# Patient Record
Sex: FEMALE | Race: WHITE | Employment: OTHER | URBAN - METROPOLITAN AREA
[De-identification: names, ages, dates, MRNs, and addresses within clinical notes are randomized per-mention and may not be internally consistent; named-entity substitution may affect disease eponyms.]

---

## 2017-02-13 ENCOUNTER — HOSPITAL ENCOUNTER (OUTPATIENT)
Dept: SURGERY | Age: 62
Discharge: HOME | End: 2017-02-13
Attending: INTERNAL MEDICINE | Admitting: INTERNAL MEDICINE

## 2017-02-13 VITALS
DIASTOLIC BLOOD PRESSURE: 76 MMHG | HEART RATE: 102 BPM | WEIGHT: 130 LBS | SYSTOLIC BLOOD PRESSURE: 108 MMHG | OXYGEN SATURATION: 97 % | BODY MASS INDEX: 25.52 KG/M2 | RESPIRATION RATE: 16 BRPM | HEIGHT: 60 IN

## 2017-02-13 RX ORDER — LIDOCAINE HYDROCHLORIDE 10 MG/ML
0.1 INJECTION, SOLUTION INFILTRATION; PERINEURAL ONCE
Status: DISCONTINUED | OUTPATIENT
Start: 2017-02-13 | End: 2017-02-14 | Stop reason: HOSPADM

## 2017-02-13 RX ORDER — SODIUM CHLORIDE, SODIUM LACTATE, POTASSIUM CHLORIDE, CALCIUM CHLORIDE 600; 310; 30; 20 MG/100ML; MG/100ML; MG/100ML; MG/100ML
INJECTION, SOLUTION INTRAVENOUS CONTINUOUS
Status: DISCONTINUED | OUTPATIENT
Start: 2017-02-13 | End: 2017-02-14 | Stop reason: HOSPADM

## 2017-02-13 RX ADMIN — SODIUM CHLORIDE, SODIUM LACTATE, POTASSIUM CHLORIDE, CALCIUM CHLORIDE: 600; 310; 30; 20 INJECTION, SOLUTION INTRAVENOUS at 10:54

## 2017-02-13 ASSESSMENT — PAIN - FUNCTIONAL ASSESSMENT: PAIN_FUNCTIONAL_ASSESSMENT: 0-10

## 2017-02-13 ASSESSMENT — PAIN SCALES - GENERAL: PAINLEVEL_OUTOF10: 0

## 2018-08-31 ENCOUNTER — HOSPITAL ENCOUNTER (OUTPATIENT)
Dept: PULMONOLOGY | Age: 63
Discharge: HOME | End: 2018-08-31
Payer: MEDICAID

## 2018-08-31 PROCEDURE — 94760 N-INVAS EAR/PLS OXIMETRY 1: CPT

## 2018-08-31 PROCEDURE — 6370000000 HC RX 637 (ALT 250 FOR IP): Performed by: NURSE PRACTITIONER

## 2018-08-31 PROCEDURE — 94726 PLETHYSMOGRAPHY LUNG VOLUMES: CPT

## 2018-08-31 PROCEDURE — 94664 DEMO&/EVAL PT USE INHALER: CPT

## 2018-08-31 PROCEDURE — 94729 DIFFUSING CAPACITY: CPT

## 2018-08-31 PROCEDURE — 94060 EVALUATION OF WHEEZING: CPT

## 2018-08-31 RX ORDER — ALBUTEROL SULFATE 90 UG/1
4 AEROSOL, METERED RESPIRATORY (INHALATION) ONCE
Status: COMPLETED | OUTPATIENT
Start: 2018-08-31 | End: 2018-08-31

## 2018-08-31 RX ADMIN — Medication 4 PUFF: at 13:51

## 2018-09-04 NOTE — PROCEDURES
[facility]                   [facility]                                PULMONARY FUNCTION    PATIENT NAME: Prashant Cope                      :        1955  MED REC NO:   [id number]                          ROOM:  ACCOUNT NO:   [de-identified]                           ADMIT DATE: 2018  PROVIDER:     Kamaljit Yi MD    DATE OF PROCEDURE:  2018    PFT    Spirometry showed FVC 1.02 L, 36% predicted; FEV1 0.88 L, 41% predicted,  with the FEV1/FVC ratio of 86%. There was no significant bronchodilator  response. Lung volume showed TLC of 2.26 L, 50% predicted; ERV 14%  predicted. Diffusion capacity was 57% predicted. IMPRESSION:  PFT suggests a severe restrictive defect based on total lung  capacity and spirometry. There is an insignificant bronchodilator  response. Diffusion capacity is also significantly diminished. Overall,  there is a high suggestion of an interstitial process. With the provided  history of wheezing, a coexisting obstructive process is not ruled out. Clinical correlation is recommended.         Edin Montes MD    D: 2018 9:02:55       T: 2018 16:50:21     [email or web address]  Job#: [id number]     Doc#: [id number]    CC:  ANGELINE Pierce MD

## 2018-11-07 ENCOUNTER — APPOINTMENT (OUTPATIENT)
Dept: CT IMAGING | Age: 63
DRG: 139 | End: 2018-11-07
Payer: MEDICAID

## 2018-11-07 ENCOUNTER — APPOINTMENT (OUTPATIENT)
Dept: GENERAL RADIOLOGY | Age: 63
DRG: 139 | End: 2018-11-07
Payer: MEDICAID

## 2018-11-07 ENCOUNTER — HOSPITAL ENCOUNTER (INPATIENT)
Age: 63
LOS: 2 days | Discharge: HOME | DRG: 139 | End: 2018-11-09
Attending: EMERGENCY MEDICINE | Admitting: INTERNAL MEDICINE
Payer: MEDICAID

## 2018-11-07 DIAGNOSIS — A41.9 SEPTICEMIA (HCC): Primary | ICD-10-CM

## 2018-11-07 DIAGNOSIS — J18.9 PNEUMONIA DUE TO ORGANISM: ICD-10-CM

## 2018-11-07 LAB
A/G RATIO: 1.3 (ref 1.1–2.2)
ALBUMIN SERPL-MCNC: 3.9 G/DL (ref 3.4–5)
ALP BLD-CCNC: 121 U/L (ref 40–129)
ALT SERPL-CCNC: 80 U/L (ref 10–40)
ANION GAP SERPL CALCULATED.3IONS-SCNC: 13 MMOL/L (ref 3–16)
AST SERPL-CCNC: 113 U/L (ref 15–37)
BANDED NEUTROPHILS RELATIVE PERCENT: 9 % (ref 0–7)
BASOPHILS ABSOLUTE: 0 K/UL (ref 0–0.2)
BASOPHILS RELATIVE PERCENT: 0 %
BILIRUB SERPL-MCNC: 0.4 MG/DL (ref 0–1)
BUN BLDV-MCNC: 15 MG/DL (ref 7–20)
CALCIUM SERPL-MCNC: 9.6 MG/DL (ref 8.3–10.6)
CHLORIDE BLD-SCNC: 98 MMOL/L (ref 99–110)
CO2: 22 MMOL/L (ref 21–32)
CREAT SERPL-MCNC: 0.8 MG/DL (ref 0.6–1.2)
EOSINOPHILS ABSOLUTE: 0 K/UL (ref 0–0.6)
EOSINOPHILS RELATIVE PERCENT: 0 %
GFR AFRICAN AMERICAN: >60
GFR NON-AFRICAN AMERICAN: >60
GLOBULIN: 3 G/DL
GLUCOSE BLD-MCNC: 158 MG/DL (ref 70–99)
HCT VFR BLD CALC: 43.3 % (ref 36–48)
HEMOGLOBIN: 14.3 G/DL (ref 12–16)
LACTIC ACID: 2.4 MMOL/L (ref 0.4–2)
LACTIC ACID: 2.9 MMOL/L (ref 0.4–2)
LYMPHOCYTES ABSOLUTE: 1.3 K/UL (ref 1–5.1)
LYMPHOCYTES RELATIVE PERCENT: 10 %
MCH RBC QN AUTO: 30.6 PG (ref 26–34)
MCHC RBC AUTO-ENTMCNC: 32.9 G/DL (ref 31–36)
MCV RBC AUTO: 92.8 FL (ref 80–100)
MONOCYTES ABSOLUTE: 0.9 K/UL (ref 0–1.3)
MONOCYTES RELATIVE PERCENT: 7 %
MYELOCYTE PERCENT: 1 %
NEUTROPHILS ABSOLUTE: 10.8 K/UL (ref 1.7–7.7)
NEUTROPHILS RELATIVE PERCENT: 73 %
PDW BLD-RTO: 15 % (ref 12.4–15.4)
PLATELET # BLD: 342 K/UL (ref 135–450)
PLATELET SLIDE REVIEW: ADEQUATE
PMV BLD AUTO: 7.3 FL (ref 5–10.5)
POTASSIUM REFLEX MAGNESIUM: 4 MMOL/L (ref 3.5–5.1)
PRO-BNP: 67 PG/ML (ref 0–124)
RBC # BLD: 4.67 M/UL (ref 4–5.2)
SLIDE REVIEW: ABNORMAL
SODIUM BLD-SCNC: 133 MMOL/L (ref 136–145)
TOTAL PROTEIN: 6.9 G/DL (ref 6.4–8.2)
TROPONIN: <0.01 NG/ML
VACUOLATED NEUTROPHILS: PRESENT
WBC # BLD: 13 K/UL (ref 4–11)

## 2018-11-07 PROCEDURE — G0378 HOSPITAL OBSERVATION PER HR: HCPCS

## 2018-11-07 PROCEDURE — 83880 ASSAY OF NATRIURETIC PEPTIDE: CPT

## 2018-11-07 PROCEDURE — 71275 CT ANGIOGRAPHY CHEST: CPT

## 2018-11-07 PROCEDURE — 6360000002 HC RX W HCPCS: Performed by: PHYSICIAN ASSISTANT

## 2018-11-07 PROCEDURE — 84484 ASSAY OF TROPONIN QUANT: CPT

## 2018-11-07 PROCEDURE — 99285 EMERGENCY DEPT VISIT HI MDM: CPT

## 2018-11-07 PROCEDURE — 93005 ELECTROCARDIOGRAM TRACING: CPT | Performed by: PHYSICIAN ASSISTANT

## 2018-11-07 PROCEDURE — 85025 COMPLETE CBC W/AUTO DIFF WBC: CPT

## 2018-11-07 PROCEDURE — 80053 COMPREHEN METABOLIC PANEL: CPT

## 2018-11-07 PROCEDURE — 6370000000 HC RX 637 (ALT 250 FOR IP): Performed by: INTERNAL MEDICINE

## 2018-11-07 PROCEDURE — 96361 HYDRATE IV INFUSION ADD-ON: CPT

## 2018-11-07 PROCEDURE — 2580000003 HC RX 258: Performed by: INTERNAL MEDICINE

## 2018-11-07 PROCEDURE — 83605 ASSAY OF LACTIC ACID: CPT

## 2018-11-07 PROCEDURE — 87040 BLOOD CULTURE FOR BACTERIA: CPT

## 2018-11-07 PROCEDURE — 96367 TX/PROPH/DG ADDL SEQ IV INF: CPT

## 2018-11-07 PROCEDURE — 96365 THER/PROPH/DIAG IV INF INIT: CPT

## 2018-11-07 PROCEDURE — 96375 TX/PRO/DX INJ NEW DRUG ADDON: CPT

## 2018-11-07 PROCEDURE — 2580000003 HC RX 258: Performed by: PHYSICIAN ASSISTANT

## 2018-11-07 PROCEDURE — 1200000000 HC SEMI PRIVATE

## 2018-11-07 PROCEDURE — 6360000004 HC RX CONTRAST MEDICATION: Performed by: PHYSICIAN ASSISTANT

## 2018-11-07 PROCEDURE — 71046 X-RAY EXAM CHEST 2 VIEWS: CPT

## 2018-11-07 PROCEDURE — 6370000000 HC RX 637 (ALT 250 FOR IP): Performed by: PHYSICIAN ASSISTANT

## 2018-11-07 RX ORDER — SODIUM CHLORIDE, SODIUM LACTATE, POTASSIUM CHLORIDE, CALCIUM CHLORIDE 600; 310; 30; 20 MG/100ML; MG/100ML; MG/100ML; MG/100ML
INJECTION, SOLUTION INTRAVENOUS CONTINUOUS
Status: DISCONTINUED | OUTPATIENT
Start: 2018-11-07 | End: 2018-11-09

## 2018-11-07 RX ORDER — IPRATROPIUM BROMIDE AND ALBUTEROL SULFATE 2.5; .5 MG/3ML; MG/3ML
1 SOLUTION RESPIRATORY (INHALATION) ONCE
Status: COMPLETED | OUTPATIENT
Start: 2018-11-07 | End: 2018-11-07

## 2018-11-07 RX ORDER — ONDANSETRON 2 MG/ML
4 INJECTION INTRAMUSCULAR; INTRAVENOUS EVERY 6 HOURS PRN
Status: DISCONTINUED | OUTPATIENT
Start: 2018-11-07 | End: 2018-11-09 | Stop reason: HOSPADM

## 2018-11-07 RX ORDER — AZITHROMYCIN 250 MG/1
250 TABLET, FILM COATED ORAL DAILY
Status: DISCONTINUED | OUTPATIENT
Start: 2018-11-08 | End: 2018-11-09 | Stop reason: HOSPADM

## 2018-11-07 RX ORDER — METOPROLOL TARTRATE 100 MG/1
100 TABLET ORAL DAILY
COMMUNITY
End: 2019-01-08 | Stop reason: ALTCHOICE

## 2018-11-07 RX ORDER — METHYLPREDNISOLONE SODIUM SUCCINATE 125 MG/2ML
125 INJECTION, POWDER, LYOPHILIZED, FOR SOLUTION INTRAMUSCULAR; INTRAVENOUS ONCE
Status: COMPLETED | OUTPATIENT
Start: 2018-11-07 | End: 2018-11-07

## 2018-11-07 RX ORDER — SODIUM CHLORIDE 0.9 % (FLUSH) 0.9 %
10 SYRINGE (ML) INJECTION EVERY 12 HOURS SCHEDULED
Status: DISCONTINUED | OUTPATIENT
Start: 2018-11-07 | End: 2018-11-09 | Stop reason: HOSPADM

## 2018-11-07 RX ORDER — METOPROLOL TARTRATE 50 MG/1
100 TABLET, FILM COATED ORAL DAILY
Status: DISCONTINUED | OUTPATIENT
Start: 2018-11-08 | End: 2018-11-09 | Stop reason: HOSPADM

## 2018-11-07 RX ORDER — KETOROLAC TROMETHAMINE 30 MG/ML
15 INJECTION, SOLUTION INTRAMUSCULAR; INTRAVENOUS ONCE
Status: COMPLETED | OUTPATIENT
Start: 2018-11-07 | End: 2018-11-07

## 2018-11-07 RX ORDER — 0.9 % SODIUM CHLORIDE 0.9 %
30 INTRAVENOUS SOLUTION INTRAVENOUS ONCE
Status: COMPLETED | OUTPATIENT
Start: 2018-11-07 | End: 2018-11-07

## 2018-11-07 RX ORDER — SODIUM CHLORIDE 0.9 % (FLUSH) 0.9 %
10 SYRINGE (ML) INJECTION PRN
Status: DISCONTINUED | OUTPATIENT
Start: 2018-11-07 | End: 2018-11-09 | Stop reason: HOSPADM

## 2018-11-07 RX ORDER — PREDNISONE 1 MG/1
5 TABLET ORAL 2 TIMES DAILY
Status: DISCONTINUED | OUTPATIENT
Start: 2018-11-07 | End: 2018-11-09 | Stop reason: HOSPADM

## 2018-11-07 RX ORDER — PREDNISONE 1 MG/1
5 TABLET ORAL DAILY
COMMUNITY
Start: 2018-07-24 | End: 2020-07-09

## 2018-11-07 RX ADMIN — METHYLPREDNISOLONE SODIUM SUCCINATE 125 MG: 125 INJECTION, POWDER, FOR SOLUTION INTRAMUSCULAR; INTRAVENOUS at 19:00

## 2018-11-07 RX ADMIN — PREDNISONE 5 MG: 5 TABLET ORAL at 22:23

## 2018-11-07 RX ADMIN — KETOROLAC TROMETHAMINE 15 MG: 30 INJECTION, SOLUTION INTRAMUSCULAR at 19:00

## 2018-11-07 RX ADMIN — IPRATROPIUM BROMIDE AND ALBUTEROL SULFATE 1 AMPULE: .5; 3 SOLUTION RESPIRATORY (INHALATION) at 19:07

## 2018-11-07 RX ADMIN — SODIUM CHLORIDE, POTASSIUM CHLORIDE, SODIUM LACTATE AND CALCIUM CHLORIDE: 600; 310; 30; 20 INJECTION, SOLUTION INTRAVENOUS at 22:24

## 2018-11-07 RX ADMIN — DEXTROSE MONOHYDRATE 500 MG: 50 INJECTION, SOLUTION INTRAVENOUS at 19:38

## 2018-11-07 RX ADMIN — CEFTRIAXONE SODIUM 1 G: 1 INJECTION, POWDER, FOR SOLUTION INTRAMUSCULAR; INTRAVENOUS at 19:06

## 2018-11-07 RX ADMIN — SODIUM CHLORIDE 1905 ML: 9 INJECTION, SOLUTION INTRAVENOUS at 17:55

## 2018-11-07 RX ADMIN — Medication 10 ML: at 22:24

## 2018-11-07 RX ADMIN — IOPAMIDOL 85 ML: 755 INJECTION, SOLUTION INTRAVENOUS at 19:49

## 2018-11-07 RX ADMIN — IPRATROPIUM BROMIDE AND ALBUTEROL SULFATE 1 AMPULE: .5; 3 SOLUTION RESPIRATORY (INHALATION) at 19:00

## 2018-11-07 ASSESSMENT — PAIN SCALES - GENERAL: PAINLEVEL_OUTOF10: 0

## 2018-11-08 PROBLEM — M35.3 POLYMYALGIA RHEUMATICA (HCC): Status: ACTIVE | Noted: 2018-11-08

## 2018-11-08 PROBLEM — R65.10 SIRS (SYSTEMIC INFLAMMATORY RESPONSE SYNDROME) (HCC): Status: ACTIVE | Noted: 2018-11-07

## 2018-11-08 PROBLEM — I10 ESSENTIAL HYPERTENSION: Status: ACTIVE | Noted: 2018-11-08

## 2018-11-08 LAB
EKG ATRIAL RATE: 125 BPM
EKG DIAGNOSIS: NORMAL
EKG P AXIS: 54 DEGREES
EKG P-R INTERVAL: 140 MS
EKG Q-T INTERVAL: 308 MS
EKG QRS DURATION: 82 MS
EKG QTC CALCULATION (BAZETT): 444 MS
EKG R AXIS: 23 DEGREES
EKG T AXIS: 34 DEGREES
EKG VENTRICULAR RATE: 125 BPM
PROCALCITONIN: 0.3 NG/ML (ref 0–0.15)

## 2018-11-08 PROCEDURE — 94150 VITAL CAPACITY TEST: CPT

## 2018-11-08 PROCEDURE — 6360000002 HC RX W HCPCS: Performed by: INTERNAL MEDICINE

## 2018-11-08 PROCEDURE — 1200000000 HC SEMI PRIVATE

## 2018-11-08 PROCEDURE — 6370000000 HC RX 637 (ALT 250 FOR IP): Performed by: INTERNAL MEDICINE

## 2018-11-08 PROCEDURE — 93010 ELECTROCARDIOGRAM REPORT: CPT | Performed by: INTERNAL MEDICINE

## 2018-11-08 PROCEDURE — G0378 HOSPITAL OBSERVATION PER HR: HCPCS

## 2018-11-08 PROCEDURE — 99233 SBSQ HOSP IP/OBS HIGH 50: CPT | Performed by: INTERNAL MEDICINE

## 2018-11-08 PROCEDURE — 84145 PROCALCITONIN (PCT): CPT

## 2018-11-08 PROCEDURE — 36415 COLL VENOUS BLD VENIPUNCTURE: CPT

## 2018-11-08 PROCEDURE — 94640 AIRWAY INHALATION TREATMENT: CPT

## 2018-11-08 PROCEDURE — 96375 TX/PRO/DX INJ NEW DRUG ADDON: CPT

## 2018-11-08 PROCEDURE — 2580000003 HC RX 258: Performed by: INTERNAL MEDICINE

## 2018-11-08 PROCEDURE — 96366 THER/PROPH/DIAG IV INF ADDON: CPT

## 2018-11-08 PROCEDURE — 94761 N-INVAS EAR/PLS OXIMETRY MLT: CPT

## 2018-11-08 PROCEDURE — 6360000002 HC RX W HCPCS: Performed by: PHYSICIAN ASSISTANT

## 2018-11-08 RX ORDER — ALBUTEROL SULFATE 2.5 MG/3ML
2.5 SOLUTION RESPIRATORY (INHALATION) EVERY 4 HOURS PRN
Status: DISCONTINUED | OUTPATIENT
Start: 2018-11-08 | End: 2018-11-09 | Stop reason: HOSPADM

## 2018-11-08 RX ADMIN — METOPROLOL TARTRATE 100 MG: 50 TABLET ORAL at 08:26

## 2018-11-08 RX ADMIN — PREDNISONE 5 MG: 5 TABLET ORAL at 20:40

## 2018-11-08 RX ADMIN — CEFTRIAXONE SODIUM 1 G: 1 INJECTION, POWDER, FOR SOLUTION INTRAMUSCULAR; INTRAVENOUS at 20:40

## 2018-11-08 RX ADMIN — AZITHROMYCIN 250 MG: 250 TABLET, FILM COATED ORAL at 08:25

## 2018-11-08 RX ADMIN — ALBUTEROL SULFATE 2.5 MG: 2.5 SOLUTION RESPIRATORY (INHALATION) at 13:31

## 2018-11-08 RX ADMIN — SODIUM CHLORIDE, POTASSIUM CHLORIDE, SODIUM LACTATE AND CALCIUM CHLORIDE: 600; 310; 30; 20 INJECTION, SOLUTION INTRAVENOUS at 05:50

## 2018-11-08 RX ADMIN — Medication 10 ML: at 20:40

## 2018-11-08 RX ADMIN — ONDANSETRON 4 MG: 2 INJECTION INTRAMUSCULAR; INTRAVENOUS at 23:33

## 2018-11-08 RX ADMIN — SODIUM CHLORIDE, POTASSIUM CHLORIDE, SODIUM LACTATE AND CALCIUM CHLORIDE: 600; 310; 30; 20 INJECTION, SOLUTION INTRAVENOUS at 13:44

## 2018-11-08 RX ADMIN — PREDNISONE 5 MG: 5 TABLET ORAL at 08:26

## 2018-11-08 RX ADMIN — ALBUTEROL SULFATE 2.5 MG: 2.5 SOLUTION RESPIRATORY (INHALATION) at 18:48

## 2018-11-08 RX ADMIN — SODIUM CHLORIDE, POTASSIUM CHLORIDE, SODIUM LACTATE AND CALCIUM CHLORIDE: 600; 310; 30; 20 INJECTION, SOLUTION INTRAVENOUS at 20:39

## 2018-11-08 ASSESSMENT — PAIN DESCRIPTION - ONSET: ONSET: GRADUAL

## 2018-11-08 ASSESSMENT — PAIN DESCRIPTION - PAIN TYPE: TYPE: ACUTE PAIN

## 2018-11-08 ASSESSMENT — PAIN SCALES - GENERAL: PAINLEVEL_OUTOF10: 2

## 2018-11-08 NOTE — ED PROVIDER NOTES
department physician unless otherwise noted. Please see attending Ed providers note for interpretation      ED Course / Medical Decision Making     Medications   metoprolol tartrate (LOPRESSOR) tablet 100 mg (not administered)   predniSONE (DELTASONE) tablet 5 mg (5 mg Oral Given 11/7/18 2223)   sodium chloride flush 0.9 % injection 10 mL (10 mLs Intravenous Given 11/7/18 2224)   sodium chloride flush 0.9 % injection 10 mL (not administered)   magnesium hydroxide (MILK OF MAGNESIA) 400 MG/5ML suspension 30 mL (not administered)   ondansetron (ZOFRAN) injection 4 mg (not administered)   enoxaparin (LOVENOX) injection 40 mg (not administered)   lactated ringers infusion ( Intravenous New Bag 11/7/18 2224)   cefTRIAXone (ROCEPHIN) 1 g IVPB in 50 mL D5W minibag (not administered)   azithromycin (ZITHROMAX) tablet 250 mg (not administered)   0.9 % sodium chloride bolus (0 mL/kg × 63.5 kg Intravenous Stopped 11/7/18 1939)   azithromycin (ZITHROMAX) 500 mg in D5W 250ml addavial (0 mg Intravenous Stopped 11/7/18 2109)   cefTRIAXone (ROCEPHIN) 1 g IVPB in 50 mL D5W minibag (0 g Intravenous Stopped 11/7/18 1939)   ipratropium-albuterol (DUONEB) nebulizer solution 1 ampule (1 ampule Inhalation Given 11/7/18 1907)   ipratropium-albuterol (DUONEB) nebulizer solution 1 ampule (1 ampule Inhalation Given 11/7/18 1900)   ketorolac (TORADOL) injection 15 mg (15 mg Intravenous Given 11/7/18 1900)   methylPREDNISolone sodium (SOLU-MEDROL) injection 125 mg (125 mg Intravenous Given 11/7/18 1900)   iopamidol (ISOVUE-370) 76 % injection 85 mL (85 mLs Intravenous Given 11/7/18 1949)     Re-Evaluations:  11/7/18      Time: 2115    Patients symptoms show no change. Consultations:             IP CONSULT TO HOSPITALIST  Agrees to admit the patient in stable condition    MDM:  Afebrile, stable, patient presents to the ED for evaluation. Seen in conjunction with attending ED provider who agrees with assessment and plan.   Patients sent in

## 2018-11-08 NOTE — H&P
[address]                 [address] [address]                              HISTORY AND PHYSICAL    PATIENT NAME: Omayra Garcia                      :        1955  MED REC NO:   [id number]                          ROOM:       2750  ACCOUNT NO:   [de-identified]                           ADMIT DATE: 2018  PROVIDER:     Delma Ellis MD    I obtained a history and performed physical exam on the patient on the  medical floor in the presence of her assigned nurse in the room on  2018. CHIEF COMPLAINT:  Feeling very fatigued and weak. HISTORY OF PRESENT ILLNESS:  The patient is a 81-year-old   female presenting to the hospital with chief complaint of 3-4 days of  initially flu-like symptoms with body aches, associated with gradual  onset of shortness of breath over three days along with cough without  expectoration and persistent increase in fevers and chills especially  today. The patient's symptoms were more when she tried to exert and  were partially relieved by rest.  At the time of my exam, she states  that she is feeling a little better. PAST MEDICAL/PAST SURGICAL HISTORY:  Polymyalgia rheumatica, on chronic  steroids at home. ALLERGIC HISTORY:  No known allergies. FAMILY HISTORY:  Reviewed by me and is currently noncontributory. SOCIAL HISTORY:  Nonsmoker, no illicit substance use. MEDICATIONS:  Home medication list has been reviewed. REVIEW OF SYSTEMS:  Significant for the fatigue and the shortness of  breath and per the history of present illness. All other systems have  been reviewed and are negative except for the history of present  illness. PHYSICAL EXAMINATION:  The patient was examined on the medical floor. VITAL SIGNS:  Temperature 98, respiratory rate 17, pulse 127, blood  pressure 97/80, saturating 94%. CNS:  Alert, awake and oriented to time, place and person.   PSYCH:

## 2018-11-08 NOTE — PROGRESS NOTES
RESPIRATORY THERAPY ASSESSMENT    Name:  Randy Burks Record Number:  [id number]  Age: 66 y.o. Gender: female  : 1955  Today's Date:  2018  Room:  02080208-01    Assessment     Is the patient being admitted for a COPD or Asthma exacerbation? No   (If yes the patient will be seen every 4 hours for the first 24 hours and then reassessed)    Patient Admission Diagnosis      Allergies  Allergies   Allergen Reactions    Penicillins Rash       Minimum Predicted Vital Capacity:     680          Actual Vital Capacity:      700            Pulmonary History:No history  Home Oxygen Therapy:  room air  Home Respiratory Therapy:Albuterol just started mdi  Current Respiratory Therapy:  Albuterol q 4 prn  Treatment Type: HHN  Medications: Albuterol    Respiratory Severity Index(RSI)   Patients with orders for inhalation medications, oxygen, or any therapeutic treatment modality will be placed on Respiratory Protocol. They will be assessed with the first treatment and at least every 72 hours thereafter. The following severity scale will be used to determine frequency of treatment intervention.     Smoking History: No Smoking History = 0    Social History  Social History   Substance Use Topics    Smoking status: Never Smoker    Smokeless tobacco: Never Used    Alcohol use No       Recent Surgical History: None = 0  Past Surgical History  Past Surgical History:   Procedure Laterality Date    INNER EAR SURGERY Right        Level of Consciousness: Alert, Oriented, and Cooperative = 0    Level of Activity: Walking unassisted = 0    Respiratory Pattern: Regular Pattern; RR 8-20 = 0    Breath Sounds: Diminshed bilaterally and/or crackles = 2    Sputum   ,  , Sputum How Obtained: Spontaneous cough  Cough: Strong, spontaneous, non-productive = 0    Vital Signs   /78   Pulse 103   Temp 97.6 °F (36.4 °C) (Oral)   Resp 20   Ht 5' (1.524 m)   Wt 144 lb 1 oz (65.3 kg)   SpO2 96%   BMI 28.14 kg/m²

## 2018-11-08 NOTE — PROGRESS NOTES
Admission questions complete. Shift assessment completed. Pt is alert and oriented. Vital signs are WNL. Respirations are even & easy at rest. Dyspnea with exertion. Lungs diminished t/o. Pt has dry cough occasionally. No complaints voiced. Four eyes completed no skin issues noted. Pt denies needs at this time. SR up x 2 and bed in low position. Call light is within reach. Will monitor.

## 2018-11-09 VITALS
SYSTOLIC BLOOD PRESSURE: 152 MMHG | RESPIRATION RATE: 18 BRPM | DIASTOLIC BLOOD PRESSURE: 80 MMHG | HEIGHT: 60 IN | HEART RATE: 97 BPM | OXYGEN SATURATION: 97 % | WEIGHT: 144.06 LBS | TEMPERATURE: 97.4 F | BODY MASS INDEX: 28.28 KG/M2

## 2018-11-09 LAB — PROCALCITONIN: 0.18 NG/ML (ref 0–0.15)

## 2018-11-09 PROCEDURE — 99238 HOSP IP/OBS DSCHRG MGMT 30/<: CPT | Performed by: INTERNAL MEDICINE

## 2018-11-09 PROCEDURE — G0378 HOSPITAL OBSERVATION PER HR: HCPCS

## 2018-11-09 PROCEDURE — 2580000003 HC RX 258: Performed by: INTERNAL MEDICINE

## 2018-11-09 PROCEDURE — 6370000000 HC RX 637 (ALT 250 FOR IP): Performed by: INTERNAL MEDICINE

## 2018-11-09 PROCEDURE — 84145 PROCALCITONIN (PCT): CPT

## 2018-11-09 PROCEDURE — 36415 COLL VENOUS BLD VENIPUNCTURE: CPT

## 2018-11-09 RX ORDER — LEVOFLOXACIN 500 MG/1
500 TABLET, FILM COATED ORAL DAILY
Qty: 6 TABLET | Refills: 0 | Status: SHIPPED | OUTPATIENT
Start: 2018-11-09 | End: 2018-11-15

## 2018-11-09 RX ORDER — ALBUTEROL SULFATE 90 UG/1
2 AEROSOL, METERED RESPIRATORY (INHALATION) EVERY 6 HOURS PRN
Qty: 1 INHALER | Refills: 0 | Status: ON HOLD | OUTPATIENT
Start: 2018-11-09 | End: 2021-05-11 | Stop reason: SDUPTHER

## 2018-11-09 RX ADMIN — METOPROLOL TARTRATE 100 MG: 50 TABLET ORAL at 08:55

## 2018-11-09 RX ADMIN — AZITHROMYCIN 250 MG: 250 TABLET, FILM COATED ORAL at 08:55

## 2018-11-09 RX ADMIN — PREDNISONE 5 MG: 5 TABLET ORAL at 08:55

## 2018-11-09 RX ADMIN — Medication 10 ML: at 08:55

## 2018-11-09 RX ADMIN — SODIUM CHLORIDE, POTASSIUM CHLORIDE, SODIUM LACTATE AND CALCIUM CHLORIDE: 600; 310; 30; 20 INJECTION, SOLUTION INTRAVENOUS at 05:24

## 2018-11-09 NOTE — PROGRESS NOTES
Awake most of the night. Pt stated that she has slept for 2 days and she just couldn't sleep very well tonight. O2 sat 95-96% on RA. Pt denies c/o pain or needing anything at this time.

## 2018-11-09 NOTE — DISCHARGE SUMMARY
shakiness,   headache, fast heartbeat.     levofloxacin 500 MG tablet  Commonly known as:  LEVAQUIN  Take 1 tablet by mouth daily for 6 days  Notes to patient:  Levaquin  Use: Antibiotic  Side effects: Nausea/Vomiting, Headache,   Dizziness, Tendoniitis, Tendon rupture        CONTINUE taking these medications    metoprolol 100 MG tablet  Commonly known as:  LOPRESSOR     predniSONE 5 MG tablet  Commonly known as:  Chloe Pelaez        STOP taking these medications    NONFORMULARY           Where to Get Your Medications      These medications were sent to [address] [place] [address],[address], [address] [place] [address], [address] [address]    Phone:  [phone]   · albuterol sulfate  (90 Base) MCG/ACT inhaler  · levofloxacin 500 MG tablet           Discharged in stable condition to home    Follow Up:   Follow up with PCP in 1 week    Homa Blake 11/10/2018 11:58 AM

## 2018-11-09 NOTE — PROGRESS NOTES
Shift assessment completed, see flowsheets. AM meds given per orders. Pt refused lovenox injection, pt educated on the importance and need for injection. Pt denies pain or further needs at this time. Call light and personal belongings within reach.

## 2018-11-12 LAB
BLOOD CULTURE, ROUTINE: NORMAL
CULTURE, BLOOD 2: NORMAL

## 2019-01-07 PROBLEM — R00.0 TACHYCARDIA: Status: ACTIVE | Noted: 2019-01-07

## 2019-01-08 ENCOUNTER — OFFICE VISIT (OUTPATIENT)
Dept: CARDIOLOGY CLINIC | Age: 64
End: 2019-01-08
Payer: MEDICAID

## 2019-01-08 VITALS
SYSTOLIC BLOOD PRESSURE: 116 MMHG | DIASTOLIC BLOOD PRESSURE: 76 MMHG | OXYGEN SATURATION: 95 % | HEIGHT: 60 IN | BODY MASS INDEX: 27.78 KG/M2 | WEIGHT: 141.5 LBS | HEART RATE: 90 BPM

## 2019-01-08 DIAGNOSIS — I10 ESSENTIAL HYPERTENSION: Primary | ICD-10-CM

## 2019-01-08 DIAGNOSIS — R00.0 TACHYCARDIA: ICD-10-CM

## 2019-01-08 PROCEDURE — G8484 FLU IMMUNIZE NO ADMIN: HCPCS | Performed by: INTERNAL MEDICINE

## 2019-01-08 PROCEDURE — G8419 CALC BMI OUT NRM PARAM NOF/U: HCPCS | Performed by: INTERNAL MEDICINE

## 2019-01-08 PROCEDURE — G8427 DOCREV CUR MEDS BY ELIG CLIN: HCPCS | Performed by: INTERNAL MEDICINE

## 2019-01-08 PROCEDURE — 1036F TOBACCO NON-USER: CPT | Performed by: INTERNAL MEDICINE

## 2019-01-08 PROCEDURE — 3017F COLORECTAL CA SCREEN DOC REV: CPT | Performed by: INTERNAL MEDICINE

## 2019-01-08 PROCEDURE — 99203 OFFICE O/P NEW LOW 30 MIN: CPT | Performed by: INTERNAL MEDICINE

## 2019-01-08 RX ORDER — METOPROLOL SUCCINATE 100 MG/1
100 TABLET, EXTENDED RELEASE ORAL DAILY
Qty: 30 TABLET | Refills: 11 | Status: SHIPPED | OUTPATIENT
Start: 2019-01-08 | End: 2020-01-02 | Stop reason: SDUPTHER

## 2019-01-08 RX ORDER — ERGOCALCIFEROL 1.25 MG/1
50000 CAPSULE ORAL WEEKLY
Qty: 12 CAPSULE | Refills: 1
Start: 2019-01-08 | End: 2020-07-09

## 2019-03-01 ENCOUNTER — HOSPITAL ENCOUNTER (OUTPATIENT)
Dept: VASCULAR LAB | Age: 64
Discharge: HOME | End: 2019-03-01
Payer: COMMERCIAL

## 2019-03-01 PROCEDURE — 93922 UPR/L XTREMITY ART 2 LEVELS: CPT

## 2019-08-30 ENCOUNTER — OFFICE VISIT (OUTPATIENT)
Dept: ENT CLINIC | Age: 64
End: 2019-08-30
Payer: COMMERCIAL

## 2019-08-30 VITALS
BODY MASS INDEX: 25.32 KG/M2 | DIASTOLIC BLOOD PRESSURE: 79 MMHG | WEIGHT: 129 LBS | HEART RATE: 100 BPM | SYSTOLIC BLOOD PRESSURE: 146 MMHG | HEIGHT: 60 IN | TEMPERATURE: 97.5 F

## 2019-08-30 DIAGNOSIS — J30.1 SEASONAL ALLERGIC RHINITIS DUE TO POLLEN: ICD-10-CM

## 2019-08-30 DIAGNOSIS — Z90.89 HISTORY OF RIGHT MASTOIDECTOMY: Primary | ICD-10-CM

## 2019-08-30 PROCEDURE — G8427 DOCREV CUR MEDS BY ELIG CLIN: HCPCS | Performed by: OTOLARYNGOLOGY

## 2019-08-30 PROCEDURE — 99203 OFFICE O/P NEW LOW 30 MIN: CPT | Performed by: OTOLARYNGOLOGY

## 2019-08-30 PROCEDURE — 1036F TOBACCO NON-USER: CPT | Performed by: OTOLARYNGOLOGY

## 2019-08-30 PROCEDURE — G8419 CALC BMI OUT NRM PARAM NOF/U: HCPCS | Performed by: OTOLARYNGOLOGY

## 2019-08-30 PROCEDURE — 69220 CLEAN OUT MASTOID CAVITY: CPT | Performed by: OTOLARYNGOLOGY

## 2019-08-30 PROCEDURE — 3017F COLORECTAL CA SCREEN DOC REV: CPT | Performed by: OTOLARYNGOLOGY

## 2019-08-30 ASSESSMENT — ENCOUNTER SYMPTOMS
COLOR CHANGE: 0
WHEEZING: 0
TROUBLE SWALLOWING: 0
STRIDOR: 0
SINUS PAIN: 0
DIARRHEA: 0
EYE ITCHING: 0
SORE THROAT: 0
CONSTIPATION: 0
COUGH: 0
PHOTOPHOBIA: 0
BLOOD IN STOOL: 0
VOMITING: 0
SINUS PRESSURE: 0
EYE DISCHARGE: 0
FACIAL SWELLING: 0
VOICE CHANGE: 0
CHOKING: 0
BACK PAIN: 0
SHORTNESS OF BREATH: 0
RHINORRHEA: 0
NAUSEA: 0

## 2019-08-30 NOTE — PROGRESS NOTES
[facility]  [address] [facility] [address], [address] [facility] [address]  P: [phone]  F: [phone]       Patient     Nancy Landeros  1955    ChiefComplaint     Chief Complaint   Patient presents with    Ear Problem     Surgery right ear and Dr. Amarilis Puentes rebuilt her ear but he retired and she need to have her ear looked at every 6 months       History of Present Illness     Celine Salcido is a pleasant 72-year-old female who presents as a new patient today for right ear problem. She has a history of a right canal wall down mastoidectomy for cholesteatoma. She is here for mastoid bowl cleaning. She has not had the ear cleaned for quite some time. Surgery was 2 years ago. Her previous ENT retired. She denies any significant change in hearing. She denies otalgia or otorrhea. She does have a history of PE tubes on the right and left side. No problems with hearing in the left ear. Additionally the patient complains of some allergic rhinitis and nasal obstruction symptoms. She has used Flonase in the past but experiences some nosebleeds with that. She has been taking Claritin as needed. She continues to have some nasal obstruction symptoms.     Past Medical History     Past Medical History:   Diagnosis Date    Arthritis     Hypertension        Past Surgical History     Past Surgical History:   Procedure Laterality Date    INNER EAR SURGERY Right        Family History     Family History   Problem Relation Age of Onset    Diabetes Mother     Cancer Father         pancreas       Social History     Social History     Socioeconomic History    Marital status:      Spouse name: Luz Elena Edwards    Number of children: 0    Years of education: 15    Highest education level: Not on file   Occupational History    Not on file   Social Needs    Financial resource strain: Not on file    Food insecurity:     Worry: Not on file     Inability: Not on file    Transportation needs:     Medical: Not not retracted and not bulging. Tympanic membrane mobility is normal.  No middle ear effusion. No decreased hearing is noted. Ears:    Nose: No mucosal edema, rhinorrhea, nose lacerations, sinus tenderness, nasal deformity, septal deviation or nasal septal hematoma. No epistaxis. No foreign bodies. Right sinus exhibits no maxillary sinus tenderness and no frontal sinus tenderness. Left sinus exhibits no maxillary sinus tenderness and no frontal sinus tenderness. Mouth/Throat: Uvula is midline and oropharynx is clear and moist. Mucous membranes are not pale, not dry and not cyanotic. No oral lesions. No trismus in the jaw. Normal dentition. No dental abscesses, uvula swelling, lacerations or dental caries. No oropharyngeal exudate, posterior oropharyngeal edema, posterior oropharyngeal erythema or tonsillar abscesses. Eyes: Lids are normal. Right eye exhibits no chemosis, no discharge and no exudate. Left eye exhibits no chemosis, no discharge and no exudate. Right eye exhibits normal extraocular motion and no nystagmus. Left eye exhibits normal extraocular motion and no nystagmus. Neck: Neck supple. Normal carotid pulses present. No tracheal tenderness present. No tracheal deviation present. No thyroid mass and no thyromegaly present. Cardiovascular: Normal rate and regular rhythm. Pulmonary/Chest: No stridor. No apnea, no tachypnea and no bradypnea. No respiratory distress. Musculoskeletal:        Right shoulder: She exhibits normal range of motion. Lymphadenopathy:        Head (right side): No submental, no submandibular, no tonsillar, no preauricular, no posterior auricular and no occipital adenopathy present. Head (left side): No submental, no submandibular, no tonsillar, no preauricular, no posterior auricular and no occipital adenopathy present. She has no cervical adenopathy.         Right cervical: No superficial cervical, no deep cervical and no posterior cervical adenopathy

## 2019-09-10 ENCOUNTER — OFFICE VISIT (OUTPATIENT)
Dept: ENT CLINIC | Age: 64
End: 2019-09-10
Payer: COMMERCIAL

## 2019-09-10 VITALS
DIASTOLIC BLOOD PRESSURE: 76 MMHG | HEART RATE: 85 BPM | SYSTOLIC BLOOD PRESSURE: 149 MMHG | TEMPERATURE: 97.4 F | BODY MASS INDEX: 25.19 KG/M2 | HEIGHT: 60 IN

## 2019-09-10 DIAGNOSIS — Z90.89 HISTORY OF RIGHT MASTOIDECTOMY: Primary | ICD-10-CM

## 2019-09-10 PROCEDURE — G8427 DOCREV CUR MEDS BY ELIG CLIN: HCPCS | Performed by: OTOLARYNGOLOGY

## 2019-09-10 PROCEDURE — 1036F TOBACCO NON-USER: CPT | Performed by: OTOLARYNGOLOGY

## 2019-09-10 PROCEDURE — G8419 CALC BMI OUT NRM PARAM NOF/U: HCPCS | Performed by: OTOLARYNGOLOGY

## 2019-09-10 PROCEDURE — 3017F COLORECTAL CA SCREEN DOC REV: CPT | Performed by: OTOLARYNGOLOGY

## 2019-09-10 PROCEDURE — 99213 OFFICE O/P EST LOW 20 MIN: CPT | Performed by: OTOLARYNGOLOGY

## 2019-09-10 PROCEDURE — 69220 CLEAN OUT MASTOID CAVITY: CPT | Performed by: OTOLARYNGOLOGY

## 2019-09-10 ASSESSMENT — ENCOUNTER SYMPTOMS
SINUS PRESSURE: 0
PHOTOPHOBIA: 0
CHOKING: 0
STRIDOR: 0
DIARRHEA: 0
TROUBLE SWALLOWING: 0
SHORTNESS OF BREATH: 0
EYE PAIN: 0
FACIAL SWELLING: 0
EYE REDNESS: 0
COUGH: 0
COLOR CHANGE: 0
SORE THROAT: 0
RHINORRHEA: 0
SINUS PAIN: 0
EYE ITCHING: 0
NAUSEA: 0
VOICE CHANGE: 0

## 2019-09-10 NOTE — PROGRESS NOTES
[facility]  [address], [address] [facility], [address] [place] [address]  P: [phone]  F: [phone]       Patient     Jean-Paul Kilgore  1955    ChiefComplaint     No chief complaint on file. History of Present Illness     Rufino Crawley is here for follow-up for right mastoid bowl cleaning. She has been using Debrox over the past week.     Past Medical History     Past Medical History:   Diagnosis Date    Arthritis     Hypertension        Past Surgical History     Past Surgical History:   Procedure Laterality Date    INNER EAR SURGERY Right        Family History     Family History   Problem Relation Age of Onset    Diabetes Mother     Cancer Father         pancreas       Social History     Social History     Socioeconomic History    Marital status:      Spouse name: Aubrie Youssef    Number of children: 0    Years of education: 15    Highest education level: Not on file   Occupational History    Not on file   Social Needs    Financial resource strain: Not on file    Food insecurity:     Worry: Not on file     Inability: Not on file    Transportation needs:     Medical: Not on file     Non-medical: Not on file   Tobacco Use    Smoking status: Never Smoker    Smokeless tobacco: Never Used   Substance and Sexual Activity    Alcohol use: No    Drug use: No    Sexual activity: Not on file   Lifestyle    Physical activity:     Days per week: Not on file     Minutes per session: Not on file    Stress: Not on file   Relationships    Social connections:     Talks on phone: Not on file     Gets together: Not on file     Attends Jehovah's witness service: Not on file     Active member of club or organization: Not on file     Attends meetings of clubs or organizations: Not on file     Relationship status: Not on file    Intimate partner violence:     Fear of current or ex partner: Not on file     Emotionally abused: Not on file     Physically abused: Not on file     Forced sexual activity: Not

## 2019-09-27 ENCOUNTER — PROCEDURE VISIT (OUTPATIENT)
Dept: AUDIOLOGY | Age: 64
End: 2019-09-27
Payer: COMMERCIAL

## 2019-09-27 DIAGNOSIS — H93.11 TINNITUS, RIGHT EAR: ICD-10-CM

## 2019-09-27 DIAGNOSIS — H90.A22 SENSORINEURAL HEARING LOSS (SNHL) OF LEFT EAR WITH RESTRICTED HEARING OF RIGHT EAR: ICD-10-CM

## 2019-09-27 DIAGNOSIS — H90.A31 MIXED CONDUCTIVE AND SENSORINEURAL HEARING LOSS OF RIGHT EAR WITH RESTRICTED HEARING OF LEFT EAR: Primary | ICD-10-CM

## 2019-09-27 PROCEDURE — 92557 COMPREHENSIVE HEARING TEST: CPT | Performed by: AUDIOLOGIST

## 2019-09-27 PROCEDURE — 92567 TYMPANOMETRY: CPT | Performed by: AUDIOLOGIST

## 2019-09-27 NOTE — PATIENT INSTRUCTIONS
directly into a persons ear      Some additional items that may be helpful:   - Remain patient - this is important for both parties   - Write down items that still cannot be heard/understood. You may write with pen/paper or consider typing/texting on a cell phone or smart device. - If background noise is unavoidable, try to keep yourself in a good position in the room. By sitting at a bonner on the side of the restaurant (preferably a corner), it will be easier to communicate than if you were sitting at a table in the middle with background noise surrounding you. Keep yourself positioned away from music speakers or heavy foot traffic. Tinnitus: Overview and Management Strategies          Many people have some ringing sounds in their ears once in a while. You may hear a roar, a hiss, a tinkle, or a buzz. The sound usually lasts only a few minutes. If it goes on all the time, you may have tinnitus. Tinnitus is usually caused by long-term exposure to loud noise. This damages the nerves in the inner ear. It can occur with all types of hearing loss. It may be a symptom of almost any ear problem. Tinnitus may be caused by a buildup of earwax. Or, it may be caused by ear infections or certain medicines (especially antibiotics or large amounts of aspirin). You can also hear noises in your ears because of an injury to the ears, drinking too much alcohol or caffeine, or a medical condition. Other conditions may also contribute to tinnitus, including: head and neck trauma, temporomandibular joint disorder (TMJ), sinus pressure and barometric trauma, traumatic brain injury, metabolic disorders, autoimmune disorders, stress, and high blood pressure. You may need tests to evaluate your hearing and to find causes of long-lasting tinnitus. Your doctor may suggest one or more treatments to help you cope with the tinnitus. You can also do things at home to help reduce symptoms.     Follow-up care is a key part of your treatment and safety. Be sure to make and go to all appointments, and call your doctor if you are having problems. It's also a good idea to know your test results and keep a list of the medicines you take. How can you care for yourself at home? · Limit or cut out alcohol, caffeine, and sodium. They can make your symptoms worse. · Do not smoke or use other tobacco products. Nicotine reduces blood flow to the ear and makes tinnitus worse. If you need help quitting, talk to your doctor about stop-smoking programs and medicines. These can increase your chances of quitting for good. · Talk to your doctor about whether to stop taking aspirin and similar products such as ibuprofen or naproxen. · Get exercise often. It can help improve blood flow to the ear. Ways to manage/cope with tinnitus  Some tinnitus may last a long time. To manage your tinnitus, try to:  · Avoid noises that you think caused your tinnitus. If you can't avoid loud noises, wear earplugs or earmuffs. · Ignore the sound by paying attention to other things. Keeping your brain busy with other tasks or background noise can help your brain not focus on the tinnitus. · Try to not give the tinnitus an emotional reaction. Do your best to ignore the sound and not let it bother you. Relax using biofeedback, meditation, or yoga. Feeling stressed and being tired can make tinnitus worse. · Play music or white noise to help you sleep. Background noise may cover up the noise that you hear in your ears. You can buy a tabletop machine or a device that sits under your pillow to play soothing sounds, like ocean waves. · Smart phones have free apps, such as Whist, Relax Melodies, ReSound Relief, and White Noise Lite. These apps have different types of sounds/noise, some of which you can blend together to find sounds that are most soothing to you.   · Hearing aid technology, especially when there is some hearing loss, may help reduce tinnitus symptoms by giving your brain better access to the sounds it is missing. There are some hearing aids with built-in noise generator programs, which may help when amplification alone is not enough. Additional resources may be found through the [employer] at www.rodrick.org    When should you call for help? Call 911 anytime you think you may need emergency care. For example, call if:    · You have symptoms of a stroke. These may include:  ? Sudden numbness, tingling, weakness, or loss of movement in your face, arm, or leg, especially on only one side of your body. ? Sudden vision changes. ? Sudden trouble speaking. ? Sudden confusion or trouble understanding simple statements. ? Sudden problems with walking or balance. ? A sudden, severe headache that is different from past headaches. Call your doctor now or seek immediate medical care if:    · You develop other symptoms. These may include hearing loss (or worse hearing loss), balance problems, dizziness, nausea, or vomiting. Watch closely for changes in your health, and be sure to contact your doctor if:    · Your tinnitus moves from both ears to one ear. · Your hearing loss gets worse within 1 day after an ear injury. · Your tinnitus or hearing loss does not get better within 1 week after an ear injury. · Your tinnitus bothers you enough that you want to take medicines to help you cope with it. If you notice changes in your tinnitus and/or your hearing, it is recommended that you have your hearing tested by your audiologist and to follow-up with your physician that manages your hearing loss (such as your ENT or Primary Care doctor). Hearing Loss: Care Instructions  Your Care Instructions      Hearing loss is a sudden or slow decrease in how well you hear. It can range from mild to profound. Permanent hearing loss can occur with aging, and it can happen when you are exposed long-term to loud noise.  Examples include listening

## 2019-10-08 ENCOUNTER — TELEPHONE (OUTPATIENT)
Dept: CARDIOLOGY CLINIC | Age: 64
End: 2019-10-08

## 2020-01-02 RX ORDER — METOPROLOL SUCCINATE 100 MG/1
100 TABLET, EXTENDED RELEASE ORAL DAILY
Qty: 30 TABLET | Refills: 1 | Status: SHIPPED | OUTPATIENT
Start: 2020-01-02 | End: 2020-02-27

## 2020-02-27 RX ORDER — METOPROLOL SUCCINATE 100 MG/1
TABLET, EXTENDED RELEASE ORAL
Qty: 30 TABLET | Refills: 6 | Status: SHIPPED | OUTPATIENT
Start: 2020-02-27 | End: 2020-09-23

## 2020-07-09 ENCOUNTER — OFFICE VISIT (OUTPATIENT)
Dept: ENT CLINIC | Age: 65
End: 2020-07-09
Payer: COMMERCIAL

## 2020-07-09 VITALS
TEMPERATURE: 96.6 F | HEART RATE: 98 BPM | WEIGHT: 131.2 LBS | BODY MASS INDEX: 26.45 KG/M2 | DIASTOLIC BLOOD PRESSURE: 73 MMHG | SYSTOLIC BLOOD PRESSURE: 118 MMHG | HEIGHT: 59 IN

## 2020-07-09 PROCEDURE — G8427 DOCREV CUR MEDS BY ELIG CLIN: HCPCS | Performed by: OTOLARYNGOLOGY

## 2020-07-09 PROCEDURE — 99213 OFFICE O/P EST LOW 20 MIN: CPT | Performed by: OTOLARYNGOLOGY

## 2020-07-09 PROCEDURE — 3017F COLORECTAL CA SCREEN DOC REV: CPT | Performed by: OTOLARYNGOLOGY

## 2020-07-09 PROCEDURE — 69220 CLEAN OUT MASTOID CAVITY: CPT | Performed by: OTOLARYNGOLOGY

## 2020-07-09 PROCEDURE — 1036F TOBACCO NON-USER: CPT | Performed by: OTOLARYNGOLOGY

## 2020-07-09 PROCEDURE — G8419 CALC BMI OUT NRM PARAM NOF/U: HCPCS | Performed by: OTOLARYNGOLOGY

## 2020-07-09 ASSESSMENT — ENCOUNTER SYMPTOMS
COLOR CHANGE: 0
NAUSEA: 0
EYE PAIN: 0
VOICE CHANGE: 0
SINUS PRESSURE: 0
EYE REDNESS: 0
RHINORRHEA: 0
FACIAL SWELLING: 0
TROUBLE SWALLOWING: 0
EYE ITCHING: 0
PHOTOPHOBIA: 0
CHOKING: 0
SORE THROAT: 0
COUGH: 0
SHORTNESS OF BREATH: 0
STRIDOR: 0
DIARRHEA: 0
SINUS PAIN: 0

## 2020-07-09 NOTE — PROGRESS NOTES
[facility]  [facility] STEPHANIE Kaba, [address] [facility], [address] [place] [address]  P: [phone]  F: [phone]       Patient     Hiram Lama  1955    ChiefComplaint     Chief Complaint   Patient presents with    Cerumen Impaction     Here to have her ears cleaned. No other conserns       History of Present Illness     Hiram Lama is a pleasant 63 y.o. female history of right canal wall down mastoidectomy. She is here today for mastoid bowl cleaning. Denies any complaints of hearing loss, tinnitus, otorrhea, otalgia. Overall doing well. Not experiencing significant issues with her allergic rhinitis.     Past Medical History     Past Medical History:   Diagnosis Date    Arthritis     Hypertension        Past Surgical History     Past Surgical History:   Procedure Laterality Date    INNER EAR SURGERY Right        Family History     Family History   Problem Relation Age of Onset    Diabetes Mother     Cancer Father         pancreas       Social History     Social History     Socioeconomic History    Marital status:      Spouse name: Jose Alcantara    Number of children: 0    Years of education: 15    Highest education level: Not on file   Occupational History    Not on file   Social Needs    Financial resource strain: Not on file    Food insecurity     Worry: Not on file     Inability: Not on file   [employer] needs     Medical: Not on file     Non-medical: Not on file   Tobacco Use    Smoking status: Never Smoker    Smokeless tobacco: Never Used   Substance and Sexual Activity    Alcohol use: Yes     Comment: occas    Drug use: No    Sexual activity: Not on file   Lifestyle    Physical activity     Days per week: Not on file     Minutes per session: Not on file    Stress: Not on file   Relationships    Social connections     Talks on phone: Not on file     Gets together: Not on file     Attends [employer] service: Not on file     Active member of club or organization: Normocephalic and atraumatic. Jaw: No trismus. Right Ear: Tympanic membrane, ear canal and external ear normal. No drainage. No middle ear effusion. Tympanic membrane is not perforated. Left Ear: Tympanic membrane, ear canal and external ear normal. No drainage. No middle ear effusion. Tympanic membrane is not perforated. Nose: No septal deviation, mucosal edema or rhinorrhea. Mouth/Throat:      Dentition: Normal dentition. Pharynx: Uvula midline. No oropharyngeal exudate. Eyes:      General: No scleral icterus. Right eye: No discharge. Left eye: No discharge. Pupils: Pupils are equal, round, and reactive to light. Neck:      Musculoskeletal: Neck supple. Thyroid: No thyromegaly. Trachea: Phonation normal. No tracheal deviation. Pulmonary:      Effort: Pulmonary effort is normal. No respiratory distress. Breath sounds: No stridor. Lymphadenopathy:      Cervical: No cervical adenopathy. Skin:     General: Skin is warm and dry. Neurological:      Mental Status: She is alert and oriented to person, place, and time. Cranial Nerves: No cranial nerve deficit. Psychiatric:         Behavior: Behavior normal.           Procedure     Otomicroscopy with mastoid debridement    An operating microscope was utilized to visualize the external auditory canals using a 5mm speculum. Right mastoid bowl, well aerated, epithelial and ceruminous debris removed. Tympanic membrane intact. Assessment and Plan     1. History of right mastoidectomy  Mastoid bowl cleaned today in the office. Appears healthy and well aerated. Follow-up 6 months for repeat cleaning    2. Seasonal allergic rhinitis due to pollen  No significant change or worsening of allergy symptoms. Overall doing well. Return in about 6 months (around 1/9/2021). Portions of this note were dictated using Dragon.  There may be linguistic errors secondary to the use of this program.

## 2020-07-17 ENCOUNTER — OFFICE VISIT (OUTPATIENT)
Dept: ORTHOPEDIC SURGERY | Age: 65
End: 2020-07-17
Payer: MEDICAID

## 2020-07-17 VITALS — HEIGHT: 59 IN | RESPIRATION RATE: 16 BRPM | WEIGHT: 131 LBS | BODY MASS INDEX: 26.41 KG/M2

## 2020-07-17 PROBLEM — G56.21 ULNAR NEUROPATHY AT ELBOW, RIGHT: Status: ACTIVE | Noted: 2020-07-17

## 2020-07-17 PROBLEM — M19.049 HAND ARTHRITIS: Status: ACTIVE | Noted: 2020-07-17

## 2020-07-17 PROBLEM — G56.01 RIGHT CARPAL TUNNEL SYNDROME: Status: ACTIVE | Noted: 2020-07-17

## 2020-07-17 PROCEDURE — G8419 CALC BMI OUT NRM PARAM NOF/U: HCPCS | Performed by: ORTHOPAEDIC SURGERY

## 2020-07-17 PROCEDURE — G8427 DOCREV CUR MEDS BY ELIG CLIN: HCPCS | Performed by: ORTHOPAEDIC SURGERY

## 2020-07-17 PROCEDURE — 99243 OFF/OP CNSLTJ NEW/EST LOW 30: CPT | Performed by: ORTHOPAEDIC SURGERY

## 2020-07-17 RX ORDER — HYDROXYCHLOROQUINE SULFATE 200 MG/1
200 TABLET, FILM COATED ORAL DAILY
COMMUNITY
Start: 2020-07-04

## 2020-07-17 RX ORDER — CYCLOBENZAPRINE HCL 5 MG
5 TABLET ORAL 2 TIMES DAILY
COMMUNITY
Start: 2020-04-28 | End: 2021-01-07 | Stop reason: ALTCHOICE

## 2020-07-17 NOTE — PROGRESS NOTES
HISTORY OF PRESENT ILLNESS: The patient is a 62-year-old right-hand-dominant female who presents today for a new hand surgery specialty consultation at the request of Trina Harmon CNP, regarding her right upper extremity. She is the spouse of 1 of my existing patients and she reports to me that she has had ongoing numbness and tingling into the right hand for several years. Several years ago she saw different physician and had electrodiagnostic studies performed. At that time she reports that it documented both ulnar neuropathy at the elbow and right carpal tunnel syndrome. In the past she has had some bracing and she has had one cortisone injection into the carpal tunnel that gave her some temporary relief. More recently she has been having a fairly constant cold feeling into the thumb index and long fingers and a feeling of weakness after prolonged typing. She has been starting to have some nighttime discomfort as well. PAST MEDICAL HISTORY: Patient's medications, allergies, past medical, surgical, social and family histories were reviewed and updated as appropriate. ROS: Pertinent items are noted in HPI. Review of systems reviewed from patient history form dated on 7/17/20 and available in the patient's chart under the media tab. Denies fever, chills, confusion, bowel/bladder active change. PHYSICAL EXAMINATION: Examination reveals a pleasant individual in no acute distress. There appears to be satisfactory pain-free range of motion, strength, and stability of the cervical spine, shoulders, and elbows. Skin is intact without lymphadenopathy, discoloration, or abnormal temperature. There is intact, symmetric circulation in both upper extremities. Wrist, hand, and digital range of motion is satisfactory bilaterally. At the right elbow there is tenderness when I palpate directly over the ulnar nerve in the cubital tunnel and fairly immediate numbness and tingling down to the small finger. There is no wasting into the intrinsics and she still has good firing of the intrinsics distally. Provocative testing for right carpal tunnel syndrome suggests reproduction of symptoms with direct compression, Phalen's, and Tinel's. There is no sign of circulatory dysfunction or atrophy. DIAGNOSTIC TESTING: I do not have the actual EMG test for review today. 3 views are taken today and x-ray of both hands. X-rays demonstrate widespread small joint osteoarthritic change of multiple IP joints in the base of each thumb. 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    At this juncture I discussed with her the options as we move forward. She would like to move forward with definitive care and we will plan for an outpatient right ulnar nerve decompression at the elbow and right carpal tunnel release, with some limited early postop therapy. In addition, she does have some baseline bilateral hand arthritis and we talked about simple conservative care options. I do not think any dramatic intervention is indicated at this juncture for her hand arthritis. We discussed the risks, benefits, limitations, alternatives, and postop recovery following the proposed procedure. We will begin the process of scheduling surgery if there are no other preoperative medical contraindications. Please note that this transcription was created using voice recognition software. Any errors are unintentional and may be due to voice recognition transcription.

## 2020-08-04 NOTE — PROGRESS NOTES
Lonmarlonl Ogren    Age 63 y.o.    female    1955    N [id number]    8/12/2020  Arrival Time_____________  OR Time____________[address]     Procedure(s):  RIGHT CARPAL TUNNEL RELEASE  RIGHT ULNAR NERVE DECOMPRESSION AT THE ELBOW                      General    Surgeon(s):  Mookie Red, MD       Phone [phone] ([place]) [phone] (work)    [address] [place] [address]  Cell Work  _________________________________________________________________  _________________________________________________________________  _________________________________________________________________  _________________________________________________________________  _________________________________________________________________      PCP _____________________________ Phone_________________       H&P__________________Bringing    Chart            Epic  DOS     Called_______  EKG__________________Bringing    Chart            Epic  DOS     Called_______  LAB__________________ Bringing    Chart            Epic  DOS     Called_______  Cardiac Clearance_______Bringing    Chart            Epic      DOS       Called_______    Cardiologist________________________ Phone___________________________      ? Mandaeism concerns / Waiver on Chart            PAT Communications_____________  ? Pre-op Instructions Given [place]          ______________________________  ? Directions to Surgery Center                          ______________________________  ? Transportation Home_______________      _______________________________  ?  Crutches/Walker__________________        _______________________________      ________Pre-op Orders   _______Transcribed    _______Wt.  ________Pharmacy          _______SCD  ______VTE     ______[facility] [employer]  ________Consent             ________[facility] [place] [facility]

## 2020-08-06 ENCOUNTER — NURSE ONLY (OUTPATIENT)
Dept: CARDIOLOGY CLINIC | Age: 65
End: 2020-08-06
Payer: MEDICAID

## 2020-08-06 ENCOUNTER — OFFICE VISIT (OUTPATIENT)
Dept: PRIMARY CARE CLINIC | Age: 65
End: 2020-08-06
Payer: MEDICAID

## 2020-08-06 DIAGNOSIS — Z01.818 PREOP EXAMINATION: Primary | ICD-10-CM

## 2020-08-06 PROCEDURE — 93000 ELECTROCARDIOGRAM COMPLETE: CPT | Performed by: INTERNAL MEDICINE

## 2020-08-06 PROCEDURE — 99211 OFF/OP EST MAY X REQ PHY/QHP: CPT | Performed by: NURSE PRACTITIONER

## 2020-08-06 PROCEDURE — G8428 CUR MEDS NOT DOCUMENT: HCPCS | Performed by: NURSE PRACTITIONER

## 2020-08-06 PROCEDURE — G8419 CALC BMI OUT NRM PARAM NOF/U: HCPCS | Performed by: NURSE PRACTITIONER

## 2020-08-06 NOTE — PROGRESS NOTES
Cindy Valorie received a viral test for COVID-19. They were educated on isolation and quarantine as appropriate. For any symptoms, they were directed to seek care from their PCP, given contact information to establish with a doctor, directed to an urgent care or the emergency room.

## 2020-08-06 NOTE — PATIENT INSTRUCTIONS

## 2020-08-07 NOTE — PROGRESS NOTES
Preoperative Screening for Elective Surgery/Invasive Procedures While COVID-19 present in the community     Have you tested positive or have been told to self-isolate for COVID-19 like symptoms within the past 28 days? No   Do you currently have any of the following symptoms? No  o Fever >100.0 F or 99.9 F in immunocompromised patients? No  o New onset cough, shortness of breath or difficulty breathing? No  o New onset sore throat, myalgia (muscle aches and pains), headache, loss of taste/smell or diarrhea? No   Have you had a potential exposure to COVID-19 within the past 14 days by:  o Close contact with a confirmed case? No  o Close contact with a healthcare worker,  or essential infrastructure worker (grocery store, [employer], gas station, public utilities or transportation)? Patient is an essential worker  o Do you reside in a congregate setting such as; skilled nursing facility, adult home, correctional facility, homeless shelter or other institutional setting? No  o Have you had recent travel to a known COVID-19 hotspot? No    Indicate if the patient has a positive screen by answering yes to one or more of the above questions. Patients who test positive or screen positive prior to surgery or on the day of surgery should be evaluated in conjunction with the surgeon/proceduralist/anesthesiologist to determine the urgency of the procedure.

## 2020-08-07 NOTE — PROGRESS NOTES
Obstructive Sleep Apnea (AMAN) Screening     Patient:  Simone Bence    YOB: 1955      Medical Record #:  [id number]                     Date:  8/7/2020     1. Are you a loud and/or regular snorer? []  Yes       [x] No    2. Have you been observed to gasp or stop breathing during sleep? []  Yes       [x] No    3. Do you feel tired or groggy upon awakening or do you awaken with a headache?           []  Yes       [] No    4. Are you often tired or fatigued during the wake time hours? []  Yes       [] No    5. Do you fall asleep sitting, reading, watching TV or driving? []  Yes       [] No    6. Do you often have problems with memory or concentration? []  Yes       [] No    **If patient's score is ? 3 they are considered high risk for AMAN. An Anesthesia provider will evaluate the patient and develop a plan of care the day of surgery. Note:  If the patient's BMI is more than 35 kg m¯² , has neck circumference > 40 cm, and/or high blood pressure the risk is greater (© American Sleep Apnea Association, 2006).

## 2020-08-08 LAB — SARS-COV-2, NAA: NOT DETECTED

## 2020-08-10 ENCOUNTER — TELEPHONE (OUTPATIENT)
Dept: ORTHOPEDIC SURGERY | Age: 65
End: 2020-08-10

## 2020-08-10 NOTE — TELEPHONE ENCOUNTER
Auth: # NPR    Date: 08/12/2020  Type of SX:  OP  Location: [facility]  CPT: 48064  95020   DX Code: G56.01   G56.21  SX area: Right CTR & right Ulnar nerve decompression at elbow  Insurance: Aneesh Hadley

## 2020-08-11 ENCOUNTER — ANESTHESIA EVENT (OUTPATIENT)
Dept: OPERATING ROOM | Age: 65
End: 2020-08-11
Payer: MEDICAID

## 2020-08-11 NOTE — H&P
I have reviewed the H&P and examined the patient and find no relevant changes. I have reviewed with the patient and/or family the risks, benefits, and alternatives to the procedure(s). Past Medical History:   Diagnosis Date    Arthritis     Hypertension     PONV (postoperative nausea and vomiting)      Past Surgical History:   Procedure Laterality Date    BACK SURGERY      Cason rods for scoliosis    EYE SURGERY Bilateral     cataracts    INNER EAR SURGERY Right     replacement of eardrum    TONSILLECTOMY       No current facility-administered medications on file prior to encounter. Current Outpatient Medications on File Prior to Encounter   Medication Sig Dispense Refill    VITAMIN D PO Take by mouth once a week      cyclobenzaprine (FLEXERIL) 5 MG tablet Take 5 mg by mouth 2 times daily       hydroxychloroquine (PLAQUENIL) 200 MG tablet Take 200 mg by mouth daily       Alendronate Sodium (FOSAMAX PO) Take by mouth once a week       metoprolol succinate (TOPROL XL) 100 MG extended release tablet TAKE ONE TABLET BY MOUTH DAILY 30 tablet 6    albuterol sulfate HFA (PROAIR HFA) 108 (90 Base) MCG/ACT inhaler Inhale 2 puffs into the lungs every 6 hours as needed for Wheezing 1 Inhaler 0       The patient was counseled at length about the risks of brad Covid-19 during their perioperative period and any recovery window from their procedure. The patient was made aware that brad Covid-19  may worsen their prognosis for recovering from their procedure  and lend to a higher morbidity and/or mortality risk. All material risks, benefits, and reasonable alternatives including postponing the procedure were discussed. The patient does wish to proceed with the procedure at this time.           Jonny Riojas 134

## 2020-08-12 ENCOUNTER — HOSPITAL ENCOUNTER (OUTPATIENT)
Age: 65
Setting detail: OUTPATIENT SURGERY
Discharge: HOME | End: 2020-08-12
Attending: ORTHOPAEDIC SURGERY | Admitting: ORTHOPAEDIC SURGERY
Payer: MEDICAID

## 2020-08-12 ENCOUNTER — ANESTHESIA (OUTPATIENT)
Dept: OPERATING ROOM | Age: 65
End: 2020-08-12
Payer: MEDICAID

## 2020-08-12 VITALS
WEIGHT: 131 LBS | SYSTOLIC BLOOD PRESSURE: 142 MMHG | RESPIRATION RATE: 16 BRPM | HEIGHT: 59 IN | TEMPERATURE: 97.6 F | DIASTOLIC BLOOD PRESSURE: 75 MMHG | HEART RATE: 84 BPM | OXYGEN SATURATION: 98 % | BODY MASS INDEX: 26.41 KG/M2

## 2020-08-12 VITALS
DIASTOLIC BLOOD PRESSURE: 43 MMHG | SYSTOLIC BLOOD PRESSURE: 89 MMHG | RESPIRATION RATE: 2 BRPM | OXYGEN SATURATION: 99 %

## 2020-08-12 PROCEDURE — 7100000001 HC PACU RECOVERY - ADDTL 15 MIN: Performed by: ORTHOPAEDIC SURGERY

## 2020-08-12 PROCEDURE — 6360000002 HC RX W HCPCS: Performed by: NURSE ANESTHETIST, CERTIFIED REGISTERED

## 2020-08-12 PROCEDURE — 3700000001 HC ADD 15 MINUTES (ANESTHESIA): Performed by: ORTHOPAEDIC SURGERY

## 2020-08-12 PROCEDURE — 2500000003 HC RX 250 WO HCPCS: Performed by: NURSE ANESTHETIST, CERTIFIED REGISTERED

## 2020-08-12 PROCEDURE — 3700000000 HC ANESTHESIA ATTENDED CARE: Performed by: ORTHOPAEDIC SURGERY

## 2020-08-12 PROCEDURE — 2580000003 HC RX 258: Performed by: ANESTHESIOLOGY

## 2020-08-12 PROCEDURE — 2709999900 HC NON-CHARGEABLE SUPPLY: Performed by: ORTHOPAEDIC SURGERY

## 2020-08-12 PROCEDURE — 7100000000 HC PACU RECOVERY - FIRST 15 MIN: Performed by: ORTHOPAEDIC SURGERY

## 2020-08-12 PROCEDURE — 7100000010 HC PHASE II RECOVERY - FIRST 15 MIN: Performed by: ORTHOPAEDIC SURGERY

## 2020-08-12 PROCEDURE — 3600000003 HC SURGERY LEVEL 3 BASE: Performed by: ORTHOPAEDIC SURGERY

## 2020-08-12 PROCEDURE — 2580000003 HC RX 258: Performed by: ORTHOPAEDIC SURGERY

## 2020-08-12 PROCEDURE — 7100000011 HC PHASE II RECOVERY - ADDTL 15 MIN: Performed by: ORTHOPAEDIC SURGERY

## 2020-08-12 PROCEDURE — 3600000013 HC SURGERY LEVEL 3 ADDTL 15MIN: Performed by: ORTHOPAEDIC SURGERY

## 2020-08-12 PROCEDURE — 2500000003 HC RX 250 WO HCPCS: Performed by: ORTHOPAEDIC SURGERY

## 2020-08-12 RX ORDER — PROPOFOL 10 MG/ML
INJECTION, EMULSION INTRAVENOUS PRN
Status: DISCONTINUED | OUTPATIENT
Start: 2020-08-12 | End: 2020-08-12 | Stop reason: SDUPTHER

## 2020-08-12 RX ORDER — DEXAMETHASONE SODIUM PHOSPHATE 10 MG/ML
INJECTION INTRAMUSCULAR; INTRAVENOUS PRN
Status: DISCONTINUED | OUTPATIENT
Start: 2020-08-12 | End: 2020-08-12 | Stop reason: SDUPTHER

## 2020-08-12 RX ORDER — HYDRALAZINE HYDROCHLORIDE 20 MG/ML
5 INJECTION INTRAMUSCULAR; INTRAVENOUS EVERY 10 MIN PRN
Status: DISCONTINUED | OUTPATIENT
Start: 2020-08-12 | End: 2020-08-12 | Stop reason: HOSPADM

## 2020-08-12 RX ORDER — BUPIVACAINE HYDROCHLORIDE 5 MG/ML
INJECTION, SOLUTION EPIDURAL; INTRACAUDAL PRN
Status: DISCONTINUED | OUTPATIENT
Start: 2020-08-12 | End: 2020-08-12 | Stop reason: ALTCHOICE

## 2020-08-12 RX ORDER — PHENYLEPHRINE HCL IN 0.9% NACL 1 MG/10 ML
SYRINGE (ML) INTRAVENOUS PRN
Status: DISCONTINUED | OUTPATIENT
Start: 2020-08-12 | End: 2020-08-12 | Stop reason: SDUPTHER

## 2020-08-12 RX ORDER — ONDANSETRON 2 MG/ML
4 INJECTION INTRAMUSCULAR; INTRAVENOUS PRN
Status: DISCONTINUED | OUTPATIENT
Start: 2020-08-12 | End: 2020-08-12 | Stop reason: HOSPADM

## 2020-08-12 RX ORDER — PROMETHAZINE HYDROCHLORIDE 25 MG/ML
6.25 INJECTION, SOLUTION INTRAMUSCULAR; INTRAVENOUS
Status: DISCONTINUED | OUTPATIENT
Start: 2020-08-12 | End: 2020-08-12 | Stop reason: HOSPADM

## 2020-08-12 RX ORDER — MAGNESIUM HYDROXIDE 1200 MG/15ML
LIQUID ORAL CONTINUOUS PRN
Status: COMPLETED | OUTPATIENT
Start: 2020-08-12 | End: 2020-08-12

## 2020-08-12 RX ORDER — IBUPROFEN 600 MG/1
600 TABLET ORAL EVERY 6 HOURS PRN
Qty: 60 TABLET | Refills: 1 | Status: SHIPPED | OUTPATIENT
Start: 2020-08-12 | End: 2021-01-07

## 2020-08-12 RX ORDER — OXYCODONE HYDROCHLORIDE 5 MG/1
5 TABLET ORAL EVERY 6 HOURS PRN
Qty: 20 TABLET | Refills: 0 | Status: SHIPPED | OUTPATIENT
Start: 2020-08-12 | End: 2020-08-19

## 2020-08-12 RX ORDER — LABETALOL HYDROCHLORIDE 5 MG/ML
5 INJECTION, SOLUTION INTRAVENOUS EVERY 10 MIN PRN
Status: DISCONTINUED | OUTPATIENT
Start: 2020-08-12 | End: 2020-08-12 | Stop reason: HOSPADM

## 2020-08-12 RX ORDER — EPHEDRINE SULFATE 50 MG/ML
INJECTION INTRAVENOUS PRN
Status: DISCONTINUED | OUTPATIENT
Start: 2020-08-12 | End: 2020-08-12 | Stop reason: SDUPTHER

## 2020-08-12 RX ORDER — MEPERIDINE HYDROCHLORIDE 50 MG/ML
12.5 INJECTION INTRAMUSCULAR; INTRAVENOUS; SUBCUTANEOUS EVERY 5 MIN PRN
Status: DISCONTINUED | OUTPATIENT
Start: 2020-08-12 | End: 2020-08-12 | Stop reason: HOSPADM

## 2020-08-12 RX ORDER — SODIUM CHLORIDE, SODIUM LACTATE, POTASSIUM CHLORIDE, CALCIUM CHLORIDE 600; 310; 30; 20 MG/100ML; MG/100ML; MG/100ML; MG/100ML
INJECTION, SOLUTION INTRAVENOUS CONTINUOUS
Status: DISCONTINUED | OUTPATIENT
Start: 2020-08-12 | End: 2020-08-12 | Stop reason: HOSPADM

## 2020-08-12 RX ORDER — MIDAZOLAM HYDROCHLORIDE 1 MG/ML
INJECTION INTRAMUSCULAR; INTRAVENOUS PRN
Status: DISCONTINUED | OUTPATIENT
Start: 2020-08-12 | End: 2020-08-12 | Stop reason: SDUPTHER

## 2020-08-12 RX ORDER — ONDANSETRON 2 MG/ML
INJECTION INTRAMUSCULAR; INTRAVENOUS PRN
Status: DISCONTINUED | OUTPATIENT
Start: 2020-08-12 | End: 2020-08-12 | Stop reason: SDUPTHER

## 2020-08-12 RX ORDER — OXYCODONE HYDROCHLORIDE AND ACETAMINOPHEN 5; 325 MG/1; MG/1
1 TABLET ORAL PRN
Status: DISCONTINUED | OUTPATIENT
Start: 2020-08-12 | End: 2020-08-12 | Stop reason: HOSPADM

## 2020-08-12 RX ORDER — MORPHINE SULFATE 2 MG/ML
1 INJECTION, SOLUTION INTRAMUSCULAR; INTRAVENOUS EVERY 5 MIN PRN
Status: DISCONTINUED | OUTPATIENT
Start: 2020-08-12 | End: 2020-08-12 | Stop reason: HOSPADM

## 2020-08-12 RX ORDER — ONDANSETRON 4 MG/1
4 TABLET, FILM COATED ORAL EVERY 8 HOURS PRN
Qty: 10 TABLET | Refills: 1 | Status: SHIPPED | OUTPATIENT
Start: 2020-08-12 | End: 2021-01-07 | Stop reason: ALTCHOICE

## 2020-08-12 RX ORDER — FENTANYL CITRATE 50 UG/ML
INJECTION, SOLUTION INTRAMUSCULAR; INTRAVENOUS PRN
Status: DISCONTINUED | OUTPATIENT
Start: 2020-08-12 | End: 2020-08-12 | Stop reason: SDUPTHER

## 2020-08-12 RX ORDER — MORPHINE SULFATE 2 MG/ML
2 INJECTION, SOLUTION INTRAMUSCULAR; INTRAVENOUS EVERY 5 MIN PRN
Status: DISCONTINUED | OUTPATIENT
Start: 2020-08-12 | End: 2020-08-12 | Stop reason: HOSPADM

## 2020-08-12 RX ORDER — OXYCODONE HYDROCHLORIDE AND ACETAMINOPHEN 5; 325 MG/1; MG/1
2 TABLET ORAL PRN
Status: DISCONTINUED | OUTPATIENT
Start: 2020-08-12 | End: 2020-08-12 | Stop reason: HOSPADM

## 2020-08-12 RX ORDER — DIPHENHYDRAMINE HYDROCHLORIDE 50 MG/ML
12.5 INJECTION INTRAMUSCULAR; INTRAVENOUS
Status: DISCONTINUED | OUTPATIENT
Start: 2020-08-12 | End: 2020-08-12 | Stop reason: HOSPADM

## 2020-08-12 RX ADMIN — FENTANYL CITRATE 50 MCG: 50 INJECTION INTRAMUSCULAR; INTRAVENOUS at 08:04

## 2020-08-12 RX ADMIN — SODIUM CHLORIDE, POTASSIUM CHLORIDE, SODIUM LACTATE AND CALCIUM CHLORIDE: 600; 310; 30; 20 INJECTION, SOLUTION INTRAVENOUS at 08:02

## 2020-08-12 RX ADMIN — ONDANSETRON 4 MG: 2 INJECTION INTRAMUSCULAR; INTRAVENOUS at 08:28

## 2020-08-12 RX ADMIN — Medication 200 MCG: at 08:17

## 2020-08-12 RX ADMIN — MIDAZOLAM HYDROCHLORIDE 1 MG: 2 INJECTION, SOLUTION INTRAMUSCULAR; INTRAVENOUS at 08:04

## 2020-08-12 RX ADMIN — SODIUM CHLORIDE, POTASSIUM CHLORIDE, SODIUM LACTATE AND CALCIUM CHLORIDE: 600; 310; 30; 20 INJECTION, SOLUTION INTRAVENOUS at 06:50

## 2020-08-12 RX ADMIN — DEXAMETHASONE SODIUM PHOSPHATE 10 MG: 10 INJECTION INTRAMUSCULAR; INTRAVENOUS at 08:28

## 2020-08-12 RX ADMIN — PROPOFOL 150 MG: 10 INJECTION, EMULSION INTRAVENOUS at 08:04

## 2020-08-12 RX ADMIN — EPHEDRINE SULFATE 20 MG: 50 INJECTION INTRAVENOUS at 08:12

## 2020-08-12 ASSESSMENT — PULMONARY FUNCTION TESTS
PIF_VALUE: 14
PIF_VALUE: 10
PIF_VALUE: 8
PIF_VALUE: 10
PIF_VALUE: 1
PIF_VALUE: 8
PIF_VALUE: 16
PIF_VALUE: 3
PIF_VALUE: 10
PIF_VALUE: 11
PIF_VALUE: 8
PIF_VALUE: 12
PIF_VALUE: 3
PIF_VALUE: 8
PIF_VALUE: 8
PIF_VALUE: 12
PIF_VALUE: 10
PIF_VALUE: 9
PIF_VALUE: 1
PIF_VALUE: 1
PIF_VALUE: 21
PIF_VALUE: 13
PIF_VALUE: 8
PIF_VALUE: 1
PIF_VALUE: 1
PIF_VALUE: 11
PIF_VALUE: 8
PIF_VALUE: 11
PIF_VALUE: 8
PIF_VALUE: 13
PIF_VALUE: 11
PIF_VALUE: 11
PIF_VALUE: 10
PIF_VALUE: 8
PIF_VALUE: 10
PIF_VALUE: 8
PIF_VALUE: 8

## 2020-08-12 ASSESSMENT — PAIN - FUNCTIONAL ASSESSMENT: PAIN_FUNCTIONAL_ASSESSMENT: 0-10

## 2020-08-12 NOTE — PROGRESS NOTES
Preoperative Screening for Elective Surgery/Invasive Procedures While COVID-19 present in the community     Have you had any of the following symptoms? o Fever, chills  o Cough  o Shortness of breath  o Muscle aches/pain  o Diarrhea  o Abdominal pain, nausea, vomiting  o Loss or decrease in taste and / or smell   Risk of Exposure  o Have you recently been hospitalized for COVID-19 or flu-like illness, if so when?  o Recently diagnosed with COVID-19, if so when?  o Recently tested for COVID-19, if so when?  o Have you been in close contact with a person or family member who currently has or recently had COVID-19? If yes, when and in what context?  o Do you live with anybody who in the last 14 days has had fever, chills, shortness of breath, muscle aches, flu-like illness?  o Do you have any close contacts or family members who are currently in the hospital for COVID-19 or flu-like illness? If yes, assess recent close contact with this person. Indicate if the patient has a positive screen by answering yes to one or more of the above questions. Patients who test positive or screen positive prior to surgery or on the day of surgery should be evaluated in conjunction with the surgeon/proceduralist/anesthesiologist to determine the urgency of the procedure.

## 2020-08-12 NOTE — ANESTHESIA POSTPROCEDURE EVALUATION
Department of Anesthesiology  Postprocedure Note    Patient: Walt Posada  MRN: [id number]  YOB: 1955  Date of evaluation: 8/12/2020  Time:  9:44 AM     Procedure Summary     Date:  08/12/20 Room / Location:  [address]    Anesthesia Start:  0802 Anesthesia Stop:  5175    Procedures:       RIGHT CARPAL TUNNEL RELEASE (Right Hand)      RIGHT ULNAR NERVE DECOMPRESSION AT THE ELBOW (Right Arm Lower) Diagnosis:       Right carpal tunnel syndrome      Cubital canal compression syndrome, right      (RIGHT CARPAL AND CUBITAL TUNNEL SYNDROMES)    Surgeon:  Julianna Neri MD Responsible Provider:  Benjy Leos MD    Anesthesia Type:  general ASA Status:  2          Anesthesia Type: general    Thad Phase I: Thad Score: 6    Thad Phase II: Thad Score: 10    Last vitals: Reviewed and per EMR flowsheets.        Anesthesia Post Evaluation    Comments: Postoperative Anesthesia Note    Name:    Walt Posada  MRN:      [id number]    Patient Vitals in the past 12 hrs:  08/12/20 0909, BP:(!) 142/75, Pulse:84, Resp:16, SpO2:98 %  08/12/20 0900, BP:137/73, Temp:97.6 °F (36.4 °C), Pulse:85, Resp:16, SpO2:99 %  08/12/20 0857, BP:128/63, Pulse:93, Resp:16, SpO2:96 %  08/12/20 0853, BP:(!) 115/53, Pulse:79, Resp:12, SpO2:96 %  08/12/20 0848, BP:(!) 113/54, Pulse:79, Resp:12, SpO2:97 %  08/12/20 0843, BP:(!) 120/53, Pulse:82, Resp:12, SpO2:96 %  08/12/20 0619, BP:(!) 147/66, Temp:97.3 °F (36.3 °C), Temp src:Temporal, Pulse:93, Resp:16, SpO2:99 %, Height:4' 11\" (1.499 m), Weight:131 lb (59.4 kg)     LABS:    CBC  Lab Results       Component                Value               Date/Time                  WBC                      13.0 (H)            11/07/2018 05:30 PM        HGB                      14.3                11/07/2018 05:30 PM        HCT                      43.3                11/07/2018 05:30 PM        PLT                      342                 11/07/2018 05:30 PM RENAL  Lab Results       Component                Value               Date/Time                  NA                       133 (L)             11/07/2018 05:30 PM        K                        4.0                 11/07/2018 05:30 PM        CL                       98 (L)              11/07/2018 05:30 PM        CO2                      22                  11/07/2018 05:30 PM        BUN                      15                  11/07/2018 05:30 PM        CREATININE               0.8                 11/07/2018 05:30 PM        GLUCOSE                  158 (H)             11/07/2018 05:30 PM   COAGS  No results found for: PROTIME, INR, APTT    Intake & Output:  @54AKAX@    Nausea & Vomiting:  No    Level of Consciousness:  Awake    Pain Assessment:  Adequate analgesia    Anesthesia Complications:  No apparent anesthetic complications    SUMMARY      Vital signs stable  OK to discharge from Stage I post anesthesia care.   Care transferred from Anesthesiology department on discharge from perioperative area

## 2020-08-12 NOTE — ANESTHESIA PRE PROCEDURE
Department of Anesthesiology  Preprocedure Note       Name:  Rafa Cornell   Age:  63 y.o.  :  1955                                          MRN:  [id number]         Date:  2020      Surgeon: Angelo Penny):  Giorgi Peñaloza MD    Procedure: Procedure(s):  RIGHT CARPAL TUNNEL RELEASE  RIGHT ULNAR NERVE DECOMPRESSION AT THE ELBOW    Medications prior to admission:   Prior to Admission medications    Medication Sig Start Date End Date Taking? Authorizing Provider   VITAMIN D PO Take by mouth once a week   Yes Historical Provider, MD   hydroxychloroquine (PLAQUENIL) 200 MG tablet Take 200 mg by mouth daily  20  Yes Historical Provider, MD   metoprolol succinate (TOPROL XL) 100 MG extended release tablet TAKE ONE TABLET BY MOUTH DAILY 20  Yes Agapito Amaya MD   cyclobenzaprine (FLEXERIL) 5 MG tablet Take 5 mg by mouth 2 times daily  20   Historical Provider, MD   Alendronate Sodium (FOSAMAX PO) Take by mouth once a week     Historical Provider, MD   albuterol sulfate HFA (PROAIR HFA) 108 (90 Base) MCG/ACT inhaler Inhale 2 puffs into the lungs every 6 hours as needed for Wheezing 18   Nancy Serrano MD       Current medications:    Current Facility-Administered Medications   Medication Dose Route Frequency Provider Last Rate Last Dose    clindamycin (CLEOCIN) 900 mg in dextrose 5% 50 mL IVPB  900 mg Intravenous On Call to [address] [place] [address], MD        lactated ringers infusion   Intravenous Continuous Flxe Salgado MD 50 mL/hr at 20 0650      lidocaine 1 % (PF) injection 0.1 mL  0.1 mL Intradermal Once PRN Flex Salgado MD           Allergies:     Allergies   Allergen Reactions    Penicillins Rash       Problem List:    Patient Active Problem List   Diagnosis Code    SIRS (systemic inflammatory response syndrome) ([facility]) R65.10    Essential hypertension I10    Polymyalgia rheumatica ([facility]) M35.3    Arthritis M19.90    Pneumonia due to organism J18.9    Tachycardia R00.0    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    Hand arthritis M19.049       Past Medical History:        Diagnosis Date    Arthritis     Hypertension     Osteoporosis     Pneumonia 2017    PONV (postoperative nausea and vomiting)        Past Surgical History:        Procedure Laterality Date    BACK SURGERY      Cason rods for scoliosis    EYE SURGERY Bilateral     cataracts    INNER EAR SURGERY Right     replacement of eardrum    TONSILLECTOMY         Social History:    Social History     Tobacco Use    Smoking status: Never Smoker    Smokeless tobacco: Never Used   Substance Use Topics    Alcohol use: Yes     Comment: 0-1 drinks per month                                Counseling given: Not Answered      Vital Signs (Current):   Vitals:    08/07/20 0928 08/12/20 0619   BP:  (!) 147/66   Pulse:  93   Resp:  16   Temp:  97.3 °F (36.3 °C)   TempSrc:  Temporal   SpO2:  99%   Weight: 131 lb (59.4 kg) 131 lb (59.4 kg)   Height: 4' 11\" (1.499 m) 4' 11\" (1.499 m)                                              BP Readings from Last 3 Encounters:   08/12/20 (!) 147/66   07/09/20 118/73   09/10/19 (!) 149/76       NPO Status: Time of last liquid consumption: 2200                        Time of last solid consumption: 2000                        Date of last liquid consumption: 08/11/20                        Date of last solid food consumption: 08/11/20    BMI:   Wt Readings from Last 3 Encounters:   08/12/20 131 lb (59.4 kg)   07/17/20 131 lb (59.4 kg)   07/09/20 131 lb 3.2 oz (59.5 kg)     Body mass index is 26.46 kg/m².     CBC:   Lab Results   Component Value Date    WBC 13.0 11/07/2018    RBC 4.67 11/07/2018    HGB 14.3 11/07/2018    HCT 43.3 11/07/2018    MCV 92.8 11/07/2018    RDW 15.0 11/07/2018     11/07/2018       CMP:   Lab Results   Component Value Date     11/07/2018    K 4.0 11/07/2018    CL 98 11/07/2018    CO2 22 11/07/2018    BUN 15 11/07/2018    CREATININE 0.8 11/07/2018    GFRAA >60 11/07/2018    AGRATIO 1.3 11/07/2018    LABGLOM >60 11/07/2018    GLUCOSE 158 11/07/2018    PROT 6.9 11/07/2018    CALCIUM 9.6 11/07/2018    BILITOT 0.4 11/07/2018    ALKPHOS 121 11/07/2018     11/07/2018    ALT 80 11/07/2018       POC Tests: No results for input(s): POCGLU, POCNA, POCK, POCCL, POCBUN, POCHEMO, POCHCT in the last 72 hours. Coags: No results found for: PROTIME, INR, APTT    HCG (If Applicable): No results found for: PREGTESTUR, PREGSERUM, HCG, HCGQUANT     ABGs: No results found for: PHART, PO2ART, ADG9VTA, ZGP7QRJ, BEART, D1XUHOPG     Type & Screen (If Applicable):  No results found for: LABABO, LABRH    Drug/Infectious Status (If Applicable):  No results found for: HIV, HEPCAB    COVID-19 Screening (If Applicable):   Lab Results   Component Value Date    COVID19 NOT DETECTED 08/06/2020         Anesthesia Evaluation  Patient summary reviewed and Nursing notes reviewed   history of anesthetic complications: PONV.   Airway: Mallampati: II     Neck ROM: full   Dental:          Pulmonary:Negative Pulmonary ROS and normal exam    (+) pneumonia:                             Cardiovascular:Negative CV ROS    (+) hypertension:,                   Neuro/Psych:   Negative Neuro/Psych ROS  (+) neuromuscular disease (neuropathy):,             GI/Hepatic/Renal: Neg GI/Hepatic/Renal ROS       (-) hiatal hernia and GERD       Endo/Other: Negative Endo/Other ROS   (+) : arthritis:., .                 Abdominal:           Vascular:                                        Anesthesia Plan      general     ASA 2     (I discussed with the patient the risks and benefits of PIV, general anesthesia, IV Narcotics, PACU. All questions were answered the patient agrees with the plan and wishes to proceed.  )  Induction: intravenous. Pre-Operative Diagnosis: Right carpal tunnel syndrome [G56.01];Cubital canal compression syndrome, right [G56.21]    63 y.o.   BMI:  Body mass index is 26.46 kg/m².      Vitals:    08/07/20 0928 08/12/20 0619   BP:  (!) 147/66   Pulse:  93   Resp:  16   Temp:  97.3 °F (36.3 °C)   TempSrc:  Temporal   SpO2:  99%   Weight: 131 lb (59.4 kg) 131 lb (59.4 kg)   Height: 4' 11\" (1.499 m) 4' 11\" (1.499 m)       Allergies   Allergen Reactions    Penicillins Rash       Social History     Tobacco Use    Smoking status: Never Smoker    Smokeless tobacco: Never Used   Substance Use Topics    Alcohol use: Yes     Comment: 0-1 drinks per month       LABS:    CBC  Lab Results   Component Value Date/Time    WBC 13.0 (H) 11/07/2018 05:30 PM    HGB 14.3 11/07/2018 05:30 PM    HCT 43.3 11/07/2018 05:30 PM     11/07/2018 05:30 PM     RENAL  Lab Results   Component Value Date/Time     (L) 11/07/2018 05:30 PM    K 4.0 11/07/2018 05:30 PM    CL 98 (L) 11/07/2018 05:30 PM    CO2 22 11/07/2018 05:30 PM    BUN 15 11/07/2018 05:30 PM    CREATININE 0.8 11/07/2018 05:30 PM    GLUCOSE 158 (H) 11/07/2018 05:30 PM     COAGS  No results found for: PROTIME, INR, APTT      Randall Haskins MD   8/12/2020

## 2020-08-24 ENCOUNTER — OFFICE VISIT (OUTPATIENT)
Dept: ORTHOPEDIC SURGERY | Age: 65
End: 2020-08-24
Payer: MEDICAID

## 2020-08-24 VITALS — BODY MASS INDEX: 26.41 KG/M2 | HEIGHT: 59 IN | WEIGHT: 131 LBS

## 2020-08-24 PROCEDURE — E0191 PROTECTOR HEEL OR ELBOW: HCPCS | Performed by: ORTHOPAEDIC SURGERY

## 2020-08-24 PROCEDURE — 99024 POSTOP FOLLOW-UP VISIT: CPT | Performed by: ORTHOPAEDIC SURGERY

## 2020-08-24 PROCEDURE — L3908 WHO COCK-UP NONMOLDE PRE OTS: HCPCS | Performed by: ORTHOPAEDIC SURGERY

## 2020-08-24 NOTE — PROGRESS NOTES
Chief Complaint:  Post-Op Check (SX 8/12/20 RIGHT CTR, R ULNAR NERVE DECOMP)      History of Present of Illness: The patient returns for the initial appointment after right ulnar nerve decompression at the elbow and right carpal tunnel release. She admits that she has not yet gone to therapy and has been very stiff. Her  is with her and we know him as a patient and he reports that she really has not been moving her fingers. Review of Systems  Pertinent items are noted in HPI  Denies fever, chills, confusion, bowel/bladder active change. Examination:  On exam today both wounds are healing nicely but she does have considerable stiffness and swelling into the fingers. She is not yet making a full fist.  There is no sign of active infection. Radiology:     X-rays obtained and reviewed in office:  Views    Location    Impression      Orders Placed This Encounter   Procedures    Veda Rodrigez Gel Wrist Brace     Patient was prescribed a Veda Rodrigez Gel Wrist Brace. The right wrist will require stabilization / immobilization from this semi-rigid / rigid orthosis to improve their function. The orthosis will assist in protecting the affected area, provide functional support and facilitate healing. The patient was educated and fit by a healthcare professional with expert knowledge and specialization in brace application while under the direct supervision of the treating physician. Verbal and written instructions for the use of and application of this item were provided. They were instructed to contact the office immediately should the brace result in increased pain, decreased sensation, increased swelling or worsening of the condition. Cierra Cons and Popeye Heel and Elbow Protector     Patient was prescribed a Bird and Popeye Elbow Protector. The right elbow will require protection from this device to improve their function.   The orthosis will assist in protecting the affected area, provide functional support and facilitate healing. The patient was educated and fit by a healthcare professional with expert knowledge and specialization in brace application while under the direct supervision of the treating physician. Verbal and written instructions for the use of and application of this item were provided. They were instructed to contact the office immediately should the brace result in increased pain, decreased sensation, increased swelling or worsening of the condition. Impression:  Encounter Diagnoses   Name Primary?  Right carpal tunnel syndrome Yes    Ulnar neuropathy at elbow, right          Treatment Plan:  I emphasized to the patient the importance of some early progressive range of motion and not allowing her hand to become stiff this early. Today we will remove sutures and apply Steri-Strips and a carpal gel sleeve and a padded elbow sleeve. She does need to work very diligently on therapy and range of motion and swelling and I will see her back in 4 weeks to monitor the progress. I did underscore to her the expectation that with significant nerve compressions it may take many months and even up to a year for maximum improvement. Please note that this transcription was created using voice recognition software. Any errors are unintentional and may be due to voice recognition transcription.

## 2020-08-25 ENCOUNTER — HOSPITAL ENCOUNTER (OUTPATIENT)
Dept: OCCUPATIONAL THERAPY | Age: 65
Setting detail: THERAPIES SERIES
Discharge: HOME | End: 2020-08-25
Payer: MEDICAID

## 2020-08-25 PROCEDURE — 97110 THERAPEUTIC EXERCISES: CPT | Performed by: OCCUPATIONAL THERAPIST

## 2020-08-25 PROCEDURE — 97530 THERAPEUTIC ACTIVITIES: CPT | Performed by: OCCUPATIONAL THERAPIST

## 2020-08-25 PROCEDURE — 97165 OT EVAL LOW COMPLEX 30 MIN: CPT | Performed by: OCCUPATIONAL THERAPIST

## 2020-08-25 NOTE — FLOWSHEET NOTE
[address] [facility], [address] [place] [address] Juan Torres  Phone: [phone]  Fax [phone]    Occupational Therapy Treatment Note/ Progress Report:     Is this a Progress Report:     []  Yes  [x]  No      If Yes:  Date Range for reporting period:  Beginning 20  Ending 2020  Progress report will be due (10 Rx or 30 days whichever is less):     Recertification will be due (POC Duration  / 90 days whichever is less): 10/20/20  Date:  2020  Patient Name:  Malika Mendoza    :  1955  MRN: [id number]  Medical/Treatment Diagnosis Information:  · Diagnosis: G56.01 (ICD-10-CM) - Right carpal tunnel syndrome G56.21 (ICD-10-CM) - Ulnar neuropathy at elbow, right   · Treatment Diagnosis: M25.521  pain right elbow     Insurance/Certification information:     Physician Information:  Referring Practitioner: Dr. Mulu Carranza  Has the plan of care been signed (Y/N):        []  Yes  [x]  No     Comorbidities Affecting Functional Performance:             []?Anxiety (F41.9)/Depression (F32.9)             []?Diabetes Type 1(E10.65) or 2 (E11.65)       []? Rheumatoid Arthritis (M05.9)  []? Fibromyalgia (M79.7)  []? Neuropathy(G60.9)  [x]? Osteoarthritis(M19.91)  []? None              [x]? Other: osteoporosis, states she has a bad right rotator cuff also, back pain    Visit # Insurance Allowable Auth Required   1  []  Yes []  No    From 20  to 2020    Date of Injury: several month history of numbness in right hand  Date of Surgery: 20   1.  Right Ulnar Nerve Decompression at Elbow                          2.  Right Carpal Tunnel Release     Date of Patient follow up with Physician: 20     RESTRICTIONS/PRECAUTIONS: none     Latex Allergy:  [x]? No      []? Yes                    Pacemaker:  [x]? No       []? Yes      Preferred Language for Healthcare:   [x]? English       []? other:      Functional Scale: 82 % (Quick DASH) Date assessed:  2020     SUBJECTIVE:  Feels like the heelbo sleeve is too tight.      Patient reported deficits/history of current problem: Did not come to therapy as directed post-operatively. Saw MD on 20 and was very stiff. She was issued carpal tunnel gel splint and heelbo for right elbow and referred to therapy. Patient states that her hand was going numb and cold all the time.     Pain Scale: 7/10          [x]? Constant                []?Intermittent              []?other:  Pain Location:  Whole right arm  Easing factors: rest  Provocative factors: trying to use arm   OBJECTIVE:      Date:  Hand Dominance:     [x]? Right    []? Left 2020      Objective Measures:     PAIN 7/10   Quick DASH 82 %   Digits tip to DPFC in cm Index: 4  Lon  Ring: 3  Small: 2   Thumb ROM MP - 20  IP - 40   Thumb opposition  R: to middle  L:   Thumb Radial/Palmar abd ROM R:  L:   Wrist ROM Ext/Flex R: 25/30  L:   Rad/Uln dev ROM R:  L:   Forearm ROM  Sup/pron R: 45/80  L:   Elbow ROM Ext/flex R: 15/75  L:   Edema in cm circumf. MCPJs R: 17.5  L: 15.5   Edema in cm circumf. Wrist R: 15.5  L: 13.0    strength in lbs R:   L:    Pinch Strengthin lbs: lat  R:  L:   Pinch Strength in lbs:  3 point R:  L:      MMT:      Observations:  (including splints, bandages, incisions, scars):  Well healed incision to palm , steristrips over medial right elbow, edematous hand         MODALITIES: 20      Fluidotherapy (38508)      Estim (35253/47306)      Paraffin (62472)      US (63113)      Iontophoresis (52937)      Hot Pack      Cold Pack            INTERVENTIONS:      Therapeutic Exercise (56612) AAROM right elbow, wrist and hand                             Therapeutic Activity (60926) Scooping and pouring with cup simulating eating                 Manual Therapy (24066) Edema massage to RUE     (IASTM, Dry Needling, manual mobilization)            Neuromuscular Reeducation (27143) ADL Training (66936)                  HEP Training/Review See sheet(s)                 Splinting      Lcode:      Orthotic Mgmt, Subsequent Enc (15484)      Orthotic Mgmt & Training (20643)            Other: Issued edema glove and tubigrip D, discharge gel splint and heelbo pad       Therapeutic Exercise & NMR:  [x] (41793) Provided verbal/tactile cueing for activities related to strengthening, flexibility, endurance, ROM  for improvements in scapular, scapulothoracic and UE control with self care, reaching, carrying, lifting, house/yardwork, driving/computer work.    [] (05825) Provided verbal/tactile cueing for activities related to improving balance, coordination, kinesthetic sense, posture, motor skill, proprioception  to assist with  scapular, scapulothoracic and UE control with self care, reaching, carrying, lifting, house/yardwork, driving/computer work.     Therapeutic Activities & NMR:    [x] (48005 or 31025) Provided verbal/tactile cueing for activities related to improving balance, coordination, kinesthetic sense, posture, motor skill, proprioception and motor activation to allow for proper function of scapular, scapulothoracic and UE control with self care, carrying, lifting, driving/computer work    Home Exercise Program:    [] (69132) Reviewed/Progressed HEP activities related to strengthening, flexibility, endurance, ROM of scapular, scapulothoracic and UE control with self care, reaching, carrying, lifting, house/yardwork, driving/computer work  [] (00686) Reviewed/Progressed HEP activities related to improving balance, coordination, kinesthetic sense, posture, motor skill, proprioception of scapular, scapulothoracic and UE control with self care, reaching, carrying, lifting, house/yardwork, driving/computer work      Manual Treatments:  PROM / STM / Oscillations-Mobs:  G-I, II, III, IV (PA's, Inf., Post.)  [] (77006) Provided manual therapy to mobilize soft tissue/joints of cervical/CT, scapular GHJ and UE for the purpose of modulating pain, promoting relaxation,  increasing ROM, reducing/eliminating soft tissue swelling/inflammation/restriction, improving soft tissue extensibility and allowing for proper ROM for normal function with self care, reaching, carrying, lifting, house/yardwork, driving/computer work    ADL Training:  [] (20849) Provided self-care/home management training related to activities of daily living and compensatory training, and/or use of adaptive equipment      Charges:  Timed Code Treatment Minutes: 30   Total Treatment Minutes: 45   Worker's Comp: Time In/Time Out     [x] EVAL (LOW) 11773 (typically 20 minutes face-to-face)    [] EVAL (MOD) 34386 (typically 30 minutes face-to-face)  [] EVAL (HIGH) 51568 (typically 45 minutes face-to-face)  [] OT Re-eval (60523)       [x] Chichi ([id number]) x 1     [] VDBXV(57490)  [] NMR (64869) x      [] Estim (attended) (71201)   [] Manual ([phone]) x      [] US (25222)  [x] TA (84446) x 1     [] Paraffin (65314)  [] ADL  (05823) x     [] Splint/L code:    [] Estim (unattended) [phone]  [] Fluidotherapy (09623)  [] DN 1-2 (28119)   [] DN 3+ (51717)  [] Orthotic Mgmt, Subsequent Enc (29799)  [] Orthotic Mgmt & Training (63847)  [] Other:    ASSESSMENT:      GOALS: Patient stated goal: write, flex fingers and arm   []? Progressing: []? Met: []? Not Met: []? Adjusted     Therapist goals for Patient:   Short Term Goals: To be achieved in: 2 weeks  1. Independent in HEP and progression per patient tolerance, in order to prevent re-injury. []? Progressing: []? Met: []? Not Met: []? Adjusted   2. Patient will have a decrease in pain to facilitate improvement in movement, function, and ADLs as indicated by Functional Deficits. []? Progressing: []? Met: []? Not Met: []?  Adjusted     Long Term Goals to be achieved in 8 weeks (through 10/20/20), including patient directed goals to address patient identified performance deficits:  1) Pt to be independent in graded HEP progression with a good level of effort and compliance. []? Progressing: []? Met: []? Not Met: []? Adjusted   2) Pt to report a score of </= 30 % on the Quick DASH disability questionnaire for increased performance with carrying, moving, and handling objects. []? Progressing: []? Met: []? Not Met: []? Adjusted   3) Pt will demonstrate increased ROM to elbow flexion to 120, wrist to 60/60 and full finger motion for improved independence with eating with right hand, writing with right hand and grooming. []? Progressing: []? Met: []? Not Met: []? Adjusted   4) Pt will demonstrate increased strength to right  780% of left for improved independence with completing household tasks. []? Progressing: []? Met: []? Not Met: []? Adjusted   5) Pt will have a decrease in pain to 2/10 to facilitate normal ADL. []? Progressing: []? Met: []? Not Met: []? Adjusted       Overall Progression Towards Functional Goals/Treatment Progress Update:  [] Patient is progressing as expected towards functional goals listed. [] Progression is slowed due to complexities/impairments listed. [] Progression has been slowed due to co-morbidities.   [x] Plan just implemented, too soon to assess goals progression <30 days  [] Goals require adjustment due to lack of progress  [] Patient is not progressing as expected and requires additional follow up with physician  [] All goals are met  [] Other:     Prognosis for POC: [x] Good [] Fair  [] Poor    Patient requires continued skilled intervention: [x] Yes  [] No    Treatment/Activity Tolerance:  [] Patient able to complete treatment  [] Patient limited by fatigue  [x] Patient limited by pain    [] Patient limited by other medical complications  [x] Other: very anxious to move                 PLAN: See eval  [] Continue per plan of care [] Alter current plan (see comments above)  [x] Plan of care initiated [] Hold pending MD visit [] Discharge    Electronically signed by:  Peggy Castanon Jewel Perez, [facility]      Note: If patient does not return for scheduled/ recommended follow up visits, this note will serve as a discharge from care along with most recent update on progress.

## 2020-08-25 NOTE — PLAN OF CARE
[facility], [address]  Phone: [phone]  Fax [phone]    Occupational [place] Certification  Dear Referring Practitioner: Dr. Francis Duenas    We had the pleasure of evaluating the following patient for occupational therapy services at [address] [place] [address]. A summary of our findings can be found in the initial assessment below. This includes our plan of care. If you have any questions or concerns regarding these findings, please do not hesitate to contact me at the office phone number checked above. Thank you for the referral.     Physician Signature:_______________________________Date:__________________  By signing above (or electronic signature), therapists plan is approved by physician      Patient: Brad Brown   : 1955   MRN: [id number]  Referring Physician: Referring Practitioner: Dr. Francis Duenas      Evaluation Date: 2020      Medical Diagnosis Information:  Diagnosis: G56.01 (ICD-10-CM) - Right carpal tunnel syndrome G56.21 (ICD-10-CM) - Ulnar neuropathy at elbow, right   Treatment Diagnosis: M25.521  pain right elbow                                         Insurance information:      Date of Injury: several month history of numbness in right hand  Date of Surgery: 20   1. Right Ulnar Nerve Decompression at Elbow                          2.  Right Carpal Tunnel Release    Date of Patient follow up with Physician: 20    RESTRICTIONS/PRECAUTIONS: none    Latex Allergy:  [x]No      []Yes  Pacemaker:  [x] No       [] Yes     Preferred Language for Healthcare:   [x]English       []other:     Functional Scale: 82 % (Quick DASH)   Date assessed:  2020    SUBJECTIVE:  Feels like the heelbo sleeve is too tight. Patient reported deficits/history of current problem: Did not come to therapy as directed post-operatively. Saw MD on 20 and was very stiff. She was issued carpal tunnel gel splint and heelbo for right elbow and referred to therapy. Patient states that her hand was going numb and cold all the time. Pain Scale: 7/10  [x]Constant      []Intermittent    []other:  Pain Location:  Whole right arm  Easing factors: rest  Provocative factors: trying to use arm     [x] Patient reported history, allergies, and medications reviewed - see intake form. 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 [employer]    Recreational Activities/Meaningful Interests: nothing specific    Prior Level of Function: [x] Independent with ADLs/IADLs     [] Assistance needed (describe):    Patient-Identified Primary Performance Deficits (to be addressed in POC):   [] bathing    [x] household tasks (cooking/cleaning)   [] dressing    [] self feeding   [x] grooming    [x] work/education - writing and typing   [] functional mobility   [] sleeping/rest   [] toileting/hygiene   [] recreational activities   [] driving    [] community/social participation   [] other:     Comorbidities Affecting Functional Performance:     []Anxiety (F41.9)/Depression (F32.9)   []Diabetes Type 1(E10.65) or 2 (E11.65)   []Rheumatoid Arthritis (M05.9)  []Fibromyalgia (M79.7)  []Neuropathy(G60.9)  [x]Osteoarthritis(M19.91)  []None   [x]Other: osteoporosis, states she has a bad right rotator cuff also, back pain    OBJECTIVE:   Date:  Hand Dominance:     [x]  Right    [] Left 2020     Objective Measures:    PAIN 7/10   Quick DASH 82 %   Digits tip to DPFC in cm Index: 4  Lon  Ring: 3  Small: 2   Thumb ROM MP - 20  IP - 40   Thumb opposition  R: to middle  L:   Thumb Radial/Palmar abd ROM R:  L:   Wrist ROM Ext/Flex R: 25/30  L:   Rad/Uln dev ROM R:  L:   Forearm ROM  Sup/pron R: 45/80  L:   Elbow ROM Ext/flex R: 15/75  L:   Edema in cm circumf.   MCPJs R: 17.5  L: 15.5   Edema in cm circumf. Wrist R: 15.5  L: 13.0    strength in lbs R:   L:    Pinch Strengthin lbs: lat  R:  L:   Pinch Strength in lbs:  3 point R:  L:     MMT:     Observations:  (including splints, bandages, incisions, scars): Well healed incision to palm , steristrips over medial right elbow, edematous hand. Holding arm very guarded. Sensation:  [x] No reported deficits  [] Intact to light touch    [] Denbo Rebel test completed, findings as noted:  [] Other:    Palpation: whole arm tender to touch    Functional Mobility/Transfers/Gait:  [] Independent - no significant gait deviations  [] Assistance needed   [] Assistive device used: Falls Risk Assessment (30 days):   [] Falls Risk assessed and no intervention required. [] Falls Risk assessed and Patient requires intervention due to being higher risk   TUG score (>12s at risk):     [] Falls education provided, including      Review Of Systems (ROS): [x]Performed Review of systems (Integumentary, CardioPulmonary, Neurological) by intake and observation. Intake form has been scanned into medical record. Patient has been instructed to contact their primary care physician regarding ROS issues if not already being addressed at this time. ASSESSMENT:   This patient presents with signs and symptoms consistent with the medical diagnosis provided by the referring physician.      Impairments (physical, cognitive and/or psychosocial):  [x] Decreased mobility   [x] Weakness    [] Hypersensitivity   [x] Pain/tenderness   [x] Edema/swelling   [x] Decreased coordination (fine/gross motor)   [] Impaired body mechanics  [] Sensory loss  [] Loss of balance   [] Other:      Performance Deficits (to be addressed in plan of care):   [] Bathing    [x] Household Tasks (cooking/cleaning)   [] Dressing    [x] Self Feeding   [x] Grooming    [x] Work/Education   [] Functional Mobility   [] Sleeping/Rest   [] Toileting/Hygiene   [] Recreational Activities   [] Driving    [] Community/Social Participation   [] Other:     Rehab Potential:   [] Excellent [x] Good [] Fair  [] Poor     Barriers affecting rehab potential:  []Age    []Lack of Motivation   []Co-Morbidities  []Cognitive Function  []Environmental/home/work barriers  []Other: Tolerance of evaluation/treatment:    [] Excellent [] Good [] Fair  [] Poor    PLAN OF CARE:  Interventions:   [x] Therapeutic Exercise [x] Therapeutic Activity    [x] Activities of Daily Living [x] Neuromuscular Re-education      [x] Patient Education  [x] Manual Therapy      [x] Modalities as needed, and not otherwise contraindicated, including: ultrasound,paraffin,moist heat/cold pack, electrical stimulation, contrast bath, iontophoresis  [] Splinting    Frequency/Duration:  2 days per week for 8 weeks      GOALS:  Patient stated goal: wrist and flex fingers and arm   [] Progressing: [] Met: [] Not Met: [] Adjusted    Therapist goals for Patient:   Short Term Goals: To be achieved in: 2 weeks  1. Independent in HEP and progression per patient tolerance, in order to prevent re-injury. [] Progressing: [] Met: [] Not Met: [] Adjusted   2. Patient will have a decrease in pain to facilitate improvement in movement, function, and ADLs as indicated by Functional Deficits. [] Progressing: [] Met: [] Not Met: [] Adjusted    Long Term Goals to be achieved in 8 weeks (through 10/20/20), including patient directed goals to address patient identified performance deficits:  1) Pt to be independent in graded HEP progression with a good level of effort and compliance. [] Progressing: [] Met: [] Not Met: [] Adjusted   2) Pt to report a score of </= 30 % on the Quick DASH disability questionnaire for increased performance with carrying, moving, and handling objects.   [] Progressing: [] Met: [] Not Met: [] Adjusted   3) Pt will demonstrate increased ROM to elbow flexion to 120, wrist to 60/60 and full finger motion for improved independence with eating with right hand, writing with right hand and grooming. [] Progressing: [] Met: [] Not Met: [] Adjusted   4) Pt will demonstrate increased strength to right  780% of left for improved independence with completing household tasks. [] Progressing: [] Met: [] Not Met: [] Adjusted   5) Pt will have a decrease in pain to 2/10 to facilitate normal ADL. [] Progressing: [] Met: [] Not Met: [] Adjusted        OCCUPATIONAL THERAPY EVALUATION COMPLEXITY JUSTIFICATION:    [] An occupational profile and medical/therapy history, which includes:   [x] a brief history including medical and/or therapy records relating to the     presenting problem   [] an expanded review of medical and/or therapy records and additional review     of physical, cognitive or psychosocial history related to current functional    performance   [] an extensive additional review of review of medical and/or therapy records   and physical, cognitive, or psychosocial history related to current    functional performance    [] An assessment that identifies performance deficits (relating to physical, cognitive, or psychosocial skills) that result in activity limitations and/or participation restrictions:   [x] 1-3 performance deficits   [] 3-5 performance deficits   [] 5 or more performance deficits    [] Clinical decision making of:   [x] low complexity, including analysis of occupational profile, data analysis from problem focused assessment, and consideration of a limited number of treatment options. No comorbidities affect occupational performance. No task modifications or assistance needed to complete evaluation. [] moderate complexity, including analysis of occupational profile, data analysis from detailed assessment and consideration of several treatment options. Comorbidities that affect occupational performance may be present. Minimal to moderate task modifications or assistance needed to complete assessment.    [] high complexity, including analysis of occupational profile, analysis of data from comprehensive assessment and consideration of multiple treatment options. Multiple comorbidities present that affect occupational performance. Significant task modifications or assistance needed to complete assessment.     Evaluation Code:  [x] Low Complexity EVAL 90445 (typically 30 minutes face to face)  [] Mod Complexity EVAL 61602 (typically 45 minutes face to face)  [] High Complexity EVAL 07653 (typically 60 minutes face to face)    Electronically signed by:  Tonya Major, [address] [place] [address]

## 2020-09-01 ENCOUNTER — HOSPITAL ENCOUNTER (OUTPATIENT)
Dept: OCCUPATIONAL THERAPY | Age: 65
Setting detail: THERAPIES SERIES
Discharge: HOME | End: 2020-09-01
Payer: MEDICAID

## 2020-09-01 PROCEDURE — 97140 MANUAL THERAPY 1/> REGIONS: CPT | Performed by: OCCUPATIONAL THERAPIST

## 2020-09-01 PROCEDURE — 97112 NEUROMUSCULAR REEDUCATION: CPT | Performed by: OCCUPATIONAL THERAPIST

## 2020-09-01 PROCEDURE — 97530 THERAPEUTIC ACTIVITIES: CPT | Performed by: OCCUPATIONAL THERAPIST

## 2020-09-01 PROCEDURE — 97110 THERAPEUTIC EXERCISES: CPT | Performed by: OCCUPATIONAL THERAPIST

## 2020-09-01 NOTE — FLOWSHEET NOTE
[address] [facility], [address] [place] [address]  Phone: [phone]  Fax [phone]    Occupational Therapy Treatment Note/ Progress Report:     Is this a Progress Report:     []  Yes  [x]  No      If Yes:  Date Range for reporting period:  Beginning 20  Ending 2020  Progress report will be due (10 Rx or 30 days whichever is less):     Recertification will be due (POC Duration  / 90 days whichever is less): 10/20/20  Date:  2020  Patient Name:  Sean Schneider    :  1955  MRN: [id number]  Medical/Treatment Diagnosis Information:  · Diagnosis: G56.01 (ICD-10-CM) - Right carpal tunnel syndrome G56.21 (ICD-10-CM) - Ulnar neuropathy at elbow, right   · Treatment Diagnosis: M25.521  pain right elbow     Insurance/Certification information:     Physician Information:  Referring Practitioner: Dr. Rodriguez Kellen  Has the plan of care been signed (Y/N):        []  Yes  [x]  No     Comorbidities Affecting Functional Performance:             []?Anxiety (F41.9)/Depression (F32.9)             []?Diabetes Type 1(E10.65) or 2 (E11.65)       []? Rheumatoid Arthritis (M05.9)  []? Fibromyalgia (M79.7)  []? Neuropathy(G60.9)  [x]? Osteoarthritis(M19.91)  []? None              [x]? Other: osteoporosis, states she has a bad right rotator cuff also, back pain    Visit # Insurance Allowable Auth Required   3  []  Yes []  No    From 20  to 2020    Date of Injury: several month history of numbness in right hand  Date of Surgery: 20   1.  Right Ulnar Nerve Decompression at Elbow                          2.  Right Carpal Tunnel Release     Date of Patient follow up with Physician: 20     RESTRICTIONS/PRECAUTIONS: none     Latex Allergy:  [x]? No      []? Yes                    Pacemaker:  [x]? No       []? Yes      Preferred Language for Healthcare:   [x]? English       []? other:      Functional Scale: 82 % (Quick DASH) Date assessed:  2020     SUBJECTIVE:  Able to tie shoes loosely today, able to write a little bit. Have to have a root canal, have to go to [facility] for [facility].     Patient reported deficits/history of current problem: Did not come to therapy as directed post-operatively. Saw MD on 20 and was very stiff. She was issued carpal tunnel gel splint and heelbo for right elbow and referred to therapy. Patient states that her hand was going numb and cold all the time.     Pain Scale: 5/10          [x]? Constant                []?Intermittent              []?other:  Pain Location: right elbow  Easing factors: rest  Provocative factors: trying to bend elbow  OBJECTIVE:      Date:  Hand Dominance:     [x]? Right    []? Left 2020   Objective Measures:      PAIN 7/10    Quick DASH 82 %    Digits tip to DPFC in cm Index: 4  Lon  Ring: 3  Small: 2 3  3  2  1   Thumb ROM MP - 20  IP - 40    Thumb opposition  R: to middle  L: To small   Thumb Radial/Palmar abd ROM R:  L:    Wrist ROM Ext/Flex R: 25/30  L: 35/35   Rad/Uln dev ROM R:  L:    Forearm ROM  Sup/pron R: 45/80  L:    Elbow ROM Ext/flex R: 15/75  L: 15/105   Edema in cm circumf. MCPJs R: 17.5  L: 15.5    Edema in cm circumf. Wrist R: 15.5  L: 13.0     strength in lbs R:   L:     Pinch Strengthin lbs: lat  R:  L:    Pinch Strength in lbs:  3 point R:  L:       MMT:       Observations:  (including splints, bandages, incisions, scars):  Well healed incision to palm , steristrips over medial right elbow, edematous hand Healed incisions, removed steristrips         MODALITIES: 20   Fluidotherapy (29002)      Estim (12730/23242)      Paraffin (87435)      US (11882)      Iontophoresis (03199)      Hot Pack   10 to elbow wrist and hand on right   Cold Pack            INTERVENTIONS:      Therapeutic Exercise (52175) AAROM right elbow, wrist and hand same Gentle passive elbow, writ and finger ROM as patient coordination, kinesthetic sense, posture, motor skill, proprioception  to assist with  scapular, scapulothoracic and UE control with self care, reaching, carrying, lifting, house/yardwork, driving/computer work.     Therapeutic Activities & NMR:    [x] (12141 or 23472) Provided verbal/tactile cueing for activities related to improving balance, coordination, kinesthetic sense, posture, motor skill, proprioception and motor activation to allow for proper function of scapular, scapulothoracic and UE control with self care, carrying, lifting, driving/computer work    Home Exercise Program:    [] (60210) Reviewed/Progressed HEP activities related to strengthening, flexibility, endurance, ROM of scapular, scapulothoracic and UE control with self care, reaching, carrying, lifting, house/yardwork, driving/computer work  [] (73703) Reviewed/Progressed HEP activities related to improving balance, coordination, kinesthetic sense, posture, motor skill, proprioception of scapular, scapulothoracic and UE control with self care, reaching, carrying, lifting, house/yardwork, driving/computer work      Manual Treatments:  PROM / STM / Oscillations-Mobs:  G-I, II, III, IV (PA's, Inf., Post.)  [x] (99465) Provided manual therapy to mobilize soft tissue/joints of cervical/CT, scapular GHJ and UE for the purpose of modulating pain, promoting relaxation,  increasing ROM, reducing/eliminating soft tissue swelling/inflammation/restriction, improving soft tissue extensibility and allowing for proper ROM for normal function with self care, reaching, carrying, lifting, house/yardwork, driving/computer work    ADL Training:  [] (55092) Provided self-care/home management training related to activities of daily living and compensatory training, and/or use of adaptive equipment      Charges:  Timed Code Treatment Minutes: 53   Total Treatment Minutes: 63   Worker's Comp: Time In/Time Out     [] TRISTAN (LOW) 65766 (typically 20 minutes face-to-face)    [] EVAL (MOD) 60929 (typically 30 minutes face-to-face)  [] EVAL (HIGH) 70179 (typically 45 minutes face-to-face)  [] OT Re-eval (44065)       [x] Chichi [phone]) x 1     [] KPBQF(93105)  [x] NMR (07786) x  1    [] Estim (attended) (31677)   [x] Manual ([phone]) x 1     [] US (84305)  [x] TA (39495) x 1     [] Paraffin (63948)  [] ADL  (83218) x     [] Splint/L code:    [] Estim (unattended) (64619)  [] Fluidotherapy (09196)  [] DN 1-2 (72149)   [] DN 3+ (02347)  [] Orthotic Mgmt, Subsequent Enc (13590)  [] Orthotic Mgmt & Training (20243)  [] Other:    ASSESSMENT:      GOALS: Patient stated goal: write, flex fingers and arm   []? Progressing: []? Met: []? Not Met: []? Adjusted     Therapist goals for Patient:   Short Term Goals: To be achieved in: 2 weeks  1. Independent in HEP and progression per patient tolerance, in order to prevent re-injury. []? Progressing: []? Met: []? Not Met: []? Adjusted   2. Patient will have a decrease in pain to facilitate improvement in movement, function, and ADLs as indicated by Functional Deficits. []? Progressing: []? Met: []? Not Met: []? Adjusted     Long Term Goals to be achieved in 8 weeks (through 10/20/20), including patient directed goals to address patient identified performance deficits:  1) Pt to be independent in graded HEP progression with a good level of effort and compliance. []? Progressing: []? Met: []? Not Met: []? Adjusted   2) Pt to report a score of </= 30 % on the Quick DASH disability questionnaire for increased performance with carrying, moving, and handling objects. []? Progressing: []? Met: []? Not Met: []? Adjusted   3) Pt will demonstrate increased ROM to elbow flexion to 120, wrist to 60/60 and full finger motion for improved independence with eating with right hand, writing with right hand and grooming. []? Progressing: []? Met: []? Not Met: []?  Adjusted   4) Pt will demonstrate increased strength to right  780% of left for improved independence with completing household tasks. []? Progressing: []? Met: []? Not Met: []? Adjusted   5) Pt will have a decrease in pain to 2/10 to facilitate normal ADL. []? Progressing: []? Met: []? Not Met: []? Adjusted       Overall Progression Towards Functional Goals/Treatment Progress Update:  [x] Patient is progressing as expected towards functional goals listed. [] Progression is slowed due to complexities/impairments listed. [] Progression has been slowed due to co-morbidities. [] Plan just implemented, too soon to assess goals progression <30 days  [] Goals require adjustment due to lack of progress  [] Patient is not progressing as expected and requires additional follow up with physician  [] All goals are met  [x] Other:  Good gains in ROM     Prognosis for POC: [x] Good [] Fair  [] Poor    Patient requires continued skilled intervention: [x] Yes  [] No    Treatment/Activity Tolerance:  [] Patient able to complete treatment  [] Patient limited by fatigue  [x] Patient limited by pain    [] Patient limited by other medical complications  [x] Other: having significant pain with attempts to passively flex elbow, did make gains in all joint ROM. PLAN: See eval  [x] Continue per plan of care [] Alter current plan (see comments above)  [] Plan of care initiated [] Hold pending MD visit [] Discharge    Electronically signed by:  Carmen Mchugh , KOSTAR\L, T - [id number]      Note: If patient does not return for scheduled/ recommended follow up visits, this note will serve as a discharge from care along with most recent update on progress.

## 2020-09-03 ENCOUNTER — HOSPITAL ENCOUNTER (OUTPATIENT)
Dept: OCCUPATIONAL THERAPY | Age: 65
Setting detail: THERAPIES SERIES
Discharge: HOME | End: 2020-09-03
Payer: MEDICAID

## 2020-09-03 PROCEDURE — 97530 THERAPEUTIC ACTIVITIES: CPT | Performed by: OCCUPATIONAL THERAPIST

## 2020-09-03 PROCEDURE — 97110 THERAPEUTIC EXERCISES: CPT | Performed by: OCCUPATIONAL THERAPIST

## 2020-09-03 PROCEDURE — 97140 MANUAL THERAPY 1/> REGIONS: CPT | Performed by: OCCUPATIONAL THERAPIST

## 2020-09-03 NOTE — FLOWSHEET NOTE
[address] [facility], [address] [place] [address] Juan Torres  Phone: [phone]  Fax [phone]    Occupational Therapy Treatment Note/ Progress Report:     Is this a Progress Report:     []  Yes  [x]  No      If Yes:  Date Range for reporting period:  Beginning 20  Ending 9/3/2020  Progress report will be due (10 Rx or 30 days whichever is less):     Recertification will be due (POC Duration  / 90 days whichever is less): 10/20/20  Date:  9/3/2020  Patient Name:  Brad Brown    :  1955  MRN: [id number]  Medical/Treatment Diagnosis Information:  · Diagnosis: G56.01 (ICD-10-CM) - Right carpal tunnel syndrome G56.21 (ICD-10-CM) - Ulnar neuropathy at elbow, right   · Treatment Diagnosis: M25.521  pain right elbow     Insurance/Certification information:     Physician Information:  Referring Practitioner: Dr. Francis Duenas  Has the plan of care been signed (Y/N):        []  Yes  [x]  No     Comorbidities Affecting Functional Performance:             []?Anxiety (F41.9)/Depression (F32.9)             []?Diabetes Type 1(E10.65) or 2 (E11.65)       []? Rheumatoid Arthritis (M05.9)  []? Fibromyalgia (M79.7)  []? Neuropathy(G60.9)  [x]? Osteoarthritis(M19.91)  []? None              [x]? Other: osteoporosis, states she has a bad right rotator cuff also, back pain    Visit # Insurance Allowable Auth Required   3  []  Yes []  No    From 20  to 9/3/2020    Date of Injury: several month history of numbness in right hand  Date of Surgery: 20   1.  Right Ulnar Nerve Decompression at Elbow                          2.  Right Carpal Tunnel Release     Date of Patient follow up with Physician: 20     RESTRICTIONS/PRECAUTIONS: none     Latex Allergy:  [x]? No      []? Yes                    Pacemaker:  [x]? No       []? Yes      Preferred Language for Healthcare:   [x]? English       []? other:      Functional Scale: 82 % (Quick DASH) elbow, writ and finger ROM as patient tolerates. Warm water soak squeezing yellow sponge  And pressing flat for passive wrist and finger extension. AROM elbow flexion to ear x 10, writ flex/ext x 10, full fisting x 10. AROM elbow flexion to ear x 10, writ flex/ext x 10, full fisting x 10. Therapeutic Activity (65577) Scooping and pouring with cup simulating eating Scooping and pouring with cup simulating eating. Touching top of head with right hand and slidiing down to nose x 5 Reaching out elbow extended grasp small velcro block to stick on shoulder and chest with elbow and wrist flexion x 15. Scrubbing table in standing with outstretched hand. Rolling green therabar on table in standing for wrist and digital extension and some elbow. extension. Reaching out elbow extended grasp small velcro block to stick on shoulder and chest with elbow and wrist flexion x 15. Scrubbing table in standing with outstretched hand. Warm water soak  Manipulating paper towels in hand. Wrist wrap up and down x 4. Wrist wrap up and down x 4. Squeezing soft pink sponge and rotating between thumb and fingertips. Pilates ring bilateral pushing forward and bringing to chest  X 10. Manual Therapy (36679) Edema massage to RUE same STM to bicep and volar forearm muscles. Scar mobilization elbow and wrist Focus on scar mobilization to right elbow. (IASTM, Dry Needling, manual mobilization)              Neuromuscular Reeducation (18819)  Cueing to maintain correct wrist and forearm positions to maximize elbow motion. Cuing for correct performance of wrist wrap activities to maximize wrist motion.                   ADL Training (68549)                     HEP Training/Review See sheet(s)                    Splinting       Lcode:       Orthotic Mgmt, Subsequent Enc (34673)       Orthotic Mgmt & Training (02901)              Other: Issued edema glove and tubigrip D, discharge gel splint and heelbo pad Continue glove and tubigrip Swelling much decreased, discontinue compression      Therapeutic Exercise & NMR:  [x] (76249) Provided verbal/tactile cueing for activities related to strengthening, flexibility, endurance, ROM  for improvements in scapular, scapulothoracic and UE control with self care, reaching, carrying, lifting, house/yardwork, driving/computer work. [x] (97002) Provided verbal/tactile cueing for activities related to improving balance, coordination, kinesthetic sense, posture, motor skill, proprioception  to assist with  scapular, scapulothoracic and UE control with self care, reaching, carrying, lifting, house/yardwork, driving/computer work.     Therapeutic Activities & NMR:    [x] (84214 or 19908) Provided verbal/tactile cueing for activities related to improving balance, coordination, kinesthetic sense, posture, motor skill, proprioception and motor activation to allow for proper function of scapular, scapulothoracic and UE control with self care, carrying, lifting, driving/computer work    Home Exercise Program:    [] (62714) Reviewed/Progressed HEP activities related to strengthening, flexibility, endurance, ROM of scapular, scapulothoracic and UE control with self care, reaching, carrying, lifting, house/yardwork, driving/computer work  [] (97210) Reviewed/Progressed HEP activities related to improving balance, coordination, kinesthetic sense, posture, motor skill, proprioception of scapular, scapulothoracic and UE control with self care, reaching, carrying, lifting, house/yardwork, driving/computer work      Manual Treatments:  PROM / STM / Oscillations-Mobs:  G-I, II, III, IV (PA's, Inf., Post.)  [x] (17437) Provided manual therapy to mobilize soft tissue/joints of cervical/CT, scapular GHJ and UE for the purpose of modulating pain, promoting relaxation,  increasing ROM, reducing/eliminating soft tissue swelling/inflammation/restriction, improving soft tissue extensibility and allowing for proper ROM for normal function with self care, reaching, carrying, lifting, house/yardwork, driving/computer work    ADL Training:  [] (61698) Provided self-care/home management training related to activities of daily living and compensatory training, and/or use of adaptive equipment      Charges:  Timed Code Treatment Minutes: 40   Total Treatment Minutes: 50   Worker's Comp: Time In/Time Out     [] EVAL (LOW) 47162 (typically 20 minutes face-to-face)    [] EVAL (MOD) 65514 (typically 30 minutes face-to-face)  [] EVAL (HIGH) 70898 (typically 45 minutes face-to-face)  [] OT Re-eval (75786)       [x] Chichi [phone]) x 1     [] KPGMM(38381)  [] NMR (18720) x  1    [] Estim (attended) (77327)   [x] Manual ([phone]) x 1     [] US (14533)  [x] TA (20558) x 1     [] Paraffin (03385)  [] ADL  ([phone]) x     [] Splint/L code:    [] Estim (unattended) [phone])  [] Fluidotherapy (28137)  [] DN 1-2 (22901)   [] DN 3+ (22912)  [] Orthotic Mgmt, Subsequent Enc (68907)  [] Orthotic Mgmt & Training (24415)  [] Other:    ASSESSMENT:      GOALS: Patient stated goal: write, flex fingers and arm   []? Progressing: []? Met: []? Not Met: []? Adjusted     Therapist goals for Patient:   Short Term Goals: To be achieved in: 2 weeks  1. Independent in HEP and progression per patient tolerance, in order to prevent re-injury. []? Progressing: []? Met: []? Not Met: []? Adjusted   2. Patient will have a decrease in pain to facilitate improvement in movement, function, and ADLs as indicated by Functional Deficits. []? Progressing: []? Met: []? Not Met: []? Adjusted     Long Term Goals to be achieved in 8 weeks (through 10/20/20), including patient directed goals to address patient identified performance deficits:  1) Pt to be independent in graded HEP progression with a good level of effort and compliance. []? Progressing: []? Met: []? Not Met: []?  Adjusted   2) Pt to report a score of </= 30 % on the Quick DASH disability scheduled/ recommended follow up visits, this note will serve as a discharge from care along with most recent update on progress.

## 2020-09-08 ENCOUNTER — APPOINTMENT (OUTPATIENT)
Dept: OCCUPATIONAL THERAPY | Age: 65
End: 2020-09-08
Payer: MEDICAID

## 2020-09-10 ENCOUNTER — HOSPITAL ENCOUNTER (OUTPATIENT)
Dept: OCCUPATIONAL THERAPY | Age: 65
Setting detail: THERAPIES SERIES
Discharge: HOME | End: 2020-09-10
Payer: MEDICAID

## 2020-09-10 ENCOUNTER — APPOINTMENT (OUTPATIENT)
Dept: OCCUPATIONAL THERAPY | Age: 65
End: 2020-09-10
Payer: MEDICAID

## 2020-09-10 PROCEDURE — 97530 THERAPEUTIC ACTIVITIES: CPT | Performed by: OCCUPATIONAL THERAPIST

## 2020-09-10 PROCEDURE — 97140 MANUAL THERAPY 1/> REGIONS: CPT | Performed by: OCCUPATIONAL THERAPIST

## 2020-09-10 PROCEDURE — 97110 THERAPEUTIC EXERCISES: CPT | Performed by: OCCUPATIONAL THERAPIST

## 2020-09-10 NOTE — FLOWSHEET NOTE
[address] [facility], [address] [place] [address] Juan Torres  Phone: [phone]  Fax [phone]    Occupational Therapy Treatment Note/ Progress Report:     Is this a Progress Report:     []  Yes  [x]  No      If Yes:  Date Range for reporting period:  Beginning 20  Ending 9/10/2020  Progress report will be due (10 Rx or 30 days whichever is less):     Recertification will be due (POC Duration  / 90 days whichever is less): 10/20/20  Date:  9/10/2020  Patient Name:  Justin Castillo    :  1955  MRN: [id number]  Medical/Treatment Diagnosis Information:  · Diagnosis: G56.01 (ICD-10-CM) - Right carpal tunnel syndrome G56.21 (ICD-10-CM) - Ulnar neuropathy at elbow, right   · Treatment Diagnosis: M25.521  pain right elbow     Insurance/Certification information:     Physician Information:  Referring Practitioner: Dr. Charles Tejeda  Has the plan of care been signed (Y/N):        []  Yes  [x]  No     Comorbidities Affecting Functional Performance:             []?Anxiety (F41.9)/Depression (F32.9)             []?Diabetes Type 1(E10.65) or 2 (E11.65)       []? Rheumatoid Arthritis (M05.9)  []? Fibromyalgia (M79.7)  []? Neuropathy(G60.9)  [x]? Osteoarthritis(M19.91)  []? None              [x]? Other: osteoporosis, states she has a bad right rotator cuff also, back pain    Visit # Insurance Allowable Auth Required   5  []  Yes []  No    From 20  to 9/10/2020    Date of Injury: several month history of numbness in right hand  Date of Surgery: 20   1.  Right Ulnar Nerve Decompression at Elbow                          2.  Right Carpal Tunnel Release     Date of Patient follow up with Physician: 20     RESTRICTIONS/PRECAUTIONS: none     Latex Allergy:  [x]? No      []? Yes                    Pacemaker:  [x]? No       []? Yes      Preferred Language for Healthcare:   [x]? English       []? other:      Functional Scale: 82 % (Quick DASH) Therapeutic Exercise (76141) AAROM right elbow, wrist and hand same Gentle passive elbow, writ and finger ROM as patient tolerates. Warm water soak squeezing yellow sponge  And pressing flat for passive wrist and finger extension. Rolling and squeezing soft yellow putty      AROM elbow flexion to ear x 10, writ flex/ext x 10, full fisting x 10. AROM elbow flexion to ear x 10, writ flex/ext x 10, full fisting x 10. AROM elbow flexion to ear x 10, writ flex/ext x 10, full fisting x 10. Therapeutic Activity (95317) Scooping and pouring with cup simulating eating Scooping and pouring with cup simulating eating. Touching top of head with right hand and slidiing down to nose x 5 Reaching out elbow extended grasp small velcro block to stick on shoulder and chest with elbow and wrist flexion x 15. Scrubbing table in standing with outstretched hand. Scrubbing table in standing with outstretched hand    Rolling green therabar on table in standing for wrist and digital extension and some elbow. extension. Reaching out elbow extended grasp small velcro block to stick on shoulder and chest with elbow and wrist flexion x 15. Scrubbing table in standing with outstretched hand. Scooping and pouring with full elbow e  Flexion and extension and forearm rotation. Wrist wrap up and down x 3. Scrubbing table in standing with outstretched hand         Warm water soak  Manipulating paper towels in hand. Wrist wrap up and down x 4. Wrist wrap up and down x 4. Squeezing soft pink sponge and rotating between thumb and fingertips. Pilates ring bilateral pushing forward and bringing to chest  X 10. Manual Therapy (88199) Edema massage to RUE same STM to bicep and volar forearm muscles. Scar mobilization elbow and wrist Focus on scar mobilization to right elbow.  Scar mobilization right elbow and palm    (IASTM, Dry Needling, manual mobilization)                Neuromuscular Reeducation (27739)  Cueing to maintain correct wrist and forearm positions to maximize elbow motion. Cuing for correct performance of wrist wrap activities to maximize wrist motion. ADL Training (04713)                        HEP Training/Review See sheet(s)                       Splinting        Lcode:        Orthotic Mgmt, Subsequent Enc (04466)        Orthotic Mgmt & Training (38881)                Other: Issued edema glove and tubigrip D, discharge gel splint and heelbo pad Continue glove and tubigrip Swelling much decreased, discontinue compression       Therapeutic Exercise & NMR:  [x] (03686) Provided verbal/tactile cueing for activities related to strengthening, flexibility, endurance, ROM  for improvements in scapular, scapulothoracic and UE control with self care, reaching, carrying, lifting, house/yardwork, driving/computer work. [x] (24089) Provided verbal/tactile cueing for activities related to improving balance, coordination, kinesthetic sense, posture, motor skill, proprioception  to assist with  scapular, scapulothoracic and UE control with self care, reaching, carrying, lifting, house/yardwork, driving/computer work.     Therapeutic Activities & NMR:    [x] (41213 or 29139) Provided verbal/tactile cueing for activities related to improving balance, coordination, kinesthetic sense, posture, motor skill, proprioception and motor activation to allow for proper function of scapular, scapulothoracic and UE control with self care, carrying, lifting, driving/computer work    Home Exercise Program:    [] (49453) Reviewed/Progressed HEP activities related to strengthening, flexibility, endurance, ROM of scapular, scapulothoracic and UE control with self care, reaching, carrying, lifting, house/yardwork, driving/computer work  [] (60632) Reviewed/Progressed HEP activities related to improving balance, coordination, kinesthetic sense, posture, motor skill, proprioception of scapular, movement, function, and ADLs as indicated by Functional Deficits. []? Progressing: []? Met: []? Not Met: []? Adjusted     Long Term Goals to be achieved in 8 weeks (through 10/20/20), including patient directed goals to address patient identified performance deficits:  1) Pt to be independent in graded HEP progression with a good level of effort and compliance. []? Progressing: []? Met: []? Not Met: []? Adjusted   2) Pt to report a score of </= 30 % on the Quick DASH disability questionnaire for increased performance with carrying, moving, and handling objects. []? Progressing: []? Met: []? Not Met: []? Adjusted   3) Pt will demonstrate increased ROM to elbow flexion to 120, wrist to 60/60 and full finger motion for improved independence with eating with right hand, writing with right hand and grooming. []? Progressing: []? Met: []? Not Met: []? Adjusted   4) Pt will demonstrate increased strength to right  780% of left for improved independence with completing household tasks. []? Progressing: []? Met: []? Not Met: []? Adjusted   5) Pt will have a decrease in pain to 2/10 to facilitate normal ADL. []? Progressing: []? Met: []? Not Met: []? Adjusted       Overall Progression Towards Functional Goals/Treatment Progress Update:  [x] Patient is progressing as expected towards functional goals listed. [] Progression is slowed due to complexities/impairments listed. [] Progression has been slowed due to co-morbidities. [] Plan just implemented, too soon to assess goals progression <30 days  [] Goals require adjustment due to lack of progress  [] Patient is not progressing as expected and requires additional follow up with physician  [] All goals are met  [x] Oher:  Much improved ROM, decreased pan and increased functional use.     Prognosis for POC: [x] Good [] Fair  [] Poor    Patient requires continued skilled intervention: [x] Yes  [] No    Treatment/Activity Tolerance:  [] Patient able to complete

## 2020-09-17 ENCOUNTER — HOSPITAL ENCOUNTER (OUTPATIENT)
Dept: OCCUPATIONAL THERAPY | Age: 65
Setting detail: THERAPIES SERIES
Discharge: HOME | End: 2020-09-17
Payer: MEDICAID

## 2020-09-17 PROCEDURE — 97110 THERAPEUTIC EXERCISES: CPT | Performed by: OCCUPATIONAL THERAPIST

## 2020-09-17 PROCEDURE — 97140 MANUAL THERAPY 1/> REGIONS: CPT | Performed by: OCCUPATIONAL THERAPIST

## 2020-09-17 PROCEDURE — 97530 THERAPEUTIC ACTIVITIES: CPT | Performed by: OCCUPATIONAL THERAPIST

## 2020-09-17 NOTE — DISCHARGE SUMMARY
[address] [facility], [address] [place] [address] Juan Torres  Phone: [phone]  Fax [phone]    Occupational Therapy Treatment Note/ Progress Report:/Discharge    Is this a Progress Report:     []  Yes  [x]  No      If Yes:  Date Range for reporting period:  Beginning 20  Ending 2020  Progress report will be due (10 Rx or 30 days whichever is less):     Recertification will be due (POC Duration  / 90 days whichever is less): 10/20/20  Date:  2020  Patient Name:  Tona Allen    :  1955  MRN: [id number]  Medical/Treatment Diagnosis Information:  · Diagnosis: G56.01 (ICD-10-CM) - Right carpal tunnel syndrome G56.21 (ICD-10-CM) - Ulnar neuropathy at elbow, right   · Treatment Diagnosis: M25.521  pain right elbow     Insurance/Certification information:     Physician Information:  Referring Practitioner: Dr. Althea Galaviz  Has the plan of care been signed (Y/N):        []  Yes  [x]  No     Comorbidities Affecting Functional Performance:             []?Anxiety (F41.9)/Depression (F32.9)             []?Diabetes Type 1(E10.65) or 2 (E11.65)       []? Rheumatoid Arthritis (M05.9)  []? Fibromyalgia (M79.7)  []? Neuropathy(G60.9)  [x]? Osteoarthritis(M19.91)  []? None              [x]? Other: osteoporosis, states she has a bad right rotator cuff also, back pain    Visit # Insurance Allowable Auth Required   6  []  Yes []  No    From 20  to 2020    Date of Injury: several month history of numbness in right hand  Date of Surgery: 20   1.  Right Ulnar Nerve Decompression at Elbow                          2.  Right Carpal Tunnel Release     Date of Patient follow up with Physician: 20     RESTRICTIONS/PRECAUTIONS: none     Latex Allergy:  [x]? No      []? Yes                    Pacemaker:  [x]? No       []? Yes      Preferred Language for Healthcare:   [x]? English       []? other:      Functional Scale: 82 % (Quick DASH)                                Date assessed:  2020                                  34%                                                                                       20     SUBJECTIVE:  Doing everything she needs to with right arm. Washing hair, writing, typing, very little pain except for in her shoulder    Patient reported deficits/history of current problem: Did not come to therapy as directed post-operatively. Saw MD on 20 and was very stiff. She was issued carpal tunnel gel splint and heelbo for right elbow and referred to therapy. Patient states that her hand was going numb and cold all the time.     Pain Scale: 3/10          []? Constant                [x]? Intermittent              []?other:  Pain Location: right elbow  Easing factors: rest  Provocative factors: trying to bend elbow  OBJECTIVE:      Date:  Hand Dominance:     [x]? Right    []? Left 2020    8/27/20 9/10/20 9/17/20   Objective Measures:        PAIN 7/10   3, occasionally   Quick DASH 82 %   34 %   Digits tip to DPFC in cm Index: 4  Lon  Ring: 3  Small: 2 3  3  2  1 0  0  0  0 0  0  0  0   Thumb ROM MP - 20  IP - 40      Thumb opposition  R: to middle  L: To small     Thumb Radial/Palmar abd ROM R:  L:      Wrist ROM Ext/Flex R: 25/30  L: 35/35 45/65 50/70   Rad/Uln dev ROM R:  L:      Forearm ROM  Sup/pron R: 45/80  L:  75/85 75/85   Elbow ROM Ext/flex R: 15/75  L: 15/105 15/135 15/135   Edema in cm circumf. MCPJs R: 17.5  L: 15.5  16.5    Edema in cm circumf. Wrist R: 15.5  L: 13.0  14.0     strength in lbs R:   L:   10  23 10   Pinch Strengthin lbs: lat  R:  L:  6.5  8.0 6.6   Pinch Strength in lbs:  3 point R:  L:     4.9  8.1 4.9   MMT:         Observations:  (including splints, bandages, incisions, scars):  Well healed incision to palm , steristrips over medial right elbow, edematous hand Healed incisions, removed steristrips Incision healed and all skin peeled around incisions MODALITIES: 8/25/20  8/27/20 9/1/20 9/3/20 9/10/20 9/17/20   Fluidotherapy (64385)         Estim (28128/74177)         Paraffin (28232)         US (86871)         Iontophoresis (47692)         Hot Pack   10 to elbow wrist and hand on right same same same   Cold Pack                  INTERVENTIONS:         Therapeutic Exercise (44487) AAROM right elbow, wrist and hand same Gentle passive elbow, writ and finger ROM as patient tolerates. Warm water soak squeezing yellow sponge  And pressing flat for passive wrist and finger extension. Rolling and squeezing soft yellow putty Passive elbow and wrist extension 15 second holds x 5 each. AROM elbow flexion to ear x 10, writ flex/ext x 10, full fisting x 10. AROM elbow flexion to ear x 10, writ flex/ext x 10, full fisting x 10. AROM elbow flexion to ear x 10, writ flex/ext x 10, full fisting x 10. Combined functional movements of hand to head, shoulder, opposite arm and armpit x 10 of each. AROM wrist flex/ext, and finger flex and ext,            Therapeutic Activity (44185) Scooping and pouring with cup simulating eating Scooping and pouring with cup simulating eating. Touching top of head with right hand and slidiing down to nose x 5 Reaching out elbow extended grasp small velcro block to stick on shoulder and chest with elbow and wrist flexion x 15. Scrubbing table in standing with outstretched hand. Scrubbing table in standing with outstretched hand    Rolling green therabar on table in standing for wrist and digital extension and some elbow. extension. Reaching out elbow extended grasp small velcro block to stick on shoulder and chest with elbow and wrist flexion x 15. Scrubbing table in standing with outstretched hand. Scooping and pouring with full elbow e  Flexion and extension and forearm rotation. Wrist wrap up and down x 3.   Scrubbing table in standing with outstretched hand     Theraputty  strengthening and pinch strengthening. Warm water soak  Manipulating paper towels in hand. Wrist wrap up and down x 4. Wrist wrap up and down x 4. Squeezing soft pink sponge and rotating between thumb and fingertips. Pilates ring bilateral pushing forward and bringing to chest  X 10. Manual Therapy (14819) Edema massage to RUE same STM to bicep and volar forearm muscles. Scar mobilization elbow and wrist Focus on scar mobilization to right elbow. Scar mobilization right elbow and palm     (IASTM, Dry Needling, manual mobilization)                  Neuromuscular Reeducation (62596)  Cueing to maintain correct wrist and forearm positions to maximize elbow motion. Cuing for correct performance of wrist wrap activities to maximize wrist motion. ADL Training (69861)                           HEP Training/Review See sheet(s)                          Splinting         Lcode:         Orthotic Mgmt, Subsequent Enc (61560)         Orthotic Mgmt & Training (05250)                  Other: Issued edema glove and tubigrip D, discharge gel splint and heelbo pad Continue glove and tubigrip Swelling much decreased, discontinue compression        Therapeutic Exercise & NMR:  [x] (39787) Provided verbal/tactile cueing for activities related to strengthening, flexibility, endurance, ROM  for improvements in scapular, scapulothoracic and UE control with self care, reaching, carrying, lifting, house/yardwork, driving/computer work.    [] (23877) Provided verbal/tactile cueing for activities related to improving balance, coordination, kinesthetic sense, posture, motor skill, proprioception  to assist with  scapular, scapulothoracic and UE control with self care, reaching, carrying, lifting, house/yardwork, driving/computer work.     Therapeutic Activities & NMR:    [x] (59470 or 23152) Provided verbal/tactile cueing for activities related to improving balance, coordination, kinesthetic sense, posture, motor skill, due to complexities/impairments listed. [] Progression has been slowed due to co-morbidities. [] Plan just implemented, too soon to assess goals progression <30 days  [] Goals require adjustment due to lack of progress  [] Patient is not progressing as expected and requires additional follow up with physician  [] All goals are met  [x] Oher:  Much improved ROM, decreased pan and increased functional use. Prognosis for POC: [x] Good [] Fair  [] Poor    Patient requires continued skilled intervention: [x] Yes  [] No    Treatment/Activity Tolerance:  [x] Patient able to complete treatment  [] Patient limited by fatigue  [] Patient limited by pain    [] Patient limited by other medical compl                 PLAN: Patient feels like he can use her arm for all she needs to. She has exercises to continue workin on wrist extension and  strengthening. [] Continue per plan of care [] Alter current plan (see comments above)  [] Plan of care initiated [] Hold pending MD visit [x] Discharge    Electronically signed by:  Sanket Seals , OTR\L, T - [id number]      Note: If patient does not return for scheduled/ recommended follow up visits, this note will serve as a discharge from care along with most recent update on progress.

## 2020-09-21 ENCOUNTER — HOSPITAL ENCOUNTER (OUTPATIENT)
Dept: PHYSICAL THERAPY | Age: 65
Setting detail: THERAPIES SERIES
Discharge: HOME | End: 2020-09-21
Payer: MEDICAID

## 2020-09-21 ENCOUNTER — APPOINTMENT (OUTPATIENT)
Dept: OCCUPATIONAL THERAPY | Age: 65
End: 2020-09-21
Payer: MEDICAID

## 2020-09-21 ENCOUNTER — OFFICE VISIT (OUTPATIENT)
Dept: ORTHOPEDIC SURGERY | Age: 65
End: 2020-09-21

## 2020-09-21 VITALS — BODY MASS INDEX: 26.4 KG/M2 | HEIGHT: 59 IN | WEIGHT: 130.95 LBS

## 2020-09-21 PROCEDURE — 97161 PT EVAL LOW COMPLEX 20 MIN: CPT | Performed by: PHYSICAL THERAPIST

## 2020-09-21 PROCEDURE — 97112 NEUROMUSCULAR REEDUCATION: CPT | Performed by: PHYSICAL THERAPIST

## 2020-09-21 PROCEDURE — 97110 THERAPEUTIC EXERCISES: CPT | Performed by: PHYSICAL THERAPIST

## 2020-09-21 PROCEDURE — 99024 POSTOP FOLLOW-UP VISIT: CPT | Performed by: ORTHOPAEDIC SURGERY

## 2020-09-21 RX ORDER — ERGOCALCIFEROL 1.25 MG/1
CAPSULE ORAL
COMMUNITY
Start: 2020-08-28

## 2020-09-21 RX ORDER — DEXAMETHASONE 4 MG/1
TABLET ORAL
COMMUNITY
Start: 2020-08-28 | End: 2021-01-07 | Stop reason: ALTCHOICE

## 2020-09-21 RX ORDER — CYCLOBENZAPRINE HCL 10 MG
TABLET ORAL
COMMUNITY
Start: 2020-07-20 | End: 2021-01-07

## 2020-09-21 NOTE — PROGRESS NOTES
Chief Complaint:  Post-Op Check (PO CK RT CTR, RT ULNAR NERVE DECOMP. 8/12)      History of Present of Illness: The patient reports that she continues to make some improvement in overall daily activity level and comfort, but continues to have scar tenderness both at the wrist and at the medial elbow. She does not seem to have a clear understanding of how to use the carpal gel sleeve and the padded elbow sleeve. Review of Systems  Pertinent items are noted in HPI  Denies fever, chills, confusion, bowel/bladder active change. Review of systems reviewed from Patient History Form dated on 7/17/2020 and available in the patient's chart under the Media tab. Examination:  On exam today the incisions are indeed healing nicely but she does have a bit of prominent scar at each site. She has good firing of the intrinsics and thenar musculature. There is good perfusion to all fingers. Radiology:     X-rays obtained and reviewed in office:  Views    Location    Impression      No orders of the defined types were placed in this encounter. Impression:  Encounter Diagnoses   Name Primary?  Ulnar neuropathy at elbow, right Yes    Right carpal tunnel syndrome          Treatment Plan:  I have reassured the patient that I believe she is coming along nicely and that many of these areas of scar tenderness will be expected to improve steadily over time. We will make certain that she has a good understanding of the different protective gel sleeve and elbow pad system as she continues to heal.    I will be happy to see her back on an as-needed basis moving forward as the therapy team has given her a good set of advancement of her program.  Please note that this transcription was created using voice recognition software. Any errors are unintentional and may be due to voice recognition transcription.

## 2020-09-21 NOTE — FLOWSHEET NOTE
pain, promoting relaxation,  increasing ROM, reducing/eliminating soft tissue swelling/inflammation/restriction, improving soft tissue extensibility and allowing for proper ROM for normal function with self care, reaching, carrying, lifting, house/yardwork, driving/computer work    Modalities:  Ice x 10 mins     Charges:  Timed Code Treatment Minutes: 25   Total Treatment Minutes: 45     BWC time in/time out:     [x] EVAL (LOW) 82214   [] EVAL (MOD) 68929   [] EVAL (HIGH) 50680   [] RE-EVAL     [x] XO(44918) x  1   [] IONTO  [x] NMR (65501) x 1     [] VASO  [] Manual (63242) x      [] Other:  [] TA x      [] Mech Traction (40102)  [] ES(attended) (50320)      [] ES (un) (05805):     HEP instruction: (see scanned forms)  Access Code: [email or web address]   URL: [facility]/   Date: 09/21/2020   Prepared by: Paula Craig     Exercises   Seated Scapular Retraction - 10 reps - 3 hold - 2x daily - 7x weekly   No Money - 10 reps - 3 hold - 2x daily - 7x weekly   Seated Shoulder Flexion Towel Slide at Table Top - 10 reps - 10 hold - 2x daily - 7x weekly   Seated Shoulder External Rotation AAROM with Dowel - 5 reps - 10 hold - 2x daily - 7x weekly   Patient Education   Office Posture   Forward Head Posture      GOALS:  Patient stated goal: decrease pain  [] Progressing: [] Met: [] Not Met: [] Adjusted    Therapist goals for Patient:   Short Term Goals: To be achieved in: 2 weeks  1. Independent in HEP and progression per patient tolerance, in order to prevent re-injury. [] Progressing: [] Met: [] Not Met: [] Adjusted  2. Patient will have a decrease in pain to facilitate improvement in movement, function, and ADLs as indicated by Functional Deficits. [] Progressing: [] Met: [] Not Met: [] Adjusted    Long Term Goals: To be achieved in: 8 weeks  1. Disability index score of 10% or less for the [facility] to assist with reaching prior level of function. [] Progressing: [] Met: [] Not Met: [] Adjusted  2.  Patient will demonstrate increased AROM to [facility] to allow for proper joint functioning as indicated by patients Functional Deficits. [] Progressing: [] Met: [] Not Met: [] Adjusted  3. Patient will demonstrate an increase in Strength to [facility] to allow for proper functional mobility as indicated by patients Functional Deficits. [] Progressing: [] Met: [] Not Met: [] Adjusted  4. Patient will return to 75% functional activities without increased symptoms or restriction. [] Progressing: [] Met: [] Not Met: [] Adjusted  5. Pt will report <3/10 pain with overhead use. (patient specific functional goal)    [] Progressing: [] Met: [] Not Met: [] Adjusted       Progression Towards Functional goals:  [] Patient is progressing as expected towards functional goals listed. [] Progression is slowed due to complexities listed. [] Progression has been slowed due to co-morbidities. [x] Plan just implemented, too soon to assess goals progression  [] Other:     ASSESSMENT:  See eval    Overall Progression Towards Functional goals/ Treatment Progress Update:  [] Patient is progressing as expected towards functional goals listed. [] Progression is slowed due to complexities/Impairments listed. [] Progression has been slowed due to co-morbidities.   [x] Plan just implemented, too soon to assess goals progression <30days   [] Goals require adjustment due to lack of progress  [] Patient is not progressing as expected and requires additional follow up with physician  [] Other    Prognosis for POC: [x] Good [] Fair  [] Poor      Patient requires continued skilled intervention: [x] Yes  [] No    Treatment/Activity Tolerance:  [x] Patient able to complete treatment  [] Patient limited by fatigue  [] Patient limited by pain    [] Patient limited by other medical complications  [] Other:         Return to Play: (if applicable)   []  Stage 1: Intro to Strength   []  Stage 2: Return to Run and Strength   []  Stage 3: Return to Jump and Strength   []  Stage 4: Dynamic Strength and Agility   []  Stage 5: Sport Specific Training     []  Ready to Return to Play, Meets All Above Stages   []  Not Ready for Return to Sports   Comments:                               PLAN: See eval  [] Continue per plan of care [] Alter current plan (see comments above)  [x] Plan of care initiated [] Hold pending MD visit [] Discharge      Electronically signed by:  Augustin Chandler, [place] [id number]    Note: If patient does not return for scheduled/ recommended follow up visits, this note will serve as a discharge from care along with most recent update on progress.

## 2020-09-21 NOTE — PLAN OF CARE
[facility], [facility]  [address] [place] [address], [address] [place] [address]  Phone: [phone]  Fax [phone]     Physical Therapy Certification    Dear Referring Practitioner: Andrei Tyler,    We had the pleasure of evaluating the following patient for physical therapy services at [address] [place] [address]. A summary of our findings can be found in the initial assessment below. This includes our plan of care. If you have any questions or concerns regarding these findings, please do not hesitate to contact me at the office phone number checked above. Thank you for the referral.       Physician Signature:_______________________________Date:__________________  By signing above (or electronic signature), therapists plan is approved by physician    Patient: Justin Castillo   : 1955   MRN: [id number]  Referring Physician: Referring Practitioner: Andrei Tyler      Evaluation Date: 2020      Medical Diagnosis Information:  Diagnosis: M25.50 (ICD-10-CM) - Pain in unspecified joint   Treatment Diagnosis: Pain in Right Shoulder                                         Insurance information: PT Insurance Information: 2020 NELSON  - $0CP - $0DED - 30PT - 100% - AUTH AFTER 30V    Precautions/ Contra-indications: h/o ernestina placement d/t scoliosis ()  C-SSRS Triggered by Intake questionnaire (Past 2 wk assessment):   [x] No, Questionnaire did not trigger screening.   [] Yes, Patient intake triggered further evaluation      [] C-SSRS Screening completed  [] PCP notified via Plan of Care  [] Emergency services notified     Latex Allergy:  [x]NO      []YES  Preferred Language for Healthcare:   [x]English       []other:    SUBJECTIVE: Patient stated complaint: Pt reports at least 1 year h/o shoulder pain, she was tried to break a fall on the stairs. She had tried injections for a year but without significant efficacy the last time.  She had an x-ray and so the MD referred her for PT. She has trouble lifting arm, reaching behind head and behind her back. She is unable to lift a gallon of milk out of the refrigerator. She does not drive but has pain when she is trying to sleep. Relevant Medical History: RA, OA, osteoperosis, HTN   Functional Disability Index: 25% Q DASH    Pain Scale:  6/10  Easing factors: injections, ice  Provocative factors: use of arm     Type: [x]Constant   []Intermittent  []Radiating [x]Localized []other:     Numbness/Tingling: none    Occupation/School: self- employed (desk/ computer work)     Living Status/Prior Level of Function: Independent with ADLs and IADLs, none    OBJECTIVE:     ROM Left Right   Shoulder Flex 148 118 w/ pain   Shoulder Abd     Shoulder ER T2 C5  PROM: 33   Shoulder IR T12 Iliac crest  PROM: 40   Elbow ext  Lacking 0             Strength  Left Right   Shoulder Flex  3-   Shoulder Scap  3-   Shoulder ER  3-   Shoulder IR  4-               Reflexes/Sensation:    [x]Dermatomes/Myotomes intact    [x]Reflexes equal and normal bilaterally   []Other:    Joint mobility:    []Normal    [x]Hypo   []Hyper    Palpation: TTP biceps, supraspinatus, AC joing    Functional Mobility/Transfers: ind. Posture: forward head/ rounded shoulders    Bandages/Dressings/Incisions: NA    Gait: (include devices/WB status): WNL    Orthopedic Special Tests:  (+) HK/ (-) abduction                        [x] Patient history, allergies, meds reviewed. Medical chart reviewed. See intake form. Review Of Systems (ROS):  [x]Performed Review of systems (Integumentary, CardioPulmonary, Neurological) by intake and observation. Intake form has been scanned into medical record. Patient has been instructed to contact their primary care physician regarding ROS issues if not already being addressed at this time.       Co-morbidities/Complexities (which will affect course of rehabilitation):   []None           Arthritic conditions   [x]Rheumatoid arthritis (M05.9)  [x]Osteoarthritis (M19.91)   Cardiovascular conditions   [x]Hypertension (I10)  []Hyperlipidemia (E78.5)  []Angina pectoris (I20)  []Atherosclerosis (I70)   Musculoskeletal conditions   []Disc pathology   []Congenital spine pathologies   []Prior surgical intervention  []Osteoporosis (M81.8)  []Osteopenia (M85.8)   Endocrine conditions   []Hypothyroid (E03.9)  []Hyperthyroid Gastrointestinal conditions   []Constipation (T33.59)   Metabolic conditions   []Morbid obesity (E66.01)  []Diabetes type 1(E10.65) or 2 (E11.65)   []Neuropathy (G60.9)     Pulmonary conditions   []Asthma (J45)  []Coughing   []COPD (J44.9)   Psychological Disorders  []Anxiety (F41.9)  []Depression (F32.9)   []Other:   []Other:          Barriers to/and or personal factors that will affect rehab potential:              []Age  []Sex              [x]Motivation/Lack of Motivation                        [x]Co-Morbidities              []Cognitive Function, education/learning barriers              [x]Environmental, home barriers              []profession/work barriers  [x]past PT/medical experience  []other:  Justification: pt has mutliple comorbidities that may limit progress. Falls Risk Assessment (30 days):   [x] Falls Risk assessed and no intervention required.   [] Falls Risk assessed and Patient requires intervention due to being higher risk   TUG score (>12s at risk):     [] Falls education provided, including         ASSESSMENT:   Functional Impairments   [x]Noted spinal or UE joint hypomobility   []Noted spinal or UE joint hypermobility   [x]Decreased UE functional ROM   [x]Decreased UE functional strength   []Abnormal reflexes/sensation/myotomal/dermatomal deficits   [x]Decreased RC/scapular/core strength and neuromuscular control   []other:      Functional Activity Limitations (from functional questionnaire and intake)   [x]Reduced ability to tolerate prolonged functional positions   [x]Reduced ability or difficulty with changes of positions or transfers between positions   [x]Reduced ability to maintain good posture and demonstrate good body mechanics with sitting, bending, and lifting   [] Reduced ability or tolerance with driving and/or computer work   [x]Reduced ability to sleep   [x]Reduced ability to perform lifting, reaching, carrying tasks   [x]Reduced ability to tolerate impact through UE   [x]Reduced ability to reach behind back   [x]Reduced ability to  or hold objects   [x]Reduced ability to throw or toss an object   []other:    Participation Restrictions   [x]Reduced participation in self care activities   [x]Reduced participation in home management activities   [x]Reduced participation in work activities   []Reduced participation in social activities. []Reduced participation in sport/recreation activities. Classification:   []Signs/symptoms consistent with post-surgical status including decreased ROM, strength and function.   []Signs/symptoms consistent with joint sprain/strain   [x]Signs/symptoms consistent with shoulder impingement   []Signs/symptoms consistent with shoulder/elbow/wrist tendinopathy   [x]Signs/symptoms consistent with Rotator cuff tear   []Signs/symptoms consistent with labral tear   [x]Signs/symptoms consistent with postural dysfunction    [x]Signs/symptoms consistent with Glenohumeral IR Deficit - <45 degrees   []Signs/symptoms consistent with facet dysfunction of cervical/thoracic spine    []Signs/symptoms consistent with pathology which may benefit from Dry needling     []other:     Prognosis/Rehab Potential:      []Excellent   []Good    [x]Fair   []Poor    Tolerance of evaluation/treatment:    []Excellent   []Good    [x]Fair   []Poor  Physical Therapy Evaluation Complexity Justification  [x] A history of present problem with:  [] no personal factors and/or comorbidities that impact the plan of care;  []1-2 personal factors and/or comorbidities that impact the plan of care  [x]3 personal factors and/or comorbidities that impact the plan of care  [x] An examination of body systems using standardized tests and measures addressing any of the following: body structures and functions (impairments), activity limitations, and/or participation restrictions;:  [] a total of 1-2 or more elements   [] a total of 3 or more elements   [x] a total of 4 or more elements   [x] A clinical presentation with:  [x] stable and/or uncomplicated characteristics   [] evolving clinical presentation with changing characteristics  [] unstable and unpredictable characteristics;   [x] Clinical decision making of [x] low, [] moderate, [] high complexity using standardized patient assessment instrument and/or measurable assessment of functional outcome. [x] EVAL (LOW) 68956 (typically 20 minutes face-to-face)  [] EVAL (MOD) 23518 (typically 30 minutes face-to-face)  [] EVAL (HIGH) 73074 (typically 45 minutes face-to-face)  [] RE-EVAL       PLAN:  Frequency/Duration:  1-2 days per week for 6-8 Weeks:  INTERVENTIONS:  [x] Therapeutic exercise including: strength training, ROM, for Upper extremity and core   [x]  NMR activation and proprioception for UE, scap and Core   [x] Manual therapy as indicated for shoulder, scapula and spine to include: Dry Needling/IASTM, STM, PROM, Gr I-IV mobilizations, manipulation. [x] Modalities as needed that may include: thermal agents, E-stim, Biofeedback, US, iontophoresis as indicated  [x] Patient education on joint protection, postural re-education, activity modification, progression of HEP. HEP instruction: (see scanned forms)  Access Code: [email or web address]   URL: [email or web address]   Date: 09/21/2020   Prepared by: Kirstin Montalvo     Exercises   Seated Scapular Retraction - 10 reps - 3 hold - 2x daily - 7x weekly   No Money - 10 reps - 3 hold - 2x daily - 7x weekly   Seated Shoulder Flexion Towel Slide at Table Top - 10 reps - 10 hold - 2x daily - 7x weekly   Seated Shoulder External Rotation GENOOM with Dowel - 5 reps - 10 hold - 2x daily - 7x weekly   Patient Education   Office Posture   Forward Head Posture      GOALS:  Patient stated goal: decrease pain  [] Progressing: [] Met: [] Not Met: [] Adjusted    Therapist goals for Patient:   Short Term Goals: To be achieved in: 2 weeks  1. Independent in HEP and progression per patient tolerance, in order to prevent re-injury. [] Progressing: [] Met: [] Not Met: [] Adjusted  2. Patient will have a decrease in pain to facilitate improvement in movement, function, and ADLs as indicated by Functional Deficits. [] Progressing: [] Met: [] Not Met: [] Adjusted    Long Term Goals: To be achieved in: 8 weeks  1. Disability index score of 10% or less for the [facility] to assist with reaching prior level of function. [] Progressing: [] Met: [] Not Met: [] Adjusted  2. Patient will demonstrate increased AROM to [employer] [facility] [place] to allow for proper joint functioning as indicated by patients Functional Deficits. [] Progressing: [] Met: [] Not Met: [] Adjusted  3. Patient will demonstrate an increase in Strength to [employer] [facility] [place] to allow for proper functional mobility as indicated by patients Functional Deficits. [] Progressing: [] Met: [] Not Met: [] Adjusted  4. Patient will return to 75% functional activities without increased symptoms or restriction. [] Progressing: [] Met: [] Not Met: [] Adjusted  5.  Pt will report <3/10 pain with overhead use. (patient specific functional goal)    [] Progressing: [] Met: [] Not Met: [] Adjusted       Electronically signed by:  Ksenia Yang, PT

## 2020-09-23 ENCOUNTER — HOSPITAL ENCOUNTER (OUTPATIENT)
Dept: PHYSICAL THERAPY | Age: 65
Setting detail: THERAPIES SERIES
Discharge: HOME | End: 2020-09-23
Payer: MEDICAID

## 2020-09-23 PROCEDURE — 97140 MANUAL THERAPY 1/> REGIONS: CPT | Performed by: PHYSICAL THERAPIST

## 2020-09-23 PROCEDURE — 97112 NEUROMUSCULAR REEDUCATION: CPT | Performed by: PHYSICAL THERAPIST

## 2020-09-23 PROCEDURE — 97110 THERAPEUTIC EXERCISES: CPT | Performed by: PHYSICAL THERAPIST

## 2020-09-23 RX ORDER — METOPROLOL SUCCINATE 100 MG/1
TABLET, EXTENDED RELEASE ORAL
Qty: 30 TABLET | Refills: 5 | Status: SHIPPED | OUTPATIENT
Start: 2020-09-23 | End: 2021-03-22

## 2020-09-23 NOTE — TELEPHONE ENCOUNTER
Pt last OV 01/08/19 [facility], No future appts scheduled, called pt and LVM to schedule her over due yrly:  Assessment:      1. Essential hypertension:  Well controlled and will continue current medical regimen.      2. Tachycardia:  Hx of atrial tachycardia controlled with toprol XL. Most recent holter monitor 9/19/17 ([facility]) NSR with sinus tachycardia frequently present. No ventricular ectopy. Moderately frequent PAC's. No symptoms noted. Most recent ECHO ([facility]) 11/22/17 EF 55-60%, Regional wall motion abnormalities cannot be excluded. Normal diastolic function. Note EKG 11/7/18 shows ST, 125 bpm.              Plan:  1. Continue current medications. Her HR today 's bpm and no concerning symptoms.   2. Follow up with Dr. Anastasiya Alvarenga in 1 year

## 2020-09-23 NOTE — FLOWSHEET NOTE
[facility],  [address] [place]  Phone: [phone]  Fax [phone]        Date:  2020    Patient Name:  Fariha Aguilar    :  1955  MRN: [id number]  Restrictions/Precautions:    Medical/Treatment Diagnosis Information:  · Diagnosis: M25.50 (ICD-10-CM) - Pain in unspecified joint  · Treatment Diagnosis: Pain in Right Shoulder  Insurance/Certification information:  PT Insurance Information: 2020 NELSON  - $0CP - $0DED - 30PT - 100% - AUTH AFTER 30V  Physician Information:  Referring Practitioner: Gali Brown  Has the plan of care been signed (Y/N):        []  Yes  [x]  No     Date of Patient follow up with Physician: PRN      Is this a Progress Report:     []  Yes  [x]  No        If Yes:  Date Range for reporting period:  Beginning 2020  Ending 10-    Progress report will be due (10 Rx or 30 days whichever is less): see above       Recertification will be due (POC Duration  / 90 days whichever is less): 8 weeks from IE       Visit # Insurance Allowable Auth Required   2 30 []  Yes []  No        Functional Scale: Q DASH 25%    Date assessed:  2020      Therapy Diagnosis/Practice Pattern:R shoulder pain/ stiffness/ C      Number of Comorbidities:  []0     []1-2    [x]3+    Latex Allergy:  [x]NO      []YES  Preferred Language for Healthcare:   [x]English       []other:      Pain level:  610     SUBJECTIVE:  Reports pain is about 10% less overall in UE since starting exercises. OBJECTIVE:    Observation:    Test measurements:  Forgot about no money exercises. Told to add that at home.     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; recent carpal tunnel sx    Exercises/Interventions:     Therapeutic Ex (89145) Sets/sec Reps Notes/CUES   Table slide 10\" 10 hep   Cane ER 10\" 10 hep   Scap retract 3\" 10 hep   No money (BW) 3\" 10 hep               Cane flexion H5 5    Cane ER at 45 abd H10 5 reps           punches in supine with L UE assist H5 5 reps + N.V. Cane BP  H5 5 reps + N.V.         Manual Intervention (24629)      PROM, joint mob 10 mins                                   NMR re-education (52224)   CUES NEEDED   Pt education: px, dx, poc, role of PT, posture re-ed, work station modifications, HEP 6 mins                                                     Therapeutic Activity (87510)                                                Therapeutic Exercise and NMR EXR  [x] (18425) Provided verbal/tactile cueing for activities related to strengthening, flexibility, endurance, ROM  for improvements in scapular, scapulothoracic and UE control with self care, reaching, carrying, lifting, house/yardwork, driving/computer work.    [] (55355) Provided verbal/tactile cueing for activities related to improving balance, coordination, kinesthetic sense, posture, motor skill, proprioception  to assist with  scapular, scapulothoracic and UE control with self care, reaching, carrying, lifting, house/yardwork, driving/computer work. Therapeutic Activities:    [] (72153 or 99250) Provided verbal/tactile cueing for activities related to improving balance, coordination, kinesthetic sense, posture, motor skill, proprioception and motor activation to allow for proper function of scapular, scapulothoracic and UE control with self care, carrying, lifting, driving/computer work.      Home Exercise Program:    [x] (75032) Reviewed/Progressed HEP activities related to strengthening, flexibility, endurance, ROM of scapular, scapulothoracic and UE control with self care, reaching, carrying, lifting, house/yardwork, driving/computer work  [] (22549) Reviewed/Progressed HEP activities related to improving balance, coordination, kinesthetic sense, posture, motor skill, proprioception of scapular, scapulothoracic and UE control with self care, reaching, carrying, lifting, house/yardwork, driving/computer work      Manual Treatments:  PROM / STM / Oscillations-Mobs:  G-I, II, III, IV (PA's, Inf., Post.)  [x] (58959) Provided manual therapy to mobilize soft tissue/joints of cervical/CT, scapular GHJ and UE for the purpose of modulating pain, promoting relaxation,  increasing ROM, reducing/eliminating soft tissue swelling/inflammation/restriction, improving soft tissue extensibility and allowing for proper ROM for normal function with self care, reaching, carrying, lifting, house/yardwork, driving/computer work    Modalities:  Ice x 10 mins     Charges:  Timed Code Treatment Minutes: 39'   Total Treatment Minutes: 39'     [facility] time in/time out:     [] EVAL (LOW) 40749   [] EVAL (MOD) 04126   [] EVAL (HIGH) 87396   [] RE-EVAL     [x] QL(01081) x  1   [] IONTO  [x] NMR (18335) x 1     [] VASO  [x] Manual (41949) x 1     [] Other:  [] TA x      [] Mech Traction (50298)  [] ES(attended) (49610)      [] ES (un) (92571):     HEP instruction: (see scanned forms)  Access Code: [email or web address]   URL: [email or web address]   Date: 09/21/2020   Prepared by: Milo Colon     Exercises   Seated Scapular Retraction - 10 reps - 3 hold - 2x daily - 7x weekly   No Money - 10 reps - 3 hold - 2x daily - 7x weekly   Seated Shoulder Flexion Towel Slide at Table Top - 10 reps - 10 hold - 2x daily - 7x weekly   Seated Shoulder External Rotation AAROM with Dowel - 5 reps - 10 hold - 2x daily - 7x weekly   Patient Education   Office Posture   Forward Head Posture      GOALS:  Patient stated goal: decrease pain  [] Progressing: [] Met: [] Not Met: [] Adjusted    Therapist goals for Patient:   Short Term Goals: To be achieved in: 2 weeks  1. Independent in HEP and progression per patient tolerance, in order to prevent re-injury. [] Progressing: [] Met: [] Not Met: [] Adjusted  2. Patient will have a decrease in pain to facilitate improvement in movement, function, and ADLs as indicated by Functional Deficits.   [] Progressing: [] Met: [] Not Met: [] Adjusted    Long Term Goals: To be achieved in: 8 weeks  1. Disability index score of 10% or less for the [facility] to assist with reaching prior level of function. [] Progressing: [] Met: [] Not Met: [] Adjusted  2. Patient will demonstrate increased AROM to [facility] [place] to allow for proper joint functioning as indicated by patients Functional Deficits. [] Progressing: [] Met: [] Not Met: [] Adjusted  3. Patient will demonstrate an increase in Strength to [facility] [place] to allow for proper functional mobility as indicated by patients Functional Deficits. [] Progressing: [] Met: [] Not Met: [] Adjusted  4. Patient will return to 75% functional activities without increased symptoms or restriction. [] Progressing: [] Met: [] Not Met: [] Adjusted  5. Pt will report <3/10 pain with overhead use. (patient specific functional goal)    [] Progressing: [] Met: [] Not Met: [] Adjusted       Progression Towards Functional goals:  [] Patient is progressing as expected towards functional goals listed. [] Progression is slowed due to complexities listed. [] Progression has been slowed due to co-morbidities. [x] Plan just implemented, too soon to assess goals progression  [] Other:     ASSESSMENT:  See eval    Overall Progression Towards Functional goals/ Treatment Progress Update:  [] Patient is progressing as expected towards functional goals listed. [] Progression is slowed due to complexities/Impairments listed. [] Progression has been slowed due to co-morbidities.   [x] Plan just implemented, too soon to assess goals progression <30days   [] Goals require adjustment due to lack of progress  [] Patient is not progressing as expected and requires additional follow up with physician  [] Other    Prognosis for POC: [x] Good [] Fair  [] Poor      Patient requires continued skilled intervention: [x] Yes  [] No    Treatment/Activity Tolerance:  [x] Patient able to complete treatment  [] Patient limited by fatigue  [] Patient

## 2020-09-28 ENCOUNTER — HOSPITAL ENCOUNTER (OUTPATIENT)
Dept: PHYSICAL THERAPY | Age: 65
Setting detail: THERAPIES SERIES
Discharge: HOME | End: 2020-09-28
Payer: MEDICAID

## 2020-09-28 PROCEDURE — 97112 NEUROMUSCULAR REEDUCATION: CPT | Performed by: PHYSICAL THERAPIST

## 2020-09-28 PROCEDURE — 97110 THERAPEUTIC EXERCISES: CPT | Performed by: PHYSICAL THERAPIST

## 2020-09-28 PROCEDURE — 97140 MANUAL THERAPY 1/> REGIONS: CPT | Performed by: PHYSICAL THERAPIST

## 2020-09-28 NOTE — FLOWSHEET NOTE
[facility], [address] [place]  Phone: [phone]  Fax [phone]        Date:  2020    Patient Name:  Brad Brown    :  1955  MRN: [id number]  Restrictions/Precautions:    Medical/Treatment Diagnosis Information:  · Diagnosis: M25.50 (ICD-10-CM) - Pain in unspecified joint  · Treatment Diagnosis: Pain in Right Shoulder  Insurance/Certification information:  PT Insurance Information: 2020 [employer]SON  - $0CP - $0DED - 30PT - 100% - AUTH AFTER 30V  Physician Information:  Referring Practitioner: Christelle Kruse  Has the plan of care been signed (Y/N):        []  Yes  [x]  No     Date of Patient follow up with Physician: PRN      Is this a Progress Report:     []  Yes  [x]  No        If Yes:  Date Range for reporting period:  Beginning 2020  Ending 10-    Progress report will be due (10 Rx or 30 days whichever is less): see above       Recertification will be due (POC Duration  / 90 days whichever is less): 8 weeks from IE       Visit # Insurance Allowable Auth Required   3 30 []  Yes []  No        Functional Scale: Q DASH 25%    Date assessed:  2020      Therapy Diagnosis/Practice Pattern:R shoulder pain/ stiffness/ C      Number of Comorbidities:  []0     []1-2    [x]3+     Latex Allergy:  [x]NO      []YES  Preferred Language for Healthcare:   [x]English       []other:      Pain level:  6/10     SUBJECTIVE:  Reports pain is about 10% less overall in UE since starting exercises. Can sit at the computer without Shld pain noted. OBJECTIVE:    Observation:    Test measurements: shld flex: 135 this visit PROM.   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; recent carpal tunnel sx    Exercises/Interventions:     Therapeutic Ex (85515) Sets/sec Reps Notes/CUES   Table slide  10\" 10 hep   Cane ER 10\" 10 hep   Scap retract 3\" 10 hep   No money (BW) 3\" 10 hep         SB table slides  10 reps Increased pain   Cane flexion H5 5    Cane ER at 45 abd H10 5 reps           punches in supine with L UE assist H5 10 reps + to HEP   Cane BP  H5 10 reps + HEP   shld ext glenny  H5 5 reps    Pulleys  10 reps Increased pain   Manual Intervention (91516)      PROM, joint mob 10 mins                                   NMR re-education (75717)   CUES NEEDED   Pt education: px, dx, poc, role of PT, posture re-ed, work station modifications, HEP 6 mins                                                     Therapeutic Activity (00599)                                                Therapeutic Exercise and NMR EXR  [x] (35189) Provided verbal/tactile cueing for activities related to strengthening, flexibility, endurance, ROM  for improvements in scapular, scapulothoracic and UE control with self care, reaching, carrying, lifting, house/yardwork, driving/computer work.    [] (29480) Provided verbal/tactile cueing for activities related to improving balance, coordination, kinesthetic sense, posture, motor skill, proprioception  to assist with  scapular, scapulothoracic and UE control with self care, reaching, carrying, lifting, house/yardwork, driving/computer work. Therapeutic Activities:    [] (57313 or 31290) Provided verbal/tactile cueing for activities related to improving balance, coordination, kinesthetic sense, posture, motor skill, proprioception and motor activation to allow for proper function of scapular, scapulothoracic and UE control with self care, carrying, lifting, driving/computer work.      Home Exercise Program:    [x] (92176) Reviewed/Progressed HEP activities related to strengthening, flexibility, endurance, ROM of scapular, scapulothoracic and UE implemented, too soon to assess goals progression <30days   [] Goals require adjustment due to lack of progress  [] Patient is not progressing as expected and requires additional follow up with physician  [] Other    Prognosis for POC: [x] Good [] Fair  [] Poor      Patient requires continued skilled intervention: [x] Yes  [] No    Treatment/Activity Tolerance:  [x] Patient able to complete treatment  [] Patient limited by fatigue  [] Patient limited by pain    [] Patient limited by other medical complications  [] Other:         Return to Play: (if applicable)   []  Stage 1: Intro to Strength   []  Stage 2: Return to Run and Strength   []  Stage 3: Return to Jump and Strength   []  Stage 4: Dynamic Strength and Agility   []  Stage 5: Sport Specific Training     []  Ready to Return to Play, Meets All Above Stages   []  Not Ready for Return to Sports   Comments:                               PLAN: Progress scap stabilization as tolerated. [x] Continue per plan of care [] Alter current plan (see comments above)  [] Plan of care initiated [] Hold pending MD visit [] Discharge      Electronically signed by:  Daysi Parr, [address] [place] [address]    Note: If patient does not return for scheduled/ recommended follow up visits, this note will serve as a discharge from care along with most recent update on progress.

## 2020-09-30 ENCOUNTER — HOSPITAL ENCOUNTER (OUTPATIENT)
Dept: PHYSICAL THERAPY | Age: 65
Setting detail: THERAPIES SERIES
Discharge: HOME | End: 2020-09-30
Payer: MEDICAID

## 2020-09-30 PROCEDURE — 97112 NEUROMUSCULAR REEDUCATION: CPT | Performed by: PHYSICAL THERAPIST

## 2020-09-30 PROCEDURE — 97110 THERAPEUTIC EXERCISES: CPT | Performed by: PHYSICAL THERAPIST

## 2020-09-30 PROCEDURE — 97140 MANUAL THERAPY 1/> REGIONS: CPT | Performed by: PHYSICAL THERAPIST

## 2020-09-30 NOTE — FLOWSHEET NOTE
[facility], [address] [place]  Phone: [phone]  Fax [phone]        Date:  2020    Patient Name:  Janki Crespo    :  1955  MRN: [id number]  Restrictions/Precautions:    Medical/Treatment Diagnosis Information:  · Diagnosis: M25.50 (ICD-10-CM) - Pain in unspecified joint  · Treatment Diagnosis: Pain in Right Shoulder  Insurance/Certification information:  PT Insurance Information: 2020 NELSON  - $0CP - $0DED - 30PT - 100% - AUTH AFTER 30V  Physician Information:  Referring Practitioner: Audra Koyanagi  Has the plan of care been signed (Y/N):        []  Yes  [x]  No     Date of Patient follow up with Physician: PRN      Is this a Progress Report:     []  Yes  [x]  No        If Yes:  Date Range for reporting period:  Beginning 2020  Ending 10-    Progress report will be due (10 Rx or 30 days whichever is less): see above       Recertification will be due (POC Duration  / 90 days whichever is less): 8 weeks from IE       Visit # Insurance Allowable Auth Required   4 30 []  Yes []  No        Functional Scale: Q DASH 25%    Date assessed:  2020      Therapy Diagnosis/Practice Pattern:R shoulder pain/ stiffness/ C      Number of Comorbidities:  []0     []1-2    [x]3+     Latex Allergy:  [x]NO      []YES  Preferred Language for Healthcare:   [x]English       []other:      Pain level:  4/10     SUBJECTIVE:  Reports pain is not as bad today but cant raise arm overhead as far today. Back is sore today probably due to the weather. OBJECTIVE:    Observation:    Test measurements: shld flex: 135 this visit PROM.   OBJECTIVE  Test used Initial score Current Score   Pain Summary  6/10    Functional questionnaire Quick dash 25%    Functional Testing            ROM PROM Flex 118     IR Iliac crest/40     ER 33    Strength shld flex/scap 3-/5             RESTRICTIONS/PRECAUTIONS: rods in T spine d/t and UE control with self care, reaching, carrying, lifting, house/yardwork, driving/computer work  [] (21575) Reviewed/Progressed HEP activities related to improving balance, coordination, kinesthetic sense, posture, motor skill, proprioception of scapular, scapulothoracic and UE control with self care, reaching, carrying, lifting, house/yardwork, driving/computer work      Manual Treatments:  PROM / STM / Oscillations-Mobs:  G-I, II, III, IV (PA's, Inf., Post.)  [x] (68534) Provided manual therapy to mobilize soft tissue/joints of cervical/CT, scapular GHJ and UE for the purpose of modulating pain, promoting relaxation,  increasing ROM, reducing/eliminating soft tissue swelling/inflammation/restriction, improving soft tissue extensibility and allowing for proper ROM for normal function with self care, reaching, carrying, lifting, house/yardwork, driving/computer work    Modalities:  Ice x 10 mins     Charges:  Timed Code Treatment Minutes: 40'   Total Treatment Minutes: 40'     [facility] time in/time out:     [] EVAL (LOW) 09281   [] EVAL (MOD) 36377   [] EVAL (HIGH) 04164   [] RE-EVAL     [x] MD(77149) x  1   [] IONTO  [x] NMR (53970) x 1     [] VASO  [x] Manual (60659) x 1     [] Other:  [] TA x      [] Mech Traction (43557)  [] ES(attended) (46033)      [] ES (un) (01942):     HEP instruction: (see scanned forms)  Access Code: [email or web address]   URL: [email or web address]   Date: 09/21/2020   Prepared by: Carmella Ayers     Exercises   Seated Scapular Retraction - 10 reps - 3 hold - 2x daily - 7x weekly   No Money - 10 reps - 3 hold - 2x daily - 7x weekly   Seated Shoulder Flexion Towel Slide at Table Top - 10 reps - 10 hold - 2x daily - 7x weekly   Seated Shoulder External Rotation AAROM with Dowel - 5 reps - 10 hold - 2x daily - 7x weekly   Patient Education   Office Posture   Forward Head Posture    Provided handout on new exercises. Not in [address].   GOALS:  Patient stated goal: decrease pain  [] Progressing: [] Met: [] Not Met: [] Adjusted    Therapist goals for Patient:   Short Term Goals: To be achieved in: 2 weeks  1. Independent in HEP and progression per patient tolerance, in order to prevent re-injury. [] Progressing: [] Met: [] Not Met: [] Adjusted  2. Patient will have a decrease in pain to facilitate improvement in movement, function, and ADLs as indicated by Functional Deficits. [] Progressing: [] Met: [] Not Met: [] Adjusted    Long Term Goals: To be achieved in: 8 weeks  1. Disability index score of 10% or less for the [facility] to assist with reaching prior level of function. [] Progressing: [] Met: [] Not Met: [] Adjusted  2. Patient will demonstrate increased AROM to [employer] [place] to allow for proper joint functioning as indicated by patients Functional Deficits. [] Progressing: [] Met: [] Not Met: [] Adjusted  3. Patient will demonstrate an increase in Strength to [employer] [place] to allow for proper functional mobility as indicated by patients Functional Deficits. [] Progressing: [] Met: [] Not Met: [] Adjusted  4. Patient will return to 75% functional activities without increased symptoms or restriction. [] Progressing: [] Met: [] Not Met: [] Adjusted  5. Pt will report <3/10 pain with overhead use. (patient specific functional goal)    [] Progressing: [] Met: [] Not Met: [] Adjusted       Progression Towards Functional goals:  [] Patient is progressing as expected towards functional goals listed. [] Progression is slowed due to complexities listed. [] Progression has been slowed due to co-morbidities. [x] Plan just implemented, too soon to assess goals progression  [] Other:     ASSESSMENT:  See eval. Increased PROM noted this visit. Overall Progression Towards Functional goals/ Treatment Progress Update:  [] Patient is progressing as expected towards functional goals listed. [] Progression is slowed due to complexities/Impairments listed. [] Progression has been slowed due to co-morbidities.   [x] Plan just implemented, too soon to assess goals progression <30days   [] Goals require adjustment due to lack of progress  [] Patient is not progressing as expected and requires additional follow up with physician  [] Other    Prognosis for POC: [x] Good [] Fair  [] Poor      Patient requires continued skilled intervention: [x] Yes  [] No    Treatment/Activity Tolerance:  [x] Patient able to complete treatment  [] Patient limited by fatigue  [] Patient limited by pain    [] Patient limited by other medical complications  [] Other:         Return to Play: (if applicable)   []  Stage 1: Intro to Strength   []  Stage 2: Return to Run and Strength   []  Stage 3: Return to Jump and Strength   []  Stage 4: Dynamic Strength and Agility   []  Stage 5: Sport Specific Training     []  Ready to Return to Play, Meets All Above Stages   []  Not Ready for Return to Sports   Comments:                               PLAN: Progress scap stabilization as tolerated. [x] Continue per plan of care [] Alter current plan (see comments above)  [] Plan of care initiated [] Hold pending MD visit [] Discharge      Electronically signed by:  Tremaine Davenport, [address]    Note: If patient does not return for scheduled/ recommended follow up visits, this note will serve as a discharge from care along with most recent update on progress.

## 2020-10-05 ENCOUNTER — APPOINTMENT (OUTPATIENT)
Dept: PHYSICAL THERAPY | Age: 65
End: 2020-10-05
Payer: MEDICAID

## 2020-10-07 ENCOUNTER — HOSPITAL ENCOUNTER (OUTPATIENT)
Dept: PHYSICAL THERAPY | Age: 65
Setting detail: THERAPIES SERIES
Discharge: HOME | End: 2020-10-07
Payer: MEDICAID

## 2020-10-07 PROCEDURE — 97110 THERAPEUTIC EXERCISES: CPT | Performed by: PHYSICAL THERAPIST

## 2020-10-07 PROCEDURE — 97112 NEUROMUSCULAR REEDUCATION: CPT | Performed by: PHYSICAL THERAPIST

## 2020-10-07 PROCEDURE — 97140 MANUAL THERAPY 1/> REGIONS: CPT | Performed by: PHYSICAL THERAPIST

## 2020-10-07 NOTE — FLOWSHEET NOTE
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; recent carpal tunnel sx    Exercises/Interventions:     Therapeutic Ex (74576) Sets/sec Reps Notes/CUES   Table slide  hep   Cane ER 10\" 10 hep   Scap retract 3\" 10 hep   No money (BW)/ YTB 3\" 2x10 hep         SB table slides  Cane flexion 1# H5 10    Cane ER at 45 abd H10 5 reps    Sitting ER stretches in chair H30 3 reps     punches in supine with and w/o L UE assist H5 10 reps( 5 with and 5 w/o) + to HEP   Cane BP 1# H5 1x10 reps + HEP   BP without cane  x5 reps    shld ext/abd glenny  H5 10 reps    Pulleys  Manual Intervention (33615)      PROM, joint mob 10 mins                                   NMR re-education (13737)   CUES NEEDED   Pt education: px, dx, poc, role of PT, posture re-ed, work station modifications, HEP 6 mins                 TB Rows/Ext YTB H3 1x10 reps Soreness noted                                 Therapeutic Activity (86550)                                                Therapeutic Exercise and NMR EXR  [x] (44080) Provided verbal/tactile cueing for activities related to strengthening, flexibility, endurance, ROM  for improvements in scapular, scapulothoracic and UE control with self care, reaching, carrying, lifting, house/yardwork, driving/computer work. [x] (99800) Provided verbal/tactile cueing for activities related to improving balance, coordination, kinesthetic sense, posture, motor skill, proprioception  to assist with  scapular, scapulothoracic and UE control with self care, reaching, carrying, lifting, house/yardwork, driving/computer work. Therapeutic Activities:    [] (60070 or 52331) Provided verbal/tactile cueing for activities related to improving balance, coordination, kinesthetic sense, posture, motor skill, proprioception and motor activation to allow for proper function of scapular, scapulothoracic and UE control with self care, carrying, lifting, driving/computer work.      Home Exercise Program:    [x] (59602) Reviewed/Progressed HEP activities related to strengthening, flexibility, endurance, ROM of scapular, scapulothoracic and UE control with self care, reaching, carrying, lifting, house/yardwork, driving/computer work  [] (03005) Reviewed/Progressed HEP activities related to improving balance, coordination, kinesthetic sense, posture, motor skill, proprioception of scapular, scapulothoracic and UE control with self care, reaching, carrying, lifting, house/yardwork, driving/computer work      Manual Treatments:  PROM / STM / Oscillations-Mobs:  G-I, II, III, IV (PA's, Inf., Post.)  [x] (56072) Provided manual therapy to mobilize soft tissue/joints of cervical/CT, scapular GHJ and UE for the purpose of modulating pain, promoting relaxation,  increasing ROM, reducing/eliminating soft tissue swelling/inflammation/restriction, improving soft tissue extensibility and allowing for proper ROM for normal function with self care, reaching, carrying, lifting, house/yardwork, driving/computer work    Modalities:  Ice x 10 mins to R shld. Charges:  Timed Code Treatment Minutes: 39'   Total Treatment Minutes: 54'     [facility] time in/time out:     [] EVAL (LOW) 08469   [] EVAL (MOD) 35741   [] EVAL (HIGH) 08304   [] RE-EVAL     [x] TM(29781) x  1   [] IONTO  [x] NMR (82419) x 1     [] VASO  [x] Manual (03044) x 1     [] Other:  [] TA x      [] Mech Traction (83569)  [] ES(attended) (61335)      [] ES (un) (69367):     HEP instruction: (see scanned forms)  Access Code: [email or web address]   URL: [email or web address]   Date: 09/21/2020   Prepared by: Rufina Farrell     Exercises   Seated Scapular Retraction - 10 reps - 3 hold - 2x daily - 7x weekly   No Money - 10 reps - 3 hold - 2x daily - 7x weekly   Seated Shoulder Flexion Towel Slide at Table Top - 10 reps - 10 hold - 2x daily - 7x weekly   Seated Shoulder External Rotation AAROM with Dowel - 5 reps - 10 hold - 2x daily - 7x weekly   Patient Education   Office Posture   Forward Head Posture    Provided handout on new exercises. Not in [address]. GOALS:  Patient stated goal: decrease pain  [] Progressing: [] Met: [] Not Met: [] Adjusted    Therapist goals for Patient:   Short Term Goals: To be achieved in: 2 weeks  1. Independent in HEP and progression per patient tolerance, in order to prevent re-injury. [] Progressing: [] Met: [] Not Met: [] Adjusted  2. Patient will have a decrease in pain to facilitate improvement in movement, function, and ADLs as indicated by Functional Deficits. [] Progressing: [] Met: [] Not Met: [] Adjusted    Long Term Goals: To be achieved in: 8 weeks  1. Disability index score of 10% or less for the [facility] to assist with reaching prior level of function. [] Progressing: [] Met: [] Not Met: [] Adjusted  2. Patient will demonstrate increased AROM to [facility] [place] to allow for proper joint functioning as indicated by patients Functional Deficits. [] Progressing: [] Met: [] Not Met: [] Adjusted  3. Patient will demonstrate an increase in Strength to [place] [place] to allow for proper functional mobility as indicated by patients Functional Deficits. [] Progressing: [] Met: [] Not Met: [] Adjusted  4. Patient will return to 75% functional activities without increased symptoms or restriction. [] Progressing: [] Met: [] Not Met: [] Adjusted  5. Pt will report <3/10 pain with overhead use. (patient specific functional goal)    [] Progressing: [] Met: [] Not Met: [] Adjusted       Progression Towards Functional goals:  [] Patient is progressing as expected towards functional goals listed. [] Progression is slowed due to complexities listed. [] Progression has been slowed due to co-morbidities. [x] Plan just implemented, too soon to assess goals progression  [] Other:     ASSESSMENT:  See eval. Increased PROM noted this visit.     Overall Progression Towards Functional goals/ Treatment Progress Update:  [] Patient is progressing as expected towards functional goals listed. [] Progression is slowed due to complexities/Impairments listed. [] Progression has been slowed due to co-morbidities. [x] Plan just implemented, too soon to assess goals progression <30days   [] Goals require adjustment due to lack of progress  [] Patient is not progressing as expected and requires additional follow up with physician  [] Other    Prognosis for POC: [x] Good [] Fair  [] Poor      Patient requires continued skilled intervention: [x] Yes  [] No    Treatment/Activity Tolerance:  [x] Patient able to complete treatment  [] Patient limited by fatigue  [] Patient limited by pain    [] Patient limited by other medical complications  [] Other:         Return to Play: (if applicable)   []  Stage 1: Intro to Strength   []  Stage 2: Return to Run and Strength   []  Stage 3: Return to Jump and Strength   []  Stage 4: Dynamic Strength and Agility   []  Stage 5: Sport Specific Training     []  Ready to Return to Play, Meets All Above Stages   []  Not Ready for Return to Sports   Comments:                               PLAN: Progress scap stabilization as tolerated. [x] Continue per plan of care [] Alter current plan (see comments above)  [] Plan of care initiated [] Hold pending MD visit [] Discharge      Electronically signed by:  Tc Parker, [address]    Note: If patient does not return for scheduled/ recommended follow up visits, this note will serve as a discharge from care along with most recent update on progress.

## 2020-10-09 ENCOUNTER — HOSPITAL ENCOUNTER (OUTPATIENT)
Dept: PHYSICAL THERAPY | Age: 65
Setting detail: THERAPIES SERIES
Discharge: HOME | End: 2020-10-09
Payer: MEDICAID

## 2020-10-09 PROCEDURE — 97110 THERAPEUTIC EXERCISES: CPT | Performed by: PHYSICAL THERAPIST

## 2020-10-09 PROCEDURE — 97140 MANUAL THERAPY 1/> REGIONS: CPT | Performed by: PHYSICAL THERAPIST

## 2020-10-09 PROCEDURE — 97112 NEUROMUSCULAR REEDUCATION: CPT | Performed by: PHYSICAL THERAPIST

## 2020-10-09 NOTE — FLOWSHEET NOTE
[facility],  [address]  Phone: [phone]  Fax [phone]        Date:  10/9/2020    Patient Name:  Kathlean Brunner    :  1955  MRN: [id number]  Restrictions/Precautions:    Medical/Treatment Diagnosis Information:  · Diagnosis: M25.50 (ICD-10-CM) - Pain in unspecified joint  · Treatment Diagnosis: Pain in Right Shoulder  Insurance/Certification information:  PT Insurance Information: 2020 [employer]  - $0CP - $0DED - 30PT - 100% - AUTH AFTER 30V  Physician Information:  Referring Practitioner: David Dong  Has the plan of care been signed (Y/N):        []  Yes  [x]  No     Date of Patient follow up with Physician: PRN      Is this a Progress Report:     []  Yes  [x]  No        If Yes:  Date Range for reporting period:  Beginning 2020  Ending 10-    Progress report will be due (10 Rx or 30 days whichever is less): see above       Recertification will be due (POC Duration  / 90 days whichever is less): 8 weeks from IE (11-16)      Visit # Insurance Allowable Auth Required   6 30 []  Yes []  No        Functional Scale: Q DASH 25%    Date assessed:  2020      Therapy Diagnosis/Practice Pattern:R shoulder pain/ stiffness/ C      Number of Comorbidities:  []0     []1-2    [x]3+     Latex Allergy:  [x]NO      []YES  Preferred Language for Healthcare:   [x]English       []other:      Pain level:  10     SUBJECTIVE:  Reports pain is not as bad as before and ROM is improved. Some soreness on Weds.  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; recent carpal tunnel sx    Exercises/Interventions:     Therapeutic Ex (52172) Sets/sec Reps Notes/CUES   Table slide  hep   Beach chair  20\" 5    Scap retract 3\" 10 hep   Corner (s) low arms 20\" 5    No money RTB 5\" 15 hep   Cane T spine rotation (s) 5\" 10     SB table slides  Cane flexion 1# H5 10    Cane ER at 45 abd H10 5 reps    Sitting ER stretches in chair H30 3 reps     punches in supine with and w/o L UE assist H5 10 reps (5 with and 5 w/o) + to HEP   Cane BP 1# H5 1x10 reps + HEP   BP without cane  x5 reps    shld ext/abd glenny  H5 10 reps    Pulleys H3 15 repsManual Intervention (82775)      PROM, joint mob 10 mins     T spine MET T12 FRS- pain free 3 reps                              NMR re-education (69366)   CUES NEEDED   Pt education: px, dx, poc, role of PT, posture re-ed, work station modifications, HEP 6 mins                 TB Rows/Ext YTB H3 15 reps ea Soreness noted                                 Therapeutic Activity (42151)                                                Therapeutic Exercise and NMR EXR  [x] (32206) Provided verbal/tactile cueing for activities related to strengthening, flexibility, endurance, ROM  for improvements in scapular, scapulothoracic and UE control with self care, reaching, carrying, lifting, house/yardwork, driving/computer work. [x] (29360) Provided verbal/tactile cueing for activities related to improving balance, coordination, kinesthetic sense, posture, motor skill, proprioception  to assist with  scapular, scapulothoracic and UE control with self care, reaching, carrying, lifting, house/yardwork, driving/computer work. Therapeutic Activities:    [] (39568 or 54869) Provided verbal/tactile cueing for activities related to improving balance, coordination, kinesthetic sense, posture, motor skill, proprioception and motor activation to allow for proper function of scapular, scapulothoracic and UE control with self care, carrying, lifting, driving/computer work.      Home Exercise Program:    [x] (16583) Reviewed/Progressed HEP activities related to strengthening, flexibility, endurance, ROM of scapular, scapulothoracic and UE control with self care, reaching, carrying, lifting, house/yardwork, driving/computer work  [] (54461) Reviewed/Progressed HEP activities related to improving balance, coordination, kinesthetic sense, posture, motor skill, proprioception of scapular, scapulothoracic and UE control with self care, reaching, carrying, lifting, house/yardwork, driving/computer work      Manual Treatments:  PROM / STM / Oscillations-Mobs:  G-I, II, III, IV (PA's, Inf., Post.)  [x] (57198) Provided manual therapy to mobilize soft tissue/joints of cervical/CT, scapular GHJ and UE for the purpose of modulating pain, promoting relaxation,  increasing ROM, reducing/eliminating soft tissue swelling/inflammation/restriction, improving soft tissue extensibility and allowing for proper ROM for normal function with self care, reaching, carrying, lifting, house/yardwork, driving/computer work    Modalities:  Ice x 10 mins to R shld. Charges:  Timed Code Treatment Minutes: 39'   Total Treatment Minutes: 54'     [facility] time in/time out:     [] EVAL (LOW) 92269   [] EVAL (MOD) 67160   [] EVAL (HIGH) 84935   [] RE-EVAL     [x] BEBO(39645) x  1   [] IONTO  [x] NMR (99281) x 1     [] VASO  [x] Manual (94461) x 1     [] Other:  [] TA x      [] Mech Traction (59762)  [] ES(attended) (64400)      [] ES (un) (14259):     HEP instruction: (see scanned forms)  Access Code: [email or web address]   URL: [email or web address]   Date: 09/21/2020   Prepared by: Su [place]     Exercises   Seated Scapular Retraction - 10 reps - 3 hold - 2x daily - 7x weekly   No Money - 10 reps - 3 hold - 2x daily - 7x weekly   Seated Shoulder Flexion Towel Slide at Table Top - 10 reps - 10 hold - 2x daily - 7x weekly   Seated Shoulder External Rotation AAROM with Dowel - 5 reps - 10 hold - 2x daily - 7x weekly   Patient Education awareness. She will continue her HEP and improving her posture. Overall Progression Towards Functional goals/ Treatment Progress Update:  [x] Patient is progressing as expected towards functional goals listed. [] Progression is slowed due to complexities/Impairments listed. [] Progression has been slowed due to co-morbidities. [] Plan just implemented, too soon to assess goals progression <30days   [] Goals require adjustment due to lack of progress  [] Patient is not progressing as expected and requires additional follow up with physician  [] Other    Prognosis for POC: [x] Good [] Fair  [] Poor      Patient requires continued skilled intervention: [x] Yes  [] No    Treatment/Activity Tolerance:  [x] Patient able to complete treatment  [] Patient limited by fatigue  [] Patient limited by pain    [] Patient limited by other medical complications  [] Other:         PLAN: Progress scap stabilization as tolerated. [x] Continue per plan of care [] Alter current plan (see comments above)  [] Plan of care initiated [] Hold pending MD visit [] Discharge      Electronically signed by:  Jamel Reardon, [place] [id number]    Note: If patient does not return for scheduled/ recommended follow up visits, this note will serve as a discharge from care along with most recent update on progress.

## 2020-10-12 ENCOUNTER — HOSPITAL ENCOUNTER (OUTPATIENT)
Dept: PHYSICAL THERAPY | Age: 65
Setting detail: THERAPIES SERIES
Discharge: HOME | End: 2020-10-12
Payer: MEDICAID

## 2020-10-12 PROCEDURE — 97140 MANUAL THERAPY 1/> REGIONS: CPT | Performed by: PHYSICAL THERAPIST

## 2020-10-12 PROCEDURE — 97110 THERAPEUTIC EXERCISES: CPT | Performed by: PHYSICAL THERAPIST

## 2020-10-12 PROCEDURE — 97112 NEUROMUSCULAR REEDUCATION: CPT | Performed by: PHYSICAL THERAPIST

## 2020-10-12 NOTE — FLOWSHEET NOTE
[facility], [address] [facility]  Phone: [phone]  Fax [phone]        Date:  10/12/2020    Patient Name:  Bettie Starr    :  1955  MRN: [id number]  Restrictions/Precautions:    Medical/Treatment Diagnosis Information:  · Diagnosis: M25.50 (ICD-10-CM) - Pain in unspecified joint  · Treatment Diagnosis: Pain in Right Shoulder  Insurance/Certification information:  PT Insurance Information: 2020 NELSON  - $0CP - $0DED - 30PT - 100% - AUTH AFTER 30V  Physician Information:  Referring Practitioner: Alcides Kohler  Has the plan of care been signed (Y/N):        []  Yes  [x]  No     Date of Patient follow up with Physician: PRN      Is this a Progress Report:     []  Yes  [x]  No        If Yes:  Date Range for reporting period:  Beginning 2020  Ending 10-    Progress report will be due (10 Rx or 30 days whichever is less): see above       Recertification will be due (POC Duration  / 90 days whichever is less): 8 weeks from IE (-16)      Visit # Insurance Allowable Auth Required   7 30 []  Yes []  No        Functional Scale: Q DASH 25%    Date assessed:  2020      Therapy Diagnosis/Practice Pattern:R shoulder pain/ stiffness/ C      Number of Comorbidities:  []0     []1-2    [x]3+     Latex Allergy:  [x]NO      []YES  Preferred Language for Healthcare:   [x]English       []other:      Pain level:  10     SUBJECTIVE:  Reports being sore after last visit.     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; recent carpal tunnel sx    Exercises/Interventions: Therapeutic Ex (40201) Sets/sec Reps Notes/CUES   Table slide  hep   Beach chair  20\" 5    Scap retract 3\" 10 hep   Corner (s) low arms 20\" 5    No money RTB 5\" 15 + TB hep   Cane T spine rotation (s)    Supine flex on wedge H5 x10 repsCane flexion 1.5 # H5 15    Cane ER at 45 abd H10 5 reps    Sitting ER stretches in chair     punches in supine  w/o L UE assist H5 10 reps  + to HEP   Cane BP 1.5# H5 1x10 reps + HEP   BP without cane 1#  2x5 reps    glenny /ER/IR H5 10 reps    Wall slides flex/ thread the needle 10 reps each Pulleys Manual Intervention (90324)      PROM, joint mob 10 mins     T spine MET T12 FRS- pain free                              NMR re-education (07385)   CUES NEEDED   Pt education: px, dx, poc, role of PT, posture re-ed, work station modifications, HEP 6 mins                 TB Rows/Ext YTB H3 2x10 reps reps ea Soreness noted                                 Therapeutic Activity (52508)                                                Therapeutic Exercise and NMR EXR  [x] (23127) Provided verbal/tactile cueing for activities related to strengthening, flexibility, endurance, ROM  for improvements in scapular, scapulothoracic and UE control with self care, reaching, carrying, lifting, house/yardwork, driving/computer work. [x] (84760) Provided verbal/tactile cueing for activities related to improving balance, coordination, kinesthetic sense, posture, motor skill, proprioception  to assist with  scapular, scapulothoracic and UE control with self care, reaching, carrying, lifting, house/yardwork, driving/computer work. Therapeutic Activities:    [] (72804 or 12327) Provided verbal/tactile cueing for activities related to improving balance, coordination, kinesthetic sense, posture, motor skill, proprioception and motor activation to allow for proper function of scapular, scapulothoracic and UE control with self care, carrying, lifting, driving/computer work.      Home Exercise Program: Forward Head Posture    Provided handout on new exercises. Not in [address]. GOALS:  Patient stated goal: decrease pain  [] Progressing: [] Met: [] Not Met: [] Adjusted    Therapist goals for Patient:   Short Term Goals: To be achieved in: 2 weeks  1. Independent in HEP and progression per patient tolerance, in order to prevent re-injury. [] Progressing: [] Met: [] Not Met: [] Adjusted  2. Patient will have a decrease in pain to facilitate improvement in movement, function, and ADLs as indicated by Functional Deficits. [] Progressing: [] Met: [] Not Met: [] Adjusted    Long Term Goals: To be achieved in: 8 weeks  1. Disability index score of 10% or less for the [facility] to assist with reaching prior level of function. [] Progressing: [] Met: [] Not Met: [] Adjusted  2. Patient will demonstrate increased AROM to [place] [place] to allow for proper joint functioning as indicated by patients Functional Deficits. [] Progressing: [] Met: [] Not Met: [] Adjusted  3. Patient will demonstrate an increase in Strength to [place] [place] to allow for proper functional mobility as indicated by patients Functional Deficits. [] Progressing: [] Met: [] Not Met: [] Adjusted  4. Patient will return to 75% functional activities without increased symptoms or restriction. [] Progressing: [] Met: [] Not Met: [] Adjusted  5. Pt will report <3/10 pain with overhead use. (patient specific functional goal)    [] Progressing: [] Met: [] Not Met: [] Adjusted       Progression Towards Functional goals:  [] Patient is progressing as expected towards functional goals listed. [x] Progression is slowed due to complexities listed. [x] Progression has been slowed due to co-morbidities. [] Plan just implemented, too soon to assess goals progression  [] Other:     ASSESSMENT:  Pt still demonstrating postural deficits and guarding with ROM. She requires skilled intervention to restore functional ROM, strength endurance and postural awareness.  She will

## 2020-10-13 ENCOUNTER — HOSPITAL ENCOUNTER (OUTPATIENT)
Dept: OCCUPATIONAL THERAPY | Age: 65
Setting detail: THERAPIES SERIES
Discharge: HOME | End: 2020-10-13
Payer: MEDICAID

## 2020-10-13 PROCEDURE — 97140 MANUAL THERAPY 1/> REGIONS: CPT | Performed by: OCCUPATIONAL THERAPIST

## 2020-10-13 PROCEDURE — 97035 APP MDLTY 1+ULTRASOUND EA 15: CPT | Performed by: OCCUPATIONAL THERAPIST

## 2020-10-13 PROCEDURE — 97110 THERAPEUTIC EXERCISES: CPT | Performed by: OCCUPATIONAL THERAPIST

## 2020-10-13 NOTE — PLAN OF CARE
[address] [facility], [address] [place] [address] Juan Torres  Phone: [phone]  Fax [phone]    Occupational Therapy Treatment Note/ Progress Report/Reassessment    Is this a Progress Report:     []  Yes  [x]  No      If Yes:  Date Range for reporting period:  Beginning 20  Ending 10/13/2020  Progress report will be due (10 Rx or 30 days whichever is less):     Recertification will be due (POC Duration  / 90 days whichever is less): 10/20/20  Date:  10/13/2020  Patient Name:  Parvin Salcido    :  1955  MRN: [id number]  Medical/Treatment Diagnosis Information:  · Diagnosis: G56.01 (ICD-10-CM) - Right carpal tunnel syndrome G56.21 (ICD-10-CM) - Ulnar neuropathy at elbow, right   · Treatment Diagnosis: M25.521  pain right elbow     Insurance/Certification information:     Physician Information:  Referring Practitioner: Dr. Bethany Martínez  Has the plan of care been signed (Y/N):        []  Yes  [x]  No     Comorbidities Affecting Functional Performance:             []?Anxiety (F41.9)/Depression (F32.9)             []?Diabetes Type 1(E10.65) or 2 (E11.65)       []? Rheumatoid Arthritis (M05.9)  []? Fibromyalgia (M79.7)  []? Neuropathy(G60.9)  [x]? Osteoarthritis(M19.91)  []? None              [x]? Other: osteoporosis, states she has a bad right rotator cuff also, back pain    Visit # Insurance Allowable Auth Required   6  []  Yes []  No    From 20  to 10/13/2020    Date of Injury: several month history of numbness in right hand  Date of Surgery: 20   1.  Right Ulnar Nerve Decompression at Elbow                          2.  Right Carpal Tunnel Release     Date of Patient follow up with Physician: 20     RESTRICTIONS/PRECAUTIONS: none     Latex Allergy:  [x]? No      []? Yes                    Pacemaker:  [x]? No       []? Yes      Preferred Language for Healthcare:   [x]? English       []? other:      Functional Scale: 82 % (Quick DASH)                                Date assessed:  2020                                  34%                                                                                       20     SUBJECTIVE:  States that her elbow feels like it is getting poked with a needle certain ways she touches it. Scar in ronaldo also still has knots in it. Patient reported deficits/history of current problem: Did not come to therapy as directed post-operatively. Saw MD on 20 and was very stiff. She was issued carpal tunnel gel splint and heelbo for right elbow and referred to therapy. Patient states that her hand was going numb and cold all the time. She attended therapy for 6 visits and obtained fairly good ROM to RUE and was increasing strength. She also had a shoulder issue and was starting PT for her shoulder so didn't think she could handle coming to two therapies at the same time.     Pain Scale: 3/10          []? Constant                [x]? Intermittent              []?other:  Pain Location: right elbow  Easing factors: rest  Provocative factors: trying to bend elbow  OBJECTIVE:      Date:  Hand Dominance:     [x]? Right    []? Left 2020    8/27/20 9/10/20 9/17/20 10/13/20   Objective Measures:         PAIN 7/10   3, occasionally 4 when she feels the needle poking her elbow. Quick DASH 82 %   34 %    Digits tip to DPFC in cm Index: 4  Lon  Ring: 3  Small: 2 3  3  2  1 0  0  0  0 0  0  0  0    Thumb ROM MP - 20  IP - 40       Thumb opposition  R: to middle  L: To small      Thumb Radial/Palmar abd ROM R:  L:       Wrist ROM Ext/Flex R: 25/30  L: 35/35 45/65 50/70 55/75   Rad/Uln dev ROM R:  L:       Forearm ROM  Sup/pron R: 45/80  L:  75/85 75/85    Elbow ROM Ext/flex R: 15/75  L: 15/105 15/135 15/135 10/140   Edema in cm circumf. MCPJs R: 17.5  L: 15.5  16.5     Edema in cm circumf.   Wrist R: 15.5  L: 13.0  14.0      strength in lbs R:   L:   10  23 10 12   Pinch Strengthin lbs: lat R:  L:  6.5  8.0 6.6 6.4   Pinch Strength in lbs:  3 point R:  L:     4.9  8.1 4.9 5.5   MMT:          Observations:  (including splints, bandages, incisions, scars): Well healed incision to palm , steristrips over medial right elbow, edematous hand Healed incisions, removed steristrips Incision healed and all skin peeled around incisions           MODALITIES: 8/25/20  8/27/20 9/1/20 9/3/20 9/10/20 9/17/20 10/13/20   Fluidotherapy (06102)          Estim (14450/10445)          Paraffin (27514)          US (58839)       1.5wcm2, 3mg, 8\" CW to carpal tunnel scar   Iontophoresis (95667)          Hot Pack   10 to elbow wrist and hand on right same same same same   Cold Pack                    INTERVENTIONS:          Therapeutic Exercise (27160) AAROM right elbow, wrist and hand same Gentle passive elbow, writ and finger ROM as patient tolerates. Warm water soak squeezing yellow sponge  And pressing flat for passive wrist and finger extension. Rolling and squeezing soft yellow putty Passive elbow and wrist extension 15 second holds x 5 each. AROM elbow flexion to ear x 10, writ flex/ext x 10, full fisting x 10. AROM elbow flexion to ear x 10, writ flex/ext x 10, full fisting x 10. AROM elbow flexion to ear x 10, writ flex/ext x 10, full fisting x 10. Combined functional movements of hand to head, shoulder, opposite arm and armpit x 10 of each. AROM wrist flex/ext, and finger flex and ext,              Therapeutic Activity (11883) Scooping and pouring with cup simulating eating Scooping and pouring with cup simulating eating. Touching top of head with right hand and slidiing down to nose x 5 Reaching out elbow extended grasp small velcro block to stick on shoulder and chest with elbow and wrist flexion x 15. Scrubbing table in standing with outstretched hand. Scrubbing table in standing with outstretched hand    Rolling green therabar on table in standing for wrist and digital extension and some elbow. extension. Reaching out elbow extended grasp small velcro block to stick on shoulder and chest with elbow and wrist flexion x 15. Scrubbing table in standing with outstretched hand. Scooping and pouring with full elbow e  Flexion and extension and forearm rotation. Wrist wrap up and down x 3. Scrubbing table in standing with outstretched hand     Theraputty  strengthening and pinch strengthening. Putty rolling and gripping. Warm water soak  Manipulating paper towels in hand. Wrist wrap up and down x 4. Wrist wrap up and down x 4. Squeezing soft pink sponge and rotating between thumb and fingertips. Pilates ring bilateral pushing forward and bringing to chest  X 10. Manual Therapy (27466) Edema massage to RUE same STM to bicep and volar forearm muscles. Scar mobilization elbow and wrist Focus on scar mobilization to right elbow. Scar mobilization right elbow and palm   Scar massage to elbow and wrist, DTM to tricep and tricep insertion as that is where patient feels the needle pain. (IASTM, Dry Needling, manual mobilization)                    Neuromuscular Reeducation (95734)  Cueing to maintain correct wrist and forearm positions to maximize elbow motion. Cuing for correct performance of wrist wrap activities to maximize wrist motion.                            ADL Training (24514)                              HEP Training/Review See sheet(s)                             Splinting          Lcode:          Orthotic Mgmt, Subsequent Enc (37051)          Orthotic Mgmt & Training (44046)                    Other: Issued edema glove and tubigrip D, discharge gel splint and heelbo pad Continue glove and tubigrip Swelling much decreased, discontinue compression         Therapeutic Exercise & NMR:  [x] (26147) Provided verbal/tactile cueing for activities related to strengthening, flexibility, endurance, ROM  for improvements in scapular, scapulothoracic and UE control with self care, reaching, carrying, lifting, house/yardwork, driving/computer work.    [] (86889) Provided verbal/tactile cueing for activities related to improving balance, coordination, kinesthetic sense, posture, motor skill, proprioception  to assist with  scapular, scapulothoracic and UE control with self care, reaching, carrying, lifting, house/yardwork, driving/computer work.     Therapeutic Activities & NMR:    [x] (02250 or 07640) Provided verbal/tactile cueing for activities related to improving balance, coordination, kinesthetic sense, posture, motor skill, proprioception and motor activation to allow for proper function of scapular, scapulothoracic and UE control with self care, carrying, lifting, driving/computer work    Home Exercise Program:    [] (21816) Reviewed/Progressed HEP activities related to strengthening, flexibility, endurance, ROM of scapular, scapulothoracic and UE control with self care, reaching, carrying, lifting, house/yardwork, driving/computer work  [] (61019) Reviewed/Progressed HEP activities related to improving balance, coordination, kinesthetic sense, posture, motor skill, proprioception of scapular, scapulothoracic and UE control with self care, reaching, carrying, lifting, house/yardwork, driving/computer work      Manual Treatments:  PROM / STM / Oscillations-Mobs:  G-I, II, III, IV (PA's, Inf., Post.)  [x] (78914) Provided manual therapy to mobilize soft tissue/joints of cervical/CT, scapular GHJ and UE for the purpose of modulating pain, promoting relaxation,  increasing ROM, reducing/eliminating soft tissue swelling/inflammation/restriction, improving soft tissue extensibility and allowing for proper ROM for normal function with self care, reaching, carrying, lifting, house/yardwork, driving/computer work    ADL Training:  [] (11969) Provided self-care/home management training related to activities of daily living and compensatory training, and/or use of adaptive equipment Charges:  Timed Code Treatment Minutes: 40   Total Treatment Minutes: 40   Worker's Comp: Time In/Time Out     [] EVAL (LOW) 20437 (typically 20 minutes face-to-face)    [] EVAL (MOD) 22177 (typically 30 minutes face-to-face)  [] EVAL (HIGH) 90990 (typically 45 minutes face-to-face)  [] OT Re-eval (35792)       [x] Chichi (77023) x 1     [] YVPSF(56337)  [] NMR (70771) x  1    [] Estim (attended) (84858)   [x] Manual ([id number]) x 1     [x] US (31725)  [] TA (21154) x 1     [] Paraffin (86806)  [] ADL  (08629) x     [] Splint/L code:    [] Estim (unattended) (62829)  [] Fluidotherapy (65003)  [] DN 1-2 (86381)   [] DN 3+ (12585)  [] Orthotic Mgmt, Subsequent Enc (67302)  [] Orthotic Mgmt & Training (82588)  [] Other:    ASSESSMENT:      GOALS: Patient stated goal: write, flex fingers and arm   []? Progressing: []? Met: []? Not Met: []? Adjusted     Therapist goals for Patient:   Short Term Goals: To be achieved in: 2 weeks  1. Independent in HEP and progression per patient tolerance, in order to prevent re-injury. []? Progressing: [x]? Met: []? Not Met: []? Adjusted   2. Patient will have a decrease in pain to facilitate improvement in movement, function, and ADLs as indicated by Functional Deficits. []? Progressing: [x]? Met: []? Not Met: []? Adjusted     Long Term Goals to be achieved in 8 weeks (through 10/20/20), including patient directed goals to address patient identified performance deficits:  1) Pt to be independent in graded HEP progression with a good level of effort and compliance. []? Progressing: [x]? Met: []? Not Met: []? Adjusted   2) Pt to report a score of </= 30 % on the Quick DASH disability questionnaire for increased performance with carrying, moving, and handling objects. []? Progressing: [x]? Met: []? Not Met: []?  Adjusted   3) Pt will demonstrate increased ROM to elbow flexion to 120, wrist to 60/60 and full finger motion for improved independence with eating with right hand, writing with right hand and grooming. [x]? Progressing: []? Met: []? Not Met: []? Adjusted   4) Pt will demonstrate increased strength to right  80% of left for improved independence with completing household tasks. [x]? Progressing: []? Met: []? Not Met: []? Adjusted   5) Pt will have a decrease in pain to 2/10 to facilitate normal ADL. [x]? Progressing: []? Met: []? Not Met: []? Adjusted       Overall Progression Towards Functional Goals/Treatment Progress Update:  [x] Patient is progressing as expected towards functional goals listed. [] Progression is slowed due to complexities/impairments listed. [] Progression has been slowed due to co-morbidities. [] Plan just implemented, too soon to assess goals progression <30 days  [] Goals require adjustment due to lack of progress  [] Patient is not progressing as expected and requires additional follow up with physician  [] All goals are met  [x] Oher:  Patient has improved elbow and wrist motion since the last time she was seen.  is slightly better also. Unsure why she is having a poking pain in posterior elbow that is not consistent. Reccommended she continue massaging it a lot and wearing the Heelbo sleeve as needed. Continue work on  strengthening as well    Prognosis for POC: [x] Good [] Fair  [] Poor    Patient requires continued skilled intervention: [x] Yes  [] No    Treatment/Activity Tolerance:  [x] Patient able to complete treatment  [] Patient limited by fatigue  [] Patient limited by pain    [] Patient limited by other medical compl                 PLAN: No further follow-up needed.   [] Continue per plan of care [] Alter current plan (see comments above)  [] Plan of care initiated [] Hold pending MD visit [x] Discharge    Electronically signed by:  Doc Prakash , OTR\L, CHT - [id number]      Note: If patient does not return for scheduled/ recommended follow up visits, this note will serve as a discharge from care along with most recent update on progress.

## 2020-10-14 ENCOUNTER — HOSPITAL ENCOUNTER (OUTPATIENT)
Dept: PHYSICAL THERAPY | Age: 65
Setting detail: THERAPIES SERIES
Discharge: HOME | End: 2020-10-14
Payer: MEDICAID

## 2020-10-14 PROCEDURE — 97140 MANUAL THERAPY 1/> REGIONS: CPT | Performed by: PHYSICAL THERAPIST

## 2020-10-14 PROCEDURE — 97110 THERAPEUTIC EXERCISES: CPT | Performed by: PHYSICAL THERAPIST

## 2020-10-14 PROCEDURE — 97112 NEUROMUSCULAR REEDUCATION: CPT | Performed by: PHYSICAL THERAPIST

## 2020-10-14 NOTE — FLOWSHEET NOTE
carpal tunnel sx    Exercises/Interventions:     Therapeutic Ex (65206) Sets/sec Reps Notes/CUES   Table slide  hep   Beach chair  20\" 5    Scap retract 3\" 10 hep   Corner (s) low arms 20\" 5    No money RTB 5\" 15 + TB hep   Cane T spine rotation (s)    Supine flex on wedge H5 x10 repsCane flexion 1.5 # H5 15    SL shld abd partial range  10 reps    Cane ER at 45 abd H10 5 reps    Sitting ER stretches in chair     punches in supine  w/o L UE assist H5 10 reps  + to HEP   Cane BP 1.5# H5 1x10 reps + HEP   BP without cane 1#  10  reps    glenny /ER/IR    Wall slides flex/ thread the needle 10 reps each Pulleys Manual Intervention (97305)      PROM, joint mob 10 mins     T spine MET T12 FRS- pain free            RST 20\"  x1 rep                  NMR re-education (19825)   CUES NEEDED   Pt education: px, dx, poc, role of PT, posture re-ed, work station modifications, HEP 6 mins                 TB Rows/Ext YTB H3 2x10 reps reps ea Soreness noted   TB walkouts ER H5 5 reps                            Therapeutic Activity (06640)                                                Therapeutic Exercise and NMR EXR  [x] (81251) Provided verbal/tactile cueing for activities related to strengthening, flexibility, endurance, ROM  for improvements in scapular, scapulothoracic and UE control with self care, reaching, carrying, lifting, house/yardwork, driving/computer work. [x] (13498) Provided verbal/tactile cueing for activities related to improving balance, coordination, kinesthetic sense, posture, motor skill, proprioception  to assist with  scapular, scapulothoracic and UE control with self care, reaching, carrying, lifting, house/yardwork, driving/computer work.     Therapeutic Activities:    [] (74477 or 59892) Provided verbal/tactile cueing for activities related to improving balance, coordination, kinesthetic sense, posture, motor skill, proprioception and motor activation to allow for proper function of scapular, scapulothoracic and UE control with self care, carrying, lifting, driving/computer work. Home Exercise Program:    [x] (06239) Reviewed/Progressed HEP activities related to strengthening, flexibility, endurance, ROM of scapular, scapulothoracic and UE control with self care, reaching, carrying, lifting, house/yardwork, driving/computer work  [] (18339) Reviewed/Progressed HEP activities related to improving balance, coordination, kinesthetic sense, posture, motor skill, proprioception of scapular, scapulothoracic and UE control with self care, reaching, carrying, lifting, house/yardwork, driving/computer work      Manual Treatments:  PROM / STM / Oscillations-Mobs:  G-I, II, III, IV (PA's, Inf., Post.)  [x] (76255) Provided manual therapy to mobilize soft tissue/joints of cervical/CT, scapular GHJ and UE for the purpose of modulating pain, promoting relaxation,  increasing ROM, reducing/eliminating soft tissue swelling/inflammation/restriction, improving soft tissue extensibility and allowing for proper ROM for normal function with self care, reaching, carrying, lifting, house/yardwork, driving/computer work    Modalities:  Ice x 10 mins to R shld. Charges:  Timed Code Treatment Minutes: 39'   Total Treatment Minutes: 54'     [facility] time in/time out:     [] EVAL (LOW) 53505   [] EVAL (MOD) 66568   [] EVAL (HIGH) 50567   [] RE-EVAL     [x] ZU(96621) x  1   [] IONTO  [x] NMR (45558) x 1     [] VASO  [x] Manual (60937) x 1     [] Other:  [] TA x      [] Mech Traction (12088)  [] ES(attended) (72795)      [] ES (un) (76186):     HEP instruction: (see scanned forms)  Access Code: [email or web address]   URL: [email or web address]   Date: 09/21/2020   Prepared by: Laci Harper     Exercises   Seated Scapular Retraction - 10 reps - 3 hold - 2x daily - 7x weekly   No Money - 10 reps - 3 hold - 2x daily - 7x weekly   Seated Shoulder Flexion Towel Slide at Table Top - 10 reps - 10 hold - 2x daily - 7x weekly   Seated Shoulder requires skilled intervention to restore functional ROM, strength endurance and postural awareness. She will continue her HEP and improving her posture. Tolerating exercises better than at beginning of therapy. Overall Progression Towards Functional goals/ Treatment Progress Update:  [x] Patient is progressing as expected towards functional goals listed. [] Progression is slowed due to complexities/Impairments listed. [] Progression has been slowed due to co-morbidities. [] Plan just implemented, too soon to assess goals progression <30days   [] Goals require adjustment due to lack of progress  [] Patient is not progressing as expected and requires additional follow up with physician  [] Other    Prognosis for POC: [x] Good [] Fair  [] Poor      Patient requires continued skilled intervention: [x] Yes  [] No    Treatment/Activity Tolerance:  [x] Patient able to complete treatment  [] Patient limited by fatigue  [] Patient limited by pain    [] Patient limited by other medical complications  [] Other:         PLAN: Progress scap stabilization as tolerated. Assess response to new exercises. [x] Continue per plan of care [] Alter current plan (see comments above)  [] Plan of care initiated [] Hold pending MD visit [] Discharge      Electronically signed by:  Fariha Smith, [address]    Note: If patient does not return for scheduled/ recommended follow up visits, this note will serve as a discharge from care along with most recent update on progress.

## 2020-10-19 ENCOUNTER — HOSPITAL ENCOUNTER (OUTPATIENT)
Dept: PHYSICAL THERAPY | Age: 65
Setting detail: THERAPIES SERIES
Discharge: HOME | End: 2020-10-19
Payer: MEDICAID

## 2020-10-19 PROCEDURE — 97110 THERAPEUTIC EXERCISES: CPT | Performed by: PHYSICAL THERAPIST

## 2020-10-19 PROCEDURE — 97140 MANUAL THERAPY 1/> REGIONS: CPT | Performed by: PHYSICAL THERAPIST

## 2020-10-19 PROCEDURE — 97112 NEUROMUSCULAR REEDUCATION: CPT | Performed by: PHYSICAL THERAPIST

## 2020-10-19 NOTE — FLOWSHEET NOTE
[facility], [address] [place]  Phone: [phone]  Fax [phone]        Date:  10/19/2020    Patient Name:  Meredith Alfaro    :  1955  MRN: [id number]  Restrictions/Precautions:    Medical/Treatment Diagnosis Information:  · Diagnosis: M25.50 (ICD-10-CM) - Pain in unspecified joint  · Treatment Diagnosis: Pain in Right Shoulder  Insurance/Certification information:  PT Insurance Information: 2020 [employer]  - $0CP - $0DED - 30PT - 100% - AUTH AFTER 30V  Physician Information:  Referring Practitioner: Agusto Haskins  Has the plan of care been signed (Y/N):        []  Yes  [x]  No     Date of Patient follow up with Physician: PRN      Is this a Progress Report:     [x]  Yes  []  No        If Yes:  Date Range for reporting period:  Beginning 2020  Ending 10-    Progress report will be due (10 Rx or 30 days whichever is less):       Recertification will be due (POC Duration  / 90 days whichever is less): 8 weeks from IE (11-16)      Visit # Insurance Allowable Auth Required   9 30 []  Yes []  No        Functional Scale: Q DASH :18%    Date assessed:  10/19/20      Therapy Diagnosis/Practice Pattern:R shoulder pain/ stiffness/ C      Number of Comorbidities:  []0     []1-2    [x]3+     Latex Allergy:  [x]NO      []YES  Preferred Language for Healthcare:   [x]English       []other:      Pain level: 2- 410     SUBJECTIVE:  Reports shld is better overall. Not as sore last time after therapy in shld. Still has to take tylenol at night to help with sleeping. Reports overall improvement since IE is about 30%.     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; recent carpal tunnel sx    Exercises/Interventions:     Therapeutic Ex (20594) Sets/sec Reps Notes/CUES   Table slide  hep   Beach chair  20\" 5    Scap retract 3\" 10 hep   Corner (s) low arms 20\" 5    No money RTB 5\" 15  TB with hep   Cane T spine rotation (s)    Supine flex on wedge H5 x10 repsCane flexion 1.5 # H5 15    SL shld abd partial range  10 reps    Cane ER at 45 abd H10 5 reps    Sitting ER stretches in chair     punches in supine  w/o L UE assist H5 10 reps   HEP   Cane BP 1.5# HEP   BP without cane 1#  10  reps    glenny /ER/IR    Wall slides flex/ thread the needle 10 reps each Pulleys Manual Intervention (18684)      PROM, joint mob 10 mins     T spine MET T12 FRS- pain free            RST 20\"  x1 rep                  NMR re-education (09060)   CUES NEEDED   Pt education: px, dx, poc, role of PT, posture re-ed, work station modifications, HEP 6 mins                 TB Rows/Ext YTB H3 2x10 reps reps ea + to HEP   TB walkouts ER/IR H5 5 reps + to HEP                           Therapeutic Activity (97771)                                                Therapeutic Exercise and NMR EXR  [x] (61157) Provided verbal/tactile cueing for activities related to strengthening, flexibility, endurance, ROM  for improvements in scapular, scapulothoracic and UE control with self care, reaching, carrying, lifting, house/yardwork, driving/computer work. [x] (36178) Provided verbal/tactile cueing for activities related to improving balance, coordination, kinesthetic sense, posture, motor skill, proprioception  to assist with  scapular, scapulothoracic and UE control with self care, reaching, carrying, lifting, house/yardwork, driving/computer work.     Therapeutic Activities:    [] (71987 or ) Provided verbal/tactile cueing for activities related to improving balance, coordination, kinesthetic sense, posture, motor skill, proprioception and motor activation to allow for proper function of scapular, scapulothoracic and UE control with self care, carrying, lifting, driving/computer work. Home Exercise Program:    [x] (07610) Reviewed/Progressed HEP activities related to strengthening, flexibility, endurance, ROM of scapular, scapulothoracic and UE control with self care, reaching, carrying, lifting, house/yardwork, driving/computer work  [] (75764) Reviewed/Progressed HEP activities related to improving balance, coordination, kinesthetic sense, posture, motor skill, proprioception of scapular, scapulothoracic and UE control with self care, reaching, carrying, lifting, house/yardwork, driving/computer work      Manual Treatments:  PROM / STM / Oscillations-Mobs:  G-I, II, III, IV (PA's, Inf., Post.)  [x] (74385) Provided manual therapy to mobilize soft tissue/joints of cervical/CT, scapular GHJ and UE for the purpose of modulating pain, promoting relaxation,  increasing ROM, reducing/eliminating soft tissue swelling/inflammation/restriction, improving soft tissue extensibility and allowing for proper ROM for normal function with self care, reaching, carrying, lifting, house/yardwork, driving/computer work    Modalities:  Ice x 10 mins to R shld. Charges:  Timed Code Treatment Minutes: 39'   Total Treatment Minutes: 54'     [facility] time in/time out:     [] EVAL (LOW) 93267   [] EVAL (MOD) 40539   [] EVAL (HIGH) 02313   [] RE-EVAL     [x] OG(19980) x  1   [] IONTO  [x] NMR (48295) x 1     [] VASO  [x] Manual (05698) x 1     [] Other:  [] TA x      [] Mech Traction (23684)  [] ES(attended) (61550)      [] ES (un) (61832):     HEP instruction: (see scanned forms)  Access Code: [email or web address]   URL: [email or web address]   Date: 09/21/2020   Prepared by: Gregg Brandt     Exercises   Seated Scapular Retraction - 10 reps - 3 hold - 2x daily - 7x weekly No Money - 10 reps - 3 hold - 2x daily - 7x weekly   Seated Shoulder Flexion Towel Slide at Table Top - 10 reps - 10 hold - 2x daily - 7x weekly   Seated Shoulder External Rotation AAROM with Dowel - 5 reps - 10 hold - 2x daily - 7x weekly   Patient Education   Office Posture   Forward Head Posture  Access Code: [email or web address]   URL: [email or web address] com/   Date: 10/19/2020   Prepared by: Evin Sandy     Exercises   Standing Shoulder Row with Anchored Resistance - 10 reps - 2-3 sets - 5 hold - 2x daily - 7x weekly   Shoulder extension with resistance - Neutral - 10 reps - 2-3 sets - 5 hold - 2x daily - 7x weekly   Shoulder External Rotation Reactive Isometrics - 10 reps - 2-3 sets - 5 hold - 2x daily - 7x weekly   Shoulder Internal Rotation Reactive Isometrics - 10 reps - 2-3 sets - 5 hold - 2x daily - 7x weekly     Provided handout on new exercises not in [address] [place] [address]. GOALS:  Patient stated goal: decrease pain  [] Progressing: [] Met: [] Not Met: [] Adjusted    Therapist goals for Patient:   Short Term Goals: To be achieved in: 2 weeks  1. Independent in HEP and progression per patient tolerance, in order to prevent re-injury. [] Progressing: [x] Met: [] Not Met: [] Adjusted  2. Patient will have a decrease in pain to facilitate improvement in movement, function, and ADLs as indicated by Functional Deficits. [x] Progressing: [] Met: [] Not Met: [] Adjusted    Long Term Goals: To be achieved in: 8 weeks  1. Disability index score of 10% or less for the [facility] to assist with reaching prior level of function. [x] Progressing: [] Met: [] Not Met: [] Adjusted  2. Patient will demonstrate increased AROM to [facility] to allow for proper joint functioning as indicated by patients Functional Deficits. [x] Progressing: [] Met: [] Not Met: [] Adjusted  3. Patient will demonstrate an increase in Strength to [facility] to allow for proper functional mobility as indicated by patients Functional Deficits.    [x] Progressing: [] Met: [] Not Met: [] Adjusted  4. Patient will return to 75% functional activities without increased symptoms or restriction. [x] Progressing: [] Met: [] Not Met: [] Adjusted  5. Pt will report <3/10 pain with overhead use. (patient specific functional goal)    [x] Progressing: [] Met: [] Not Met: [] Adjusted       Progression Towards Functional goals:  [] Patient is progressing as expected towards functional goals listed. [x] Progression is slowed due to complexities listed. [x] Progression has been slowed due to co-morbidities. [] Plan just implemented, too soon to assess goals progression  [] Other:     ASSESSMENT:  Pt still demonstrating postural deficits and guarding with ROM. She requires skilled intervention to restore functional ROM, strength endurance and postural awareness. She will continue her HEP and improving her posture. Tolerating exercises better than at beginning of therapy. Overall progressing with ROM and strength but continues to have pain and functional limitations. Overall Progression Towards Functional goals/ Treatment Progress Update:  [x] Patient is progressing as expected towards functional goals listed. [x] Progression is slowed due to complexities/Impairments listed. [] Progression has been slowed due to co-morbidities. [] Plan just implemented, too soon to assess goals progression <30days   [] Goals require adjustment due to lack of progress  [] Patient is not progressing as expected and requires additional follow up with physician  [] Other    Prognosis for POC: [x] Good [] Fair  [] Poor      Patient requires continued skilled intervention: [x] Yes  [] No    Treatment/Activity Tolerance:   [x] Patient able to complete treatment  [] Patient limited by fatigue  [] Patient limited by pain    [] Patient limited by other medical complications  [] Other:         PLAN: Progress scap stabilization as tolerated.     [x] Continue per plan of care [] Alter current plan (see comments above)  [] Plan of care initiated [] Hold pending MD visit [] Discharge      Electronically signed by:  Ambrosio Francisco, [address]    Note: If patient does not return for scheduled/ recommended follow up visits, this note will serve as a discharge from care along with most recent update on progress.

## 2020-10-22 ENCOUNTER — HOSPITAL ENCOUNTER (OUTPATIENT)
Dept: PHYSICAL THERAPY | Age: 65
Setting detail: THERAPIES SERIES
Discharge: HOME | End: 2020-10-22
Payer: MEDICAID

## 2020-10-22 PROCEDURE — 97112 NEUROMUSCULAR REEDUCATION: CPT | Performed by: PHYSICAL THERAPIST

## 2020-10-22 PROCEDURE — 97110 THERAPEUTIC EXERCISES: CPT | Performed by: PHYSICAL THERAPIST

## 2020-10-22 NOTE — FLOWSHEET NOTE
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; recent carpal tunnel sx    Exercises/Interventions:     Therapeutic Ex (59469) Sets/sec Reps Notes/CUES   Table slide  hep   Beach chair  20\" 5    Scap retract 3\" 10 hep   Corner (s) low arms 20\" 5    No money RTB 5\"/ 2 10  TB with hep   Cane T spine rotation (s)    Supine flex on wedge H5 x10 repsCane flexion 1.5 # H5 15    SL shld abd partial range  15 reps    Cane ER at 45 abd H10 5 reps    Sitting ER stretches in chair     punches in supine  w/o L UE assist 1# H5 15 reps   HEP   Cane BP 1.5# HEP   BP without cane 1#  10  reps    glenny /ER/IR    Wall slides flex   Thread the needle 15 reps  10 reps ea Pulleys H3 15 reps Increased pain   Manual Intervention (18149)                                  NMR re-education (55020)   CUES NEEDED   Pt education: px, dx, poc, role of PT, posture re-ed, work station modifications, HEP 6 mins                 TB Rows/Ext YTB H3 2x12 reps reps ea + to HEP   TB walkouts ER/IR H5 5 reps + to HEP   TB Hug a Tree  10 reps    Wall ball on table  10 ea 4 direction: a/p, med/lat, CW/CCW               Therapeutic Activity (64553)                                                Therapeutic Exercise and NMR EXR  [x] (00717) Provided verbal/tactile cueing for activities related to strengthening, flexibility, endurance, ROM  for improvements in scapular, scapulothoracic and UE control with self care, reaching, carrying, lifting, house/yardwork, driving/computer work. [x] (03505) Provided verbal/tactile cueing for activities related to improving balance, coordination, kinesthetic sense, posture, motor skill, proprioception  to assist with  scapular, scapulothoracic and UE control with self care, reaching, carrying, lifting, house/yardwork, driving/computer work.     Therapeutic Activities:    [] (44619 or 09458) Provided verbal/tactile cueing for activities related to improving balance, coordination, kinesthetic sense, posture, motor skill, proprioception and motor activation to allow for proper function of scapular, scapulothoracic and UE control with self care, carrying, lifting, driving/computer work. Home Exercise Program:    [x] (37991) Reviewed/Progressed HEP activities related to strengthening, flexibility, endurance, ROM of scapular, scapulothoracic and UE control with self care, reaching, carrying, lifting, house/yardwork, driving/computer work  [] (44060) Reviewed/Progressed HEP activities related to improving balance, coordination, kinesthetic sense, posture, motor skill, proprioception of scapular, scapulothoracic and UE control with self care, reaching, carrying, lifting, house/yardwork, driving/computer work      Manual Treatments:  PROM / STM / Oscillations-Mobs:  G-I, II, III, IV (PA's, Inf., Post.)  [x] (72455) Provided manual therapy to mobilize soft tissue/joints of cervical/CT, scapular GHJ and UE for the purpose of modulating pain, promoting relaxation,  increasing ROM, reducing/eliminating soft tissue swelling/inflammation/restriction, improving soft tissue extensibility and allowing for proper ROM for normal function with self care, reaching, carrying, lifting, house/yardwork, driving/computer work    Modalities:  Ice x 10 mins to R shld. Charges:  Timed Code Treatment Minutes: 40'   Total Treatment Minutes: 48'     [facility] time in/time out:     [] EVAL (LOW) 49212   [] EVAL (MOD) 78188   [] EVAL (HIGH) 64499   [] RE-EVAL     [x] XY(83574) x  2  [] IONTO  [x] NMR (41531) x 1     [] VASO  [x] Manual (36014) x      [] Other:  [] TA x      [] Mech Traction (00704)  [] ES(attended) (73865)      [] ES (un) (12579):     HEP instruction: (see scanned forms)  Access Code: [email or web address]   URL: [email or web address]   Date: 09/21/2020   Prepared by: Su Beatty     Exercises   Seated Scapular Other: PLAN: Progress scap stabilization as tolerated. [x] Continue per plan of care [] Alter current plan (see comments above)  [] Plan of care initiated [] Hold pending MD visit [] Discharge      Electronically signed by:  Janell Betts, [place] [id number]    Note: If patient does not return for scheduled/ recommended follow up visits, this note will serve as a discharge from care along with most recent update on progress.

## 2020-10-26 ENCOUNTER — HOSPITAL ENCOUNTER (OUTPATIENT)
Dept: PHYSICAL THERAPY | Age: 65
Setting detail: THERAPIES SERIES
Discharge: HOME | End: 2020-10-26
Payer: MEDICAID

## 2020-10-26 PROCEDURE — 97530 THERAPEUTIC ACTIVITIES: CPT | Performed by: PHYSICAL THERAPIST

## 2020-10-26 PROCEDURE — 97110 THERAPEUTIC EXERCISES: CPT | Performed by: PHYSICAL THERAPIST

## 2020-10-26 NOTE — FLOWSHEET NOTE
[facility],  [address] [place]  Phone: [phone]  Fax [phone]        Date:  10/26/2020    Patient Name:  Saloni Monk    :  1955  MRN: [id number]  Restrictions/Precautions:    Medical/Treatment Diagnosis Information:  · Diagnosis: M25.50 (ICD-10-CM) - Pain in unspecified joint  · Treatment Diagnosis: Pain in Right Shoulder  Insurance/Certification information:  PT Insurance Information: 2020 [employer]  - $0CP - $0DED - 30PT - 100% - AUTH AFTER 30V  Physician Information:  Referring Practitioner: Cee Barrios  Has the plan of care been signed (Y/N):        []  Yes  [x]  No     Date of Patient follow up with Physician: PRN      Is this a Progress Report:     []  Yes  [x]  No        If Yes:  Date Range for reporting period:  Beginning 2020  Ending 10-    Progress report will be due (10 Rx or 30 days whichever is less): 80//12      Recertification will be due (POC Duration  / 90 days whichever is less): 8 weeks from IE (11-16)      Visit # Insurance Allowable Auth Required   10 30 []  Yes []  No        Functional Scale: Q DASH :18%    Date assessed:  10/19/20      Therapy Diagnosis/Practice Pattern:R shoulder pain/ stiffness/ C      Number of Comorbidities:  []0     []1-2    [x]3+     Latex Allergy:  [x]NO      []YES  Preferred Language for Healthcare:   [x]English       []other:      Pain level: 10     SUBJECTIVE:  Shoulder is hurting more today than usual. Has been sitting all day and thinks she is stiff and painful.        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; recent carpal tunnel sx    Exercises/Interventions:     Therapeutic Ex (75697) Sets/sec Reps Notes/CUES   Table slide with SB: Aaron Villa, DKG, B UE 10\" 33245 Palingen chair  20\" 5    Scap retract 3\" 10 hep   Corner (s) low arms 20\" 5    No money RTB 5\"/ 2 10  TB with hep   Cane T spine rotation (s)    Post capsule (s) 30\" 3Cane flexion  H10 15    SL shld abd partial range  15 reps    Cane ER at 45 abd H10 10 reps    Sitting ER stretches in chair     punches in supine  w/o L UE assist 1# H5 15 reps   HEP   Cane BP 1.5# HEP   BP without cane 1#  10  reps    glenny /ER/IR    Pulleys H3 15 reps Increased pain   Manual Intervention (97779)                                  NMR re-education (09931)   CUES NEEDED   Pt education: px, dx, poc, role of PT, posture re-ed, work station modifications, HEP 6 mins                 TB Rows/Ext YTB H3 2x12 reps reps ea + to HEP   TB walkouts ER/IR H5 5 reps + to HEP   TB Hug a Tree  10 reps    Wall ball on table  10 ea 4 direction: a/p, med/lat, CW/CCW               Therapeutic Activity (55209)      Finisher- ladders, B flex, CW/CCW, threading B 2# B 10 reps ea                                        Therapeutic Exercise and NMR EXR  [x] (89430) Provided verbal/tactile cueing for activities related to strengthening, flexibility, endurance, ROM  for improvements in scapular, scapulothoracic and UE control with self care, reaching, carrying, lifting, house/yardwork, driving/computer work. [x] (61891) Provided verbal/tactile cueing for activities related to improving balance, coordination, kinesthetic sense, posture, motor skill, proprioception  to assist with  scapular, scapulothoracic and UE control with self care, reaching, carrying, lifting, house/yardwork, driving/computer work.     Therapeutic Activities:    [] (75179 or ) Provided verbal/tactile cueing for activities related to improving balance, coordination, kinesthetic sense, posture, motor skill, proprioception and motor activation to allow for proper function of scapular, scapulothoracic and UE control with self care, carrying, lifting, driving/computer work. Home Exercise Program:    [x] (08124) Reviewed/Progressed HEP activities related to strengthening, flexibility, endurance, ROM of scapular, scapulothoracic and UE control with self care, reaching, carrying, lifting, house/yardwork, driving/computer work  [] (92197) Reviewed/Progressed HEP activities related to improving balance, coordination, kinesthetic sense, posture, motor skill, proprioception of scapular, scapulothoracic and UE control with self care, reaching, carrying, lifting, house/yardwork, driving/computer work      Manual Treatments:  PROM / STM / Oscillations-Mobs:  G-I, II, III, IV (PA's, Inf., Post.)  [x] (57554) Provided manual therapy to mobilize soft tissue/joints of cervical/CT, scapular GHJ and UE for the purpose of modulating pain, promoting relaxation,  increasing ROM, reducing/eliminating soft tissue swelling/inflammation/restriction, improving soft tissue extensibility and allowing for proper ROM for normal function with self care, reaching, carrying, lifting, house/yardwork, driving/computer work    Modalities:  Ice x 10 mins to R shld. Charges:  Timed Code Treatment Minutes: 40'   Total Treatment Minutes: 48'     [facility] time in/time out:     [] EVAL (LOW) 52262   [] EVAL (MOD) 86389   [] EVAL (HIGH) 69386   [] RE-EVAL     [x] MA(20908) x  2  [] IONTO  [x] NMR (79911) x      [] VASO  [x] Manual (91208) x      [] Other:  [x] TA x  1    [] Mech Traction (68571)  [] ES(attended) (02539)      [] ES (un) (02982):     HEP instruction: (see scanned forms)  Access Code: [email or web address]   URL: [email or web address]   Date: 09/21/2020   Prepared by: Mark Anthony Caceresr     Exercises   Seated Scapular Retraction - 10 reps - 3 hold - 2x daily - 7x weekly   No Money - 10 reps - 3 hold - 2x daily - 7x weekly   Seated Shoulder Flexion Towel Slide at Table Top - 10 reps - 10 hold - 2x daily - 7x weekly   Seated Shoulder External Rotation AAROM with Dowel - 5 reps - 10 hold - 2x daily - 7x weekly   Patient Education   Office Posture   Forward Head Posture  Access Code: [email or web address]   URL: [email or web address]   Date: 10/19/2020   Prepared by: Radha Prince     Exercises   Standing Shoulder Row with Anchored Resistance - 10 reps - 2-3 sets - 5 hold - 2x daily - 7x weekly   Shoulder extension with resistance - Neutral - 10 reps - 2-3 sets - 5 hold - 2x daily - 7x weekly   Shoulder External Rotation Reactive Isometrics - 10 reps - 2-3 sets - 5 hold - 2x daily - 7x weekly   Shoulder Internal Rotation Reactive Isometrics - 10 reps - 2-3 sets - 5 hold - 2x daily - 7x weekly     Provided handout on new exercises not in [address] [place] [address]. GOALS:  Patient stated goal: decrease pain  [] Progressing: [] Met: [] Not Met: [] Adjusted    Therapist goals for Patient:   Short Term Goals: To be achieved in: 2 weeks  1. Independent in HEP and progression per patient tolerance, in order to prevent re-injury. [] Progressing: [x] Met: [] Not Met: [] Adjusted  2. Patient will have a decrease in pain to facilitate improvement in movement, function, and ADLs as indicated by Functional Deficits. [x] Progressing: [] Met: [] Not Met: [] Adjusted    Long Term Goals: To be achieved in: 8 weeks  1. Disability index score of 10% or less for the [facility] to assist with reaching prior level of function. [x] Progressing: [] Met: [] Not Met: [] Adjusted  2. Patient will demonstrate increased [facility] to allow for proper joint functioning as indicated by patients Functional Deficits. [x] Progressing: [] Met: [] Not Met: [] Adjusted  3. Patient will demonstrate an increase in Strength to [facility] to allow for proper functional mobility as indicated by patients Functional Deficits.    [x] Progressing: [] Met: [] Not Met: [] Adjusted  4. Patient will return to 75% functional activities without increased symptoms or restriction. [x] Progressing: [] Met: [] Not Met: [] Adjusted  5. Pt will report <3/10 pain with overhead use. (patient specific functional goal)    [x] Progressing: [] Met: [] Not Met: [] Adjusted       Progression Towards Functional goals:  [] Patient is progressing as expected towards functional goals listed. [x] Progression is slowed due to complexities listed. [x] Progression has been slowed due to co-morbidities. [] Plan just implemented, too soon to assess goals progression  [] Other:     ASSESSMENT:  Pt still demonstrating postural deficits and guarding with ROM, so stretching and proprioception of posture was the focus of the session today. She requires skilled intervention to restore functional ROM, strength endurance and postural awareness. She will continue her HEP and improving her posture. Tolerating exercises better than at beginning of therapy. Overall progressing with ROM and strength but continues to have pain and functional limitations. Overall Progression Towards Functional goals/ Treatment Progress Update:  [] Patient is progressing as expected towards functional goals listed. [x] Progression is slowed due to complexities/Impairments listed. [] Progression has been slowed due to co-morbidities. [] Plan just implemented, too soon to assess goals progression <30days   [] Goals require adjustment due to lack of progress  [] Patient is not progressing as expected and requires additional follow up with physician  [] Other    Prognosis for POC: [x] Good [] Fair  [] Poor      Patient requires continued skilled intervention: [x] Yes  [] No    Treatment/Activity Tolerance:   [x] Patient able to complete treatment  [] Patient limited by fatigue  [] Patient limited by pain    [] Patient limited by other medical complications  [] Other:         PLAN: Progress scap stabilization as tolerated.     [x] Continue per plan of care [] Alter current plan (see comments above)  [] Plan of care initiated [] Hold pending MD visit [] Discharge      Electronically signed by:  Janell Betts, [place] [id number]    Note: If patient does not return for scheduled/ recommended follow up visits, this note will serve as a discharge from care along with most recent update on progress.

## 2020-10-28 ENCOUNTER — HOSPITAL ENCOUNTER (OUTPATIENT)
Dept: PHYSICAL THERAPY | Age: 65
Setting detail: THERAPIES SERIES
Discharge: HOME | End: 2020-10-28
Payer: MEDICAID

## 2020-10-28 PROCEDURE — 97112 NEUROMUSCULAR REEDUCATION: CPT | Performed by: PHYSICAL THERAPIST

## 2020-10-28 PROCEDURE — 97140 MANUAL THERAPY 1/> REGIONS: CPT | Performed by: PHYSICAL THERAPIST

## 2020-10-28 PROCEDURE — 97110 THERAPEUTIC EXERCISES: CPT | Performed by: PHYSICAL THERAPIST

## 2020-10-28 NOTE — FLOWSHEET NOTE
[facility],  [address] [place]  Phone: [phone]  Fax [phone]        Date:  10/28/2020    Patient Name:  Adal Marie    :  1955  MRN: [id number]  Restrictions/Precautions:    Medical/Treatment Diagnosis Information:  · Diagnosis: M25.50 (ICD-10-CM) - Pain in unspecified joint  · Treatment Diagnosis: Pain in Right Shoulder  Insurance/Certification information:  PT Insurance Information: 2020 [employer]ELSON  - $0CP - $0DED - 30PT - 100% - AUTH AFTER 30V  Physician Information:  Referring Practitioner: Santa Patton  Has the plan of care been signed (Y/N):        []  Yes  [x]  No     Date of Patient follow up with Physician: PRN      Is this a Progress Report:     []  Yes  [x]  No        If Yes:  Date Range for reporting period:  Beginning 10/19/20  Ending:    Progress report will be due (10 Rx or 30 days whichever is less):       Recertification will be due (POC Duration  / 90 days whichever is less): 8 weeks from IE (-16)      Visit # Insurance Allowable Auth Required   11 30 []  Yes []  No        Functional Scale: Q DASH :18%    Date assessed:  10/19/20      Therapy Diagnosis/Practice Pattern:R shoulder pain/ stiffness/ C      Number of Comorbidities:  []0     []1-2    [x]3+     Latex Allergy:  [x]NO      []YES  Preferred Language for Healthcare:   [x]English       []other:      Pain level: 10     SUBJECTIVE:  Shoulder is not as painful today. Much better than Monday. OBJECTIVE: AROM flex: 125 /abd 105 this visit.   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; recent carpal tunnel sx    Exercises/Interventions:     Therapeutic Ex (76259) Sets/sec Reps Notes/CUES   Table slide with SB: Aaron Pedraza, XVD, B UE 10\" [address] chair  20\" 5    Scap retract 3\" 10 hep   Corner (s) low arms 20\" 5    No money RTB 5\"/ 2 10  TB with hep   Cane T spine rotation (s)    Post capsule (s) 30\" 3Cane flexion 1.5 # H10 15    SL shld abd partial range  15 reps    Cane ER at 45 abd H10 10 reps    Sitting ER stretches in chair     punches in supine  w/o L UE assist 1# H5 15 reps   HEP   Cane BP 1.5# HEP   BP without cane 1#  10  reps    glenny /ER/IR    Pulleys H3 15 reps Increased pain   Manual Intervention (32901)      PROM, joint mob 10 mins                             NMR re-education (76859)   CUES NEEDED   Pt education: px, dx, poc, role of PT, posture re-ed, work station modifications, HEP 6 mins                 TB Rows/Ext YTB H3 2x12 reps reps ea + to HEP   TB walkouts ER/IR H5 5 reps + to HEP   TB Hug a Tree     Wall ball on table              Therapeutic Activity (02737)      Finisher- ladders, B flex, CW/CCW, threading B, diagonals,  2# B 15 reps ea                                        Therapeutic Exercise and NMR EXR  [x] (92270) Provided verbal/tactile cueing for activities related to strengthening, flexibility, endurance, ROM  for improvements in scapular, scapulothoracic and UE control with self care, reaching, carrying, lifting, house/yardwork, driving/computer work. [x] (61843) Provided verbal/tactile cueing for activities related to improving balance, coordination, kinesthetic sense, posture, motor skill, proprioception  to assist with  scapular, scapulothoracic and UE control with self care, reaching, carrying, lifting, house/yardwork, driving/computer work.     Therapeutic Activities:    [] (48081 or ) Provided verbal/tactile cueing for activities related to improving balance, coordination, kinesthetic sense, posture, motor skill, proprioception and motor activation to allow for proper function of scapular, scapulothoracic and UE control with self care, carrying, lifting, driving/computer work. Home Exercise Program:    [x] (95075) Reviewed/Progressed HEP activities related to strengthening, flexibility, endurance, ROM of scapular, scapulothoracic and UE control with self care, reaching, carrying, lifting, house/yardwork, driving/computer work  [] (98596) Reviewed/Progressed HEP activities related to improving balance, coordination, kinesthetic sense, posture, motor skill, proprioception of scapular, scapulothoracic and UE control with self care, reaching, carrying, lifting, house/yardwork, driving/computer work      Manual Treatments:  PROM / STM / Oscillations-Mobs:  G-I, II, III, IV (PA's, Inf., Post.)  [x] (84223) Provided manual therapy to mobilize soft tissue/joints of cervical/CT, scapular GHJ and UE for the purpose of modulating pain, promoting relaxation,  increasing ROM, reducing/eliminating soft tissue swelling/inflammation/restriction, improving soft tissue extensibility and allowing for proper ROM for normal function with self care, reaching, carrying, lifting, house/yardwork, driving/computer work    Modalities:  Ice x 10 mins to R shld. Charges:  Timed Code Treatment Minutes: 48'   Total Treatment Minutes: 61'     [facility] time in/time out:     [] EVAL (LOW) 61384   [] EVAL (MOD) 04054   [] EVAL (HIGH) 12195   [] RE-EVAL     [x] DB(19031) x  1  [] IONTO  [x] NMR (59692) x 1     [] VASO  [x] Manual (27889) x  1    [] Other:  [] TA x    [] Mech Traction (93031)  [] ES(attended) (76543)      [] ES (un) (56379):     HEP instruction: (see scanned forms)  Access Code: [email or web address]   URL: [employer]/   Date: 09/21/2020   Prepared by: Mayelin Lima     Exercises   Seated Scapular Retraction - 10 reps - 3 hold - 2x daily - 7x weekly   No Money - 10 reps - 3 hold - 2x daily - 7x return to 75% functional activities without increased symptoms or restriction. [x] Progressing: [] Met: [] Not Met: [] Adjusted  5. Pt will report <3/10 pain with overhead use. (patient specific functional goal)    [x] Progressing: [] Met: [] Not Met: [] Adjusted       Progression Towards Functional goals:  [] Patient is progressing as expected towards functional goals listed. [x] Progression is slowed due to complexities listed. [x] Progression has been slowed due to co-morbidities. [] Plan just implemented, too soon to assess goals progression  [] Other:     ASSESSMENT:  Pt still demonstrating postural deficits and guarding with ROM, so stretching and proprioception of posture was the focus of the session today. She requires skilled intervention to restore functional ROM, strength endurance and postural awareness. She will continue her HEP and improving her posture. Tolerating exercises better than at beginning of therapy. Overall progressing with ROM and strength but continues to have pain and functional limitations. Overall Progression Towards Functional goals/ Treatment Progress Update:  [] Patient is progressing as expected towards functional goals listed. [x] Progression is slowed due to complexities/Impairments listed. [] Progression has been slowed due to co-morbidities. [] Plan just implemented, too soon to assess goals progression <30days   [] Goals require adjustment due to lack of progress  [] Patient is not progressing as expected and requires additional follow up with physician  [] Other    Prognosis for POC: [x] Good [] Fair  [] Poor      Patient requires continued skilled intervention: [x] Yes  [] No    Treatment/Activity Tolerance:   [x] Patient able to complete treatment  [] Patient limited by fatigue  [] Patient limited by pain    [] Patient limited by other medical complications  [] Other:         PLAN: Progress scap stabilization as tolerated.     [x] Continue per plan of care []

## 2020-11-02 ENCOUNTER — HOSPITAL ENCOUNTER (OUTPATIENT)
Dept: PHYSICAL THERAPY | Age: 65
Setting detail: THERAPIES SERIES
Discharge: HOME | End: 2020-11-02
Payer: MEDICAID

## 2020-11-02 PROCEDURE — 97110 THERAPEUTIC EXERCISES: CPT | Performed by: PHYSICAL THERAPIST

## 2020-11-02 PROCEDURE — 97112 NEUROMUSCULAR REEDUCATION: CPT | Performed by: PHYSICAL THERAPIST

## 2020-11-02 PROCEDURE — 97140 MANUAL THERAPY 1/> REGIONS: CPT | Performed by: PHYSICAL THERAPIST

## 2020-11-02 NOTE — FLOWSHEET NOTE
d/t scolosis as a child; recent carpal tunnel sx    Exercises/Interventions:     Therapeutic Ex (60981) Sets/sec Reps Notes/CUES   Table slide with SB: Omid Pal, DSV, B UE 10\" [address] chair  30\" 5    Scap retract 3\" 10 hep   Corner (s) low arms 20\" 5    No money RTB 5\"/ 2 10  TB with hep   Cane T spine rotation (s)    Post capsule (s) 30\" 3Cane flexion 1.5 # H10 15    SL shld abd partial range  15 reps    Cane ER at 45 abd H10 10 reps    Sitting ER stretches in chair     punches in supine  w/o L UE assist 1# H5 15 reps   HEP   Cane BP 1.5# HEP   BP without cane 1#  10  reps    glenny /ER/IR    Pulleys H3 15 reps Increased pain   Manual Intervention (35725)      PROM, joint mob 10 mins                             NMR re-education (37491)   CUES NEEDED   Pt education: px, dx, poc, role of PT, posture re-ed, work station modifications, HEP 6 mins                 TB Rows/Ext YTB H3 2x12 reps reps ea + to HEP   TB walkouts ER/IR H5 5 reps + to HEP   TB Hug a Tree  2x10 reps   Wall ball on table              Therapeutic Activity (38291)      Finisher- ladders, B flex, CW/CCW, threading B, diagonals,  3# B 15 reps ea                                        Therapeutic Exercise and NMR EXR  [x] (81191) Provided verbal/tactile cueing for activities related to strengthening, flexibility, endurance, ROM  for improvements in scapular, scapulothoracic and UE control with self care, reaching, carrying, lifting, house/yardwork, driving/computer work. [x] (41605) Provided verbal/tactile cueing for activities related to improving balance, coordination, kinesthetic sense, posture, motor skill, proprioception  to assist with  scapular, scapulothoracic and UE control with self care, reaching, carrying, lifting, house/yardwork, driving/computer work.     Therapeutic Activities:    [] (50933 or 35992) Provided verbal/tactile cueing for activities related to improving balance, coordination, kinesthetic sense, posture, motor skill, proprioception and motor activation to allow for proper function of scapular, scapulothoracic and UE control with self care, carrying, lifting, driving/computer work. Home Exercise Program:    [x] (99177) Reviewed/Progressed HEP activities related to strengthening, flexibility, endurance, ROM of scapular, scapulothoracic and UE control with self care, reaching, carrying, lifting, house/yardwork, driving/computer work  [] (46078) Reviewed/Progressed HEP activities related to improving balance, coordination, kinesthetic sense, posture, motor skill, proprioception of scapular, scapulothoracic and UE control with self care, reaching, carrying, lifting, house/yardwork, driving/computer work      Manual Treatments:  PROM / STM / Oscillations-Mobs:  G-I, II, III, IV (PA's, Inf., Post.)  [x] (66764) Provided manual therapy to mobilize soft tissue/joints of cervical/CT, scapular GHJ and UE for the purpose of modulating pain, promoting relaxation,  increasing ROM, reducing/eliminating soft tissue swelling/inflammation/restriction, improving soft tissue extensibility and allowing for proper ROM for normal function with self care, reaching, carrying, lifting, house/yardwork, driving/computer work    Modalities:  Ice x 10 mins to R shld. Charges:  Timed Code Treatment Minutes: 48'   Total Treatment Minutes: 61'     [facility] time in/time out:     [] EVAL (LOW) 05940   [] EVAL (MOD) 43521   [] EVAL (HIGH) 23367   [] RE-EVAL     [x] NL(02397) x  1  [] IONTO  [x] NMR (15641) x 1     [] VASO  [x] Manual (86115) x  1    [] Other:  [] TA x    [] Mech Traction (19601)  [] ES(attended) (08019)      [] ES (un) (80665):     HEP instruction: (see scanned forms)  Access Code: [email or web address]   URL: [email or web address]/   Date: 09/21/2020   Prepared by: Marylu Ball     Exercises   Seated Scapular Retraction - 10 reps - 3 hold - 2x daily - 7x weekly   No Money - 10 reps - 3 hold - 2x daily - 7x weekly   Seated Shoulder Flexion Towel Slide at Table Top - 10 reps - 10 hold - 2x daily - 7x weekly   Seated Shoulder External Rotation AAROM with Dowel - 5 reps - 10 hold - 2x daily - 7x weekly   Patient Education   Office Posture   Forward Head Posture  Access Code: [email or web address]   URL: [email or web address]/   Date: 10/19/2020   Prepared by: Hai Cash     Exercises   Standing Shoulder Row with Anchored Resistance - 10 reps - 2-3 sets - 5 hold - 2x daily - 7x weekly   Shoulder extension with resistance - Neutral - 10 reps - 2-3 sets - 5 hold - 2x daily - 7x weekly   Shoulder External Rotation Reactive Isometrics - 10 reps - 2-3 sets - 5 hold - 2x daily - 7x weekly   Shoulder Internal Rotation Reactive Isometrics - 10 reps - 2-3 sets - 5 hold - 2x daily - 7x weekly     Provided handout on new exercises not in [address] [place] [address]. GOALS:  Patient stated goal: decrease pain  [] Progressing: [] Met: [] Not Met: [] Adjusted    Therapist goals for Patient:   Short Term Goals: To be achieved in: 2 weeks  1. Independent in HEP and progression per patient tolerance, in order to prevent re-injury. [] Progressing: [x] Met: [] Not Met: [] Adjusted  2. Patient will have a decrease in pain to facilitate improvement in movement, function, and ADLs as indicated by Functional Deficits. [x] Progressing: [] Met: [] Not Met: [] Adjusted    Long Term Goals: To be achieved in: 8 weeks  1. Disability index score of 10% or less for the [facility] to assist with reaching prior level of function. [x] Progressing: [] Met: [] Not Met: [] Adjusted  2. Patient will demonstrate increased [facility]OM to [facility] to allow for proper joint functioning as indicated by patients Functional Deficits. [x] Progressing: [] Met: [] Not Met: [] Adjusted  3. Patient will demonstrate an increase in Strength to [facility] [place] to allow for proper functional mobility as indicated by patients Functional Deficits. [x] Progressing: [] Met: [] Not Met: [] Adjusted  4.  Patient will return to 75% functional activities above)  [] Plan of care initiated [] Hold pending MD visit [] Discharge      Electronically signed by:  Eveline Lindsey, [address]    Note: If patient does not return for scheduled/ recommended follow up visits, this note will serve as a discharge from care along with most recent update on progress.

## 2020-11-04 ENCOUNTER — HOSPITAL ENCOUNTER (OUTPATIENT)
Dept: PHYSICAL THERAPY | Age: 65
Setting detail: THERAPIES SERIES
Discharge: HOME | End: 2020-11-04
Payer: MEDICAID

## 2020-11-04 PROCEDURE — 97140 MANUAL THERAPY 1/> REGIONS: CPT | Performed by: PHYSICAL THERAPIST

## 2020-11-04 PROCEDURE — 97112 NEUROMUSCULAR REEDUCATION: CPT | Performed by: PHYSICAL THERAPIST

## 2020-11-04 PROCEDURE — 97110 THERAPEUTIC EXERCISES: CPT | Performed by: PHYSICAL THERAPIST

## 2020-11-04 NOTE — FLOWSHEET NOTE
[facility], [address] [place]  Phone: [phone]  Fax [phone]        Date:  2020    Patient Name:  Meredith Alfaro    :  1955  MRN: [id number]  Restrictions/Precautions:    Medical/Treatment Diagnosis Information:  · Diagnosis: M25.50 (ICD-10-CM) - Pain in unspecified joint  · Treatment Diagnosis: Pain in Right Shoulder  Insurance/Certification information:  PT Insurance Information: 2020 NELSON  - $0CP - $0DED - 30PT - 100% - AUTH AFTER 30V  Physician Information:  Referring Practitioner: Agusto Haskins  Has the plan of care been signed (Y/N):        []  Yes  [x]  No     Date of Patient follow up with Physician: PRN      Is this a Progress Report:     []  Yes  [x]  No        If Yes:  Date Range for reporting period:  Beginning 10/19/20  Ending:    Progress report will be due (10 Rx or 30 days whichever is less):       Recertification will be due (POC Duration  / 90 days whichever is less): 8 weeks from IE (-16)      Visit # Insurance Allowable Auth Required   13 30 []  Yes []  No        Functional Scale: Q DASH :18%    Date assessed:  10/19/20      Therapy Diagnosis/Practice Pattern:R shoulder pain/ stiffness/ C      Number of Comorbidities:  []0     []1-2    [x]3+     Latex Allergy:  [x]NO      []YES  Preferred Language for Healthcare:   [x]English       []other:      Pain level: 10     SUBJECTIVE:  Shoulder is not as painful today. Was sore that night after last therapy session.     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; recent carpal tunnel sx    Exercises/Interventions:     Therapeutic Ex (54634) Sets/sec Reps Notes/CUES   Table slide with SB: Aaron Carry, AVS, B UE 10\" [address] chair  30\" 5    Scap retract 3\" 10 hep   Corner (s) low arms 20\" 5    No money RTB 5\"/ 2 10  TB with hep   Cane T spine rotation (s)    Post capsule (s) 30\" 3Cane flexion 1.5 # H10 15    SL shld abd   15 reps    Cane ER at 45 abd H10 10 reps    Sitting ER stretches in chair     punches in supine  w/o L UE assist 1# H5 15 reps   HEP   Cane BP 1.5# HEP   BP without cane 1#  10  reps    glenny /ER/IR    Wall slides flex   Thread the needle  15 reps  10 reps ea Pulleys H3 15 reps Increased pain   Manual Intervention (56282)      PROM, joint mob 10 mins                             NMR re-education (83390)   CUES NEEDED   Pt education: px, dx, poc, role of PT, posture re-ed, work station modifications, HEP 6 mins                 TB Rows/Ext YTB TB walkouts ER/IR H5 10 reps + to HEP   TB Hug a Tree     Wall ball on table  10 ea 4 direction: a/p, med/lat, CW/CCW               Therapeutic Activity (16848)      Finisher- ladders, B flex, CW/CCW, threading B, diagonals,  3# B 15 reps ea                                        Therapeutic Exercise and NMR EXR  [x] (66166) Provided verbal/tactile cueing for activities related to strengthening, flexibility, endurance, ROM  for improvements in scapular, scapulothoracic and UE control with self care, reaching, carrying, lifting, house/yardwork, driving/computer work. [x] (91875) Provided verbal/tactile cueing for activities related to improving balance, coordination, kinesthetic sense, posture, motor skill, proprioception  to assist with  scapular, scapulothoracic and UE control with self care, reaching, carrying, lifting, house/yardwork, driving/computer work.     Therapeutic Activities:    [] (65017 or 53210) Provided verbal/tactile cueing for activities related to improving balance, coordination, kinesthetic sense, posture, motor skill, proprioception and motor activation to allow for proper function of scapular, scapulothoracic and UE control with self care, carrying, lifting, driving/computer work. Home Exercise Program:    [x] (08154) Reviewed/Progressed HEP activities related to strengthening, flexibility, endurance, ROM of scapular, scapulothoracic and UE control with self care, reaching, carrying, lifting, house/yardwork, driving/computer work  [] (18681) Reviewed/Progressed HEP activities related to improving balance, coordination, kinesthetic sense, posture, motor skill, proprioception of scapular, scapulothoracic and UE control with self care, reaching, carrying, lifting, house/yardwork, driving/computer work      Manual Treatments:  PROM / STM / Oscillations-Mobs:  G-I, II, III, IV (PA's, Inf., Post.)  [x] (06861) Provided manual therapy to mobilize soft tissue/joints of cervical/CT, scapular GHJ and UE for the purpose of modulating pain, promoting relaxation,  increasing ROM, reducing/eliminating soft tissue swelling/inflammation/restriction, improving soft tissue extensibility and allowing for proper ROM for normal function with self care, reaching, carrying, lifting, house/yardwork, driving/computer work    Modalities:  Ice x 10 mins to R shld. Charges:  Timed Code Treatment Minutes: 48'   Total Treatment Minutes: 61'     [facility] time in/time out:     [] EVAL (LOW) 50195   [] EVAL (MOD) 75228   [] EVAL (HIGH) 13612   [] RE-EVAL     [x] NR(67531) x  1  [] IONTO  [x] NMR (64544) x 1     [] VASO  [x] Manual (82951) x  1    [] Other:  [] TA x    [] Mech Traction (39393)  [] ES(attended) (76570)      [] ES (un) (26240):     HEP instruction: (see scanned forms)  Access Code: [email or web address]   URL: [email or web address]/   Date: 09/21/2020   Prepared by: Mark Anthony Angel     Exercises   Seated Scapular Retraction - 10 reps - 3 hold - 2x daily - 7x weekly   No Money - 10 reps - 3 hold - 2x daily - 7x weekly   Seated Shoulder Flexion Towel Slide at Table Top - 10 reps - 10 hold - 2x daily - 7x weekly   Seated Shoulder External Rotation AAROM with Dowel - 5 reps - 10 hold - 2x daily - 7x weekly   Patient Education   Office Posture   Forward Head Posture  Access Code: [email or web address]   URL: [email or web address] com/   Date: 10/19/2020   Prepared by: Marinoaris Runner     Exercises   Standing Shoulder Row with Anchored Resistance - 10 reps - 2-3 sets - 5 hold - 2x daily - 7x weekly   Shoulder extension with resistance - Neutral - 10 reps - 2-3 sets - 5 hold - 2x daily - 7x weekly   Shoulder External Rotation Reactive Isometrics - 10 reps - 2-3 sets - 5 hold - 2x daily - 7x weekly   Shoulder Internal Rotation Reactive Isometrics - 10 reps - 2-3 sets - 5 hold - 2x daily - 7x weekly     Provided handout on new exercises not in [address] [place] [address]. GOALS:  Patient stated goal: decrease pain  [] Progressing: [] Met: [] Not Met: [] Adjusted    Therapist goals for Patient:   Short Term Goals: To be achieved in: 2 weeks  1. Independent in HEP and progression per patient tolerance, in order to prevent re-injury. [] Progressing: [x] Met: [] Not Met: [] Adjusted  2. Patient will have a decrease in pain to facilitate improvement in movement, function, and ADLs as indicated by Functional Deficits. [x] Progressing: [] Met: [] Not Met: [] Adjusted    Long Term Goals: To be achieved in: 8 weeks  1. Disability index score of 10% or less for the [facility] to assist with reaching prior level of function. [x] Progressing: [] Met: [] Not Met: [] Adjusted  2. Patient will demonstrate increased AROM to [facility] to allow for proper joint functioning as indicated by patients Functional Deficits. [x] Progressing: [] Met: [] Not Met: [] Adjusted  3. Patient will demonstrate an increase in Strength to [facility] [place] to allow for proper functional mobility as indicated by patients Functional Deficits.    [x] Progressing: [] Met: [] Not Met: [] Adjusted  4. Patient will return to 75% functional activities without increased symptoms or restriction. [x] Progressing: [] Met: [] Not Met: [] Adjusted  5. Pt will report <3/10 pain with overhead use. (patient specific functional goal)    [x] Progressing: [] Met: [] Not Met: [] Adjusted       Progression Towards Functional goals:  [] Patient is progressing as expected towards functional goals listed. [x] Progression is slowed due to complexities listed. [x] Progression has been slowed due to co-morbidities. [] Plan just implemented, too soon to assess goals progression  [] Other:     ASSESSMENT:  Pt still demonstrating postural deficits and guarding with ROM, so stretching and proprioception of posture was the focus of the session today. She requires skilled intervention to restore functional ROM, strength endurance and postural awareness. She will continue her HEP and improving her posture. Tolerating exercises better than at beginning of therapy. Overall progressing with ROM and strength but continues to have pain and functional limitations. Overall Progression Towards Functional goals/ Treatment Progress Update:  [] Patient is progressing as expected towards functional goals listed. [x] Progression is slowed due to complexities/Impairments listed. [] Progression has been slowed due to co-morbidities.   [] Plan just implemented, too soon to assess goals progression <30days   [] Goals require adjustment due to lack of progress  [] Patient is not progressing as expected and requires additional follow up with physician  [] Other    Prognosis for POC: [x] Good [] Fair  [] Poor      Patient requires continued skilled intervention: [x] Yes  [] No    Treatment/Activity Tolerance:   [x] Patient able to complete treatment  [] Patient limited by fatigue  [] Patient limited by pain    [] Patient limited by other medical complications  [] Other:         PLAN: Progress scap stabilization as tolerated. Trial of HEP after next week. [x] Continue per plan of care [] Alter current plan (see comments above)  [] Plan of care initiated [] Hold pending MD visit [] Discharge      Electronically signed by:  Elisa Wheat, [facility]    Note: If patient does not return for scheduled/ recommended follow up visits, this note will serve as a discharge from care along with most recent update on progress.

## 2020-11-09 ENCOUNTER — APPOINTMENT (OUTPATIENT)
Dept: PHYSICAL THERAPY | Age: 65
End: 2020-11-09
Payer: MEDICAID

## 2020-11-09 ENCOUNTER — OFFICE VISIT (OUTPATIENT)
Dept: CARDIOLOGY CLINIC | Age: 65
End: 2020-11-09
Payer: MEDICAID

## 2020-11-09 VITALS
HEART RATE: 87 BPM | SYSTOLIC BLOOD PRESSURE: 116 MMHG | DIASTOLIC BLOOD PRESSURE: 60 MMHG | WEIGHT: 132.5 LBS | OXYGEN SATURATION: 97 % | HEIGHT: 59 IN | BODY MASS INDEX: 26.71 KG/M2

## 2020-11-09 PROCEDURE — G8484 FLU IMMUNIZE NO ADMIN: HCPCS | Performed by: INTERNAL MEDICINE

## 2020-11-09 PROCEDURE — 1036F TOBACCO NON-USER: CPT | Performed by: INTERNAL MEDICINE

## 2020-11-09 PROCEDURE — 99213 OFFICE O/P EST LOW 20 MIN: CPT | Performed by: INTERNAL MEDICINE

## 2020-11-09 PROCEDURE — 3017F COLORECTAL CA SCREEN DOC REV: CPT | Performed by: INTERNAL MEDICINE

## 2020-11-09 PROCEDURE — G8427 DOCREV CUR MEDS BY ELIG CLIN: HCPCS | Performed by: INTERNAL MEDICINE

## 2020-11-09 PROCEDURE — G8419 CALC BMI OUT NRM PARAM NOF/U: HCPCS | Performed by: INTERNAL MEDICINE

## 2020-11-09 NOTE — PATIENT INSTRUCTIONS
Plan:  1. Continue current medications. No changes  2. No cardiac testing warranted at this time   3.  Follow up with Dr. Mandie Brown as needed

## 2020-11-09 NOTE — LETTER
[facility]   Cardiac Consultation    Referring Provider:  Carly De La Vega CNP     Chief Complaint   Patient presents with    1 Year Follow Up    Hypertension        History of Present Illness: Pam Browning, 67 y/o female here for routine cardiac f/u. I saw as new patient to establish cardiac care on 1/8/19. She has PMH: HTN, sinus tachycardia, and atrial tachycardia. She previously saw Dr. Demian Ribeiro with [facility] cardiology. Most recent ECHO ([facility]) 11/22/17 EF 55-60%, Regional wall motion abnormalities cannot be excluded. Normal diastolic function. Most recent holter monitor 9/19/17 ([facility]) NSR with sinus tachycardia frequently present. No ventricular ectopy. Moderately frequent PAC's. No symptoms noted. Note EKG 11/7/18 shows ST, 125 bpm.    Today she reports feeling OK. She reports she had carpel tunnel and elbow release surgery in August 2020 by Dr. Mandie Rocha. She is taking her medications as prescribed. She states she gets around and does her normal activities without any issue. Most recent EKG 8/6/20 demonstrated ST, HR 101bpm.  Patient denies chest pain, SOB, palpitations, dizziness or syncope. Past Medical History:   has a past medical history of Polymyalgia Rheumaica and Hypertension, osteoarthritis. Surgical History:    has a past surgical history that includes Inner ear surgery (Right). Social History: , lives in [facility]. reports that she has never smoked. She has never used smokeless tobacco. She reports that she does not use drugs. She drinks socially, only occassionally. Family History:  family history includes Cancer in her father; Diabetes in her mother. Home Medications:  Prior to Admission medications    Medication Sig Start Date End Date Taking?  Authorizing Provider   metoprolol succinate (TOPROL XL) 100 MG extended release tablet TAKE ONE TABLET BY MOUTH DAILY 9/23/20  Yes Abby Lemon MD or tingling. No change in gait, balance, coordination, mood, affect, memory, mentation, behavior. · Psychiatric: No anxiety, no depression. · Endocrine: No malaise, fatigue or temperature intolerance. No excessive thirst, fluid intake, or urination. No tremor. · Hematologic/Lymphatic: No abnormal bruising or bleeding, blood clots or swollen lymph nodes. · Allergic/Immunologic: No nasal congestion or hives. Physical Examination:    Vitals:    11/09/20 1603   BP: 116/60   Pulse: 87   SpO2: 97%        Constitutional and General Appearance: NAD   Respiratory:  · Normal excursion and expansion without use of accessory muscles  · Resp Auscultation: Soft breath sounds at the bases  Cardiovascular:  · The apical impulses not displaced  · Heart tones are crisp and normal  · Cervical veins are not engorged  · The carotid upstroke is normal in amplitude and contour without delay or bruit  · Normal S1S2, No S3, No Murmur  · Peripheral pulses are symmetrical and full  · There is no clubbing, cyanosis of the extremities. · No edema  · Femoral Arteries: 2+ and equal  · Pedal Pulses: 2+ and equal   Abdomen:  · No masses or tenderness  · Liver/Spleen: No Abnormalities Noted  Neurological/Psychiatric:  · Alert and oriented in all spheres  · Moves all extremities well  · Exhibits normal gait balance and coordination  · No abnormalities of mood, affect, memory, mentation, or behavior are noted  Skin:  · Skin: warm and dry. LABS: November 2018 [facility])  - , K 4.0, bun/creat 15/0.8, ALT 80,   LABS: May 2018 ([facility])  , , HDL 64,     Assessment:     1. Essential hypertension:  Well controlled and will continue current medical regimen. 2. Tachycardia:  Hx of atrial tachycardia in past controlled with toprol XL. No recent recorded issues and no need for anticoagulation.  Most recent holter monitor 9/19/17 ([facility]) NSR with sinus tachycardia frequently present. No ventricular ectopy. Moderately frequent PAC's. No symptoms noted. Most recent ECHO ([facility]) 11/22/17 EF 55-60%, Regional wall motion abnormalities cannot be excluded. Normal diastolic function. Most recent EKG 8/6/20 demonstrated ST, HR 101bpm.        Plan:  1. Continue current medications. No changes. Labs ordered per PCP. Nothing in our system for 2 years. 2. No cardiac testing warranted at this time   3. Follow up with Dr. Mariajose Delgado as needed. No documented atrial tachycardia for > 2 years. No need for [facility] and can f/u PCP regularly with cardiology available if further issues arise. Cost of prescription medications and patient compliance have been reviewed with patient. All questions answered. Thank you for allowing me to participate in the care of this individual.    This note was scribed in the presence of Dr.Meyers RIVERA by Birdie Gay RN.     I, Dr. Jorge Null, personally performed the services described in this documentation, as scribed by the above signed scribe in my presence. It is both accurate and complete to my knowledge. I agree with the details independently gathered by the clinical support staff, while the remaining scribed note accurately describes my personal service to the patient. Jcarlos Jane.  Mariajose Delgado M.D., [facility]

## 2020-11-09 NOTE — PROGRESS NOTES
[place]   Cardiac Consultation    Referring Provider:  Danna Eastman CNP     Chief Complaint   Patient presents with    1 Year Follow Up    Hypertension        History of Present Illness: Saloni Monk, 67 y/o female here for routine cardiac f/u. I saw as new patient to establish cardiac care on 1/8/19. She has PMH: HTN, sinus tachycardia, and atrial tachycardia. She previously saw Dr. Maia Hoyt with [facility] cardiology. Most recent ECHO ([facility]) 11/22/17 EF 55-60%, Regional wall motion abnormalities cannot be excluded. Normal diastolic function. Most recent holter monitor 9/19/17 ([facility]) NSR with sinus tachycardia frequently present. No ventricular ectopy. Moderately frequent PAC's. No symptoms noted. Note EKG 11/7/18 shows ST, 125 bpm.    Today she reports feeling OK. She reports she had carpel tunnel and elbow release surgery in August 2020 by Dr. Noelle Sanchez. She is taking her medications as prescribed. She states she gets around and does her normal activities without any issue. Most recent EKG 8/6/20 demonstrated ST, HR 101bpm.  Patient denies chest pain, SOB, palpitations, dizziness or syncope. Past Medical History:   has a past medical history of Polymyalgia Rheumaica and Hypertension, osteoarthritis. Surgical History:    has a past surgical history that includes Inner ear surgery (Right). Social History: , lives in [facility]. reports that she has never smoked. She has never used smokeless tobacco. She reports that she does not use drugs. She drinks socially, only occassionally. Family History:  family history includes Cancer in her father; Diabetes in her mother. Home Medications:  Prior to Admission medications    Medication Sig Start Date End Date Taking?  Authorizing Provider   metoprolol succinate (TOPROL XL) 100 MG extended release tablet TAKE ONE TABLET BY MOUTH DAILY 9/23/20  Yes Miguel Angel Philippe MD   vitamin D (ERGOCALCIFEROL) 1.25 MG (46911 UT) CAPS capsule  8/28/20  Yes Historical Provider, MD   ibuprofen (ADVIL;MOTRIN) 600 MG tablet Take 1 tablet by mouth every 6 hours as needed for Pain 8/12/20  Yes Rebeca Granda MD   hydroxychloroquine (PLAQUENIL) 200 MG tablet Take 200 mg by mouth daily  7/4/20  Yes Historical Provider, MD   Alendronate Sodium (FOSAMAX PO) Take by mouth once a week    Yes Historical Provider, MD   albuterol sulfate HFA (PROAIR HFA) 108 (90 Base) MCG/ACT inhaler Inhale 2 puffs into the lungs every 6 hours as needed for Wheezing 11/9/18  Yes Hugh Virgen MD   cyclobenzaprine (FLEXERIL) 10 MG tablet  7/20/20   Historical Provider, MD   dexamethasone (DECADRON) 4 MG tablet  8/28/20   Historical Provider, MD   ondansetron (ZOFRAN) 4 MG tablet Take 1 tablet by mouth every 8 hours as needed for Nausea  Patient not taking: Reported on 11/9/2020 8/12/20   Rebeca Granda MD   VITAMIN D PO Take by mouth once a week    Historical Provider, MD   cyclobenzaprine (FLEXERIL) 5 MG tablet Take 5 mg by mouth 2 times daily  4/28/20   Historical Provider, MD        Allergies:  Penicillins     Review of Systems:   · Constitutional: there has been no unanticipated weight loss. There's been no change in energy level, sleep pattern, or activity level. · Eyes: No visual changes or diplopia. No scleral icterus. · ENT: No Headaches, hearing loss or vertigo. No mouth sores or sore throat. · Cardiovascular: Reviewed in HPI  · Respiratory: No cough or wheezing, no sputum production. No hematemesis. · Gastrointestinal: No abdominal pain, appetite loss, blood in stools. No change in bowel or bladder habits. · Genitourinary: No dysuria, trouble voiding, or hematuria. · Musculoskeletal:  No gait disturbance, weakness or joint complaints. · Integumentary: No rash or pruritis. · Neurological: No headache, diplopia, change in muscle strength, numbness or tingling.  No change in gait, balance, coordination, mood, affect, memory, mentation, behavior. · Psychiatric: No anxiety, no depression. · Endocrine: No malaise, fatigue or temperature intolerance. No excessive thirst, fluid intake, or urination. No tremor. · Hematologic/Lymphatic: No abnormal bruising or bleeding, blood clots or swollen lymph nodes. · Allergic/Immunologic: No nasal congestion or hives. Physical Examination:    Vitals:    11/09/20 1603   BP: 116/60   Pulse: 87   SpO2: 97%        Constitutional and General Appearance: NAD   Respiratory:  · Normal excursion and expansion without use of accessory muscles  · Resp Auscultation: Soft breath sounds at the bases  Cardiovascular:  · The apical impulses not displaced  · Heart tones are crisp and normal  · Cervical veins are not engorged  · The carotid upstroke is normal in amplitude and contour without delay or bruit  · Normal S1S2, No S3, No Murmur  · Peripheral pulses are symmetrical and full  · There is no clubbing, cyanosis of the extremities. · No edema  · Femoral Arteries: 2+ and equal  · Pedal Pulses: 2+ and equal   Abdomen:  · No masses or tenderness  · Liver/Spleen: No Abnormalities Noted  Neurological/Psychiatric:  · Alert and oriented in all spheres  · Moves all extremities well  · Exhibits normal gait balance and coordination  · No abnormalities of mood, affect, memory, mentation, or behavior are noted  Skin:  · Skin: warm and dry. LABS: November 2018 [facility])  - , K 4.0, bun/creat 15/0.8, ALT 80,   LABS: May 2018 (Mk)  , , HDL 64,     Assessment:     1. Essential hypertension:  Well controlled and will continue current medical regimen. 2. Tachycardia:  Hx of atrial tachycardia in past controlled with toprol XL. No recent recorded issues and no need for anticoagulation. Most recent holter monitor 9/19/17 ([facility]) NSR with sinus tachycardia frequently present. No ventricular ectopy. Moderately frequent PAC's. No symptoms noted.  Most recent ECHO ([facility]) 11/22/17 EF 55-60%, Regional wall motion abnormalities cannot be excluded. Normal diastolic function. Most recent EKG 8/6/20 demonstrated ST, HR 101bpm.        Plan:  1. Continue current medications. No changes. Labs ordered per PCP. Nothing in our system for 2 years. 2. No cardiac testing warranted at this time   3. Follow up with Dr. Carmencita Hogan as needed. No documented atrial tachycardia for > 2 years. No need for [facility] and can f/u PCP regularly with cardiology available if further issues arise. Cost of prescription medications and patient compliance have been reviewed with patient. All questions answered. Thank you for allowing me to participate in the care of this individual.    This note was scribed in the presence of Dr.Meyers RIVERA by Talya Faulkner RN.     I, Dr. Gavin Archer, personally performed the services described in this documentation, as scribed by the above signed scribe in my presence. It is both accurate and complete to my knowledge. I agree with the details independently gathered by the clinical support staff, while the remaining scribed note accurately describes my personal service to the patient. Francisco Glaser.  Carmencita Hogan M.D., [facility]

## 2020-11-11 ENCOUNTER — HOSPITAL ENCOUNTER (OUTPATIENT)
Dept: PHYSICAL THERAPY | Age: 65
Setting detail: THERAPIES SERIES
Discharge: HOME | End: 2020-11-11
Payer: MEDICAID

## 2020-11-11 PROCEDURE — 97112 NEUROMUSCULAR REEDUCATION: CPT | Performed by: PHYSICAL THERAPIST

## 2020-11-11 PROCEDURE — 97140 MANUAL THERAPY 1/> REGIONS: CPT | Performed by: PHYSICAL THERAPIST

## 2020-11-11 PROCEDURE — 97110 THERAPEUTIC EXERCISES: CPT | Performed by: PHYSICAL THERAPIST

## 2020-11-11 NOTE — FLOWSHEET NOTE
[facility],  [address] [place]  Phone: [phone]  Fax [phone]        Date:  2020    Patient Name:  Smitha Judd    :  1955  MRN: [id number]  Restrictions/Precautions:    Medical/Treatment Diagnosis Information:  · Diagnosis: M25.50 (ICD-10-CM) - Pain in unspecified joint  · Treatment Diagnosis: Pain in Right Shoulder  Insurance/Certification information:  PT Insurance Information: 2020 NELSON  - $0CP - $0DED - 30PT - 100% - AUTH AFTER 30V  Physician Information:  Referring Practitioner: Tashia Simms  Has the plan of care been signed (Y/N):        []  Yes  [x]  No     Date of Patient follow up with Physician: PRN      Is this a Progress Report: D/C N.V.    []  Yes  [x]  No        If Yes:  Date Range for reporting period:  Beginning 10/19/20  Ending:    Progress report will be due (10 Rx or 30 days whichever is less):       Recertification will be due (POC Duration  / 90 days whichever is less): 8 weeks from IE (-16)      Visit # Insurance Allowable Auth Required   14 30 []  Yes []  No        Functional Scale: Q DASH :18%    Date assessed:  10/19/20      Therapy Diagnosis/Practice Pattern:R shoulder pain/ stiffness/ C      Number of Comorbidities:  []0     []1-2    [x]3+     Latex Allergy:  [x]NO      []YES  Preferred Language for Healthcare:   [x]English       []other:      Pain level: 2-3/10     SUBJECTIVE:  Shoulder is not as painful today. 310 in general. Friday is last visit. Trial of HEP.     OBJECTIVE: AROM flex: 130 /abd 115, IR:iliac crest,   Observation:    Test measurements:  OBJECTIVE  Test used Initial score 10/19/20   Pain Summary  6/10 2-4/10   Functional questionnaire Quick dash 25% 18%   Functional Testing       AROM flex/abd  125/100   ROM PROM Flex 118 141 cane flex    IR Iliac crest/40 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; recent carpal tunnel sx    Exercises/Interventions:     Therapeutic Ex (45404) Sets/sec Reps Notes/CUES   Table slide with SB: Shelley Hatch, XQK, B UE 10\" [address] chair  30\" 5    Scap retract 3\" 10 hep   Corner (s) low arms 20\" 5    No money RTB 5\"/ 2 10  TB with hep   Cane T spine rotation (s)    Post capsule (s) 30\" 3Cane flexion 1.5 # H10 15    SL shld abd  1#  15 reps    Cane ER at 45 abd H10 10 reps    Sitting ER stretches in chair     punches in supine  w/o L UE assist 1# H5 15 reps   HEP   Cane BP 1.5# HEP   BP without cane 1#  10  reps    glenny /ER/IR    Wall slides flex   Thread the needle  15 reps  10 reps ea Pulleys H3 15 reps Increased pain   Manual Intervention (60736)      PROM, joint mob 10 mins                             NMR re-education (53078)   CUES NEEDED   Pt education: px, dx, poc, role of PT, posture re-ed, work station modifications, HEP 6 mins                 TB Rows/Ext YTB TB walkouts ER/IR H5 10 reps + to HEP   TB Hug a Tree     Wall ball on table  10 ea 4 direction: a/p, med/lat, CW/CCW               Therapeutic Activity (39529)      Finisher- ladders, B flex, CW/CCW, threading B, diagonals,  3# B 10 reps ea                                        Therapeutic Exercise and NMR EXR  [x] (84055) Provided verbal/tactile cueing for activities related to strengthening, flexibility, endurance, ROM  for improvements in scapular, scapulothoracic and UE control with self care, reaching, carrying, lifting, house/yardwork, driving/computer work. [x] (02402) Provided verbal/tactile cueing for activities related to improving balance, coordination, kinesthetic sense, posture, motor skill, proprioception  to assist with  scapular, scapulothoracic and UE control with self care, reaching, carrying, lifting, house/yardwork, driving/computer work.     Therapeutic Activities:    [] (83163 or 74371) Provided verbal/tactile cueing for activities related to improving balance, coordination, kinesthetic sense, posture, motor skill, proprioception and motor activation to allow for proper function of scapular, scapulothoracic and UE control with self care, carrying, lifting, driving/computer work. Home Exercise Program:    [x] (43368) Reviewed/Progressed HEP activities related to strengthening, flexibility, endurance, ROM of scapular, scapulothoracic and UE control with self care, reaching, carrying, lifting, house/yardwork, driving/computer work  [] (47201) Reviewed/Progressed HEP activities related to improving balance, coordination, kinesthetic sense, posture, motor skill, proprioception of scapular, scapulothoracic and UE control with self care, reaching, carrying, lifting, house/yardwork, driving/computer work      Manual Treatments:  PROM / STM / Oscillations-Mobs:  G-I, II, III, IV (PA's, Inf., Post.)  [x] (24829) Provided manual therapy to mobilize soft tissue/joints of cervical/CT, scapular GHJ and UE for the purpose of modulating pain, promoting relaxation,  increasing ROM, reducing/eliminating soft tissue swelling/inflammation/restriction, improving soft tissue extensibility and allowing for proper ROM for normal function with self care, reaching, carrying, lifting, house/yardwork, driving/computer work    Modalities:  Ice x 10 mins to R shld. Charges:  Timed Code Treatment Minutes: 48'   Total Treatment Minutes: 61'     [facility] time in/time out:     [] EVAL (LOW) 20074   [] EVAL (MOD) 12442   [] EVAL (HIGH) 69029   [] RE-EVAL     [x] OV(19846) x  1  [] IONTO  [x] NMR (61038) x 1     [] VASO  [x] Manual (62774) x  1    [] Other:  [] TA x    [] Mech Traction (19934)  [] ES(attended) (77080)      [] ES (un) (55188):     HEP instruction: (see scanned forms)  Access Code: [email or web address]   URL: [email or web address]   Date: 09/21/2020   Prepared by: Marcy Quiet     Exercises   Seated Scapular Retraction - 10 reps PLAN: Progress scap stabilization as tolerated. D/ C with HEP N.V.  [x] Continue per plan of care [] Alter current plan (see comments above)  [] Plan of care initiated [] Hold pending MD visit [] Discharge      Electronically signed by:  Mirtha Gamez, [facility]    Note: If patient does not return for scheduled/ recommended follow up visits, this note will serve as a discharge from care along with most recent update on progress.

## 2020-11-13 ENCOUNTER — HOSPITAL ENCOUNTER (OUTPATIENT)
Dept: PHYSICAL THERAPY | Age: 65
Setting detail: THERAPIES SERIES
Discharge: HOME | End: 2020-11-13
Payer: MEDICAID

## 2020-11-13 PROCEDURE — 97110 THERAPEUTIC EXERCISES: CPT | Performed by: PHYSICAL THERAPIST

## 2020-11-13 NOTE — FLOWSHEET NOTE
[facility],  [address] [place]  Phone: [phone]  Fax [phone]        Date:  2020    Patient Name:  Reza Toney    :  1955  MRN: [id number]  Restrictions/Precautions:    Medical/Treatment Diagnosis Information:  · Diagnosis: M25.50 (ICD-10-CM) - Pain in unspecified joint  · Treatment Diagnosis: Pain in Right Shoulder  Insurance/Certification information:  PT Insurance Information: 2020 NELSON  - $0CP - $0DED - 30PT - 100% - AUTH AFTER 30V  Physician Information:  Referring Practitioner: Reena Watters  Has the plan of care been signed (Y/N):        [x]  Yes  []  No     Date of Patient follow up with Physician: PRN      Is this a Progress Report: D/C N.V.    [x]  Yes  []  No        If Yes:  Date Range for reporting period:  Beginning 10/19/20  Ending:    Progress report will be due (10 Rx or 30 days whichever is less):       Recertification will be due (POC Duration  / 90 days whichever is less): 8 weeks from IE (-16)      Visit # Insurance Allowable Auth Required   16 30 []  Yes []  No        Functional Scale: Q DASH :18%    Date assessed:  10/19/20      Therapy Diagnosis/Practice Pattern:R shoulder pain/ stiffness/ C      Number of Comorbidities:  []0     []1-2    [x]3+     Latex Allergy:  [x]NO      []YES  Preferred Language for Healthcare:   [x]English       []other:      SUBJECTIVE: Pt ready for d/c. 75% improvement in overall condition.      OBJECTIVE: AROM flex: 130 /abd 115, IR:iliac crest,   Observation:    Test measurements:  OBJECTIVE  Test used Initial score 10/19/20 11-   Pain Summary  6/10 2-4/10 0-2   Functional questionnaire Quick dash 25% 18% 9%   Functional Testing        AROM flex/abd  125/100 130/120   ROM PROM Flex 118 141 cane flex     IR Iliac crest/40 L4-5 L4-5    ER 33/ C5 45/ C7 C7   Strength shld flex/scap 3-/5 4-/5 within range 4/5 both    shld Er/IR 3-/5, 4-5 3+/5/ 4-/5 4-/5 ER  5-/5 IR      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; recent carpal tunnel sx    Exercises/Interventions:     Therapeutic Ex (32030) Sets/sec Reps Notes/CUES   Table slide with SB: [address] [place] [address] , ELVIAB, B UE 10\" [address] chair  30\" 5    Scap retract 3\" 10 hep   Corner (s) low arms 20\" 5    No money RTB 5\"/ 2 10  TB with hep   Cane T spine rotation (s)    Post capsule (s) 30\" 3Cane flexion 1.5 # H10 15    SL shld abd  1#  15 reps    Cane ER at 45 abd H10 10 reps    Sitting ER stretches in chair     punches in supine  w/o L UE assist 1# H5 15 reps   HEP   Cane BP 1.5# HEP   BP without cane 1#  10  reps    glenny /ER/IR    Wall slides flex   Thread the needle  15 reps  10 reps ea Pulleys H3 15 reps Increased pain   Manual Intervention (77368)      PROM, joint mob 10 mins                             NMR re-education (87620)   CUES NEEDED   Pt education: px, dx, poc, role of PT, posture re-ed, work station modifications, HEP 6 mins                 TB Rows/Ext YTB TB walkouts ER/IR H5 10 reps + to HEP   TB Hug a Tree     Wall ball on table  10 ea 4 direction: a/p, med/lat, CW/CCW               Therapeutic Activity (71167)      Finisher- ladders, B flex, CW/CCW, threading B, diagonals,  3# B 10 reps ea                                        Therapeutic Exercise and NMR EXR  [x] (58742) Provided verbal/tactile cueing for activities related to strengthening, flexibility, endurance, ROM  for improvements in scapular, scapulothoracic and UE control with self care, reaching, carrying, lifting, house/yardwork, driving/computer work. [x] (98290) Provided verbal/tactile cueing for activities related to improving balance, coordination, kinesthetic sense, posture, motor skill, proprioception  to assist with  scapular, scapulothoracic and UE control with self care, reaching, carrying, lifting, house/yardwork, driving/computer work.     Therapeutic Activities:    [] (53872 or 57027) Provided verbal/tactile cueing for activities related to improving balance, coordination, kinesthetic sense, posture, motor skill, proprioception and motor activation to allow for proper function of scapular, scapulothoracic and UE control with self care, carrying, lifting, driving/computer work. Home Exercise Program:    [x] (48744) Reviewed/Progressed HEP activities related to strengthening, flexibility, endurance, ROM of scapular, scapulothoracic and UE control with self care, reaching, carrying, lifting, house/yardwork, driving/computer work  [] (22166) Reviewed/Progressed HEP activities related to improving balance, coordination, kinesthetic sense, posture, motor skill, proprioception of scapular, scapulothoracic and UE control with self care, reaching, carrying, lifting, house/yardwork, driving/computer work      Manual Treatments:  PROM / STM / Oscillations-Mobs:  G-I, II, III, IV (PA's, Inf., Post.)  [x] (34339) Provided manual therapy to mobilize soft tissue/joints of cervical/CT, scapular GHJ and UE for the purpose of modulating pain, promoting relaxation,  increasing ROM, reducing/eliminating soft tissue swelling/inflammation/restriction, improving soft tissue extensibility and allowing for proper ROM for normal function with self care, reaching, carrying, lifting, house/yardwork, driving/computer work    Modalities:  Ice x 10 mins to R shld. Charges:  Timed Code Treatment Minutes: 48'   Total Treatment Minutes: 61'     [facility] time in/time out:     [] EVAL (LOW) 29465   [] EVAL (MOD) 89131   [] EVAL (HIGH) 33959   [] RE-EVAL     [x] EZ(62122) x  1  [] IONTO  [x] NMR (90143) x 1     [] VASO  [x] Manual (52897) x  1    [] Other:  [] TA x    [] Mech Traction (23721)  [] ES(attended) (17871)      [] ES (un) (02298):     HEP instruction:   Access Code: [email or web address]   URL: [email or web address]   Date: 11/13/2020   Prepared by: Climmie Acre     Exercises   Seated Scapular Retraction - 10 reps - 3 hold - 2x daily - 7x weekly   No Money - 10 reps - 3 hold - 2x daily - 7x weekly   Supine Shoulder Flexion Extension AAROM with Dowel - 10 reps - 10 hold - 1x daily - 7x weekly   Sidelying Shoulder Abduction Palm Forward - 10 reps - 3 sets - 1x daily - 7x weekly   Free Mookie - 10 reps - 3 sets - 1x daily - 7x weekly   Standing Shoulder External Rotation AAROM with Dowel - 10 reps - 10 hold - 1x daily - 7x weekly   Shoulder extension with resistance - Neutral - 10 reps - 3 sets - 1x daily - 7x weekly   Standing Shoulder Row with Anchored Resistance - 10 reps - 3 sets - 1x daily - 7x weekly   Standing Bilateral Shoulder Internal Rotation AAROM with Dowel - 10 reps - 10 hold - 1x daily - 7x weekly   Patient Education   Office Posture   Forward Head Posture    Provided handout on new exercises not in [address]. GOALS:  Patient stated goal: decrease pain  [] Progressing: [] Met: [] Not Met: [] Adjusted    Therapist goals for Patient:   Short Term Goals: To be achieved in: 2 weeks  1. Independent in HEP and progression per patient tolerance, in order to prevent re-injury. [] Progressing: [x] Met: [] Not Met: [] Adjusted  2. Patient will have a decrease in pain to facilitate improvement in movement, function, and ADLs as indicated by Functional Deficits. [x] Progressing: [] Met: [] Not Met: [] Adjusted    Long Term Goals: To be achieved in: 8 weeks  1. Disability index score of 10% or less for the [facility] to assist with reaching prior level of function. [] Progressing: [x] Met: [] Not Met: [] Adjusted  2. Patient will demonstrate increased AROM to [facility] to allow for proper joint functioning as indicated by patients Functional Deficits. [x] Progressing: [] Met: [] Not Met: [] Adjusted  3. Patient will demonstrate an increase in Strength to [facility] to allow for proper functional mobility as indicated by patients Functional Deficits. [x] Progressing: [] Met: [] Not Met: [] Adjusted  4.  Patient will return to 75% functional activities without increased symptoms or restriction. [] Progressing: [x] Met: [] Not Met: [] Adjusted  5. Pt will report <3/10 pain with overhead use. (patient specific functional goal)    [] Progressing: [x] Met: [] Not Met: [] Adjusted       Progression Towards Functional goals:  [] Patient is progressing as expected towards functional goals listed. [x] Progression is slowed due to complexities listed. [x] Progression has been slowed due to co-morbidities. [] Plan just implemented, too soon to assess goals progression  [] Other:     ASSESSMENT:  Pt will continue HEP and f/u PRN    Overall Progression Towards Functional goals/ Treatment Progress Update:  [] Patient is progressing as expected towards functional goals listed. [x] Progression is slowed due to complexities/Impairments listed. [] Progression has been slowed due to co-morbidities. [] Plan just implemented, too soon to assess goals progression <30days   [] Goals require adjustment due to lack of progress  [] Patient is not progressing as expected and requires additional follow up with physician  [] Other    Prognosis for POC: [x] Good [] Fair  [] Poor      Patient requires continued skilled intervention: [x] Yes  [] No    Treatment/Activity Tolerance:   [x] Patient able to complete treatment  [] Patient limited by fatigue  [] Patient limited by pain    [] Patient limited by other medical complications  [] Other:         PLAN:   [] Continue per plan of care [] Alter current plan (see comments above)  [] Plan of care initiated [] Hold pending MD visit [x] Discharge      Electronically signed by:  Kiran Pascual, [place] [id number]    Note: If patient does not return for scheduled/ recommended follow up visits, this note will serve as a discharge from care along with most recent update on progress.

## 2021-01-07 ENCOUNTER — OFFICE VISIT (OUTPATIENT)
Dept: ENT CLINIC | Age: 66
End: 2021-01-07
Payer: MEDICAID

## 2021-01-07 VITALS
TEMPERATURE: 96.6 F | DIASTOLIC BLOOD PRESSURE: 71 MMHG | HEART RATE: 88 BPM | BODY MASS INDEX: 26.31 KG/M2 | SYSTOLIC BLOOD PRESSURE: 128 MMHG | WEIGHT: 134 LBS | HEIGHT: 60 IN

## 2021-01-07 DIAGNOSIS — Z90.89 HISTORY OF RIGHT MASTOIDECTOMY: Primary | ICD-10-CM

## 2021-01-07 PROCEDURE — 3017F COLORECTAL CA SCREEN DOC REV: CPT | Performed by: OTOLARYNGOLOGY

## 2021-01-07 PROCEDURE — G8484 FLU IMMUNIZE NO ADMIN: HCPCS | Performed by: OTOLARYNGOLOGY

## 2021-01-07 PROCEDURE — G8419 CALC BMI OUT NRM PARAM NOF/U: HCPCS | Performed by: OTOLARYNGOLOGY

## 2021-01-07 PROCEDURE — 69220 CLEAN OUT MASTOID CAVITY: CPT | Performed by: OTOLARYNGOLOGY

## 2021-01-07 PROCEDURE — 1036F TOBACCO NON-USER: CPT | Performed by: OTOLARYNGOLOGY

## 2021-01-07 PROCEDURE — G8427 DOCREV CUR MEDS BY ELIG CLIN: HCPCS | Performed by: OTOLARYNGOLOGY

## 2021-01-07 ASSESSMENT — ENCOUNTER SYMPTOMS
DIARRHEA: 0
SINUS PRESSURE: 0
TROUBLE SWALLOWING: 0
SINUS PAIN: 0
EYE PAIN: 0
RHINORRHEA: 0
COUGH: 0
VOICE CHANGE: 0
SHORTNESS OF BREATH: 0
SORE THROAT: 0
CHOKING: 0
NAUSEA: 0
EYE ITCHING: 0
STRIDOR: 0
EYE REDNESS: 0
COLOR CHANGE: 0
PHOTOPHOBIA: 0
FACIAL SWELLING: 0

## 2021-01-07 NOTE — PROGRESS NOTES
[facility]  [facility] STEPHANIE Katz, [address]  P: [phone]  F: [phone]       Patient     Grecia Hernandez  1955    ChiefComplaint     Chief Complaint   Patient presents with    Cerumen Impaction     Routine cerumen removal. Patient denies any symptoms. History of Present Illness     Grecia Hernandez is a pleasant 63 y.o. female here for routine mastoid bowl cleaning. She is not having any issues with otorrhea, otalgia, changes in hearing, tinnitus or dizziness. No problems with the left ear.     Past Medical History     Past Medical History:   Diagnosis Date    Arthritis     Hypertension     Osteoporosis     Pneumonia 2017    PONV (postoperative nausea and vomiting)        Past Surgical History     Past Surgical History:   Procedure Laterality Date    ARM SURGERY Right 8/12/2020    RIGHT ULNAR NERVE DECOMPRESSION AT THE ELBOW performed by Epi Kate MD at [facility] for scoliosis    CARPAL TUNNEL RELEASE Right 8/12/2020    RIGHT CARPAL TUNNEL RELEASE performed by Epi Kate MD at [address] [facility] Bilateral     cataracts    INNER EAR SURGERY Right     replacement of eardrum    TONSILLECTOMY         Family History     Family History   Problem Relation Age of Onset    Diabetes Mother     Cancer Father         pancreas       Social History     Social History     Socioeconomic History    Marital status:      Spouse name: Dana Smith    Number of children: 0    Years of education: 15    Highest education level: Not on file   Occupational History    Not on file   Social Needs    Financial resource strain: Not on file    Food insecurity     Worry: Not on file     Inability: Not on file   [employer] needs     Medical: Not on file     Non-medical: Not on file   Tobacco Use    Smoking status: Never Smoker    Smokeless tobacco: Never Used   Substance and Sexual Activity    Alcohol use: Yes     Comment: 0-1 drinks per month    Drug use: No    Sexual activity: Not on file   Lifestyle    Physical activity     Days per week: Not on file     Minutes per session: Not on file    Stress: Not on file   Relationships    Social connections     Talks on phone: Not on file     Gets together: Not on file     Attends Tenriism service: Not on file     Active member of club or organization: Not on file     Attends meetings of clubs or organizations: Not on file     Relationship status: Not on file    Intimate partner violence     Fear of current or ex partner: Not on file     Emotionally abused: Not on file     Physically abused: Not on file     Forced sexual activity: Not on file   Other Topics Concern    Not on file   Social History Narrative    Not on file       Allergies     Allergies   Allergen Reactions    Penicillins Rash       Medications     Current Outpatient Medications   Medication Sig Dispense Refill    metoprolol succinate (TOPROL XL) 100 MG extended release tablet TAKE ONE TABLET BY MOUTH DAILY 30 tablet 5    vitamin D (ERGOCALCIFEROL) 1.25 MG (04820 UT) CAPS capsule       hydroxychloroquine (PLAQUENIL) 200 MG tablet Take 200 mg by mouth daily       Alendronate Sodium (FOSAMAX PO) Take by mouth once a week       albuterol sulfate HFA (PROAIR HFA) 108 (90 Base) MCG/ACT inhaler Inhale 2 puffs into the lungs every 6 hours as needed for Wheezing 1 Inhaler 0     No current facility-administered medications for this visit. Review of Systems     Review of Systems   Constitutional: Negative for chills, fatigue and fever. HENT: Negative for congestion, ear discharge, ear pain, facial swelling, hearing loss, nosebleeds, postnasal drip, rhinorrhea, sinus pressure, sinus pain, sneezing, sore throat, tinnitus, trouble swallowing and voice change. Eyes: Negative for photophobia, pain, redness, itching and visual disturbance.    Respiratory: Negative for cough, choking, shortness of breath and stridor. Gastrointestinal: Negative for diarrhea and nausea. Musculoskeletal: Negative for neck pain and neck stiffness. Skin: Negative for color change and rash. Neurological: Negative for dizziness, facial asymmetry and light-headedness. Hematological: Negative for adenopathy. Psychiatric/Behavioral: Negative for agitation and confusion. PhysicalExam     Vitals:    01/07/21 1000   BP: 128/71   Pulse: 88   Temp: 96.6 °F (35.9 °C)       Physical Exam  Constitutional:       Appearance: She is well-developed. HENT:      Head: Normocephalic and atraumatic. Jaw: No trismus. Right Ear: Tympanic membrane, ear canal and external ear normal. No drainage. No middle ear effusion. Tympanic membrane is not perforated. Left Ear: Tympanic membrane, ear canal and external ear normal. No drainage. No middle ear effusion. Tympanic membrane is not perforated. Nose: No septal deviation, mucosal edema or rhinorrhea. Mouth/Throat:      Dentition: Normal dentition. Pharynx: Uvula midline. No oropharyngeal exudate. Eyes:      General: No scleral icterus. Right eye: No discharge. Left eye: No discharge. Pupils: Pupils are equal, round, and reactive to light. Neck:      Musculoskeletal: Neck supple. Thyroid: No thyromegaly. Trachea: Phonation normal. No tracheal deviation. Pulmonary:      Effort: Pulmonary effort is normal. No respiratory distress. Breath sounds: No stridor. Lymphadenopathy:      Cervical: No cervical adenopathy. Skin:     General: Skin is warm and dry. Neurological:      Mental Status: She is alert and oriented to person, place, and time. Cranial Nerves: No cranial nerve deficit.    Psychiatric:         Behavior: Behavior normal.           Procedure     Otomicroscopy with cleaning of mastoid bowl    An operating microscope was utilized to visualize the external auditory canals using a 4mm speculum. The external auditory canals are clear. The tympanic membrane is intact. Ossicles appear intact. No fluid visualized in the middle ear. Right mastoid bowl with mild to moderate amount of epithelial debris. This was removed using a Boland forcep. Assessment and Plan     1. History of right mastoidectomy  Patient here for mastoid bowl cleaning on the right. Cleaning performed without difficulty. No issues. Follow-up 1 year for cleaning. Return in about 1 year (around 1/7/2022). Portions of this note were dictated using Dragon.  There may be linguistic errors secondary to the use of this program.

## 2021-01-22 ENCOUNTER — HOSPITAL ENCOUNTER (OUTPATIENT)
Dept: WOMENS IMAGING | Age: 66
Discharge: HOME | End: 2021-01-22
Payer: MEDICAID

## 2021-01-22 DIAGNOSIS — N64.4 BREAST PAIN: ICD-10-CM

## 2021-01-22 PROCEDURE — G0279 TOMOSYNTHESIS, MAMMO: HCPCS

## 2021-01-22 PROCEDURE — 76642 ULTRASOUND BREAST LIMITED: CPT

## 2021-03-22 RX ORDER — METOPROLOL SUCCINATE 100 MG/1
TABLET, EXTENDED RELEASE ORAL
Qty: 30 TABLET | Refills: 4 | Status: ON HOLD
Start: 2021-03-22 | End: 2021-05-11 | Stop reason: HOSPADM

## 2021-03-31 ENCOUNTER — IMMUNIZATION (OUTPATIENT)
Dept: PRIMARY CARE CLINIC | Age: 66
End: 2021-03-31
Payer: MEDICAID

## 2021-03-31 PROCEDURE — 0001A COVID-19, PFIZER VACCINE 30MCG/0.3ML DOSE: CPT | Performed by: FAMILY MEDICINE

## 2021-03-31 PROCEDURE — 91300 COVID-19, PFIZER VACCINE 30MCG/0.3ML DOSE: CPT | Performed by: FAMILY MEDICINE

## 2021-04-25 ENCOUNTER — APPOINTMENT (OUTPATIENT)
Dept: GENERAL RADIOLOGY | Age: 66
DRG: 130 | End: 2021-04-25
Payer: MEDICAID

## 2021-04-25 ENCOUNTER — APPOINTMENT (OUTPATIENT)
Dept: CT IMAGING | Age: 66
DRG: 130 | End: 2021-04-25
Payer: MEDICAID

## 2021-04-25 ENCOUNTER — HOSPITAL ENCOUNTER (INPATIENT)
Age: 66
LOS: 15 days | Discharge: NURSING, REHAB OR LONG-TERM CARE | DRG: 130 | End: 2021-05-11
Attending: EMERGENCY MEDICINE | Admitting: INTERNAL MEDICINE
Payer: MEDICAID

## 2021-04-25 DIAGNOSIS — J12.82 PNEUMONIA DUE TO COVID-19 VIRUS: Primary | ICD-10-CM

## 2021-04-25 DIAGNOSIS — K80.70 CALCULUS OF GALLBLADDER AND BILE DUCT WITHOUT CHOLECYSTITIS OR OBSTRUCTION: ICD-10-CM

## 2021-04-25 DIAGNOSIS — U07.1 PNEUMONIA DUE TO COVID-19 VIRUS: Primary | ICD-10-CM

## 2021-04-25 DIAGNOSIS — R09.02 HYPOXIA: ICD-10-CM

## 2021-04-25 LAB
A/G RATIO: 0.9 (ref 1.1–2.2)
ABO/RH: NORMAL
ALBUMIN SERPL-MCNC: 3.9 G/DL (ref 3.4–5)
ALP BLD-CCNC: 119 U/L (ref 40–129)
ALT SERPL-CCNC: 51 U/L (ref 10–40)
ANION GAP SERPL CALCULATED.3IONS-SCNC: 16 MMOL/L (ref 3–16)
ANTIBODY SCREEN: NORMAL
AST SERPL-CCNC: 111 U/L (ref 15–37)
BASE EXCESS VENOUS: -3.6 MMOL/L (ref -3–3)
BASOPHILS ABSOLUTE: 0 K/UL (ref 0–0.2)
BASOPHILS RELATIVE PERCENT: 0.6 %
BILIRUB SERPL-MCNC: 0.5 MG/DL (ref 0–1)
BUN BLDV-MCNC: 19 MG/DL (ref 7–20)
CALCIUM SERPL-MCNC: 9.9 MG/DL (ref 8.3–10.6)
CARBOXYHEMOGLOBIN: 0.6 % (ref 0–1.5)
CHLORIDE BLD-SCNC: 101 MMOL/L (ref 99–110)
CO2: 20 MMOL/L (ref 21–32)
CREAT SERPL-MCNC: 0.7 MG/DL (ref 0.6–1.2)
D DIMER: 356 NG/ML DDU (ref 0–229)
EOSINOPHILS ABSOLUTE: 0 K/UL (ref 0–0.6)
EOSINOPHILS RELATIVE PERCENT: 0 %
GFR AFRICAN AMERICAN: >60
GFR NON-AFRICAN AMERICAN: >60
GLOBULIN: 4.3 G/DL
GLUCOSE BLD-MCNC: 143 MG/DL (ref 70–99)
HCO3 VENOUS: 21.3 MMOL/L (ref 23–29)
HCT VFR BLD CALC: 44.2 % (ref 36–48)
HEMOGLOBIN: 14.8 G/DL (ref 12–16)
LACTIC ACID, SEPSIS: 1.5 MMOL/L (ref 0.4–1.9)
LIPASE: 88 U/L (ref 13–60)
LYMPHOCYTES ABSOLUTE: 0.6 K/UL (ref 1–5.1)
LYMPHOCYTES RELATIVE PERCENT: 9.9 %
MCH RBC QN AUTO: 30.4 PG (ref 26–34)
MCHC RBC AUTO-ENTMCNC: 33.5 G/DL (ref 31–36)
MCV RBC AUTO: 91 FL (ref 80–100)
METHEMOGLOBIN VENOUS: 0 %
MONOCYTES ABSOLUTE: 0.4 K/UL (ref 0–1.3)
MONOCYTES RELATIVE PERCENT: 6.5 %
NEUTROPHILS ABSOLUTE: 5.1 K/UL (ref 1.7–7.7)
NEUTROPHILS RELATIVE PERCENT: 83 %
O2 CONTENT, VEN: 15 VOL %
O2 SAT, VEN: 74 %
O2 THERAPY: ABNORMAL
PCO2, VEN: 38 MMHG (ref 40–50)
PDW BLD-RTO: 14 % (ref 12.4–15.4)
PH VENOUS: 7.37 (ref 7.35–7.45)
PLATELET # BLD: 286 K/UL (ref 135–450)
PMV BLD AUTO: 7.7 FL (ref 5–10.5)
PO2, VEN: 40.5 MMHG (ref 25–40)
POTASSIUM REFLEX MAGNESIUM: 4.6 MMOL/L (ref 3.5–5.1)
RBC # BLD: 4.87 M/UL (ref 4–5.2)
SARS-COV-2, NAAT: DETECTED
SODIUM BLD-SCNC: 137 MMOL/L (ref 136–145)
TCO2 CALC VENOUS: 22 MMOL/L
TOTAL PROTEIN: 8.2 G/DL (ref 6.4–8.2)
TROPONIN: <0.01 NG/ML
WBC # BLD: 6.1 K/UL (ref 4–11)

## 2021-04-25 PROCEDURE — 86850 RBC ANTIBODY SCREEN: CPT

## 2021-04-25 PROCEDURE — 6360000002 HC RX W HCPCS

## 2021-04-25 PROCEDURE — 71260 CT THORAX DX C+: CPT

## 2021-04-25 PROCEDURE — 80053 COMPREHEN METABOLIC PANEL: CPT

## 2021-04-25 PROCEDURE — 83605 ASSAY OF LACTIC ACID: CPT

## 2021-04-25 PROCEDURE — 86901 BLOOD TYPING SEROLOGIC RH(D): CPT

## 2021-04-25 PROCEDURE — 85379 FIBRIN DEGRADATION QUANT: CPT

## 2021-04-25 PROCEDURE — 84484 ASSAY OF TROPONIN QUANT: CPT

## 2021-04-25 PROCEDURE — 83690 ASSAY OF LIPASE: CPT

## 2021-04-25 PROCEDURE — 82803 BLOOD GASES ANY COMBINATION: CPT

## 2021-04-25 PROCEDURE — 87635 SARS-COV-2 COVID-19 AMP PRB: CPT

## 2021-04-25 PROCEDURE — 74177 CT ABD & PELVIS W/CONTRAST: CPT

## 2021-04-25 PROCEDURE — 2580000003 HC RX 258: Performed by: PHYSICIAN ASSISTANT

## 2021-04-25 PROCEDURE — 2500000003 HC RX 250 WO HCPCS

## 2021-04-25 PROCEDURE — 96374 THER/PROPH/DIAG INJ IV PUSH: CPT

## 2021-04-25 PROCEDURE — 93005 ELECTROCARDIOGRAM TRACING: CPT | Performed by: EMERGENCY MEDICINE

## 2021-04-25 PROCEDURE — 85025 COMPLETE CBC W/AUTO DIFF WBC: CPT

## 2021-04-25 PROCEDURE — 71045 X-RAY EXAM CHEST 1 VIEW: CPT

## 2021-04-25 PROCEDURE — 86900 BLOOD TYPING SEROLOGIC ABO: CPT

## 2021-04-25 PROCEDURE — 99282 EMERGENCY DEPT VISIT SF MDM: CPT

## 2021-04-25 PROCEDURE — 87040 BLOOD CULTURE FOR BACTERIA: CPT

## 2021-04-25 PROCEDURE — 96361 HYDRATE IV INFUSION ADD-ON: CPT

## 2021-04-25 PROCEDURE — 6360000004 HC RX CONTRAST MEDICATION: Performed by: PHYSICIAN ASSISTANT

## 2021-04-25 RX ORDER — SODIUM CHLORIDE, SODIUM LACTATE, POTASSIUM CHLORIDE, AND CALCIUM CHLORIDE .6; .31; .03; .02 G/100ML; G/100ML; G/100ML; G/100ML
30 INJECTION, SOLUTION INTRAVENOUS ONCE
Status: COMPLETED | OUTPATIENT
Start: 2021-04-25 | End: 2021-04-26

## 2021-04-25 RX ORDER — DEXAMETHASONE SODIUM PHOSPHATE 10 MG/ML
10 INJECTION, SOLUTION INTRAMUSCULAR; INTRAVENOUS ONCE
Status: COMPLETED | OUTPATIENT
Start: 2021-04-26 | End: 2021-04-26

## 2021-04-25 RX ORDER — SODIUM CHLORIDE, SODIUM LACTATE, POTASSIUM CHLORIDE, CALCIUM CHLORIDE 600; 310; 30; 20 MG/100ML; MG/100ML; MG/100ML; MG/100ML
1000 INJECTION, SOLUTION INTRAVENOUS ONCE
Status: COMPLETED | OUTPATIENT
Start: 2021-04-25 | End: 2021-04-26

## 2021-04-25 RX ADMIN — IOPAMIDOL 85 ML: 755 INJECTION, SOLUTION INTRAVENOUS at 23:09

## 2021-04-25 RX ADMIN — SODIUM CHLORIDE, POTASSIUM CHLORIDE, SODIUM LACTATE AND CALCIUM CHLORIDE 1770 ML: 600; 310; 30; 20 INJECTION, SOLUTION INTRAVENOUS at 22:04

## 2021-04-26 ENCOUNTER — TELEPHONE (OUTPATIENT)
Dept: CARDIOLOGY CLINIC | Age: 66
End: 2021-04-26

## 2021-04-26 PROBLEM — J96.00 ACUTE RESPIRATORY FAILURE DUE TO COVID-19 (HCC): Status: ACTIVE | Noted: 2021-04-26

## 2021-04-26 PROBLEM — U07.1 ACUTE RESPIRATORY FAILURE DUE TO COVID-19 (HCC): Status: ACTIVE | Noted: 2021-04-26

## 2021-04-26 PROBLEM — J12.82 PNEUMONIA DUE TO COVID-19 VIRUS: Status: ACTIVE | Noted: 2021-04-26

## 2021-04-26 LAB
A/G RATIO: 0.8 (ref 1.1–2.2)
ALBUMIN SERPL-MCNC: 2.7 G/DL (ref 3.4–5)
ALP BLD-CCNC: 102 U/L (ref 40–129)
ALT SERPL-CCNC: 55 U/L (ref 10–40)
AMORPHOUS: ABNORMAL /HPF
ANION GAP SERPL CALCULATED.3IONS-SCNC: 15 MMOL/L (ref 3–16)
AST SERPL-CCNC: 112 U/L (ref 15–37)
BACTERIA: ABNORMAL /HPF
BASE EXCESS ARTERIAL: 1.3 MMOL/L (ref -3–3)
BILIRUB SERPL-MCNC: 0.4 MG/DL (ref 0–1)
BILIRUBIN URINE: NEGATIVE
BLOOD BANK DISPENSE STATUS: NORMAL
BLOOD BANK PRODUCT CODE: NORMAL
BLOOD, URINE: ABNORMAL
BPU ID: NORMAL
BUN BLDV-MCNC: 10 MG/DL (ref 7–20)
CALCIUM SERPL-MCNC: 8.6 MG/DL (ref 8.3–10.6)
CARBOXYHEMOGLOBIN ARTERIAL: 0.3 % (ref 0–1.5)
CHLORIDE BLD-SCNC: 101 MMOL/L (ref 99–110)
CLARITY: ABNORMAL
CO2: 17 MMOL/L (ref 21–32)
COLOR: YELLOW
CREAT SERPL-MCNC: <0.5 MG/DL (ref 0.6–1.2)
DESCRIPTION BLOOD BANK: NORMAL
EKG ATRIAL RATE: 143 BPM
EKG DIAGNOSIS: NORMAL
EKG P AXIS: 60 DEGREES
EKG P-R INTERVAL: 136 MS
EKG Q-T INTERVAL: 284 MS
EKG QRS DURATION: 92 MS
EKG QTC CALCULATION (BAZETT): 438 MS
EKG R AXIS: 17 DEGREES
EKG T AXIS: 58 DEGREES
EKG VENTRICULAR RATE: 143 BPM
EPITHELIAL CELLS, UA: ABNORMAL /HPF (ref 0–5)
GFR AFRICAN AMERICAN: >60
GFR NON-AFRICAN AMERICAN: >60
GLOBULIN: 3.6 G/DL
GLUCOSE BLD-MCNC: 143 MG/DL (ref 70–99)
GLUCOSE BLD-MCNC: 184 MG/DL (ref 70–99)
GLUCOSE BLD-MCNC: 213 MG/DL (ref 70–99)
GLUCOSE URINE: NEGATIVE MG/DL
HCO3 ARTERIAL: 27.3 MMOL/L (ref 21–29)
HEMOGLOBIN, ART, EXTENDED: 13.8 G/DL (ref 12–16)
HYALINE CASTS: ABNORMAL /LPF (ref 0–2)
KETONES, URINE: ABNORMAL MG/DL
LEUKOCYTE ESTERASE, URINE: ABNORMAL
MAGNESIUM: 1.8 MG/DL (ref 1.8–2.4)
METHEMOGLOBIN ARTERIAL: 0.1 %
MICROSCOPIC EXAMINATION: YES
MUCUS: ABNORMAL /LPF
NITRITE, URINE: NEGATIVE
O2 CONTENT ARTERIAL: 18 ML/DL
O2 SAT, ARTERIAL: 92.8 %
O2 THERAPY: ABNORMAL
PCO2 ARTERIAL: 48.3 MMHG (ref 35–45)
PERFORMED ON: ABNORMAL
PERFORMED ON: ABNORMAL
PH ARTERIAL: 7.37 (ref 7.35–7.45)
PH UA: 6 (ref 5–8)
PO2 ARTERIAL: 67.4 MMHG (ref 75–108)
POTASSIUM REFLEX MAGNESIUM: 3.5 MMOL/L (ref 3.5–5.1)
PROCALCITONIN: 0.18 NG/ML (ref 0–0.15)
PROTEIN UA: 100 MG/DL
RBC UA: ABNORMAL /HPF (ref 0–4)
RENAL EPITHELIAL, UA: ABNORMAL /HPF (ref 0–1)
SODIUM BLD-SCNC: 133 MMOL/L (ref 136–145)
SPECIFIC GRAVITY UA: 1.02 (ref 1–1.03)
TCO2 ARTERIAL: 28.8 MMOL/L
TOTAL PROTEIN: 6.3 G/DL (ref 6.4–8.2)
URINE CULTURE, ROUTINE: NORMAL
URINE REFLEX TO CULTURE: YES
URINE TYPE: ABNORMAL
UROBILINOGEN, URINE: 0.2 E.U./DL
WBC UA: ABNORMAL /HPF (ref 0–5)

## 2021-04-26 PROCEDURE — 6370000000 HC RX 637 (ALT 250 FOR IP): Performed by: INTERNAL MEDICINE

## 2021-04-26 PROCEDURE — 2700000000 HC OXYGEN THERAPY PER DAY

## 2021-04-26 PROCEDURE — 6360000002 HC RX W HCPCS: Performed by: PHYSICIAN ASSISTANT

## 2021-04-26 PROCEDURE — 81001 URINALYSIS AUTO W/SCOPE: CPT

## 2021-04-26 PROCEDURE — 82803 BLOOD GASES ANY COMBINATION: CPT

## 2021-04-26 PROCEDURE — 6370000000 HC RX 637 (ALT 250 FOR IP): Performed by: PHYSICIAN ASSISTANT

## 2021-04-26 PROCEDURE — 93010 ELECTROCARDIOGRAM REPORT: CPT | Performed by: INTERNAL MEDICINE

## 2021-04-26 PROCEDURE — 87086 URINE CULTURE/COLONY COUNT: CPT

## 2021-04-26 PROCEDURE — 2580000003 HC RX 258: Performed by: INTERNAL MEDICINE

## 2021-04-26 PROCEDURE — 80053 COMPREHEN METABOLIC PANEL: CPT

## 2021-04-26 PROCEDURE — XW033E5 INTRODUCTION OF REMDESIVIR ANTI-INFECTIVE INTO PERIPHERAL VEIN, PERCUTANEOUS APPROACH, NEW TECHNOLOGY GROUP 5: ICD-10-PCS | Performed by: INTERNAL MEDICINE

## 2021-04-26 PROCEDURE — 2500000003 HC RX 250 WO HCPCS: Performed by: INTERNAL MEDICINE

## 2021-04-26 PROCEDURE — 83735 ASSAY OF MAGNESIUM: CPT

## 2021-04-26 PROCEDURE — 84145 PROCALCITONIN (PCT): CPT

## 2021-04-26 PROCEDURE — 83036 HEMOGLOBIN GLYCOSYLATED A1C: CPT

## 2021-04-26 PROCEDURE — 36430 TRANSFUSION BLD/BLD COMPNT: CPT

## 2021-04-26 PROCEDURE — 6360000002 HC RX W HCPCS: Performed by: INTERNAL MEDICINE

## 2021-04-26 PROCEDURE — 2580000003 HC RX 258: Performed by: PHYSICIAN ASSISTANT

## 2021-04-26 PROCEDURE — 99223 1ST HOSP IP/OBS HIGH 75: CPT | Performed by: PHYSICIAN ASSISTANT

## 2021-04-26 PROCEDURE — 99255 IP/OBS CONSLTJ NEW/EST HI 80: CPT | Performed by: INTERNAL MEDICINE

## 2021-04-26 PROCEDURE — 94761 N-INVAS EAR/PLS OXIMETRY MLT: CPT

## 2021-04-26 PROCEDURE — 36415 COLL VENOUS BLD VENIPUNCTURE: CPT

## 2021-04-26 PROCEDURE — 2000000000 HC ICU R&B

## 2021-04-26 RX ORDER — PROMETHAZINE HYDROCHLORIDE 25 MG/1
12.5 TABLET ORAL EVERY 6 HOURS PRN
Status: DISCONTINUED | OUTPATIENT
Start: 2021-04-26 | End: 2021-05-11 | Stop reason: HOSPADM

## 2021-04-26 RX ORDER — METOPROLOL SUCCINATE 50 MG/1
100 TABLET, EXTENDED RELEASE ORAL DAILY
Status: DISCONTINUED | OUTPATIENT
Start: 2021-04-26 | End: 2021-04-27

## 2021-04-26 RX ORDER — SODIUM CHLORIDE 9 MG/ML
INJECTION, SOLUTION INTRAVENOUS PRN
Status: DISCONTINUED | OUTPATIENT
Start: 2021-04-26 | End: 2021-05-08

## 2021-04-26 RX ORDER — SODIUM CHLORIDE 9 MG/ML
INJECTION, SOLUTION INTRAVENOUS CONTINUOUS
Status: DISCONTINUED | OUTPATIENT
Start: 2021-04-26 | End: 2021-04-26

## 2021-04-26 RX ORDER — ALBUTEROL SULFATE 90 UG/1
2 AEROSOL, METERED RESPIRATORY (INHALATION) EVERY 6 HOURS PRN
Status: DISCONTINUED | OUTPATIENT
Start: 2021-04-26 | End: 2021-04-27

## 2021-04-26 RX ORDER — PROPOFOL 10 MG/ML
INJECTION, EMULSION INTRAVENOUS
Status: DISPENSED
Start: 2021-04-26 | End: 2021-04-27

## 2021-04-26 RX ORDER — SODIUM CHLORIDE 0.9 % (FLUSH) 0.9 %
5-40 SYRINGE (ML) INJECTION EVERY 12 HOURS SCHEDULED
Status: DISCONTINUED | OUTPATIENT
Start: 2021-04-26 | End: 2021-05-11 | Stop reason: HOSPADM

## 2021-04-26 RX ORDER — DEXAMETHASONE SODIUM PHOSPHATE 10 MG/ML
6 INJECTION, SOLUTION INTRAMUSCULAR; INTRAVENOUS EVERY 24 HOURS
Status: DISCONTINUED | OUTPATIENT
Start: 2021-04-27 | End: 2021-05-04

## 2021-04-26 RX ORDER — NICOTINE POLACRILEX 4 MG
15 LOZENGE BUCCAL PRN
Status: DISCONTINUED | OUTPATIENT
Start: 2021-04-26 | End: 2021-05-11 | Stop reason: HOSPADM

## 2021-04-26 RX ORDER — VITAMIN B COMPLEX
2000 TABLET ORAL DAILY
Status: DISCONTINUED | OUTPATIENT
Start: 2021-04-26 | End: 2021-05-11 | Stop reason: HOSPADM

## 2021-04-26 RX ORDER — ALBUTEROL SULFATE 90 UG/1
2 AEROSOL, METERED RESPIRATORY (INHALATION)
Status: DISCONTINUED | OUTPATIENT
Start: 2021-04-26 | End: 2021-04-26

## 2021-04-26 RX ORDER — SODIUM CHLORIDE 9 MG/ML
25 INJECTION, SOLUTION INTRAVENOUS PRN
Status: DISCONTINUED | OUTPATIENT
Start: 2021-04-26 | End: 2021-05-08

## 2021-04-26 RX ORDER — ACETAMINOPHEN 325 MG/1
650 TABLET ORAL EVERY 6 HOURS PRN
Status: DISCONTINUED | OUTPATIENT
Start: 2021-04-26 | End: 2021-05-11 | Stop reason: HOSPADM

## 2021-04-26 RX ORDER — DEXTROSE MONOHYDRATE 25 G/50ML
12.5 INJECTION, SOLUTION INTRAVENOUS PRN
Status: DISCONTINUED | OUTPATIENT
Start: 2021-04-26 | End: 2021-05-11 | Stop reason: HOSPADM

## 2021-04-26 RX ORDER — ONDANSETRON 2 MG/ML
4 INJECTION INTRAMUSCULAR; INTRAVENOUS EVERY 6 HOURS PRN
Status: DISCONTINUED | OUTPATIENT
Start: 2021-04-26 | End: 2021-05-11 | Stop reason: HOSPADM

## 2021-04-26 RX ORDER — SODIUM CHLORIDE 0.9 % (FLUSH) 0.9 %
5-40 SYRINGE (ML) INJECTION PRN
Status: DISCONTINUED | OUTPATIENT
Start: 2021-04-26 | End: 2021-05-11 | Stop reason: HOSPADM

## 2021-04-26 RX ORDER — SODIUM BICARBONATE 650 MG/1
650 TABLET ORAL 4 TIMES DAILY
Status: DISCONTINUED | OUTPATIENT
Start: 2021-04-26 | End: 2021-04-26

## 2021-04-26 RX ORDER — DEXTROSE MONOHYDRATE 50 MG/ML
100 INJECTION, SOLUTION INTRAVENOUS PRN
Status: DISCONTINUED | OUTPATIENT
Start: 2021-04-26 | End: 2021-05-11 | Stop reason: HOSPADM

## 2021-04-26 RX ORDER — POLYETHYLENE GLYCOL 3350 17 G/17G
17 POWDER, FOR SOLUTION ORAL DAILY PRN
Status: DISCONTINUED | OUTPATIENT
Start: 2021-04-26 | End: 2021-05-11 | Stop reason: HOSPADM

## 2021-04-26 RX ORDER — GUAIFENESIN/DEXTROMETHORPHAN 100-10MG/5
5 SYRUP ORAL EVERY 4 HOURS PRN
Status: DISCONTINUED | OUTPATIENT
Start: 2021-04-26 | End: 2021-05-11 | Stop reason: HOSPADM

## 2021-04-26 RX ORDER — ACETAMINOPHEN 650 MG/1
650 SUPPOSITORY RECTAL EVERY 6 HOURS PRN
Status: DISCONTINUED | OUTPATIENT
Start: 2021-04-26 | End: 2021-05-11 | Stop reason: HOSPADM

## 2021-04-26 RX ORDER — ALBUTEROL SULFATE 90 UG/1
2 AEROSOL, METERED RESPIRATORY (INHALATION) EVERY 4 HOURS PRN
Status: DISCONTINUED | OUTPATIENT
Start: 2021-04-26 | End: 2021-04-27

## 2021-04-26 RX ORDER — SACCHAROMYCES BOULARDII 250 MG
250 CAPSULE ORAL 2 TIMES DAILY
Status: DISCONTINUED | OUTPATIENT
Start: 2021-04-26 | End: 2021-05-11 | Stop reason: HOSPADM

## 2021-04-26 RX ADMIN — RDII 250 MG CAPSULE 250 MG: at 13:13

## 2021-04-26 RX ADMIN — DEXTROSE MONOHYDRATE 500 MG: 50 INJECTION, SOLUTION INTRAVENOUS at 05:20

## 2021-04-26 RX ADMIN — INSULIN LISPRO 1 UNITS: 100 INJECTION, SOLUTION INTRAVENOUS; SUBCUTANEOUS at 20:41

## 2021-04-26 RX ADMIN — Medication 2000 UNITS: at 09:49

## 2021-04-26 RX ADMIN — GUAIFENESIN AND DEXTROMETHORPHAN 5 ML: 100; 10 SYRUP ORAL at 14:52

## 2021-04-26 RX ADMIN — REMDESIVIR 200 MG: 100 INJECTION, POWDER, LYOPHILIZED, FOR SOLUTION INTRAVENOUS at 05:18

## 2021-04-26 RX ADMIN — CEFTRIAXONE SODIUM 1000 MG: 1 INJECTION, POWDER, FOR SOLUTION INTRAMUSCULAR; INTRAVENOUS at 20:40

## 2021-04-26 RX ADMIN — ENOXAPARIN SODIUM 30 MG: 30 INJECTION SUBCUTANEOUS at 09:49

## 2021-04-26 RX ADMIN — RDII 250 MG CAPSULE 250 MG: at 20:40

## 2021-04-26 RX ADMIN — METOPROLOL SUCCINATE 100 MG: 50 TABLET, EXTENDED RELEASE ORAL at 05:15

## 2021-04-26 RX ADMIN — Medication 10 ML: at 20:38

## 2021-04-26 RX ADMIN — DEXAMETHASONE 6 MG: 4 TABLET ORAL at 09:49

## 2021-04-26 RX ADMIN — ENOXAPARIN SODIUM 30 MG: 30 INJECTION SUBCUTANEOUS at 20:38

## 2021-04-26 RX ADMIN — DEXAMETHASONE SODIUM PHOSPHATE 10 MG: 10 INJECTION, SOLUTION INTRAMUSCULAR; INTRAVENOUS at 00:22

## 2021-04-26 RX ADMIN — ONDANSETRON HYDROCHLORIDE 4 MG: 2 INJECTION, SOLUTION INTRAMUSCULAR; INTRAVENOUS at 06:36

## 2021-04-26 RX ADMIN — INSULIN LISPRO 1 UNITS: 100 INJECTION, SOLUTION INTRAVENOUS; SUBCUTANEOUS at 17:57

## 2021-04-26 RX ADMIN — SODIUM CHLORIDE: 9 INJECTION, SOLUTION INTRAVENOUS at 05:15

## 2021-04-26 RX ADMIN — SODIUM CHLORIDE, SODIUM LACTATE, POTASSIUM CHLORIDE, AND CALCIUM CHLORIDE 1000 ML: .6; .31; .03; .02 INJECTION, SOLUTION INTRAVENOUS at 00:22

## 2021-04-26 RX ADMIN — CEFTRIAXONE SODIUM 1000 MG: 1 INJECTION, POWDER, FOR SOLUTION INTRAMUSCULAR; INTRAVENOUS at 02:17

## 2021-04-26 ASSESSMENT — ENCOUNTER SYMPTOMS
BACK PAIN: 0
EYE PAIN: 0
ABDOMINAL PAIN: 0
SORE THROAT: 0
SHORTNESS OF BREATH: 0
COUGH: 1
VOMITING: 0
DIARRHEA: 1
NAUSEA: 0

## 2021-04-26 ASSESSMENT — PAIN SCALES - GENERAL
PAINLEVEL_OUTOF10: 0

## 2021-04-26 NOTE — TELEPHONE ENCOUNTER
I tried to call and did not leave VM on cell phone for Ms. Blocher. Please let  know that I am aware and I tried to call and I hope she recovers from Covid quickly. Thanks.

## 2021-04-26 NOTE — ED NOTES
Perfect serve message to Dr. Dionte Borden @ [id number] [facility]  04/26/21 5465

## 2021-04-26 NOTE — H&P
Hospital Medicine History & Physical      PCP: ARABELLA Guzman - CNP    Date of Admission: 4/25/2021    Date of Service: Pt seen/examined on 4/26/2021    Chief Complaint:    Chief Complaint   Patient presents with    Diarrhea     pt states diarrhea for 10 days was seen by PCP told she has allergies. History Of Present Illness: The patient is a 61 y.o. female with no significant   PMH who presented to the ED with complaint of diarrhea and poor PO intake for the past 10 days. Does have a cough productive of clear sputum, no hemoptysis. Denies any fevers or chills at home. No chest pain or SOB. She did have her first COVID vaccine on March 31st. She was scheduled to get her second dose earlier this week but was unable to get the vaccine because of diarrhea. Denies any abdominal pain, melena or hematochezia. Denies any sick contacts. No recent abx. She does not wear O2 at baseline. Past Medical History:        Diagnosis Date    Arthritis     COVID-19 04/25/2021    Hypertension     Osteoporosis     Pneumonia 2017    PONV (postoperative nausea and vomiting)        Past Surgical History:        Procedure Laterality Date    ARM SURGERY Right 8/12/2020    RIGHT ULNAR NERVE DECOMPRESSION AT THE ELBOW performed by Preeti Kraft MD at [address] [facility] rods for scoliosis    CARPAL TUNNEL RELEASE Right 8/12/2020    RIGHT CARPAL TUNNEL RELEASE performed by Preeti Kraft MD at [address] [facility] Bilateral     cataracts    INNER EAR SURGERY Right     replacement of eardrum    TONSILLECTOMY         Medications Prior to Admission:    Prior to Admission medications    Medication Sig Start Date End Date Taking?  Authorizing Provider   metoprolol succinate (TOPROL XL) 100 MG extended release tablet TAKE ONE TABLET BY MOUTH DAILY 3/22/21  Yes Shefali Zaman MD   vitamin D (ERGOCALCIFEROL) 1.25 MG (09922 UT) CAPS capsule  8/28/20   Historical Provider, MD   hydroxychloroquine (PLAQUENIL) 200 MG tablet Take 200 mg by mouth daily  7/4/20   Historical Provider, MD   Alendronate Sodium (FOSAMAX PO) Take by mouth once a week     Historical Provider, MD   albuterol sulfate HFA (PROAIR HFA) 108 (90 Base) MCG/ACT inhaler Inhale 2 puffs into the lungs every 6 hours as needed for Wheezing 11/9/18   Hoa Allen MD       Allergies:  Penicillins    Social History:  The patient currently lives with . TOBACCO:   reports that she has never smoked. She has never used smokeless tobacco.  ETOH:   reports current alcohol use. Family History:   Positive as follows:        Problem Relation Age of Onset    Diabetes Mother     Cancer Father         pancreas       REVIEW OF SYSTEMS:     Constitutional: Negative for fever, chills, + poor PO intake   HENT: Negative for sore throat   Eyes: Negative for redness   Respiratory: Negative  for dyspnea,+ productive cough   Cardiovascular: Negative for chest pain   Gastrointestinal: Negative for vomiting, abdominal pain, + diarrhea   Genitourinary: Negative for hematuria   Musculoskeletal: Negative for arthralgias   Skin: Negative for rash   Neurological: Negative for syncope   Hematological: Negative for adenopathy   Psychiatric/Behavorial: Negative for anxiety    PHYSICAL EXAM:    /80   Pulse 103   Temp 97.8 °F (36.6 °C) (Oral)   Resp 24   Ht 4' 11\" (1.499 m)   Wt 128 lb 6.4 oz (58.2 kg)   SpO2 91%   BMI 25.93 kg/m²   Gen: No distress. Alert. Elderly  female, resting comfortably in bed  Eyes: No sclera icterus. No conjunctival injection. Neck: Trachea midline. Resp: No accessory muscle use. No crackles. No wheezes. No rhonchi. On 5L NC  CV: Regular rate. Regular rhythm. No murmur. No rub. No edema. Peripheral Pulses: +2 palpable, equal bilaterally   GI: Soft, Non-tender. Non-distended. Normal bowel sounds. Skin: Warm and dry. No rash on exposed extremities. Neuro: Awake.  Grossly non-focal, moves all extremities, follows commands, no dysarthria    Psych: Oriented x 3. No anxiety or agitation. CBC:   Recent Labs     04/25/21 2113   WBC 6.1   HGB 14.8   HCT 44.2   MCV 91.0        BMP:   Recent Labs     04/25/21 2113 04/26/21  0548    133*   K 4.6 3.5    101   CO2 20* 17*   BUN 19 10   CREATININE 0.7 <0.5*     LIVER PROFILE:   Recent Labs     04/25/21 2113 04/26/21  0548   * 112*   ALT 51* 55*   LIPASE 88.0*  --    BILITOT 0.5 0.4   ALKPHOS 119 102     UA:  Recent Labs     04/26/21  0058   COLORU Yellow   PHUR 6.0   WBCUA 10-20*   RBCUA 3-4   MUCUS 1+*   BACTERIA 2+*   CLARITYU SL CLOUDY*   SPECGRAV 1.020   LEUKOCYTESUR SMALL*   UROBILINOGEN 0.2   BILIRUBINUR Negative   BLOODU MODERATE*   GLUCOSEU Negative   AMORPHOUS 2+        CARDIAC ENZYMES  Recent Labs     04/25/21 2113   TROPONINI <0.01       U/A:    Lab Results   Component Value Date    COLORU Yellow 04/26/2021    WBCUA 10-20 04/26/2021    RBCUA 3-4 04/26/2021    MUCUS 1+ 04/26/2021    BACTERIA 2+ 04/26/2021    CLARITYU SL CLOUDY 04/26/2021    SPECGRAV 1.020 04/26/2021    LEUKOCYTESUR SMALL 04/26/2021    BLOODU MODERATE 04/26/2021    GLUCOSEU Negative 04/26/2021    AMORPHOUS 2+ 04/26/2021     CULTURES    Blood cx x2: pending    Urine cx: pending    SARS-COV-2 - Rapid: DETECTED    EKG:  I have reviewed the EKG with the following interpretation:   Sinus tachycardia with Premature atrial complexes rate of 143  Otherwise normal ECG  Confirmed by SUSHMA RIVERA, ONEYDA ([id number]) on 4/26/2021 5:21:21 AM    RADIOLOGY    CT ABDOMEN PELVIS W IV CONTRAST Additional Contrast? None   Final Result   1. Motion limited study. 2. Cholelithiasis and probable choledocholithiasis without evidence for acute   cholecystitis. 3. Nonobstructing left renal calculus. CT CHEST PULMONARY EMBOLISM W CONTRAST   Final Result   1. Motion limited study with no definite scan evidence for pulmonary embolus.    2. Bilateral airspace opacities probably represent a combination of   atelectasis and pneumonia. XR CHEST PORTABLE   Final Result   Nonspecific perihilar opacities. Pulmonary edema and multifocal pneumonia   are in the differential.  Follow-up to resolution recommended.            ASSESSMENT/PLAN:    COVID 19 PNA  Acute Hypoxic Respiratory Failure  - 85% on RA upon arrival to ED  - CT chest with bilateral airspace opacities, no PE  - PCT: 0.18; had 1/2 Pfizer Vaccine on 3/31  - Admitted to Med Surg, droplet + precautions  - supp O2, wean as tolerated, daily LFTs  - cover with Azithro and Rocephin D#2  - start Decadro D#2, Remdesivir D#2, plan for CCP, PRN Robitussin  - Pulm consult  - currently on 5L NC     Diarrhea - likely 2/2 COVID  - C diff precautions  - check stool studies  - low fiber diet, IVF  - GI consulted    NAGMA  - CO2 17  - likely 2/2 diarrhea  - add Na Bicarb to IVF  - repeat BMP tomorrow    Hyperglycemia  - likely 2/2 steroids  - check Hgb A1c   - add low dose SSI     Transaminitis  - AST: 111, ALT 51  - last labs normal in 2/2021, previously elevated in 11/2018  - monitor with Remdesivir use    HTN  - controlled  - cont Toprol XL    DVT Prophylaxis: Lovenox  Diet: DIET GENERAL; Low Fiber  Code Status: Full Code    Kathleen Villareal PA-C 12:57 PM 4/26/2021

## 2021-04-26 NOTE — TELEPHONE ENCOUNTER
Pt  came in for appt, did r/s appt, he didn't want to wait.  Wanted [facility] to know that Kathrin Leyden is in the hospital.

## 2021-04-26 NOTE — PROGRESS NOTES
Patient arrived to unit by stretcher from ER. Lives at home with spouse. States she has had diarrhea for 10 days and was told by PCP she had allergies. Diarrhea has just gotten worse and 4/25 evening she became SOB and extremely fatigued. On arrival 85% on room air. 2L nasal cannula initially but titrated up to 5L to sat of 93%. #20 in R AC. Normal saline at 75. Alert and oriented x 4. Standby assist to bedside commode due general weakness. No skin issues. Able to demonstrate recliner position. HR tachy at baseline. 1st dose of Remdesivir hanging. No pain reported. Vitals otherwise stable.

## 2021-04-26 NOTE — ED PROVIDER NOTES
[address] ED  EMERGENCY DEPARTMENT ENCOUNTER        Pt Name: Jewel Gray  MRN: [id number]  Armstoñitogfmariano 1955  Date of evaluation: 4/25/2021  Provider: ADDY Barboza  PCP: ARABELLA Curiel - CNP     I have seen and evaluated this patient with my supervising physician Jigar Holguin MD.    [facility]       Chief Complaint   Patient presents with    Diarrhea     pt states diarrhea for 10 days was seen by PCP told she has allergies. HISTORY OF PRESENT ILLNESS   (Location, Timing/Onset, Context/Setting, Quality, Duration, Modifying Factors, Severity, Associated Signs and Symptoms)  Note limiting factors. Jewel Gray is a 61 y.o. female presents the emergency room with a chief complaint of diarrhea. Patient reports over the last 10 days she has had watery diarrhea. Initially was having approximately 8-10 bowel was per day, this is decreased to 3 to 4/day. No abdominal pain, nausea, vomiting, black or bloody stool. Has had a nonproductive cough over the last 2 to 3 days. Denies fever, chest pain, shortness of breath, hemoptysis, calf swelling or pain, dysuria, urinary frequency. She got her first [address] on March 31, she was scheduled to get her second dose earlier this week but skipped the appointment because of her diarrhea. Nursing Notes were all reviewed and agreed with or any disagreements were addressed in the HPI. REVIEW OF SYSTEMS    (2-9 systems for level 4, 10 or more for level 5)     Review of Systems   Constitutional: Negative for fever. HENT: Negative for sore throat. Eyes: Negative for pain and visual disturbance. Respiratory: Positive for cough. Negative for shortness of breath. Cardiovascular: Negative for chest pain. Gastrointestinal: Positive for diarrhea. Negative for abdominal pain, nausea and vomiting. Genitourinary: Negative for dysuria and frequency.    Musculoskeletal: Negative for back pain and neck pain.   Skin: Negative for rash. Neurological: Negative for dizziness, weakness, numbness and headaches. Psychiatric/Behavioral: Negative for confusion. Positives and Pertinent negatives as per HPI. Except as noted above in the ROS, all other systems were reviewed and negative.        PAST MEDICAL HISTORY     Past Medical History:   Diagnosis Date    Arthritis     Hypertension     Osteoporosis     Pneumonia 2017    PONV (postoperative nausea and vomiting)          SURGICAL HISTORY     Past Surgical History:   Procedure Laterality Date    ARM SURGERY Right 8/12/2020    RIGHT ULNAR NERVE DECOMPRESSION AT THE ELBOW performed by Sarah Vance MD at [facility] rods for scoliosis    CARPAL TUNNEL RELEASE Right 8/12/2020    RIGHT CARPAL TUNNEL RELEASE performed by Sarah Vance MD at [facility] Bilateral     cataracts    INNER EAR SURGERY Right     replacement of eardrum    TONSILLECTOMY           CURRENTMEDICATIONS       Previous Medications    ALBUTEROL SULFATE HFA (PROAIR HFA) 108 (90 BASE) MCG/ACT INHALER    Inhale 2 puffs into the lungs every 6 hours as needed for Wheezing    ALENDRONATE SODIUM (FOSAMAX PO)    Take by mouth once a week     HYDROXYCHLOROQUINE (PLAQUENIL) 200 MG TABLET    Take 200 mg by mouth daily     METOPROLOL SUCCINATE (TOPROL XL) 100 MG EXTENDED RELEASE TABLET    TAKE ONE TABLET BY MOUTH DAILY    VITAMIN D (ERGOCALCIFEROL) 1.25 MG (72759 UT) CAPS CAPSULE             ALLERGIES     Penicillins    FAMILYHISTORY       Family History   Problem Relation Age of Onset    Diabetes Mother     Cancer Father         pancreas          SOCIAL HISTORY       Social History     Tobacco Use    Smoking status: Never Smoker    Smokeless tobacco: Never Used   Substance Use Topics    Alcohol use: Yes     Comment: 0-1 drinks per month    Drug use: No       SCREENINGS             PHYSICAL EXAM    (up to 7 for level 4, 8 or more for level 5)     ED Triage Vitals [04/25/21 1927]   BP Temp Temp Source Pulse Resp SpO2 Height Weight   109/66 97.5 °F (36.4 °C) Oral 97 16 (!) 85 % 4' 11\" (1.499 m) 130 lb (59 kg)       Physical Exam  Vitals signs reviewed. Constitutional:       Appearance: She is ill-appearing. She is not diaphoretic. Comments: Patient on 4 L nasal cannula from triage. This was turned off. Patient dropped to 84% after approximately 5 minutes while on room air, was placed on 3 L nasal cannula with oxygen saturations above 92%. HENT:      Nose: No congestion or rhinorrhea. Mouth/Throat:      Mouth: Mucous membranes are dry. Pharynx: Oropharynx is clear. No oropharyngeal exudate or posterior oropharyngeal erythema. Eyes:      General: No scleral icterus. Conjunctiva/sclera: Conjunctivae normal.   Neck:      Musculoskeletal: Normal range of motion and neck supple. Cardiovascular:      Rate and Rhythm: Normal rate and regular rhythm. Pulses: Normal pulses. Heart sounds: Normal heart sounds. No murmur. No friction rub. No gallop. Pulmonary:      Effort: Pulmonary effort is normal.      Breath sounds: Normal breath sounds. No stridor. No wheezing, rhonchi or rales. Abdominal:      General: There is no distension. Palpations: Abdomen is soft. Tenderness: There is no abdominal tenderness. There is no right CVA tenderness, left CVA tenderness, guarding or rebound. Musculoskeletal: Normal range of motion. General: No swelling or tenderness. Comments: No calf swelling or tenderness. Skin:     General: Skin is warm and dry. Neurological:      General: No focal deficit present. Mental Status: She is alert and oriented to person, place, and time. Sensory: No sensory deficit. Motor: No weakness.    Psychiatric:         Mood and Affect: Mood normal.         Behavior: Behavior normal.         DIAGNOSTIC RESULTS   LABS:    Labs Reviewed   COVID-19, RAPID - Abnormal; Notable for the following components:       Result Value    SARS-CoV-2, NAAT DETECTED (*)     All other components within normal limits    Narrative:     Jerad Jeff  [facility] tel. [phone],  Chemistry results called to and read back by ADDY Bermudez, 04/25/2021  23:53, by MARIAN  Performed at:  [address]   Phone [phone]   CBC WITH AUTO DIFFERENTIAL - Abnormal; Notable for the following components:    Lymphocytes Absolute 0.6 (*)     All other components within normal limits    Narrative:     Performed at:  [address]   Phone [phone]   COMPREHENSIVE METABOLIC PANEL W/ REFLEX TO MG FOR LOW K - Abnormal; Notable for the following components:    CO2 20 (*)     Glucose 143 (*)     Albumin/Globulin Ratio 0.9 (*)     ALT 51 (*)      (*)     All other components within normal limits    Narrative:     Performed at:  [address]   Phone [phone]   LIPASE - Abnormal; Notable for the following components:    Lipase 88.0 (*)     All other components within normal limits    Narrative:     Performed at:  [address]   Phone [phone]   D-DIMER, QUANTITATIVE - Abnormal; Notable for the following components:    D-Dimer, Quant 356 (*)     All other components within normal limits    Narrative:     Performed at:  [address]   Phone [phone]   BLOOD GAS, VENOUS - Abnormal; Notable for the following components:    pCO2, Kvng 38.0 (*)     pO2, Kvng 40.5 (*)     HCO3, Venous 21.3 (*)     Base Excess, Kvng -3.6 (*)     All other components within normal limits    Narrative:     Performed at:  [facility]  [facility] [address],  [place], [facility]   Phone [phone]   PROCALCITONIN - Abnormal; Notable for the following components:    Procalcitonin 0.18 (*)     All other components within normal limits    Narrative:     Performed at:  [facility],  [place], [facility]   Phone [phone]   CULTURE, BLOOD 1   CULTURE, BLOOD 2   C DIFF TOXIN/ANTIGEN   GASTROINTESTINAL PANEL, MOLECULAR   TROPONIN    Narrative:     Performed at:  [place] [address],  [place], [facility]   Phone [phone]   LACTATE, SEPSIS    Narrative:     Performed at:  [place] [address],  [place], [facility]   Phone [phone]   URINE RT REFLEX TO CULTURE    Narrative:     Performed at:  [place] [address],  [place], [facility]   Phone [phone]   LACTATE, SEPSIS   ANTIBODY SCREEN    Narrative:     Performed at:  [place] [address],  [place], [facility]   Phone [phone]   ABO/RH    Narrative:     Performed at:  [place] [address],  [place], [facility]   Phone [phone]   TYPE AND SCREEN       All other labs were within normal range or not returned as of this dictation. EKG: All EKG's are interpreted by the Emergency Department Physician in the absence of a cardiologist.  Please see their note for interpretation of EKG. RADIOLOGY:   Non-plain film images such as CT, Ultrasound and MRI are read by the radiologist. Plain radiographic images are visualized and preliminarily interpreted by the ED Provider with the below findings:        Interpretation per the Radiologist below, if available at the time of this note:    CT ABDOMEN PELVIS W IV CONTRAST Additional Contrast? None   Final Result   1. Motion limited study.    2. Cholelithiasis and probable choledocholithiasis without evidence for acute   cholecystitis. 3. Nonobstructing left renal calculus. CT CHEST PULMONARY EMBOLISM W CONTRAST   Final Result   1. Motion limited study with no definite scan evidence for pulmonary embolus. 2. Bilateral airspace opacities probably represent a combination of   atelectasis and pneumonia. XR CHEST PORTABLE   Final Result   Nonspecific perihilar opacities. Pulmonary edema and multifocal pneumonia   are in the differential.  Follow-up to resolution recommended. Ct Abdomen Pelvis W Iv Contrast Additional Contrast? None    Result Date: 4/25/2021  EXAMINATION: CT OF THE ABDOMEN AND PELVIS WITH CONTRAST 4/25/2021 11:09 pm TECHNIQUE: CT of the abdomen and pelvis was performed with the administration of intravenous contrast. Multiplanar reformatted images are provided for review. Dose modulation, iterative reconstruction, and/or weight based adjustment of the mA/kV was utilized to reduce the radiation dose to as low as reasonably achievable. COMPARISON: None. HISTORY: ORDERING SYSTEM PROVIDED HISTORY: Diarrhea TECHNOLOGIST PROVIDED HISTORY: Reason for exam:->Diarrhea Additional Contrast?->None Reason for Exam: diarrhea Acuity: Acute Type of Exam: Ongoing Additional signs and symptoms: diarrhea FINDINGS: Lower Chest: Chest findings have been reported separately. Organs: Evaluation is limited by motion artifact. There is fatty infiltration of the liver. Cholelithiasis is present without evidence for acute cholecystitis. Choledocholithiasis is also suspected. The spleen, pancreas, adrenal glands and right kidney are unremarkable. A 1 cm calculus is noted in the lower pole of the left kidney without hydronephrosis. GI/Bowel: Small bowel caliber is normal.  The appendix is normal.  The colon is unremarkable. Pelvis: The uterus and bladder are grossly negative. Peritoneum/Retroperitoneum: No adenopathy, mesenteric stranding or free fluid. Aortic caliber is normal. Bones/Soft Tissues: No acute findings. Rotatory dextroscoliosis deformity is noted with spinal fixation rods in place. 1. Motion limited study. 2. Cholelithiasis and probable choledocholithiasis without evidence for acute cholecystitis. 3. Nonobstructing left renal calculus. Xr Chest Portable    Result Date: 4/25/2021  EXAMINATION: ONE XRAY VIEW OF THE CHEST 4/25/2021 9:08 pm COMPARISON: Chest x-ray 11/07/2018. HISTORY: ORDERING SYSTEM PROVIDED HISTORY: Hypoxia TECHNOLOGIST PROVIDED HISTORY: Reason for exam:->Hypoxia Reason for Exam: Hypoxia Acuity: Acute Type of Exam: Initial FINDINGS: Cardiomediastinal silhouette is partially obscured by elevated left hemidiaphragm. Perihilar opacities noted. No pleural effusion identified on this projection. Scoliosis and paraspinal rods again visualized. Nonspecific perihilar opacities. Pulmonary edema and multifocal pneumonia are in the differential.  Follow-up to resolution recommended. Ct Chest Pulmonary Embolism W Contrast    Result Date: 4/25/2021  EXAMINATION: CTA OF THE CHEST 4/25/2021 11:08 pm TECHNIQUE: CTA of the chest was performed after the administration of intravenous contrast.  Multiplanar reformatted images are provided for review. MIP images are provided for review. Dose modulation, iterative reconstruction, and/or weight based adjustment of the mA/kV was utilized to reduce the radiation dose to as low as reasonably achievable. COMPARISON: 11/07/2018 HISTORY: ORDERING SYSTEM PROVIDED HISTORY: r/o Pulmonary Embolism TECHNOLOGIST PROVIDED HISTORY: Reason for exam:->r/o Pulmonary Embolism Decision Support Exception->Emergency Medical Condition (MA) Reason for Exam: elevated D-dimer Acuity: Acute Type of Exam: Initial Additional signs and symptoms: elevated D-dimer, r/o PE FINDINGS: Pulmonary Arteries: Evaluation is compromised by motion artifact.   No definite evidence of intraluminal filling defect to suggest pulmonary embolism. Main pulmonary artery is normal in caliber. Mediastinum: Mediastinal and bilateral hilar lymph nodes are probably reactive. The heart and pericardium demonstrate no acute abnormality. There is no acute abnormality of the thoracic aorta. Lungs/pleura: There is no pneumothorax or pleural effusion. There are patchy airspace opacities throughout both lungs. Elevation of the left hemidiaphragm is again noted. Upper Abdomen: There is fatty infiltration of the liver. The visualized upper abdomen is otherwise unremarkable. Soft Tissues/Bones: No acute bone or soft tissue abnormality. Spinal fixation rods are again seen. 1. Motion limited study with no definite scan evidence for pulmonary embolus. 2. Bilateral airspace opacities probably represent a combination of atelectasis and pneumonia. PROCEDURES   Unless otherwise noted below, none     Procedures    CRITICAL CARE TIME   Due to the immediate potential for life-threatening deterioration due to hypoxia, COVID-19 pneumonia, I spent 45 minutes providing critical care. Rationale acute risk for cardiovascular and hemodynamic compromise. Critical interventions initiation of IV fluids, initiation and titration of supplemental oxygen, IV Decadron, ordering and review of imaging studies and labs, consultation with admitting hospitalist.  This time is excluding time spent performing procedures.     CONSULTS:  IP CONSULT TO HOSPITALIST      EMERGENCY DEPARTMENT COURSE and DIFFERENTIAL DIAGNOSIS/MDM:   Vitals:    Vitals:    04/25/21 2258 04/25/21 2332 04/25/21 2353 04/26/21 0002   BP: 91/65 (!) 150/59  (!) 148/76   Pulse: 134 140  148   Resp: 28 30 20 26   Temp:       TempSrc:       SpO2: 96% 95%  91%   Weight:       Height:           Patient was given the following medications:  Medications   lactated ringers infusion 1,000 mL (1,000 mLs Intravenous New Bag 4/26/21 0022)   cefTRIAXone (ROCEPHIN) 1000 mg IVPB in 50 mL D5W minibag (has no Prescriptions    No medications on file       DISCONTINUED MEDICATIONS:  Discontinued Medications    No medications on file              (Please note that portions of this note were completed with a voice recognition program.  Efforts were made to edit the dictations but occasionally words are mis-transcribed.)    ADDY Byrne (electronically signed)         ADDY Byrne  04/26/21 8444

## 2021-04-26 NOTE — CONSULTS
Pharmacy to initiate remdesivir protocol per Dr Thuy Edwards. Patient is Covid + and on supplemental O2. BUN/SCR = 19/0.7    Est CrCl (IBW) = 58.32 mL/min  Baseline ALT/AST = 51/111  WBC = 6.1    Start Remdesivir 200 mg x 1 dose today, then 200 mg daily x 4 doses.   Sylvie Clinton [facility]

## 2021-04-26 NOTE — ED PROVIDER NOTES
ED Attending Attestation Note    This patient was seen by the advance practice provider. I have seen and examined the patient. I agree with the workup, evaluation, management, and diagnosis. The care plan has been discussed. My assessment reveals 61 y.o. female presenting with diarrhea x 10 days. Also complaining of cough. Rec'd first dose COVID 19 vaccine 3/31/2021. COVID 19 positive in ED. Requiring nasal canula to maintain adequate oxygenation. EKG interpreted by myself. Rate: 143  Rhythm: sinus tachycardia with PACs  Axis: normal  Intervals: within normal limits  ST Segments: no acute abnormality  T waves: no acute abnormality  Comparison: Compared to 8/6/20, rate faster by 42 bpm  Impression: sinus tachycardia with PACs otherwise normal       For further details of the patient's emergency department visit, please see the advanced practice provider's documentation. Christina Patterson MD     This report has been produced using speech recognition software and may contain errors related to that system including errors in grammar, punctuation, and spelling, as well as words and phrases that may be inappropriate. If there are any questions or concerns please feel free to contact the dictating provider for clarification.        Christina Patterson MD  04/26/21 5303

## 2021-04-26 NOTE — PROGRESS NOTES
AM assessment done at this time. Alert and oriented. Needs are met. Pain denied. VS; refer to flow sheet. Medications given; refer to flow sheet. Pt tolerating 5 L high flow nasal cannula at this time; 91 % SPO2. In bed, call light within reach.

## 2021-04-26 NOTE — PROGRESS NOTES
Blood consent signed and placed on chart. Needs are met. Pain denied at this time. In bed, call light within reach.

## 2021-04-26 NOTE — CARE COORDINATION
Case Management Assessment  Initial Evaluation      Patient Name: Randall Del Angel  YOB: 1955  Diagnosis: Acute respiratory failure due to COVID-19 [facility]) [U07.1, J96.00]  Date / Time: 4/25/2021  8:28 PM    Admission status/Date:inpt  Chart Reviewed: Yes      Patient Interviewed: Yes   Family Interviewed:  No      Hospitalization in the last 30 days:  No      Health Care Decision Maker :   Primary Decision Maker: BlocherWayne - Spouse - [phone]    (CM - must 1st enter selection under Navigator - emergency contact- [place] Relationship and pick relationship)   Who do you trust or have selected to make healthcare decisions for you      Met with: pt via TC  Interview conducted  (bedside/phone):    Current PCP:   Heike Limon, Tita Burks required for SNF : Y, N          3 night stay required - Y, [employer]  Support Systems/Care Needs: Spouse/Significant Other  Transportation: family    Meal Preparation: family    Housing  Living Arrangements: home with spouse  Steps: 2  Intent for return to present living arrangements: Yes  Identified Issues: no    Home Care Information  Active with [address] : No Agency:(Services)  Type of Home Care Services: None  Passport/Waiver : No  :                      Phone Number:    Passport/Waiver Services: no          Durable Medical Equiptment   DME Provider: naye  Equipment:   Walker___Cane___RTS___ BSC___Shower Chair___Hospital Bed___W/C____Other________  02 at ____Liter(s)---wears(frequency)_______ HHN ___ CPAP___ BiPap___   N/A____      Home O2 Use :  No    If No for home O2---if presently on O2 during hospitalization:  Yes  if yes CM to follow for potential DC O2 need  Informed of need for care provider to bring portable home O2 tank on day of discharge for nursing to connect prior to leaving:   Not Indicated  Verbalized agreement/Understanding:   Not Indicated    Community Service Affiliation  Dialysis:  No    · Agency:  · Location:  · Dialysis Schedule:  · Phone:   · Fax: Other Community Services: (ex:PT/OT,Mental Health,Wound Clinic, Cardio/Pul [address])    DISCHARGE PLAN: Explained Case Management role/services. Reviewed chart. Pt C-19+ on 04/25/21. Writer spoke with the pt via TC for initial interview. Pt from home with spouse and plan return. Pt states IPTA. Pt now on 5 liters O2. Following.

## 2021-04-26 NOTE — PROGRESS NOTES
RESPIRATORY THERAPY ASSESSMENT    Name:  Randy Burks Record Number:  [id number]  Age: 61 y.o. Gender: female  : 1955  Today's Date:  2021  Room:  [address] [place] [address]-    Assessment     Is the patient being admitted for a COPD or Asthma exacerbation? No   (If yes the patient will be seen every 4 hours for the first 24 hours and then reassessed)    Patient Admission Diagnosis      Allergies  Allergies   Allergen Reactions    Penicillins Rash       Minimum Predicted Vital Capacity:               Actual Vital Capacity:                    Pulmonary History:no history  Home Oxygen Therapy:  room air  Home Respiratory Therapy:Albuterol   Current Respiratory Therapy:  Albuterol/atrovent q4wa          Respiratory Severity Index(RSI)   Patients with orders for inhalation medications, oxygen, or any therapeutic treatment modality will be placed on Respiratory Protocol. They will be assessed with the first treatment and at least every 72 hours thereafter. The following severity scale will be used to determine frequency of treatment intervention.     Smoking History: No Smoking History = 0    Social History  Social History     Tobacco Use    Smoking status: Never Smoker    Smokeless tobacco: Never Used   Substance Use Topics    Alcohol use: Yes     Comment: 0-1 drinks per month    Drug use: No       Recent Surgical History: None = 0  Past Surgical History  Past Surgical History:   Procedure Laterality Date    ARM SURGERY Right 2020    RIGHT ULNAR NERVE DECOMPRESSION AT THE ELBOW performed by Dontae Freitas MD at [address] [facility] rods for scoliosis    CARPAL TUNNEL RELEASE Right 2020    RIGHT CARPAL TUNNEL RELEASE performed by Dontae Freitas MD at [address] [facility] Bilateral     cataracts    INNER EAR SURGERY Right     replacement of eardrum    TONSILLECTOMY         Level of Consciousness: Alert, Oriented, and Cooperative = 0    Level of Activity: Walking unassisted = 0    Respiratory Pattern: Dyspnea with exertion;Irregular pattern;or RR less than 6 = 2    Breath Sounds: Diminshed bilaterally and/or crackles = 2    Sputum   ,  ,    Cough: Strong, spontaneous, non-productive = 0    Vital Signs   /88   Pulse 124   Temp 99.6 °F (37.6 °C) (Oral)   Resp 28   Ht 4' 11\" (1.499 m)   Wt 128 lb 6.4 oz (58.2 kg)   SpO2 91%   BMI 25.93 kg/m²   SPO2 (COPD values may differ): 88-89% on room air or greater than 92% on FiO2 28- 35% = 2    Peak Flow (asthma only): not applicable = 0    RSI: 5-6 = Q4hr PRN (every four hours as needed) for dyspnea        Plan       Goals: medication delivery and improve oxygenation    Patient/caregiver was educated on the proper method of use for Respiratory Care Devices:  Yes      Level of patient/caregiver understanding able to:   ? Verbalize understanding   ? Demonstrate understanding       ? Teach back        ? Needs reinforcement       ? No available caregiver               ? Other:     Response to education:  Good     Is patient being placed on Home Treatment Regimen? No     Does the patient have everything they need prior to discharge? NA     Comments: chart reviewed and patient assessed    Plan of Care: change albuterol/atrovent q4wa to prn per assessment    Electronically signed by Hiram Yañez RCP on 4/26/2021 at 4:55 AM    Respiratory Protocol Guidelines     1. Assessment and treatment by Respiratory Therapy will be initiated for medication and therapeutic interventions upon initiation of aerosolized medication. 2. Physician will be contacted for respiratory rate (RR) greater than 35 breaths per minute. Therapy will be held for heart rate (HR) greater than 140 beats per minute, pending direction from physician. 3. Bronchodilators will be administered via Metered Dose Inhaler (MDI) with spacer when the following criteria are met:  a.  Alert and cooperative     b. HR < 140 bpm  c. RR < 30 bpm d. Can demonstrate a 2-3 second inspiratory hold  4. Bronchodilators will be administered via Hand Held Nebulizer ORTIZ [facility]) to patients when ANY of the following criteria are met  a. Incognizant or uncooperative          b. Patients treated with HHN at Home        c. Unable to demonstrate proper use of MDI with spacer     d. RR > 30 bpm   5. Bronchodilators will be delivered via Metered Dose Inhaler (MDI), HHN, Aerogen to intubated patients on mechanical ventilation. 6. Inhalation medication orders will be delivered and/or substituted as outlined below. Aerosolized Medications Ordering and Administration Guidelines:    1. All Medications will be ordered by a physician, and their frequency and/or modality will be adjusted as defined by the patients Respiratory Severity Index (RSI) score. 2. If the patient does not have documented COPD, consider discontinuing anticholinergics when RSI is less than 9.  3. If the bronchospasm worsens (increased RSI), then the bronchodilator frequency can be increased to a maximum of every 4 hours. If greater than every 4 hours is required, the physician will be contacted. 4. If the bronchospasm improves, the frequency of the bronchodilator can be decreased, based on the patient's RSI, but not less than home treatment regimen frequency. 5. Bronchodilator(s) will be discontinued if patient has a RSI less than 9 and has received no scheduled or as needed treatment for 72  Hrs. Patients Ordered on a Mucolytic Agent:    1. Must always be administered with a bronchodilator. 2. Discontinue if patient experiences worsened bronchospasm, or secretions have lessened to the point that the patient is able to clear them with a cough. Anti-inflammatory and Combination Medications:    1.  If the patient lacks prior history of lung disease, is not using inhaled anti-inflammatory medication at home, and lacks wheezing by examination or by history for at least 24 hours, contact physician for possible discontinuation.

## 2021-04-26 NOTE — PROGRESS NOTES
Convalescent plasma complete at this time. No adverse reactions noted. Patient feels well. VS; refer to flow sheet. Will continue to monitor.

## 2021-04-26 NOTE — CONSULTS
Gastroenterology Consult Note    Patient:   Nathen Tierney   :    1955   Facility:   [facility]  Referring/PCP: Juaquin Duran, APRN - CNP  Date:     2021  Consultant:   Nikhil Asencio PA-C      Chief Complaint   Patient presents with    Diarrhea     pt states diarrhea for 10 days was seen by PCP told she has allergies. History of Present illness   61year old female with a history of arthritis, HTN, and osteoporosis presented to the ED with diarrhea x 2 weeks. She was found to be COVID-19 positive in the ED, so patient encounter was conducted over the phone. She passes 3-4 runny brown BMs daily. She developed early satiety one week ago. She has lost 3 lbs over the past three days. She stopped taking hydroxychloroquine three days ago due to early satiety. She denies nausea, vomiting, heartburn, cramping, bloating, abdominal pain, rectal bleeding, and melena. Her last colonoscopy in 2017 showed benign polyp. GI was consulted for evaluation of diarrhea. CT abdomen pelvis with IV contrast showed cholelithiasis and probable choledocholithiasis without evidence for acute cholecystitis.      Past Medical History:   Diagnosis Date    Arthritis     COVID-19 2021    Hypertension     Osteoporosis     Pneumonia 2017    PONV (postoperative nausea and vomiting)      Past Surgical History:   Procedure Laterality Date    ARM SURGERY Right 2020    RIGHT ULNAR NERVE DECOMPRESSION AT THE ELBOW performed by Iva Lopez MD at [address] [facility] rods for scoliosis    CARPAL TUNNEL RELEASE Right 2020    RIGHT CARPAL TUNNEL RELEASE performed by Iva Lopez MD at [address] [facility] Bilateral     cataracts    INNER EAR SURGERY Right     replacement of eardrum    TONSILLECTOMY         Social:   Social History     Tobacco Use    Smoking status: Never Smoker    Smokeless tobacco: Never Used   Substance Use Topics    Alcohol use: Yes     Comment: 0-1 drinks per month     Family:   Family History   Problem Relation Age of Onset    Diabetes Mother     Cancer Father         pancreas     No current facility-administered medications on file prior to encounter. Current Outpatient Medications on File Prior to Encounter   Medication Sig Dispense Refill    metoprolol succinate (TOPROL XL) 100 MG extended release tablet TAKE ONE TABLET BY MOUTH DAILY 30 tablet 4    vitamin D (ERGOCALCIFEROL) 1.25 MG (71954 UT) CAPS capsule       hydroxychloroquine (PLAQUENIL) 200 MG tablet Take 200 mg by mouth daily       Alendronate Sodium (FOSAMAX PO) Take by mouth once a week       albuterol sulfate HFA (PROAIR HFA) 108 (90 Base) MCG/ACT inhaler Inhale 2 puffs into the lungs every 6 hours as needed for Wheezing 1 Inhaler 0      Infusions:    sodium chloride      sodium chloride      sodium chloride 75 mL/hr at 04/26/21 0515     PRN Medications: sodium chloride, albuterol sulfate HFA, sodium chloride flush, sodium chloride, promethazine **OR** ondansetron, polyethylene glycol, acetaminophen **OR** acetaminophen, guaiFENesin-dextromethorphan, albuterol sulfate HFA **AND** ipratropium **AND** MDI Treatment  Allergies: Allergies   Allergen Reactions    Penicillins Rash       ROS:  Unobtainable as patient is in COVID precautions. Physical Exam   /80   Pulse 103   Temp 97.8 °F (36.6 °C) (Oral)   Resp 24   Ht 4' 11\" (1.499 m)   Wt 128 lb 6.4 oz (58.2 kg)   SpO2 91%   BMI 25.93 kg/m²       Due to the current efforts to prevent transmission of COVID-19 and also the need to preserve PPE for other caregivers, a face-to-face encounter with the patient was not performed. That being said, all relevant records and diagnostic tests were reviewed, including laboratory results and imaging. Please reference any relevant documentation elsewhere. Care will be coordinated with the primary service.       Lab and Imaging Review   Labs:  CBC:   Recent not performed. That being said, all relevant records and diagnostic tests were reviewed, including laboratory results and imaging. Please reference any relevant documentation elsewhere. Care will be coordinated with the primary service. Assessment:   61year old female with a history of arthritis, HTN, and osteoporosis admitted with COVID-19 pneumonia and acute diarrhea. Plan:   Continue supportive care  Monitor LFTs  Monitor and document output  Monitor and replenish electrolytes  Start probiotics  Check stool tests  Continue low fiber diet  Will follow  Will consider outpatient colonoscopy upon d/c    Julian Blackmon PA-C  10:43 AM 4/26/2021                      61year old female with history of HTN, arthritis, osteoporosis, admitted with COVID19 pneumonia and diarrhea. Continue supportive care. Check stool tests. Low fiber diet. Start probiotics. Consider a trial of dicyclomine. May need a diagnostic colonoscopy if symptoms do not improve.     Miguel Winters MD          [phone]  [phone]

## 2021-04-26 NOTE — CONSULTS
Patient is being seen at the request of Dr. Princess Yu for a consultation for  Covid pneumonia    HISTORY OF PRESENT ILLNESS: This is a 62-year-old female with a history of hypertension who presented to the emergency department with a 14-day h/o severe diarrhea, associated with poor p.o. intake and cough. She is s/p first Covid vaccination 3/31/2021, 12 days prior to the onset of symptoms. No similar prior events. She is not feeling better with treatments here so far. Rasheed Sarabia PAST MEDICAL HISTORY:  Past Medical History:   Diagnosis Date    Arthritis     COVID-19 04/25/2021    Hypertension     Osteoporosis     Pneumonia 2017    PONV (postoperative nausea and vomiting)      PAST SURGICAL HISTORY:  Past Surgical History:   Procedure Laterality Date    ARM SURGERY Right 8/12/2020    RIGHT ULNAR NERVE DECOMPRESSION AT THE ELBOW performed by Lalo Barahona MD at [address] [facility] for scoliosis    CARPAL TUNNEL RELEASE Right 8/12/2020    RIGHT CARPAL TUNNEL RELEASE performed by Lalo Barahona MD at [address] [facility] Bilateral     cataracts    INNER EAR SURGERY Right     replacement of eardrum    TONSILLECTOMY         FAMILY HISTORY:  family history includes Cancer in her father; Diabetes in her mother. SOCIAL HISTORY:   reports that she has never smoked.  She has never used smokeless tobacco.    Scheduled Meds:   cefTRIAXone (ROCEPHIN) IV  1,000 mg Intravenous Q24H    azithromycin  500 mg Intravenous Q24H    metoprolol succinate  100 mg Oral Daily    sodium chloride flush  5-40 mL Intravenous 2 times per day    enoxaparin  30 mg Subcutaneous BID    Vitamin D  2,000 Units Oral Daily    [START ON 4/27/2021] remdesivir IVPB  100 mg Intravenous Q24H    insulin lispro  0-6 Units Subcutaneous TID WC    insulin lispro  0-3 Units Subcutaneous Nightly    saccharomyces boulardii  250 mg Oral BID    [START ON 4/27/2021] dexamethasone  6 mg Intravenous Q24H Continuous Infusions:   sodium chloride      sodium chloride      dextrose      IV infusion builder 75 mL/hr at 04/26/21 1357     PRN Meds:  sodium chloride, albuterol sulfate HFA, sodium chloride flush, sodium chloride, promethazine **OR** ondansetron, polyethylene glycol, acetaminophen **OR** acetaminophen, guaiFENesin-dextromethorphan, albuterol sulfate HFA **AND** ipratropium **AND** MDI Treatment, glucose, dextrose, glucagon (rDNA), dextrose    ALLERGIES:  Patient is allergic to penicillins. REVIEW OF SYSTEMS:  Constitutional: Negative for fever  HENT: Negative for sore throat  Eyes: Negative for redness   Respiratory: Negative for dyspnea, + cough  Cardiovascular: Negative for chest pain  Gastrointestinal: Negative for vomiting, + diarrhea   Genitourinary: Negative for hematuria   Musculoskeletal: Negative for arthralgias   Skin: Negative for rash  Neurological: Negative for syncope  Hematological: Negative for adenopathy  Psychiatric/Behavorial: Negative for anxiety    PHYSICAL EXAM:  Blood pressure 132/78, pulse 100, temperature 97.2 °F (36.2 °C), temperature source Oral, resp. rate 22, height 4' 11\" (1.499 m), weight 128 lb 6.4 oz (58.2 kg), SpO2 90 %.' on 5 L  Gen: No distress. Eyes: PERRL. No sclera icterus. No conjunctival injection. ENT: No discharge. Pharynx clear. Neck: Trachea midline. No obvious mass. Resp: No accessory muscle use. ++ crackles. No wheezes. No rhonchi. No dullness on percussion. CV: Regular rate. Regular rhythm. No murmur or rub. No edema. Peripheral pulses are 2+. Capillary refill is less than 3 seconds. GI: Non-tender. Non-distended. No hernia. Skin: Warm and dry. No nodule on exposed extremities. Lymph: No cervical LAD. No supraclavicular LAD. M/S: No cyanosis. No joint deformity. No clubbing. Neuro: Awake. Alert. Moves all four extremities. Psych: Oriented x 3. No anxiety.      LABS:  CBC:   Recent Labs     04/25/21  2113   WBC 6.1   HGB 14.8   HCT 44.2   MCV 91.0        BMP:   Recent Labs     04/25/21 2113 04/26/21  0548    133*   K 4.6 3.5    101   CO2 20* 17*   BUN 19 10   CREATININE 0.7 <0.5*     LIVER PROFILE:   Recent Labs     04/25/21 2113 04/26/21  0548   * 112*   ALT 51* 55*   LIPASE 88.0*  --    BILITOT 0.5 0.4   ALKPHOS 119 102     PT/INR: No results for input(s): PROTIME, INR in the last 72 hours. APTT: No results for input(s): APTT in the last 72 hours. UA:  Recent Labs     04/26/21  0058   COLORU Yellow   PHUR 6.0   WBCUA 10-20*   RBCUA 3-4   MUCUS 1+*   BACTERIA 2+*   CLARITYU SL CLOUDY*   SPECGRAV 1.020   LEUKOCYTESUR SMALL*   UROBILINOGEN 0.2   BILIRUBINUR Negative   BLOODU MODERATE*   GLUCOSEU Negative   AMORPHOUS 2+     No results for input(s): PHART, CMU0PAG, PO2ART in the last 72 hours. Microbiology  4/25/2021 SARS-CoV-2 positive  4/25/2021 C. difficile sent  4/25/2021 blood sent    Imaging:  Chest imaging was reviewed by me and showed CTPA 4/25/2021  Pulmonary Arteries: Evaluation is compromised by motion artifact.  No   definite evidence of intraluminal filling defect to suggest pulmonary   embolism.  Main pulmonary artery is normal in caliber. Mediastinum: Mediastinal and bilateral hilar lymph nodes are probably   reactive.  The heart and pericardium demonstrate no acute abnormality.  There   is no acute abnormality of the thoracic aorta. Lungs/pleura: There is no pneumothorax or pleural effusion.  There are patchy   airspace opacities throughout both lungs.  Elevation of the left   hemidiaphragm is again noted. Upper Abdomen: There is fatty infiltration of the liver.  The visualized   upper abdomen is otherwise unremarkable. Soft Tissues/Bones: No acute bone or soft tissue abnormality.  Spinal   fixation rods are again seen.       Impression:       1. Motion limited study with no definite scan evidence for pulmonary embolus.    2. Bilateral airspace opacities probably represent a combination of   atelectasis and pneumonia. CT abdomen 4/25/2021  Impression:        1. Motion limited study. 2. Cholelithiasis and probable choledocholithiasis without evidence for acute   cholecystitis. 3. Nonobstructing left renal calculus.         ASSESSMENT:  · Acute hypoxemic respiratory failure  · COVID-19 pneumonia  · Abnormal CT: Mediastinal lymphadenopathy and parenchymal changes consistent with COVID-19 pneumonia  · Diarrhea  · Transaminitis    PLAN:  COVID-19 isolation, droplet plus  Supplemental oxygen to maintain SaO2 >92%; wean as tolerated    Remdesevir D#2, LFT monitoring for high risk medication  CCP given 4/26/21  Decadron D#2, 6 mg daily   Inhaled bronchodilators only as needed, MDI preferred  Add tocilizumab if requires vapotherm/airvo - 8 mg/kg X 1   Eventually will need f/u CT imaging of chest  · Prophylaxis: Lovenox 30 twice daily

## 2021-04-26 NOTE — PLAN OF CARE
Problem: Airway Clearance - Ineffective  Goal: Achieve or maintain patent airway  4/26/2021 0944 by Margarette Snellen, RN  Outcome: Ongoing  1/82/4711 1844 by Kenneth Neely RN  Outcome: Ongoing     Problem: Gas Exchange - Impaired  Goal: Absence of hypoxia  4/26/2021 0944 by Margarette Snellen, RN  Outcome: Ongoing  2/79/2914 4579 by Kenneth Neely RN  Outcome: Ongoing  Goal: Promote optimal lung function  4/26/2021 0944 by Margarette Snellen, RN  Outcome: Ongoing  6/00/6020 8546 by Kenneth Neely RN  Outcome: Ongoing     Problem: Breathing Pattern - Ineffective  Goal: Ability to achieve and maintain a regular respiratory rate  4/26/2021 0944 by Margarette Snellen, RN  Outcome: Ongoing  5/40/8580 7527 by Kenneth Neely RN  Outcome: Ongoing     Problem:  Body Temperature -  Risk of, Imbalanced  Goal: Ability to maintain a body temperature within defined limits  4/26/2021 0944 by Margarette Snellen, RN  Outcome: Ongoing  0/98/8423 5389 by Kenneth Neely RN  Outcome: Ongoing  Goal: Will regain or maintain usual level of consciousness  4/26/2021 0944 by Margarette Snellen, RN  Outcome: Ongoing  8/15/9583 1349 by Kenneth Neely RN  Outcome: Ongoing  Goal: Complications related to the disease process, condition or treatment will be avoided or minimized  4/26/2021 0944 by Margarette Snellen, RN  Outcome: Ongoing  2/39/9173 2158 by Kenneth Neely RN  Outcome: Ongoing     Problem: Isolation Precautions - Risk of Spread of Infection  Goal: Prevent transmission of infection  4/26/2021 0944 by Margarette Snellen, RN  Outcome: Ongoing  7/14/6100 1576 by Kenneth Neely RN  Outcome: Ongoing     Problem: Nutrition Deficits  Goal: Optimize nutritional status  4/26/2021 0944 by Margarette Snellen, RN  Outcome: Ongoing  4/55/9899 1060 by Kenneth Neely RN  Outcome: Ongoing     Problem: Risk for Fluid Volume Deficit  Goal: Maintain normal heart rhythm  4/26/2021 0944 by Margarette Snellen, RN  Outcome: Ongoing  9/87/4848 9072 by Kenneth Neely, RN  Outcome: Ongoing  Goal: Maintain absence of muscle cramping  4/26/2021 0944 by Tamica Pritchard RN  Outcome: Ongoing  1/72/6059 0309 by Shruthi Michel RN  Outcome: Ongoing  Goal: Maintain normal serum potassium, sodium, calcium, phosphorus, and pH  4/26/2021 0944 by Tamica Pritchard RN  Outcome: Ongoing  1/78/4198 3480 by Shruthi Michel RN  Outcome: Ongoing     Problem: Loneliness or Risk for Loneliness  Goal: Demonstrate positive use of time alone when socialization is not possible  4/26/2021 0944 by Tamica Pritchard RN  Outcome: Ongoing  3/95/5200 2298 by Shruthi Michel RN  Outcome: Ongoing     Problem: Fatigue  Goal: Verbalize increase energy and improved vitality  4/26/2021 0944 by Tamica Pritchard RN  Outcome: Ongoing  2/65/4011 6502 by Shruthi Michel RN  Outcome: Ongoing     Problem: Patient Education: Go to Patient Education Activity  Goal: Patient/Family Education  4/26/2021 3440 by Tamica Pritchard RN  Outcome: Ongoing  2/41/2132 4782 by Shruthi Michel RN  Outcome: Ongoing     Problem: Falls - Risk of:  Goal: Will remain free from falls  Description: Will remain free from falls  4/26/2021 0944 by Tamica Pritchard RN  Outcome: Ongoing  6/07/1441 4407 by Shruthi Michel RN  Outcome: Ongoing  Goal: Absence of physical injury  Description: Absence of physical injury  4/26/2021 0944 by Tamica Pritchard RN  Outcome: Ongoing  2/04/6421 2089 by Shruthi Michel RN  Outcome: Ongoing

## 2021-04-26 NOTE — PLAN OF CARE
61 yoF p/w diarrhea. She was found to be hypoxic in the ER, no hx lung problems. COVID vaccine X1 end of last month. Was to get 2nd last week but couldn't 2/2 diarrhea. COVID + in the ER today. Acute resp failure w/ hypoxia  Sepsis? COVID PNA. Bacterial component? PCT minimally +, covered for CAP. Diarrhea, 10 day course. GI PCR/c. Diff. COVID related? GI c/s.    Chronic tachycardia  Possible UTI, micro pending

## 2021-04-27 ENCOUNTER — APPOINTMENT (OUTPATIENT)
Dept: GENERAL RADIOLOGY | Age: 66
DRG: 130 | End: 2021-04-27
Payer: MEDICAID

## 2021-04-27 LAB
ALBUMIN SERPL-MCNC: 2.6 G/DL (ref 3.4–5)
ALP BLD-CCNC: 97 U/L (ref 40–129)
ALT SERPL-CCNC: 38 U/L (ref 10–40)
ANION GAP SERPL CALCULATED.3IONS-SCNC: 13 MMOL/L (ref 3–16)
AST SERPL-CCNC: 77 U/L (ref 15–37)
BASE EXCESS ARTERIAL: 0.6 MMOL/L (ref -3–3)
BASE EXCESS ARTERIAL: 2 MMOL/L (ref -3–3)
BASOPHILS ABSOLUTE: 0 K/UL (ref 0–0.2)
BASOPHILS RELATIVE PERCENT: 0.3 %
BILIRUB SERPL-MCNC: 0.3 MG/DL (ref 0–1)
BILIRUBIN DIRECT: <0.2 MG/DL (ref 0–0.3)
BILIRUBIN, INDIRECT: ABNORMAL MG/DL (ref 0–1)
BUN BLDV-MCNC: 15 MG/DL (ref 7–20)
C-REACTIVE PROTEIN: 114.7 MG/L (ref 0–5.1)
CALCIUM SERPL-MCNC: 8.5 MG/DL (ref 8.3–10.6)
CARBOXYHEMOGLOBIN ARTERIAL: 0 % (ref 0–1.5)
CARBOXYHEMOGLOBIN ARTERIAL: 0.3 % (ref 0–1.5)
CHLORIDE BLD-SCNC: 106 MMOL/L (ref 99–110)
CO2: 21 MMOL/L (ref 21–32)
CREAT SERPL-MCNC: <0.5 MG/DL (ref 0.6–1.2)
EOSINOPHILS ABSOLUTE: 0 K/UL (ref 0–0.6)
EOSINOPHILS RELATIVE PERCENT: 0 %
ESTIMATED AVERAGE GLUCOSE: 119.8 MG/DL
GFR AFRICAN AMERICAN: >60
GFR NON-AFRICAN AMERICAN: >60
GLUCOSE BLD-MCNC: 123 MG/DL (ref 70–99)
GLUCOSE BLD-MCNC: 160 MG/DL (ref 70–99)
GLUCOSE BLD-MCNC: 170 MG/DL (ref 70–99)
GLUCOSE BLD-MCNC: 170 MG/DL (ref 70–99)
HBA1C MFR BLD: 5.8 %
HCO3 ARTERIAL: 26.3 MMOL/L (ref 21–29)
HCO3 ARTERIAL: 26.5 MMOL/L (ref 21–29)
HCT VFR BLD CALC: 37.7 % (ref 36–48)
HEMOGLOBIN, ART, EXTENDED: 13 G/DL (ref 12–16)
HEMOGLOBIN, ART, EXTENDED: 14.3 G/DL (ref 12–16)
HEMOGLOBIN: 12.4 G/DL (ref 12–16)
LYMPHOCYTES ABSOLUTE: 0.7 K/UL (ref 1–5.1)
LYMPHOCYTES RELATIVE PERCENT: 9.6 %
MCH RBC QN AUTO: 30.7 PG (ref 26–34)
MCHC RBC AUTO-ENTMCNC: 32.8 G/DL (ref 31–36)
MCV RBC AUTO: 93.7 FL (ref 80–100)
METHEMOGLOBIN ARTERIAL: 0 %
METHEMOGLOBIN ARTERIAL: 0.3 %
MONOCYTES ABSOLUTE: 0.5 K/UL (ref 0–1.3)
MONOCYTES RELATIVE PERCENT: 6.6 %
NEUTROPHILS ABSOLUTE: 6.2 K/UL (ref 1.7–7.7)
NEUTROPHILS RELATIVE PERCENT: 83.5 %
O2 CONTENT ARTERIAL: 17 ML/DL
O2 CONTENT ARTERIAL: 17 ML/DL
O2 SAT, ARTERIAL: 82.6 %
O2 SAT, ARTERIAL: 95.2 %
O2 THERAPY: ABNORMAL
O2 THERAPY: ABNORMAL
PCO2 ARTERIAL: 41 MMHG (ref 35–45)
PCO2 ARTERIAL: 46.3 MMHG (ref 35–45)
PDW BLD-RTO: 14.2 % (ref 12.4–15.4)
PERFORMED ON: ABNORMAL
PH ARTERIAL: 7.37 (ref 7.35–7.45)
PH ARTERIAL: 7.43 (ref 7.35–7.45)
PLATELET # BLD: 261 K/UL (ref 135–450)
PMV BLD AUTO: 7.9 FL (ref 5–10.5)
PO2 ARTERIAL: 48.1 MMHG (ref 75–108)
PO2 ARTERIAL: 74 MMHG (ref 75–108)
POTASSIUM REFLEX MAGNESIUM: 3.6 MMOL/L (ref 3.5–5.1)
RBC # BLD: 4.03 M/UL (ref 4–5.2)
SODIUM BLD-SCNC: 140 MMOL/L (ref 136–145)
TCO2 ARTERIAL: 27.8 MMOL/L
TCO2 ARTERIAL: 27.8 MMOL/L
TOTAL PROTEIN: 6.3 G/DL (ref 6.4–8.2)
WBC # BLD: 7.4 K/UL (ref 4–11)

## 2021-04-27 PROCEDURE — 2500000003 HC RX 250 WO HCPCS: Performed by: INTERNAL MEDICINE

## 2021-04-27 PROCEDURE — 2580000003 HC RX 258: Performed by: INTERNAL MEDICINE

## 2021-04-27 PROCEDURE — 6370000000 HC RX 637 (ALT 250 FOR IP): Performed by: INTERNAL MEDICINE

## 2021-04-27 PROCEDURE — 85025 COMPLETE CBC W/AUTO DIFF WBC: CPT

## 2021-04-27 PROCEDURE — 5A1955Z RESPIRATORY VENTILATION, GREATER THAN 96 CONSECUTIVE HOURS: ICD-10-PCS | Performed by: INTERNAL MEDICINE

## 2021-04-27 PROCEDURE — 99291 CRITICAL CARE FIRST HOUR: CPT | Performed by: INTERNAL MEDICINE

## 2021-04-27 PROCEDURE — 80076 HEPATIC FUNCTION PANEL: CPT

## 2021-04-27 PROCEDURE — 36600 WITHDRAWAL OF ARTERIAL BLOOD: CPT

## 2021-04-27 PROCEDURE — 6360000002 HC RX W HCPCS: Performed by: INTERNAL MEDICINE

## 2021-04-27 PROCEDURE — 87449 NOS EACH ORGANISM AG IA: CPT

## 2021-04-27 PROCEDURE — 51702 INSERT TEMP BLADDER CATH: CPT

## 2021-04-27 PROCEDURE — 2500000003 HC RX 250 WO HCPCS

## 2021-04-27 PROCEDURE — 71045 X-RAY EXAM CHEST 1 VIEW: CPT

## 2021-04-27 PROCEDURE — 2700000000 HC OXYGEN THERAPY PER DAY

## 2021-04-27 PROCEDURE — 87324 CLOSTRIDIUM AG IA: CPT

## 2021-04-27 PROCEDURE — 82803 BLOOD GASES ANY COMBINATION: CPT

## 2021-04-27 PROCEDURE — 94761 N-INVAS EAR/PLS OXIMETRY MLT: CPT

## 2021-04-27 PROCEDURE — 87205 SMEAR GRAM STAIN: CPT

## 2021-04-27 PROCEDURE — 99233 SBSQ HOSP IP/OBS HIGH 50: CPT | Performed by: INTERNAL MEDICINE

## 2021-04-27 PROCEDURE — 80048 BASIC METABOLIC PNL TOTAL CA: CPT

## 2021-04-27 PROCEDURE — 86140 C-REACTIVE PROTEIN: CPT

## 2021-04-27 PROCEDURE — 2000000000 HC ICU R&B

## 2021-04-27 PROCEDURE — 89220 SPUTUM SPECIMEN COLLECTION: CPT

## 2021-04-27 PROCEDURE — 87070 CULTURE OTHR SPECIMN AEROBIC: CPT

## 2021-04-27 PROCEDURE — 94002 VENT MGMT INPAT INIT DAY: CPT

## 2021-04-27 PROCEDURE — 94640 AIRWAY INHALATION TREATMENT: CPT

## 2021-04-27 PROCEDURE — 0BH17EZ INSERTION OF ENDOTRACHEAL AIRWAY INTO TRACHEA, VIA NATURAL OR ARTIFICIAL OPENING: ICD-10-PCS | Performed by: INTERNAL MEDICINE

## 2021-04-27 PROCEDURE — 36415 COLL VENOUS BLD VENIPUNCTURE: CPT

## 2021-04-27 RX ORDER — CHLORHEXIDINE GLUCONATE 0.12 MG/ML
15 RINSE ORAL 2 TIMES DAILY
Status: DISCONTINUED | OUTPATIENT
Start: 2021-04-27 | End: 2021-05-04

## 2021-04-27 RX ORDER — MIDAZOLAM HYDROCHLORIDE 1 MG/ML
2 INJECTION INTRAMUSCULAR; INTRAVENOUS
Status: DISCONTINUED | OUTPATIENT
Start: 2021-04-27 | End: 2021-05-05

## 2021-04-27 RX ORDER — IPRATROPIUM BROMIDE AND ALBUTEROL SULFATE 2.5; .5 MG/3ML; MG/3ML
1 SOLUTION RESPIRATORY (INHALATION) EVERY 4 HOURS
Status: DISCONTINUED | OUTPATIENT
Start: 2021-04-27 | End: 2021-05-04

## 2021-04-27 RX ORDER — IPRATROPIUM BROMIDE AND ALBUTEROL SULFATE 2.5; .5 MG/3ML; MG/3ML
1 SOLUTION RESPIRATORY (INHALATION)
Status: DISCONTINUED | OUTPATIENT
Start: 2021-04-27 | End: 2021-04-27

## 2021-04-27 RX ORDER — PROPOFOL 10 MG/ML
5-50 INJECTION, EMULSION INTRAVENOUS
Status: DISCONTINUED | OUTPATIENT
Start: 2021-04-27 | End: 2021-05-05

## 2021-04-27 RX ORDER — 0.9 % SODIUM CHLORIDE 0.9 %
500 INTRAVENOUS SOLUTION INTRAVENOUS ONCE
Status: COMPLETED | OUTPATIENT
Start: 2021-04-27 | End: 2021-04-27

## 2021-04-27 RX ORDER — SODIUM CHLORIDE 9 MG/ML
INJECTION, SOLUTION INTRAVENOUS CONTINUOUS
Status: ACTIVE | OUTPATIENT
Start: 2021-04-27 | End: 2021-04-28

## 2021-04-27 RX ORDER — FENTANYL CITRATE 50 UG/ML
25 INJECTION, SOLUTION INTRAMUSCULAR; INTRAVENOUS
Status: DISCONTINUED | OUTPATIENT
Start: 2021-04-27 | End: 2021-05-05

## 2021-04-27 RX ADMIN — PROPOFOL 10 MCG/KG/MIN: 10 INJECTION, EMULSION INTRAVENOUS at 00:30

## 2021-04-27 RX ADMIN — Medication 2000 UNITS: at 07:57

## 2021-04-27 RX ADMIN — ENOXAPARIN SODIUM 30 MG: 30 INJECTION SUBCUTANEOUS at 07:57

## 2021-04-27 RX ADMIN — MIDAZOLAM HYDROCHLORIDE 2 MG: 1 INJECTION, SOLUTION INTRAMUSCULAR; INTRAVENOUS at 05:26

## 2021-04-27 RX ADMIN — Medication 10 ML: at 07:58

## 2021-04-27 RX ADMIN — TOCILIZUMAB 400 MG: 20 INJECTION, SOLUTION, CONCENTRATE INTRAVENOUS at 12:21

## 2021-04-27 RX ADMIN — MUPIROCIN: 20 OINTMENT TOPICAL at 20:20

## 2021-04-27 RX ADMIN — INSULIN LISPRO 1 UNITS: 100 INJECTION, SOLUTION INTRAVENOUS; SUBCUTANEOUS at 08:11

## 2021-04-27 RX ADMIN — PROPOFOL 30 MCG/KG/MIN: 10 INJECTION, EMULSION INTRAVENOUS at 12:21

## 2021-04-27 RX ADMIN — Medication 10 ML: at 20:22

## 2021-04-27 RX ADMIN — SODIUM CHLORIDE 500 ML: 9 INJECTION, SOLUTION INTRAVENOUS at 21:15

## 2021-04-27 RX ADMIN — PROPOFOL 25 MCG/KG/MIN: 10 INJECTION, EMULSION INTRAVENOUS at 18:31

## 2021-04-27 RX ADMIN — MIDAZOLAM HYDROCHLORIDE 2 MG: 1 INJECTION, SOLUTION INTRAMUSCULAR; INTRAVENOUS at 18:31

## 2021-04-27 RX ADMIN — MIDAZOLAM HYDROCHLORIDE 2 MG: 1 INJECTION, SOLUTION INTRAMUSCULAR; INTRAVENOUS at 03:36

## 2021-04-27 RX ADMIN — REMDESIVIR 100 MG: 100 INJECTION, POWDER, LYOPHILIZED, FOR SOLUTION INTRAVENOUS at 05:26

## 2021-04-27 RX ADMIN — INSULIN LISPRO 1 UNITS: 100 INJECTION, SOLUTION INTRAVENOUS; SUBCUTANEOUS at 16:42

## 2021-04-27 RX ADMIN — IPRATROPIUM BROMIDE AND ALBUTEROL SULFATE 1 AMPULE: .5; 3 SOLUTION RESPIRATORY (INHALATION) at 12:28

## 2021-04-27 RX ADMIN — SODIUM CHLORIDE: 9 INJECTION, SOLUTION INTRAVENOUS at 21:30

## 2021-04-27 RX ADMIN — IPRATROPIUM BROMIDE AND ALBUTEROL SULFATE 1 AMPULE: 2.5; .5 SOLUTION RESPIRATORY (INHALATION) at 23:42

## 2021-04-27 RX ADMIN — MIDAZOLAM HYDROCHLORIDE 2 MG: 1 INJECTION, SOLUTION INTRAMUSCULAR; INTRAVENOUS at 23:02

## 2021-04-27 RX ADMIN — MUPIROCIN: 20 OINTMENT TOPICAL at 08:39

## 2021-04-27 RX ADMIN — RDII 250 MG CAPSULE 250 MG: at 20:29

## 2021-04-27 RX ADMIN — CEFTRIAXONE SODIUM 1000 MG: 1 INJECTION, POWDER, FOR SOLUTION INTRAMUSCULAR; INTRAVENOUS at 20:29

## 2021-04-27 RX ADMIN — RDII 250 MG CAPSULE 250 MG: at 08:12

## 2021-04-27 RX ADMIN — CHLORHEXIDINE GLUCONATE 0.12% ORAL RINSE 15 ML: 1.2 LIQUID ORAL at 07:57

## 2021-04-27 RX ADMIN — DEXAMETHASONE SODIUM PHOSPHATE 6 MG: 10 INJECTION, SOLUTION INTRAMUSCULAR; INTRAVENOUS at 07:57

## 2021-04-27 RX ADMIN — CHLORHEXIDINE GLUCONATE 0.12% ORAL RINSE 15 ML: 1.2 LIQUID ORAL at 20:20

## 2021-04-27 RX ADMIN — ENOXAPARIN SODIUM 30 MG: 30 INJECTION SUBCUTANEOUS at 20:28

## 2021-04-27 RX ADMIN — INSULIN LISPRO 1 UNITS: 100 INJECTION, SOLUTION INTRAVENOUS; SUBCUTANEOUS at 20:26

## 2021-04-27 RX ADMIN — IPRATROPIUM BROMIDE AND ALBUTEROL SULFATE 1 AMPULE: .5; 3 SOLUTION RESPIRATORY (INHALATION) at 17:02

## 2021-04-27 RX ADMIN — PROPOFOL 35 MCG/KG/MIN: 10 INJECTION, EMULSION INTRAVENOUS at 09:17

## 2021-04-27 RX ADMIN — DEXTROSE MONOHYDRATE 500 MG: 50 INJECTION, SOLUTION INTRAVENOUS at 04:05

## 2021-04-27 RX ADMIN — IPRATROPIUM BROMIDE AND ALBUTEROL SULFATE 1 AMPULE: .5; 3 SOLUTION RESPIRATORY (INHALATION) at 20:04

## 2021-04-27 ASSESSMENT — PAIN SCALES - GENERAL: PAINLEVEL_OUTOF10: 0

## 2021-04-27 ASSESSMENT — PULMONARY FUNCTION TESTS
PIF_VALUE: 35
PIF_VALUE: 22

## 2021-04-27 NOTE — PROGRESS NOTES
Pulmonary & Critical Care Medicine ICU Progress Note    CC: COVID-19 with progressive hypoxemia    Events of Last 24 hours: Transferred to ICU with progressive hypoxemia, intubated, sedated    Invasive Lines: Peripheral    MV: 2021  Vent Mode: AC/VC Rate Set: 24 bmp/Vt Ordered: 300 mL/ /FiO2 : 70 %  Recent Labs     21  0011 21  0422   PHART 7.373 7.429   OJE2INH 46.3* 41.0   PO2ART 48.1* 74.0*     IV:   propofol 30 mcg/kg/min (21 0526)    sodium chloride      sodium chloride      dextrose      IV infusion builder 75 mL/hr at 21 0618     Vitals:  Blood pressure (!) 93/47, pulse 75, temperature 98.6 °F (37 °C), temperature source Bladder, resp. rate 24, height 4' 11\" (1.499 m), weight 126 lb 12.8 oz (57.5 kg), SpO2 97 %. on 70%  Temp  Av.8 °F (36.6 °C)  Min: 97.2 °F (36.2 °C)  Max: 98.6 °F (37 °C)    Intake/Output Summary (Last 24 hours) at 2021 0640  Last data filed at 2021 0600  Gross per 24 hour   Intake 2350.45 ml   Output 575 ml   Net 1775.45 ml     EXAM:  General: intubated, ill appearing    ENT: Pharynx with ETT. Resp: No crackles. No wheezing. CV: S1, S2. Trace edema  GI: NT, ND, +BS  Skin: Warm and dry. Neuro: PERRL. Sedated, not following commands.  Patellar reflexes are symmetric    Scheduled Meds:   chlorhexidine  15 mL Mouth/Throat BID    cefTRIAXone (ROCEPHIN) IV  1,000 mg Intravenous Q24H    azithromycin  500 mg Intravenous Q24H    metoprolol succinate  100 mg Oral Daily    sodium chloride flush  5-40 mL Intravenous 2 times per day    enoxaparin  30 mg Subcutaneous BID    Vitamin D  2,000 Units Oral Daily    remdesivir IVPB  100 mg Intravenous Q24H    insulin lispro  0-6 Units Subcutaneous TID WC    insulin lispro  0-3 Units Subcutaneous Nightly    saccharomyces boulardii  250 mg Oral BID    dexamethasone  6 mg Intravenous Q24H     PRN Meds:  midazolam, fentanNYL, sodium chloride, albuterol sulfate HFA, sodium chloride flush, sodium chloride, promethazine **OR** ondansetron, polyethylene glycol, acetaminophen **OR** acetaminophen, guaiFENesin-dextromethorphan, albuterol sulfate HFA **AND** ipratropium **AND** MDI Treatment, glucose, dextrose, glucagon (rDNA), dextrose    Results:  CBC:   Recent Labs     04/25/21 2113 04/27/21  0445   WBC 6.1 7.4   HGB 14.8 12.4   HCT 44.2 37.7   MCV 91.0 93.7    261     BMP:   Recent Labs     04/25/21 2113 04/26/21  0548 04/27/21  0445    133* 140   K 4.6 3.5 3.6    101 106   CO2 20* 17* 21   BUN 19 10 15   CREATININE 0.7 <0.5* <0.5*     LIVER PROFILE:   Recent Labs     04/25/21 2113 04/26/21  0548 04/27/21  0445   * 112* 77*   ALT 51* 55* 38   LIPASE 88.0*  --   --    BILIDIR  --   --  <0.2   BILITOT 0.5 0.4 0.3   ALKPHOS 119 102 97     Microbiology  4/25/2021 SARS-CoV-2 positive  4/25/2021 C. difficile sent  4/25/2021 blood NGTD  4/27/2021 tracheal aspirate sent    Imaging:  Chest imaging was reviewed by me and showed CTPA 4/25/2021  Pulmonary Arteries: Evaluation is compromised by motion artifact.  No   definite evidence of intraluminal filling defect to suggest pulmonary   embolism.  Main pulmonary artery is normal in caliber. Mediastinum: Mediastinal and bilateral hilar lymph nodes are probably   reactive.  The heart and pericardium demonstrate no acute abnormality.  There   is no acute abnormality of the thoracic aorta. Lungs/pleura: There is no pneumothorax or pleural effusion.  There are patchy   airspace opacities throughout both lungs.  Elevation of the left   hemidiaphragm is again noted. Upper Abdomen: There is fatty infiltration of the liver.  The visualized   upper abdomen is otherwise unremarkable. Soft Tissues/Bones: No acute bone or soft tissue abnormality.  Spinal   fixation rods are again seen.       Impression:       1. Motion limited study with no definite scan evidence for pulmonary embolus.    2. Bilateral airspace opacities probably represent a combination of   atelectasis and pneumonia. CT abdomen 4/25/2021  Impression:        1. Motion limited study. 2. Cholelithiasis and probable choledocholithiasis without evidence for acute   cholecystitis. 3. Nonobstructing left renal calculus. CXR 4/27/2021 ET tube too low, bilateral infiltrates    ASSESSMENT:  · Acute hypoxemic respiratory failure -progressive  · COVID-19 pneumonia  · Abnormal CT: Mediastinal lymphadenopathy and parenchymal changes consistent with COVID-19 pneumonia  · Diarrhea  · Transaminitis - improved    PLAN:  COVID-19 isolation, droplet plus  Mechanical ventilation as per my orders. The ventilator was adjusted by me at the bedside for unstable, life threatening respiratory failure. IV Propofol for sedation, target RASS -2, with daily spontaneous awakening trial.  Fentanyl gtt as needed. Lower TV to 6 cc/kg/IBW  Remdesevir D#3, LFT monitoring for high risk medication  CCP given 4/26/21  Decadron D#3, 6 mg daily   Ceftriaxone and azithromycin D#2/5, low clinical suspicion for superimposed bacterial pneumonia  Inhaled bronchodilators only as needed, MDI preferred  Add tocilizumab - 8 mg/kg X 1    Eventually will need f/u CT imaging of chest  · Prophylaxis: Lovenox 30 twice daily    Total critical care time caring for this patient with life threatening, unstable organ failure, including direct patient contact, management of life support systems, review of data including imaging and labs, discussions with other team members and physicians is 34 minutes so far today, excluding procedures.

## 2021-04-27 NOTE — ACP (ADVANCE CARE PLANNING)
Advance Care Planning   Healthcare Decision Maker:    Primary Decision Maker: Sophia Bennett - [facility] - [phone]    Click here to complete Healthcare Decision Makers including selection of the Healthcare Decision Maker Relationship (ie \"Primary\").

## 2021-04-27 NOTE — PROGRESS NOTES
MD Burris notified of increased O2 demands. SpO2 92% on 15L NC and nonrebreather over top. Orders for vapotherm and STAT ABG obtained. ABG pCO2 only slightly elevated at 48.3 and pO2 slightly decreased at 67. 4.

## 2021-04-27 NOTE — PROGRESS NOTES
Patient reassessed. Pretty well unchanged. Propofol infusing at 35 mcg/kg/min. Bicarb gtt stopped per Dr. Suresh Lazaro.      Electronically signed by Rosalba Godfrey RN on 4/27/2021 at 11:34 AM

## 2021-04-27 NOTE — PROGRESS NOTES
Comprehensive Nutrition Assessment    Type and Reason for Visit:  Initial, Consult(consult for TF ordering and management)    Nutrition Recommendations/Plan:   1. Start TF - Vital High-Protein (\"low calorie, high-protein\" formula name in Epic) with a goal rate of 50 ml/hr x 20 hours. Start with 20 ml/hr and increase by 15 ml every 4 hours, as tolerated by patient, until goal rate can be achieved and maintained. Water flushes, 30 ml every 3 hours or per MD guidance. 2. Monitor TF start, rate, intake, and tolerance + water flushes. 3. Monitor vent status, sedation type/amount (propofol at 35 mcg x 24 hours which = 316 kcals from lipids), and plan of care + check TG results from order placed today. 4. Monitor nutrition-related labs, bowel function (+ diarrhea), and weight trends. Nutrition Assessment:  patient is nutritionally compromised AEB patient reported diarrhea and poor po intake x 10-14 days PTA and she is at risk for further compromise d/t worsening respiratory failure + intubation r/t COVID-19 virus, increased nutrition needs r/t COVID-19 virus, and need for EN as sole source of nutrition while intubated; will start Vital High-Protein with a goal rate of 50 ml/hr x 20 hours + 30 ml water flushes every 3 hours or per MD guidance    Malnutrition Assessment:  Malnutrition Status: At risk for malnutrition     Context:  Acute Illness     Findings of the 6 clinical characteristics of malnutrition:  Energy Intake:  7 - 50% or less of estimated energy requirements for 5 or more days  Weight Loss:  No significant weight loss     Body Fat Loss:  Unable to assess(COVID-19 +)     Muscle Mass Loss:  Unable to assess(COVID-19 +)    Fluid Accumulation:  No significant fluid accumulation     Strength:  Not Performed    Estimated Daily Nutrient Needs:  Energy (kcal):  1140 - 1311 kcals based on 20-23 kcals/kg/CBW;  Weight Used for Energy Requirements:  Current     Protein (g):  68 - 86 g protein based on 1.2-1.5 g/kg/IBW; Weight Used for Protein Requirements:  Ideal        Fluid (ml/day):  1140 - 1311 ml; Method Used for Fluid Requirements:  1 ml/kcal      Nutrition Related Findings:  patient is intubated and sedated on 35 mcg propofol x 24 hours at this time; she responds to voice; patient reported that she had experienced diarrhea and poor po intake x 10-14 days PTA; she received her first COVID vaccine on 3/31/21 but did not receive second vaccine shot; patient last had diarrhea on 4/26/21; abdomen is soft, non-tender, and bowel sounds are active; patient has decadron, low-dose SSI, Florastor, and vitamin D ordered at this time      Wounds:  None       Current Nutrition Therapies:    Current Tube Feeding (TF) Orders:  · Feeding Route: Orogastric  · Formula: Low Calorie, High Protein  · Schedule: Continuous  · Additives/Modulars: (none)  · Water Flushes: 30 ml every 3 hours or per MD guidance  · Current TF & Flush Orders Provides: TF to be started this afternoon  · Goal TF & Flush Orders Provides: Vital High-Protein with a goal rate of 50 ml/hr x 20 hours = 1000 ml TV, 1000 kcals, 88 g protein, and 836 ml free water + 30 ml water flushes every 3 hours or per MD guidance      Anthropometric Measures:  · Height: 4' 11\" (149.9 cm)  · Current Body Weight: 126 lb 12.8 oz (57.5 kg)(obtained on 4/27/21)   · Admission Body Weight: 128 lb 6.4 oz (58.2 kg)(obtained on 4/26/21; actual weight)    · Usual Body Weight: 131 lb 3.2 oz (59.5 kg)(obtained on 7/9/20; actual weight)     · Ideal Body Weight: 95 lbs; % Ideal Body Weight 133.5 %   · BMI: 25.6  · BMI Categories: Overweight (BMI 25.0-29. 9)       Nutrition Diagnosis:   · Inadequate oral intake related to inadequate protein-energy intake, impaired respiratory function, altered GI function, increase demand for energy/nutrients as evidenced by NPO or clear liquid status due to medical condition, intubation, nutrition support - enteral nutrition, GI abnormality, diarrhea      Nutrition Interventions:   Food and/or Nutrient Delivery:  Continue NPO, Start Tube Feeding  Nutrition Education/Counseling:  No recommendation at this time   Coordination of Nutrition Care:  Continue to monitor while inpatient, Interdisciplinary Rounds    Goals:  patient will tolerate Vital High-Protein at goal rate of 50 ml/hr x 20 hours without GI distress, without s/s of aspiration, and without additional lab/fluid disturbances       Nutrition Monitoring and Evaluation:   Behavioral-Environmental Outcomes:  None Identified   Food/Nutrient Intake Outcomes:  Enteral Nutrition Intake/Tolerance  Physical Signs/Symptoms Outcomes:  Biochemical Data, Diarrhea, GI Status, Hemodynamic Status, Weight     Discharge Planning:     Too soon to determine     Electronically signed by Janeen Sotelo RD, LD on 4/27/21 at 3:36 PM EDT    Contact: [phone]

## 2021-04-27 NOTE — PLAN OF CARE
Problem: Airway Clearance - Ineffective  Goal: Achieve or maintain patent airway  4/26/2021 2316 by Renetta Chin RN  Outcome: Ongoing  4/26/2021 2316 by Renetta Chin RN  Outcome: Ongoing  4/26/2021 0944 by Jennifer Ulrich RN  Outcome: Ongoing     Problem: Gas Exchange - Impaired  Goal: Absence of hypoxia  4/26/2021 2316 by Renetta Chin RN  Outcome: Ongoing  4/26/2021 2316 by Renetta Chin RN  Outcome: Ongoing  4/26/2021 0944 by Jennifer Ulrich RN  Outcome: Ongoing  Goal: Promote optimal lung function  4/26/2021 2316 by Renetta Chin RN  Outcome: Ongoing  4/26/2021 2316 by Renetta Chin RN  Outcome: Ongoing  4/26/2021 0944 by Jennifer Ulrich RN  Outcome: Ongoing     Problem: Breathing Pattern - Ineffective  Goal: Ability to achieve and maintain a regular respiratory rate  4/26/2021 2316 by Renetta Chin RN  Outcome: Ongoing  4/26/2021 2316 by Renetta Chin RN  Outcome: Ongoing  4/26/2021 0944 by Jennifer Ulrich RN  Outcome: Ongoing     Problem:  Body Temperature -  Risk of, Imbalanced  Goal: Ability to maintain a body temperature within defined limits  4/26/2021 2316 by Renetta Chin RN  Outcome: Ongoing  4/26/2021 2316 by Renetta Chin RN  Outcome: Ongoing  4/26/2021 0944 by Jennifer Ulrich RN  Outcome: Ongoing  Goal: Will regain or maintain usual level of consciousness  4/26/2021 2316 by Renetta Chin RN  Outcome: Ongoing  4/26/2021 2316 by Renetta Chin RN  Outcome: Ongoing  4/26/2021 0944 by Jennifer Ulrich RN  Outcome: Ongoing  Goal: Complications related to the disease process, condition or treatment will be avoided or minimized  4/26/2021 2316 by Renetta Chin RN  Outcome: Ongoing  4/26/2021 2316 by Renetta Chin RN  Outcome: Ongoing  4/26/2021 0944 by Jennifer Ulrich RN  Outcome: Ongoing     Problem: Isolation Precautions - Risk of Spread of Infection  Goal: Prevent transmission of infection  4/26/2021 2316 by Renetta Chin RN  Outcome: Ongoing  4/26/2021 2316 by Renetta Chin RN  Outcome: Ongoing  4/26/2021 0944 by Nader Rebolledo RN  Outcome: Ongoing     Problem: Nutrition Deficits  Goal: Optimize nutritional status  4/26/2021 2316 by Ginger Zarate RN  Outcome: Ongoing  4/26/2021 2316 by Ginger Zarate RN  Outcome: Ongoing  4/26/2021 0944 by Nader Rebolledo RN  Outcome: Ongoing     Problem: Risk for Fluid Volume Deficit  Goal: Maintain normal heart rhythm  4/26/2021 2316 by Ginger Zarate RN  Outcome: Ongoing  4/26/2021 2316 by Ginger Zarate RN  Outcome: Ongoing  4/26/2021 0944 by Nader Rebolledo RN  Outcome: Ongoing  Goal: Maintain absence of muscle cramping  4/26/2021 2316 by Ginger Zarate RN  Outcome: Ongoing  4/26/2021 2316 by Ginger Zarate RN  Outcome: Ongoing  4/26/2021 0944 by Nader Rebolledo RN  Outcome: Ongoing  Goal: Maintain normal serum potassium, sodium, calcium, phosphorus, and pH  4/26/2021 2316 by Ginger Zarate RN  Outcome: Ongoing  4/26/2021 2316 by Ginger Zarate RN  Outcome: Ongoing  4/26/2021 0944 by Nader Rebolledo RN  Outcome: Ongoing     Problem: Loneliness or Risk for Loneliness  Goal: Demonstrate positive use of time alone when socialization is not possible  4/26/2021 2316 by Ginger Zarate RN  Outcome: Ongoing  4/26/2021 2316 by Ginger Zarate RN  Outcome: Ongoing  4/26/2021 0944 by Nader Rebolledo RN  Outcome: Ongoing     Problem: Fatigue  Goal: Verbalize increase energy and improved vitality  4/26/2021 2316 by Ginger Zarate RN  Outcome: Ongoing  4/26/2021 2316 by Ginger Zarate RN  Outcome: Ongoing  4/26/2021 0944 by Nader Rebolledo RN  Outcome: Ongoing     Problem: Patient Education: Go to Patient Education Activity  Goal: Patient/Family Education  4/26/2021 2316 by Ginger Zarate RN  Outcome: Ongoing  4/26/2021 2316 by Ginger Zarate RN  Outcome: Ongoing  4/26/2021 0944 by Nader Rebolledo RN  Outcome: Ongoing     Problem: Falls - Risk of:  Goal: Will remain free from falls  Description: Will remain free from falls  4/26/2021 2316 by Ginger Zarate RN  Outcome:

## 2021-04-27 NOTE — PLAN OF CARE
Nutrition Problem #1: Inadequate oral intake  Intervention: Food and/or Nutrient Delivery: Continue NPO, Start Tube Feeding  Nutritional Goals: patient will tolerate Vital High-Protein at goal rate of 50 ml/hr x 20 hours without GI distress, without s/s of aspiration, and without additional lab/fluid disturbances

## 2021-04-27 NOTE — SIGNIFICANT EVENT
Dr Milburn Kussmaul at bedside reviewing intubation with the patient, pt gives understanding. Dr Milburn Kussmaul administered 100 mg Ketamine at 0016  RT bagging, sats 87%     40 mg Rocuronium administered by Lizbeth Johnson RN at 0017    Pt intubated with  #8 ETT by Dr Milburn Kussmaul at 23 lip line, good color change and bilat breath sounds noted, and no air bolus noted to ABD    OG placed by Dr Milburn Kussmaul at 60 cm lip line at 0020    sats 99% with RT bagging/ETT at 0221    VO for sedation noted.      [facility]

## 2021-04-27 NOTE — PROGRESS NOTES
4 Eyes Skin Assessment     The patient is being assess for   Reassessment     I agree that 2 RN's have performed a thorough Head to Toe Skin Assessment on the patient. ALL assessment sites listed below have been assessed. Areas assessed by both nurses:   [x]   Head, Face, and Ears   [x]   Shoulders, Back, and Chest, Abdomen  [x]   Arms, Elbows, and Hands   [x]   Coccyx, Sacrum, and Ischium  [x]   Legs, Feet, and Heels        No skin issues noted. **SHARE this note so that the co-signing nurse is able to place an eSignature**    Co-signer eSignature: Electronically signed by Jazzy Moody RN on 4/27/21 at 6:46 PM EDT    Does the Patient have Skin Breakdown?   No          Miguel Prevention initiated:  Yes   Wound Care Orders initiated:  No      [facility] nurse consulted for Pressure Injury (Stage 3,4, Unstageable, DTI, NWPT, Complex wounds)and New or Established Ostomies:  No      Primary Nurse eSignature: Electronically signed by Rosi Wilson RN on 4/27/21 at 6:15 PM EDT

## 2021-04-27 NOTE — PROGRESS NOTES
Shift assessment complete. Patient currently intubated with ETT at 21LL. Vent settings are 24/300/+14/70%. Lungs appear diminished throughout. Tolerating VENT well right now. Propofol infusing at 35 mcg/kg/min. Increased this AM for RASS of +1. She now appears settled and responds to voice. NSR on monitor with PACs noted. VSS. BP is soft. Held Toprol XL this AM until assessing with care team.     Valderrama in place. NO stool noted at this time. Still need stool sample for GI. BUE soft wrist restraints in place due to attempts of pulling at ETT and lines. No signs of injury noted and PROM performed. x3 PIV in place.      Electronically signed by Mervin Tierney RN on 4/27/2021 at 8:21 AM      Vitals:    04/27/21 0801   BP: (!) 101/52   Pulse: 84   Resp: 22   Temp: 100 °F (37.8 °C)   SpO2: 98%

## 2021-04-27 NOTE — PROGRESS NOTES
TF initiated at 20 ml/h per order.      Electronically signed by Emmet Romberg, RN on 4/27/2021 at 4:04 PM

## 2021-04-27 NOTE — PROGRESS NOTES
IM Progress Note    Admit Date:  4/25/2021  1    Interval history:  covid 19 infection with progressive hypoxia  Transferred to ICU yesterday, intubated overnight     Subjective:  Ms. Aaliyah Sharp seen sedated on vent , no distress      Objective:   BP (!) 100/54   Pulse 74   Temp 98.6 °F (37 °C) (Bladder)   Resp 25   Ht 4' 11\" (1.499 m)   Wt 126 lb 12.8 oz (57.5 kg)   SpO2 98%   BMI 25.61 kg/m²       Intake/Output Summary (Last 24 hours) at 4/27/2021 4701  Last data filed at 4/27/2021 0600  Gross per 24 hour   Intake 2350.45 ml   Output 575 ml   Net 1775.45 ml       Physical Exam:        General: middle  Aged female on vent, sedated  Oral ETT and OG noted   Awake, alert and oriented. Appears to be not in any distress  Mucous Membranes:  Pink , anicteric  Neck: No JVD, no carotid bruit, no thyromegaly  Chest: scattered rhonchi and diminished pedro   Cardiovascular:  RRR S1S2 heard, no murmurs or gallops  Abdomen:  Soft, undistended, non tender, no organomegaly, BS present  Extremities: No edema or cyanosis.  Distal pulses well felt  Neurological : sedated    Medications:   Scheduled Medications:    chlorhexidine  15 mL Mouth/Throat BID    cefTRIAXone (ROCEPHIN) IV  1,000 mg Intravenous Q24H    azithromycin  500 mg Intravenous Q24H    metoprolol succinate  100 mg Oral Daily    sodium chloride flush  5-40 mL Intravenous 2 times per day    enoxaparin  30 mg Subcutaneous BID    Vitamin D  2,000 Units Oral Daily    remdesivir IVPB  100 mg Intravenous Q24H    insulin lispro  0-6 Units Subcutaneous TID WC    insulin lispro  0-3 Units Subcutaneous Nightly    saccharomyces boulardii  250 mg Oral BID    dexamethasone  6 mg Intravenous Q24H     I   propofol 30 mcg/kg/min (04/27/21 0526)    sodium chloride      sodium chloride      dextrose      IV infusion builder 75 mL/hr at 04/27/21 0618     midazolam, fentanNYL, sodium chloride, albuterol sulfate HFA, sodium chloride flush, sodium chloride, promethazine **OR** ondansetron, polyethylene glycol, acetaminophen **OR** acetaminophen, guaiFENesin-dextromethorphan, albuterol sulfate HFA **AND** ipratropium **AND** MDI Treatment, glucose, dextrose, glucagon (rDNA), dextrose    Lab Data:  Recent Labs     04/25/21 2113 04/27/21  0445   WBC 6.1 7.4   HGB 14.8 12.4   HCT 44.2 37.7   MCV 91.0 93.7    261     Recent Labs     04/25/21 2113 04/26/21  0548 04/27/21  0445    133* 140   K 4.6 3.5 3.6    101 106   CO2 20* 17* 21   BUN 19 10 15   CREATININE 0.7 <0.5* <0.5*     Recent Labs     04/25/21 2113   TROPONINI <0.01       Coagulation: No results found for: INR, APTT  Cardiac markers:   Lab Results   Component Value Date    TROPONINI <0.01 04/25/2021         Lab Results   Component Value Date    ALT 38 04/27/2021    AST 77 (H) 04/27/2021    ALKPHOS 97 04/27/2021    BILITOT 0.3 04/27/2021       No results found for: INR, PROTIME    Radiology    Blood cx x2: pending     Urine cx: pending     SARS-COV-2 - Rapid: DETECTED     EKG:  I have reviewed the EKG with the following interpretation:   Sinus tachycardia with Premature atrial complexes rate of 143  Otherwise normal ECG       RADIOLOGY     CT ABDOMEN PELVIS W IV CONTRAST Additional Contrast? None   Final Result   1. Motion limited study. 2. Cholelithiasis and probable choledocholithiasis without evidence for acute   cholecystitis. 3. Nonobstructing left renal calculus.           CT CHEST PULMONARY EMBOLISM W CONTRAST   Final Result   1. Motion limited study with no definite scan evidence for pulmonary embolus. 2. Bilateral airspace opacities probably represent a combination of   atelectasis and pneumonia.           XR CHEST PORTABLE   Final Result   Nonspecific perihilar opacities.   Pulmonary edema and multifocal pneumonia   are in the differential.  Follow-up to resolution recommended.              ASSESSMENT/PLAN:     COVID 19 PNA  Acute Hypoxic Respiratory Failure  - 85% on RA upon arrival to ED  -

## 2021-04-27 NOTE — PROGRESS NOTES
Patient reassessed. Pretty well unchanged. Slowly weaning VENT support and tolerating well.      Electronically signed by Adiel Schroeder RN on 4/27/2021 at 3:29 PM

## 2021-04-27 NOTE — PLAN OF CARE
Problem: Airway Clearance - Ineffective  Goal: Achieve or maintain patent airway  Outcome: Ongoing     Problem: Gas Exchange - Impaired  Goal: Absence of hypoxia  Outcome: Ongoing  Goal: Promote optimal lung function  Outcome: Ongoing     Problem: Breathing Pattern - Ineffective  Goal: Ability to achieve and maintain a regular respiratory rate  Outcome: Ongoing     Problem:  Body Temperature -  Risk of, Imbalanced  Goal: Ability to maintain a body temperature within defined limits  Outcome: Ongoing  Goal: Will regain or maintain usual level of consciousness  Outcome: Ongoing  Goal: Complications related to the disease process, condition or treatment will be avoided or minimized  Outcome: Ongoing     Problem: Isolation Precautions - Risk of Spread of Infection  Goal: Prevent transmission of infection  Outcome: Ongoing     Problem: Nutrition Deficits  Goal: Optimize nutritional status  Outcome: Ongoing     Problem: Risk for Fluid Volume Deficit  Goal: Maintain normal heart rhythm  Outcome: Ongoing  Goal: Maintain absence of muscle cramping  Outcome: Ongoing  Goal: Maintain normal serum potassium, sodium, calcium, phosphorus, and pH  Outcome: Ongoing     Problem: Loneliness or Risk for Loneliness  Goal: Demonstrate positive use of time alone when socialization is not possible  Outcome: Ongoing     Problem: Fatigue  Goal: Verbalize increase energy and improved vitality  Outcome: Ongoing     Problem: Patient Education: Go to Patient Education Activity  Goal: Patient/Family Education  Outcome: Ongoing     Problem: Falls - Risk of:  Goal: Will remain free from falls  Description: Will remain free from falls  Outcome: Ongoing  Goal: Absence of physical injury  Description: Absence of physical injury  Outcome: Ongoing     Problem: Skin Integrity:  Goal: Will show no infection signs and symptoms  Description: Will show no infection signs and symptoms  Outcome: Ongoing  Goal: Absence of new skin breakdown  Description: Absence of new skin breakdown  Outcome: Ongoing     Problem: Non-Violent Restraints  Goal: Removal from restraints as soon as assessed to be safe  Outcome: Ongoing  Goal: No harm/injury to patient while restraints in use  Outcome: Ongoing  Goal: Patient's dignity will be maintained  Outcome: Ongoing

## 2021-04-27 NOTE — PROCEDURES
Pt was emergently intubated secondary increasing O2 demand, failed heated high flow. Pt was sedated with 100 mg Ketamine and 40 mg rocuronium. Pt was intubated in a single attempt using a Glide scope with a size 3 MAC in place. The tip of a size 8 ETT was inserted between the cords and then advanced off of the stylette into the air way to 23 cm from the lip. Cuff was inflated and secured. Good CO2 color change and bilateral breath sounds auscultated. Vent settings provided to respiratory staff. OG was passed to 60 cm from the lip. CXR ordered and pending to verify tube placement.

## 2021-04-27 NOTE — PROGRESS NOTES
.Shift report given to STACIE Becker and Mario Lyon RN  at bedside. Patient care handed off in stable condition at this time.    Electronically signed by Vinnie Lester RN on 4/27/2021 at 7:24 PM

## 2021-04-27 NOTE — PROGRESS NOTES
Reassessment completed, see flow sheet. Pt oxygenating well on vent, SpO2 100% on 100% FiO2 and 14 PEEP. Propofol infusing at 10 mcg/kg/min. Valderrama and OG inserted, restraints in place. Pt's  called prior to intubation for update. Asked us to call him back in the morning for an update on how the night goes. No other changes at this time, will continue to monitor.

## 2021-04-27 NOTE — PROGRESS NOTES
04/27/21 0026   Vent Information   Vent Type 980   Vent Mode AC/VC   Vt Ordered 300 mL   Rate Set 24 bmp   Peak Flow 50 L/min   FiO2  100 %   SpO2 90 %   SpO2/FiO2 ratio 90   Sensitivity 3   PEEP/CPAP 14   Humidification Source Heated wire   Humidification Temp 37   Vent Patient Data   Peak Inspiratory Pressure 35 cmH2O   Mean Airway Pressure 20 cmH20   Rate Measured 24 br/min   Vt Exhaled 342 mL   Minute Volume 8.14 Liters   I:E Ratio 1:2.8   Plateau Pressure 33 [id number]   Cough/Sputum   Sputum How Obtained Suctioned;Endotracheal   $Obtained Sample $Induced Sputum   Cough Productive   Sputum Amount Small   Sputum Color Red   Tenacity Thin   Spontaneous Breathing Trial (SBT) RT Doc   Pulse 82   Breath Sounds   Right Upper Lobe Diminished   Right Middle Lobe Diminished   Right Lower Lobe Diminished   Left Upper Lobe Diminished   Left Lower Lobe Diminished   Additional Respiratory  Assessments   Resp 24   Position Semi-Barnett's   Alarm Settings   High Pressure Alarm 45 cmH2O   Low Minute Volume Alarm 3 L/min   Apnea (secs) 20 secs   High Respiratory Rate 40 br/min   Low Exhaled Vt  175 mL   ETT (adult)   Placement Date/Time: 04/27/21 0020   Tube Size: 8 mm  Laryngoscope: GlideScope  Blade Size: 3  Location: Oral  Secured at: 23 cm  Measured From: Lips   Secured at 23 cm   Measured From Lips   ET Placement Left   Secured By Commercial tube haq   Site Condition Dry

## 2021-04-27 NOTE — PROGRESS NOTES
Reassessment completed, see flow sheet. Vent settings down to 70% FiO2 and 14 PEEP, pt tolerating well. PRN versed given for agitation. Propofol up to 25 mcg/kg/min. BP stable with increase in sedation. Pt following commands and nodding head appropriately. ABG drawn from right radial artery x 1 attempt. Modified Henry's test positive. Pressure held to site after procedure for five minutes. No hematoma present. Pulse present after procedure. Pt tolerated well. No other changes at this time, will continue to monitor.

## 2021-04-27 NOTE — PROGRESS NOTES
PROGRESS NOTE  S:66 yrs Patient  admitted on 4/25/2021 with Acute respiratory failure due to COVID-19 (HCC) [U07.1, J96.00] . Today she is intubated and sedated in the ICU. Exam:   Vitals:    04/27/21 1030   BP: 105/62   Pulse: 94   Resp: 27   Temp:    SpO2: 94%     Due to the current efforts to prevent transmission of COVID-19 and also the need to preserve PPE for other caregivers, a face-to-face encounter with the patient was not performed. That being said, all relevant records and diagnostic tests were reviewed, including laboratory results and imaging. Please reference any relevant documentation elsewhere. Care will be coordinated with the primary service. Medications: Reviewed    Labs:  CBC:   Recent Labs     04/25/21 2113 04/27/21  0445   WBC 6.1 7.4   HGB 14.8 12.4   HCT 44.2 37.7   MCV 91.0 93.7    261     BMP:   Recent Labs     04/25/21 2113 04/26/21  0548 04/27/21  0445    133* 140   K 4.6 3.5 3.6    101 106   CO2 20* 17* 21   BUN 19 10 15   CREATININE 0.7 <0.5* <0.5*     LIVER PROFILE:   Recent Labs     04/25/21 2113 04/26/21  0548 04/27/21  0445   * 112* 77*   ALT 51* 55* 38   LIPASE 88.0*  --   --    PROT 8.2 6.3* 6.3*   BILIDIR  --   --  <0.2   BILITOT 0.5 0.4 0.3   ALKPHOS 119 102 97     PT/INR: No results for input(s): INR in the last 72 hours. Invalid input(s): PT      IMAGING:  XR CHEST PORTABLE  Impression   Endotracheal tube 1 cm above the wyatt, consider 2-3 cm retraction.       Multifocal airspace opacities worrisome for multifocal pneumonia or   atelectasis.  Mild progressed within right lung base and right upper lung. Attending Supervising [de-identified] Attestation Statement  The patient is a 61 y.o. female. I have performed a history and physical examination of the patient.  I discussed the case with my physician assistant Penelope Pereira PA-C    I reviewed the patient's Past Medical History, Past Surgical History, Medications, and Allergies. Physical Exam:  Vitals:    04/27/21 1600 04/27/21 1700 04/27/21 1703 04/27/21 1800   BP: (!) 89/51 (!) 96/50  (!) 105/54   Pulse: 75 78 72 87   Resp: 23 25 24 24   Temp: 99.9 °F (37.7 °C)      TempSrc: Bladder      SpO2: 95% 97% 96% 96%   Weight:       Height:           Due to the current efforts to prevent transmission of COVID-19 and also the need to preserve PPE for other caregivers, a face-to-face encounter with the patient was not performed. That being said, all relevant records and diagnostic tests were reviewed, including laboratory results and imaging. Please reference any relevant documentation elsewhere. Care will be coordinated with the primary service. Impression: 61year old female with history of HTN, arthritis, osteoporosis, admitted with COVID19 pneumonia and diarrhea.       Recommendation:  Continue supportive care  Monitor and document output  Await stool test results  Continue probiotics  Will follow  May need diagnostic colonoscopy if symptoms do not improve       Romaine Colunga PA-C  11:01 AM 4/27/2021                      61year old female with history of HTN, arthritis, osteoporosis, admitted with COVID19 pneumonia and diarrhea. Diarrhea is likely related to COVID-19     Continue supportive care. Await stool tests. Continue  probiotics. Consider starting TFs per NG.     Asmita Huber MD          [phone]  [phone]

## 2021-04-27 NOTE — PROGRESS NOTES
04/27/21 0212   Vent Information   Vent Type 980   Vent Mode AC/VC   Vt Ordered 300 mL   Rate Set 24 bmp   Peak Flow 50 L/min   FiO2  90 %   SpO2 100 %   SpO2/FiO2 ratio 111.11   Sensitivity 3   PEEP/CPAP 14   Humidification Source Heated wire   Humidification Temp 37   Vent Patient Data   Peak Inspiratory Pressure 35 cmH2O   Mean Airway Pressure 20 cmH20   Rate Measured 24 br/min   Vt Exhaled 318 mL   Minute Volume 8.38 Liters   I:E Ratio 1:2.8   Plateau Pressure 34 [id number]   Cough/Sputum   Sputum How Obtained Suctioned;Endotracheal   Cough Non-productive   Spontaneous Breathing Trial (SBT) RT Doc   Pulse 89   Breath Sounds   Right Upper Lobe Diminished   Right Middle Lobe Diminished   Right Lower Lobe Diminished   Left Upper Lobe Diminished   Left Lower Lobe Diminished   Additional Respiratory  Assessments   Resp 24   Position Semi-Barnett's   Oral Care Completed? Yes   Oral Care Mouth suctioned   Subglottic Suction Done?  Yes   Alarm Settings   High Pressure Alarm 45 cmH2O   Low Minute Volume Alarm 3 L/min   Apnea (secs) 20 secs   High Respiratory Rate 40 br/min   Low Exhaled Vt  175 mL   ETT (adult)   Placement Date/Time: 04/27/21 0020   Tube Size: 8 mm  Laryngoscope: GlideScope  Blade Size: 3  Location: Oral  Secured at: 23 cm  Measured From: Lips   Secured at 21 cm   Measured From Lips   ET Placement Left   Secured By Commercial tube haq   Site Condition Dry

## 2021-04-28 ENCOUNTER — APPOINTMENT (OUTPATIENT)
Dept: GENERAL RADIOLOGY | Age: 66
DRG: 130 | End: 2021-04-28
Payer: MEDICAID

## 2021-04-28 LAB
ALBUMIN SERPL-MCNC: 2.6 G/DL (ref 3.4–5)
ALP BLD-CCNC: 79 U/L (ref 40–129)
ALT SERPL-CCNC: 25 U/L (ref 10–40)
ANION GAP SERPL CALCULATED.3IONS-SCNC: 10 MMOL/L (ref 3–16)
AST SERPL-CCNC: 45 U/L (ref 15–37)
BASE EXCESS ARTERIAL: 1.1 MMOL/L (ref -3–3)
BASOPHILS ABSOLUTE: 0 K/UL (ref 0–0.2)
BASOPHILS RELATIVE PERCENT: 0.1 %
BILIRUB SERPL-MCNC: <0.2 MG/DL (ref 0–1)
BILIRUBIN DIRECT: <0.2 MG/DL (ref 0–0.3)
BILIRUBIN, INDIRECT: ABNORMAL MG/DL (ref 0–1)
BUN BLDV-MCNC: 21 MG/DL (ref 7–20)
C DIFF TOXIN/ANTIGEN: NORMAL
CALCIUM SERPL-MCNC: 7.6 MG/DL (ref 8.3–10.6)
CARBOXYHEMOGLOBIN ARTERIAL: 0.3 % (ref 0–1.5)
CHLORIDE BLD-SCNC: 106 MMOL/L (ref 99–110)
CO2: 24 MMOL/L (ref 21–32)
CREAT SERPL-MCNC: <0.5 MG/DL (ref 0.6–1.2)
EKG ATRIAL RATE: 178 BPM
EKG DIAGNOSIS: NORMAL
EKG P AXIS: 76 DEGREES
EKG P-R INTERVAL: 130 MS
EKG Q-T INTERVAL: 206 MS
EKG QRS DURATION: 90 MS
EKG QTC CALCULATION (BAZETT): 351 MS
EKG R AXIS: 43 DEGREES
EKG T AXIS: 253 DEGREES
EKG VENTRICULAR RATE: 175 BPM
EOSINOPHILS ABSOLUTE: 0 K/UL (ref 0–0.6)
EOSINOPHILS RELATIVE PERCENT: 0 %
GFR AFRICAN AMERICAN: >60
GFR NON-AFRICAN AMERICAN: >60
GLUCOSE BLD-MCNC: 248 MG/DL (ref 70–99)
GLUCOSE BLD-MCNC: 253 MG/DL (ref 70–99)
GLUCOSE BLD-MCNC: 306 MG/DL (ref 70–99)
GLUCOSE BLD-MCNC: 349 MG/DL (ref 70–99)
GLUCOSE BLD-MCNC: 396 MG/DL (ref 70–99)
HCO3 ARTERIAL: 24.5 MMOL/L (ref 21–29)
HCT VFR BLD CALC: 31.6 % (ref 36–48)
HEMOGLOBIN, ART, EXTENDED: 11.6 G/DL (ref 12–16)
HEMOGLOBIN: 10.5 G/DL (ref 12–16)
LYMPHOCYTES ABSOLUTE: 0.3 K/UL (ref 1–5.1)
LYMPHOCYTES RELATIVE PERCENT: 5.1 %
MAGNESIUM: 1.8 MG/DL (ref 1.8–2.4)
MCH RBC QN AUTO: 30.2 PG (ref 26–34)
MCHC RBC AUTO-ENTMCNC: 33.2 G/DL (ref 31–36)
MCV RBC AUTO: 90.9 FL (ref 80–100)
METHEMOGLOBIN ARTERIAL: 0 %
MONOCYTES ABSOLUTE: 0.4 K/UL (ref 0–1.3)
MONOCYTES RELATIVE PERCENT: 7.3 %
NEUTROPHILS ABSOLUTE: 5.1 K/UL (ref 1.7–7.7)
NEUTROPHILS RELATIVE PERCENT: 87.5 %
O2 CONTENT ARTERIAL: 15 ML/DL
O2 SAT, ARTERIAL: 95.1 %
O2 THERAPY: ABNORMAL
PCO2 ARTERIAL: 34.7 MMHG (ref 35–45)
PDW BLD-RTO: 14.1 % (ref 12.4–15.4)
PERFORMED ON: ABNORMAL
PH ARTERIAL: 7.47 (ref 7.35–7.45)
PLATELET # BLD: 321 K/UL (ref 135–450)
PMV BLD AUTO: 7.7 FL (ref 5–10.5)
PO2 ARTERIAL: 70.2 MMHG (ref 75–108)
POTASSIUM REFLEX MAGNESIUM: 3.3 MMOL/L (ref 3.5–5.1)
RBC # BLD: 3.48 M/UL (ref 4–5.2)
SODIUM BLD-SCNC: 140 MMOL/L (ref 136–145)
TCO2 ARTERIAL: 25.5 MMOL/L
TOTAL PROTEIN: 5.1 G/DL (ref 6.4–8.2)
TRIGL SERPL-MCNC: 55 MG/DL (ref 0–150)
WBC # BLD: 5.8 K/UL (ref 4–11)

## 2021-04-28 PROCEDURE — 87505 NFCT AGENT DETECTION GI: CPT

## 2021-04-28 PROCEDURE — 6370000000 HC RX 637 (ALT 250 FOR IP): Performed by: INTERNAL MEDICINE

## 2021-04-28 PROCEDURE — 85025 COMPLETE CBC W/AUTO DIFF WBC: CPT

## 2021-04-28 PROCEDURE — 93010 ELECTROCARDIOGRAM REPORT: CPT | Performed by: INTERNAL MEDICINE

## 2021-04-28 PROCEDURE — 94761 N-INVAS EAR/PLS OXIMETRY MLT: CPT

## 2021-04-28 PROCEDURE — 2500000003 HC RX 250 WO HCPCS: Performed by: INTERNAL MEDICINE

## 2021-04-28 PROCEDURE — 6360000002 HC RX W HCPCS: Performed by: INTERNAL MEDICINE

## 2021-04-28 PROCEDURE — 87336 ENTAMOEB HIST DISPR AG IA: CPT

## 2021-04-28 PROCEDURE — 2580000003 HC RX 258: Performed by: INTERNAL MEDICINE

## 2021-04-28 PROCEDURE — 99233 SBSQ HOSP IP/OBS HIGH 50: CPT | Performed by: INTERNAL MEDICINE

## 2021-04-28 PROCEDURE — 80076 HEPATIC FUNCTION PANEL: CPT

## 2021-04-28 PROCEDURE — 71045 X-RAY EXAM CHEST 1 VIEW: CPT

## 2021-04-28 PROCEDURE — 80048 BASIC METABOLIC PNL TOTAL CA: CPT

## 2021-04-28 PROCEDURE — 83735 ASSAY OF MAGNESIUM: CPT

## 2021-04-28 PROCEDURE — 83993 ASSAY FOR CALPROTECTIN FECAL: CPT

## 2021-04-28 PROCEDURE — 82803 BLOOD GASES ANY COMBINATION: CPT

## 2021-04-28 PROCEDURE — 2700000000 HC OXYGEN THERAPY PER DAY

## 2021-04-28 PROCEDURE — 93005 ELECTROCARDIOGRAM TRACING: CPT | Performed by: INTERNAL MEDICINE

## 2021-04-28 PROCEDURE — 84478 ASSAY OF TRIGLYCERIDES: CPT

## 2021-04-28 PROCEDURE — 94003 VENT MGMT INPAT SUBQ DAY: CPT

## 2021-04-28 PROCEDURE — 99291 CRITICAL CARE FIRST HOUR: CPT | Performed by: INTERNAL MEDICINE

## 2021-04-28 PROCEDURE — 87328 CRYPTOSPORIDIUM AG IA: CPT

## 2021-04-28 PROCEDURE — 2000000000 HC ICU R&B

## 2021-04-28 PROCEDURE — 94640 AIRWAY INHALATION TREATMENT: CPT

## 2021-04-28 PROCEDURE — 36415 COLL VENOUS BLD VENIPUNCTURE: CPT

## 2021-04-28 RX ORDER — MAGNESIUM SULFATE 1 G/100ML
1000 INJECTION INTRAVENOUS ONCE
Status: COMPLETED | OUTPATIENT
Start: 2021-04-28 | End: 2021-04-28

## 2021-04-28 RX ORDER — METOPROLOL TARTRATE 5 MG/5ML
5 INJECTION INTRAVENOUS ONCE
Status: COMPLETED | OUTPATIENT
Start: 2021-04-28 | End: 2021-04-28

## 2021-04-28 RX ORDER — METOPROLOL TARTRATE 5 MG/5ML
INJECTION INTRAVENOUS
Status: DISPENSED
Start: 2021-04-28 | End: 2021-04-28

## 2021-04-28 RX ADMIN — PROPOFOL 24.91 MCG/KG/MIN: 10 INJECTION, EMULSION INTRAVENOUS at 06:35

## 2021-04-28 RX ADMIN — IPRATROPIUM BROMIDE AND ALBUTEROL SULFATE 1 AMPULE: 2.5; .5 SOLUTION RESPIRATORY (INHALATION) at 11:55

## 2021-04-28 RX ADMIN — REMDESIVIR 100 MG: 100 INJECTION, POWDER, LYOPHILIZED, FOR SOLUTION INTRAVENOUS at 06:39

## 2021-04-28 RX ADMIN — MAGNESIUM SULFATE HEPTAHYDRATE 1000 MG: 1 INJECTION, SOLUTION INTRAVENOUS at 10:01

## 2021-04-28 RX ADMIN — INSULIN LISPRO 9 UNITS: 100 INJECTION, SOLUTION INTRAVENOUS; SUBCUTANEOUS at 21:33

## 2021-04-28 RX ADMIN — IPRATROPIUM BROMIDE AND ALBUTEROL SULFATE 1 AMPULE: 2.5; .5 SOLUTION RESPIRATORY (INHALATION) at 16:22

## 2021-04-28 RX ADMIN — MIDAZOLAM HYDROCHLORIDE 2 MG: 1 INJECTION, SOLUTION INTRAMUSCULAR; INTRAVENOUS at 03:25

## 2021-04-28 RX ADMIN — METOPROLOL TARTRATE 25 MG: 25 TABLET, FILM COATED ORAL at 10:03

## 2021-04-28 RX ADMIN — POTASSIUM BICARBONATE 60 MEQ: 782 TABLET, EFFERVESCENT ORAL at 10:01

## 2021-04-28 RX ADMIN — MUPIROCIN: 20 OINTMENT TOPICAL at 21:19

## 2021-04-28 RX ADMIN — MIDAZOLAM HYDROCHLORIDE 2 MG: 1 INJECTION, SOLUTION INTRAMUSCULAR; INTRAVENOUS at 12:59

## 2021-04-28 RX ADMIN — SODIUM CHLORIDE: 9 INJECTION, SOLUTION INTRAVENOUS at 14:13

## 2021-04-28 RX ADMIN — IPRATROPIUM BROMIDE AND ALBUTEROL SULFATE 1 AMPULE: 2.5; .5 SOLUTION RESPIRATORY (INHALATION) at 20:28

## 2021-04-28 RX ADMIN — INSULIN LISPRO 6 UNITS: 100 INJECTION, SOLUTION INTRAVENOUS; SUBCUTANEOUS at 15:07

## 2021-04-28 RX ADMIN — RDII 250 MG CAPSULE 250 MG: at 07:36

## 2021-04-28 RX ADMIN — METOPROLOL TARTRATE 5 MG: 5 INJECTION INTRAVENOUS at 09:39

## 2021-04-28 RX ADMIN — INSULIN LISPRO 4 UNITS: 100 INJECTION, SOLUTION INTRAVENOUS; SUBCUTANEOUS at 07:40

## 2021-04-28 RX ADMIN — CHLORHEXIDINE GLUCONATE 0.12% ORAL RINSE 15 ML: 1.2 LIQUID ORAL at 07:36

## 2021-04-28 RX ADMIN — IPRATROPIUM BROMIDE AND ALBUTEROL SULFATE 1 AMPULE: 2.5; .5 SOLUTION RESPIRATORY (INHALATION) at 23:08

## 2021-04-28 RX ADMIN — IPRATROPIUM BROMIDE AND ALBUTEROL SULFATE 1 AMPULE: 2.5; .5 SOLUTION RESPIRATORY (INHALATION) at 08:50

## 2021-04-28 RX ADMIN — Medication 2000 UNITS: at 07:35

## 2021-04-28 RX ADMIN — Medication 10 ML: at 21:20

## 2021-04-28 RX ADMIN — METOPROLOL TARTRATE 25 MG: 25 TABLET, FILM COATED ORAL at 21:19

## 2021-04-28 RX ADMIN — IPRATROPIUM BROMIDE AND ALBUTEROL SULFATE 1 AMPULE: 2.5; .5 SOLUTION RESPIRATORY (INHALATION) at 02:29

## 2021-04-28 RX ADMIN — RDII 250 MG CAPSULE 250 MG: at 21:19

## 2021-04-28 RX ADMIN — DEXTROSE MONOHYDRATE 250 MG: 50 INJECTION, SOLUTION INTRAVENOUS at 05:00

## 2021-04-28 RX ADMIN — MIDAZOLAM HYDROCHLORIDE 2 MG: 1 INJECTION, SOLUTION INTRAMUSCULAR; INTRAVENOUS at 16:38

## 2021-04-28 RX ADMIN — MIDAZOLAM HYDROCHLORIDE 2 MG: 1 INJECTION, SOLUTION INTRAMUSCULAR; INTRAVENOUS at 10:01

## 2021-04-28 RX ADMIN — DEXAMETHASONE SODIUM PHOSPHATE 6 MG: 10 INJECTION, SOLUTION INTRAMUSCULAR; INTRAVENOUS at 07:35

## 2021-04-28 RX ADMIN — INSULIN LISPRO 12 UNITS: 100 INJECTION, SOLUTION INTRAVENOUS; SUBCUTANEOUS at 12:05

## 2021-04-28 RX ADMIN — ENOXAPARIN SODIUM 30 MG: 30 INJECTION SUBCUTANEOUS at 07:35

## 2021-04-28 RX ADMIN — PROPOFOL 24.91 MCG/KG/MIN: 10 INJECTION, EMULSION INTRAVENOUS at 21:19

## 2021-04-28 RX ADMIN — PROPOFOL 25 MCG/KG/MIN: 10 INJECTION, EMULSION INTRAVENOUS at 15:37

## 2021-04-28 RX ADMIN — ENOXAPARIN SODIUM 30 MG: 30 INJECTION SUBCUTANEOUS at 21:19

## 2021-04-28 RX ADMIN — CHLORHEXIDINE GLUCONATE 0.12% ORAL RINSE 15 ML: 1.2 LIQUID ORAL at 21:19

## 2021-04-28 RX ADMIN — CEFTRIAXONE SODIUM 1000 MG: 1 INJECTION, POWDER, FOR SOLUTION INTRAMUSCULAR; INTRAVENOUS at 21:19

## 2021-04-28 RX ADMIN — MIDAZOLAM HYDROCHLORIDE 2 MG: 1 INJECTION, SOLUTION INTRAMUSCULAR; INTRAVENOUS at 07:58

## 2021-04-28 RX ADMIN — MUPIROCIN: 20 OINTMENT TOPICAL at 07:37

## 2021-04-28 RX ADMIN — METOPROLOL TARTRATE 25 MG: 25 TABLET, FILM COATED ORAL at 16:37

## 2021-04-28 ASSESSMENT — PULMONARY FUNCTION TESTS
PIF_VALUE: 20
PIF_VALUE: 29
PIF_VALUE: 19
PIF_VALUE: 21

## 2021-04-28 NOTE — PROGRESS NOTES
Assessment complete as per flow sheets. NSR/ST with PAC on cardiac monitor. BP hypotensive, otherwise VSS. Perfectserve to Dr. Teofilo Rodriguez, nocturnist, regarding low BP; orders obtained for 0.9NC bolus and continuous IVF. Patient is intubated and sedated, on mechanical ventilation. Propofol gtt infusing at 20 mcg/kg/min. Vent settings of AC/VC, vT 250 ml, RR 24, PEEP 14, FiO2 50. ETT #8 @ 23 cm lip. OG tube @ 57  cm lip. Tube feedings are ongoing. Patient voids per simpson catheter, site is patent and secured. UO is adequate and clear & yellow in appearance. PIV x3 appears WNL. Dressings are C/D/I. Meds as per MAR. Safety measures in place and will continue to monitor.

## 2021-04-28 NOTE — PLAN OF CARE
Problem: Airway Clearance - Ineffective  Goal: Achieve or maintain patent airway  4/27/2021 2219 by Мария Martinez  Outcome: Ongoing  4/27/2021 1431 by Hazel Aguayo RN  Outcome: Ongoing     Problem: Gas Exchange - Impaired  Goal: Absence of hypoxia  4/27/2021 2219 by Мария Martinez  Outcome: Ongoing  4/27/2021 1431 by Hazel Aguayo RN  Outcome: Ongoing  Goal: Promote optimal lung function  4/27/2021 2219 by Мария Martinez  Outcome: Ongoing  4/27/2021 1431 by Hazel Aguayo RN  Outcome: Ongoing     Problem: Breathing Pattern - Ineffective  Goal: Ability to achieve and maintain a regular respiratory rate  4/27/2021 2219 by Мария Martinez  Outcome: Ongoing  4/27/2021 1431 by Hazel Aguayo RN  Outcome: Ongoing     Problem:  Body Temperature -  Risk of, Imbalanced  Goal: Ability to maintain a body temperature within defined limits  4/27/2021 2219 by Мария Martinez  Outcome: Ongoing  4/27/2021 1431 by Hazel Aguayo RN  Outcome: Ongoing  Goal: Will regain or maintain usual level of consciousness  4/27/2021 2219 by Мария Martinez  Outcome: Ongoing  4/27/2021 1431 by Hazel Aguayo RN  Outcome: Ongoing  Goal: Complications related to the disease process, condition or treatment will be avoided or minimized  4/27/2021 2219 by Мария Martinez  Outcome: Ongoing  4/27/2021 1431 by Hazel Aguayo RN  Outcome: Ongoing     Problem: Isolation Precautions - Risk of Spread of Infection  Goal: Prevent transmission of infection  4/27/2021 2219 by Мария Martinez  Outcome: Ongoing  4/27/2021 1431 by Hazel Aguayo RN  Outcome: Ongoing     Problem: Nutrition Deficits  Goal: Optimize nutritional status  4/27/2021 2219 by Мария Martinez  Outcome: Ongoing  4/27/2021 1431 by Hazel Aguayo RN  Outcome: Ongoing     Problem: Risk for Fluid Volume Deficit  Goal: Maintain normal heart rhythm  4/27/2021 2219 by Мария Martinez  Outcome: Ongoing  4/27/2021 1431 by Hazel Aguayo RN  Outcome: Jaron Crenshaw  Outcome: Ongoing  4/27/2021 1431 by Jennifer Bui RN  Outcome: Ongoing  Goal: No harm/injury to patient while restraints in use  4/27/2021 2219 by Jaron Crenshaw  Outcome: Ongoing  4/27/2021 1431 by Jennifer Bui RN  Outcome: Ongoing  Goal: Patient's dignity will be maintained  4/27/2021 2219 by Jaron Crenshaw  Outcome: Ongoing  4/27/2021 1431 by Jennifer Bui RN  Outcome: Ongoing     Problem: Nutrition  Goal: Optimal nutrition therapy  Outcome: Ongoing  Goal: Understanding of nutritional guidelines  Outcome: Ongoing

## 2021-04-28 NOTE — PROGRESS NOTES
PRN versed given at this time due to patient RASS +1 during bath. VSS and propofol gtt continues @ 20 mcg/kg/min. Will closely monitor.

## 2021-04-28 NOTE — PROGRESS NOTES
Patient's HR briefly ( for about 1 minute) up to 150s, rhythm appears to be A. Fib on telemetry (strip printed and placed in paper chart). BP remains stable. Before EKG could be done, patient flipped back into sinus tach, with -130s. Will closely monitor.

## 2021-04-28 NOTE — PROGRESS NOTES
Patient's emergency contact and spouse, Luca Harper, updated at this time. Discussed patient condition and updated on plan of care. Questions answered at this time.

## 2021-04-28 NOTE — PROGRESS NOTES
PROGRESS NOTE  S:66 yrs Patient  admitted on 4/25/2021 with Acute respiratory failure due to COVID-19 (HCC) [U07.1, J96.00] . Today she remains intubated and sedated in the ICU. She passed a small BM last night. Exam:   Vitals:    04/28/21 1200   BP: 114/66   Pulse: 98   Resp: 27   Temp:    SpO2: 96%     Due to the current efforts to prevent transmission of COVID-19 and also the need to preserve PPE for other caregivers, a face-to-face encounter with the patient was not performed. That being said, all relevant records and diagnostic tests were reviewed, including laboratory results and imaging. Please reference any relevant documentation elsewhere. Care will be coordinated with the primary service. Medications: Reviewed    Labs:  CBC:   Recent Labs     04/25/21 2113 04/27/21 0445 04/28/21  0641   WBC 6.1 7.4 5.8   HGB 14.8 12.4 10.5*   HCT 44.2 37.7 31.6*   MCV 91.0 93.7 90.9    261 321     BMP:   Recent Labs     04/26/21 0548 04/27/21 0445 04/28/21  0641   * 140 140   K 3.5 3.6 3.3*    106 106   CO2 17* 21 24   BUN 10 15 21*   CREATININE <0.5* <0.5* <0.5*     LIVER PROFILE:   Recent Labs     04/25/21 2113 04/26/21 0548 04/27/21 0445 04/28/21  0641   * 112* 77* 45*   ALT 51* 55* 38 25   LIPASE 88.0*  --   --   --    PROT 8.2 6.3* 6.3* 5.1*   BILIDIR  --   --  <0.2 <0.2   BILITOT 0.5 0.4 0.3 <0.2   ALKPHOS 119 102 97 79     PT/INR: No results for input(s): INR in the last 72 hours. Invalid input(s): PT    IMAGING:  XR CHEST PORTABLE  Impression   Persistent moderate bilateral airspace disease unchanged.       Endotracheal tube is in satisfactory position at this time. Attending Supervising [de-identified] Attestation Statement  The patient is a 61 y.o. female. I have performed a history and physical examination of the patient.  I discussed the case with my physician assistant Augustus Drew PA-C    I reviewed the patient's Past Medical History, Past Surgical History, Medications, and Allergies. Physical Exam:  Vitals:    04/28/21 1637 04/28/21 1700 04/28/21 1800 04/28/21 1904   BP: 120/70 92/73 112/66 107/65   Pulse: 119 121 95 100   Resp:  24 23 19   Temp:       TempSrc:       SpO2:  92% 94% 94%   Weight:       Height:           Due to the current efforts to prevent transmission of COVID-19 and also the need to preserve PPE for other caregivers, a face-to-face encounter with the patient was not performed. That being said, all relevant records and diagnostic tests were reviewed, including laboratory results and imaging. Please reference any relevant documentation elsewhere. Care will be coordinated with the primary service. Impression: 61year old female with history of HTN, arthritis, osteoporosis, admitted with COVID19 pneumonia and diarrhea. Diarrhea is likely related to COVID-19. Recommendation:  Continue supportive care  Monitor and document output  Await remaining stool test results  C diff negative  Continue probiotics  Will follow  Consider starting TFs per HAZEL Fowler PA-C  2:52 PM 4/28/2021                      61year old female with history of HTN, arthritis, osteoporosis, admitted with COVID19 pneumonia and diarrhea. Diarrhea is likely related to COVID-19     Continue supportive care. Await stool tests. Continue  probiotics. Consider starting TFs per NG.     Daphne Chiang MD          [phone]  [phone]

## 2021-04-28 NOTE — PROGRESS NOTES
IM Progress Note    Admit Date:  4/25/2021  2    Interval history:  covid 19 infection with progressive hypoxia  Transferred to ICU yesterday, intubated overnight   High heart rates overnight     Subjective:    Ms. Raimundo Harris seen sedated on vent , no distress  No further diarrhea       Objective:   BP (!) 101/58   Pulse 115   Temp 99.6 °F (37.6 °C) (Bladder)   Resp 30   Ht 4' 11\" (1.499 m)   Wt 133 lb 3.2 oz (60.4 kg)   SpO2 93%   BMI 26.90 kg/m²       Intake/Output Summary (Last 24 hours) at 4/28/2021 3982  Last data filed at 4/28/2021 0500  Gross per 24 hour   Intake 2390 ml   Output 860 ml   Net 1530 ml       Physical Exam:        General: middle  Aged female on vent, sedated  Oral ETT and OG noted   Awake, alert and oriented. Appears to be not in any distress  Mucous Membranes:  Pink , anicteric  Neck: No JVD, no carotid bruit, no thyromegaly  Chest: scattered rhonchi and diminished pedro   Cardiovascular:  RRR S1S2 heard, no murmurs or gallops  Abdomen:  Soft, undistended, non tender, no organomegaly, BS present  Extremities: No edema or cyanosis.  Distal pulses well felt  Neurological : sedated    Medications:   Scheduled Medications:    chlorhexidine  15 mL Mouth/Throat BID    mupirocin   Nasal BID    ipratropium-albuterol  1 ampule Inhalation Q4H    cefTRIAXone (ROCEPHIN) IV  1,000 mg Intravenous Q24H    sodium chloride flush  5-40 mL Intravenous 2 times per day    enoxaparin  30 mg Subcutaneous BID    Vitamin D  2,000 Units Oral Daily    remdesivir IVPB  100 mg Intravenous Q24H    insulin lispro  0-6 Units Subcutaneous TID WC    insulin lispro  0-3 Units Subcutaneous Nightly    saccharomyces boulardii  250 mg Oral BID    dexamethasone  6 mg Intravenous Q24H     I   propofol 24.914 mcg/kg/min (04/28/21 1529)    sodium chloride 100 mL/hr at 04/27/21 2130    sodium chloride      sodium chloride      dextrose       midazolam, fentanNYL, sodium chloride, sodium chloride flush, sodium chloride, promethazine **OR** ondansetron, polyethylene glycol, acetaminophen **OR** acetaminophen, guaiFENesin-dextromethorphan, glucose, dextrose, glucagon (rDNA), dextrose    Lab Data:  Recent Labs     04/25/21 2113 04/27/21  0445 04/28/21  0641   WBC 6.1 7.4 5.8   HGB 14.8 12.4 10.5*   HCT 44.2 37.7 31.6*   MCV 91.0 93.7 90.9    261 321     Recent Labs     04/26/21  0548 04/27/21  0445 04/28/21  0641   * 140 140   K 3.5 3.6 3.3*    106 106   CO2 17* 21 24   BUN 10 15 21*   CREATININE <0.5* <0.5* <0.5*     Recent Labs     04/25/21 2113   [address] <0.01       Coagulation: No results found for: INR, APTT  Cardiac markers:   Lab Results   Component Value Date    TROPONINI <0.01 04/25/2021         Lab Results   Component Value Date    ALT 25 04/28/2021    AST 45 (H) 04/28/2021    ALKPHOS 79 04/28/2021    BILITOT <0.2 04/28/2021       No results found for: INR, PROTIME    Radiology    Blood cx x2: pending     Urine cx: mixed coral     SARS-COV-2 - Rapid: DETECTED     EKG:  I have reviewed the EKG with the following interpretation:   Sinus tachycardia with Premature atrial complexes rate of 143  Otherwise normal ECG       RADIOLOGY     CT ABDOMEN PELVIS W IV CONTRAST Additional Contrast? None   Final Result   1. Motion limited study. 2. Cholelithiasis and probable choledocholithiasis without evidence for acute   cholecystitis. 3. Nonobstructing left renal calculus.           CT CHEST PULMONARY EMBOLISM W CONTRAST   Final Result   1. Motion limited study with no definite scan evidence for pulmonary embolus. 2. Bilateral airspace opacities probably represent a combination of   atelectasis and pneumonia.           XR CHEST PORTABLE   Final Result   Nonspecific perihilar opacities.   Pulmonary edema and multifocal pneumonia   are in the differential.  Follow-up to resolution recommended.              ASSESSMENT/PLAN:     COVID 19 PNA  Acute Hypoxic Respiratory Failure  - 85% on RA upon arrival to ED  - CT

## 2021-04-28 NOTE — PROGRESS NOTES
EOS report and transfer of care to [facility]. Patient resting in bed, stable condition at hand off.

## 2021-04-28 NOTE — PROGRESS NOTES
I d/w the patient spouse on the phone this evening for approximately 30 minutes. Multiple questions were answered. His concerns were addressed.

## 2021-04-28 NOTE — PROGRESS NOTES
04/27/21 2004   Vent Information   $Ventilation $Initial Day   Vent Type 980   Vent Mode AC/VC   Vt Ordered 250 mL   Rate Set 24 bmp   Peak Flow 50 L/min   FiO2  50 %   SpO2 96 %   SpO2/FiO2 ratio 192   Sensitivity 3   PEEP/CPAP 14   Humidification Source Heated wire   Humidification Temp 37   Humidification Temp Measured 36   Circuit Condensation Drained   Vent Patient Data   Peak Inspiratory Pressure 21 cmH2O   Mean Airway Pressure 16 cmH20   Rate Measured 27 br/min   Vt Exhaled 293 mL   Minute Volume 8.22 Liters   I:E Ratio 1:3.0   Plateau Pressure 25 [id number]   Static Compliance 27 mL/cmH2O   Dynamic Compliance 49 mL/cmH2O   Cough/Sputum   Sputum How Obtained Suctioned;Endotracheal   Cough Non-productive   Spontaneous Breathing Trial (SBT) RT Doc   Pulse 73   Breath Sounds   Right Upper Lobe Diminished   Right Middle Lobe Diminished   Right Lower Lobe Diminished   Left Upper Lobe Diminished   Left Lower Lobe Diminished   Additional Respiratory  Assessments   Resp 21   Position Semi-Barnett's   Oral Care Completed? Yes   Oral Care Mouth suctioned   Subglottic Suction Done?  Yes   Cuff Pressure (cm H2O)   (MOV)   Alarm Settings   High Pressure Alarm 40 cmH2O   Low Minute Volume Alarm 2 L/min   Apnea (secs) 20 secs   High Respiratory Rate 40 br/min   Low Exhaled Vt  175 mL   ETT (adult)   Placement Date/Time: 04/27/21 0020   Tube Size: 8 mm  Laryngoscope: GlideScope  Blade Size: 3  Location: Oral  Secured at: 23 cm  Measured From: Lips   Secured at 21 cm   Measured From Lips   ET Placement Right   Secured By Commercial tube haq   Site Condition Dry

## 2021-04-28 NOTE — PROGRESS NOTES
PRN versed again given for RASS +2, patient attempting to sit up in bed, pulling at restraints and kicking blankets. Propofol gtt also titrated up, BP is now stable 137/68 MAP 87). Will closely monitor.

## 2021-04-28 NOTE — PROGRESS NOTES
Shift report given to Jude Klein and Mykel Baker RN  at bedside. Patient care handed off in stable condition at this time.    Electronically signed by Carla Benavides RN on 4/28/2021 at 7:15 PM

## 2021-04-28 NOTE — CARE COORDINATION
INTERDISCIPLINARY PLAN OF CARE CONFERENCE    Date/Time: 4/28/2021 1:24 PM  Completed by: Karin Stephens Case Management      Patient Name:  Diane Limon  YOB: 1955  Admitting Diagnosis: Acute respiratory failure due to COVID-19 [facility]) [U07.1, J96.00]     Admit Date/Time:  4/25/2021  8:28 PM    Chart reviewed. Interdisciplinary team contacted or reviewed plan related to patient progress and discharge plans. Disciplines included Case Management, Nursing, and Dietitian. Current Status:labs, vs, vent  PT/OT recommendation for discharge plan of care: tbd    Expected D/C Disposition:  tbd     Discharge Plan Comments: pt is from home and is IPTA. Currently in ICU on vent. CM following for discharge needs.     Home O2 in place on admit: No

## 2021-04-28 NOTE — PROGRESS NOTES
04/27/21 2342   Vent Information   Vent Type 980   Vent Mode AC/VC   Vt Ordered 250 mL   Rate Set 24 bmp   Peak Flow 50 L/min   FiO2  40 %   SpO2 96 %   SpO2/FiO2 ratio 240   Sensitivity 3   PEEP/CPAP 14   Humidification Source Heated wire   Humidification Temp 37   Circuit Condensation Drained   Vent Patient Data   Peak Inspiratory Pressure 22 cmH2O   Mean Airway Pressure 15 cmH20   Rate Measured 28 br/min   Vt Exhaled 265 mL   Minute Volume 8.34 Liters   I:E Ratio 1:3.6   Plateau Pressure 30 GDX26   Cough/Sputum   Sputum How Obtained Suctioned;Endotracheal   Cough Productive   Sputum Amount Small   Sputum Color Creamy   Tenacity Thick   Spontaneous Breathing Trial (SBT) RT Doc   Pulse 99   Breath Sounds   Right Upper Lobe Diminished   Right Middle Lobe Diminished   Right Lower Lobe Diminished   Left Upper Lobe Diminished   Left Lower Lobe Diminished   Additional Respiratory  Assessments   Resp 30   Position Semi-Barnett's   Oral Care Completed? Yes   Oral Care Mouth suctioned   Subglottic Suction Done?  Yes   Alarm Settings   High Pressure Alarm 40 cmH2O   Low Minute Volume Alarm 2 L/min   Apnea (secs) 20 secs   High Respiratory Rate 40 br/min   Low Exhaled Vt  175 mL   ETT (adult)   Placement Date/Time: 04/27/21 0020   Tube Size: 8 mm  Laryngoscope: GlideScope  Blade Size: 3  Location: Oral  Secured at: 23 cm  Measured From: Lips   Secured at 21 cm   Measured From [address] [employer] [address]   Site Condition Dry

## 2021-04-28 NOTE — PLAN OF CARE
Problem: Airway Clearance - Ineffective  Goal: Achieve or maintain patent airway  Outcome: Ongoing     Problem: Gas Exchange - Impaired  Goal: Absence of hypoxia  Outcome: Ongoing  Goal: Promote optimal lung function  Outcome: Ongoing     Problem: Breathing Pattern - Ineffective  Goal: Ability to achieve and maintain a regular respiratory rate  Outcome: Ongoing     Problem:  Body Temperature -  Risk of, Imbalanced  Goal: Ability to maintain a body temperature within defined limits  Outcome: Ongoing  Goal: Will regain or maintain usual level of consciousness  Outcome: Ongoing  Goal: Complications related to the disease process, condition or treatment will be avoided or minimized  Outcome: Ongoing     Problem: Isolation Precautions - Risk of Spread of Infection  Goal: Prevent transmission of infection  Outcome: Ongoing     Problem: Nutrition Deficits  Goal: Optimize nutritional status  Outcome: Ongoing     Problem: Risk for Fluid Volume Deficit  Goal: Maintain normal heart rhythm  Outcome: Ongoing  Goal: Maintain absence of muscle cramping  Outcome: Ongoing  Goal: Maintain normal serum potassium, sodium, calcium, phosphorus, and pH  Outcome: Ongoing     Problem: Loneliness or Risk for Loneliness  Goal: Demonstrate positive use of time alone when socialization is not possible  Outcome: Ongoing     Problem: Fatigue  Goal: Verbalize increase energy and improved vitality  Outcome: Ongoing     Problem: Patient Education: Go to Patient Education Activity  Goal: Patient/Family Education  Outcome: Ongoing     Problem: Falls - Risk of:  Goal: Will remain free from falls  Description: Will remain free from falls  Outcome: Ongoing  Goal: Absence of physical injury  Description: Absence of physical injury  Outcome: Ongoing     Problem: Skin Integrity:  Goal: Will show no infection signs and symptoms  Description: Will show no infection signs and symptoms  Outcome: Ongoing  Goal: Absence of new skin breakdown  Description: Absence of new skin breakdown  Outcome: Ongoing     Problem: Non-Violent Restraints  Goal: Removal from restraints as soon as assessed to be safe  Outcome: Ongoing  Goal: No harm/injury to patient while restraints in use  Outcome: Ongoing  Goal: Patient's dignity will be maintained  Outcome: Ongoing     Problem: Nutrition  Goal: Optimal nutrition therapy  Outcome: Ongoing  Goal: Understanding of nutritional guidelines  Outcome: Ongoing

## 2021-04-28 NOTE — PROGRESS NOTES
Shift assessment complete. Patient currently intubated with ETT at 21LL. Vent settings are 24/250/+14/40%. Propofol infusing at 25 mcg/kg/min. ST on monitor with frequent PACs. Episode of A-fib RVR over night x3 minutes. BP is better. 500 ml NS bolus given overnight. Now on maintenance IVF at 100 ml/h. Abdomen is soft and flat. C-diff negative from stool overnight. Not enough collected to send other studies. BUE soft wrist restraints in place due to attempts of pulling at ETT and lines. No signs of injury noted and PROM performed.      Electronically signed by Moncho Anton RN on 4/28/2021 at 8:26 AM  Vitals:    04/28/21 0802   BP: (!) 118/47   Pulse: 116   Resp: 25   Temp: 100.3 °F (37.9 °C)   SpO2: 92%

## 2021-04-28 NOTE — PROGRESS NOTES
Patient reassessed. HR improved with PO administration of metoprolol. Propofol infusing at 25 mcg/kg/min. Unchanged otherwise.      Electronically signed by Phoenix Monson RN on 4/28/2021 at 11:45 AM

## 2021-04-28 NOTE — PROGRESS NOTES
Reassessment complete, changes as per flowsheet. VSS. Patient remains intubated and sedated. Vent settings of AC/VC, Vt 250 ml, RR 24, PEEP 14, FiO2 40 %. Sinus tachy rhythm on cardiac monitor, - 130s. Will continue to closely monitor.

## 2021-04-28 NOTE — PROGRESS NOTES
Reassessment complete, changes as per flowsheet. VSS. Patient remains intubated and sedated. Appears to be resting comfortably. Vent settings of AC/VC, Vt 250 ml, RR 24, PEEP 14, FiO2 40 %. Normal sinus rhythm on cardiac monitor. Will continue to closely monitor.

## 2021-04-28 NOTE — PROGRESS NOTES
RESPIRATORY THERAPY ASSESSMENT    Name:  Randy Burks Record Number:  [id number]  Age: 61 y.o. Gender: female  : 1955  Today's Date:  2021  Room:  [facility]3014-01    Assessment     Is the patient being admitted for a COPD or Asthma exacerbation? No   (If yes the patient will be seen every 4 hours for the first 24 hours and then reassessed)    Patient Admission Diagnosis      Allergies  Allergies   Allergen Reactions    Penicillins Rash       Minimum Predicted Vital Capacity:               Actual Vital Capacity:                    Pulmonary History:No history  Home Oxygen Therapy:  room air  Home Respiratory Therapy:None   Current Respiratory Therapy:  Albuterol PRN  Treatment Type: Aerosol generator  Medications: Albuterol/Ipratropium    Respiratory Severity Index(RSI)   Patients with orders for inhalation medications, oxygen, or any therapeutic treatment modality will be placed on Respiratory Protocol. They will be assessed with the first treatment and at least every 72 hours thereafter. The following severity scale will be used to determine frequency of treatment intervention.     Smoking History: No Smoking History = 0    Social History  Social History     Tobacco Use    Smoking status: Never Smoker    Smokeless tobacco: Never Used   Substance Use Topics    Alcohol use: Yes     Comment: 0-1 drinks per month    Drug use: No       Recent Surgical History: None = 0  Past Surgical History  Past Surgical History:   Procedure Laterality Date    ARM SURGERY Right 2020    RIGHT ULNAR NERVE DECOMPRESSION AT THE ELBOW performed by Armaan Nicolas MD at [address] [facility] for scoliosis    CARPAL TUNNEL RELEASE Right 2020    RIGHT CARPAL TUNNEL RELEASE performed by Armaan Nicolas MD at [address] [facility] Bilateral     cataracts    INNER EAR SURGERY Right     replacement of eardrum    TONSILLECTOMY         Level of Consciousness: Lethargic = 3    Level of Activity: Bedridden, unresponsive or quadriplegic = 4    Respiratory Pattern: Increased; RR 21-30 = 1    Breath Sounds: Diminshed bilaterally and/or crackles = 2    Sputum  Sputum Color: Cloudy, Tenacity: Thick, Sputum How Obtained: Suctioned, Endotracheal  Cough: Strong, productive = 1    Vital Signs   BP (!) 106/41   Pulse 100   Temp 99.9 °F (37.7 °C) (Bladder)   Resp 29   Ht 4' 11\" (1.499 m)   Wt 126 lb 12.8 oz (57.5 kg)   SpO2 92%   BMI 25.61 kg/m²   SPO2 (COPD values may differ): Less than 86% on room air or greater than 92% on FiO2 greater than 50% = 4    Peak Flow (asthma only): not applicable = 0    RSI: 52-93 = Q4WA (every four hours while awake) and Q4hrs PRN        Plan       Goals: medication delivery and improve oxygenation    Patient/caregiver was educated on the proper method of use for Respiratory Care Devices:  No:       Level of patient/caregiver understanding able to:   ? Verbalize understanding   ? Demonstrate understanding       ? Teach back        ? Needs reinforcement       ? No available caregiver               ? Other:     Response to education:  N/A     Is patient being placed on Home Treatment Regimen? No     Does the patient have everything they need prior to discharge? NA     Comments: Patient chart reviewed, patient assessed. Plan of Care: change patient to duoneb Q4     Electronically signed by Roshan Humphrey RCP on 4/27/2021 at 9:10 PM    Respiratory Protocol Guidelines     1. Assessment and treatment by Respiratory Therapy will be initiated for medication and therapeutic interventions upon initiation of aerosolized medication. 2. Physician will be contacted for respiratory rate (RR) greater than 35 breaths per minute. Therapy will be held for heart rate (HR) greater than 140 beats per minute, pending direction from physician.   3. Bronchodilators will be administered via Metered Dose Inhaler (MDI) with spacer when the following criteria are met:  a. Alert and cooperative     b. HR < 140 bpm  c. RR < 30 bpm                d. Can demonstrate a 2-3 second inspiratory hold  4. Bronchodilators will be administered via Hand Held Nebulizer ORTIZ [facility]) to patients when ANY of the following criteria are met  a. Incognizant or uncooperative          b. Patients treated with HHN at Home        c. Unable to demonstrate proper use of MDI with spacer     d. RR > 30 bpm   5. Bronchodilators will be delivered via Metered Dose Inhaler (MDI), HHN, Aerogen to intubated patients on mechanical ventilation. 6. Inhalation medication orders will be delivered and/or substituted as outlined below. Aerosolized Medications Ordering and Administration Guidelines:    1. All Medications will be ordered by a physician, and their frequency and/or modality will be adjusted as defined by the patients Respiratory Severity Index (RSI) score. 2. If the patient does not have documented COPD, consider discontinuing anticholinergics when RSI is less than 9.  3. If the bronchospasm worsens (increased RSI), then the bronchodilator frequency can be increased to a maximum of every 4 hours. If greater than every 4 hours is required, the physician will be contacted. 4. If the bronchospasm improves, the frequency of the bronchodilator can be decreased, based on the patient's RSI, but not less than home treatment regimen frequency. 5. Bronchodilator(s) will be discontinued if patient has a RSI less than 9 and has received no scheduled or as needed treatment for 72  Hrs. Patients Ordered on a Mucolytic Agent:    1. Must always be administered with a bronchodilator. 2. Discontinue if patient experiences worsened bronchospasm, or secretions have lessened to the point that the patient is able to clear them with a cough. Anti-inflammatory and Combination Medications:    1.  If the patient lacks prior history of lung disease, is not using inhaled anti-inflammatory medication at home, and lacks wheezing by examination or by history for at least 24 hours, contact physician for possible discontinuation.

## 2021-04-29 ENCOUNTER — APPOINTMENT (OUTPATIENT)
Dept: GENERAL RADIOLOGY | Age: 66
DRG: 130 | End: 2021-04-29
Payer: MEDICAID

## 2021-04-29 LAB
ALBUMIN SERPL-MCNC: 2.5 G/DL (ref 3.4–5)
ALP BLD-CCNC: 89 U/L (ref 40–129)
ALT SERPL-CCNC: 24 U/L (ref 10–40)
ANION GAP SERPL CALCULATED.3IONS-SCNC: 7 MMOL/L (ref 3–16)
AST SERPL-CCNC: 38 U/L (ref 15–37)
BASE EXCESS ARTERIAL: 5.4 MMOL/L (ref -3–3)
BASOPHILS ABSOLUTE: 0 K/UL (ref 0–0.2)
BASOPHILS RELATIVE PERCENT: 0.4 %
BILIRUB SERPL-MCNC: <0.2 MG/DL (ref 0–1)
BILIRUBIN DIRECT: <0.2 MG/DL (ref 0–0.3)
BILIRUBIN, INDIRECT: ABNORMAL MG/DL (ref 0–1)
BLOOD CULTURE, ROUTINE: NORMAL
BUN BLDV-MCNC: 16 MG/DL (ref 7–20)
CALCIUM SERPL-MCNC: 8.1 MG/DL (ref 8.3–10.6)
CARBOXYHEMOGLOBIN ARTERIAL: 0.3 % (ref 0–1.5)
CHLORIDE BLD-SCNC: 112 MMOL/L (ref 99–110)
CO2: 27 MMOL/L (ref 21–32)
CREAT SERPL-MCNC: <0.5 MG/DL (ref 0.6–1.2)
CRYPTOSPORIDIUM ANTIGEN STOOL: NORMAL
CULTURE, BLOOD 2: NORMAL
CULTURE, RESPIRATORY: ABNORMAL
CULTURE, RESPIRATORY: ABNORMAL
E HISTOLYTICA ANTIGEN STOOL: NORMAL
EOSINOPHILS ABSOLUTE: 0 K/UL (ref 0–0.6)
EOSINOPHILS RELATIVE PERCENT: 0 %
GFR AFRICAN AMERICAN: >60
GFR NON-AFRICAN AMERICAN: >60
GIARDIA ANTIGEN STOOL: NORMAL
GLUCOSE BLD-MCNC: 190 MG/DL (ref 70–99)
GLUCOSE BLD-MCNC: 196 MG/DL (ref 70–99)
GLUCOSE BLD-MCNC: 214 MG/DL (ref 70–99)
GLUCOSE BLD-MCNC: 232 MG/DL (ref 70–99)
GLUCOSE BLD-MCNC: 247 MG/DL (ref 70–99)
GLUCOSE BLD-MCNC: 267 MG/DL (ref 70–99)
GLUCOSE BLD-MCNC: 273 MG/DL (ref 70–99)
GRAM STAIN RESULT: ABNORMAL
HCO3 ARTERIAL: 28.5 MMOL/L (ref 21–29)
HCT VFR BLD CALC: 34.1 % (ref 36–48)
HEMOGLOBIN, ART, EXTENDED: 11.7 G/DL (ref 12–16)
HEMOGLOBIN: 11.6 G/DL (ref 12–16)
LYMPHOCYTES ABSOLUTE: 0.9 K/UL (ref 1–5.1)
LYMPHOCYTES RELATIVE PERCENT: 10.2 %
MAGNESIUM: 1.9 MG/DL (ref 1.8–2.4)
MCH RBC QN AUTO: 31.9 PG (ref 26–34)
MCHC RBC AUTO-ENTMCNC: 34 G/DL (ref 31–36)
MCV RBC AUTO: 93.7 FL (ref 80–100)
METHEMOGLOBIN ARTERIAL: 0.3 %
MONOCYTES ABSOLUTE: 0.6 K/UL (ref 0–1.3)
MONOCYTES RELATIVE PERCENT: 6.6 %
NEUTROPHILS ABSOLUTE: 7 K/UL (ref 1.7–7.7)
NEUTROPHILS RELATIVE PERCENT: 82.8 %
O2 CONTENT ARTERIAL: 14 ML/DL
O2 SAT, ARTERIAL: 89.5 %
O2 THERAPY: ABNORMAL
ORGANISM: ABNORMAL
PCO2 ARTERIAL: 36.4 MMHG (ref 35–45)
PDW BLD-RTO: 14.1 % (ref 12.4–15.4)
PERFORMED ON: ABNORMAL
PH ARTERIAL: 7.51 (ref 7.35–7.45)
PLATELET # BLD: 421 K/UL (ref 135–450)
PMV BLD AUTO: 7.7 FL (ref 5–10.5)
PO2 ARTERIAL: 50.8 MMHG (ref 75–108)
POTASSIUM REFLEX MAGNESIUM: 3.5 MMOL/L (ref 3.5–5.1)
RBC # BLD: 3.64 M/UL (ref 4–5.2)
SODIUM BLD-SCNC: 146 MMOL/L (ref 136–145)
TCO2 ARTERIAL: 29.6 MMOL/L
TOTAL PROTEIN: 5.6 G/DL (ref 6.4–8.2)
WBC # BLD: 8.4 K/UL (ref 4–11)

## 2021-04-29 PROCEDURE — 6360000002 HC RX W HCPCS: Performed by: INTERNAL MEDICINE

## 2021-04-29 PROCEDURE — 83735 ASSAY OF MAGNESIUM: CPT

## 2021-04-29 PROCEDURE — 6370000000 HC RX 637 (ALT 250 FOR IP): Performed by: INTERNAL MEDICINE

## 2021-04-29 PROCEDURE — 99291 CRITICAL CARE FIRST HOUR: CPT | Performed by: INTERNAL MEDICINE

## 2021-04-29 PROCEDURE — 2580000003 HC RX 258: Performed by: INTERNAL MEDICINE

## 2021-04-29 PROCEDURE — 99232 SBSQ HOSP IP/OBS MODERATE 35: CPT | Performed by: INTERNAL MEDICINE

## 2021-04-29 PROCEDURE — 71045 X-RAY EXAM CHEST 1 VIEW: CPT

## 2021-04-29 PROCEDURE — 80048 BASIC METABOLIC PNL TOTAL CA: CPT

## 2021-04-29 PROCEDURE — 94003 VENT MGMT INPAT SUBQ DAY: CPT

## 2021-04-29 PROCEDURE — 2700000000 HC OXYGEN THERAPY PER DAY

## 2021-04-29 PROCEDURE — 36415 COLL VENOUS BLD VENIPUNCTURE: CPT

## 2021-04-29 PROCEDURE — 80076 HEPATIC FUNCTION PANEL: CPT

## 2021-04-29 PROCEDURE — 82803 BLOOD GASES ANY COMBINATION: CPT

## 2021-04-29 PROCEDURE — 94640 AIRWAY INHALATION TREATMENT: CPT

## 2021-04-29 PROCEDURE — 85025 COMPLETE CBC W/AUTO DIFF WBC: CPT

## 2021-04-29 PROCEDURE — 94761 N-INVAS EAR/PLS OXIMETRY MLT: CPT

## 2021-04-29 PROCEDURE — 2000000000 HC ICU R&B

## 2021-04-29 PROCEDURE — 2500000003 HC RX 250 WO HCPCS: Performed by: INTERNAL MEDICINE

## 2021-04-29 RX ORDER — MAGNESIUM SULFATE 1 G/100ML
1000 INJECTION INTRAVENOUS ONCE
Status: COMPLETED | OUTPATIENT
Start: 2021-04-29 | End: 2021-04-29

## 2021-04-29 RX ADMIN — MIDAZOLAM HYDROCHLORIDE 2 MG: 1 INJECTION, SOLUTION INTRAMUSCULAR; INTRAVENOUS at 20:38

## 2021-04-29 RX ADMIN — INSULIN LISPRO 9 UNITS: 100 INJECTION, SOLUTION INTRAVENOUS; SUBCUTANEOUS at 21:00

## 2021-04-29 RX ADMIN — ENOXAPARIN SODIUM 30 MG: 30 INJECTION SUBCUTANEOUS at 08:18

## 2021-04-29 RX ADMIN — IPRATROPIUM BROMIDE AND ALBUTEROL SULFATE 1 AMPULE: 2.5; .5 SOLUTION RESPIRATORY (INHALATION) at 10:47

## 2021-04-29 RX ADMIN — MIDAZOLAM HYDROCHLORIDE 2 MG: 1 INJECTION, SOLUTION INTRAMUSCULAR; INTRAVENOUS at 05:24

## 2021-04-29 RX ADMIN — IPRATROPIUM BROMIDE AND ALBUTEROL SULFATE 1 AMPULE: 2.5; .5 SOLUTION RESPIRATORY (INHALATION) at 20:47

## 2021-04-29 RX ADMIN — MUPIROCIN: 20 OINTMENT TOPICAL at 08:18

## 2021-04-29 RX ADMIN — Medication 10 ML: at 08:18

## 2021-04-29 RX ADMIN — RDII 250 MG CAPSULE 250 MG: at 20:59

## 2021-04-29 RX ADMIN — METOPROLOL TARTRATE 25 MG: 25 TABLET, FILM COATED ORAL at 05:13

## 2021-04-29 RX ADMIN — INSULIN LISPRO 6 UNITS: 100 INJECTION, SOLUTION INTRAVENOUS; SUBCUTANEOUS at 12:48

## 2021-04-29 RX ADMIN — MIDAZOLAM HYDROCHLORIDE 2 MG: 1 INJECTION, SOLUTION INTRAMUSCULAR; INTRAVENOUS at 00:04

## 2021-04-29 RX ADMIN — PROPOFOL 24.91 MCG/KG/MIN: 10 INJECTION, EMULSION INTRAVENOUS at 05:14

## 2021-04-29 RX ADMIN — CHLORHEXIDINE GLUCONATE 0.12% ORAL RINSE 15 ML: 1.2 LIQUID ORAL at 20:38

## 2021-04-29 RX ADMIN — INSULIN LISPRO 9 UNITS: 100 INJECTION, SOLUTION INTRAVENOUS; SUBCUTANEOUS at 00:04

## 2021-04-29 RX ADMIN — IPRATROPIUM BROMIDE AND ALBUTEROL SULFATE 1 AMPULE: 2.5; .5 SOLUTION RESPIRATORY (INHALATION) at 15:25

## 2021-04-29 RX ADMIN — Medication 2000 UNITS: at 08:18

## 2021-04-29 RX ADMIN — IPRATROPIUM BROMIDE AND ALBUTEROL SULFATE 1 AMPULE: 2.5; .5 SOLUTION RESPIRATORY (INHALATION) at 07:44

## 2021-04-29 RX ADMIN — ENOXAPARIN SODIUM 30 MG: 30 INJECTION SUBCUTANEOUS at 20:38

## 2021-04-29 RX ADMIN — METOPROLOL TARTRATE 25 MG: 25 TABLET, FILM COATED ORAL at 17:39

## 2021-04-29 RX ADMIN — REMDESIVIR 100 MG: 100 INJECTION, POWDER, LYOPHILIZED, FOR SOLUTION INTRAVENOUS at 05:14

## 2021-04-29 RX ADMIN — DEXTROSE MONOHYDRATE 250 MG: 50 INJECTION, SOLUTION INTRAVENOUS at 09:01

## 2021-04-29 RX ADMIN — MUPIROCIN: 20 OINTMENT TOPICAL at 20:59

## 2021-04-29 RX ADMIN — METOPROLOL TARTRATE 25 MG: 25 TABLET, FILM COATED ORAL at 22:10

## 2021-04-29 RX ADMIN — CHLORHEXIDINE GLUCONATE 0.12% ORAL RINSE 15 ML: 1.2 LIQUID ORAL at 08:17

## 2021-04-29 RX ADMIN — IPRATROPIUM BROMIDE AND ALBUTEROL SULFATE 1 AMPULE: 2.5; .5 SOLUTION RESPIRATORY (INHALATION) at 03:01

## 2021-04-29 RX ADMIN — INSULIN LISPRO 6 UNITS: 100 INJECTION, SOLUTION INTRAVENOUS; SUBCUTANEOUS at 17:48

## 2021-04-29 RX ADMIN — MAGNESIUM SULFATE HEPTAHYDRATE 1000 MG: 1 INJECTION, SOLUTION INTRAVENOUS at 10:07

## 2021-04-29 RX ADMIN — POTASSIUM BICARBONATE 40 MEQ: 782 TABLET, EFFERVESCENT ORAL at 10:07

## 2021-04-29 RX ADMIN — METOPROLOL TARTRATE 25 MG: 25 TABLET, FILM COATED ORAL at 09:01

## 2021-04-29 RX ADMIN — RDII 250 MG CAPSULE 250 MG: at 09:01

## 2021-04-29 RX ADMIN — CEFTRIAXONE SODIUM 1000 MG: 1 INJECTION, POWDER, FOR SOLUTION INTRAMUSCULAR; INTRAVENOUS at 20:38

## 2021-04-29 RX ADMIN — PROPOFOL 40 MCG/KG/MIN: 10 INJECTION, EMULSION INTRAVENOUS at 13:31

## 2021-04-29 RX ADMIN — INSULIN LISPRO 6 UNITS: 100 INJECTION, SOLUTION INTRAVENOUS; SUBCUTANEOUS at 05:14

## 2021-04-29 RX ADMIN — DEXAMETHASONE SODIUM PHOSPHATE 6 MG: 10 INJECTION, SOLUTION INTRAMUSCULAR; INTRAVENOUS at 08:18

## 2021-04-29 RX ADMIN — INSULIN LISPRO 3 UNITS: 100 INJECTION, SOLUTION INTRAVENOUS; SUBCUTANEOUS at 07:50

## 2021-04-29 RX ADMIN — PROPOFOL 40 MCG/KG/MIN: 10 INJECTION, EMULSION INTRAVENOUS at 20:39

## 2021-04-29 RX ADMIN — Medication 10 ML: at 20:38

## 2021-04-29 ASSESSMENT — PULMONARY FUNCTION TESTS
PIF_VALUE: 23
PIF_VALUE: 24
PIF_VALUE: 25

## 2021-04-29 ASSESSMENT — PAIN SCALES - GENERAL
PAINLEVEL_OUTOF10: 0
PAINLEVEL_OUTOF10: 0

## 2021-04-29 NOTE — PROGRESS NOTES
EOS report and transfer of care to [facility]. Patient resting in bed, stable condition at hand off.

## 2021-04-29 NOTE — PROGRESS NOTES
Reassessment complete, changes as per flowsheet. VSS. Patient remains intubated and sedated. Vent settings of AC/VC, Vt 250 ml, RR 24, PEEP 12, FiO2 45 %. Sinus tachy rhythm on cardiac monitor, HR low 100s. Will continue to closely monitor.

## 2021-04-29 NOTE — PROGRESS NOTES
PROGRESS NOTE  S:66 yrs Patient  admitted on 4/25/2021 with Acute respiratory failure due to COVID-19 (HCC) [U07.1, J96.00] . Today she remains intubated and sedated. Nurse reports multiple episodes of diarrhea since starting TFs per OG yesterday. Exam:   Vitals:    04/29/21 1000   BP: (!) 147/86   Pulse: 103   Resp: 29   Temp:    SpO2: 91%     Due to the current efforts to prevent transmission of COVID-19 and also the need to preserve PPE for other caregivers, a face-to-face encounter with the patient was not performed. That being said, all relevant records and diagnostic tests were reviewed, including laboratory results and imaging. Please reference any relevant documentation elsewhere. Care will be coordinated with the primary service. Medications: Reviewed    Labs:  CBC:   Recent Labs     04/27/21 0445 04/28/21  0641 04/29/21  0404   WBC 7.4 5.8 8.4   HGB 12.4 10.5* 11.6*   HCT 37.7 31.6* 34.1*   MCV 93.7 90.9 93.7    321 421     BMP:   Recent Labs     04/27/21 0445 04/28/21  0641 04/29/21  0404    140 146*   K 3.6 3.3* 3.5    106 112*   CO2 21 24 27   BUN 15 21* 16   CREATININE <0.5* <0.5* <0.5*     LIVER PROFILE:   Recent Labs     04/27/21 0445 04/28/21  0641 04/29/21  0404   AST 77* 45* 38*   ALT 38 25 24   PROT 6.3* 5.1* 5.6*   BILIDIR <0.2 <0.2 <0.2   BILITOT 0.3 <0.2 <0.2   ALKPHOS 97 79 89     PT/INR: No results for input(s): INR in the last 72 hours. Invalid input(s): PT    IMAGING:  XR CHEST PORTABLE  Impression   Patchy airspace opacities bilaterally, may be related to pulmonary edema   versus pneumonia.       Mild to moderate left pleural effusion.       Asymmetric elevation of the left hemidiaphragm. Attending Supervising [de-identified] Attestation Statement  The patient is a 61 y.o. female. I have performed a history and physical examination of the patient.  I discussed the case with my physician assistant Ortega Adams CAROL HOLMAN reviewed the patient's Past Medical History, Past Surgical History, Medications, and Allergies. Physical Exam:  Vitals:    04/29/21 1600 04/29/21 1700 04/29/21 1739 04/29/21 1800   BP: 134/69 126/66 (!) 112/50 117/68   Pulse: 105 99 88 94   Resp: 26 22  25   Temp: 99.7 °F (37.6 °C)      TempSrc: Bladder      SpO2: 91% 92%  92%   Weight:       Height:         Due to the current efforts to prevent transmission of COVID-19 and also the need to preserve PPE for other caregivers, a face-to-face encounter with the patient was not performed. That being said, all relevant records and diagnostic tests were reviewed, including laboratory results and imaging. Please reference any relevant documentation elsewhere. Care will be coordinated with the primary service. Impression: 61year old female with history of HTN, arthritis, osteoporosis, admitted with COVID19 pneumonia and Sally Creed is likely related to COVID-19. Recommendation:  Continue supportive care  Monitor and document output  Await remaining stool test results  Continue probiotics  Continue TFs per OG as tolerated  Will follow      Valeria Morales PA-C  10:16 AM 4/29/2021                      61year old female with history of HTN, arthritis, osteoporosis, admitted with COVID19 pneumonia and Sally Creed is likely related to COVID-19     Continue supportive care. stool tests negative thus far. Continue probiotics.  Continue TFs per Rhiannon Britt MD          (O) [phone]  (O) [phone]

## 2021-04-29 NOTE — PROGRESS NOTES
Agitated, restless, attempting to grab ETTube. Desat'd to 84%. Vent Fi02 60%. Propofol to 30 mcg/kg/min, RASS +1 to +2. Physical exam at this time. All ICU monitoring leads in place. All medical tubing in place, secured per protocol. Incontinent of xlarge amount liquid brown stool. Pericare, simpson care provided with repositioning and bedpad change. Settled down after ADL care and repositioning, nods head appropriately to question, eyes open briefly. Continues to require BUE soft wrist restraints due to attempts to grab medical tubing. 0900- GI PA here for rounding. Reported loose stools, TF rates and initiation. Pending stool testing results. Medications reviewed.

## 2021-04-29 NOTE — PLAN OF CARE
Problem: Airway Clearance - Ineffective  Goal: Achieve or maintain patent airway  4/28/2021 2317 by Nicholas Spine  Outcome: Ongoing  4/28/2021 1422 by Mei Womack RN  Outcome: Ongoing     Problem: Gas Exchange - Impaired  Goal: Absence of hypoxia  4/28/2021 2317 by Nicholas Spine  Outcome: Ongoing  4/28/2021 1422 by Mei Womack RN  Outcome: Ongoing  Goal: Promote optimal lung function  4/28/2021 2317 by Nicholas Vazquez  Outcome: Ongoing  4/28/2021 1422 by Mei Womack RN  Outcome: Ongoing     Problem: Breathing Pattern - Ineffective  Goal: Ability to achieve and maintain a regular respiratory rate  4/28/2021 2317 by Nicholas Vazquez  Outcome: Ongoing  4/28/2021 1422 by Mei Womack RN  Outcome: Ongoing     Problem:  Body Temperature -  Risk of, Imbalanced  Goal: Ability to maintain a body temperature within defined limits  4/28/2021 2317 by Nicholas Vazquez  Outcome: Ongoing  4/28/2021 1422 by Mei Womack RN  Outcome: Ongoing  Goal: Will regain or maintain usual level of consciousness  4/28/2021 2317 by Nicholas Vazquez  Outcome: Ongoing  4/28/2021 1422 by Mei Womack RN  Outcome: Ongoing  Goal: Complications related to the disease process, condition or treatment will be avoided or minimized  4/28/2021 2317 by Nicholas Vazquez  Outcome: Ongoing  4/28/2021 1422 by Mei Womack RN  Outcome: Ongoing     Problem: Isolation Precautions - Risk of Spread of Infection  Goal: Prevent transmission of infection  4/28/2021 2317 by Nicholas Vazquez  Outcome: Ongoing  4/28/2021 1422 by Mei Womack RN  Outcome: Ongoing     Problem: Nutrition Deficits  Goal: Optimize nutritional status  4/28/2021 2317 by Nicholas Vazquez  Outcome: Ongoing  4/28/2021 1422 by Mei Womack RN  Outcome: Ongoing     Problem: Risk for Fluid Volume Deficit  Goal: Maintain normal heart rhythm  4/28/2021 2317 by Nicholas Spine  Outcome: Ongoing  4/28/2021 1422 by Mei Womack RN  Outcome: Ongoing  Goal: Maintain absence of muscle cramping  4/28/2021 2317 by Jeffry Li  Outcome: Ongoing  4/28/2021 1422 by Brian Cabrales RN  Outcome: Ongoing  Goal: Maintain normal serum potassium, sodium, calcium, phosphorus, and pH  4/28/2021 2317 by Jeffry Li  Outcome: Ongoing  4/28/2021 1422 by Brian Cabrales RN  Outcome: Ongoing     Problem: Loneliness or Risk for Loneliness  Goal: Demonstrate positive use of time alone when socialization is not possible  4/28/2021 2317 by Jeffry Li  Outcome: Ongoing  4/28/2021 1422 by Brian Cabrales RN  Outcome: Ongoing     Problem: Loneliness or Risk for Loneliness  Goal: Demonstrate positive use of time alone when socialization is not possible  4/28/2021 2317 by Jeffry Li  Outcome: Ongoing  4/28/2021 1422 by Brian Cabrales RN  Outcome: Ongoing     Problem: Fatigue  Goal: Verbalize increase energy and improved vitality  4/28/2021 2317 by Jeffry Li  Outcome: Ongoing  4/28/2021 1422 by Brian Cabrales RN  Outcome: Ongoing     Problem: Patient Education: Go to Patient Education Activity  Goal: Patient/Family Education  4/28/2021 2317 by Jeffry Li  Outcome: Ongoing  4/28/2021 1422 by Brian Cabrales RN  Outcome: Ongoing     Problem: Falls - Risk of:  Goal: Will remain free from falls  Description: Will remain free from falls  4/28/2021 2317 by Jeffry Li  Outcome: Ongoing  4/28/2021 1422 by Brian Cabrales RN  Outcome: Ongoing  Goal: Absence of physical injury  Description: Absence of physical injury  4/28/2021 2317 by Jeffry Li  Outcome: Ongoing  4/28/2021 1422 by Brian Cabrales RN  Outcome: Ongoing     Problem: Skin Integrity:  Goal: Will show no infection signs and symptoms  Description: Will show no infection signs and symptoms  4/28/2021 2317 by Jeffry Li  Outcome: Ongoing  4/28/2021 1422 by Brian Cabrales RN  Outcome: Ongoing  Goal: Absence of new skin breakdown  Description: Absence of new skin breakdown  4/28/2021 2317 by Drew Allen  Outcome: Ongoing  4/28/2021 1422 by Vinnie Lester RN  Outcome: Ongoing     Problem: Non-Violent Restraints  Goal: Removal from restraints as soon as assessed to be safe  4/28/2021 2317 by Drew Allen  Outcome: Ongoing  4/28/2021 1422 by Vinnie Lester RN  Outcome: Ongoing  Goal: No harm/injury to patient while restraints in use  4/28/2021 2317 by Drew Allen  Outcome: Ongoing  4/28/2021 1422 by Vinnie Lester RN  Outcome: Ongoing  Goal: Patient's dignity will be maintained  4/28/2021 2317 by Drew Allen  Outcome: Ongoing  4/28/2021 1422 by Vinnie Lester RN  Outcome: Ongoing     Problem: Nutrition  Goal: Optimal nutrition therapy  4/28/2021 2317 by Drew Allen  Outcome: Ongoing  4/28/2021 1422 by Vinnie Lester RN  Outcome: Ongoing  Goal: Understanding of nutritional guidelines  4/28/2021 2317 by Drew Allen  Outcome: Ongoing  4/28/2021 1422 by Vinnie Lester RN  Outcome: Ongoing

## 2021-04-29 NOTE — PROGRESS NOTES
1500- Spouse, [place] and Family members able to video call with patient at this time. Verbal Permission per Spouse for video phone call to brother Eloisa Ndiaye and Sister in law Jesús Paul.

## 2021-04-29 NOTE — PROGRESS NOTES
4 Eyes Skin Assessment     The patient is being assess for   Shift Handoff    I agree that 2 RN's have performed a thorough Head to Toe Skin Assessment on the patient. ALL assessment sites listed below have been assessed. Areas assessed by both nurses:   [x]   Head, Face, and Ears   [x]   Shoulders, Back, and Chest, Abdomen  [x]   Arms, Elbows, and Hands   [x]   Coccyx, Sacrum, and Ischium  [x]   Legs, Feet, and Heels        Scattered bruising and redness to janis area, zinc cream applied. **SHARE this note so that the co-signing nurse is able to place an eSignature**    Co-signer eSignature: Electronically signed by Keyur Mari RN on 4/29/21 at 7:35 AM EDT    Does the Patient have Skin Breakdown?   No          Miguel Prevention initiated:  Yes   Wound Care Orders initiated:  NA      [facility] nurse consulted for Pressure Injury (Stage 3,4, Unstageable, DTI, NWPT, Complex wounds)and New or Established Ostomies:  NA      Primary Nurse eSignature: Electronically signed by Arabella Brambila on 4/29/21 at 7:03 AM EDT

## 2021-04-29 NOTE — PROGRESS NOTES
04/28/21  0641 04/29/21  0404    140 146*   K 3.6 3.3* 3.5    106 112*   CO2 21 24 27   BUN 15 21* 16   CREATININE <0.5* <0.5* <0.5*     No results for input(s): CKTOTAL, CKMB, CKMBINDEX, TROPONINI in the last 72 hours. Coagulation: No results found for: INR, APTT  Cardiac markers:   Lab Results   Component Value Date    TROPONINI <0.01 04/25/2021         Lab Results   Component Value Date    ALT 24 04/29/2021    AST 38 (H) 04/29/2021    ALKPHOS 89 04/29/2021    BILITOT <0.2 04/29/2021       No results found for: INR, PROTIME    Radiology    Blood cx x2: pending     Urine cx: mixed coral     SARS-COV-2 - Rapid: DETECTED     EKG:  I have reviewed the EKG with the following interpretation:   Sinus tachycardia with Premature atrial complexes rate of 143  Otherwise normal ECG       RADIOLOGY     CT ABDOMEN PELVIS W IV CONTRAST Additional Contrast? None   Final Result   1. Motion limited study. 2. Cholelithiasis and probable choledocholithiasis without evidence for acute   cholecystitis. 3. Nonobstructing left renal calculus.           CT CHEST PULMONARY EMBOLISM W CONTRAST   Final Result   1. Motion limited study with no definite scan evidence for pulmonary embolus. 2. Bilateral airspace opacities probably represent a combination of   atelectasis and pneumonia.           XR CHEST PORTABLE   Final Result   Nonspecific perihilar opacities.   Pulmonary edema and multifocal pneumonia   are in the differential.  Follow-up to resolution recommended.              ASSESSMENT/PLAN:     COVID 19 PNA  Acute Hypoxic Respiratory Failure  - 85% on RA upon arrival to ED  - CT chest with bilateral airspace opacities, no PE  - PCT: 0.18; had 1/2 Pfizer Vaccine on 3/31  - Admitted to Med Surg, droplet + precautions  - worsened quickly leading to ICU transfer, vapotherm and now intubated and on vent support  - continue  Decadro D#5, Remdesivir D#5, s/p  CCP and IL 6 inhibitor infusion   - pulm managing       Diarrhea - likely 2/2 COVID  - diarrhea returned now   - GI f/w        NAGMA  - CO2 17  - likely 2/2 diarrhea  - improved and off bicarb gtt     Hyperglycemia- A1c at 5.8  - likely 2/2 steroids  -  low dose SSI      Transaminitis  - likely with viral infection   - monitor with Remdesivir use-improving      HTN  - controlled  - cont Toprol XL     DVT Prophylaxis: Lovenox bid  Diet: TF  Code Status: Full Code    Wanda Braswell MD 4/29/2021 7:30 AM

## 2021-04-29 NOTE — PROGRESS NOTES
1035-Agitated, restless, RASS +2, sitting up in bed, grabbing for ETT with verbal stimulation. Propfol to 40 mcg/kg/min. Redirected verbally and reoriented. Continent. Monitoring closely.

## 2021-04-29 NOTE — PROGRESS NOTES
4 Eyes Skin Assessment     The patient is being assess for   Shift Handoff    I agree that 2 RN's have performed a thorough Head to Toe Skin Assessment on the patient. ALL assessment sites listed below have been assessed. Areas assessed by both nurses:   [x]   Head, Face, and Ears   [x]   Shoulders, Back, and Chest, Abdomen  [x]   Arms, Elbows, and Hands   [x]   Coccyx, Sacrum, and Ischium  [x]   Legs, Feet, and Heels        Scattered bruising and janis area redness. **SHARE this note so that the co-signing nurse is able to place an eSignature**    Co-signer eSignature: Electronically signed by Ginger Zarate RN on 4/29/21 at 9:34 PM EDT    Does the Patient have Skin Breakdown?   No          Miguel Prevention initiated:  Yes   Wound Care Orders initiated:  NA      [facility] nurse consulted for Pressure Injury (Stage 3,4, Unstageable, DTI, NWPT, Complex wounds)and New or Established Ostomies:  NA      Primary Nurse eSignature: Electronically signed by Natty Ahumada on 4/28/21 at 01:30 AM EDT

## 2021-04-29 NOTE — PROGRESS NOTES
04/29/21 0301   Vent Information   Vent Type 980   Vent Mode AC/VC   Vt Ordered 250 mL   Rate Set 24 bmp   Peak Flow 50 L/min   FiO2  45 %   SpO2 90 %   SpO2/FiO2 ratio 200   Sensitivity 3   PEEP/CPAP 12   Humidification Source Heated wire   Humidification Temp 37   Humidification Temp Measured 36.8   Circuit Condensation Drained   Vent Patient Data   Peak Inspiratory Pressure 24 cmH2O   Mean Airway Pressure 15 cmH20   Rate Measured 34 br/min   Vt Exhaled 334 mL   Minute Volume 10.2 Liters   I:E Ratio 1:1.5   Cough/Sputum   Sputum How Obtained Endotracheal;Suctioned   Cough Productive   Sputum Amount Small   Sputum Color Creamy   Tenacity Thick   Spontaneous Breathing Trial (SBT) RT Doc   Pulse 98   Breath Sounds   Right Upper Lobe Diminished   Right Middle Lobe Diminished   Right Lower Lobe Diminished   Left Upper Lobe Diminished   Left Lower Lobe Diminished   Additional Respiratory  Assessments   Resp (!) 34   Position Semi-Barnett's   Alarm Settings   High Pressure Alarm 45 cmH2O   Low Minute Volume Alarm 3 L/min   High Respiratory Rate 40 br/min   Low Exhaled Vt  175 mL   Patient Observation   Observations ETT SIZE 8.0, SECURED AT 21 LIP LINE. AMBU BAG AT HEAD OF BED. WATER GOOD.     ETT (adult)   Placement Date/Time: 04/27/21 0020   Tube Size: 8 mm  Laryngoscope: GlideScope  Blade Size: 3  Location: Oral  Secured at: 23 cm  Measured From: Lips   Secured at 21 cm   Measured From Lips   ET Placement Right   Secured By [employer] tube haq   Site Condition Dry

## 2021-04-29 NOTE — PROGRESS NOTES
Pulmonary & Critical Care Medicine ICU Progress Note    CC: COVID-19 with progressive hypoxemia    Events of Last 24 hours:   More restless  Diarrhea overnight  Increased FiO2 to 60%    Invasive Lines: Peripheral    MV: 2021  Vent Mode: AC/VC Rate Set: 24 bmp/Vt Ordered: 250 mL/ /FiO2 : 45 %  Recent Labs     21  0520 21  0550   PHART 7.466* 7.512*   PNW3AZN 34.7* 36.4   PO2ART 70.2* 50.8*     IV:   propofol 24.914 mcg/kg/min (21 0514)    sodium chloride      sodium chloride      dextrose       Vitals:  Blood pressure 136/62, pulse 100, temperature 99.6 °F (37.6 °C), temperature source Bladder, resp. rate 27, height 4' 11\" (1.499 m), weight 133 lb 3.2 oz (60.4 kg), SpO2 (!) 89 %. on 45%  Temp  Av.6 °F (37.6 °C)  Min: 98.9 °F (37.2 °C)  Max: 100.3 °F (37.9 °C)    Intake/Output Summary (Last 24 hours) at 2021 0726  Last data filed at 2021 8391  Gross per 24 hour   Intake 4773 ml   Output 2420 ml   Net 2353 ml     EXAM:  EXAM (COVID isolation):  General: intubated, ill appearing    ENT:   Pharynx with ETT. Neck: Trachea midline  Resp: No accessory muscle use. CV: Regular rate. Regular rhythm. GI:  Non-distended.     Neuro: Sedated  Psych: Unable to obtain because the patient is non-communicative     Scheduled Meds:   azithromycin  250 mg Intravenous Q24H    insulin lispro  0-18 Units Subcutaneous Q4H    metoprolol tartrate  25 mg Oral Q6H    chlorhexidine  15 mL Mouth/Throat BID    mupirocin   Nasal BID    ipratropium-albuterol  1 ampule Inhalation Q4H    cefTRIAXone (ROCEPHIN) IV  1,000 mg Intravenous Q24H    sodium chloride flush  5-40 mL Intravenous 2 times per day    enoxaparin  30 mg Subcutaneous BID    Vitamin D  2,000 Units Oral Daily    remdesivir IVPB  100 mg Intravenous Q24H    saccharomyces boulardii  250 mg Oral BID    dexamethasone  6 mg Intravenous Q24H     PRN Meds:  midazolam, fentanNYL, sodium chloride, sodium chloride flush, sodium chloride, promethazine **OR** ondansetron, polyethylene glycol, acetaminophen **OR** acetaminophen, guaiFENesin-dextromethorphan, glucose, dextrose, glucagon (rDNA), dextrose    Results:  CBC:   Recent Labs     04/27/21 0445 04/28/21  0641 04/29/21  0404   WBC 7.4 5.8 8.4   HGB 12.4 10.5* 11.6*   HCT 37.7 31.6* 34.1*   MCV 93.7 90.9 93.7    321 421     BMP:   Recent Labs     04/27/21 0445 04/28/21  0641 04/29/21  0404    140 146*   K 3.6 3.3* 3.5    106 112*   CO2 21 24 27   BUN 15 21* 16   CREATININE <0.5* <0.5* <0.5*     LIVER PROFILE:   Recent Labs     04/27/21 0445 04/28/21  0641 04/29/21  0404   AST 77* 45* 38*   ALT 38 25 24   BILIDIR <0.2 <0.2 <0.2   BILITOT 0.3 <0.2 <0.2   ALKPHOS 97 79 89     Microbiology  4/25/2021 SARS-CoV-2 positive  4/25/2021 C. difficile negative  4/25/2021 blood NGTD  4/26/2021 urine mixed coral  4/27/2021 tracheal aspirate sent    Imaging:  Chest imaging was reviewed by me and showed CTPA 4/25/2021  Pulmonary Arteries: Evaluation is compromised by motion artifact.  No   definite evidence of intraluminal filling defect to suggest pulmonary   embolism.  Main pulmonary artery is normal in caliber. Mediastinum: Mediastinal and bilateral hilar lymph nodes are probably   reactive.  The heart and pericardium demonstrate no acute abnormality.  There   is no acute abnormality of the thoracic aorta. Lungs/pleura: There is no pneumothorax or pleural effusion.  There are patchy   airspace opacities throughout both lungs.  Elevation of the left   hemidiaphragm is again noted. Upper Abdomen: There is fatty infiltration of the liver.  The visualized   upper abdomen is otherwise unremarkable. Soft Tissues/Bones: No acute bone or soft tissue abnormality.  Spinal   fixation rods are again seen.       Impression:       1. Motion limited study with no definite scan evidence for pulmonary embolus.    2. Bilateral airspace opacities probably represent a combination of atelectasis and pneumonia. CT abdomen 4/25/2021  Impression:        1. Motion limited study. 2. Cholelithiasis and probable choledocholithiasis without evidence for acute   cholecystitis. 3. Nonobstructing left renal calculus. CXR 4/27/2021 ET tube okay, bilateral infiltrates, rods in back    ASSESSMENT:  · Acute hypoxemic respiratory failure -progressive  · COVID-19 pneumonia  · Abnormal CT: Mediastinal lymphadenopathy and parenchymal changes consistent with COVID-19 pneumonia  · Diarrhea  · Transaminitis - improved  · Hyperglycemia   · Tachyarrhythmia    PLAN:  COVID-19 isolation, droplet plus  Mechanical ventilation as per my orders. The ventilator was adjusted by me at the bedside for unstable, life threatening respiratory failure. IV Propofol for sedation, target RASS -2, with daily spontaneous awakening trial.  Fentanyl gtt as needed. Remdesevir D#5, LFT monitoring for high risk medication  CCP given 4/26/21, Tocilizumab given 4/27/2021  Decadron D#5, 6 mg daily   Ceftriaxone and azithromycin D#4/5, low clinical suspicion for superimposed bacterial pneumonia  Inhaled bronchodilators only as needed, MDI preferred  Free water increase  Home metoprolol  Eventually will need f/u CT imaging of chest  · Prophylaxis: Lovenox 30 twice daily    Total critical care time caring for this patient with life threatening, unstable organ failure, including direct patient contact, management of life support systems, review of data including imaging and labs, discussions with other team members and physicians is 32 minutes so far today, excluding procedures.

## 2021-04-29 NOTE — PROGRESS NOTES
Assessment complete as per flow sheets. Sinus tachy rhythm on cardiac monitor, -120s. Otherwise VSS. Patient is intubated and sedated, on mechanical ventilation. Vent settings of AC/VC, vT 250 ml, RR 24, PEEP 12, FiO2 45. ETT #8 @ 23cm lip. Sedation and other gtts infusing as per MAR. OG tube @ 57  cm lip. Tube feedings are ongoing. Patient voids per simpson catheter, site is patent and secured. UO is clear and yellow in appearance. PIV x 3 appears WNL. Dressings are C/D/I. Meds as per MAR. Safety measures in place and will continue to monitor.

## 2021-04-29 NOTE — PROGRESS NOTES
Reassessment complete, changes as per flowsheet. VSS. Patient remains intubated and sedated. Vent settings of AC/VC, Vt 250 ml, RR 24, PEEP 12, FiO2 45 %. Sinus tach with PVC rhythm on cardiac monitor, HR low 100-110s. Will continue to closely monitor.

## 2021-04-30 ENCOUNTER — APPOINTMENT (OUTPATIENT)
Dept: GENERAL RADIOLOGY | Age: 66
DRG: 130 | End: 2021-04-30
Payer: MEDICAID

## 2021-04-30 LAB
ALBUMIN SERPL-MCNC: 2.7 G/DL (ref 3.4–5)
ALP BLD-CCNC: 86 U/L (ref 40–129)
ALT SERPL-CCNC: 23 U/L (ref 10–40)
ANION GAP SERPL CALCULATED.3IONS-SCNC: 7 MMOL/L (ref 3–16)
AST SERPL-CCNC: 37 U/L (ref 15–37)
BANDED NEUTROPHILS RELATIVE PERCENT: 1 % (ref 0–7)
BASE EXCESS ARTERIAL: 10.3 MMOL/L (ref -3–3)
BASOPHILS ABSOLUTE: 0 K/UL (ref 0–0.2)
BASOPHILS RELATIVE PERCENT: 0 %
BILIRUB SERPL-MCNC: <0.2 MG/DL (ref 0–1)
BILIRUBIN DIRECT: <0.2 MG/DL (ref 0–0.3)
BILIRUBIN, INDIRECT: ABNORMAL MG/DL (ref 0–1)
BUN BLDV-MCNC: 17 MG/DL (ref 7–20)
CALCIUM SERPL-MCNC: 9.7 MG/DL (ref 8.3–10.6)
CARBOXYHEMOGLOBIN ARTERIAL: 0.3 % (ref 0–1.5)
CHLORIDE BLD-SCNC: 102 MMOL/L (ref 99–110)
CO2: 33 MMOL/L (ref 21–32)
CREAT SERPL-MCNC: <0.5 MG/DL (ref 0.6–1.2)
EOSINOPHILS ABSOLUTE: 0 K/UL (ref 0–0.6)
EOSINOPHILS RELATIVE PERCENT: 0 %
GFR AFRICAN AMERICAN: >60
GFR NON-AFRICAN AMERICAN: >60
GI BACTERIAL PATHOGENS BY PCR: NORMAL
GLUCOSE BLD-MCNC: 123 MG/DL (ref 70–99)
GLUCOSE BLD-MCNC: 163 MG/DL (ref 70–99)
GLUCOSE BLD-MCNC: 173 MG/DL (ref 70–99)
GLUCOSE BLD-MCNC: 174 MG/DL (ref 70–99)
GLUCOSE BLD-MCNC: 229 MG/DL (ref 70–99)
GLUCOSE BLD-MCNC: 251 MG/DL (ref 70–99)
GLUCOSE BLD-MCNC: 303 MG/DL (ref 70–99)
HCO3 ARTERIAL: 34.3 MMOL/L (ref 21–29)
HCT VFR BLD CALC: 37.6 % (ref 36–48)
HEMOGLOBIN, ART, EXTENDED: 12.2 G/DL (ref 12–16)
HEMOGLOBIN: 12.1 G/DL (ref 12–16)
LYMPHOCYTES ABSOLUTE: 1.6 K/UL (ref 1–5.1)
LYMPHOCYTES RELATIVE PERCENT: 22 %
MCH RBC QN AUTO: 30.4 PG (ref 26–34)
MCHC RBC AUTO-ENTMCNC: 32.2 G/DL (ref 31–36)
MCV RBC AUTO: 94.5 FL (ref 80–100)
METHEMOGLOBIN ARTERIAL: 0.3 %
MONOCYTES ABSOLUTE: 0.2 K/UL (ref 0–1.3)
MONOCYTES RELATIVE PERCENT: 3 %
NEUTROPHILS ABSOLUTE: 5.5 K/UL (ref 1.7–7.7)
NEUTROPHILS RELATIVE PERCENT: 74 %
O2 CONTENT ARTERIAL: 16 ML/DL
O2 SAT, ARTERIAL: 92.9 %
O2 THERAPY: ABNORMAL
PCO2 ARTERIAL: 43.5 MMHG (ref 35–45)
PDW BLD-RTO: 14.5 % (ref 12.4–15.4)
PERFORMED ON: ABNORMAL
PH ARTERIAL: 7.51 (ref 7.35–7.45)
PLATELET # BLD: 247 K/UL (ref 135–450)
PLATELET SLIDE REVIEW: ADEQUATE
PMV BLD AUTO: 8.1 FL (ref 5–10.5)
PO2 ARTERIAL: 59.5 MMHG (ref 75–108)
POTASSIUM REFLEX MAGNESIUM: 3.6 MMOL/L (ref 3.5–5.1)
RBC # BLD: 3.98 M/UL (ref 4–5.2)
SLIDE REVIEW: ABNORMAL
SODIUM BLD-SCNC: 142 MMOL/L (ref 136–145)
TCO2 ARTERIAL: 35.7 MMOL/L
TOTAL PROTEIN: 5.7 G/DL (ref 6.4–8.2)
TRIGL SERPL-MCNC: 88 MG/DL (ref 0–150)
WBC # BLD: 7.3 K/UL (ref 4–11)

## 2021-04-30 PROCEDURE — 80076 HEPATIC FUNCTION PANEL: CPT

## 2021-04-30 PROCEDURE — 2580000003 HC RX 258: Performed by: INTERNAL MEDICINE

## 2021-04-30 PROCEDURE — 36415 COLL VENOUS BLD VENIPUNCTURE: CPT

## 2021-04-30 PROCEDURE — 84478 ASSAY OF TRIGLYCERIDES: CPT

## 2021-04-30 PROCEDURE — 80048 BASIC METABOLIC PNL TOTAL CA: CPT

## 2021-04-30 PROCEDURE — 6360000002 HC RX W HCPCS: Performed by: INTERNAL MEDICINE

## 2021-04-30 PROCEDURE — 6370000000 HC RX 637 (ALT 250 FOR IP): Performed by: INTERNAL MEDICINE

## 2021-04-30 PROCEDURE — 99232 SBSQ HOSP IP/OBS MODERATE 35: CPT | Performed by: INTERNAL MEDICINE

## 2021-04-30 PROCEDURE — 82803 BLOOD GASES ANY COMBINATION: CPT

## 2021-04-30 PROCEDURE — 2500000003 HC RX 250 WO HCPCS: Performed by: INTERNAL MEDICINE

## 2021-04-30 PROCEDURE — 85025 COMPLETE CBC W/AUTO DIFF WBC: CPT

## 2021-04-30 PROCEDURE — 2000000000 HC ICU R&B

## 2021-04-30 PROCEDURE — 2700000000 HC OXYGEN THERAPY PER DAY

## 2021-04-30 PROCEDURE — 71045 X-RAY EXAM CHEST 1 VIEW: CPT

## 2021-04-30 PROCEDURE — 94003 VENT MGMT INPAT SUBQ DAY: CPT

## 2021-04-30 PROCEDURE — 99291 CRITICAL CARE FIRST HOUR: CPT | Performed by: INTERNAL MEDICINE

## 2021-04-30 PROCEDURE — 94640 AIRWAY INHALATION TREATMENT: CPT

## 2021-04-30 PROCEDURE — 94761 N-INVAS EAR/PLS OXIMETRY MLT: CPT

## 2021-04-30 RX ORDER — CLOBETASOL PROPIONATE 0.5 MG/G
CREAM TOPICAL
COMMUNITY
Start: 2020-10-15

## 2021-04-30 RX ORDER — HYDROCHLOROTHIAZIDE 12.5 MG/1
TABLET ORAL
COMMUNITY
End: 2022-01-06

## 2021-04-30 RX ADMIN — Medication 10 ML: at 09:08

## 2021-04-30 RX ADMIN — IPRATROPIUM BROMIDE AND ALBUTEROL SULFATE 1 AMPULE: 2.5; .5 SOLUTION RESPIRATORY (INHALATION) at 19:29

## 2021-04-30 RX ADMIN — METOPROLOL TARTRATE 25 MG: 25 TABLET, FILM COATED ORAL at 15:50

## 2021-04-30 RX ADMIN — Medication 10 ML: at 20:51

## 2021-04-30 RX ADMIN — CHLORHEXIDINE GLUCONATE 0.12% ORAL RINSE 15 ML: 1.2 LIQUID ORAL at 20:46

## 2021-04-30 RX ADMIN — Medication 2000 UNITS: at 09:09

## 2021-04-30 RX ADMIN — ENOXAPARIN SODIUM 30 MG: 30 INJECTION SUBCUTANEOUS at 20:43

## 2021-04-30 RX ADMIN — REMDESIVIR 100 MG: 100 INJECTION, POWDER, LYOPHILIZED, FOR SOLUTION INTRAVENOUS at 04:23

## 2021-04-30 RX ADMIN — MUPIROCIN: 20 OINTMENT TOPICAL at 20:45

## 2021-04-30 RX ADMIN — INSULIN LISPRO 9 UNITS: 100 INJECTION, SOLUTION INTRAVENOUS; SUBCUTANEOUS at 21:00

## 2021-04-30 RX ADMIN — PROPOFOL 40 MCG/KG/MIN: 10 INJECTION, EMULSION INTRAVENOUS at 02:43

## 2021-04-30 RX ADMIN — CHLORHEXIDINE GLUCONATE 0.12% ORAL RINSE 15 ML: 1.2 LIQUID ORAL at 09:08

## 2021-04-30 RX ADMIN — DEXAMETHASONE SODIUM PHOSPHATE 6 MG: 10 INJECTION, SOLUTION INTRAMUSCULAR; INTRAVENOUS at 09:09

## 2021-04-30 RX ADMIN — IPRATROPIUM BROMIDE AND ALBUTEROL SULFATE 1 AMPULE: 2.5; .5 SOLUTION RESPIRATORY (INHALATION) at 11:49

## 2021-04-30 RX ADMIN — ENOXAPARIN SODIUM 30 MG: 30 INJECTION SUBCUTANEOUS at 09:09

## 2021-04-30 RX ADMIN — METOPROLOL TARTRATE 25 MG: 25 TABLET, FILM COATED ORAL at 20:42

## 2021-04-30 RX ADMIN — IPRATROPIUM BROMIDE AND ALBUTEROL SULFATE 1 AMPULE: 2.5; .5 SOLUTION RESPIRATORY (INHALATION) at 00:22

## 2021-04-30 RX ADMIN — METOPROLOL TARTRATE 25 MG: 25 TABLET, FILM COATED ORAL at 09:09

## 2021-04-30 RX ADMIN — MUPIROCIN: 20 OINTMENT TOPICAL at 09:08

## 2021-04-30 RX ADMIN — POTASSIUM BICARBONATE 40 MEQ: 782 TABLET, EFFERVESCENT ORAL at 14:13

## 2021-04-30 RX ADMIN — IPRATROPIUM BROMIDE AND ALBUTEROL SULFATE 1 AMPULE: 2.5; .5 SOLUTION RESPIRATORY (INHALATION) at 08:28

## 2021-04-30 RX ADMIN — CEFTRIAXONE SODIUM 1000 MG: 1 INJECTION, POWDER, FOR SOLUTION INTRAMUSCULAR; INTRAVENOUS at 20:42

## 2021-04-30 RX ADMIN — IPRATROPIUM BROMIDE AND ALBUTEROL SULFATE 1 AMPULE: 2.5; .5 SOLUTION RESPIRATORY (INHALATION) at 04:00

## 2021-04-30 RX ADMIN — INSULIN LISPRO 12 UNITS: 100 INJECTION, SOLUTION INTRAVENOUS; SUBCUTANEOUS at 17:54

## 2021-04-30 RX ADMIN — IPRATROPIUM BROMIDE AND ALBUTEROL SULFATE 1 AMPULE: 2.5; .5 SOLUTION RESPIRATORY (INHALATION) at 22:45

## 2021-04-30 RX ADMIN — METOPROLOL TARTRATE 25 MG: 25 TABLET, FILM COATED ORAL at 04:23

## 2021-04-30 RX ADMIN — IPRATROPIUM BROMIDE AND ALBUTEROL SULFATE 1 AMPULE: 2.5; .5 SOLUTION RESPIRATORY (INHALATION) at 15:35

## 2021-04-30 RX ADMIN — PROPOFOL 45 MCG/KG/MIN: 10 INJECTION, EMULSION INTRAVENOUS at 15:50

## 2021-04-30 RX ADMIN — MIDAZOLAM HYDROCHLORIDE 2 MG: 1 INJECTION, SOLUTION INTRAMUSCULAR; INTRAVENOUS at 01:32

## 2021-04-30 RX ADMIN — RDII 250 MG CAPSULE 250 MG: at 20:51

## 2021-04-30 RX ADMIN — DEXTROSE MONOHYDRATE 250 MG: 50 INJECTION, SOLUTION INTRAVENOUS at 09:09

## 2021-04-30 RX ADMIN — INSULIN LISPRO 3 UNITS: 100 INJECTION, SOLUTION INTRAVENOUS; SUBCUTANEOUS at 05:03

## 2021-04-30 RX ADMIN — PROPOFOL 40 MCG/KG/MIN: 10 INJECTION, EMULSION INTRAVENOUS at 09:12

## 2021-04-30 RX ADMIN — INSULIN LISPRO 3 UNITS: 100 INJECTION, SOLUTION INTRAVENOUS; SUBCUTANEOUS at 00:02

## 2021-04-30 RX ADMIN — RDII 250 MG CAPSULE 250 MG: at 11:36

## 2021-04-30 ASSESSMENT — PULMONARY FUNCTION TESTS
PIF_VALUE: 28
PIF_VALUE: 28

## 2021-04-30 ASSESSMENT — PAIN SCALES - GENERAL
PAINLEVEL_OUTOF10: 0
PAINLEVEL_OUTOF10: 0

## 2021-04-30 NOTE — PROGRESS NOTES
04/30/21 1929   Vent Information   $Ventilation $Subsequent Day   Skin Assessment Clean, dry, & intact   Vent Type 980   Vent Mode AC/VC   Rate Set 24 bmp   Peak Flow 50 L/min   FiO2  40 %   SpO2 94 %   SpO2/FiO2 ratio 235   Sensitivity 3   PEEP/CPAP 8   Humidification Source Heated wire   Humidification Temp Measured 37   Circuit Condensation Drained   Vent Patient Data   Peak Inspiratory Pressure 18 cmH2O   Mean Airway Pressure 12 cmH20   Rate Measured 28 br/min   Vt Exhaled 480 mL   Minute Volume 9.1 Liters   I:E Ratio 1:3.6   Plateau Pressure 18 UEB12   Static Compliance 48 mL/cmH2O   Dynamic Compliance 44 mL/cmH2O   Cough/Sputum   Sputum How Obtained Endotracheal   Cough Productive   Sputum Amount Small   Sputum Color Dark Yellow   Tenacity Thick   Spontaneous Breathing Trial (SBT) RT Doc   Pulse 87   Breath Sounds   Right Upper Lobe Diminished   Right Middle Lobe Diminished   Right Lower Lobe Diminished   Left Upper Lobe Diminished   Left Lower Lobe Diminished   Additional Respiratory  Assessments   Resp 24   Alarm Settings   High Pressure Alarm 45 cmH2O   Low Minute Volume Alarm 3 L/min   Apnea (secs) 20 secs   High Respiratory Rate 40 br/min   Low Exhaled Vt  175 mL   Patient Observation   Observations 8ett 20 at lip

## 2021-04-30 NOTE — PROGRESS NOTES
146* 142   K 3.3* 3.5 3.6    112* 102   CO2 24 27 33*   BUN 21* 16 17   CREATININE <0.5* <0.5* <0.5*     No results for input(s): CKTOTAL, CKMB, CKMBINDEX, TROPONINI in the last 72 hours. Coagulation: No results found for: INR, APTT  Cardiac markers:   Lab Results   Component Value Date    TROPONINI <0.01 04/25/2021         Lab Results   Component Value Date    ALT 23 04/30/2021    AST 37 04/30/2021    ALKPHOS 86 04/30/2021    BILITOT <0.2 04/30/2021       No results found for: INR, PROTIME    Radiology    Blood cx x2: pending     Urine cx: mixed coral     SARS-COV-2 - Rapid: DETECTED     EKG:  I have reviewed the EKG with the following interpretation:   Sinus tachycardia with Premature atrial complexes rate of 143  Otherwise normal ECG       RADIOLOGY     CT ABDOMEN PELVIS W IV CONTRAST Additional Contrast? None   Final Result   1. Motion limited study. 2. Cholelithiasis and probable choledocholithiasis without evidence for acute   cholecystitis. 3. Nonobstructing left renal calculus.           CT CHEST PULMONARY EMBOLISM W CONTRAST   Final Result   1. Motion limited study with no definite scan evidence for pulmonary embolus. 2. Bilateral airspace opacities probably represent a combination of   atelectasis and pneumonia.           XR CHEST PORTABLE   Final Result   Nonspecific perihilar opacities.   Pulmonary edema and multifocal pneumonia   are in the differential.  Follow-up to resolution recommended.              ASSESSMENT/PLAN:     COVID 19 PNA  Acute Hypoxic Respiratory Failure  - 85% on RA upon arrival to ED  - CT chest with bilateral airspace opacities, no PE  - PCT: 0.18; had 1/2 Pfizer Vaccine on 3/31  - Admitted to Med Surg, droplet + precautions  - worsened quickly leading to ICU transfer, vapotherm and now intubated and on vent support  - continue  Decadron D#6, completed  Remdesivir for 5 days , s/p  CCP and IL 6 inhibitor infusion   - pulm managing       Diarrhea - likely 2/2 COVID  - diarrhea returned now   - GI f/w   - consider imodium       NAGMA  - CO2 17  - likely 2/2 diarrhea  - improved and off bicarb gtt     Hyperglycemia- A1c at 5.8  - likely 2/2 steroids  -  low dose SSI      Transaminitis  - likely with viral infection   - monitor with Remdesivir use-improving      HTN  - controlled  - cont Toprol XL     DVT Prophylaxis: Lovenox bid  Diet: TF  Code Status: Full Code    El Rodriguez MD 4/30/2021 7:41 AM

## 2021-04-30 NOTE — PROGRESS NOTES
04/30/21 0025   Vent Information   Vent Type 980   Vent Mode AC/VC   Vt Ordered 250 mL   Rate Set 24 bmp   Peak Flow 50 L/min   FiO2  60 %   SpO2 96 %   SpO2/FiO2 ratio 160   Sensitivity 3   PEEP/CPAP 10   Humidification Source Heated wire   Humidification Temp 37   Humidification Temp Measured 37   Circuit Condensation Drained   Vent Patient Data   Peak Inspiratory Pressure 28 cmH2O   Mean Airway Pressure 14 cmH20   Rate Measured 29 br/min   Vt Exhaled 368 mL   Minute Volume 8.69 Liters   I:E Ratio 1:3.3   Cough/Sputum   Sputum How Obtained Endotracheal;Suctioned   Cough Productive   Sputum Amount Small   Sputum Color Creamy   Tenacity Thick   Spontaneous Breathing Trial (SBT) RT Doc   Pulse 75   Breath Sounds   Right Upper Lobe Diminished   Right Middle Lobe Diminished   Right Lower Lobe Diminished   Left Upper Lobe Diminished   Left Lower Lobe Diminished   Additional Respiratory  Assessments   Resp 29   Position Semi-Barnett's   Alarm Settings   High Pressure Alarm 45 cmH2O   Low Minute Volume Alarm 3 L/min   High Respiratory Rate 40 br/min   Low Exhaled Vt  175 mL   Patient Observation   Observations ETT SIZE 8.0, SECURED AT 20 LIP LINE. AMBU BAG AT HEAD OF BED. WATER GOOD.     ETT (adult)   Placement Date/Time: 04/27/21 0020   Tube Size: 8 mm  Laryngoscope: GlideScope  Blade Size: 3  Location: Oral  Secured at: 23 cm  Measured From: Lips   Secured at 20 cm   Measured From Lips   ET Placement Right   Secured By [employer] tube haq   Site Condition Dry

## 2021-04-30 NOTE — PROGRESS NOTES
04/29/21 2049   Vent Information   $Ventilation $Subsequent Day   Skin Assessment Clean, dry, & intact   Vent Type 980   Vent Mode AC/VC   Vt Ordered 250 mL   Rate Set 24 bmp   Peak Flow 50 L/min   FiO2  60 %   SpO2 91 %   SpO2/FiO2 ratio 151.67   Sensitivity 3   PEEP/CPAP 12   Humidification Source Heated wire   Humidification Temp 37   Humidification Temp Measured 36.7   Circuit Condensation Drained   Vent Patient Data   Peak Inspiratory Pressure 25 cmH2O   Mean Airway Pressure 14 cmH20   Rate Measured 26 br/min   Vt Exhaled 300 mL   Minute Volume 7.89 Liters   I:E Ratio 1:3.2   Plateau Pressure 32 [id number]   Static Compliance 15 mL/cmH2O   Dynamic Compliance 23 mL/cmH2O   Cough/Sputum   Sputum How Obtained Endotracheal;Suctioned   Cough Productive   Sputum Amount Small   Sputum Color Creamy   Tenacity Thick   Spontaneous Breathing Trial (SBT) RT Doc   Pulse 84   Breath Sounds   Right Upper Lobe Diminished   Right Middle Lobe Diminished   Right Lower Lobe Diminished   Left Upper Lobe Diminished   Left Lower Lobe Diminished   Additional Respiratory  Assessments   Resp 19   Position Semi-Barnett's   Alarm Settings   High Pressure Alarm 45 cmH2O   Low Minute Volume Alarm 3 L/min   High Respiratory Rate 40 br/min   Low Exhaled Vt  175 mL   Patient Observation   Observations ETT SIZE 8.0, SECURED AT 20 LIP LINE. AMBU BAG AT HEAD OF BED. WATER GOOD.     ETT (adult)   Placement Date/Time: 04/27/21 0020   Tube Size: 8 mm  Laryngoscope: GlideScope  Blade Size: 3  Location: Oral  Secured at: 23 cm  Measured From: Lips   Secured at 20 cm   Measured From [address] [employer] tube haq   Site Condition Dry

## 2021-04-30 NOTE — CARE COORDINATION
INTERDISCIPLINARY PLAN OF CARE CONFERENCE    Date/Time: 4/30/2021 2:43 PM  Completed by: Imelda Haskins, Case Management      Patient Name:  Angel Severin  YOB: 1955  Admitting Diagnosis: Acute respiratory failure due to COVID-19 [facility]) [U07.1, J96.00]     Admit Date/Time:  4/25/2021  8:28 PM    Chart reviewed. Interdisciplinary team contacted or reviewed plan related to patient progress and discharge plans. Disciplines included Case Management, Nursing, and Dietitian. Current Status:inpt, ICU LOC  PT/OT recommendation for discharge plan of care: tbd    Expected D/C Disposition:  tbd    Discharge Plan Comments: Reviewed chart. Pt in ICU on Ventilator, C-19+. Pt from home with spouse and plan to be decided pending medical progress. Awaiting PT/OT evaluation when appropriate.  following      Home O2 in place on admit: No

## 2021-04-30 NOTE — PROGRESS NOTES
Pulmonary & Critical Care Medicine ICU Progress Note    CC: COVID-19 with progressive hypoxemia    Events of Last 24 hours:   Low grade fever  Rectal tube for frequent loose stools. Invasive Lines: Peripheral    MV: 2021  Vent Mode: AC/VC Rate Set: 24 bmp/Vt Ordered: 250 mL/ /FiO2 : 60 %  Recent Labs     21  0550 21  0440   PHART 7.512* 7.515*   ETH3KKA 36.4 43.5   PO2ART 50.8* 59.5*     IV:   propofol 40 mcg/kg/min (21 0243)    sodium chloride      sodium chloride      dextrose       Vitals:  Blood pressure (!) 85/54, pulse 77, temperature 99.8 °F (37.7 °C), temperature source Bladder, resp. rate 21, height 4' 11\" (1.499 m), weight 136 lb 6.4 oz (61.9 kg), SpO2 96 %. on 60%  Temp  Av.7 °F (37.6 °C)  Min: 99.3 °F (37.4 °C)  Max: 100.1 °F (37.8 °C)    Intake/Output Summary (Last 24 hours) at 2021 0739  Last data filed at 2021 0600  Gross per 24 hour   Intake 3407 ml   Output 4750 ml   Net -1343 ml     EXAM:  General: intubated, ill appearing    ENT: Pharynx with ETT. Resp: No crackles. No wheezing. CV: S1, S2. No edema  GI: NT, ND, +BS  Skin: Warm and dry. Neuro: PERRL. Sedated, not following commands.  Patellar reflexes are symmetric     Scheduled Meds:   azithromycin  250 mg Intravenous Q24H    insulin lispro  0-18 Units Subcutaneous Q4H    metoprolol tartrate  25 mg Oral Q6H    chlorhexidine  15 mL Mouth/Throat BID    mupirocin   Nasal BID    ipratropium-albuterol  1 ampule Inhalation Q4H    cefTRIAXone (ROCEPHIN) IV  1,000 mg Intravenous Q24H    sodium chloride flush  5-40 mL Intravenous 2 times per day    enoxaparin  30 mg Subcutaneous BID    Vitamin D  2,000 Units Oral Daily    saccharomyces boulardii  250 mg Oral BID    dexamethasone  6 mg Intravenous Q24H     PRN Meds:  midazolam, fentanNYL, sodium chloride, sodium chloride flush, sodium chloride, promethazine **OR** ondansetron, polyethylene glycol, acetaminophen **OR** acetaminophen, guaiFENesin-dextromethorphan, glucose, dextrose, glucagon (rDNA), dextrose    Results:  CBC:   Recent Labs     04/28/21  0641 04/29/21  0404 04/30/21  0636   WBC 5.8 8.4 7.3   HGB 10.5* 11.6* 12.1   HCT 31.6* 34.1* 37.6   MCV 90.9 93.7 94.5    421 247     BMP:   Recent Labs     04/28/21  0641 04/29/21  0404 04/30/21  0636    146* 142   K 3.3* 3.5 3.6    112* 102   CO2 24 27 33*   BUN 21* 16 17   CREATININE <0.5* <0.5* <0.5*     LIVER PROFILE:   Recent Labs     04/28/21  0641 04/29/21  0404 04/30/21  0636   AST 45* 38* 37   ALT 25 24 23   BILIDIR <0.2 <0.2 <0.2   BILITOT <0.2 <0.2 <0.2   ALKPHOS 79 89 86     Microbiology  4/25/2021 SARS-CoV-2 positive  4/25/2021 C. difficile negative  4/25/2021 blood NGTD  4/26/2021 urine mixed coral  4/27/2021 tracheal aspirate Candida  4/28/2020 1 GI bacterial pathogen by PCR negative    Imaging:  Chest imaging was reviewed by me and showed CTPA 4/25/2021  Pulmonary Arteries: Evaluation is compromised by motion artifact.  No   definite evidence of intraluminal filling defect to suggest pulmonary   embolism.  Main pulmonary artery is normal in caliber. Mediastinum: Mediastinal and bilateral hilar lymph nodes are probably   reactive.  The heart and pericardium demonstrate no acute abnormality.  There   is no acute abnormality of the thoracic aorta. Lungs/pleura: There is no pneumothorax or pleural effusion.  There are patchy   airspace opacities throughout both lungs.  Elevation of the left   hemidiaphragm is again noted. Upper Abdomen: There is fatty infiltration of the liver.  The visualized   upper abdomen is otherwise unremarkable. Soft Tissues/Bones: No acute bone or soft tissue abnormality.  Spinal   fixation rods are again seen.       Impression:       1. Motion limited study with no definite scan evidence for pulmonary embolus. 2. Bilateral airspace opacities probably represent a combination of   atelectasis and pneumonia.       CT abdomen 4/25/2021  Impression:        1. Motion limited study. 2. Cholelithiasis and probable choledocholithiasis without evidence for acute   cholecystitis. 3. Nonobstructing left renal calculus. CXR 4/30/2021 bilateral airspace disease, greater on left, ET tube okay    ASSESSMENT:  · Acute hypoxemic respiratory failure -progressive  · COVID-19 pneumonia  · Abnormal CT: Mediastinal lymphadenopathy and parenchymal changes consistent with COVID-19 pneumonia  · Diarrhea  · Transaminitis - improved  · Hyperglycemia   · Tachyarrhythmia    PLAN:  COVID-19 isolation, droplet plus  Mechanical ventilation as per my orders. The ventilator was adjusted by me at the bedside for unstable, life threatening respiratory failure. IV Propofol for sedation, target RASS -2, with daily spontaneous awakening trial.  Fentanyl gtt as needed. CCP given 4/26/21, Tocilizumab given 4/27/2021, completed 5 days remdesivir  Decadron D#6, 6 mg daily   Ceftriaxone and azithromycin D#5/5, low clinical suspicion for superimposed bacterial pneumonia  Inhaled bronchodilators only as needed, MDI preferred  Home metoprolol  Eventually will need f/u CT imaging of chest  · Prophylaxis: Lovenox 30 twice daily    Total critical care time caring for this patient with life threatening, unstable organ failure, including direct patient contact, management of life support systems, review of data including imaging and labs, discussions with other team members and physicians is 35 minutes so far today, excluding procedures.

## 2021-04-30 NOTE — PROGRESS NOTES
Comprehensive Nutrition Assessment    Type and Reason for Visit:  Reassess    Nutrition Recommendations/Plan:   1. Continue Vital High-Protein at goal rate of 50 ml/hr x 20 hours + water flushes of 100 ml every 4 hours or per MD guidance. 2. Monitor TF rate, intake, and tolerance + water flushes. 3. Monitor vent status, sedation type/amount (propofol is currently at 45 mcg x 24 hours which = 442 kcals from lipids), and plan of care + check TG (TG check today was 88 mg/dl). 4. Monitor nutrition-related labs, bowel function (+ diarrhea), and weight trends. Nutrition Assessment:  patient is improving from a nutritional standpoint AEB she is tolerating TF at goal rate and TF is meeting 100% of patient's current estimated nutrition needs, however, she remains at risk for further compromise d/t intubation + NPO status, need for EN as sole source of nutrition while intubated, increased nutrition needs r/t COVID-19 virus, and diarrhea/loos stool output via rectal tube; will continue Vital High-Protein at 50 ml/hr x 20 hours + 100 ml water flushes every 4 hours or per MD guidance    Malnutrition Assessment:  Malnutrition Status: At risk for malnutrition    Context:  Acute Illness     Findings of the 6 clinical characteristics of malnutrition:  Energy Intake:  7 - 50% or less of estimated energy requirements for 5 or more days  Weight Loss:  No significant weight loss     Body Fat Loss:  Unable to assess(COVID-19 +)     Muscle Mass Loss:  Unable to assess(COVID-19 +)    Fluid Accumulation:  No significant fluid accumulation     Strength:  Not Performed    Estimated Daily Nutrient Needs:  Energy (kcal):  1140 - 1311 kcals based on 20-23 kcals/kg/CBW; Weight Used for Energy Requirements:  Current     Protein (g):  68 - 86 g protein based on 1.2-1.5 g/kg/IBW;  Weight Used for Protein Requirements:  Ideal        Fluid (ml/day):  1140 - 1311 ml; Method Used for Fluid Requirements:  1 ml/kcal      Nutrition Related Findings:  patient remains intubated and sedated on 45 mcg propofol at this time; she responds to pain; abdomen is soft and bowel sounds are active; + diarrhea has started again so rectal tube was placed - last output noted for today; TG check today was 88 mg/dl; patient has decadron, high-dose SSI, Florastor, and vitamin D ordered at this time      Wounds:  None       Current Nutrition Therapies:    Current Tube Feeding (TF) Orders:  · Feeding Route: Orogastric  · Formula: Low Calorie, High Protein  · Schedule: Continuous  · Additives/Modulars: (none)  · Water Flushes: 100 ml every 4 hours or per MD guidance  · Current TF & Flush Orders Provides: TF is infusing at goal rate and patient is tolerating well  · Goal TF & Flush Orders Provides: Vital High-Protein with a goal rate of 50 ml/hr x 20 hours = 1000 ml TV, 1000 kcals, 88 g protein, and 836 ml free water + 30 ml water flushes every 3 hours or per MD guidance      Anthropometric Measures:  · Height: 4' 11\" (149.9 cm)  · Current Body Weight: 136 lb 6.4 oz (61.9 kg)(obtained on 4/30/21)   · Admission Body Weight: 128 lb 6.4 oz (58.2 kg)(obtained on 4/26/21; actual weight)    · Usual Body Weight: 131 lb 3.2 oz (59.5 kg)(obtained on 7/9/20; actual weight)     · Ideal Body Weight: 95 lbs; % Ideal Body Weight 143.6 %   · BMI: 27.5   · BMI Categories: Overweight (BMI 25.0-29. 9)       Nutrition Diagnosis:   · Inadequate oral intake related to inadequate protein-energy intake, impaired respiratory function, altered GI function, increase demand for energy/nutrients as evidenced by NPO or clear liquid status due to medical condition, intubation, nutrition support - enteral nutrition, GI abnormality, diarrhea      Nutrition Interventions:   Food and/or Nutrient Delivery:  Continue NPO, Continue Current Tube Feeding  Nutrition Education/Counseling:  No recommendation at this time   Coordination of Nutrition Care:  Continue to monitor while inpatient, Interdisciplinary Rounds    Goals:  patient will tolerate Vital High-Protein at goal rate of 50 ml/hr x 20 hours without GI distress, without s/s of aspiration, and without additional lab/fluid disturbances       Nutrition Monitoring and Evaluation:   Behavioral-Environmental Outcomes:  None Identified   Food/Nutrient Intake Outcomes:  Enteral Nutrition Intake/Tolerance  Physical Signs/Symptoms Outcomes:  Biochemical Data, Diarrhea, GI Status, Hemodynamic Status, Weight     Discharge Planning:     Too soon to determine     Electronically signed by Dorita Huerta RD, LD on 4/30/21 at 3:49 PM EDT    Contact: [phone]

## 2021-04-30 NOTE — PLAN OF CARE
muscle cramping  4/29/2021 2133 by Leland Alatorre RN  Outcome: Ongoing  4/29/2021 1850 by Martha Pierce RN  Outcome: Ongoing  Goal: Maintain normal serum potassium, sodium, calcium, phosphorus, and pH  4/29/2021 2133 by Leland Alatorre RN  Outcome: Ongoing  4/29/2021 1850 by Martha Pierce RN  Outcome: Ongoing     Problem: Loneliness or Risk for Loneliness  Goal: Demonstrate positive use of time alone when socialization is not possible  4/29/2021 2133 by Leland Alatorre RN  Outcome: Ongoing  4/29/2021 1850 by Martha Pierce RN  Outcome: Ongoing     Problem: Fatigue  Goal: Verbalize increase energy and improved vitality  4/29/2021 2133 by Leland Alatorre RN  Outcome: Ongoing  4/29/2021 1850 by Martha Pierce RN  Outcome: Ongoing     Problem: Fatigue  Goal: Verbalize increase energy and improved vitality  4/29/2021 2133 by Leland Alatorre RN  Outcome: Ongoing  4/29/2021 1850 by Martha Pierce RN  Outcome: Ongoing     Problem: Patient Education: Go to Patient Education Activity  Goal: Patient/Family Education  4/29/2021 2133 by Leland Alatorre RN  Outcome: Ongoing  4/29/2021 1850 by Martha Pierce RN  Outcome: Ongoing     Problem: Falls - Risk of:  Goal: Will remain free from falls  Description: Will remain free from falls  4/29/2021 2133 by Leland Alatorre RN  Outcome: Ongoing  4/29/2021 1850 by Martha Pierce RN  Outcome: Ongoing  Goal: Absence of physical injury  Description: Absence of physical injury  4/29/2021 2133 by Leland Alatorre RN  Outcome: Ongoing  4/29/2021 1850 by Martha Pierce RN  Outcome: Ongoing     Problem: Skin Integrity:  Goal: Will show no infection signs and symptoms  Description: Will show no infection signs and symptoms  4/29/2021 2133 by Leland Alatorre RN  Outcome: Ongoing  4/29/2021 1850 by Martha Pierce RN  Outcome: Ongoing  Goal: Absence of new skin breakdown  Description: Absence of new skin breakdown  4/29/2021 2133 by Leland Alatorre RN  Outcome: Ongoing  4/29/2021 1850 by Martha Pierce RN  Outcome: Ongoing     Problem: Non-Violent Restraints  Goal: Removal from restraints as soon as assessed to be safe  4/29/2021 2133 by Lety Gregory RN  Outcome: Ongoing  4/29/2021 1850 by Yanni Veras RN  Outcome: Ongoing  Goal: No harm/injury to patient while restraints in use  4/29/2021 2133 by Lety Gregory RN  Outcome: Ongoing  4/29/2021 1850 by Yanni Veras RN  Outcome: Ongoing  Goal: Patient's dignity will be maintained  4/29/2021 2133 by Lety Gregory RN  Outcome: Ongoing  4/29/2021 1850 by Yanni Veras RN  Outcome: Ongoing     Problem: Nutrition  Goal: Optimal nutrition therapy  4/29/2021 2133 by Lety Gregory RN  Outcome: Ongoing  4/29/2021 1850 by Yanni Veras RN  Outcome: Ongoing  Goal: Understanding of nutritional guidelines  4/29/2021 2133 by Lety Gregory RN  Outcome: Ongoing  4/29/2021 1850 by Yanni Veras RN  Outcome: Ongoing

## 2021-04-30 NOTE — PROGRESS NOTES
Shift assessment completed, see flow sheet. Pt is not following commands with a RASS of -2, responding to painful stimulus. Intubated and sedated on AC #8* ETT, at 20 LL. RR 24 /  / FiO2 60% / PEEP 12. NSR on monitor, HR 83, /57 (70), SpO2 90%. Respirations are easy, even, and unlabored. Bilateral lung sounds diminished. No edema present. OG in place at 65, with TF at 50 mL/hr, 50 mL residual.    Valderrama in place and patent with STAT lock. Bilateral soft wrist restraints in place for pt safety. PIVs in place, WNL with the following infusions:  Propofol at 40 mcg/kg/min    PRN versed given for pt double stacking every other breath. Pt's  Jhoana Moreno updated at this time. Call light within reach, bed in lowest position, bed alarm on. Will continue to monitor.

## 2021-04-30 NOTE — PROGRESS NOTES
Reassessment completed as documented. VSS Valderrama patent to bsd. RASS -2. pedro wrist restraints in use. Monitoring closely.

## 2021-04-30 NOTE — PROGRESS NOTES
Reassessment completed, see flow sheet. PEEP down to 10 per RT, pt tolerating well, SpO2 96%. Bite block placed due to pt consistently biting on tube. CHG bath and linen change provided. Zinc cream applied to redness on bottom. Rectal tube inserted due to frequent loose stools. PRN versed given for frequent double stacking and pt coughing over vent. No other changes at this time, will continue to monitor.

## 2021-04-30 NOTE — PROGRESS NOTES
04/30/21 0402   Vent Information   Vent Type 980   Vent Mode AC/VC   Vt Ordered 250 mL   Rate Set 24 bmp   Peak Flow 50 L/min   FiO2  60 %   SpO2 96 %   SpO2/FiO2 ratio 160   Sensitivity 3   PEEP/CPAP 10   Humidification Source Heated wire   Humidification Temp 37   Humidification Temp Measured 36.9   Circuit Condensation Drained   Vent Patient Data   Peak Inspiratory Pressure 28 cmH2O   Mean Airway Pressure 13 cmH20   Rate Measured 28 br/min   Vt Exhaled 447 mL   Minute Volume 8.7 Liters   I:E Ratio 1:3.6   Cough/Sputum   Sputum How Obtained Endotracheal;Suctioned   Cough Productive   Sputum Amount Small   Sputum Color Creamy   Tenacity Thick   Spontaneous Breathing Trial (SBT) RT Doc   Pulse 75   Additional Respiratory  Assessments   Resp 28   Position Semi-Barnett's   Alarm Settings   High Pressure Alarm 45 cmH2O   Low Minute Volume Alarm 3 L/min   High Respiratory Rate 40 br/min   Low Exhaled Vt  175 mL   Patient Observation   Observations ETT SIZE 8.0, SECURED AT 20 LIP LINE. AMBU BAG AT HEAD OF BED. WATER GOOD.     ETT (adult)   Placement Date/Time: 04/27/21 0020   Tube Size: 8 mm  Laryngoscope: GlideScope  Blade Size: 3  Location: Oral  Secured at: 23 cm  Measured From: Lips   Secured at 20 cm   Measured From [address] [employer] [address]   Site Condition Dry

## 2021-04-30 NOTE — PLAN OF CARE
Nutrition Problem #1: Inadequate oral intake  Intervention: Food and/or Nutrient Delivery: Continue NPO, Continue Current Tube Feeding  Nutritional Goals: patient will tolerate Vital High-Protein at goal rate of 50 ml/hr x 20 hours without GI distress, without s/s of aspiration, and without additional lab/fluid disturbances

## 2021-04-30 NOTE — PROGRESS NOTES
PROGRESS NOTE  S:66 yrs Patient  admitted on 4/25/2021 with Acute respiratory failure due to COVID-19 (HCC) [U07.1, J96.00] . Today she remains intubated and sedated. She is tolerating TFs per OG. Flexiseal in place with brown stool. Exam:   Vitals:    04/30/21 1100   BP: (!) 112/58   Pulse: 85   Resp: 22   Temp:    SpO2: 94%     Due to the current efforts to prevent transmission of COVID-19 and also the need to preserve PPE for other caregivers, a face-to-face encounter with the patient was not performed. That being said, all relevant records and diagnostic tests were reviewed, including laboratory results and imaging. Please reference any relevant documentation elsewhere. Care will be coordinated with the primary service. Medications: Reviewed    Labs:  CBC:   Recent Labs     04/28/21  0641 04/29/21  0404 04/30/21  0636   WBC 5.8 8.4 7.3   HGB 10.5* 11.6* 12.1   HCT 31.6* 34.1* 37.6   MCV 90.9 93.7 94.5    421 247     BMP:   Recent Labs     04/28/21  0641 04/29/21  0404 04/30/21  0636    146* 142   K 3.3* 3.5 3.6    112* 102   CO2 24 27 33*   BUN 21* 16 17   CREATININE <0.5* <0.5* <0.5*     LIVER PROFILE:   Recent Labs     04/28/21  0641 04/29/21  0404 04/30/21  0636   AST 45* 38* 37   ALT 25 24 23   PROT 5.1* 5.6* 5.7*   BILIDIR <0.2 <0.2 <0.2   BILITOT <0.2 <0.2 <0.2   ALKPHOS 79 89 86     PT/INR: No results for input(s): INR in the last 72 hours. Invalid input(s): PT    IMAGING:  XR CHEST PORTABLE  Impression   Moderate consolidating airspace disease left lower lobe.       Improving aeration with decreased airspace disease right lung and left apex. Attending Supervising [de-identified] Attestation Statement  The patient is a 61 y.o. female. I have performed a history and physical examination of the patient.  I discussed the case with my physician assistant Giovani Rebolledo PA-C    I reviewed the patient's Past Medical History, Past Surgical History, Medications, and Allergies. Physical Exam:  Vitals:    04/30/21 1541 04/30/21 1547 04/30/21 1600 04/30/21 1614   BP:   100/64 100/64   Pulse:   113 109   Resp:   27 25   Temp:  99.5 °F (37.5 °C)     TempSrc:  Bladder     SpO2:   90%    Weight:       Height: 4' 11\" (1.499 m)          Due to the current efforts to prevent transmission of COVID-19 and also the need to preserve PPE for other caregivers, a face-to-face encounter with the patient was not performed. That being said, all relevant records and diagnostic tests were reviewed, including laboratory results and imaging. Please reference any relevant documentation elsewhere. Care will be coordinated with the primary service. Impression: 61year old female with history of HTN, arthritis, osteoporosis, admitted with COVID19 pneumonia and Chandra Downey is likely related to COVID-19. Stool tests negative thus far. Recommendation:  Continue supportive care  Monitor and document output  Await fecal calprotectin results  Continue probiotics  Continue TFs per OG as tolerated  Will follow       Patel Bell PA-C  11:49 AM 4/30/2021                      61year old female with history of HTN, arthritis, osteoporosis, admitted with COVID19 pneumonia and Chandra Downey is likely related to COVID-19 superimposed on IBS     Continue supportive care. stool tests negative thus far. Continue probiotics. Continue TFs per OG. Add fiber supplements to the TF formula. Can consider Adonna Sabal if diarrhea worsens.     Mabel Badillo MD          [phone]  [phone]

## 2021-05-01 ENCOUNTER — APPOINTMENT (OUTPATIENT)
Dept: GENERAL RADIOLOGY | Age: 66
DRG: 130 | End: 2021-05-01
Payer: MEDICAID

## 2021-05-01 LAB
ALBUMIN SERPL-MCNC: 2.9 G/DL (ref 3.4–5)
ALP BLD-CCNC: 87 U/L (ref 40–129)
ALT SERPL-CCNC: 24 U/L (ref 10–40)
ANION GAP SERPL CALCULATED.3IONS-SCNC: 6 MMOL/L (ref 3–16)
AST SERPL-CCNC: 36 U/L (ref 15–37)
BANDED NEUTROPHILS RELATIVE PERCENT: 2 % (ref 0–7)
BASE EXCESS ARTERIAL: 9.2 MMOL/L (ref -3–3)
BASOPHILS ABSOLUTE: 0 K/UL (ref 0–0.2)
BASOPHILS RELATIVE PERCENT: 0 %
BILIRUB SERPL-MCNC: <0.2 MG/DL (ref 0–1)
BILIRUBIN DIRECT: <0.2 MG/DL (ref 0–0.3)
BILIRUBIN, INDIRECT: ABNORMAL MG/DL (ref 0–1)
BUN BLDV-MCNC: 27 MG/DL (ref 7–20)
CALCIUM SERPL-MCNC: 10.5 MG/DL (ref 8.3–10.6)
CALPROTECTIN, FECAL: 9 UG/G
CARBOXYHEMOGLOBIN ARTERIAL: 0.3 % (ref 0–1.5)
CHLORIDE BLD-SCNC: 98 MMOL/L (ref 99–110)
CO2: 35 MMOL/L (ref 21–32)
CREAT SERPL-MCNC: <0.5 MG/DL (ref 0.6–1.2)
EOSINOPHILS ABSOLUTE: 0 K/UL (ref 0–0.6)
EOSINOPHILS RELATIVE PERCENT: 0 %
GFR AFRICAN AMERICAN: >60
GFR NON-AFRICAN AMERICAN: >60
GLUCOSE BLD-MCNC: 127 MG/DL (ref 70–99)
GLUCOSE BLD-MCNC: 159 MG/DL (ref 70–99)
GLUCOSE BLD-MCNC: 164 MG/DL (ref 70–99)
GLUCOSE BLD-MCNC: 173 MG/DL (ref 70–99)
GLUCOSE BLD-MCNC: 173 MG/DL (ref 70–99)
GLUCOSE BLD-MCNC: 181 MG/DL (ref 70–99)
GLUCOSE BLD-MCNC: 183 MG/DL (ref 70–99)
GLUCOSE BLD-MCNC: 210 MG/DL (ref 70–99)
GLUCOSE BLD-MCNC: 251 MG/DL (ref 70–99)
HCO3 ARTERIAL: 32.9 MMOL/L (ref 21–29)
HCT VFR BLD CALC: 39 % (ref 36–48)
HEMOGLOBIN, ART, EXTENDED: 13.4 G/DL (ref 12–16)
HEMOGLOBIN: 13 G/DL (ref 12–16)
LYMPHOCYTES ABSOLUTE: 1.3 K/UL (ref 1–5.1)
LYMPHOCYTES RELATIVE PERCENT: 9 %
MCH RBC QN AUTO: 30.4 PG (ref 26–34)
MCHC RBC AUTO-ENTMCNC: 33.3 G/DL (ref 31–36)
MCV RBC AUTO: 91.4 FL (ref 80–100)
METHEMOGLOBIN ARTERIAL: 0.3 %
MONOCYTES ABSOLUTE: 0.6 K/UL (ref 0–1.3)
MONOCYTES RELATIVE PERCENT: 4 %
MYELOCYTE PERCENT: 2 %
NEUTROPHILS ABSOLUTE: 12.8 K/UL (ref 1.7–7.7)
NEUTROPHILS RELATIVE PERCENT: 83 %
O2 CONTENT ARTERIAL: 17 ML/DL
O2 SAT, ARTERIAL: 93.9 %
O2 THERAPY: ABNORMAL
PCO2 ARTERIAL: 41 MMHG (ref 35–45)
PDW BLD-RTO: 14 % (ref 12.4–15.4)
PERFORMED ON: ABNORMAL
PH ARTERIAL: 7.52 (ref 7.35–7.45)
PLATELET # BLD: 497 K/UL (ref 135–450)
PLATELET SLIDE REVIEW: ABNORMAL
PMV BLD AUTO: 8 FL (ref 5–10.5)
PO2 ARTERIAL: 62.1 MMHG (ref 75–108)
POTASSIUM REFLEX MAGNESIUM: 4.4 MMOL/L (ref 3.5–5.1)
RBC # BLD: 4.27 M/UL (ref 4–5.2)
SLIDE REVIEW: ABNORMAL
SODIUM BLD-SCNC: 139 MMOL/L (ref 136–145)
TCO2 ARTERIAL: 34.1 MMOL/L
TOTAL PROTEIN: 5.9 G/DL (ref 6.4–8.2)
WBC # BLD: 14.7 K/UL (ref 4–11)

## 2021-05-01 PROCEDURE — 87070 CULTURE OTHR SPECIMN AEROBIC: CPT

## 2021-05-01 PROCEDURE — 6370000000 HC RX 637 (ALT 250 FOR IP): Performed by: INTERNAL MEDICINE

## 2021-05-01 PROCEDURE — 80048 BASIC METABOLIC PNL TOTAL CA: CPT

## 2021-05-01 PROCEDURE — 6360000002 HC RX W HCPCS: Performed by: INTERNAL MEDICINE

## 2021-05-01 PROCEDURE — 87205 SMEAR GRAM STAIN: CPT

## 2021-05-01 PROCEDURE — 94761 N-INVAS EAR/PLS OXIMETRY MLT: CPT

## 2021-05-01 PROCEDURE — 2000000000 HC ICU R&B

## 2021-05-01 PROCEDURE — 2700000000 HC OXYGEN THERAPY PER DAY

## 2021-05-01 PROCEDURE — 31622 DX BRONCHOSCOPE/WASH: CPT

## 2021-05-01 PROCEDURE — 0B9J8ZX DRAINAGE OF LEFT LOWER LUNG LOBE, VIA NATURAL OR ARTIFICIAL OPENING ENDOSCOPIC, DIAGNOSTIC: ICD-10-PCS | Performed by: INTERNAL MEDICINE

## 2021-05-01 PROCEDURE — 94003 VENT MGMT INPAT SUBQ DAY: CPT

## 2021-05-01 PROCEDURE — 36415 COLL VENOUS BLD VENIPUNCTURE: CPT

## 2021-05-01 PROCEDURE — 99233 SBSQ HOSP IP/OBS HIGH 50: CPT | Performed by: INTERNAL MEDICINE

## 2021-05-01 PROCEDURE — 82803 BLOOD GASES ANY COMBINATION: CPT

## 2021-05-01 PROCEDURE — 85025 COMPLETE CBC W/AUTO DIFF WBC: CPT

## 2021-05-01 PROCEDURE — 94640 AIRWAY INHALATION TREATMENT: CPT

## 2021-05-01 PROCEDURE — 2580000003 HC RX 258: Performed by: INTERNAL MEDICINE

## 2021-05-01 PROCEDURE — 31624 DX BRONCHOSCOPE/LAVAGE: CPT | Performed by: INTERNAL MEDICINE

## 2021-05-01 PROCEDURE — 71045 X-RAY EXAM CHEST 1 VIEW: CPT

## 2021-05-01 PROCEDURE — 80076 HEPATIC FUNCTION PANEL: CPT

## 2021-05-01 PROCEDURE — 99291 CRITICAL CARE FIRST HOUR: CPT | Performed by: INTERNAL MEDICINE

## 2021-05-01 RX ORDER — LIDOCAINE HYDROCHLORIDE 40 MG/ML
SOLUTION TOPICAL
Status: DISPENSED
Start: 2021-05-01 | End: 2021-05-01

## 2021-05-01 RX ADMIN — CEFEPIME 2000 MG: 2 INJECTION, POWDER, FOR SOLUTION INTRAVENOUS at 21:22

## 2021-05-01 RX ADMIN — DEXAMETHASONE SODIUM PHOSPHATE 6 MG: 10 INJECTION, SOLUTION INTRAMUSCULAR; INTRAVENOUS at 07:43

## 2021-05-01 RX ADMIN — PROPOFOL 30 MCG/KG/MIN: 10 INJECTION, EMULSION INTRAVENOUS at 22:23

## 2021-05-01 RX ADMIN — VANCOMYCIN HYDROCHLORIDE 750 MG: 750 INJECTION, POWDER, LYOPHILIZED, FOR SOLUTION INTRAVENOUS at 13:25

## 2021-05-01 RX ADMIN — CHLORHEXIDINE GLUCONATE 0.12% ORAL RINSE 15 ML: 1.2 LIQUID ORAL at 21:22

## 2021-05-01 RX ADMIN — ENOXAPARIN SODIUM 30 MG: 30 INJECTION SUBCUTANEOUS at 07:43

## 2021-05-01 RX ADMIN — IPRATROPIUM BROMIDE AND ALBUTEROL SULFATE 1 AMPULE: 2.5; .5 SOLUTION RESPIRATORY (INHALATION) at 20:18

## 2021-05-01 RX ADMIN — IPRATROPIUM BROMIDE AND ALBUTEROL SULFATE 1 AMPULE: 2.5; .5 SOLUTION RESPIRATORY (INHALATION) at 10:46

## 2021-05-01 RX ADMIN — Medication 2000 UNITS: at 07:43

## 2021-05-01 RX ADMIN — MUPIROCIN: 20 OINTMENT TOPICAL at 07:43

## 2021-05-01 RX ADMIN — PROPOFOL 35 MCG/KG/MIN: 10 INJECTION, EMULSION INTRAVENOUS at 15:01

## 2021-05-01 RX ADMIN — INSULIN LISPRO 6 UNITS: 100 INJECTION, SOLUTION INTRAVENOUS; SUBCUTANEOUS at 10:55

## 2021-05-01 RX ADMIN — INSULIN LISPRO 3 UNITS: 100 INJECTION, SOLUTION INTRAVENOUS; SUBCUTANEOUS at 00:30

## 2021-05-01 RX ADMIN — RDII 250 MG CAPSULE 250 MG: at 21:23

## 2021-05-01 RX ADMIN — ENOXAPARIN SODIUM 30 MG: 30 INJECTION SUBCUTANEOUS at 21:22

## 2021-05-01 RX ADMIN — INSULIN LISPRO 9 UNITS: 100 INJECTION, SOLUTION INTRAVENOUS; SUBCUTANEOUS at 15:21

## 2021-05-01 RX ADMIN — METOPROLOL TARTRATE 25 MG: 25 TABLET, FILM COATED ORAL at 15:02

## 2021-05-01 RX ADMIN — PROPOFOL 35 MCG/KG/MIN: 10 INJECTION, EMULSION INTRAVENOUS at 08:46

## 2021-05-01 RX ADMIN — METOPROLOL TARTRATE 25 MG: 25 TABLET, FILM COATED ORAL at 09:52

## 2021-05-01 RX ADMIN — INSULIN LISPRO 3 UNITS: 100 INJECTION, SOLUTION INTRAVENOUS; SUBCUTANEOUS at 05:15

## 2021-05-01 RX ADMIN — METOPROLOL TARTRATE 25 MG: 25 TABLET, FILM COATED ORAL at 21:32

## 2021-05-01 RX ADMIN — RDII 250 MG CAPSULE 250 MG: at 07:43

## 2021-05-01 RX ADMIN — Medication 10 ML: at 21:22

## 2021-05-01 RX ADMIN — MIDAZOLAM HYDROCHLORIDE 2 MG: 1 INJECTION, SOLUTION INTRAMUSCULAR; INTRAVENOUS at 23:41

## 2021-05-01 RX ADMIN — INSULIN LISPRO 3 UNITS: 100 INJECTION, SOLUTION INTRAVENOUS; SUBCUTANEOUS at 23:41

## 2021-05-01 RX ADMIN — IPRATROPIUM BROMIDE AND ALBUTEROL SULFATE 1 AMPULE: 2.5; .5 SOLUTION RESPIRATORY (INHALATION) at 08:35

## 2021-05-01 RX ADMIN — IPRATROPIUM BROMIDE AND ALBUTEROL SULFATE 1 AMPULE: 2.5; .5 SOLUTION RESPIRATORY (INHALATION) at 16:15

## 2021-05-01 RX ADMIN — MIDAZOLAM HYDROCHLORIDE 2 MG: 1 INJECTION, SOLUTION INTRAMUSCULAR; INTRAVENOUS at 16:47

## 2021-05-01 RX ADMIN — IPRATROPIUM BROMIDE AND ALBUTEROL SULFATE 1 AMPULE: 2.5; .5 SOLUTION RESPIRATORY (INHALATION) at 03:51

## 2021-05-01 RX ADMIN — CEFEPIME 2000 MG: 2 INJECTION, POWDER, FOR SOLUTION INTRAVENOUS at 12:24

## 2021-05-01 RX ADMIN — ACETAMINOPHEN 650 MG: 325 TABLET ORAL at 00:41

## 2021-05-01 RX ADMIN — MUPIROCIN: 20 OINTMENT TOPICAL at 21:22

## 2021-05-01 RX ADMIN — INSULIN LISPRO 3 UNITS: 100 INJECTION, SOLUTION INTRAVENOUS; SUBCUTANEOUS at 21:23

## 2021-05-01 RX ADMIN — PROPOFOL 45 MCG/KG/MIN: 10 INJECTION, EMULSION INTRAVENOUS at 03:33

## 2021-05-01 RX ADMIN — MIDAZOLAM HYDROCHLORIDE 2 MG: 1 INJECTION, SOLUTION INTRAMUSCULAR; INTRAVENOUS at 21:32

## 2021-05-01 RX ADMIN — METOPROLOL TARTRATE 25 MG: 25 TABLET, FILM COATED ORAL at 05:05

## 2021-05-01 RX ADMIN — CHLORHEXIDINE GLUCONATE 0.12% ORAL RINSE 15 ML: 1.2 LIQUID ORAL at 07:47

## 2021-05-01 ASSESSMENT — PULMONARY FUNCTION TESTS
PIF_VALUE: 25
PIF_VALUE: 24
PIF_VALUE: 20

## 2021-05-01 NOTE — PROCEDURES
PROCEDURE:  BRONCHOSCOPY WITH BRONCHOALVEOLAR LAVAGE    Pre-procedure evaluation/H&P: Critically ill patient with respiratory failure, requires fiberoptic bronchoscopy for direct airway evaluation and to enable acquisition of quality pulmonary fluid sample for analysis.   Please see my ICU note from today for additional details    Current Facility-Administered Medications   Medication Dose Route Frequency Provider Last Rate Last Admin    cefepime (MAXIPIME) 2000 mg IVPB minibag  2,000 mg Intravenous Q8H Rob Hair MD        lidocaine (XYLOCAINE) 4 % external solution             insulin lispro (HUMALOG) injection vial 0-18 Units  0-18 Units Subcutaneous Q4H Rob Hair MD   6 Units at 05/01/21 1055    metoprolol tartrate (LOPRESSOR) tablet 25 mg  25 mg Oral Q6H Rob Hair MD   25 mg at 05/01/21 7861    midazolam (VERSED) injection 2 mg  2 mg Intravenous Q1H PRN Annabel Hernandez MD   2 mg at 04/30/21 0132    fentaNYL (SUBLIMAZE) injection 25 mcg  25 mcg Intravenous Q1H PRN Annabel Hernandez MD        propofol injection  5-50 mcg/kg/min Intravenous Titrated Annabel Hernandez MD 12.2 mL/hr at 05/01/21 0846 35 mcg/kg/min at 05/01/21 0846    chlorhexidine (PERIDEX) 0.12 % solution 15 mL  15 mL Mouth/Throat BID Annabel Hernandez MD   15 mL at 05/01/21 0747    mupirocin (BACTROBAN) 2 % ointment   Nasal BID Yuriy Boston MD   Given at 05/01/21 0743    ipratropium-albuterol (DUONEB) nebulizer solution 1 ampule  1 ampule Inhalation Q4H Yuriy Boston MD   1 ampule at 05/01/21 1046    0.9 % sodium chloride infusion   Intravenous PRN Yuriy Boston MD        sodium chloride flush 0.9 % injection 5-40 mL  5-40 mL Intravenous 2 times per day Yuriy Boston MD   10 mL at 04/30/21 2051    sodium chloride flush 0.9 % injection 5-40 mL  5-40 mL Intravenous PRN Yuriy Boston MD        0.9 % sodium chloride infusion  25 mL Intravenous PRN Yuriy Boston MD        enoxaparin (LOVENOX) injection 30 mg  30 mg Subcutaneous BID Rufino Sesay MD   30 mg at 05/01/21 0743    promethazine (PHENERGAN) tablet 12.5 mg  12.5 mg Oral Q6H PRN Rufino Sesay MD        Or    [facility]ron [facility] injection 4 mg  4 mg Intravenous Q6H PRN Rufino Sesay MD   4 mg at 04/26/21 0636    polyethylene glycol (GLYCOLAX) packet 17 g  17 g Oral Daily PRN Rufino Sesay MD        acetaminophen (TYLENOL) tablet 650 mg  650 mg Oral Q6H PRN Rufino Sesay MD   650 mg at 05/01/21 0041    Or    acetaminophen (TYLENOL) suppository 650 mg  650 mg Rectal Q6H PRN Rufino Sesay MD        guaiFENesin-dextromethorphan (ROBITUSSIN DM) 100-10 MG/5ML syrup 5 mL  5 mL Oral Q4H PRN Rufino Sesay MD   5 mL at 04/26/21 1452    Vitamin D (CHOLECALCIFEROL) tablet 2,000 Units  2,000 Units Oral Daily Rufino Sesay MD   2,000 Units at 05/01/21 0743    glucose (GLUTOSE) 40 % oral gel 15 g  15 g Oral PRN Rufino Sesay MD        dextrose 50 % IV solution  12.5 g Intravenous PRN Rufino Sesay MD        glucagon (rDNA) injection 1 mg  1 mg Intramuscular PRN Rufino Sesay MD        dextrose 5 % solution  100 mL/hr Intravenous PRN Rufino Sesay MD        saccharomyces boulardii (FLORASTOR) capsule 250 mg  250 mg Oral BID Rufino Sesay MD   250 mg at 05/01/21 0743    dexamethasone (PF) (DECADRON) injection 6 mg  6 mg Intravenous Q24H Rufino Sesay MD   6 mg at 05/01/21 1406     Allergies   Allergen Reactions    Penicillins Rash       EXAM:    HENT: ETT in place, Mallampati cannot be determined  Cardiovascular: Normal rate, regular rhythm, normal heart sounds. Pulmonary/Chest: No wheezes. + rhonchi. No rales. Abdominal: Soft. Bowel sounds are normal. No distension. ASA CLASS  IV.  Severe Systemic Disease which is a threat to life    Sedation plan: Continue ICU sedation, as needed versed/fentanyl boluses    Post Procedure Plan: ongoing ICU care    The risks and benefits as well as alternatives to the procedure have been discussed with the family. The patient's spouse understands and agrees to proceed. DESCRIPTION OF PROCEDURE & FINDINGS:  A time out was taken. IV sedation was continued. The scope was passed with ease via the endotracheal tube. A complete airway inspection was performed. No endobronchial lesions were identified. There were scant viscous secretions throughout the airways. Washings were obtained throughout the airways. A bronchoalveolar lavage was obtained from the LLL with good return. The patient tolerated the procedure well. Recovery per endoscopy protocol.     FOLLOW UP:  Cultures

## 2021-05-01 NOTE — PROGRESS NOTES
GI recommending Fiber supplement per dietician.      Electronically signed by Anibal Damon RN on 5/1/2021 at 12:31 PM

## 2021-05-01 NOTE — PROGRESS NOTES
Patient reassessed. Pretty well unchanged. More easily arousable. Bronch to occur around 1200.      Electronically signed by Efra Levine RN on 5/1/2021 at 11:15 AM

## 2021-05-01 NOTE — PROGRESS NOTES
Shift assessment complete. WBC jumped from 7.3 to 14+. Patient currently intubated with ETT at 20LL. Vent settings are 24/250/+8/40%. 89-94% sats this AM.     BPs are soft. She was on 45 mcg of propofol upon my arrival. Now on 35. NSR on monitor. Lungs are diminished throughout. RR are easy and even. Breathing over set rate to 28 RR. No edema noted througout. Bowels active x4. Abdomen is soft and nondistended. Valderrama and Flexi in place. BUE soft wrist restraints in place due to attempts of pulling at ETT and lines. No signs of injury noted and PROM performed.  .     Electronically signed by Rosalba Godfrey RN on 5/1/2021 at 7:56 AM  Vitals:    05/01/21 0706   BP: (!) 81/52   Pulse: 81   Resp: 19   Temp: 99.4 °F (37.4 °C)   SpO2: (!) 89%

## 2021-05-01 NOTE — PROGRESS NOTES
Shift assessment completed, see flow sheet. Pt is not/following commands with a RASS of -2, responding to pain/sucationing. Intubated/sedated on AC/VC # 8 ETT, at 20 LL. RR 24 /  / FiO2 40% / PEEP 8. HR ST on monitor pt appears to be in ST was in Afib /60   Pulse 112   Temp 99.5 °F (37.5 °C) (Bladder)   Resp 22   Ht 4' 11\" (1.499 m)   Wt 136 lb 6.4 oz (61.9 kg)   SpO2 92%   BMI 27.55 kg/m²     Respirations are easy, even, and unlabored. Bilateral lung sounds diminished. Edema present on all extremities. OG in place at 55, with TF at 50 mL/hr, 0 mL residual.    Valderrama in place and patent with STAT lock. Flexiseal in place. 3 PIV in place, WNL with the following infusions:  Propofol @ 45mcg/kg/min. Kacy Bella for ABX    Pt family called and updated. Writer will continue to monitor.

## 2021-05-01 NOTE — PROGRESS NOTES
05/01/21 0351   Vent Information   Vent Type 980   Vent Mode AC/VC   Vt Ordered 250 mL   Rate Set 24 bmp   Peak Flow 50 L/min   FiO2  40 %   SpO2 93 %   SpO2/FiO2 ratio 232.5   Sensitivity 3   PEEP/CPAP 8   Humidification Source Heated wire   Humidification Temp Measured 37   Circuit Condensation Drained   Vent Patient Data   Peak Inspiratory Pressure 20 cmH2O   Mean Airway Pressure 12 cmH20   Rate Measured 28 br/min   Vt Exhaled 292 mL   Minute Volume 7.3 Liters   I:E Ratio 1:3.1   Cough/Sputum   Sputum How Obtained Endotracheal   Cough Productive   Sputum Amount Small   Sputum Color Yellow   Tenacity Thick   Spontaneous Breathing Trial (SBT) RT Doc   Pulse 83   Breath Sounds   Right Upper Lobe Diminished   Right Middle Lobe Diminished   Right Lower Lobe Diminished   Left Upper Lobe Diminished   Left Lower Lobe Diminished   Additional Respiratory  Assessments   Resp 19   Alarm Settings   High Pressure Alarm 45 cmH2O   Low Minute Volume Alarm 3 L/min   Apnea (secs) 20 secs   High Respiratory Rate 40 br/min   Low Exhaled Vt  175 mL   Patient Observation   Observations 8ett 20 at lip

## 2021-05-01 NOTE — PROGRESS NOTES
Assisted M.D. with bronchoscopy. Pt placed on PCV 15 and 100% Fio2 during procedure. Pt. sage well and without complication. Returned to previous settings after procedure.

## 2021-05-01 NOTE — PROGRESS NOTES
RESPIRATORY THERAPY ASSESSMENT    Name:  Randy Burks Record Number:  [id number]  Age: 61 y.o. Gender: female  : 1955  Today's Date:  2021  Room:  [address] [place] [address]    Assessment     Is the patient being admitted for a COPD or Asthma exacerbation? No   (If yes the patient will be seen every 4 hours for the first 24 hours and then reassessed)    Patient Admission Diagnosis      Allergies  Allergies   Allergen Reactions    Penicillins Rash       Minimum Predicted Vital Capacity:               Actual Vital Capacity:                    Pulmonary History:no history  Home Oxygen Therapy:  room air  Home Respiratory Therapy:None   Current Respiratory Therapy:  duoneb q4  Treatment Type: Aerosol generator  Medications: Albuterol/Ipratropium    Respiratory Severity Index(RSI)   Patients with orders for inhalation medications, oxygen, or any therapeutic treatment modality will be placed on Respiratory Protocol. They will be assessed with the first treatment and at least every 72 hours thereafter. The following severity scale will be used to determine frequency of treatment intervention.     Smoking History: No Smoking History = 0    Social History  Social History     Tobacco Use    Smoking status: Never Smoker    Smokeless tobacco: Never Used   Substance Use Topics    Alcohol use: Yes     Comment: 0-1 drinks per month    Drug use: No       Recent Surgical History: None = 0  Past Surgical History  Past Surgical History:   Procedure Laterality Date    ARM SURGERY Right 2020    RIGHT ULNAR NERVE DECOMPRESSION AT THE ELBOW performed by Jerry Durham MD at [address] [facility] rods for scoliosis    CARPAL TUNNEL RELEASE Right 2020    RIGHT CARPAL TUNNEL RELEASE performed by Jerry Durham MD at [address] [facility] Bilateral     cataracts    INNER EAR SURGERY Right     replacement of eardrum    TONSILLECTOMY         Level of Consciousness: Comatose = bpm  c. RR < 30 bpm                d. Can demonstrate a 2-3 second inspiratory hold  4. Bronchodilators will be administered via Hand Held Nebulizer ORTI[facility]) to patients when ANY of the following criteria are met  a. Incognizant or uncooperative          b. Patients treated with HHN at Home        c. Unable to demonstrate proper use of MDI with spacer     d. RR > 30 bpm   5. Bronchodilators will be delivered via Metered Dose Inhaler (MDI), HHN, Aerogen to intubated patients on mechanical ventilation. 6. Inhalation medication orders will be delivered and/or substituted as outlined below. Aerosolized Medications Ordering and Administration Guidelines:    1. All Medications will be ordered by a physician, and their frequency and/or modality will be adjusted as defined by the patients Respiratory Severity Index (RSI) score. 2. If the patient does not have documented COPD, consider discontinuing anticholinergics when RSI is less than 9.  3. If the bronchospasm worsens (increased RSI), then the bronchodilator frequency can be increased to a maximum of every 4 hours. If greater than every 4 hours is required, the physician will be contacted. 4. If the bronchospasm improves, the frequency of the bronchodilator can be decreased, based on the patient's RSI, but not less than home treatment regimen frequency. 5. Bronchodilator(s) will be discontinued if patient has a RSI less than 9 and has received no scheduled or as needed treatment for 72  Hrs. Patients Ordered on a Mucolytic Agent:    1. Must always be administered with a bronchodilator. 2. Discontinue if patient experiences worsened bronchospasm, or secretions have lessened to the point that the patient is able to clear them with a cough. Anti-inflammatory and Combination Medications:    1.  If the patient lacks prior history of lung disease, is not using inhaled anti-inflammatory medication at home, and lacks wheezing by examination or by history for at least 24 hours, contact physician for possible discontinuation.

## 2021-05-01 NOTE — PROGRESS NOTES
Labs     04/29/21  0404 04/30/21  0636 05/01/21  0525   WBC 8.4 7.3 14.7*   HGB 11.6* 12.1 13.0   HCT 34.1* 37.6 39.0   MCV 93.7 94.5 91.4    247 497*     BMP:   Recent Labs     04/29/21  0404 04/30/21  0636 05/01/21  0525   * 142 139   K 3.5 3.6 4.4   * 102 98*   CO2 27 33* 35*   BUN 16 17 27*   CREATININE <0.5* <0.5* <0.5*     LIVER PROFILE:   Recent Labs     04/29/21  0404 04/30/21  0636 05/01/21  0525   AST 38* 37 36   ALT 24 23 24   BILIDIR <0.2 <0.2 <0.2   BILITOT <0.2 <0.2 <0.2   ALKPHOS 89 86 87     Microbiology  4/25/2021 SARS-CoV-2 positive  4/25/2021 C. difficile negative  4/25/2021 blood NGTD  4/26/2021 urine mixed coral  4/27/2021 tracheal aspirate Candida  4/28/2020 1 GI bacterial pathogen by PCR negative    Imaging:  Chest imaging was reviewed by me and showed CTPA 4/25/2021  Pulmonary Arteries: Evaluation is compromised by motion artifact.  No   definite evidence of intraluminal filling defect to suggest pulmonary   embolism.  Main pulmonary artery is normal in caliber. Mediastinum: Mediastinal and bilateral hilar lymph nodes are probably   reactive.  The heart and pericardium demonstrate no acute abnormality.  There   is no acute abnormality of the thoracic aorta. Lungs/pleura: There is no pneumothorax or pleural effusion.  There are patchy   airspace opacities throughout both lungs.  Elevation of the left   hemidiaphragm is again noted. Upper Abdomen: There is fatty infiltration of the liver.  The visualized   upper abdomen is otherwise unremarkable. Soft Tissues/Bones: No acute bone or soft tissue abnormality.  Spinal   fixation rods are again seen.       Impression:       1. Motion limited study with no definite scan evidence for pulmonary embolus. 2. Bilateral airspace opacities probably represent a combination of   atelectasis and pneumonia. CT abdomen 4/25/2021  Impression:        1. Motion limited study.    2. Cholelithiasis and probable choledocholithiasis without evidence for acute   cholecystitis. 3. Nonobstructing left renal calculus. CXR 5/1/2021 bilateral airspace disease, greater on left, ET tube okay    ASSESSMENT:  · Acute hypoxemic respiratory failure -progressive  · COVID-19 pneumonia  · Abnormal CT: Mediastinal lymphadenopathy and parenchymal changes consistent with COVID-19 pneumonia  · Diarrhea  · Transaminitis - improved  · Hyperglycemia   · Tachyarrhythmia  · Low-grade fever with white count     PLAN:  COVID-19 isolation, droplet plus  Mechanical ventilation as per my orders. The ventilator was adjusted by me at the bedside for unstable, life threatening respiratory failure. IV Propofol for sedation, target RASS -2, with daily spontaneous awakening trial.  Fentanyl gtt as needed. CCP given 4/26/21, Tocilizumab given 4/27/2021, completed 5 days remdesivir  Decadron D#7, 6 mg daily   Bronchoscopy with left lower lobe BAL then start antibiotics, received 5 days ceftriaxone and azithromycin  Inhaled bronchodilators only as needed, MDI preferred  Home metoprolol  Eventually will need f/u CT imaging of chest  · Prophylaxis: Lovenox 30 twice daily  · I tried to call patient's spouse, left v/m. Total critical care time caring for this patient with life threatening, unstable organ failure, including direct patient contact, management of life support systems, review of data including imaging and labs, discussions with other team members and physicians is 32 minutes so far today, excluding procedures.

## 2021-05-01 NOTE — PROGRESS NOTES
Reassessment complete; see flow sheet. Pt febrile with 100.5 temp; tylenol given, fan on and cool wash cloth applied. Pt remains intubated and sedated with no change to vent or sedation settings. Writer will continue to monitor.

## 2021-05-01 NOTE — PROGRESS NOTES
Shift report given to Annemarie Vieira RN  at bedside. Patient care handed off in stable condition at this time.    Electronically signed by Hazel Aguayo RN on 5/1/2021 at 7:03 PM

## 2021-05-01 NOTE — PROGRESS NOTES
4 Eyes Skin Assessment     The patient is being assess for   Reassessment. I agree that 2 RN's have performed a thorough Head to Toe Skin Assessment on the patient. ALL assessment sites listed below have been assessed. Areas assessed by both nurses:   [x]   Head, Face, and Ears   [x]   Shoulders, Back, and Chest, Abdomen  [x]   Arms, Elbows, and Hands   [x]   Coccyx, Sacrum, and Ischium  [x]   Legs, Feet, and Heels        No skin issues noted. Irritation around rectum from loose stools. No skin breakdown. **SHARE this note so that the co-signing nurse is able to place an eSignature**    Co-signer eSignature: Electronically signed by CARINA Ryder on 5/1/21 at 5:18 PM EDT    Does the Patient have Skin Breakdown?   No          Miguel Prevention initiated:  Yes   Wound Care Orders initiated:  No      WOC nurse consulted for Pressure Injury (Stage 3,4, Unstageable, DTI, NWPT, Complex wounds)and New or Established Ostomies:  No      Primary Nurse eSignature: Electronically signed by Jennifer Bui RN on 5/1/21 at 3:32 PM EDT

## 2021-05-01 NOTE — PROGRESS NOTES
PROGRESS NOTE  S:66 yrs Patient  admitted on 4/25/2021 with Acute respiratory failure due to COVID-19 (HCC) [U07.1, J96.00] . Today she remains stable on the vent    Exam:   Vitals:    05/01/21 1200   BP: 97/62   Pulse: 94   Resp: 25   Temp: 99.5 °F (37.5 °C)   SpO2: 94%     Due to the current efforts to prevent transmission of COVID-19 and also the need to preserve PPE for other caregivers, a face-to-face encounter with the patient was not performed. That being said, all relevant records and diagnostic tests were reviewed, including laboratory results and imaging. Please reference any relevant documentation elsewhere. Care will be coordinated with the primary service. Medications: Reviewed    Labs:  CBC:   Recent Labs     04/29/21  0404 04/30/21 0636 05/01/21  0525   WBC 8.4 7.3 14.7*   HGB 11.6* 12.1 13.0   HCT 34.1* 37.6 39.0   MCV 93.7 94.5 91.4    247 497*     BMP:   Recent Labs     04/29/21  0404 04/30/21  0636 05/01/21  0525   * 142 139   K 3.5 3.6 4.4   * 102 98*   CO2 27 33* 35*   BUN 16 17 27*   CREATININE <0.5* <0.5* <0.5*     LIVER PROFILE:   Recent Labs     04/29/21  0404 04/30/21  0636 05/01/21  0525   AST 38* 37 36   ALT 24 23 24   PROT 5.6* 5.7* 5.9*   BILIDIR <0.2 <0.2 <0.2   BILITOT <0.2 <0.2 <0.2   ALKPHOS 89 86 87       Impression: 61year old female with history of HTN, arthritis, osteoporosis, admitted with COVID19 pneumonia and Marvetta Oly is likely related to COVID-19 superimposed on IBS    Recommendation:  1. Continue supportive care  2. Continue probiotics  3. Continue TFs to goal rate  4. Add fiber supplements to TFs  5. Can consider a trial of questran  6.  Will follow      Levi Marquez MD  1:38 PM 5/1/2021

## 2021-05-02 ENCOUNTER — APPOINTMENT (OUTPATIENT)
Dept: GENERAL RADIOLOGY | Age: 66
DRG: 130 | End: 2021-05-02
Payer: MEDICAID

## 2021-05-02 LAB
ALBUMIN SERPL-MCNC: 2.7 G/DL (ref 3.4–5)
ALP BLD-CCNC: 76 U/L (ref 40–129)
ALT SERPL-CCNC: 26 U/L (ref 10–40)
ANION GAP SERPL CALCULATED.3IONS-SCNC: 9 MMOL/L (ref 3–16)
AST SERPL-CCNC: 41 U/L (ref 15–37)
BASE EXCESS ARTERIAL: 11.4 MMOL/L (ref -3–3)
BASOPHILS ABSOLUTE: 0 K/UL (ref 0–0.2)
BASOPHILS RELATIVE PERCENT: 0 %
BILIRUB SERPL-MCNC: 0.3 MG/DL (ref 0–1)
BILIRUBIN DIRECT: <0.2 MG/DL (ref 0–0.3)
BILIRUBIN, INDIRECT: ABNORMAL MG/DL (ref 0–1)
BUN BLDV-MCNC: 28 MG/DL (ref 7–20)
CALCIUM SERPL-MCNC: 10.5 MG/DL (ref 8.3–10.6)
CARBOXYHEMOGLOBIN ARTERIAL: 0.3 % (ref 0–1.5)
CHLORIDE BLD-SCNC: 99 MMOL/L (ref 99–110)
CO2: 32 MMOL/L (ref 21–32)
CREAT SERPL-MCNC: <0.5 MG/DL (ref 0.6–1.2)
EOSINOPHILS ABSOLUTE: 0 K/UL (ref 0–0.6)
EOSINOPHILS RELATIVE PERCENT: 0 %
GFR AFRICAN AMERICAN: >60
GFR NON-AFRICAN AMERICAN: >60
GLUCOSE BLD-MCNC: 111 MG/DL (ref 70–99)
GLUCOSE BLD-MCNC: 125 MG/DL (ref 70–99)
GLUCOSE BLD-MCNC: 133 MG/DL (ref 70–99)
GLUCOSE BLD-MCNC: 151 MG/DL (ref 70–99)
GLUCOSE BLD-MCNC: 152 MG/DL (ref 70–99)
GLUCOSE BLD-MCNC: 256 MG/DL (ref 70–99)
GLUCOSE BLD-MCNC: 286 MG/DL (ref 70–99)
HCO3 ARTERIAL: 35.5 MMOL/L (ref 21–29)
HCT VFR BLD CALC: 40 % (ref 36–48)
HEMOGLOBIN, ART, EXTENDED: 13.6 G/DL (ref 12–16)
HEMOGLOBIN: 13.2 G/DL (ref 12–16)
LYMPHOCYTES ABSOLUTE: 2.3 K/UL (ref 1–5.1)
LYMPHOCYTES RELATIVE PERCENT: 14 %
MCH RBC QN AUTO: 30.3 PG (ref 26–34)
MCHC RBC AUTO-ENTMCNC: 33.1 G/DL (ref 31–36)
MCV RBC AUTO: 91.5 FL (ref 80–100)
METHEMOGLOBIN ARTERIAL: 0.3 %
MONOCYTES ABSOLUTE: 0.3 K/UL (ref 0–1.3)
MONOCYTES RELATIVE PERCENT: 2 %
NEUTROPHILS ABSOLUTE: 13.7 K/UL (ref 1.7–7.7)
NEUTROPHILS RELATIVE PERCENT: 84 %
O2 CONTENT ARTERIAL: 17 ML/DL
O2 SAT, ARTERIAL: 93.6 %
O2 THERAPY: ABNORMAL
PCO2 ARTERIAL: 43.7 MMHG (ref 35–45)
PDW BLD-RTO: 14 % (ref 12.4–15.4)
PERFORMED ON: ABNORMAL
PH ARTERIAL: 7.53 (ref 7.35–7.45)
PLATELET # BLD: 478 K/UL (ref 135–450)
PLATELET SLIDE REVIEW: ABNORMAL
PMV BLD AUTO: 7.6 FL (ref 5–10.5)
PO2 ARTERIAL: 61.2 MMHG (ref 75–108)
POTASSIUM REFLEX MAGNESIUM: 4 MMOL/L (ref 3.5–5.1)
RBC # BLD: 4.38 M/UL (ref 4–5.2)
SODIUM BLD-SCNC: 140 MMOL/L (ref 136–145)
TCO2 ARTERIAL: 36.8 MMOL/L
TOTAL PROTEIN: 5.7 G/DL (ref 6.4–8.2)
WBC # BLD: 16.3 K/UL (ref 4–11)

## 2021-05-02 PROCEDURE — 2500000003 HC RX 250 WO HCPCS: Performed by: INTERNAL MEDICINE

## 2021-05-02 PROCEDURE — 99291 CRITICAL CARE FIRST HOUR: CPT | Performed by: INTERNAL MEDICINE

## 2021-05-02 PROCEDURE — 36415 COLL VENOUS BLD VENIPUNCTURE: CPT

## 2021-05-02 PROCEDURE — 82803 BLOOD GASES ANY COMBINATION: CPT

## 2021-05-02 PROCEDURE — 2000000000 HC ICU R&B

## 2021-05-02 PROCEDURE — 6370000000 HC RX 637 (ALT 250 FOR IP): Performed by: INTERNAL MEDICINE

## 2021-05-02 PROCEDURE — 85025 COMPLETE CBC W/AUTO DIFF WBC: CPT

## 2021-05-02 PROCEDURE — 2580000003 HC RX 258: Performed by: INTERNAL MEDICINE

## 2021-05-02 PROCEDURE — 6360000002 HC RX W HCPCS: Performed by: INTERNAL MEDICINE

## 2021-05-02 PROCEDURE — 80048 BASIC METABOLIC PNL TOTAL CA: CPT

## 2021-05-02 PROCEDURE — 94003 VENT MGMT INPAT SUBQ DAY: CPT

## 2021-05-02 PROCEDURE — 94761 N-INVAS EAR/PLS OXIMETRY MLT: CPT

## 2021-05-02 PROCEDURE — 2700000000 HC OXYGEN THERAPY PER DAY

## 2021-05-02 PROCEDURE — 80076 HEPATIC FUNCTION PANEL: CPT

## 2021-05-02 PROCEDURE — 71045 X-RAY EXAM CHEST 1 VIEW: CPT

## 2021-05-02 PROCEDURE — 94640 AIRWAY INHALATION TREATMENT: CPT

## 2021-05-02 PROCEDURE — 99233 SBSQ HOSP IP/OBS HIGH 50: CPT | Performed by: INTERNAL MEDICINE

## 2021-05-02 RX ADMIN — Medication 75 MCG/HR: at 21:34

## 2021-05-02 RX ADMIN — RDII 250 MG CAPSULE 250 MG: at 20:24

## 2021-05-02 RX ADMIN — INSULIN LISPRO 9 UNITS: 100 INJECTION, SOLUTION INTRAVENOUS; SUBCUTANEOUS at 11:08

## 2021-05-02 RX ADMIN — RDII 250 MG CAPSULE 250 MG: at 07:37

## 2021-05-02 RX ADMIN — MIDAZOLAM HYDROCHLORIDE 2 MG: 1 INJECTION, SOLUTION INTRAMUSCULAR; INTRAVENOUS at 17:04

## 2021-05-02 RX ADMIN — CEFEPIME 2000 MG: 2 INJECTION, POWDER, FOR SOLUTION INTRAVENOUS at 04:08

## 2021-05-02 RX ADMIN — Medication 2000 UNITS: at 07:28

## 2021-05-02 RX ADMIN — PROPOFOL 30 MCG/KG/MIN: 10 INJECTION, EMULSION INTRAVENOUS at 07:28

## 2021-05-02 RX ADMIN — VANCOMYCIN HYDROCHLORIDE 750 MG: 750 INJECTION, POWDER, LYOPHILIZED, FOR SOLUTION INTRAVENOUS at 13:50

## 2021-05-02 RX ADMIN — IPRATROPIUM BROMIDE AND ALBUTEROL SULFATE 1 AMPULE: 2.5; .5 SOLUTION RESPIRATORY (INHALATION) at 20:06

## 2021-05-02 RX ADMIN — MIDAZOLAM HYDROCHLORIDE 2 MG: 1 INJECTION, SOLUTION INTRAMUSCULAR; INTRAVENOUS at 08:29

## 2021-05-02 RX ADMIN — IPRATROPIUM BROMIDE AND ALBUTEROL SULFATE 1 AMPULE: 2.5; .5 SOLUTION RESPIRATORY (INHALATION) at 00:05

## 2021-05-02 RX ADMIN — IPRATROPIUM BROMIDE AND ALBUTEROL SULFATE 1 AMPULE: 2.5; .5 SOLUTION RESPIRATORY (INHALATION) at 16:39

## 2021-05-02 RX ADMIN — MIDAZOLAM HYDROCHLORIDE 2 MG: 1 INJECTION, SOLUTION INTRAMUSCULAR; INTRAVENOUS at 04:08

## 2021-05-02 RX ADMIN — ENOXAPARIN SODIUM 30 MG: 30 INJECTION SUBCUTANEOUS at 07:28

## 2021-05-02 RX ADMIN — PROPOFOL 20 MCG/KG/MIN: 10 INJECTION, EMULSION INTRAVENOUS at 14:28

## 2021-05-02 RX ADMIN — ENOXAPARIN SODIUM 30 MG: 30 INJECTION SUBCUTANEOUS at 20:24

## 2021-05-02 RX ADMIN — MIDAZOLAM HYDROCHLORIDE 2 MG: 1 INJECTION, SOLUTION INTRAMUSCULAR; INTRAVENOUS at 21:55

## 2021-05-02 RX ADMIN — CHLORHEXIDINE GLUCONATE 0.12% ORAL RINSE 15 ML: 1.2 LIQUID ORAL at 20:25

## 2021-05-02 RX ADMIN — Medication 25 MCG/HR: at 08:40

## 2021-05-02 RX ADMIN — INSULIN LISPRO 9 UNITS: 100 INJECTION, SOLUTION INTRAVENOUS; SUBCUTANEOUS at 15:21

## 2021-05-02 RX ADMIN — DEXAMETHASONE SODIUM PHOSPHATE 6 MG: 10 INJECTION, SOLUTION INTRAMUSCULAR; INTRAVENOUS at 07:28

## 2021-05-02 RX ADMIN — CHLORHEXIDINE GLUCONATE 0.12% ORAL RINSE 15 ML: 1.2 LIQUID ORAL at 07:28

## 2021-05-02 RX ADMIN — CEFEPIME 2000 MG: 2 INJECTION, POWDER, FOR SOLUTION INTRAVENOUS at 20:25

## 2021-05-02 RX ADMIN — Medication 10 ML: at 20:25

## 2021-05-02 RX ADMIN — CEFEPIME 2000 MG: 2 INJECTION, POWDER, FOR SOLUTION INTRAVENOUS at 12:20

## 2021-05-02 RX ADMIN — VANCOMYCIN HYDROCHLORIDE 750 MG: 750 INJECTION, POWDER, LYOPHILIZED, FOR SOLUTION INTRAVENOUS at 02:03

## 2021-05-02 RX ADMIN — INSULIN LISPRO 3 UNITS: 100 INJECTION, SOLUTION INTRAVENOUS; SUBCUTANEOUS at 04:07

## 2021-05-02 RX ADMIN — IPRATROPIUM BROMIDE AND ALBUTEROL SULFATE 1 AMPULE: 2.5; .5 SOLUTION RESPIRATORY (INHALATION) at 03:09

## 2021-05-02 RX ADMIN — IPRATROPIUM BROMIDE AND ALBUTEROL SULFATE 1 AMPULE: 2.5; .5 SOLUTION RESPIRATORY (INHALATION) at 08:14

## 2021-05-02 RX ADMIN — MIDAZOLAM HYDROCHLORIDE 2 MG: 1 INJECTION, SOLUTION INTRAMUSCULAR; INTRAVENOUS at 14:28

## 2021-05-02 RX ADMIN — FENTANYL CITRATE 25 MCG: 50 INJECTION, SOLUTION INTRAMUSCULAR; INTRAVENOUS at 21:59

## 2021-05-02 RX ADMIN — METOPROLOL TARTRATE 25 MG: 25 TABLET, FILM COATED ORAL at 04:08

## 2021-05-02 RX ADMIN — Medication 10 ML: at 07:28

## 2021-05-02 RX ADMIN — IPRATROPIUM BROMIDE AND ALBUTEROL SULFATE 1 AMPULE: 2.5; .5 SOLUTION RESPIRATORY (INHALATION) at 11:40

## 2021-05-02 ASSESSMENT — PAIN SCALES - GENERAL
PAINLEVEL_OUTOF10: 0
PAINLEVEL_OUTOF10: 0
PAINLEVEL_OUTOF10: 4

## 2021-05-02 ASSESSMENT — PULMONARY FUNCTION TESTS
PIF_VALUE: 20
PIF_VALUE: 19
PIF_VALUE: 27

## 2021-05-02 NOTE — PROGRESS NOTES
Shift assessment complete. Patient currently intubated with ETT at 20LL. Vent settings are 250/24/+8/45%. No SBT this am.     Propofol infusing at 30 mcg/kg/min. RASS -2. Opens eyes on command and to voice. Lungs are diminished throughout. RR are easy and even. She is compliant with VENT at this time. NSR.   VSS. Bowels are hyperactive x4. Diarrhea noted in [address] [place] [address]. Valderrama in place with good urine output. LABS reviewed. BUE soft wrist restraints in place due to attempts of pulling at ETT and lines. No signs of injury noted and PROM performed.      Electronically signed by Phoenix Monson RN on 5/2/2021 at 8:17 AM

## 2021-05-02 NOTE — PROGRESS NOTES
Vancomycin Day # 2  Current dose = 750 mg IVPB q12h  BUN/SRCR 28/ < 0.5        WBC   16.3            Tmax 100.1  Vancomycin trough ordered for tomorrow @ 1330. Continue same until then.

## 2021-05-02 NOTE — PROGRESS NOTES
Dropped propofol from 15 to 5 mcg for low BPs.     72/44.      Electronically signed by Aisha Prader, RN on 5/2/2021 at 4:03 PM

## 2021-05-02 NOTE — PROGRESS NOTES
4 Eyes Skin Assessment     The patient is being assess for   Shift Handoff    I agree that 2 RN's have performed a thorough Head to Toe Skin Assessment on the patient. ALL assessment sites listed below have been assessed. Areas assessed by both nurses:   [x]   Head, Face, and Ears   [x]   Shoulders, Back, and Chest, Abdomen  [x]   Arms, Elbows, and Hands   [x]   Coccyx, Sacrum, and Ischium  [x]   Legs, Feet, and Heels        Redness to janis area/rectum from diarrhea. Cleansed and zinc cream applied. Co-signer eSignature: Electronically signed by Miguelina Castaneda RN on 5/2/21 at 7:18 AM EDT    Does the Patient have Skin Breakdown?   No          Miguel Prevention initiated:  Yes   Wound Care Orders initiated:  No      WOC nurse consulted for Pressure Injury (Stage 3,4, Unstageable, DTI, NWPT, Complex wounds)and New or Established Ostomies:  No      Primary Nurse eSignature: Electronically signed by Massiel Grider RN on 5/2/21 at 5:50 AM EDT

## 2021-05-02 NOTE — PROGRESS NOTES
[facility] Pt stable at this time. POC transferred.

## 2021-05-02 NOTE — PROGRESS NOTES
Patient reassessed. Patient currently intubated with ETT at 21. Vent settings are 24/250/+8/40%. Fentanyl infusing at 75 mcg//h. Propofol infusing at 15 mcg/kg/min. BUE soft wrist restraints in place due to attempts of pulling at ETT and lines. No signs of injury noted and PROM performed.      Electronically signed by Denisse Shaffer RN on 5/2/2021 at 3:26 PM

## 2021-05-02 NOTE — PROGRESS NOTES
Hospitalist Progress Note      PCP: Bisi Pierre, ARABELLA - CNP    Date of Admission: 4/25/2021    Subjective: cont to be intubated, had bronch today    Medications:  Reviewed    Infusion Medications    propofol 35 mcg/kg/min (05/01/21 2223)    sodium chloride      sodium chloride      dextrose       Scheduled Medications    cefepime  2,000 mg Intravenous Q8H    vancomycin  750 mg Intravenous Q12H    insulin lispro  0-18 Units Subcutaneous Q4H    metoprolol tartrate  25 mg Oral Q6H    chlorhexidine  15 mL Mouth/Throat BID    ipratropium-albuterol  1 ampule Inhalation Q4H    sodium chloride flush  5-40 mL Intravenous 2 times per day    enoxaparin  30 mg Subcutaneous BID    Vitamin D  2,000 Units Oral Daily    saccharomyces boulardii  250 mg Oral BID    dexamethasone  6 mg Intravenous Q24H     PRN Meds: midazolam, fentanNYL, sodium chloride, sodium chloride flush, sodium chloride, promethazine **OR** ondansetron, polyethylene glycol, acetaminophen **OR** acetaminophen, guaiFENesin-dextromethorphan, glucose, dextrose, glucagon (rDNA), dextrose      Intake/Output Summary (Last 24 hours) at 5/2/2021 0117  Last data filed at 5/1/2021 2200  Gross per 24 hour   Intake 3058 ml   Output 3440 ml   Net -382 ml       Physical Exam Performed:    BP (!) 110/54   Pulse 92   Temp 99.9 °F (37.7 °C) (Bladder)   Resp 18   Ht 4' 11\" (1.499 m)   Wt 136 lb 0.4 oz (61.7 kg)   SpO2 92%   BMI 27.47 kg/m²     Pt seen from outside  On vent - resp even  Cardio: tachy    Labs:   Recent Labs     04/29/21  0404 04/30/21  0636 05/01/21  0525   WBC 8.4 7.3 14.7*   HGB 11.6* 12.1 13.0   HCT 34.1* 37.6 39.0    247 497*     Recent Labs     04/29/21  0404 04/30/21  0636 05/01/21  0525   * 142 139   K 3.5 3.6 4.4   * 102 98*   CO2 27 33* 35*   BUN 16 17 27*   CREATININE <0.5* <0.5* <0.5*   CALCIUM 8.1* 9.7 10.5     Recent Labs     04/29/21  0404 04/30/21  0636 05/01/21  0525   AST 38* 37 36   ALT 24 23 24 BILIDIR <0.2 <0.2 <0.2   BILITOT <0.2 <0.2 <0.2   ALKPHOS 89 86 87     No results for input(s): INR in the last 72 hours. No results for input(s): Caryn Councilman in the last 72 hours. Urinalysis:      Lab Results   Component Value Date    NITRU Negative 04/26/2021    WBCUA 10-20 04/26/2021    BACTERIA 2+ 04/26/2021    RBCUA 3-4 04/26/2021    BLOODU MODERATE 04/26/2021    SPECGRAV 1.020 04/26/2021    GLUCOSEU Negative 04/26/2021       Radiology:  XR CHEST PORTABLE   Final Result   Slight improved aeration of the lungs. Scattered airspace disease remains. XR CHEST PORTABLE   Final Result   Moderate consolidating airspace disease left lower lobe. Improving aeration with decreased airspace disease right lung and left apex. XR CHEST PORTABLE   Final Result   Patchy airspace opacities bilaterally, may be related to pulmonary edema   versus pneumonia. Mild to moderate left pleural effusion. Asymmetric elevation of the left hemidiaphragm. XR CHEST PORTABLE   Final Result   Persistent moderate bilateral airspace disease unchanged. Endotracheal tube is in satisfactory position at this time. XR CHEST PORTABLE   Final Result   Endotracheal tube 1 cm above the wyatt, consider 2-3 cm retraction. Multifocal airspace opacities worrisome for multifocal pneumonia or   atelectasis. Mild progressed within right lung base and right upper lung. CT ABDOMEN PELVIS W IV CONTRAST Additional Contrast? None   Final Result   1. Motion limited study. 2. Cholelithiasis and probable choledocholithiasis without evidence for acute   cholecystitis. 3. Nonobstructing left renal calculus. CT CHEST PULMONARY EMBOLISM W CONTRAST   Final Result   1. Motion limited study with no definite scan evidence for pulmonary embolus. 2. Bilateral airspace opacities probably represent a combination of   atelectasis and pneumonia.          XR CHEST PORTABLE   Final Result Nonspecific perihilar opacities. Pulmonary edema and multifocal pneumonia   are in the differential.  Follow-up to resolution recommended.          XR CHEST PORTABLE    (Results Pending)   XR CHEST PORTABLE    (Results Pending)   XR CHEST PORTABLE    (Results Pending)   XR CHEST PORTABLE    (Results Pending)   XR CHEST PORTABLE    (Results Pending)           Assessment/Plan:    Active Hospital Problems    Diagnosis    Pneumonia due to COVID-19 virus [U07.1, J12.82]    Hypoxia [R09.02]    Transaminitis [O74.39]    Metabolic acidosis [M14.6]    Acute hypoxemic respiratory failure (HCC) [J96.01]    HCAP (healthcare-associated pneumonia) [J18.9]       COVID 19 PNA  Acute Hypoxic Respiratory Failure  - 85% on RA upon arrival to ED  - CT chest with bilateral airspace opacities, no PE  - PCT: 0.18; had 1/2 Pfizer Vaccine on 3/31  - Admitted to Med Surg, droplet + precautions  - worsened quickly leading to ICU transfer, vapotherm and now intubated and on vent support  - continue  Decadron D#7, completed  Remdesivir for 5 days , s/p  CCP and IL 6 inhibitor infusion   - pulm managing  - s/p bronch 5/1/21        Diarrhea - likely 2/2 COVID  - diarrhea returned now   - GI f/w   - consider imodium        NAGMA  - CO2 17  - likely 2/2 diarrhea  - improved and off bicarb gtt     Hyperglycemia- A1c at 5.8  - likely 2/2 steroids  -  low dose SSI      Transaminitis  - likely with viral infection   - monitor with Remdesivir use-improving      HTN  - controlled  - cont Toprol XL     DVT Prophylaxis: Lovenox bid  Diet: DIET TUBE FEED CONTINUOUS/CYCLIC NPO; Low Calorie High Protein (Vital High-Protein ); Orogastric; Continuous; 20; 50; 20  Code Status: Full Code    PT/OT Eval Status: not indicated    Darwin Anders MD

## 2021-05-02 NOTE — PROGRESS NOTES
05/02/21 0310   Vent Information   Vent Type 980   Vent Mode AC/VC   Vt Ordered 250 mL   Rate Set 24 bmp   Peak Flow 50 L/min   FiO2  45 %   SpO2 92 %   SpO2/FiO2 ratio 204.44   Sensitivity 3   PEEP/CPAP 8   Humidification Source Heated wire   Humidification Temp Measured 35.1   Circuit Condensation Drained   Vent Patient Data   Peak Inspiratory Pressure 27 cmH2O   Mean Airway Pressure 13 cmH20   Rate Measured 35 br/min   Vt Exhaled 417 mL   Minute Volume 10.9 Liters   I:E Ratio 1:3.6   Spontaneous Breathing Trial (SBT) RT Doc   Pulse 97   Breath Sounds   Right Upper Lobe Diminished   Right Middle Lobe Diminished   Right Lower Lobe Diminished   Left Upper Lobe Diminished   Left Lower Lobe Diminished   Additional Respiratory  Assessments   Resp 16   Alarm Settings   High Pressure Alarm 45 cmH2O   Low Minute Volume Alarm 3 L/min   Apnea (secs) 20 secs   High Respiratory Rate 40 br/min   Low Exhaled Vt  175 mL   Patient Observation   Observations 8ett 20 at lip

## 2021-05-02 NOTE — PROGRESS NOTES
Reassessment completed, see flow sheet. 05/02/21 0400   Vitals   Temp 99.4 °F (37.4 °C)   Temp Source Bladder   Pulse 113   Heart Rate Source Monitor   Resp 17   BP 93/73   MAP (mmHg) 80   BP Location Left upper arm   BP Upper/Lower Upper   BP Method Automatic   Patient Position Semi fowlers   Level of Consciousness Responds to Pain (2)   MEWS Score 4   Patient Currently in Pain No   Cardiac Rhythm NSR   Oxygen Therapy   SpO2 92 %   O2 Device Ventilator   FiO2  45 %   PEEP/CPAP (cm H2O) 8 cm H20     PRN versed given for pt double stacking and wiggling around in bed. Attempted to wean propofol down to 25 mcg/kg/min but pt's HR romain to the 110s and she began moving around in bed consistently. Propofol continues at 30 mcg/kg/min, scheduled metoprolol given. Pt has had an increase in oral secretions tonight but not a lot is being suctioned from ETT. ABG drawn from left radial artery x 1 attempt. Modified Henry's test positive. Pressure held to site after procedure for five minutes. No hematoma present. Pulse present after procedure. Pt tolerated well. No other changes at this time, will continue to monitor.

## 2021-05-02 NOTE — PROGRESS NOTES
05/02/21 0006   Vent Information   Vent Type 980   Vent Mode AC/VC   Vt Ordered 250 mL   Rate Set 24 bmp   Peak Flow 50 L/min   FiO2  45 %   SpO2 92 %   SpO2/FiO2 ratio 204.44   Sensitivity 3   PEEP/CPAP 8   Humidification Source Heated wire   Humidification Temp Measured 37   Circuit Condensation Drained   Vent Patient Data   Peak Inspiratory Pressure 20 cmH2O   Mean Airway Pressure 12 cmH20   Rate Measured 37 br/min   Vt Exhaled 453 mL   Minute Volume 8.7 Liters   I:E Ratio 1:2.8   Cough/Sputum   Sputum How Obtained Endotracheal   Cough Non-productive   Sputum Amount None   Spontaneous Breathing Trial (SBT) RT Doc   Pulse 92   Breath Sounds   Right Upper Lobe Diminished   Right Middle Lobe Diminished   Right Lower Lobe Diminished   Left Upper Lobe Diminished   Left Lower Lobe Diminished   Additional Respiratory  Assessments   Resp 18   Alarm Settings   High Pressure Alarm 45 cmH2O   Low Minute Volume Alarm 3 L/min   Apnea (secs) 20 secs   High Respiratory Rate 40 br/min   Low Exhaled Vt  175 mL   Patient Observation   Observations 8ett 20 at lip

## 2021-05-02 NOTE — PROGRESS NOTES
Reassessment completed, see flow sheet. 05/02/21 0000   Vitals   Temp 100.1 °F (37.8 °C)   Temp Source Bladder   Pulse 93   Heart Rate Source Monitor   Resp 30   BP (!) 116/50   MAP (mmHg) 67   BP Location Left upper arm   BP Upper/Lower Upper   BP Method Automatic   Patient Position Semi fowlers   Level of Consciousness Responds to Pain (2)   MEWS Score 3   Patient Currently in Pain No   Cardiac Rhythm NSR   Oxygen Therapy   SpO2 93 %   O2 Device Ventilator   FiO2  45 %   PEEP/CPAP (cm H2O) 8 cm H20     PRN versed given for pt double stacking every breath and RR in the 30s. Sedation decreased in preparation for SBT in AM. Propofol now infusing at 30 mcg/kg/min. Valderrama care and oral care provided at this time. Zinc cream applied to redness in janis/rectal area. Bite block placed d/t pt biting down on tube. No other changes at this time, will continue to monitor.

## 2021-05-02 NOTE — PROGRESS NOTES
Shift assessment completed, see flow sheet. 05/01/21 2000   Vitals   Temp 99.9 °F (37.7 °C)   Temp Source Bladder   Pulse 95   Heart Rate Source Monitor   Resp 20   BP (!) 104/59   MAP (mmHg) 71   BP Location Left upper arm   BP Upper/Lower Upper   BP Method Automatic   Patient Position Semi fowlers   Level of Consciousness Responds to Pain (2)   MEWS Score 1   Patient Currently in Pain No   Cardiac Rhythm NSR     Pt is not following commands with a RASS of -2, responding to painful stimulus and spontaneously moving extremities. Intubated and sedated on AC #8 ETT, at [facility]. RR 24 /  / FiO2 45% / PEEP 8. SpO2 91%. Respirations are easy, even, and unlabored. Bilateral lung sounds diminished. No edema present.     OG in place at 65, with TF at 50 mL/hr, 50 mL residual.    Valderrama in place and patent with STAT lock. Flexi seal in place for frequent loose stools. Bilateral soft wrist restraints in place for pt safety.      PIVs in place, WNL with the following infusions:  Propofol at 35 mcg/kg/min     PRN versed given for pt double stacking every breath. Pt's  Wisam Perez updated at this time.     Call light within reach, bed in lowest position, bed alarm on. Will continue to monitor.

## 2021-05-02 NOTE — PROGRESS NOTES
Pulmonary & Critical Care Medicine ICU Progress Note    CC: COVID-19 with progressive hypoxemia    Events of Last 24 hours:   Bronchoscopy yesterday for fevers and white count, showed minimal secretions  Low-grade fever overnight    Invasive Lines: Peripheral    MV: 2021  Vent Mode: AC/VC Rate Set: 24 bmp/Vt Ordered: 250 mL/ /FiO2 : 45 %  Recent Labs     21  0514 21  0425   PHART 7.522* 7.527*   KJM0OLE 41.0 43.7   PO2ART 62.1* 61.2*     IV:   propofol 30 mcg/kg/min (21 0402)    sodium chloride      sodium chloride      dextrose       Vitals:  Blood pressure (!) 105/55, pulse 91, temperature 99.4 °F (37.4 °C), temperature source Bladder, resp. rate 19, height 4' 11\" (1.499 m), weight 132 lb 4.8 oz (60 kg), SpO2 94 %. on 40%  Temp  Av.6 °F (37.6 °C)  Min: 99.2 °F (37.3 °C)  Max: 100.1 °F (37.8 °C)    Intake/Output Summary (Last 24 hours) at 2021 0657  Last data filed at 2021 0600  Gross per 24 hour   Intake 2725 ml   Output 3595 ml   Net -870 ml     EXAM:  General: intubated, ill appearing    ENT: Pharynx with ETT. Resp: No crackles. No wheezing. CV: S1, S2. Trace edema  GI: NT, ND, +BS  Skin: Warm and dry. Neuro: PERRL. Sedated, not following commands.  Patellar reflexes are symmetric     Scheduled Meds:   cefepime  2,000 mg Intravenous Q8H    vancomycin  750 mg Intravenous Q12H    insulin lispro  0-18 Units Subcutaneous Q4H    metoprolol tartrate  25 mg Oral Q6H    chlorhexidine  15 mL Mouth/Throat BID    ipratropium-albuterol  1 ampule Inhalation Q4H    sodium chloride flush  5-40 mL Intravenous 2 times per day    enoxaparin  30 mg Subcutaneous BID    Vitamin D  2,000 Units Oral Daily    saccharomyces boulardii  250 mg Oral BID    dexamethasone  6 mg Intravenous Q24H     PRN Meds:  midazolam, fentanNYL, sodium chloride, sodium chloride flush, sodium chloride, promethazine **OR** ondansetron, polyethylene glycol, acetaminophen **OR** acetaminophen, 4/25/2021  Impression:        1. Motion limited study. 2. Cholelithiasis and probable choledocholithiasis without evidence for acute   cholecystitis. 3. Nonobstructing left renal calculus. CXR 5/1/2021 bilateral airspace disease, greater on left, ET tube okay    ASSESSMENT:  · Acute hypoxemic respiratory failure -progressive  · COVID-19 pneumonia  · Abnormal CT: Mediastinal lymphadenopathy and parenchymal changes consistent with COVID-19 pneumonia  · Diarrhea  · Transaminitis - improved  · Hyperglycemia   · Tachyarrhythmia  · Low-grade fever with white count     PLAN:  COVID-19 isolation, droplet plus  Mechanical ventilation as per my orders. The ventilator was adjusted by me at the bedside for unstable, life threatening respiratory failure. Alkalemia due to overbreathing vent. IV Propofol for sedation, target RASS -2, with daily spontaneous awakening trial.  Fentanyl gtt added. CCP given 4/26/21, Tocilizumab given 4/27/2021, completed 5 days remdesivir  Decadron D#8, 6 mg daily   Cefepime and vancomycin day #2, started after bronchoscopy cultures,received 5 days ceftriaxone and azithromycin  Inhaled bronchodilators only as needed, MDI preferred  Home metoprolol  Eventually will need f/u CT imaging of chest  · Prophylaxis: Lovenox 30 twice daily  · NOK is      Total critical care time caring for this patient with life threatening, unstable organ failure, including direct patient contact, management of life support systems, review of data including imaging and labs, discussions with other team members and physicians is 31 minutes so far today, excluding procedures.

## 2021-05-03 ENCOUNTER — APPOINTMENT (OUTPATIENT)
Dept: GENERAL RADIOLOGY | Age: 66
DRG: 130 | End: 2021-05-03
Payer: MEDICAID

## 2021-05-03 LAB
ALBUMIN SERPL-MCNC: 2.8 G/DL (ref 3.4–5)
ALP BLD-CCNC: 72 U/L (ref 40–129)
ALT SERPL-CCNC: 28 U/L (ref 10–40)
ANION GAP SERPL CALCULATED.3IONS-SCNC: 5 MMOL/L (ref 3–16)
AST SERPL-CCNC: 47 U/L (ref 15–37)
BANDED NEUTROPHILS RELATIVE PERCENT: 1 % (ref 0–7)
BASE EXCESS ARTERIAL: 4.2 MMOL/L (ref -3–3)
BASOPHILS ABSOLUTE: 0 K/UL (ref 0–0.2)
BASOPHILS RELATIVE PERCENT: 0 %
BILIRUB SERPL-MCNC: 0.3 MG/DL (ref 0–1)
BILIRUBIN DIRECT: <0.2 MG/DL (ref 0–0.3)
BILIRUBIN, INDIRECT: ABNORMAL MG/DL (ref 0–1)
BUN BLDV-MCNC: 30 MG/DL (ref 7–20)
CALCIUM SERPL-MCNC: 9.3 MG/DL (ref 8.3–10.6)
CARBOXYHEMOGLOBIN ARTERIAL: 0.3 % (ref 0–1.5)
CHLORIDE BLD-SCNC: 94 MMOL/L (ref 99–110)
CO2: 33 MMOL/L (ref 21–32)
CREAT SERPL-MCNC: <0.5 MG/DL (ref 0.6–1.2)
CULTURE, RESPIRATORY: NORMAL
CULTURE, RESPIRATORY: NORMAL
EOSINOPHILS ABSOLUTE: 0.2 K/UL (ref 0–0.6)
EOSINOPHILS RELATIVE PERCENT: 1 %
GFR AFRICAN AMERICAN: >60
GFR NON-AFRICAN AMERICAN: >60
GLUCOSE BLD-MCNC: 122 MG/DL (ref 70–99)
GLUCOSE BLD-MCNC: 131 MG/DL (ref 70–99)
GLUCOSE BLD-MCNC: 178 MG/DL (ref 70–99)
GLUCOSE BLD-MCNC: 189 MG/DL (ref 70–99)
GLUCOSE BLD-MCNC: 217 MG/DL (ref 70–99)
GLUCOSE BLD-MCNC: 250 MG/DL (ref 70–99)
GLUCOSE BLD-MCNC: 264 MG/DL (ref 70–99)
GRAM STAIN RESULT: NORMAL
GRAM STAIN RESULT: NORMAL
HCO3 ARTERIAL: 29.5 MMOL/L (ref 21–29)
HCT VFR BLD CALC: 33.3 % (ref 36–48)
HEMOGLOBIN, ART, EXTENDED: 11.5 G/DL (ref 12–16)
HEMOGLOBIN: 11.1 G/DL (ref 12–16)
INR BLD: 1.04 (ref 0.86–1.14)
LYMPHOCYTES ABSOLUTE: 1.7 K/UL (ref 1–5.1)
LYMPHOCYTES RELATIVE PERCENT: 10 %
MCH RBC QN AUTO: 30.5 PG (ref 26–34)
MCHC RBC AUTO-ENTMCNC: 33.4 G/DL (ref 31–36)
MCV RBC AUTO: 91.3 FL (ref 80–100)
METHEMOGLOBIN ARTERIAL: 0 %
MONOCYTES ABSOLUTE: 0.3 K/UL (ref 0–1.3)
MONOCYTES RELATIVE PERCENT: 2 %
NEUTROPHILS ABSOLUTE: 15.1 K/UL (ref 1.7–7.7)
NEUTROPHILS RELATIVE PERCENT: 86 %
O2 CONTENT ARTERIAL: 15 ML/DL
O2 SAT, ARTERIAL: 95.3 %
O2 THERAPY: ABNORMAL
PCO2 ARTERIAL: 47.5 MMHG (ref 35–45)
PDW BLD-RTO: 14 % (ref 12.4–15.4)
PERFORMED ON: ABNORMAL
PH ARTERIAL: 7.41 (ref 7.35–7.45)
PLATELET # BLD: 488 K/UL (ref 135–450)
PLATELET SLIDE REVIEW: ABNORMAL
PMV BLD AUTO: 7.4 FL (ref 5–10.5)
PO2 ARTERIAL: 76.2 MMHG (ref 75–108)
POTASSIUM REFLEX MAGNESIUM: 3.8 MMOL/L (ref 3.5–5.1)
PROCALCITONIN: 0.1 NG/ML (ref 0–0.15)
PROTHROMBIN TIME: 12.1 SEC (ref 10–13.2)
RBC # BLD: 3.65 M/UL (ref 4–5.2)
SLIDE REVIEW: ABNORMAL
SODIUM BLD-SCNC: 132 MMOL/L (ref 136–145)
TCO2 ARTERIAL: 31 MMOL/L
TOTAL PROTEIN: 5.2 G/DL (ref 6.4–8.2)
TRIGL SERPL-MCNC: 173 MG/DL (ref 0–150)
WBC # BLD: 17.4 K/UL (ref 4–11)

## 2021-05-03 PROCEDURE — 2700000000 HC OXYGEN THERAPY PER DAY

## 2021-05-03 PROCEDURE — 2500000003 HC RX 250 WO HCPCS: Performed by: INTERNAL MEDICINE

## 2021-05-03 PROCEDURE — 36415 COLL VENOUS BLD VENIPUNCTURE: CPT

## 2021-05-03 PROCEDURE — 99291 CRITICAL CARE FIRST HOUR: CPT | Performed by: INTERNAL MEDICINE

## 2021-05-03 PROCEDURE — 6360000002 HC RX W HCPCS: Performed by: INTERNAL MEDICINE

## 2021-05-03 PROCEDURE — 94761 N-INVAS EAR/PLS OXIMETRY MLT: CPT

## 2021-05-03 PROCEDURE — 2580000003 HC RX 258: Performed by: INTERNAL MEDICINE

## 2021-05-03 PROCEDURE — 99233 SBSQ HOSP IP/OBS HIGH 50: CPT | Performed by: INTERNAL MEDICINE

## 2021-05-03 PROCEDURE — 02HV33Z INSERTION OF INFUSION DEVICE INTO SUPERIOR VENA CAVA, PERCUTANEOUS APPROACH: ICD-10-PCS | Performed by: INTERNAL MEDICINE

## 2021-05-03 PROCEDURE — 85610 PROTHROMBIN TIME: CPT

## 2021-05-03 PROCEDURE — 36556 INSERT NON-TUNNEL CV CATH: CPT

## 2021-05-03 PROCEDURE — 80048 BASIC METABOLIC PNL TOTAL CA: CPT

## 2021-05-03 PROCEDURE — 94003 VENT MGMT INPAT SUBQ DAY: CPT

## 2021-05-03 PROCEDURE — 6370000000 HC RX 637 (ALT 250 FOR IP): Performed by: INTERNAL MEDICINE

## 2021-05-03 PROCEDURE — 71045 X-RAY EXAM CHEST 1 VIEW: CPT

## 2021-05-03 PROCEDURE — 80076 HEPATIC FUNCTION PANEL: CPT

## 2021-05-03 PROCEDURE — 82803 BLOOD GASES ANY COMBINATION: CPT

## 2021-05-03 PROCEDURE — 2000000000 HC ICU R&B

## 2021-05-03 PROCEDURE — 84478 ASSAY OF TRIGLYCERIDES: CPT

## 2021-05-03 PROCEDURE — 84145 PROCALCITONIN (PCT): CPT

## 2021-05-03 PROCEDURE — 85025 COMPLETE CBC W/AUTO DIFF WBC: CPT

## 2021-05-03 PROCEDURE — 94640 AIRWAY INHALATION TREATMENT: CPT

## 2021-05-03 RX ORDER — DEXMEDETOMIDINE HYDROCHLORIDE 4 UG/ML
.2-1.4 INJECTION, SOLUTION INTRAVENOUS CONTINUOUS
Status: DISCONTINUED | OUTPATIENT
Start: 2021-05-03 | End: 2021-05-05

## 2021-05-03 RX ADMIN — IPRATROPIUM BROMIDE AND ALBUTEROL SULFATE 1 AMPULE: 2.5; .5 SOLUTION RESPIRATORY (INHALATION) at 14:48

## 2021-05-03 RX ADMIN — INSULIN LISPRO 9 UNITS: 100 INJECTION, SOLUTION INTRAVENOUS; SUBCUTANEOUS at 12:00

## 2021-05-03 RX ADMIN — INSULIN LISPRO 6 UNITS: 100 INJECTION, SOLUTION INTRAVENOUS; SUBCUTANEOUS at 16:21

## 2021-05-03 RX ADMIN — MIDAZOLAM HYDROCHLORIDE 2 MG: 1 INJECTION, SOLUTION INTRAMUSCULAR; INTRAVENOUS at 02:59

## 2021-05-03 RX ADMIN — IPRATROPIUM BROMIDE AND ALBUTEROL SULFATE 1 AMPULE: 2.5; .5 SOLUTION RESPIRATORY (INHALATION) at 03:28

## 2021-05-03 RX ADMIN — Medication 150 MCG/HR: at 21:03

## 2021-05-03 RX ADMIN — PROPOFOL 35 MCG/KG/MIN: 10 INJECTION, EMULSION INTRAVENOUS at 12:24

## 2021-05-03 RX ADMIN — INSULIN LISPRO 6 UNITS: 100 INJECTION, SOLUTION INTRAVENOUS; SUBCUTANEOUS at 19:42

## 2021-05-03 RX ADMIN — Medication 10 ML: at 20:01

## 2021-05-03 RX ADMIN — ENOXAPARIN SODIUM 30 MG: 30 INJECTION SUBCUTANEOUS at 20:00

## 2021-05-03 RX ADMIN — CEFEPIME 2000 MG: 2 INJECTION, POWDER, FOR SOLUTION INTRAVENOUS at 04:34

## 2021-05-03 RX ADMIN — VANCOMYCIN HYDROCHLORIDE 750 MG: 750 INJECTION, POWDER, LYOPHILIZED, FOR SOLUTION INTRAVENOUS at 14:00

## 2021-05-03 RX ADMIN — CHLORHEXIDINE GLUCONATE 0.12% ORAL RINSE 15 ML: 1.2 LIQUID ORAL at 20:01

## 2021-05-03 RX ADMIN — VANCOMYCIN HYDROCHLORIDE 750 MG: 750 INJECTION, POWDER, LYOPHILIZED, FOR SOLUTION INTRAVENOUS at 02:59

## 2021-05-03 RX ADMIN — IPRATROPIUM BROMIDE AND ALBUTEROL SULFATE 1 AMPULE: 2.5; .5 SOLUTION RESPIRATORY (INHALATION) at 08:06

## 2021-05-03 RX ADMIN — Medication 2000 UNITS: at 09:28

## 2021-05-03 RX ADMIN — ENOXAPARIN SODIUM 30 MG: 30 INJECTION SUBCUTANEOUS at 09:30

## 2021-05-03 RX ADMIN — Medication 0.2 MCG/KG/HR: at 10:48

## 2021-05-03 RX ADMIN — NOREPINEPHRINE BITARTRATE 5 MCG/MIN: 1 INJECTION INTRAVENOUS at 04:09

## 2021-05-03 RX ADMIN — PROPOFOL 35 MCG/KG/MIN: 10 INJECTION, EMULSION INTRAVENOUS at 22:09

## 2021-05-03 RX ADMIN — IPRATROPIUM BROMIDE AND ALBUTEROL SULFATE 1 AMPULE: 2.5; .5 SOLUTION RESPIRATORY (INHALATION) at 00:23

## 2021-05-03 RX ADMIN — CEFEPIME 2000 MG: 2 INJECTION, POWDER, FOR SOLUTION INTRAVENOUS at 20:01

## 2021-05-03 RX ADMIN — MIDAZOLAM HYDROCHLORIDE 2 MG: 1 INJECTION, SOLUTION INTRAMUSCULAR; INTRAVENOUS at 01:59

## 2021-05-03 RX ADMIN — RDII 250 MG CAPSULE 250 MG: at 20:00

## 2021-05-03 RX ADMIN — DEXAMETHASONE SODIUM PHOSPHATE 6 MG: 10 INJECTION, SOLUTION INTRAMUSCULAR; INTRAVENOUS at 09:28

## 2021-05-03 RX ADMIN — Medication 150 MCG/HR: at 14:00

## 2021-05-03 RX ADMIN — RDII 250 MG CAPSULE 250 MG: at 10:00

## 2021-05-03 RX ADMIN — IPRATROPIUM BROMIDE AND ALBUTEROL SULFATE 1 AMPULE: 2.5; .5 SOLUTION RESPIRATORY (INHALATION) at 20:00

## 2021-05-03 RX ADMIN — IPRATROPIUM BROMIDE AND ALBUTEROL SULFATE 1 AMPULE: 2.5; .5 SOLUTION RESPIRATORY (INHALATION) at 10:25

## 2021-05-03 RX ADMIN — Medication 150 MCG/HR: at 06:38

## 2021-05-03 RX ADMIN — CEFEPIME 2000 MG: 2 INJECTION, POWDER, FOR SOLUTION INTRAVENOUS at 12:25

## 2021-05-03 RX ADMIN — CHLORHEXIDINE GLUCONATE 0.12% ORAL RINSE 15 ML: 1.2 LIQUID ORAL at 09:29

## 2021-05-03 RX ADMIN — SODIUM CHLORIDE, POTASSIUM CHLORIDE, SODIUM LACTATE AND CALCIUM CHLORIDE 500 ML: 600; 310; 30; 20 INJECTION, SOLUTION INTRAVENOUS at 10:47

## 2021-05-03 RX ADMIN — INSULIN LISPRO 3 UNITS: 100 INJECTION, SOLUTION INTRAVENOUS; SUBCUTANEOUS at 04:31

## 2021-05-03 ASSESSMENT — PULMONARY FUNCTION TESTS
PIF_VALUE: 20
PIF_VALUE: 20
PIF_VALUE: 19
PIF_VALUE: 21
PIF_VALUE: 19
PIF_VALUE: 20

## 2021-05-03 NOTE — PROGRESS NOTES
Hospitalist Progress Note      PCP: ARABELLA Dyer - CNP      COVID pna with acute resp failure   Date of Admission: 4/25/2021     cont to be intubated  Became hypotensive overnight. Central line placed. Currently on Levophed. Medications:  Reviewed    Infusion Medications    norepinephrine 7 mcg/min (05/03/21 0751)    fentaNYL 150 mcg/hr (05/03/21 5479)    propofol 20 mcg/kg/min (05/03/21 0537)    sodium chloride      sodium chloride      dextrose       Scheduled Medications    cefepime  2,000 mg Intravenous Q8H    vancomycin  750 mg Intravenous Q12H    insulin lispro  0-18 Units Subcutaneous Q4H    metoprolol tartrate  25 mg Oral Q6H    chlorhexidine  15 mL Mouth/Throat BID    ipratropium-albuterol  1 ampule Inhalation Q4H    sodium chloride flush  5-40 mL Intravenous 2 times per day    enoxaparin  30 mg Subcutaneous BID    Vitamin D  2,000 Units Oral Daily    saccharomyces boulardii  250 mg Oral BID    dexamethasone  6 mg Intravenous Q24H     PRN Meds: midazolam, fentanNYL, sodium chloride, sodium chloride flush, sodium chloride, promethazine **OR** ondansetron, polyethylene glycol, acetaminophen **OR** acetaminophen, guaiFENesin-dextromethorphan, glucose, dextrose, glucagon (rDNA), dextrose      Intake/Output Summary (Last 24 hours) at 5/3/2021 0924  Last data filed at 5/3/2021 0500  Gross per 24 hour   Intake 1676.15 ml   Output 1970 ml   Net -293.85 ml       Physical Exam Performed:    BP (!) 117/53   Pulse 109   Temp 98.3 °F (36.8 °C) (Bladder)   Resp 21   Ht 4' 11\" (1.499 m)   Wt 132 lb 4.8 oz (60 kg)   SpO2 91%   BMI 26.72 kg/m²     General: middle  Aged female on vent, sedated  Oral ETT and OG noted   Awake, alert and oriented.  Appears to be not in any distress  Mucous Membranes:  Pink , anicteric  Neck: No JVD, no carotid bruit, no thyromegaly  Chest: scattered rhonchi and diminished pedro   Cardiovascular:  RRR S1S2 heard, no murmurs or gallops  Abdomen:  Soft, Endotracheal tube 1 cm above the wyatt, consider 2-3 cm retraction. Multifocal airspace opacities worrisome for multifocal pneumonia or   atelectasis. Mild progressed within right lung base and right upper lung. CT ABDOMEN PELVIS W IV CONTRAST Additional Contrast? None   Final Result   1. Motion limited study. 2. Cholelithiasis and probable choledocholithiasis without evidence for acute   cholecystitis. 3. Nonobstructing left renal calculus. CT CHEST PULMONARY EMBOLISM W CONTRAST   Final Result   1. Motion limited study with no definite scan evidence for pulmonary embolus. 2. Bilateral airspace opacities probably represent a combination of   atelectasis and pneumonia. XR CHEST PORTABLE   Final Result   Nonspecific perihilar opacities. Pulmonary edema and multifocal pneumonia   are in the differential.  Follow-up to resolution recommended.          XR CHEST PORTABLE    (Results Pending)   XR CHEST PORTABLE    (Results Pending)   XR CHEST PORTABLE    (Results Pending)   XR CHEST PORTABLE    (Results Pending)           Assessment/Plan:    Active Hospital Problems    Diagnosis    Pneumonia due to COVID-19 virus [U07.1, J12.82]    Hypoxia [R09.02]    Transaminitis [E51.39]    Metabolic acidosis [U25.2]    Acute hypoxemic respiratory failure (HCC) [J96.01]    HCAP (healthcare-associated pneumonia) [J18.9]         COVID 19 PNA  Acute Hypoxic Respiratory Failure  - 85% on RA upon arrival to ED  - CT chest with bilateral airspace opacities, no PE  - PCT: 0.18; had 1/2 Pfizer Vaccine on 3/31  - Admitted to Med Surg, droplet + precautions  - worsened quickly leading to ICU transfer, vapotherm and now intubated and on vent support  - continue  Decadron D#8, completed  Remdesivir for 5 days , s/p  CCP and IL 6 inhibitor infusion   - pulm managing  - s/p bronch 5/1/21  Continue Cefepime and IV vanc day 3   Cultures negative so far       Diarrhea - likely 2/2 COVID  - diarrhea returned now   - GI f/w       Hypotension  Primarily from excess sedation  Now on levophed    NAGMA  - CO2 17  - likely 2/2 diarrhea  - improved and off bicarb gtt     Hyperglycemia- A1c at 5.8  - likely 2/2 steroids  -  low dose SSI      Transaminitis  - likely with viral infection   - monitor with Remdesivir use-improving      HTN  - controlled  - cont Toprol XL     DVT Prophylaxis: Lovenox bid  Diet: DIET TUBE FEED CONTINUOUS/CYCLIC NPO; Semi-elemental (Vital High-Protein ); Orogastric; Continuous; 15; 45; 20  Code Status: Full Code    PT/OT Eval Status: ordered    Dispo - cont care in ICU    David Berg MD

## 2021-05-03 NOTE — PROGRESS NOTES
Tube feeding residuals checked with 550ml noted. Tube feedings held and 550ml of tube feeding returned via OG.

## 2021-05-03 NOTE — PROGRESS NOTES
05/03/21 0331   Vent Information   Vent Type 980   Vent Mode AC/VC   Vt Ordered 250 mL   Rate Set 24 bmp   Peak Flow 50 L/min   FiO2  40 %   SpO2 92 %   SpO2/FiO2 ratio 230   Sensitivity 3   PEEP/CPAP 8   Vent Patient Data   Peak Inspiratory Pressure 20 cmH2O   Mean Airway Pressure 11 cmH20   Rate Measured 24 br/min   Vt Exhaled 295 mL   Minute Volume 7.09 Liters   I:E Ratio 1:3.6   Cough/Sputum   Sputum How Obtained Suctioned;Endotracheal   Sputum Amount Small   Sputum Color Creamy; Tan   Tenacity Thick   Spontaneous Breathing Trial (SBT) RT Doc   Pulse 88   Breath Sounds   Right Upper Lobe Diminished   Right Middle Lobe Diminished   Right Lower Lobe Diminished   Left Upper Lobe Diminished   Left Lower Lobe Diminished   Additional Respiratory  Assessments   Resp 24   Position Semi-Barnett's

## 2021-05-03 NOTE — PROGRESS NOTES
Patient continues to become hypotensive with sedation. Without sedation patient becomes noncompliant with vent and oxygen saturations decrease. Contacted Dr Uriel Granados and received orders to get a stat PT/INR and consent for central line placement.

## 2021-05-03 NOTE — PROGRESS NOTES
atelectasis and pneumonia. XR CHEST PORTABLE   Final Result   Nonspecific perihilar opacities. Pulmonary edema and multifocal pneumonia   are in the differential.  Follow-up to resolution recommended.          XR CHEST PORTABLE    (Results Pending)   XR CHEST PORTABLE    (Results Pending)           Assessment/Plan:    Active Hospital Problems    Diagnosis    Pneumonia due to COVID-19 virus [U07.1, J12.82]    Hypoxia [R09.02]    Transaminitis [E44.59]    Metabolic acidosis [U36.9]    Acute hypoxemic respiratory failure (HCC) [J96.01]    HCAP (healthcare-associated pneumonia) [J18.9]         COVID 19 PNA  Acute Hypoxic Respiratory Failure  - 85% on RA upon arrival to ED  - CT chest with bilateral airspace opacities, no PE  - PCT: 0.18; had 1/2 Pfizer Vaccine on 3/31  - Admitted to Med Surg, droplet + precautions  - worsened quickly leading to ICU transfer, vapotherm and now intubated and on vent support  - continue  Decadron D#7, completed  Remdesivir for 5 days , s/p  CCP and IL 6 inhibitor infusion   - pulm managing  - s/p bronch 5/1/21        Diarrhea - likely 2/2 COVID  - diarrhea returned now   - GI f/w         NAGMA  - CO2 17  - likely 2/2 diarrhea  - improved and off bicarb gtt     Hyperglycemia- A1c at 5.8  - likely 2/2 steroids  -  low dose SSI      Transaminitis  - likely with viral infection   - monitor with Remdesivir use-improving      HTN  - controlled  - cont Toprol XL     DVT Prophylaxis: Lovenox bid  Diet: DIET TUBE FEED CONTINUOUS/CYCLIC NPO; Semi-elemental (Vital High-Protein ); Orogastric; Continuous; 15; 45; 20  Code Status: Full Code    PT/OT Eval Status: ordered    Dispo - cont care in ICU    Arabella Jacobsen MD

## 2021-05-03 NOTE — PROGRESS NOTES
CM-SR, VSS on Levo gtt. Pt remains afebrile, UO WNL. Tube feeding off. Pt will open eyes & follow commands. She nods approp to yes & no questions. Pt is sage vent & current settings. She is resting w/out evidence of discomfort or distress @ this time.

## 2021-05-03 NOTE — PROGRESS NOTES
PROGRESS NOTE  S:66 yrs Patient  admitted on 4/25/2021 with Acute respiratory failure due to COVID-19 (HCC) [U07.1, J96.00] . Today she remains intubated. She has loose, brown stool per [facility]ex[facility].     Exam:   Vitals:    05/03/21 0930   BP: (!) 95/55   Pulse: 107   Resp: 23   Temp:    SpO2:      Due to the current efforts to prevent transmission of COVID-19 and also the need to preserve PPE for other caregivers, a face-to-face encounter with the patient was not performed. That being said, all relevant records and diagnostic tests were reviewed, including laboratory results and imaging. Please reference any relevant documentation elsewhere. Care will be coordinated with the primary service. Medications: Reviewed    Labs:  CBC:   Recent Labs     05/01/21 0525 05/02/21 0515 05/03/21 0522   WBC 14.7* 16.3* 17.4*   HGB 13.0 13.2 11.1*   HCT 39.0 40.0 33.3*   MCV 91.4 91.5 91.3   * 478* 488*     BMP:   Recent Labs     05/01/21 0525 05/02/21  0515 05/03/21  0522    140 132*   K 4.4 4.0 3.8   CL 98* 99 94*   CO2 35* 32 33*   BUN 27* 28* 30*   CREATININE <0.5* <0.5* <0.5*     LIVER PROFILE:   Recent Labs     05/01/21 0525 05/02/21 0515 05/03/21 0522   AST 36 41* 47*   ALT 24 26 28   PROT 5.9* 5.7* 5.2*   BILIDIR <0.2 <0.2 <0.2   BILITOT <0.2 0.3 0.3   ALKPHOS 87 76 72     PT/INR:   Recent Labs     05/03/21  0003   INR 1.04       IMAGING:  XR CHEST PORTABLE  Impression   Right central venous catheter tip projects at the low SVC. Attending Supervising [de-identified] Attestation Statement  The patient is a 61 y.o. female. I have performed a history and physical examination of the patient. I discussed the case with my physician assistant Jeff Garcia PA-C    I reviewed the patient's Past Medical History, Past Surgical History, Medications, and Allergies.      Physical Exam:  Vitals:    05/03/21 1600 05/03/21 1630 05/03/21 1700 05/03/21 1730   BP: (!) 114/55 (!)

## 2021-05-03 NOTE — PROGRESS NOTES
Residual noted at 160ml. Will maintain current tube feed infusion of 30ml/hr. Will reassess in 4 hours.

## 2021-05-03 NOTE — PROGRESS NOTES
AM assessment done. Pt will arouse & follows commands. She nods approp to yes & no questions & seems to be aware of her surroundings & the situation. Pt is resting w/out evidence of discomfort or distress @ this time.

## 2021-05-03 NOTE — CARE COORDINATION
INTERDISCIPLINARY PLAN OF CARE CONFERENCE    Date/Time: 5/3/2021 2:17 PM  Completed by: Jenny Can Case Management      Patient Name:  Kori Luna  YOB: 1955  Admitting Diagnosis: Acute respiratory failure due to COVID-19 [facility]) [U07.1, J96.00]     Admit Date/Time:  4/25/2021  8:28 PM    Chart reviewed. Interdisciplinary team contacted or reviewed plan related to patient progress and discharge plans. Disciplines included Case Management, Nursing, and Dietitian. Current Status:Stable  PT/OT recommendation for discharge plan of care: N/A    Expected D/C Disposition:  Home  Confirmed plan with  family Yes confirmed with: (name) spouse  Met with: Spoke with pt spouse via phone as pt intubated  Discharge Plan Comments:  Spoke with spouse via phone as pt intubated and explained role of CM. Spouse emotional at this time. Support offered. Will follow and determine dc needs as pt progresses.     Home O2 in place on admit: No

## 2021-05-03 NOTE — PROGRESS NOTES
Pulmonary & Critical Care Medicine ICU Progress Note    CC: COVID-19 with progressive hypoxemia    Events of Last 24 hours:   Hypotensive overnight and CVC was placed. Follows commands. proprofol @20  Fentanyl @150  Levophed @ 8  Invasive Lines: RIJ CVC 5/3/21    MV: 4/27/2021  Vent Mode: AC/VC Rate Set: 24 bmp/Vt Ordered: 250 mL/ /FiO2 : 50 %  Recent Labs     05/02/21  0425 05/03/21  0423   PHART 7.527* 7.411   DFU5WDD 43.7 47.5*   PO2ART 61.2* 76.2     IV:   norepinephrine 7 mcg/min (05/03/21 0751)    fentaNYL 150 mcg/hr (05/03/21 6145)    propofol 20 mcg/kg/min (05/03/21 0554)    sodium chloride      sodium chloride      dextrose       Vitals:  Blood pressure (!) 106/52, pulse 96, temperature 98.3 °F (36.8 °C), temperature source Bladder, resp. rate 23, height 4' 11\" (1.499 m), weight 132 lb 4.8 oz (60 kg), SpO2 (!) 89 %. on vent    Intake/Output Summary (Last 24 hours) at 5/3/2021 0854  Last data filed at 5/3/2021 0500  Gross per 24 hour   Intake 1676.15 ml   Output 1970 ml   Net -293.85 ml     EXAM (COVID or R/O):  General: intubated, ill appearing    ENT:   Pharynx with ETT. Neck: Trachea midline  Resp: No accessory muscle use. CV: Regular rate. Regular rhythm. GI:  Non-distended. Neuro: Sedated  Psych: Unable to obtain because the patient is non-communicative but does follow commands.     Scheduled Meds:   cefepime  2,000 mg Intravenous Q8H    vancomycin  750 mg Intravenous Q12H    insulin lispro  0-18 Units Subcutaneous Q4H    metoprolol tartrate  25 mg Oral Q6H    chlorhexidine  15 mL Mouth/Throat BID    ipratropium-albuterol  1 ampule Inhalation Q4H    sodium chloride flush  5-40 mL Intravenous 2 times per day    enoxaparin  30 mg Subcutaneous BID    Vitamin D  2,000 Units Oral Daily    saccharomyces boulardii  250 mg Oral BID    dexamethasone  6 mg Intravenous Q24H     PRN Meds:  midazolam, fentanNYL, sodium chloride, sodium chloride flush, sodium chloride, promethazine **OR** ondansetron, polyethylene glycol, acetaminophen **OR** acetaminophen, guaiFENesin-dextromethorphan, glucose, dextrose, glucagon (rDNA), dextrose    Results:  CBC:   Recent Labs     05/01/21 0525 05/02/21 0515 05/03/21 0522   WBC 14.7* 16.3* 17.4*   HGB 13.0 13.2 11.1*   HCT 39.0 40.0 33.3*   MCV 91.4 91.5 91.3   * 478* 488*     BMP:   Recent Labs     05/01/21 0525 05/02/21 0515 05/03/21 0522    140 132*   K 4.4 4.0 3.8   CL 98* 99 94*   CO2 35* 32 33*   BUN 27* 28* 30*   CREATININE <0.5* <0.5* <0.5*     LIVER PROFILE:   Recent Labs     05/01/21 0525 05/02/21 0515 05/03/21 0522   AST 36 41* 47*   ALT 24 26 28   BILIDIR <0.2 <0.2 <0.2   BILITOT <0.2 0.3 0.3   ALKPHOS 87 76 72     Microbiology  4/25/2021 SARS-CoV-2 positive  4/25/2021 C. difficile negative  4/25/2021 blood NGTD  4/26/2021 urine mixed coral  4/27/2021 tracheal aspirate Candida  4/28/2020 1 GI bacterial pathogen by PCR negative  5/1/21 BAL pending    Imaging:  Chest imaging was reviewed by me and showed CTPA 4/25/2021  Pulmonary Arteries: Evaluation is compromised by motion artifact.  No   definite evidence of intraluminal filling defect to suggest pulmonary   embolism.  Main pulmonary artery is normal in caliber. Mediastinum: Mediastinal and bilateral hilar lymph nodes are probably   reactive.  The heart and pericardium demonstrate no acute abnormality.  There   is no acute abnormality of the thoracic aorta. Lungs/pleura: There is no pneumothorax or pleural effusion.  There are patchy   airspace opacities throughout both lungs.  Elevation of the left   hemidiaphragm is again noted. Upper Abdomen: There is fatty infiltration of the liver.  The visualized   upper abdomen is otherwise unremarkable. Soft Tissues/Bones: No acute bone or soft tissue abnormality.  Spinal   fixation rods are again seen.       Impression:       1. Motion limited study with no definite scan evidence for pulmonary embolus.    2. Bilateral airspace opacities probably represent a combination of   atelectasis and pneumonia. CT abdomen 4/25/2021  Impression:        1. Motion limited study. 2. Cholelithiasis and probable choledocholithiasis without evidence for acute   cholecystitis. 3. Nonobstructing left renal calculus. CXR 5/3/2021   Cardiomediastinal silhouette is stable.  Right central venous catheter tip   projects at the low SVC.  Other devices are in similar position.  Similar   background pulmonary vascular congestion and left airspace opacities.  No   convincing pleural effusion or pneumothorax.  Similar abnormal elevation of   the left hemidiaphragm.  No gross bony abnormality. ASSESSMENT:  · Acute hypoxemic respiratory failure -progressive  · COVID-19 pneumonia  · Abnormal CT: Mediastinal lymphadenopathy and parenchymal changes consistent with COVID-19 pneumonia  · Diarrhea  · Transaminitis - improved  · Tachyarrhythmia    PLAN:  COVID-19 isolation, droplet plus  Mechanical ventilation as per my orders. The ventilator was adjusted by me at the bedside for unstable, life threatening respiratory failure. Alkalemia due to overbreathing vent. IV Propofol for sedation, target RASS -2, with daily spontaneous awakening trial.  Fentanyl gtt added.    Add Precedex to facilitate SBT  CCP given 4/26/21, Tocilizumab given 4/27/2021, completed 5 days remdesivir  Decadron D#9, 6 mg daily  Levophed to maintain MAP greater than 65  IVF bolus 500cc x 1  F/u BAL results  Check PCT  Cefepime and vancomycin day #3, started after bronchoscopy cultures,received 5 days ceftriaxone and azithromycin  Inhaled bronchodilators only as needed, MDI preferred  Home metoprolol  Eventually will need f/u CT imaging of chest  · Prophylaxis: Lovenox 30 twice daily  · NOK is      Total critical care time caring for this patient with life threatening, unstable organ failure, including direct patient contact, management of life support systems,

## 2021-05-03 NOTE — PROGRESS NOTES
05/02/21 2007   Vent Information   $Ventilation $Subsequent Day   Skin Assessment Clean, dry, & intact   Vent Type 980   Vent Mode AC/VC   Vt Ordered 250 mL   Rate Set 24 bmp   Peak Flow 50 L/min   FiO2  40 %   SpO2 94 %   SpO2/FiO2 ratio 235   Sensitivity 3   PEEP/CPAP 8   Humidification Source Heated wire   Humidification Temp Measured 37   Circuit Condensation Drained   Vent Patient Data   Peak Inspiratory Pressure 20 cmH2O   Mean Airway Pressure 11 cmH20   Rate Measured 24 br/min   Vt Exhaled 295 mL   Minute Volume 7 Liters   I:E Ratio 1:3.6   Plateau Pressure 18 [id number]   Static Compliance 29 mL/cmH2O   Dynamic Compliance 24 mL/cmH2O   Cough/Sputum   Sputum How Obtained Endotracheal   Cough Productive   Sputum Amount Small   Sputum Color Creamy; Red   Tenacity Thick   Spontaneous Breathing Trial (SBT) RT Doc   Pulse 84   Breath Sounds   Right Upper Lobe Diminished   Right Middle Lobe Diminished   Right Lower Lobe Diminished   Left Upper Lobe Diminished   Left Lower Lobe Diminished   Additional Respiratory  Assessments   Resp 22   Alarm Settings   High Pressure Alarm 45 cmH2O   Low Minute Volume Alarm 3 L/min   Apnea (secs) 20 secs   High Respiratory Rate 40 br/min   Low Exhaled Vt  175 mL   Patient Observation   Observations 8ett 20 at lip

## 2021-05-04 ENCOUNTER — APPOINTMENT (OUTPATIENT)
Dept: GENERAL RADIOLOGY | Age: 66
DRG: 130 | End: 2021-05-04
Payer: MEDICAID

## 2021-05-04 LAB
ALBUMIN SERPL-MCNC: 2.9 G/DL (ref 3.4–5)
ALP BLD-CCNC: 93 U/L (ref 40–129)
ALT SERPL-CCNC: 38 U/L (ref 10–40)
ANION GAP SERPL CALCULATED.3IONS-SCNC: 6 MMOL/L (ref 3–16)
ANISOCYTOSIS: ABNORMAL
AST SERPL-CCNC: 43 U/L (ref 15–37)
ATYPICAL LYMPHOCYTE RELATIVE PERCENT: 2 % (ref 0–6)
BANDED NEUTROPHILS RELATIVE PERCENT: 1 % (ref 0–7)
BASE EXCESS ARTERIAL: 8.1 MMOL/L (ref -3–3)
BASE EXCESS ARTERIAL: 8.4 MMOL/L (ref -3–3)
BASOPHILIC STIPPLING: ABNORMAL
BASOPHILS ABSOLUTE: 0 K/UL (ref 0–0.2)
BASOPHILS RELATIVE PERCENT: 0 %
BILIRUB SERPL-MCNC: 0.3 MG/DL (ref 0–1)
BILIRUBIN DIRECT: <0.2 MG/DL (ref 0–0.3)
BILIRUBIN, INDIRECT: ABNORMAL MG/DL (ref 0–1)
BUN BLDV-MCNC: 18 MG/DL (ref 7–20)
CALCIUM SERPL-MCNC: 8.9 MG/DL (ref 8.3–10.6)
CARBOXYHEMOGLOBIN ARTERIAL: 0.3 % (ref 0–1.5)
CARBOXYHEMOGLOBIN ARTERIAL: 0.3 % (ref 0–1.5)
CHLORIDE BLD-SCNC: 99 MMOL/L (ref 99–110)
CO2: 32 MMOL/L (ref 21–32)
CREAT SERPL-MCNC: <0.5 MG/DL (ref 0.6–1.2)
EOSINOPHILS ABSOLUTE: 0.2 K/UL (ref 0–0.6)
EOSINOPHILS RELATIVE PERCENT: 1 %
GFR AFRICAN AMERICAN: >60
GFR NON-AFRICAN AMERICAN: >60
GLUCOSE BLD-MCNC: 108 MG/DL (ref 70–99)
GLUCOSE BLD-MCNC: 129 MG/DL (ref 70–99)
GLUCOSE BLD-MCNC: 139 MG/DL (ref 70–99)
GLUCOSE BLD-MCNC: 167 MG/DL (ref 70–99)
GLUCOSE BLD-MCNC: 172 MG/DL (ref 70–99)
GLUCOSE BLD-MCNC: 180 MG/DL (ref 70–99)
GLUCOSE BLD-MCNC: 205 MG/DL (ref 70–99)
GLUCOSE BLD-MCNC: 85 MG/DL (ref 70–99)
HCO3 ARTERIAL: 32.7 MMOL/L (ref 21–29)
HCO3 ARTERIAL: 33.3 MMOL/L (ref 21–29)
HCT VFR BLD CALC: 35.7 % (ref 36–48)
HEMOGLOBIN, ART, EXTENDED: 12 G/DL (ref 12–16)
HEMOGLOBIN, ART, EXTENDED: 12.4 G/DL (ref 12–16)
HEMOGLOBIN: 11.7 G/DL (ref 12–16)
LYMPHOCYTES ABSOLUTE: 0.8 K/UL (ref 1–5.1)
LYMPHOCYTES RELATIVE PERCENT: 2 %
MCH RBC QN AUTO: 30.2 PG (ref 26–34)
MCHC RBC AUTO-ENTMCNC: 32.9 G/DL (ref 31–36)
MCV RBC AUTO: 91.7 FL (ref 80–100)
METAMYELOCYTES RELATIVE PERCENT: 1 %
METHEMOGLOBIN ARTERIAL: 0 %
METHEMOGLOBIN ARTERIAL: 0.3 %
MONOCYTES ABSOLUTE: 0.4 K/UL (ref 0–1.3)
MONOCYTES RELATIVE PERCENT: 2 %
NEUTROPHILS ABSOLUTE: 18.6 K/UL (ref 1.7–7.7)
NEUTROPHILS RELATIVE PERCENT: 91 %
O2 CONTENT ARTERIAL: 16 ML/DL
O2 CONTENT ARTERIAL: 16 ML/DL
O2 SAT, ARTERIAL: 93 %
O2 SAT, ARTERIAL: 95.2 %
O2 THERAPY: ABNORMAL
O2 THERAPY: ABNORMAL
PCO2 ARTERIAL: 44.2 MMHG (ref 35–45)
PCO2 ARTERIAL: 48.9 MMHG (ref 35–45)
PDW BLD-RTO: 13.9 % (ref 12.4–15.4)
PERFORMED ON: ABNORMAL
PERFORMED ON: NORMAL
PH ARTERIAL: 7.45 (ref 7.35–7.45)
PH ARTERIAL: 7.49 (ref 7.35–7.45)
PLATELET # BLD: 487 K/UL (ref 135–450)
PLATELET SLIDE REVIEW: ABNORMAL
PMV BLD AUTO: 7.5 FL (ref 5–10.5)
PO2 ARTERIAL: 63.8 MMHG (ref 75–108)
PO2 ARTERIAL: 70.5 MMHG (ref 75–108)
POLYCHROMASIA: ABNORMAL
POTASSIUM REFLEX MAGNESIUM: 4.1 MMOL/L (ref 3.5–5.1)
RBC # BLD: 3.89 M/UL (ref 4–5.2)
SLIDE REVIEW: ABNORMAL
SODIUM BLD-SCNC: 137 MMOL/L (ref 136–145)
TCO2 ARTERIAL: 34.1 MMOL/L
TCO2 ARTERIAL: 34.8 MMOL/L
TOTAL PROTEIN: 5.6 G/DL (ref 6.4–8.2)
VANCOMYCIN TROUGH: 14.7 UG/ML (ref 10–20)
WBC # BLD: 20 K/UL (ref 4–11)

## 2021-05-04 PROCEDURE — 6360000002 HC RX W HCPCS: Performed by: INTERNAL MEDICINE

## 2021-05-04 PROCEDURE — 2700000000 HC OXYGEN THERAPY PER DAY

## 2021-05-04 PROCEDURE — 94003 VENT MGMT INPAT SUBQ DAY: CPT

## 2021-05-04 PROCEDURE — 99233 SBSQ HOSP IP/OBS HIGH 50: CPT | Performed by: INTERNAL MEDICINE

## 2021-05-04 PROCEDURE — 6370000000 HC RX 637 (ALT 250 FOR IP): Performed by: INTERNAL MEDICINE

## 2021-05-04 PROCEDURE — 2580000003 HC RX 258: Performed by: INTERNAL MEDICINE

## 2021-05-04 PROCEDURE — 85025 COMPLETE CBC W/AUTO DIFF WBC: CPT

## 2021-05-04 PROCEDURE — 80048 BASIC METABOLIC PNL TOTAL CA: CPT

## 2021-05-04 PROCEDURE — 94640 AIRWAY INHALATION TREATMENT: CPT

## 2021-05-04 PROCEDURE — 2500000003 HC RX 250 WO HCPCS: Performed by: INTERNAL MEDICINE

## 2021-05-04 PROCEDURE — 82803 BLOOD GASES ANY COMBINATION: CPT

## 2021-05-04 PROCEDURE — 99291 CRITICAL CARE FIRST HOUR: CPT | Performed by: INTERNAL MEDICINE

## 2021-05-04 PROCEDURE — 71045 X-RAY EXAM CHEST 1 VIEW: CPT

## 2021-05-04 PROCEDURE — 92610 EVALUATE SWALLOWING FUNCTION: CPT

## 2021-05-04 PROCEDURE — 80202 ASSAY OF VANCOMYCIN: CPT

## 2021-05-04 PROCEDURE — 2000000000 HC ICU R&B

## 2021-05-04 PROCEDURE — 94761 N-INVAS EAR/PLS OXIMETRY MLT: CPT

## 2021-05-04 PROCEDURE — 80076 HEPATIC FUNCTION PANEL: CPT

## 2021-05-04 PROCEDURE — 92526 ORAL FUNCTION THERAPY: CPT

## 2021-05-04 RX ORDER — DEXAMETHASONE SODIUM PHOSPHATE 4 MG/ML
4 INJECTION, SOLUTION INTRA-ARTICULAR; INTRALESIONAL; INTRAMUSCULAR; INTRAVENOUS; SOFT TISSUE EVERY 24 HOURS
Status: DISCONTINUED | OUTPATIENT
Start: 2021-05-05 | End: 2021-05-06

## 2021-05-04 RX ADMIN — RDII 250 MG CAPSULE 250 MG: at 19:57

## 2021-05-04 RX ADMIN — MIDAZOLAM HYDROCHLORIDE 2 MG: 1 INJECTION, SOLUTION INTRAMUSCULAR; INTRAVENOUS at 00:38

## 2021-05-04 RX ADMIN — INSULIN LISPRO 3 UNITS: 100 INJECTION, SOLUTION INTRAVENOUS; SUBCUTANEOUS at 15:44

## 2021-05-04 RX ADMIN — RDII 250 MG CAPSULE 250 MG: at 08:47

## 2021-05-04 RX ADMIN — ENOXAPARIN SODIUM 30 MG: 30 INJECTION SUBCUTANEOUS at 19:58

## 2021-05-04 RX ADMIN — Medication 10 ML: at 19:57

## 2021-05-04 RX ADMIN — Medication 10 ML: at 08:49

## 2021-05-04 RX ADMIN — IPRATROPIUM BROMIDE AND ALBUTEROL SULFATE 1 AMPULE: 2.5; .5 SOLUTION RESPIRATORY (INHALATION) at 11:13

## 2021-05-04 RX ADMIN — MIDAZOLAM HYDROCHLORIDE 2 MG: 1 INJECTION, SOLUTION INTRAMUSCULAR; INTRAVENOUS at 21:46

## 2021-05-04 RX ADMIN — IPRATROPIUM BROMIDE AND ALBUTEROL SULFATE 1 AMPULE: 2.5; .5 SOLUTION RESPIRATORY (INHALATION) at 03:32

## 2021-05-04 RX ADMIN — Medication 2000 UNITS: at 08:48

## 2021-05-04 RX ADMIN — VANCOMYCIN HYDROCHLORIDE 750 MG: 750 INJECTION, POWDER, LYOPHILIZED, FOR SOLUTION INTRAVENOUS at 02:29

## 2021-05-04 RX ADMIN — INSULIN LISPRO 6 UNITS: 100 INJECTION, SOLUTION INTRAVENOUS; SUBCUTANEOUS at 12:38

## 2021-05-04 RX ADMIN — IPRATROPIUM BROMIDE AND ALBUTEROL SULFATE 1 AMPULE: 2.5; .5 SOLUTION RESPIRATORY (INHALATION) at 00:02

## 2021-05-04 RX ADMIN — ONDANSETRON HYDROCHLORIDE 4 MG: 2 INJECTION, SOLUTION INTRAMUSCULAR; INTRAVENOUS at 23:32

## 2021-05-04 RX ADMIN — IPRATROPIUM BROMIDE AND ALBUTEROL SULFATE 1 AMPULE: 2.5; .5 SOLUTION RESPIRATORY (INHALATION) at 08:33

## 2021-05-04 RX ADMIN — CEFEPIME 2000 MG: 2 INJECTION, POWDER, FOR SOLUTION INTRAVENOUS at 04:34

## 2021-05-04 RX ADMIN — PROPOFOL 50 MCG/KG/MIN: 10 INJECTION, EMULSION INTRAVENOUS at 05:34

## 2021-05-04 RX ADMIN — Medication 200 MCG/HR: at 04:58

## 2021-05-04 RX ADMIN — DEXAMETHASONE SODIUM PHOSPHATE 6 MG: 10 INJECTION, SOLUTION INTRAMUSCULAR; INTRAVENOUS at 08:48

## 2021-05-04 RX ADMIN — ENOXAPARIN SODIUM 30 MG: 30 INJECTION SUBCUTANEOUS at 08:48

## 2021-05-04 RX ADMIN — ONDANSETRON HYDROCHLORIDE 4 MG: 2 INJECTION, SOLUTION INTRAMUSCULAR; INTRAVENOUS at 17:07

## 2021-05-04 ASSESSMENT — PULMONARY FUNCTION TESTS
PIF_VALUE: 13
PIF_VALUE: 13
PIF_VALUE: 30
PIF_VALUE: 20
PIF_VALUE: 28
PIF_VALUE: 15
PIF_VALUE: 20
PIF_VALUE: 18

## 2021-05-04 ASSESSMENT — PAIN DESCRIPTION - FREQUENCY
FREQUENCY: INTERMITTENT
FREQUENCY: INTERMITTENT

## 2021-05-04 ASSESSMENT — PAIN SCALES - GENERAL
PAINLEVEL_OUTOF10: 1
PAINLEVEL_OUTOF10: 0

## 2021-05-04 ASSESSMENT — PAIN DESCRIPTION - ORIENTATION: ORIENTATION: MID

## 2021-05-04 ASSESSMENT — PAIN DESCRIPTION - PROGRESSION
CLINICAL_PROGRESSION: GRADUALLY IMPROVING
CLINICAL_PROGRESSION: GRADUALLY IMPROVING

## 2021-05-04 ASSESSMENT — PAIN DESCRIPTION - ONSET: ONSET: OTHER (COMMENT)

## 2021-05-04 ASSESSMENT — PAIN DESCRIPTION - PAIN TYPE: TYPE: OTHER (COMMENT)

## 2021-05-04 NOTE — PROGRESS NOTES
05/04/21 0002   Vent Information   Vent Type 980   Vent Mode AC/VC   Vt Ordered 275 mL   Rate Set 22 bmp   Peak Flow 50 L/min   Pressure Support 0 cmH20   FiO2  40 %   SpO2 94 %   SpO2/FiO2 ratio 235   Sensitivity 3   PEEP/CPAP 5   Humidification Source Heated wire   Humidification Temp 37   Humidification Temp Measured 37   Circuit Condensation Drained   Vent Patient Data   Peak Inspiratory Pressure 20 cmH2O   Mean Airway Pressure 8.4 cmH20   Rate Measured 22 br/min   Vt Exhaled 317 mL   Minute Volume 6.98 Liters   I:E Ratio 1:3.60   Cough/Sputum   Sputum How Obtained Endotracheal;Suctioned   Cough Productive   Sputum Amount Small   Sputum Color Creamy   Tenacity Thick   Spontaneous Breathing Trial (SBT) RT Doc   Pulse 53   Breath Sounds   Right Upper Lobe Clear   Right Middle Lobe Diminished   Right Lower Lobe Diminished   Left Upper Lobe Clear   Left Lower Lobe Diminished   Additional Respiratory  Assessments   Resp 22   Position Semi-Barnett's   Alarm Settings   High Pressure Alarm 45 cmH2O   Low Minute Volume Alarm 3 L/min   High Respiratory Rate 40 br/min   Patient Observation   Observations ETT SIZE 8.0, SECURED AT 20 LIP LINE. AMBU BAG AT HEAD OF BED. WATER GOOD.     ETT (adult)   Placement Date/Time: 04/27/21 0020   Tube Size: 8 mm  Laryngoscope: GlideScope  Blade Size: 3  Location: Oral  Secured at: 23 cm  Measured From: Lips   Secured at 20 cm   Measured From Lips   ET Placement Left   Secured By [employer] tube haq   Site Condition Dry

## 2021-05-04 NOTE — PROGRESS NOTES
Hospitalist Progress Note      PCP: ARABELLA Smith - FADY      COVID pna with acute resp failure   Date of Admission: 4/25/2021     cont to be intubated  Became hypotensive overnight. Central line placed. Currently on Levophed. 5/4  Anxious on spontaneous breathing trials. On Precedex. Continues to be on Levophed. Medications:  Reviewed    Infusion Medications    norepinephrine 4 mcg/min (05/04/21 0859)    dexmedetomidine HCl in NaCl 0.5 mcg/kg/hr (05/04/21 1004)    fentaNYL Stopped (05/04/21 0856)    propofol Stopped (05/04/21 0856)    sodium chloride      sodium chloride      dextrose       Scheduled Medications    [START ON 5/5/2021] dexamethasone  4 mg Intravenous Q24H    insulin lispro  0-18 Units Subcutaneous Q4H    [Held by provider] metoprolol tartrate  25 mg Oral Q6H    chlorhexidine  15 mL Mouth/Throat BID    ipratropium-albuterol  1 ampule Inhalation Q4H    sodium chloride flush  5-40 mL Intravenous 2 times per day    enoxaparin  30 mg Subcutaneous BID    Vitamin D  2,000 Units Oral Daily    saccharomyces boulardii  250 mg Oral BID     PRN Meds: midazolam, fentanNYL, sodium chloride, sodium chloride flush, sodium chloride, promethazine **OR** ondansetron, polyethylene glycol, acetaminophen **OR** acetaminophen, guaiFENesin-dextromethorphan, glucose, dextrose, glucagon (rDNA), dextrose      Intake/Output Summary (Last 24 hours) at 5/4/2021 1142  Last data filed at 5/4/2021 0441  Gross per 24 hour   Intake 2019.79 ml   Output 2550 ml   Net -530.21 ml       Physical Exam Performed:    /83   Pulse 121   Temp 99.5 °F (37.5 °C) (Bladder)   Resp 18   Ht 4' 11\" (1.499 m)   Wt 132 lb 4.8 oz (60 kg)   SpO2 91%   BMI 26.72 kg/m²     General: middle  Aged female on vent  Oral ETT and OG noted   Awake, alert and oriented.  Appears to be not in any distress  Mucous Membranes:  Pink , anicteric  Neck: No JVD, no carotid bruit, no thyromegaly  Chest: scattered rhonchi and diminished pedro   Cardiovascular:  RRR S1S2 heard, no murmurs or gallops  Abdomen:  Soft, undistended, non tender, no organomegaly, BS present  Extremities: No edema or cyanosis. Distal pulses well felt  Neurological -awake, following commands. Labs:   Recent Labs     05/02/21 0515 05/03/21 0522 05/04/21 0418   WBC 16.3* 17.4* 20.0*   HGB 13.2 11.1* 11.7*   HCT 40.0 33.3* 35.7*   * 488* 487*     Recent Labs     05/02/21 0515 05/03/21 0522 05/04/21 0418    132* 137   K 4.0 3.8 4.1   CL 99 94* 99   CO2 32 33* 32   BUN 28* 30* 18   CREATININE <0.5* <0.5* <0.5*   CALCIUM 10.5 9.3 8.9     Recent Labs     05/02/21 0515 05/03/21 0522 05/04/21 0418   AST 41* 47* 43*   ALT 26 28 38   BILIDIR <0.2 <0.2 <0.2   BILITOT 0.3 0.3 0.3   ALKPHOS 76 72 93     Recent Labs     05/03/21  0003   INR 1.04     No results for input(s): CKTOTAL, TROPONINI in the last 72 hours. Urinalysis:      Lab Results   Component Value Date    NITRU Negative 04/26/2021    WBCUA 10-20 04/26/2021    BACTERIA 2+ 04/26/2021    RBCUA 3-4 04/26/2021    BLOODU MODERATE 04/26/2021    SPECGRAV 1.020 04/26/2021    GLUCOSEU Negative 04/26/2021       Radiology:  XR CHEST PORTABLE   Final Result   No significant interval change. XR CHEST PORTABLE   Final Result   Right central venous catheter tip projects at the low SVC. XR CHEST PORTABLE   Final Result   Stable chest.         XR CHEST PORTABLE   Final Result   Slight improved aeration of the lungs. Scattered airspace disease remains. XR CHEST PORTABLE   Final Result   Moderate consolidating airspace disease left lower lobe. Improving aeration with decreased airspace disease right lung and left apex. XR CHEST PORTABLE   Final Result   Patchy airspace opacities bilaterally, may be related to pulmonary edema   versus pneumonia. Mild to moderate left pleural effusion. Asymmetric elevation of the left hemidiaphragm.          XR CHEST PORTABLE Final Result   Persistent moderate bilateral airspace disease unchanged. Endotracheal tube is in satisfactory position at this time. XR CHEST PORTABLE   Final Result   Endotracheal tube 1 cm above the wyatt, consider 2-3 cm retraction. Multifocal airspace opacities worrisome for multifocal pneumonia or   atelectasis. Mild progressed within right lung base and right upper lung. CT ABDOMEN PELVIS W IV CONTRAST Additional Contrast? None   Final Result   1. Motion limited study. 2. Cholelithiasis and probable choledocholithiasis without evidence for acute   cholecystitis. 3. Nonobstructing left renal calculus. CT CHEST PULMONARY EMBOLISM W CONTRAST   Final Result   1. Motion limited study with no definite scan evidence for pulmonary embolus. 2. Bilateral airspace opacities probably represent a combination of   atelectasis and pneumonia. XR CHEST PORTABLE   Final Result   Nonspecific perihilar opacities. Pulmonary edema and multifocal pneumonia   are in the differential.  Follow-up to resolution recommended.          XR CHEST PORTABLE    (Results Pending)   XR CHEST PORTABLE    (Results Pending)           Assessment/Plan:    Active Hospital Problems    Diagnosis    Hypotension due to drugs [I95.2]    Elevated LFTs [R79.89]    Pneumonia due to COVID-19 virus [U07.1, J12.82]    Hypoxia [R09.02]    Transaminitis [R36.71]    Metabolic acidosis [J87.1]    Acute hypoxemic respiratory failure (HCC) [J96.01]    HCAP (healthcare-associated pneumonia) [J18.9]         COVID 19 PNA  Acute Hypoxic Respiratory Failure  - 85% on RA upon arrival to ED  - CT chest with bilateral airspace opacities, no PE  - PCT: 0.18; had 1/2 Pfizer Vaccine on 3/31  - Admitted to Med Surg, droplet + precautions  - worsened quickly leading to ICU transfer, vapotherm and now intubated and on vent support  - continue  Decadron D#9, completed  Remdesivir for 5 days , s/p  CCP and IL 6 inhibitor infusion   - pulm managing  - s/p bronch 5/1/21  Continue Cefepime and IV vanc day 4 -stop the negative cultures.        Diarrhea - likely 2/2 COVID  - diarrhea returned now   - GI f/w       Hypotension  Primarily from excess sedation  Now on levophed    NAGMA  - CO2 17  - likely 2/2 diarrhea  - improved and off bicarb gtt     Hyperglycemia- A1c at 5.8  - likely 2/2 steroids  -  low dose SSI      Transaminitis  - likely with viral infection   - monitor with Remdesivir use-improving      HTN  - controlled  - cont Toprol XL     DVT Prophylaxis: Lovenox bid  Diet: DIET TUBE FEED CONTINUOUS/CYCLIC NPO; STANDARD WITH FIBER (Jevity 1.2);  Orogastric; Continuous; 20; 55; 20  Code Status: Full Code    PT/OT Eval Status: ordered    Dispo - cont care in ICU    Odalys Beckwith MD

## 2021-05-04 NOTE — PROGRESS NOTES
Vancomycin trough on 750 mg IVPB q12h = 14.7 mcg/ml. Continue same dose and schedule. OK to give dose now.

## 2021-05-04 NOTE — PROGRESS NOTES
RESPIRATORY THERAPY ASSESSMENT    Name:  Randy Burks Record Number:  [id number]  Age: 61 y.o. Gender: female  : 1955  Today's Date:  2021  Room:  [facility]3014-01    Assessment     Is the patient being admitted for a COPD or Asthma exacerbation? No   (If yes the patient will be seen every 4 hours for the first 24 hours and then reassessed)    Patient Admission Diagnosis      Allergies  Allergies   Allergen Reactions    Penicillins Rash       Minimum Predicted Vital Capacity:               Actual Vital Capacity:                    Pulmonary History:  PNA. Home Oxygen Therapy:  room air  Home Respiratory Therapy:Albuterol   Current Respiratory Therapy:  Duoneb Q4H. Treatment Type: Aerosol generator  Medications: Albuterol/Ipratropium    Respiratory Severity Index(RSI)   Patients with orders for inhalation medications, oxygen, or any therapeutic treatment modality will be placed on Respiratory Protocol. They will be assessed with the first treatment and at least every 72 hours thereafter. The following severity scale will be used to determine frequency of treatment intervention.     Smoking History: Pulmonary Disease or Smoking History, Greater than 15 pack year = 2    Social History  Social History     Tobacco Use    Smoking status: Never Smoker    Smokeless tobacco: Never Used   Substance Use Topics    Alcohol use: Yes     Comment: 0-1 drinks per month    Drug use: No       Recent Surgical History: None = 0  Past Surgical History  Past Surgical History:   Procedure Laterality Date    ARM SURGERY Right 2020    RIGHT ULNAR NERVE DECOMPRESSION AT THE ELBOW performed by Preeti Kraft MD at [address] [facility] for scoliosis    CARPAL TUNNEL RELEASE Right 2020    RIGHT CARPAL TUNNEL RELEASE performed by Preeti Kraft MD at [address] [facility] Bilateral     cataracts    INNER EAR SURGERY Right     replacement of eardrum    TONSILLECTOMY         Level of Consciousness: Comatose = 4    Level of Activity: Bedridden, unresponsive or quadriplegic = 4    Respiratory Pattern: Increased; RR 21-30 = 1    Breath Sounds: Diminshed bilaterally and/or crackles = 2    Sputum  Sputum Color: Creamy, Tenacity: Thick, Sputum How Obtained: Endotracheal, Suctioned  Cough: Strong, productive = 1    Vital Signs   BP (!) 111/47   Pulse 81 Comment: sedation titrated back up   Temp 97 °F (36.1 °C) (Bladder)   Resp 20   Ht 4' 11\" (1.499 m)   Wt 132 lb 4.8 oz (60 kg)   SpO2 97%   BMI 26.72 kg/m²   SPO2 (COPD values may differ): 86-87% on room air or greater than 92% on FiO2 35- 50% = 3    Peak Flow (asthma only): not applicable = 0    RSI: 82-50 = Q4 (every four hours)        Plan       Goals: medication delivery    Patient/caregiver was educated on the proper method of use for Respiratory Care Devices:        Level of patient/caregiver understanding able to:   ? Verbalize understanding   ? Demonstrate understanding       ? Teach back        ? Needs reinforcement       ? No available caregiver               ? Other:     Response to education:       Is patient being placed on Home Treatment Regimen? No     Does the patient have everything they need prior to discharge? NA     Comments: Chart reviewed and patient assessed. Plan of Care: Duoneb Q4H. Electronically signed by Yoni Floyd RCP on 5/4/2021 at 12:51 AM    Respiratory Protocol Guidelines     1. Assessment and treatment by Respiratory Therapy will be initiated for medication and therapeutic interventions upon initiation of aerosolized medication. 2. Physician will be contacted for respiratory rate (RR) greater than 35 breaths per minute. Therapy will be held for heart rate (HR) greater than 140 beats per minute, pending direction from physician. 3. Bronchodilators will be administered via Metered Dose Inhaler (MDI) with spacer when the following criteria are met:  a.  Alert and cooperative     b. HR < 140 bpm  c. RR < 30 bpm                d. Can demonstrate a 2-3 second inspiratory hold  4. Bronchodilators will be administered via Hand Held Nebulizer ORTIZ [facility]) to patients when ANY of the following criteria are met  a. Incognizant or uncooperative          b. Patients treated with HHN at Home        c. Unable to demonstrate proper use of MDI with spacer     d. RR > 30 bpm   5. Bronchodilators will be delivered via Metered Dose Inhaler (MDI), HHN, Aerogen to intubated patients on mechanical ventilation. 6. Inhalation medication orders will be delivered and/or substituted as outlined below. Aerosolized Medications Ordering and Administration Guidelines:    1. All Medications will be ordered by a physician, and their frequency and/or modality will be adjusted as defined by the patients Respiratory Severity Index (RSI) score. 2. If the patient does not have documented COPD, consider discontinuing anticholinergics when RSI is less than 9.  3. If the bronchospasm worsens (increased RSI), then the bronchodilator frequency can be increased to a maximum of every 4 hours. If greater than every 4 hours is required, the physician will be contacted. 4. If the bronchospasm improves, the frequency of the bronchodilator can be decreased, based on the patient's RSI, but not less than home treatment regimen frequency. 5. Bronchodilator(s) will be discontinued if patient has a RSI less than 9 and has received no scheduled or as needed treatment for 72  Hrs. Patients Ordered on a Mucolytic Agent:    1. Must always be administered with a bronchodilator. 2. Discontinue if patient experiences worsened bronchospasm, or secretions have lessened to the point that the patient is able to clear them with a cough. Anti-inflammatory and Combination Medications:    1.  If the patient lacks prior history of lung disease, is not using inhaled anti-inflammatory medication at home, and lacks wheezing by examination or by history for at least 24 hours, contact physician for possible discontinuation.

## 2021-05-04 NOTE — PROGRESS NOTES
Reassessment complete  Post extubation     Patient calm and relaxed    ST on monitor 119   VSS right now    Patient on 3 L/min via nasal cannula 02    Valderrama in place with good output. Flexiseal in place with loose diarrheal stools noted. To be discontinued today     No edema noted throughout.      Abdomen is flat and soft. Bowels hyperactive x4.      Central line dressing is clean, dry and intact with no signs of drainage. All tubing is current and dated. IPA caps applied to all access hubs. No restraints in place at this time. Speech at bedside. Passed evaluation to dysphagia 1 on puree. Tolerating well. Blood pressure 136/75, pulse 128, temperature 99.4 °F (37.4 °C), temperature source Bladder, resp. rate 19, height 4' 11\" (1.499 m), weight 132 lb 4.8 oz (60 kg), SpO2 93 %.       Electronically signed by Tim Small RN on 5/4/2021 at 3:58 PM

## 2021-05-04 NOTE — PROGRESS NOTES
35 ml of FENTANYL gtt wasted with [place], RN.     Electronically signed by Thomas Machado RN on 5/4/2021 at 12:27 PM     Electronically signed by Cierra Gomez RN on 5/4/2021 at 12:28 PM

## 2021-05-04 NOTE — PROGRESS NOTES
Speech Language Pathology  Facility/Department: [facility] ICU   CLINICAL BEDSIDE SWALLOW EVALUATION    NAME: Matt Sal  : 1955  MRN: [id number]    ADMISSION DATE: 2021  ADMITTING DIAGNOSIS: has SIRS (systemic inflammatory response syndrome) ([address] Essential hypertension; Polymyalgia rheumatica ([address] Arthritis; HCAP (healthcare-associated pneumonia); Tachycardia; Mixed conductive and sensorineural hearing loss of right ear with restricted hearing of left ear; Sensorineural hearing loss (SNHL) of left ear with restricted hearing of right ear; Tinnitus, right ear; Right carpal tunnel syndrome; Ulnar neuropathy at elbow, right; Hand arthritis; Pneumonia due to COVID-19 virus; Hypoxia; Transaminitis; Metabolic acidosis; Acute hypoxemic respiratory failure ([address] Hypotension due to drugs; and Elevated LFTs on their problem list.  ONSET DATE: 2021    Recent Chest Xray/CT of Chest: 2021  Impression   Right central venous catheter tip projects at the low SVC. Date of Eval: 2021  Evaluating Therapist: Andrés Ray    Current Diet level:  Current Diet : NPO  Current Liquid Diet : NPO      Primary Complaint  Patient Complaint: Pt is happy to be extubated. Reports she is thirsty    Pain:  Pain Assessment  Pain Assessment: 0-10  Pain Level: 1  Patient's Stated Pain Goal: No pain  Pain Type:  Other (Comment)(discomfort)  Pain Location: (throat)  Pain Orientation: Mid  Pain Descriptors: Dull, Discomfort, Itching  Pain Frequency: Intermittent  Pain Onset: Other (Comment)  Clinical Progression: Gradually improving  Non-Pharmaceutical Pain Intervention(s): Distraction, Emotional support  Response to Pain Intervention: Other (Comment)  RASS Score: Alert and calm    Reason for Referral  Matt Sal was referred for a bedside swallow evaluation to assess the efficiency of her swallow function, identify signs and symptoms of aspiration and make recommendations regarding safe dietary consistencies, effective compensatory strategies, and safe eating environment. Medical record review/interview: The patient is a 60 y/o female who presented to [facility] ED on 04/26 with c/o diarrhea and reduced PO intake for 10 days. She denies fevers, chills, CP, and SOB. She does endorse cough productive of clear sputum. Pt is s/p initial Covid vaccine on 03/31 but, despite, tested positive via Covid-19 rapid in the ED. Chest CT indicative of PNA. Pt admitted to [facility] with Covid-19 PNA, diarrhea, NAGMA, hyperglycemia, transminitis, HTN. She was initially admitted to med [facility] unit, however, she eventually required 15L of O2. She was then transferred to ICU where she was satting low even with use of vapotherm at 40LPM at 100% FiO2. Pt was subsequently intubated on 04/27 and extubated this date. She has functional vocal quality and appears alert. Med hx significant for: arthritis, HTN, PNA, PONV, osteoporosis  Predisposing dysphagia risk factors: N/A  Clinical signs of possible chronic dysphagia: N/A  Precipitating dysphagia risk factors: recent intubation    Vitals/labs:   Temp: 99.6  SpO2: 93% on 5L O@ via NC  RR: 18  BP: 119/78  HR: 128  WBCs: 20.0- elevated    Cranial nerve exam:   CN V (trigeminal): ophthalmic, maxillary, and mandibular facial sensation- WNL  CN VII (facial): WNL b/l  CN IX/X (glossopharyngeal/vagus): MPT: WFL; pitch range: WFL; vocal quality: WFL; cough: Strong-perceptually, Congested and Productive  CN XII (hypoglossal): WNL b/l      Laryngeal function exam:   Secretions: Thick dried secretions throughout oral cavity. Extensive oral care via suction kit completed via SLP with removal of copious thick secretions and deep suctioning of thick, tan sputum.   Vocal quality: WFL  MPT: WFL  S/Z ratio: WFL  Pitch range: Adequate  Cough: Strong-perceptually, Congested and Productive    PO trials:   Ice: grossly functional with no clinical s/s of aspiration   IDDSI 0 (thin):   - 5cc bolus (tsp): DNT  - Cup: suspected premature bolus loss, vitals stable, no clinical s/s of aspiration and delayed throat clearing after the swallow 1/3 trials  - Straw: suspected premature bolus loss, vitals stable, wet vocal quality and immediate cough after the swallow. Occasional anterior bolus loss  IDDSI 2 (mildly thick): not clinically indicated  IDDSI 3 (moderately thick): not clinically indicated  IDDSI 4 (puree): suspected timely swallow, oral stasis noted post swallow with suspected reduced A-P bolus transit, vitals stable, no clinical s/s of aspiration and change in RR  IDDSI 5 (minced and moist): DNT  IDDSI 6 (soft and bite sized): DNT  IDDSI 7 (regular): DNT  3 oz water: FAIL    Clinical signs of oropharyngeal dysphagia likely subacute related to reduced physical mobility, increased O2 demands, respiratory illness, acute infection, disuse atrophy and intubation. Swallow prognosis is good. Instrumental swallow study is not indicated- see below. Given tolerance to PO at bedside, stable respiratory status, willingness to participate in therapy and potential for spontaneous improvement, pt is safe for modified oral diet at this time. Instrumentation: Pt's swallow currently being managed clinically d/t limited availability for instrumentation d/t isolation status. Pt would benefit from instrumental swallow assessment when isolation status cleared to determine risks/benefits of diet modification. Diet recommendation: IDDSI 4 Puree Solids; IDDSI 0 Thin Liquids - NO straws ;  Meds whole in puree  Risk management: upright for all intake, stay upright for at least 30 mins after intake, small bites/sips, assist feed, no straws, distant supervision with intake, oral care q4 hrs to reduce adverse affects in the event of aspiration, increase physical mobility as able, slow rate of intake, general aspiration precautions and hold PO and contact SLP if s/s of aspiration or worsening respiratory status develop. Impression  Dysphagia Diagnosis: Mild oral stage dysphagia;Mild pharyngeal stage dysphagia  Dysphagia Impression : Suspect mild oropharyngeal dysphagia  Dysphagia Outcome Severity Scale: Level 5: Mild dysphagia- Distant supervision. May need one diet consistency restricted     Treatment Plan  Requires SLP Intervention: Yes  Duration/Frequency of Treatment: 3-5x/wk  D/C Recommendations: To be determined  Referral To: Pulmonology    Skilled instruction completed with patient re: evidenced based practice regarding recommendations and POC, importance of oral care to reduce adverse affects in the event of aspiration, and instruction of recommended compensatory strategies developed based upon clinical exam. Pt able to recall/demonstrate compensatory strategies with mod cues. Recommended Diet and Intervention  Diet Solids Recommendation: Dysphagia Pureed (Dysphagia I)  Liquid Consistency Recommendation: Thin  Recommended Form of Meds: Meds in puree  Recommendations: Dysphagia treatment  Therapeutic Interventions: Diet tolerance monitoring;Oral care; Therapeutic PO trials with SLP;Patient/Family education    Compensatory Swallowing Strategies  Compensatory Swallowing Strategies: Alternate solids and liquids;Eat/Feed slowly; No straws;Upright as possible for all oral intake;Remain upright for 30-45 minutes after meals;Assist feed;Small bites/sips    Treatment/Goals  Short-term Goals  Timeframe for Short-term Goals: 5 days (05/09/2021)  Goal 1: The patient will tolerate recommended diet with no clinical s/s of aspiration 5/5  Goal 2: The patient will tolerate therapeutic diet upgrade trials with no clinical s/s of aspiration 5/5  Goal 3: The patient will recall/perform recommended compensatory strategies, given no cues  Long-term Goals  Timeframe for Long-term Goals: 7 days (05/11/2021)  Goal 1: The patient will tolerate LRD with no clinical s/s of aspiration or worsening respiratory status    General  Chart Reviewed: Yes  Comments: Chart reviewed prior to completion of assessment  Subjective  Subjective: Pt seen in room with RN permission  Behavior/Cognition: Alert; Cooperative;Pleasant mood;Confused  Respiratory Status: O2 via nasual cannula  O2 Device: Nasal cannula  Liters of Oxygen: 5 L  Communication Observation: Functional  Follows Directions: Simple  Dentition: Adequate  Patient Positioning: Upright in bed  Baseline Vocal Quality: Normal  Volitional Cough: Strong;Congested  Prior Dysphagia History: No prior dysphagia hx per chart  Consistencies Administered: Dysphagia Pureed (Dysphagia I); Thin - straw; Thin - cup; Ice Chips     Vision/Hearing  Vision  Vision: Impaired  Vision Exceptions: Cataracts  Hearing  Hearing: Within functional limits    Oral Motor Deficits  Oral/Motor  Oral Motor: Within functional limits    Oral Phase Dysfunction  Oral Phase  Oral Phase: Exceptions     Indicators of Pharyngeal Phase Dysfunction   Pharyngeal Phase  Pharyngeal Phase: Exceptions    Prognosis  Prognosis  Prognosis for safe diet advancement: good  Individuals consulted  Consulted and agree with results and recommendations: Patient;RN    Education  Patient Education: Role of SLP, rationale for completion of BSE, results, recommendations, and POC  Patient Education Response: Verbalizes understanding  Safety Devices in place: Yes  Type of devices: Left in bed; All fall risk precautions in place; Bed alarm in place;Call light within reach;Nurse notified       Therapy Time  SLP Individual Minutes  Time In: 4651  Time Out: 9400  Minutes: [address] [place], [place]  5/4/2021 3:17 PM  Sara Carvajal M.A., [id number]  Speech-Language Pathologist  Phone: [phone]

## 2021-05-04 NOTE — PROGRESS NOTES
Shift assessment complete. Upon arrival patient was on max dose of fentanyl and propofol. However, she was able to be completely alert when called upon and followed commands easily. Sedation is now off for SAT and SBT. She is still alert and now does appear anxious. I chose to restart precedex at 0.2 mcg. This was increased to 0.4 mcg per Dr. Ami De Anda at bedside during rounds. ST on monitor as high as 150s. VSS right now. LEVOPHED infusing at a rate of 4 mcg/min. Patient currently intubated with ETT at 20LL. On SBT now. Valderrama in place with good urine output. Flexiseal in place with loose diarrheal stools noted. TF has been off for high residuals according to night RN. No edema noted throughout. Abdomen is flat and soft. Bowels hyperactive x4. Central line dressing is clean, dry and intact with no signs of drainage. All tubing is current and dated. IPA caps applied to all access hubs. BUE soft wrist restraints in place due to attempts of pulling at ETT and lines. No signs of injury noted and PROM performed.      Electronically signed by Denisse Shaffer RN on 5/4/2021 at 9:36 AM  Vitals:    05/04/21 0900   BP: 126/62   Pulse: 137   Resp: 16   Temp:    SpO2: 90%

## 2021-05-04 NOTE — PROGRESS NOTES
Shift assessment complete.      Sedation is now off. Patient remaining calm with distraction and relaxation tactics.      ST on monitor 119. VSS right now. LEVOPHED no longer infusing     Patient currently on 5/L.min O2 via nasal cannula      Valderrama in place with good urine output. Flexiseal in place with loose diarrheal stools noted.      No edema noted throughout.      Abdomen is flat and soft. Bowels hyperactive x4.      Central line dressing is wet, saturated and  Blood drainage noted. Change of dressing to be completed. All tubing is current and dated. IPA caps applied to all access hubs.      BUE soft wrist restraints removed. No signs of injury noted and PROM performed. Blood pressure (!) 109/55, pulse 108, temperature 99.6 °F (37.6 °C), temperature source Bladder, resp. rate 16, height 4' 11\" (1.499 m), weight 132 lb 4.8 oz (60 kg), SpO2 95 %.     Electronically signed by Anabela Craft RN on 5/4/2021 at 1:26 PM

## 2021-05-04 NOTE — PLAN OF CARE
Nutrition Problem #1: Inadequate oral intake  Intervention: Food and/or Nutrient Delivery: Continue NPO, Discontinue Tube Feeding  Nutritional Goals: patient will adhere to NPO status until she is medically cleared to receive nutrition therapy (po diet)

## 2021-05-04 NOTE — PROGRESS NOTES
Pulmonary & Critical Care Medicine ICU Progress Note    CC: COVID-19 with progressive hypoxemia    Events of Last 24 hours: Tolerating PO    Invasive Lines: RIJ CVC 5/3/21    MV: 4/27/2021  Vent Mode: AC/VC Rate Set: 22 bmp/Vt Ordered: 275 mL/ /FiO2 : 40 %  Recent Labs     05/03/21 0423 05/04/21  0420   PHART 7.411 7.451*   [id number] 47.5* 48.9*   PO2ART 76.2 63.8*     IV:   norepinephrine 6 mcg/min (05/03/21 1605)    dexmedetomidine HCl in NaCl Stopped (05/03/21 1944)    fentaNYL 200 mcg/hr (05/04/21 0458)    propofol 50 mcg/kg/min (05/04/21 0534)    sodium chloride      sodium chloride      dextrose       Vitals:  Blood pressure (!) 103/46, pulse 104, temperature 99 °F (37.2 °C), temperature source Bladder, resp. rate 24, height 4' 11\" (1.499 m), weight 132 lb 4.8 oz (60 kg), SpO2 92 %. on 5L  Constitutional:  No acute distress. Eyes: PERRL. Conjunctivae anicteric. ENT: Normal nose. Normal tongue. Neck:  Trachea is midline. No thyroid tenderness. Respiratory: No accessory muscle usage. no wheezes. No rales. No Rhonchi. Cardiovascular: Normal S1S2. No digit clubbing. No digit cyanosis. No LE edema. Psychiatric: No anxiety or Agitation. Alert and Oriented to person, place and time.     Scheduled Meds:   cefepime  2,000 mg Intravenous Q8H    vancomycin  750 mg Intravenous Q12H    insulin lispro  0-18 Units Subcutaneous Q4H    [Held by provider] metoprolol tartrate  25 mg Oral Q6H    chlorhexidine  15 mL Mouth/Throat BID    ipratropium-albuterol  1 ampule Inhalation Q4H    sodium chloride flush  5-40 mL Intravenous 2 times per day    enoxaparin  30 mg Subcutaneous BID    Vitamin D  2,000 Units Oral Daily    saccharomyces boulardii  250 mg Oral BID    dexamethasone  6 mg Intravenous Q24H     PRN Meds:  midazolam, fentanNYL, sodium chloride, sodium chloride flush, sodium chloride, promethazine **OR** ondansetron, polyethylene glycol, acetaminophen **OR** acetaminophen, guaiFENesin-dextromethorphan, glucose, dextrose, glucagon (rDNA), dextrose    Results:  CBC:   Recent Labs     05/02/21 0515 05/03/21 0522 05/04/21 0418   WBC 16.3* 17.4* 20.0*   HGB 13.2 11.1* 11.7*   HCT 40.0 33.3* 35.7*   MCV 91.5 91.3 91.7   * 488* 487*     BMP:   Recent Labs     05/02/21 0515 05/03/21 0522 05/04/21 0418    132* 137   K 4.0 3.8 4.1   CL 99 94* 99   CO2 32 33* 32   BUN 28* 30* 18   CREATININE <0.5* <0.5* <0.5*     LIVER PROFILE:   Recent Labs     05/02/21 0515 05/03/21 0522 05/04/21 0418   AST 41* 47* 43*   ALT 26 28 38   BILIDIR <0.2 <0.2 <0.2   BILITOT 0.3 0.3 0.3   ALKPHOS 76 72 93     Microbiology  4/25/2021 SARS-CoV-2 positive  4/25/2021 C. difficile negative  4/25/2021 blood NGTD  4/26/2021 urine mixed coral  4/27/2021 tracheal aspirate Candida  4/28/2020 1 GI bacterial pathogen by PCR negative  5/1/21 BAL NRF    Imaging:  Chest imaging was reviewed by me and showed CTPA 4/25/2021  Pulmonary Arteries: Evaluation is compromised by motion artifact.  No   definite evidence of intraluminal filling defect to suggest pulmonary   embolism.  Main pulmonary artery is normal in caliber. Mediastinum: Mediastinal and bilateral hilar lymph nodes are probably   reactive.  The heart and pericardium demonstrate no acute abnormality.  There   is no acute abnormality of the thoracic aorta. Lungs/pleura: There is no pneumothorax or pleural effusion.  There are patchy   airspace opacities throughout both lungs.  Elevation of the left   hemidiaphragm is again noted. Upper Abdomen: There is fatty infiltration of the liver.  The visualized   upper abdomen is otherwise unremarkable. Soft Tissues/Bones: No acute bone or soft tissue abnormality.  Spinal   fixation rods are again seen.       Impression:       1. Motion limited study with no definite scan evidence for pulmonary embolus.    2. Bilateral airspace opacities probably represent a combination of   atelectasis and pneumonia. CT abdomen 4/25/2021  Impression:        1. Motion limited study. 2. Cholelithiasis and probable choledocholithiasis without evidence for acute   cholecystitis. 3. Nonobstructing left renal calculus. CXR 5/4/2021   Tubes and lines are unchanged.  Marked elevation left hemidiaphragm is again   demonstrated.       Given difference technique multifocal bilateral airspace disease is not   significantly changed.        ASSESSMENT:  · Acute hypoxemic respiratory failure -progressive  · Shock - possibly sedation related  · COVID-19 pneumonia  · Abnormal CT: Mediastinal lymphadenopathy and parenchymal changes consistent with COVID-19 pneumonia  · Diarrhea  · Transaminitis - improved  · Tachyarrhythmia  · Leukocytosis - possibly steroid related, normal PCT    PLAN:  COVID-19 isolation, droplet plus  Supplemental oxygen to maintain SaO2 >92%; wean as tolerated   CCP given 4/26/21, Tocilizumab given 4/27/2021, completed 5 days remdesivir  Decadron D#10, change to 4mg  Levophed to maintain MAP greater than 65  S/p Cefepime and vancomycin day #4 - stopped with neg BAL and normal PCT; received 5 days ceftriaxone and azithromycin  Inhaled bronchodilators only as needed, MDI preferred  Restart metoprolol  GI following  Eventually will need f/u CT imaging of chest  · Prophylaxis: Lovenox 30 twice daily  · Ok to transfer

## 2021-05-04 NOTE — PROGRESS NOTES
PROGRESS NOTE  S:66 yrs Patient  admitted on 4/25/2021 with Acute respiratory failure due to COVID-19 (HCC) [U07.1, J96.00] . Today she remains intubated. Nurse reports high TF residuals. She is passing brown BMs per Flexiseal.    Exam:   Vitals:    05/04/21 1000   BP: (!) 125/50   Pulse: 127   Resp: 17   Temp:    SpO2: 91%     Due to the current efforts to prevent transmission of COVID-19 and also the need to preserve PPE for other caregivers, a face-to-face encounter with the patient was not performed. That being said, all relevant records and diagnostic tests were reviewed, including laboratory results and imaging. Please reference any relevant documentation elsewhere. Care will be coordinated with the primary service. Medications: Reviewed    Labs:  CBC:   Recent Labs     05/02/21 0515 05/03/21 0522 05/04/21 0418   WBC 16.3* 17.4* 20.0*   HGB 13.2 11.1* 11.7*   HCT 40.0 33.3* 35.7*   MCV 91.5 91.3 91.7   * 488* 487*     BMP:   Recent Labs     05/02/21  0515 05/03/21  0522 05/04/21 0418    132* 137   K 4.0 3.8 4.1   CL 99 94* 99   CO2 32 33* 32   BUN 28* 30* 18   CREATININE <0.5* <0.5* <0.5*     LIVER PROFILE:   Recent Labs     05/02/21 0515 05/03/21 0522 05/04/21 0418   AST 41* 47* 43*   ALT 26 28 38   PROT 5.7* 5.2* 5.6*   BILIDIR <0.2 <0.2 <0.2   BILITOT 0.3 0.3 0.3   ALKPHOS 76 72 93     PT/INR:   Recent Labs     05/03/21  0003   INR 1.04       IMAGING:  XR CHEST PORTABLE  Impression   No significant interval change. Attending Supervising [de-identified] Attestation Statement  The patient is a 61 y.o. female. I have performed a history and physical examination of the patient. I discussed the case with my physician assistant Anna Mitchell PA-C    I reviewed the patient's Past Medical History, Past Surgical History, Medications, and Allergies.      Physical Exam:  Vitals:    05/04/21 1535 05/04/21 1600 05/04/21 1700 05/04/21 1800   BP:  120/62 126/77 112/68   Pulse:  122 119 118   Resp:  17 17 14   Temp: 99.4 °F (37.4 °C)      TempSrc: Bladder      SpO2:  95% 92% 91%   Weight:       Height:         Due to the current efforts to prevent transmission of COVID-19 and also the need to preserve PPE for other caregivers, a face-to-face encounter with the patient was not performed. That being said, all relevant records and diagnostic tests were reviewed, including laboratory results and imaging. Please reference any relevant documentation elsewhere. Care will be coordinated with the primary service. Impression: 61year old female with history of HTN, arthritis, osteoporosis, admitted with COVID19 pneumonia and Joycelyn Tang is likely related to COVID-19 superimposed on IBS. Recommendation:  Continue supportive care  Monitor and document output  Continue probiotics  Continue TFs as tolerated   Continue fiber supplementation with TFs per dietician recommendations  Will follow       Michael Zambrano PA-C  10:53 AM 5/4/2021                      61year old female with history of HTN, arthritis, osteoporosis, admitted with acute respiratory failure secondary COVID19 pneumonia. Diarrhea is likely related to COVID-19 superimposed on IBS     Continue supportive care. Start carb control diet after swallow eval.  Add fiber supplements. D/c rectal tube. Consider a trial of dicyclomine.  Will follow    Joe Everett MD          [phone]  Prasanna Boss

## 2021-05-04 NOTE — PLAN OF CARE
Absence of new skin breakdown  Outcome: Ongoing     Problem: Non-Violent Restraints  Goal: Removal from restraints as soon as assessed to be safe  Outcome: Ongoing  Goal: No harm/injury to patient while restraints in use  Outcome: Ongoing  Goal: Patient's dignity will be maintained  Outcome: Ongoing     Problem: Nutrition  Goal: Optimal nutrition therapy  Outcome: Ongoing  Goal: Understanding of nutritional guidelines  Outcome: Ongoing

## 2021-05-04 NOTE — PROGRESS NOTES
SBT of 5/5 started. Pt is awake and following commands at this time. sp02 is 93%, VT in the 450 range, F/V is 49. Continuing to monitor pt.

## 2021-05-04 NOTE — PROGRESS NOTES
05/03/21 2000   Vent Information   $Ventilation $Subsequent Day   Skin Assessment Clean, dry, & intact   Vent Type 980   Vent Mode AC/VC   Vt Ordered 275 mL   Rate Set 22 bmp   Peak Flow 50 L/min   Pressure Support 0 cmH20   FiO2  40 %   SpO2 94 %   SpO2/FiO2 ratio 235   Sensitivity 3   PEEP/CPAP 5   Humidification Source Heated wire   Humidification Temp 37   Humidification Temp Measured 37   Circuit Condensation Drained   Vent Patient Data   Peak Inspiratory Pressure 19 cmH2O   Mean Airway Pressure 8.3 cmH20   Rate Measured 22 br/min   Vt Exhaled 308 mL   Minute Volume 6.77 Liters   I:E Ratio 1:3.60   Plateau Pressure 19 [id number]   Static Compliance 22 mL/cmH2O   Cough/Sputum   Sputum How Obtained Endotracheal;Suctioned   Cough Productive   Sputum Amount Small   Sputum Color Creamy   Tenacity Thick   Spontaneous Breathing Trial (SBT) RT Doc   Pulse 57   Breath Sounds   Right Upper Lobe Clear   Right Middle Lobe Diminished   Right Lower Lobe Diminished   Left Upper Lobe Clear   Left Lower Lobe Diminished   Additional Respiratory  Assessments   Resp 22   Position Semi-Barnett's   Alarm Settings   High Pressure Alarm 45 cmH2O   Low Minute Volume Alarm 3 L/min   High Respiratory Rate 40 br/min   Patient Observation   Observations ETT SIZE 8.0, SECURED AT 20 LIP LINE. AMBU BAG AT HEAD OF BED. WATER GOOD.     ETT (adult)   Placement Date/Time: 04/27/21 0020   Tube Size: 8 mm  Laryngoscope: GlideScope  Blade Size: 3  Location: Oral  Secured at: 23 cm  Measured From: Lips   Secured at 20 cm   Measured From [address] [employer] [address]   Site Condition Dry

## 2021-05-04 NOTE — PROGRESS NOTES
05/03/21 2000   Vent Information   $Ventilation $Subsequent Day   Skin Assessment Clean, dry, & intact   Vent Type 980   Vent Mode AC/VC   Vt Ordered 275 mL   Rate Set 22 bmp   Peak Flow 50 L/min   Pressure Support 0 cmH20   FiO2  40 %   SpO2 94 %   SpO2/FiO2 ratio 235   Sensitivity 3   PEEP/CPAP 5   Vent Patient Data   Peak Inspiratory Pressure 19 cmH2O   Mean Airway Pressure 8.3 cmH20   Rate Measured 22 br/min   Vt Exhaled 308 mL   Minute Volume 6.77 Liters   I:E Ratio 1:3.60   Spontaneous Breathing Trial (SBT) RT Doc   Pulse 57   Additional Respiratory  Assessments   Resp 22   Alarm Settings   High Pressure Alarm 45 cmH2O   Low Minute Volume Alarm 3 L/min   High Respiratory Rate 40 br/min

## 2021-05-05 ENCOUNTER — APPOINTMENT (OUTPATIENT)
Dept: GENERAL RADIOLOGY | Age: 66
DRG: 130 | End: 2021-05-05
Payer: MEDICAID

## 2021-05-05 LAB
ALBUMIN SERPL-MCNC: 3.2 G/DL (ref 3.4–5)
ALP BLD-CCNC: 91 U/L (ref 40–129)
ALT SERPL-CCNC: 36 U/L (ref 10–40)
ANION GAP SERPL CALCULATED.3IONS-SCNC: 8 MMOL/L (ref 3–16)
AST SERPL-CCNC: 48 U/L (ref 15–37)
BASOPHILS ABSOLUTE: 0 K/UL (ref 0–0.2)
BASOPHILS RELATIVE PERCENT: 0.2 %
BILIRUB SERPL-MCNC: 0.5 MG/DL (ref 0–1)
BILIRUBIN DIRECT: <0.2 MG/DL (ref 0–0.3)
BILIRUBIN, INDIRECT: ABNORMAL MG/DL (ref 0–1)
BUN BLDV-MCNC: 17 MG/DL (ref 7–20)
CALCIUM SERPL-MCNC: 8.9 MG/DL (ref 8.3–10.6)
CHLORIDE BLD-SCNC: 103 MMOL/L (ref 99–110)
CO2: 28 MMOL/L (ref 21–32)
CREAT SERPL-MCNC: <0.5 MG/DL (ref 0.6–1.2)
EOSINOPHILS ABSOLUTE: 0.2 K/UL (ref 0–0.6)
EOSINOPHILS RELATIVE PERCENT: 1.3 %
GFR AFRICAN AMERICAN: >60
GFR NON-AFRICAN AMERICAN: >60
GLUCOSE BLD-MCNC: 65 MG/DL (ref 70–99)
GLUCOSE BLD-MCNC: 80 MG/DL (ref 70–99)
GLUCOSE BLD-MCNC: 81 MG/DL (ref 70–99)
GLUCOSE BLD-MCNC: 81 MG/DL (ref 70–99)
GLUCOSE BLD-MCNC: 84 MG/DL (ref 70–99)
GLUCOSE BLD-MCNC: 87 MG/DL (ref 70–99)
HCT VFR BLD CALC: 32.4 % (ref 36–48)
HEMOGLOBIN: 10.7 G/DL (ref 12–16)
LYMPHOCYTES ABSOLUTE: 2.4 K/UL (ref 1–5.1)
LYMPHOCYTES RELATIVE PERCENT: 19.3 %
MCH RBC QN AUTO: 30.4 PG (ref 26–34)
MCHC RBC AUTO-ENTMCNC: 33 G/DL (ref 31–36)
MCV RBC AUTO: 91.9 FL (ref 80–100)
MONOCYTES ABSOLUTE: 0.7 K/UL (ref 0–1.3)
MONOCYTES RELATIVE PERCENT: 6 %
NEUTROPHILS ABSOLUTE: 9 K/UL (ref 1.7–7.7)
NEUTROPHILS RELATIVE PERCENT: 73.2 %
PDW BLD-RTO: 14.1 % (ref 12.4–15.4)
PERFORMED ON: ABNORMAL
PERFORMED ON: NORMAL
PLATELET # BLD: 331 K/UL (ref 135–450)
PMV BLD AUTO: 7 FL (ref 5–10.5)
POTASSIUM REFLEX MAGNESIUM: 3.8 MMOL/L (ref 3.5–5.1)
RBC # BLD: 3.52 M/UL (ref 4–5.2)
SODIUM BLD-SCNC: 139 MMOL/L (ref 136–145)
TOTAL PROTEIN: 6 G/DL (ref 6.4–8.2)
TRIGL SERPL-MCNC: 157 MG/DL (ref 0–150)
WBC # BLD: 12.3 K/UL (ref 4–11)

## 2021-05-05 PROCEDURE — 99233 SBSQ HOSP IP/OBS HIGH 50: CPT | Performed by: INTERNAL MEDICINE

## 2021-05-05 PROCEDURE — 6370000000 HC RX 637 (ALT 250 FOR IP): Performed by: INTERNAL MEDICINE

## 2021-05-05 PROCEDURE — 2580000003 HC RX 258: Performed by: INTERNAL MEDICINE

## 2021-05-05 PROCEDURE — 97530 THERAPEUTIC ACTIVITIES: CPT

## 2021-05-05 PROCEDURE — 2700000000 HC OXYGEN THERAPY PER DAY

## 2021-05-05 PROCEDURE — 94761 N-INVAS EAR/PLS OXIMETRY MLT: CPT

## 2021-05-05 PROCEDURE — 2060000000 HC ICU INTERMEDIATE R&B

## 2021-05-05 PROCEDURE — 92526 ORAL FUNCTION THERAPY: CPT

## 2021-05-05 PROCEDURE — 6360000002 HC RX W HCPCS: Performed by: INTERNAL MEDICINE

## 2021-05-05 PROCEDURE — 97166 OT EVAL MOD COMPLEX 45 MIN: CPT

## 2021-05-05 PROCEDURE — 99232 SBSQ HOSP IP/OBS MODERATE 35: CPT | Performed by: INTERNAL MEDICINE

## 2021-05-05 PROCEDURE — 97110 THERAPEUTIC EXERCISES: CPT

## 2021-05-05 PROCEDURE — 80048 BASIC METABOLIC PNL TOTAL CA: CPT

## 2021-05-05 PROCEDURE — 80076 HEPATIC FUNCTION PANEL: CPT

## 2021-05-05 PROCEDURE — 97162 PT EVAL MOD COMPLEX 30 MIN: CPT

## 2021-05-05 PROCEDURE — 85025 COMPLETE CBC W/AUTO DIFF WBC: CPT

## 2021-05-05 PROCEDURE — 84478 ASSAY OF TRIGLYCERIDES: CPT

## 2021-05-05 PROCEDURE — 6370000000 HC RX 637 (ALT 250 FOR IP): Performed by: PHYSICIAN ASSISTANT

## 2021-05-05 RX ORDER — METOPROLOL TARTRATE 50 MG/1
50 TABLET, FILM COATED ORAL 2 TIMES DAILY
Status: DISCONTINUED | OUTPATIENT
Start: 2021-05-05 | End: 2021-05-11 | Stop reason: HOSPADM

## 2021-05-05 RX ADMIN — DEXAMETHASONE SODIUM PHOSPHATE 4 MG: 4 INJECTION, SOLUTION INTRAMUSCULAR; INTRAVENOUS at 09:23

## 2021-05-05 RX ADMIN — METOPROLOL TARTRATE 50 MG: 50 TABLET, FILM COATED ORAL at 12:53

## 2021-05-05 RX ADMIN — ENOXAPARIN SODIUM 30 MG: 30 INJECTION SUBCUTANEOUS at 09:22

## 2021-05-05 RX ADMIN — ENOXAPARIN SODIUM 30 MG: 30 INJECTION SUBCUTANEOUS at 20:00

## 2021-05-05 RX ADMIN — PSYLLIUM HUSK 1 PACKET: 3.4 POWDER ORAL at 09:23

## 2021-05-05 RX ADMIN — Medication 10 ML: at 09:44

## 2021-05-05 RX ADMIN — Medication 10 ML: at 20:01

## 2021-05-05 RX ADMIN — ONDANSETRON HYDROCHLORIDE 4 MG: 2 INJECTION, SOLUTION INTRAMUSCULAR; INTRAVENOUS at 09:23

## 2021-05-05 RX ADMIN — METOPROLOL TARTRATE 50 MG: 50 TABLET, FILM COATED ORAL at 20:00

## 2021-05-05 RX ADMIN — Medication 2000 UNITS: at 09:23

## 2021-05-05 RX ADMIN — RDII 250 MG CAPSULE 250 MG: at 09:31

## 2021-05-05 RX ADMIN — PSYLLIUM HUSK 1 PACKET: 3.4 POWDER ORAL at 20:00

## 2021-05-05 RX ADMIN — RDII 250 MG CAPSULE 250 MG: at 20:00

## 2021-05-05 ASSESSMENT — PAIN SCALES - GENERAL
PAINLEVEL_OUTOF10: 0

## 2021-05-05 ASSESSMENT — PAIN DESCRIPTION - PROGRESSION
CLINICAL_PROGRESSION: GRADUALLY IMPROVING

## 2021-05-05 NOTE — PROGRESS NOTES
Shift report given to Hua Bello RN  at bedside. Patient care handed off in stable condition at this time.    Electronically signed by Myla Hawkins RN on 5/5/2021 at 7:11 PM

## 2021-05-05 NOTE — CARE COORDINATION
INTERDISCIPLINARY PLAN OF CARE CONFERENCE    Date/Time: 5/5/2021 1:29 PM  Completed by: Mark Elder, Case Management      Patient Name:  Sam Sacks  YOB: 1955  Admitting Diagnosis: Acute respiratory failure due to COVID-19 [facility]) [U07.1, J96.00]     Admit Date/Time:  4/25/2021  8:28 PM    Chart reviewed. Interdisciplinary team contacted or reviewed plan related to patient progress and discharge plans. Disciplines included Case Management, Nursing, and Dietitian. Current Status: ICU, O2  PT/OT recommendation for discharge plan of care: TBD    Expected D/C Disposition:  TBD   Discharge Plan Comments: Pt cont in ICU on O2 @ 6lpm. PT/OT evals pending. CM following for discharge needs.     Home O2 in place on admit: No

## 2021-05-05 NOTE — PROGRESS NOTES
Pill  utilized and mixed PO medication with applesauce. Confirmed medications could be crushed. Patient continued to have difficulty with swallowing medications.

## 2021-05-05 NOTE — PROGRESS NOTES
Speech Language Pathology  Facility/Department: [facility] ICU  Dysphagia Daily Treatment Note    NAME: Diane Limon  : 1955  MRN: [id number]    Patient Diagnosis(es):   Patient Active Problem List    Diagnosis Date Noted    Hypotension due to drugs     Elevated LFTs     Pneumonia due to COVID-19 virus 2021    Hypoxia     Transaminitis     Metabolic acidosis     Acute hypoxemic respiratory failure (HCC)     Right carpal tunnel syndrome 2020    Ulnar neuropathy at elbow, right 2020    Hand arthritis 2020    Mixed conductive and sensorineural hearing loss of right ear with restricted hearing of left ear 2019    Sensorineural hearing loss (SNHL) of left ear with restricted hearing of right ear 2019    Tinnitus, right ear 2019    Tachycardia 2019    Essential hypertension 2018    Polymyalgia rheumatica ([facility] [address] 2018    Arthritis     HCAP (healthcare-associated pneumonia)     [facility]     Allergies: Allergies   Allergen Reactions    Penicillins Rash     Onset Date: 2021  Subjective: Pt seen in room with RN permission. Alert and pleasant. Pain: The patient does not complain of pain     Current Diet: DIET DYSPHAGIA PUREED; Carb Control: 4 carb choices (60 gms)/meal; No Drinking Straw    Diet Tolerance:  Patient tolerating current diet level without signs/symptoms of penetration / aspiration. Dysphagia Treatment and Impressions:   Pt seen in room at bedside with RN permission  [facility], RN (working with Kelton Lockwood), reports that patient is coughing with intake of puree. Occasionally expectorating puree bolus. No coughing/choking noted with thin liquids. O2 desaturation noted with coughing episodes   Respiratory Status: Pt with SPO2% of 92 on 5 LPM NC with RR of 20   Liquid PO Trials:    IDDSI 0 Thin:  Assessed via cup.  No anterior bolus loss, suspect timely swallow, suspect good A-P bolus transit, vitals stable and no clinical s/s of aspiration   Solid PO Trials   IDDSI 4 Puree:   No anterior bolus loss, suspect delayed swallow onset, suspect reduced A-P bolus transit, stasis noted to lingual surface post swallow, stasis noted in oral cavity post swallow, vitals stable and no clinical s/s of aspiration. Pt reports that she coughs with puree because she senses significant mucus in pharynx and this is more noticeable with puree bolus vs thin. She reports ice cream is more tolerable as it melts. Suspect increased pharyngeal secretions vs actual aspiration given suspected tolerance to thin liquids. Additionally, per MD and RN, pt's lung sounds continue to improve. MD ok with pt moving out of ICU.  IDDSI 5 Minced and Moist:   Anterior bolus loss, suspect delayed swallow onset, suspect reduced A-P bolus transit, stasis noted to lingual surface post swallow, stasis noted in oral cavity post swallow, coughing after the swallow, increased RR and wet vocal quality     Education: SLP edu pt re: Role of SLP, Rationale for dysphagia tx, Recommended compensatory strategies, Aspiration precautions, Evidenced based practice for current recommendations and treatment and Importance of oral care to reduce adverse affects in the event of aspiration. Pt verbalized understanding, would benefit from ongoing education and RN aware of recommendations   Assessment: Pt grossly tolerating current diet with limited clinical s/s of aspiration. Good carryover of recommended compensatory strategies   Recommendations: SLP recommends to continue with IDDSI 4 Puree Solids; IDDSI 0 Thin Liquids and NPO; Meds crushed in puree as able. Recommend 1:1 sup w/all intake. Small bolus solids followed by thin liquid wash. Monitor lung sounds and temp post PO intake.  Downgrade to NPO if pt exhibits worsening respiratory status, worsening chest radiography, or if pt becomes febrile   Risk Management: upright for all in the event of aspiration    Compensatory Strategies: Upright for all intake, Remain upright at least 30 mins after intake, Alternate solids and liquids, Check oral cavity for pocketing after intake, Rinse mouth with water after intake and Take pills crushed with applesauce/pudding    Plan:    Continued Dysphagia treatment with goals per plan of care. Discharge Recommendations: Recommend SLP tx at ARU    If pt discharges from hospital prior to Speech/Swallowing discharge, this note serves as tx and discharge summary.      Total Treatment Time / Charges     Time in Time out Total Time / units   Cognitive Tx         Speech Tx      Dysphagia Tx 1241 1255 14 mins / 1 unit     Signature:  Jaquelin Goyal M.A.[facility] #[id number]  Speech-Language Pathologist  Phone: [phone], [address], [facility]

## 2021-05-05 NOTE — PROGRESS NOTES
Hospitalist Progress Note      PCP: ARABELLA Mckeon - CNP      COVID pna with acute resp failure   Date of Admission: 4/25/2021     cont to be intubated  Became hypotensive overnight. Central line placed. Currently on Levophed. 5/4  Anxious on spontaneous breathing trials. On Precedex. Continues to be on Levophed. 5/5  Patient extubated yesterday and placed on 5 L of oxygen. Off Levophed. Medications:  Reviewed    Infusion Medications    sodium chloride      sodium chloride      dextrose       Scheduled Medications    psyllium  1 packet Oral BID    metoprolol tartrate  50 mg Oral BID    dexamethasone  4 mg Intravenous Q24H    insulin lispro  0-18 Units Subcutaneous Q4H    sodium chloride flush  5-40 mL Intravenous 2 times per day    enoxaparin  30 mg Subcutaneous BID    Vitamin D  2,000 Units Oral Daily    saccharomyces boulardii  250 mg Oral BID     PRN Meds: albuterol-ipratropium, sodium chloride, sodium chloride flush, sodium chloride, promethazine **OR** ondansetron, polyethylene glycol, acetaminophen **OR** acetaminophen, guaiFENesin-dextromethorphan, glucose, dextrose, glucagon (rDNA), dextrose      Intake/Output Summary (Last 24 hours) at 5/5/2021 1305  Last data filed at 5/5/2021 1254  Gross per 24 hour   Intake 368 ml   Output 7705 ml   Net -7337 ml       Physical Exam Performed:    BP (!) 141/123   Pulse 113   Temp 98.7 °F (37.1 °C) (Bladder)   Resp 16   Ht 4' 11\" (1.499 m)   Wt 132 lb 4.8 oz (60 kg)   SpO2 94%   BMI 26.72 kg/m²     General:  Alert and oriented. Awake, alert and oriented. Appears to be not in any distress  Mucous Membranes:  Pink , anicteric  Neck: No JVD, no carotid bruit, no thyromegaly  Chest: scattered rhonchi and diminished pedro   Cardiovascular:  RRR S1S2 heard, no murmurs or gallops  Abdomen:  Soft, undistended, non tender, no organomegaly, BS present  Extremities: No edema or cyanosis.  Distal pulses well felt  Neurological -awake, alert 2-3 cm retraction. Multifocal airspace opacities worrisome for multifocal pneumonia or   atelectasis. Mild progressed within right lung base and right upper lung. CT ABDOMEN PELVIS W IV CONTRAST Additional Contrast? None   Final Result   1. Motion limited study. 2. Cholelithiasis and probable choledocholithiasis without evidence for acute   cholecystitis. 3. Nonobstructing left renal calculus. CT CHEST PULMONARY EMBOLISM W CONTRAST   Final Result   1. Motion limited study with no definite scan evidence for pulmonary embolus. 2. Bilateral airspace opacities probably represent a combination of   atelectasis and pneumonia. XR CHEST PORTABLE   Final Result   Nonspecific perihilar opacities. Pulmonary edema and multifocal pneumonia   are in the differential.  Follow-up to resolution recommended.          XR CHEST PORTABLE    (Results Pending)   XR CHEST PORTABLE    (Results Pending)           Assessment/Plan:    Active Hospital Problems    Diagnosis    Hypotension due to drugs [I95.2]    Elevated LFTs [R79.89]    Pneumonia due to COVID-19 virus [U07.1, J12.82]    Hypoxia [R09.02]    Transaminitis [A10.22]    Metabolic acidosis [A23.8]    Acute hypoxemic respiratory failure (HCC) [J96.01]    HCAP (healthcare-associated pneumonia) [J18.9]         COVID 19 PNA  Acute Hypoxic Respiratory Failure  - 85% on RA upon arrival to ED  - CT chest with bilateral airspace opacities, no PE  - PCT: 0.18; had 1/2 Pfizer Vaccine on 3/31  - Admitted to Med Surg, droplet + precautions  - worsened quickly leading to ICU transfer, vapotherm and now intubated and on vent support  - continue  Decadron D#9, completed  Remdesivir for 5 days , s/p  CCP and IL 6 inhibitor infusion   - pulm managing  - s/p bronch 5/1/21  Continue Cefepime and IV vanc( 4 days) -stop with  negative cultures.        Diarrhea - likely 2/2 COVID  - diarrhea returned now   - GI f/w       Hypotension  Primarily from excess

## 2021-05-05 NOTE — PROGRESS NOTES
Shift Aassessment complete     Patient calm and relaxed     ST on monitor 120   VSS right now     Patient on 6 L/min via nasal cannula 02     Valderrama in place with good output.      Flexiseal no longer in place.     No edema noted throughout.      Abdomen is flat and soft. Bowels hyperactive x4.       Central line dressing is clean, dry and intact with no signs of drainage. Dressing was replaced due to old blood. StatClot was left in place due to clotting concerns. All tubing is current and dated. IPA caps applied to all access hubs.           Blood pressure (!) 118/103, pulse 110, temperature 98.8 °F (37.1 °C), temperature source Bladder, resp. rate 18, height 4' 11\" (1.499 m), weight 132 lb 4.8 oz (60 kg), SpO2 96 %.     Electronically signed by Jey Daniels RN on 5/5/2021 at 9:57 AM

## 2021-05-05 NOTE — PROGRESS NOTES
PROGRESS NOTE  S:66 yrs Patient  admitted on 4/25/2021 with Acute respiratory failure due to COVID-19 (HCC) [U07.1, J96.00] . Today she complains of nausea and dysphagia. She passed one BM yesterday. Exam:   Vitals:    05/05/21 1400   BP: 122/67   Pulse: 81   Resp: 13   Temp:    SpO2: 99%     Due to the current efforts to prevent transmission of COVID-19 and also the need to preserve PPE for other caregivers, a face-to-face encounter with the patient was not performed. That being said, all relevant records and diagnostic tests were reviewed, including laboratory results and imaging. Please reference any relevant documentation elsewhere. Care will be coordinated with the primary service. Medications: Reviewed    Labs:  CBC:   Recent Labs     05/03/21  0522 05/04/21  0418 05/05/21  0800   WBC 17.4* 20.0* 12.3*   HGB 11.1* 11.7* 10.7*   HCT 33.3* 35.7* 32.4*   MCV 91.3 91.7 91.9   * 487* 331     BMP:   Recent Labs     05/03/21  0522 05/04/21  0418 05/05/21  0800   * 137 139   K 3.8 4.1 3.8   CL 94* 99 103   CO2 33* 32 28   BUN 30* 18 17   CREATININE <0.5* <0.5* <0.5*     LIVER PROFILE:   Recent Labs     05/03/21  0522 05/04/21  0418 05/05/21  0800   AST 47* 43* 48*   ALT 28 38 36   PROT 5.2* 5.6* 6.0*   BILIDIR <0.2 <0.2 <0.2   BILITOT 0.3 0.3 0.5   ALKPHOS 72 93 91     PT/INR:   Recent Labs     05/03/21  0003   INR 1.04     Attending Supervising [de-identified] Attestation Statement  The patient is a 61 y.o. female. I have performed a history and physical examination of the patient. I discussed the case with my physician assistant Yanni Jaquez PA-C    I reviewed the patient's Past Medical History, Past Surgical History, Medications, and Allergies.      Physical Exam:  Vitals:    05/05/21 1400 05/05/21 1500 05/05/21 1600 05/05/21 1700   BP: 122/67 127/70 135/64 127/71   Pulse: 81 84 87 96   Resp: 13 17 19 16   Temp:       TempSrc:   Bladder    SpO2: 99% 95%

## 2021-05-05 NOTE — PROGRESS NOTES
Inpatient Physical Therapy Evaluation and Treatment    Unit: ICU  Date:  5/5/2021  Patient Name:    Jeremy Bach  Admitting diagnosis:  Acute respiratory failure due to COVID-19 [facility]) [U07.1, J96.00]  Admit Date:  4/25/2021  Precautions/Restrictions/WB Status/ Lines/ Wounds/ Oxygen: Fall risk, Bed/chair alarm, Lines -IV, Supplemental O2 (R IJ line with mild bleeding with movement), Valderrama catheter, Confusion, Telemetry, Continuous pulse oximetry and Isolation Precautions: Droplet Plus - COVID    Treatment Time: 1616 - 1700  Treatment Number:  1   Timed Code Treatment Minutes: 34 minutes  Total Treatment Minutes:  44  minutes    Patient Goals for Therapy: \" Go home \"          Discharge Recommendations: SNF  DME needs for discharge: defer to facility       Therapy recommendation for EMS Transport: requires transport by cot due to need of lift equipment for functional transfers    Therapy recommendations for staff:   Assist of 2 with use of MAXI-Move for all transfers to/from chair   Assist of 2 for bed mobility    History of Present Illness: The patient is a 61 y.o. female with no significant   PMH who presented to the ED with complaint of diarrhea and poor PO intake for the past 10 days. Does have a cough productive of clear sputum, no hemoptysis. Denies any fevers or chills at home. No chest pain or SOB. She did have her first COVID vaccine on March 31st. She was scheduled to get her second dose earlier this week but was unable to get the vaccine because of diarrhea. Denies any abdominal pain, melena or hematochezia. Denies any sick contacts. No recent abx. She does not wear O2 at baseline. cont to be intubated  Became hypotensive overnight. Central line placed. Currently on Levophed. Intubated 4/27-5/4/21      Home Health S4 Level Recommendation:  Level 3 Safety  AM-PAC Mobility Score    AM-PAC Inpatient Mobility Raw Score : 8     Preadmission Environment    Pt.  Lives with spouse and 24/7 Assist not provided  Home environment:    one story home  Steps to enter first floor: 2 steps to enter and hand rail unilateral  Steps to second floor: N/A  Bathroom: tub/shower unit and standard height commode  Equipment owned: none   Works full time doing affidavits for builders and subcontractors     Preadmission Status:  Pt. Able to drive: Yes  Pt Fully independent with ADLs: Yes  Pt. Required assistance from spouse for: Cooking  Pt. independent for transfers and gait and walked with No Device  History of falls Yes, fell in April when tripped over shoes    Pain   No    Cognition    A&O Person, stated [employer]? [facility]? \" for place, aware it is a hospital; stated April 2022 for time but reoriented easily  Able to follow 1 step commands    Subjective  Patient lying supine in bed with no family present. Pt agreeable to this PT eval & tx. Upper Extremity ROM/Strength  Please see OT evaluation. Lower Extremity ROM / Strength   AROM WFL: Yes  ROM limitations: N/A    Strength Assessment (measured on a 0-5 scale):  R LE   Quad   3-   Ant Tib  2+   Hamstring 2+   Iliopsoas 3+  L LE  Quad   3-   Ant Tib  2+   Hamstring 2+   Iliopsoas 2+    Lower Extremity Sensation    WFL    Lower Extremity Proprioception:   WFL    Coordination and Tone  WFL    Balance  Sitting:  Fair -; CGA  Comments: ~6-7 min (Patient presented with LOB on the L side and slowly improved in sitting balance from Mod A (Poor) to CGA (Fair - ) sitting balance. Standing: Not tested; Not Tested  Comments:     Bed Mobility   Supine to Sit:    Max A  and 2 persons  Sit to Supine:   Max A  and 2 persons  Rolling: Max A  and 2 persons  Scooting in sitting: Max A  and 2 persons  Scooting in supine:  Max A  and 2 persons    Transfer Training     Sit to stand:   Not Tested  Stand to sit:   Not Tested  Bed to Chair:   Not Tested with use of N/A    Gait gait deferred due to difficulty with transfers; pt ambulated 0 ft.       Stair Training deferred, pt unsafe/ not appropriate to complete stairs at this time    Activity Tolerance   Pt completed therapy session with SOB noted with during all functional mobility  At rest in bed:  /112  HR 96  SpO2 95% on 5L     Rechecked BP in supine in bed:  /116  RN notified     After repositioning in bed:  /86     At EOB:  /57    SpO2 89-92% on 5L  RN notified about the treatment response. Positioning Needs   Pt in bed, alarm set, positioned in proper neutral alignment and pressure relief provided. Call light provided and all needs within reach    Exercises Initiated  all completed bilaterally unless indicated  Ankle Pumps x 10 reps  Heel slides x 10 reps  LAQ x 10 reps  forward reach outs in EOB sitting x 10 reps    Other  None. Patient/Family Education   Pt educated on role of inpatient PT, POC, importance of continued activity, DC recommendations, safety awareness, transfer techniques, pursed lip breathing, energy conservation, pacing activity and calling for assist with mobility. Assessment  Pt seen for Physical Therapy evaluation in acute care setting. Pt demonstrated decreased Activity tolerance, Balance, Safety and Strength as well as decreased independence with Ambulation, Bed Mobility  and Transfers. Recommending SNF upon discharge as patient functioning well below baseline, demonstrates good rehab potential and unable to return home due to limited or no family support, inability to negotiate stairs to enter home/bedroom/bathroom, burden of care beyond caregiver ability, home environment not conducive to patient recovery and limited safety awareness. Goals : To be met in 3 visits:  1). Independent with LE Ex x 10 reps    To be met in 6 visits:  1). Supine to/from sit: Min A   2). Sit to/from stand: Max A   3). Bed to chair: Max A   4). Gait: Ambulate 10 ft.   with  Max A  and use of LRAD (least restrictive assistive device)  5).   Tolerate B LE exercises 3 sets of 10-15 reps  6). Ascend/descend 2 steps with Max A  with use of hand rail unilateral and LRAD (least restrictive assistive device)    Rehabilitation Potential: Good  Strengths for achieving goals include:   Pt motivated and Pt cooperative   Barriers to achieving goals include:    Complexity of condition, Weakness and impaired cognition    Plan    To be seen 3-5 x / week  while in acute care setting for therapeutic exercises, bed mobility, transfers, progressive gait training, balance training, and family/patient education. Signature: Camille Stringer MS PT, # [id number]    If patient discharges from this facility prior to next visit, this note will serve as the Discharge Summary.

## 2021-05-05 NOTE — PROGRESS NOTES
CVC removed from RIGHT IJ. Catheter tip intact. No complications.      Electronically signed by Moncho Anton RN on 5/5/2021 at 6:17 PM

## 2021-05-05 NOTE — PROGRESS NOTES
Inpatient Occupational Therapy  Evaluation and Treatment    Unit: ICU  Date:  5/5/2021  Patient Name:    Randall Del Angel  Admitting diagnosis:  Acute respiratory failure due to COVID-19 [facility]) [U07.1, J96.00]  Admit Date:  4/25/2021  Precautions/Restrictions/WB Status/ Lines/ Wounds/ Oxygen: Fall risk, Bed/chair alarm, Lines -IV, Supplemental O2 (5L), Valderrama catheter and RIJ CVC, Telemetry and Continuous pulse oximetry    Treatment Time:  1152-5434  Treatment Number: 1   Timed code treatment minutes 48 minutes   Total Treatment minutes:   58   minutes    Patient Goals for Therapy:  \" go home \"      Discharge Recommendations: SNF, consider ARU pending progress  DME needs for discharge: defer to facility       Therapy recommendations for staff:   Assist of 2 with use of MAXI-Move and Roel-Lift for all transfers to/from chair    History of Present Illness: trey DALY H&P 4/25/21:  \"The patient is a 61 y.o. female with no significant   PMH who presented to the ED with complaint of diarrhea and poor PO intake for the past 10 days. Does have a cough productive of clear sputum, no hemoptysis. Denies any fevers or chills at home. No chest pain or SOB. She did have her first COVID vaccine on March 31st. She was scheduled to get her second dose earlier this week but was unable to get the vaccine because of diarrhea. Denies any abdominal pain, melena or hematochezia. Denies any sick contacts. No recent abx.     She does not wear O2 at baseline. \"     Intubated 4/27-5/4/21    Home Health S4 Level Recommendation:  NA  AM-PAC Score: AM-PAC Inpatient Daily Activity Raw Score: 13    Preadmission Environment    Pt.  Lives with spouse and 24/7 Assist not provided  Home environment:  one story home  Steps to enter first floor: 2 steps to enter and hand rail unilateral  Steps to second floor: N/A  Bathroom: tub/shower unit and standard height commode  Equipment owned: none   Works full time doing affidavits for builders and subcontractors    Preadmission Status:  Pt. Able to drive: Yes  Pt Fully independent with ADLs: Yes  Pt. Required assistance from spouse for: Cooking  Pt. independent for transfers and gait and walked with No Device  History of falls Yes, fell in April when tripped over shoes    Pain  No  Rating:NA  Location:  Pain Medicine Status: No request made      Cognition    A&O Person, stated [employer]? [employer]? \" for place, aware it is a hospital; stated April 2022 for time but reoriented easily  Able to follow 1 step commands    Subjective  Patient lying supine in bed with no family present. Pt agreeable to this OT eval & tx. Upper Extremity ROM:    WFL    Upper Extremity Strength:    Strength Assessment (measured on a 0-5 scale):   RUE 4/5 overall but fatigues quickly  LUE 3/5 overall, fatigues quickly      Upper Extremity Sensation    WFL    Upper Extremity Proprioception:  WFL    Coordination and Tone  WFL    Balance  Functional Sitting Balance:  Impaired seated at EOB, MOD A initially, improved to CGA; sat EOB 6-7 min while engaging in some unilateral reach activities to challenge balance  Functional Standing Balance:NT    Bed mobility:    Supine to sit: Max A  and 2 persons  Sit to supine:   Max A  and 2 persons  Rolling:    Not Tested  Scooting in sitting:   Max A   Scooting to head of bed:   Max A  and 2 persons    Bridging:   Not Tested    Transfers:    Sit to stand:  Not Tested  Stand to sit:  Not Tested  Bed to chair:   Not Tested  Standard toilet: Not Tested  Bed to [facility]:  Not Tested    Dressing:      UE:   Not Tested  LE:    Max A  manage pressure relieving boots off/on    Bathing:    UE:  Not Tested  LE:  Not Tested    Eating:   Not Tested- reports difficulty eating - SLP on case    Toileting:  Not Tested simpson in place    Activity Tolerance   Pt completed therapy session with fatigue noted with min exertion  At rest in bed:  /112  HR 96  SpO2 95% on 5L    Rechecked BP in supine in bed:  /116  RN notified    After repositioning in bed:  /86    At EOB:  /57    SpO2 89-92% on 5L    Positioning Needs:   Pt in bed, no alarm needed, positioned in proper neutral alignment and pressure relief provided. Call light provided and all needs within reach     Exercise / Activities Initiated: NA  N/A    Patient/Family Education:   Role of OT  Recommendations for DC  Energy conservation techniques-focus on PLB    Assessment of Deficits: Pt seen for Occupational therapy evaluation in acute care setting. Pt demonstrated decreased Activity tolerance, ADLs, IADLs, Balance , Bathing, Bed mobility, Dressing, Strength, Transfers and Cognition. Pt functioning below baseline and will likely benefit from skilled occupational therapy services to maximize safety and independence. Goal(s) : To be met in 3 Visits:  1). Bed to toilet/BSC: Mod A    2). Patient will complete MOCA. To be met in 5 Visits:  1). Supine to/from Sit:  Mod A   2). Upper Body Bathing:   Min A   3). Lower Body Bathing: Mod A   4). Upper Body Dressing:  Min A   5). Lower Body Dressing: Mod A   6). Pt to demonstrate UE exs x 15 reps with minimal cues    Rehabilitation Potential:  Good for goals listed above. Strengths for achieving goals include: Pt motivated, PLOF and Pt cooperative  Barriers to achieving goals include:  Complexity of condition, Weakness and Cognition     Plan: To be seen 3-5 x/wk while in acute care setting for therapeutic exercises, bed mobility, transfers, dressing, bathing, family/patient education, ADL/IADL retraining, energy conservation training.      La Garcia, OTR/L [id number]            If patient discharges from this facility prior to next visit, this note will serve as the Discharge Summary

## 2021-05-06 ENCOUNTER — APPOINTMENT (OUTPATIENT)
Dept: GENERAL RADIOLOGY | Age: 66
DRG: 130 | End: 2021-05-06
Payer: MEDICAID

## 2021-05-06 LAB
ALBUMIN SERPL-MCNC: 3.2 G/DL (ref 3.4–5)
ALP BLD-CCNC: 75 U/L (ref 40–129)
ALT SERPL-CCNC: 43 U/L (ref 10–40)
ANION GAP SERPL CALCULATED.3IONS-SCNC: 9 MMOL/L (ref 3–16)
AST SERPL-CCNC: 53 U/L (ref 15–37)
BASOPHILS ABSOLUTE: 0 K/UL (ref 0–0.2)
BASOPHILS RELATIVE PERCENT: 0.3 %
BILIRUB SERPL-MCNC: 0.4 MG/DL (ref 0–1)
BILIRUBIN DIRECT: <0.2 MG/DL (ref 0–0.3)
BILIRUBIN, INDIRECT: ABNORMAL MG/DL (ref 0–1)
BUN BLDV-MCNC: 13 MG/DL (ref 7–20)
CALCIUM SERPL-MCNC: 8.7 MG/DL (ref 8.3–10.6)
CHLORIDE BLD-SCNC: 107 MMOL/L (ref 99–110)
CO2: 26 MMOL/L (ref 21–32)
CREAT SERPL-MCNC: <0.5 MG/DL (ref 0.6–1.2)
EOSINOPHILS ABSOLUTE: 0.2 K/UL (ref 0–0.6)
EOSINOPHILS RELATIVE PERCENT: 2 %
GFR AFRICAN AMERICAN: >60
GFR NON-AFRICAN AMERICAN: >60
GLUCOSE BLD-MCNC: 108 MG/DL (ref 70–99)
GLUCOSE BLD-MCNC: 138 MG/DL (ref 70–99)
GLUCOSE BLD-MCNC: 72 MG/DL (ref 70–99)
GLUCOSE BLD-MCNC: 82 MG/DL (ref 70–99)
HCT VFR BLD CALC: 35.2 % (ref 36–48)
HEMOGLOBIN: 11.7 G/DL (ref 12–16)
LYMPHOCYTES ABSOLUTE: 1.7 K/UL (ref 1–5.1)
LYMPHOCYTES RELATIVE PERCENT: 16.5 %
MCH RBC QN AUTO: 30.9 PG (ref 26–34)
MCHC RBC AUTO-ENTMCNC: 33.2 G/DL (ref 31–36)
MCV RBC AUTO: 92.8 FL (ref 80–100)
MONOCYTES ABSOLUTE: 0.5 K/UL (ref 0–1.3)
MONOCYTES RELATIVE PERCENT: 5.2 %
NEUTROPHILS ABSOLUTE: 7.9 K/UL (ref 1.7–7.7)
NEUTROPHILS RELATIVE PERCENT: 76 %
PDW BLD-RTO: 14 % (ref 12.4–15.4)
PERFORMED ON: ABNORMAL
PERFORMED ON: ABNORMAL
PERFORMED ON: NORMAL
PLATELET # BLD: 321 K/UL (ref 135–450)
PMV BLD AUTO: 6.8 FL (ref 5–10.5)
POTASSIUM REFLEX MAGNESIUM: 3.6 MMOL/L (ref 3.5–5.1)
RBC # BLD: 3.8 M/UL (ref 4–5.2)
SODIUM BLD-SCNC: 142 MMOL/L (ref 136–145)
TOTAL PROTEIN: 5.7 G/DL (ref 6.4–8.2)
WBC # BLD: 10.4 K/UL (ref 4–11)

## 2021-05-06 PROCEDURE — 80076 HEPATIC FUNCTION PANEL: CPT

## 2021-05-06 PROCEDURE — 99232 SBSQ HOSP IP/OBS MODERATE 35: CPT | Performed by: INTERNAL MEDICINE

## 2021-05-06 PROCEDURE — 2060000000 HC ICU INTERMEDIATE R&B

## 2021-05-06 PROCEDURE — 6360000002 HC RX W HCPCS: Performed by: INTERNAL MEDICINE

## 2021-05-06 PROCEDURE — 80048 BASIC METABOLIC PNL TOTAL CA: CPT

## 2021-05-06 PROCEDURE — 94761 N-INVAS EAR/PLS OXIMETRY MLT: CPT

## 2021-05-06 PROCEDURE — 6370000000 HC RX 637 (ALT 250 FOR IP): Performed by: PHYSICIAN ASSISTANT

## 2021-05-06 PROCEDURE — 6370000000 HC RX 637 (ALT 250 FOR IP): Performed by: INTERNAL MEDICINE

## 2021-05-06 PROCEDURE — 97112 NEUROMUSCULAR REEDUCATION: CPT

## 2021-05-06 PROCEDURE — 2580000003 HC RX 258: Performed by: INTERNAL MEDICINE

## 2021-05-06 PROCEDURE — 97535 SELF CARE MNGMENT TRAINING: CPT

## 2021-05-06 PROCEDURE — 85025 COMPLETE CBC W/AUTO DIFF WBC: CPT

## 2021-05-06 PROCEDURE — 2700000000 HC OXYGEN THERAPY PER DAY

## 2021-05-06 PROCEDURE — 99233 SBSQ HOSP IP/OBS HIGH 50: CPT | Performed by: INTERNAL MEDICINE

## 2021-05-06 PROCEDURE — 92526 ORAL FUNCTION THERAPY: CPT

## 2021-05-06 PROCEDURE — 97530 THERAPEUTIC ACTIVITIES: CPT

## 2021-05-06 RX ORDER — DEXAMETHASONE SODIUM PHOSPHATE 4 MG/ML
3 INJECTION, SOLUTION INTRA-ARTICULAR; INTRALESIONAL; INTRAMUSCULAR; INTRAVENOUS; SOFT TISSUE EVERY 24 HOURS
Status: DISCONTINUED | OUTPATIENT
Start: 2021-05-07 | End: 2021-05-08

## 2021-05-06 RX ADMIN — PSYLLIUM HUSK 1 PACKET: 3.4 POWDER ORAL at 08:31

## 2021-05-06 RX ADMIN — PSYLLIUM HUSK 1 PACKET: 3.4 POWDER ORAL at 20:05

## 2021-05-06 RX ADMIN — ENOXAPARIN SODIUM 30 MG: 30 INJECTION SUBCUTANEOUS at 08:31

## 2021-05-06 RX ADMIN — RDII 250 MG CAPSULE 250 MG: at 10:00

## 2021-05-06 RX ADMIN — DEXAMETHASONE SODIUM PHOSPHATE 4 MG: 4 INJECTION, SOLUTION INTRAMUSCULAR; INTRAVENOUS at 08:31

## 2021-05-06 RX ADMIN — METOPROLOL TARTRATE 50 MG: 50 TABLET, FILM COATED ORAL at 08:31

## 2021-05-06 RX ADMIN — Medication 10 ML: at 20:05

## 2021-05-06 RX ADMIN — Medication 10 ML: at 08:32

## 2021-05-06 RX ADMIN — Medication 2000 UNITS: at 08:31

## 2021-05-06 RX ADMIN — RDII 250 MG CAPSULE 250 MG: at 20:05

## 2021-05-06 RX ADMIN — ACETAMINOPHEN 650 MG: 325 TABLET ORAL at 03:53

## 2021-05-06 RX ADMIN — METOPROLOL TARTRATE 50 MG: 50 TABLET, FILM COATED ORAL at 20:05

## 2021-05-06 RX ADMIN — ENOXAPARIN SODIUM 30 MG: 30 INJECTION SUBCUTANEOUS at 20:05

## 2021-05-06 ASSESSMENT — PAIN SCALES - GENERAL
PAINLEVEL_OUTOF10: 5
PAINLEVEL_OUTOF10: 0

## 2021-05-06 ASSESSMENT — PAIN DESCRIPTION - PROGRESSION: CLINICAL_PROGRESSION: GRADUALLY IMPROVING

## 2021-05-06 NOTE — PROGRESS NOTES
Speech Language Pathology  Facility/Department: [facility] ICU  Dysphagia Daily Treatment Note    NAME: Xin Clinton  : 1955  MRN: [id number]    Patient Diagnosis(es):   Patient Active Problem List    Diagnosis Date Noted    Hypotension due to drugs     Elevated LFTs     Pneumonia due to COVID-19 virus 2021    Hypoxia     Transaminitis     Metabolic acidosis     Acute hypoxemic respiratory failure (HCC)     Right carpal tunnel syndrome 2020    Ulnar neuropathy at elbow, right 2020    Hand arthritis 2020    Mixed conductive and sensorineural hearing loss of right ear with restricted hearing of left ear 2019    Sensorineural hearing loss (SNHL) of left ear with restricted hearing of right ear 2019    Tinnitus, right ear 2019    Tachycardia 2019    Essential hypertension 2018    Polymyalgia rheumatica ([facility] [address] 2018    Arthritis     HCAP (healthcare-associated pneumonia)     SIRS (systemic inflammatory response syndrome) ([facility] [address] 2018     Allergies: Allergies   Allergen Reactions    Penicillins Rash     Onset Date: 2021  Subjective: Pt seen in room with RN permission. Alert and pleasant. Pain: The patient does not complain of pain     Current Diet: DIET DYSPHAGIA PUREED; Carb Control: 4 carb choices (60 gms)/meal; No Drinking Straw    Diet Tolerance:  Patient tolerating current diet level without signs/symptoms of penetration / aspiration. Dysphagia Treatment and Impressions:   Pt seen in room at bedside with RN permission  Geri Arteaga RN reports pt appears to grossly tolerate current diet, however reports she has a \"delayed swallow\". Requires her pills crushed in apple sauce. No concerns regarding coughing or choking reported, which is an improvement from yesterday. Upon assessment of oral cavity, noting pink and moist oral mucosa with minimal dried secretions throughout. Oral care completed by SLP.    Respiratory Status: Pt with SPO2% of 97 on 4 LPM NC with RR of 16 (improved overall vitals)   Liquid PO Trials:    IDDSI 0 Thin:  Assessed via cup. Some anterior bolus loss, suspect timely swallow, suspect good A-P bolus transit, vitals stable and no clinical s/s of aspiration. Pt does exhibit behavior of blowing out of her lips prior to the swallow which is likely the cause of anterior bolus loss. Lip strength appears grossly intact. Pt states that she does this to aid in her breathing. Suspect impaired resp-swallow coordination and cannot r/u cognitive component as well.  Solid PO Trials   IDDSI 4 Puree:   No anterior bolus loss, suspect delayed swallow onset or bolus holding, which could be secondary to anticipatory sensation given reported presence of phlegm in throat. Suspect reduced A-P bolus transit, stasis noted to lingual surface and oral cavity post swallow, however, less residue compared to prior session. Vitals stable and no clinical s/s of aspiration. Pt reports that she coughs with puree because she senses significant mucus in pharynx and this is more noticeable with puree bolus vs thin. She reports ice cream is more tolerable as it melts. Suspect increased pharyngeal secretions vs actual aspiration given suspected tolerance to thin liquids. Additionally, per chart review, pt's lung sounds continue to improve. MD ok with pt moving out of ICU- she is awaiting a bed on different unit.  IDDSI 5 Minced and Moist:   Did not target this date. Reduced PO intake with puree. Pt states she has little to no appetite.  Education: SLP edu pt re: Role of SLP, Rationale for dysphagia tx, Recommended compensatory strategies, Aspiration precautions, Evidenced based practice for current recommendations and treatment and Importance of oral care to reduce adverse affects in the event of aspiration.  Pt verbalized understanding, would benefit from ongoing education and RN aware of recommendations   Assessment: Pt grossly tolerating current diet with no clinical s/s of aspiration. Good carryover of recommended compensatory strategies   Recommendations: SLP recommends to continue with IDDSI 4 Puree Solids; IDDSI 0 Thin Liquids and NPO; Meds crushed in puree as able. Recommend 1:1 sup w/all intake. Small bolus solids followed by thin liquid wash. Monitor lung sounds and temp post PO intake. Downgrade to NPO if pt exhibits worsening respiratory status, worsening chest radiography, or if pt becomes febrile   Risk Management: upright for all intake, stay upright for at least 30 mins after intake, small bites/sips, 1:1 supervision with intake, oral care q4 hrs to reduce adverse affects in the event of aspiration, increase physical mobility as able, alternate bites/sips, slow rate of intake, general aspiration precautions and hold PO and contact SLP if s/s of aspiration or worsening respiratory status develop. If the patient exhibits s/s of aspiration and/or worsening respiratory status, hold PO and contact SLP. Pt's swallow currently being managed clinically d/t limited availability for instrumentation d/t isolation status. Pt would benefit from instrumental swallow assessment when isolation status cleared to determine risks/benefits of diet modification. Dysphagia Goals:  Timeframe for Long-term Goals: 7 days (05/11/2021)  Goal 1: The patient will tolerate LRD with no clinical s/s of aspiration or worsening respiratory status   5/6/2021 : Ongoing, progressing. Short-term Goals  Timeframe for Short-term Goals: 5 days (05/09/2021)  Goal 1: The patient will tolerate recommended diet with no clinical s/s of aspiration 5/5 5/6/2021 : Goal addressed, see above. Ongoing, progressing. Goal 2: The patient will tolerate therapeutic diet upgrade trials with no clinical s/s of aspiration 5/5 5/6/2021 : Goal addressed, see above. Ongoing, progressing.   Goal 3: The patient will recall/perform recommended compensatory strategies, given no cues  5/6/2021 : Goal addressed, see above. Ongoing, progressing. Speech/Language/Cog Goals: N/A    Recommendations:  Solid Consistency: IDDSI 4 Puree Solids  Liquid Consistency: IDDSI 0 Thin Liquids  Medication: Meds crushed in puree as able    Patient/Family/Caregiver Education: Role of SLP, Rationale for dysphagia tx, Recommended compensatory strategies, Aspiration precautions, BSE results, POC, Evidenced based practice for current recommendations and treatment and Importance of oral care to reduce adverse affects in the event of aspiration    Compensatory Strategies: Upright for all intake, Remain upright at least 30 mins after intake, Alternate solids and liquids, Check oral cavity for pocketing after intake, Rinse mouth with water after intake and Take pills crushed with applesauce/pudding    Plan:    Continued Dysphagia treatment with goals per plan of care. Discharge Recommendations: Recommend SLP tx at ARU    If pt discharges from hospital prior to Speech/Swallowing discharge, this note serves as tx and discharge summary.      Total Treatment Time / Charges     Time in Time out Total Time / units   Cognitive Tx         Speech Tx      Dysphagia Tx 0830 0901 31 mins / 1 unit     Signature:  Salvador Gaspar M.A., [id number] [id number]  Speech-Language Pathologist  Phone: [phone], [address], SLP

## 2021-05-06 NOTE — PROGRESS NOTES
BMP:   Recent Labs     05/04/21  0418 05/05/21  0800 05/06/21  0451    139 142   K 4.1 3.8 3.6   CL 99 103 107   CO2 32 28 26   BUN 18 17 13   CREATININE <0.5* <0.5* <0.5*     LIVER PROFILE:   Recent Labs     05/04/21  0418 05/05/21  0800 05/06/21  0451   AST 43* 48* 53*   ALT 38 36 43*   BILIDIR <0.2 <0.2 <0.2   BILITOT 0.3 0.5 0.4   ALKPHOS 93 91 75     Microbiology  4/25/2021 SARS-CoV-2 positive  4/25/2021 C. difficile negative  4/25/2021 blood NGTD  4/26/2021 urine mixed coral  4/27/2021 tracheal aspirate Candida  4/28/2020 1 GI bacterial pathogen by PCR negative  5/1/21 BAL pending    Imaging:  Chest imaging was reviewed by me and showed CTPA 4/25/2021  Pulmonary Arteries: Evaluation is compromised by motion artifact.  No   definite evidence of intraluminal filling defect to suggest pulmonary   embolism.  Main pulmonary artery is normal in caliber. Mediastinum: Mediastinal and bilateral hilar lymph nodes are probably   reactive.  The heart and pericardium demonstrate no acute abnormality.  There   is no acute abnormality of the thoracic aorta. Lungs/pleura: There is no pneumothorax or pleural effusion.  There are patchy   airspace opacities throughout both lungs.  Elevation of the left   hemidiaphragm is again noted. Upper Abdomen: There is fatty infiltration of the liver.  The visualized   upper abdomen is otherwise unremarkable. Soft Tissues/Bones: No acute bone or soft tissue abnormality.  Spinal   fixation rods are again seen.       Impression:       1. Motion limited study with no definite scan evidence for pulmonary embolus. 2. Bilateral airspace opacities probably represent a combination of   atelectasis and pneumonia. CT abdomen 4/25/2021  Impression:        1. Motion limited study. 2. Cholelithiasis and probable choledocholithiasis without evidence for acute   cholecystitis. 3. Nonobstructing left renal calculus.       CXR 5/3/2021   Cardiomediastinal silhouette is stable.  Right central venous catheter tip   projects at the low SVC.  Other devices are in similar position.  Similar   background pulmonary vascular congestion and left airspace opacities.  No   convincing pleural effusion or pneumothorax.  Similar abnormal elevation of   the left hemidiaphragm.  No gross bony abnormality. ASSESSMENT:  · Acute hypoxemic respiratory failure -progressive  · COVID-19 pneumonia  · Abnormal CT: Mediastinal lymphadenopathy and parenchymal changes consistent with COVID-19 pneumonia  · Diarrhea  · Transaminitis - improved  · Tachyarrhythmia    PLAN:  COVID-19 isolation, droplet plus  Mechanical ventilation as per my orders. The ventilator was adjusted by me at the bedside for unstable, life threatening respiratory failure. Alkalemia due to overbreathing vent. IV Propofol for sedation, target RASS -2, with daily spontaneous awakening trial.  Fentanyl gtt added. Add Precedex to facilitate SBT  CCP given 4/26/21, Tocilizumab given 4/27/2021, completed 5 days remdesivir  Decadron D#10, 6 mg daily  Levophed to maintain MAP greater than 65  IVF bolus 500cc x 1  F/u BAL results  Check PCT  Cefepime and vancomycin day #3, started after bronchoscopy cultures,received 5 days ceftriaxone and azithromycin  Inhaled bronchodilators only as needed, MDI preferred  Home metoprolol  Eventually will need f/u CT imaging of chest  · Prophylaxis: Lovenox 30 twice daily  · NOK is      Total critical care time caring for this patient with life threatening, unstable organ failure, including direct patient contact, management of life support systems, review of data including imaging and labs, discussions with other team members and physicians is 31 minutes so far today, excluding procedures.

## 2021-05-06 NOTE — PROGRESS NOTES
Problem: Falls - Risk of:  Goal: Will remain free from falls  Description: Will remain free from falls  Outcome: Ongoing   Patient placed on fall Precautions per [facility] Fall Risk Assessment Scale (Score> 45). Fall risk armband on, yellow blanket placed on foot of bed, S.A.F.E. sign or orange light displayed at door. Bed in locked and low position, bed alarm armed and audible, call light and bedside table in reach. Patient acknowledges the need to call before getting out of bed. Q2 monitoring, and toileting performed. Problem: Skin Integrity:  Goal: Absence of new skin breakdown  Description: Absence of new skin breakdown  Outcome: Ongoing   At risk for skin breakdown. See Yasmani scale. Remains on bedrest.  Unable to position self in bed. Turn and reposition every two hours and as needed. Heels elevated off bed. Protective barrier placed as needed. Patient kept clean and dry. Pillows used for positioning. Will continue to monitor for skin breakdown. Problem: Nutritional:  Goal: Maintenance of adequate nutrition will improve  Description: Maintenance of adequate nutrition will improve  Outcome: Ongoing   Patient NPO at this time. Pulmonary & Critical Care Medicine ICU Progress Note    CC: COVID-19 with progressive hypoxemia    Events of Last 24 hours:   Less SOB, still waiting on a floor bed    Invasive Lines: RIJ CVC 5/3/21    MV: 4/27/2021-5/4/21  Vent Mode: PS Rate Set: 14 bmp/Vt Ordered: 450 mL/ /FiO2 : 50 %  Recent Labs     05/04/21  0420 05/04/21  1101   PHART 7.451* 7.487*   DAY1JOB 48.9* 44.2   PO2ART 63.8* 70.5*     IV:   sodium chloride      sodium chloride      dextrose       Vitals:  Blood pressure 138/69, pulse 95, temperature 99.6 °F (37.6 °C), temperature source Bladder, resp. rate 18, height 4' 11\" (1.499 m), weight 132 lb 4.8 oz (60 kg), SpO2 96 %. on 5L  Constitutional:  No acute distress. Eyes: PERRL. Conjunctivae anicteric. ENT: Normal nose. Normal tongue. Neck:  Trachea is midline. No thyroid tenderness. Respiratory: No accessory muscle usage. no wheezes. No rales. No Rhonchi. Cardiovascular: Normal S1S2. No digit clubbing. No digit cyanosis. No LE edema. Psychiatric: No anxiety or Agitation. Alert and Oriented to person, place and time.     Scheduled Meds:   psyllium  1 packet Oral BID    metoprolol tartrate  50 mg Oral BID    dexamethasone  4 mg Intravenous Q24H    insulin lispro  0-18 Units Subcutaneous Q4H    sodium chloride flush  5-40 mL Intravenous 2 times per day    enoxaparin  30 mg Subcutaneous BID    Vitamin D  2,000 Units Oral Daily    saccharomyces boulardii  250 mg Oral BID     PRN Meds:  albuterol-ipratropium, sodium chloride, sodium chloride flush, sodium chloride, promethazine **OR** ondansetron, polyethylene glycol, acetaminophen **OR** acetaminophen, guaiFENesin-dextromethorphan, glucose, dextrose, glucagon (rDNA), dextrose    Results:  CBC:   Recent Labs     05/04/21  0418 05/05/21  0800 05/06/21  0451   WBC 20.0* 12.3* 10.4   HGB 11.7* 10.7* 11.7*   HCT 35.7* 32.4* 35.2*   MCV 91.7 91.9 92.8   * 331 321     BMP:   Recent Labs     05/04/21  0418 05/05/21  0800     Given difference technique multifocal bilateral airspace disease is not   significantly changed.        ASSESSMENT:  · Acute hypoxemic respiratory failure -progressive  · Shock - possibly sedation related  · COVID-19 pneumonia  · Abnormal CT: Mediastinal lymphadenopathy and parenchymal changes consistent with COVID-19 pneumonia  · Diarrhea  · Transaminitis - improved    PLAN:  COVID-19 isolation, droplet plus  Supplemental oxygen to maintain SaO2 >92%; wean as tolerated   CCP given 4/26/21, Tocilizumab given 4/27/2021, completed 5 days remdesivir  Decadron D#12, change to 3mg  S/p Cefepime and vancomycin day #4 - stopped with neg BAL and normal PCT; received 5 days ceftriaxone and azithromycin  Inhaled bronchodilators only as needed, MDI preferred  GI following  Eventually will need f/u CT imaging of chest  · Prophylaxis: Lovenox 30 twice daily  · Ok to transfer

## 2021-05-06 NOTE — PROGRESS NOTES
4 Eyes Skin Assessment     The patient is being assess for   Transfer to New Unit    I agree that 2 RN's have performed a thorough Head to Toe Skin Assessment on the patient. ALL assessment sites listed below have been assessed. Areas assessed by both nurses:   [x]   Head, Face, and Ears   [x]   Shoulders, Back, and Chest, Abdomen  [x]   Arms, Elbows, and Hands   [x]   Coccyx, Sacrum, and Ischium  [x]   Legs, Feet, and Heels        Patient has a red warm rash on right shoulder and side of right breast, inside of left thigh. Slow to kat bilateral heels (placed on pillows) and safety socks applied. Red maceration to buttocks r/t soiling. Hygiene needs completed and zinc cream applied. **SHARE this note so that the co-signing nurse is able to place an eSignature**    Co-signer eSignature: Electronically signed by Saqib Ashley RN on 5/6/21 at 1:48 PM EDT    Does the Patient have Skin Breakdown?   No          Miguel Prevention initiated:  Yes   Wound Care Orders initiated:  NA      [facility] nurse consulted for Pressure Injury (Stage 3,4, Unstageable, DTI, NWPT, Complex wounds)and New or Established Ostomies:  NA      Primary Nurse eSignature: Electronically signed by Bernard Lynn RN on 5/6/21 at 1:31 PM EDT

## 2021-05-06 NOTE — PROGRESS NOTES
Hospitalist Progress Note      PCP: ARABELLA Ch - CNP      COVID pna with acute resp failure   Date of Admission: 4/25/2021     cont to be intubated  Became hypotensive overnight. Central line placed. Currently on Levophed. 5/4  Anxious on spontaneous breathing trials. On Precedex. Continues to be on Levophed. 5/5  Patient extubated yesterday and placed on 5 L of oxygen. Off Levophed. Medications:  Reviewed    Infusion Medications    sodium chloride      sodium chloride      dextrose       Scheduled Medications    [START ON 5/7/2021] dexamethasone  3 mg Intravenous Q24H    psyllium  1 packet Oral BID    metoprolol tartrate  50 mg Oral BID    insulin lispro  0-18 Units Subcutaneous Q4H    sodium chloride flush  5-40 mL Intravenous 2 times per day    enoxaparin  30 mg Subcutaneous BID    Vitamin D  2,000 Units Oral Daily    saccharomyces boulardii  250 mg Oral BID     PRN Meds: albuterol-ipratropium, sodium chloride, sodium chloride flush, sodium chloride, promethazine **OR** ondansetron, polyethylene glycol, acetaminophen **OR** acetaminophen, guaiFENesin-dextromethorphan, glucose, dextrose, glucagon (rDNA), dextrose      Intake/Output Summary (Last 24 hours) at 5/6/2021 1123  Last data filed at 5/6/2021 1000  Gross per 24 hour   Intake 1047.5 ml   Output 5765 ml   Net -4717.5 ml       Physical Exam Performed:    /65   Pulse 78   Temp 99.6 °F (37.6 °C) (Bladder)   Resp 14   Ht 4' 11\" (1.499 m)   Wt 132 lb 4.8 oz (60 kg)   SpO2 93%   BMI 26.72 kg/m²     General:  Alert and oriented. Awake, alert and oriented. Appears to be not in any distress  Mucous Membranes:  Pink , anicteric  Neck: No JVD, no carotid bruit, no thyromegaly  Chest: scattered rhonchi and diminished pedro   Cardiovascular:  RRR S1S2 heard, no murmurs or gallops  Abdomen:  Soft, undistended, non tender, no organomegaly, BS present  Extremities: No edema or cyanosis.  Distal pulses well felt  Neurological -awake, alert and oriented. Labs:   Recent Labs     05/04/21 0418 05/05/21  0800 05/06/21  0451   WBC 20.0* 12.3* 10.4   HGB 11.7* 10.7* 11.7*   HCT 35.7* 32.4* 35.2*   * 331 321     Recent Labs     05/04/21 0418 05/05/21  0800 05/06/21  0451    139 142   K 4.1 3.8 3.6   CL 99 103 107   CO2 32 28 26   BUN 18 17 13   CREATININE <0.5* <0.5* <0.5*   CALCIUM 8.9 8.9 8.7     Recent Labs     05/04/21 0418 05/05/21  0800 05/06/21  0451   AST 43* 48* 53*   ALT 38 36 43*   BILIDIR <0.2 <0.2 <0.2   BILITOT 0.3 0.5 0.4   ALKPHOS 93 91 75     No results for input(s): INR in the last 72 hours. No results for input(s): Janeece Gambles in the last 72 hours. Urinalysis:      Lab Results   Component Value Date    NITRU Negative 04/26/2021    WBCUA 10-20 04/26/2021    BACTERIA 2+ 04/26/2021    RBCUA 3-4 04/26/2021    BLOODU MODERATE 04/26/2021    SPECGRAV 1.020 04/26/2021    GLUCOSEU Negative 04/26/2021       Radiology:  XR CHEST PORTABLE   Final Result   No significant interval change. XR CHEST PORTABLE   Final Result   Right central venous catheter tip projects at the low SVC. XR CHEST PORTABLE   Final Result   Stable chest.         XR CHEST PORTABLE   Final Result   Slight improved aeration of the lungs. Scattered airspace disease remains. XR CHEST PORTABLE   Final Result   Moderate consolidating airspace disease left lower lobe. Improving aeration with decreased airspace disease right lung and left apex. XR CHEST PORTABLE   Final Result   Patchy airspace opacities bilaterally, may be related to pulmonary edema   versus pneumonia. Mild to moderate left pleural effusion. Asymmetric elevation of the left hemidiaphragm. XR CHEST PORTABLE   Final Result   Persistent moderate bilateral airspace disease unchanged. Endotracheal tube is in satisfactory position at this time.          XR CHEST PORTABLE   Final Result   Endotracheal tube 1 cm above the wyatt, consider 2-3 cm retraction. Multifocal airspace opacities worrisome for multifocal pneumonia or   atelectasis. Mild progressed within right lung base and right upper lung. CT ABDOMEN PELVIS W IV CONTRAST Additional Contrast? None   Final Result   1. Motion limited study. 2. Cholelithiasis and probable choledocholithiasis without evidence for acute   cholecystitis. 3. Nonobstructing left renal calculus. CT CHEST PULMONARY EMBOLISM W CONTRAST   Final Result   1. Motion limited study with no definite scan evidence for pulmonary embolus. 2. Bilateral airspace opacities probably represent a combination of   atelectasis and pneumonia. XR CHEST PORTABLE   Final Result   Nonspecific perihilar opacities. Pulmonary edema and multifocal pneumonia   are in the differential.  Follow-up to resolution recommended.          XR CHEST PORTABLE    (Results Pending)   XR CHEST PORTABLE    (Results Pending)   XR CHEST PORTABLE    (Results Pending)           Assessment/Plan:    Active Hospital Problems    Diagnosis    Hypotension due to drugs [I95.2]    Elevated LFTs [R79.89]    Pneumonia due to COVID-19 virus [U07.1, J12.82]    Hypoxia [R09.02]    Transaminitis [W44.38]    Metabolic acidosis [D36.8]    Acute hypoxemic respiratory failure (HCC) [J96.01]    HCAP (healthcare-associated pneumonia) [J18.9]         COVID 19 PNA  Acute Hypoxic Respiratory Failure  - 85% on RA upon arrival to ED  - CT chest with bilateral airspace opacities, no PE  - PCT: 0.18; had 1/2 Pfizer Vaccine on 3/31  - Admitted to Med Surg, droplet + precautions  - worsened quickly leading to ICU transfer, vapotherm and now intubated and on vent support  - continue  Decadron D 12 , completed  Remdesivir for 5 days , s/p  CCP and IL 6 inhibitor infusion   - pulm managing  - s/p bronch 5/1/21  Continue Cefepime and IV vanc( 4 days) -stop with  negative cultures.        Diarrhea - likely 2/2 COVID  -

## 2021-05-06 NOTE — FLOWSHEET NOTE
05/06/21 1230   Vital Signs   Temp 99 °F (37.2 °C)   Temp Source Axillary   Pulse 85   Heart Rate Source Monitor   Resp 16   /78   BP Location Left upper arm   Patient Position Supine   Level of Consciousness Alert (0)   MEWS Score 1   Oxygen Therapy   SpO2 98 %   O2 Device Nasal cannula   O2 Flow Rate (L/min) 4 L/min       Received patient from ICU to [facility] at this time. Patient is alert and oriented x4, on 4 L of oxyen via nasal cannula, NSR on telemetry. Noted patient oozing blood covering linens and gown from right neck (removed 4x4 and silk tape dressing) and placed a stat seal, 4x4 and tegaderm to control bleeding. Received patient in large amount of loose brown stool. Hygiene needs completed and simpson care provided. A stat lock was placed on simpson catheter at this time. Complete linen change completed due to blood and stool covering them. Shift assessment completed and 4 eye assessment completed with PCU charge RN at this time. Patient sat up for lunch. Denies pain/discomfort or additional needs at this time. Call light in reach. Bed alarm on. Will continue to monitor.

## 2021-05-06 NOTE — PROGRESS NOTES
PROGRESS NOTE  S:66 yrs Patient  admitted on 4/25/2021 with Acute respiratory failure due to COVID-19 (HCC) [U07.1, J96.00] . Today she complains of dysphagia, cough, nausea, and decreased appetite. She did not pass a BM yesterday. Exam:   Vitals:    05/06/21 0800   BP: 138/69   Pulse: 95   Resp: 18   Temp:    SpO2: 96%     Due to the current efforts to prevent transmission of COVID-19 and also the need to preserve PPE for other caregivers, a face-to-face encounter with the patient was not performed. That being said, all relevant records and diagnostic tests were reviewed, including laboratory results and imaging. Please reference any relevant documentation elsewhere. Care will be coordinated with the primary service. Medications: Reviewed    Labs:  CBC:   Recent Labs     05/04/21 0418 05/05/21  0800 05/06/21  0451   WBC 20.0* 12.3* 10.4   HGB 11.7* 10.7* 11.7*   HCT 35.7* 32.4* 35.2*   MCV 91.7 91.9 92.8   * 331 321     BMP:   Recent Labs     05/04/21 0418 05/05/21  0800 05/06/21  0451    139 142   K 4.1 3.8 3.6   CL 99 103 107   CO2 32 28 26   BUN 18 17 13   CREATININE <0.5* <0.5* <0.5*     LIVER PROFILE:   Recent Labs     05/04/21 0418 05/05/21  0800 05/06/21  0451   AST 43* 48* 53*   ALT 38 36 43*   PROT 5.6* 6.0* 5.7*   BILIDIR <0.2 <0.2 <0.2   BILITOT 0.3 0.5 0.4   ALKPHOS 93 91 75     Attending Supervising [de-identified] Attestation Statement  The patient is a 61 y.o. female. I have performed a history and physical examination of the patient. I discussed the case with my physician assistant Jony Jose PA-C    I reviewed the patient's Past Medical History, Past Surgical History, Medications, and Allergies.      Physical Exam:  Vitals:    05/06/21 0900 05/06/21 1000 05/06/21 1100 05/06/21 1230   BP: 102/74 123/72 120/65 128/78   Pulse: 118 81 78 85   Resp: 19 17 14 16   Temp:    99 °F (37.2 °C)   TempSrc:    Axillary   SpO2: 95% 92% 93% 98% Weight:       Height:           Due to the current efforts to prevent transmission of COVID-19 and also the need to preserve PPE for other caregivers, a face-to-face encounter with the patient was not performed. That being said, all relevant records and diagnostic tests were reviewed, including laboratory results and imaging. Please reference any relevant documentation elsewhere. Care will be coordinated with the primary service. Impression: 61year old female with history of HTN, arthritis, osteoporosis, admitted with acute respiratory failure secondary COVID19 pneumonia. Diarrhea is likely related to COVID-19 superimposed on IBS. Recommendation:  1. Continue supportive care  2. Monitor and document output  3. Continue probiotics  4. Continue fiber supplements  5. Continue diet as toelrated per SLP recommendations  6. PT/OT  7. Will follow  8. Will consider trial of dicyclomine if diarrhea persists       Karen Zazueta PA-C  9:14 AM 5/6/2021                      61year old female with history of HTN, arthritis, osteoporosis, admitted with acute respiratory failure secondary COVID19 pneumonia. Diarrhea is likely related to COVID-19 superimposed on IBS     Continue supportive care. Advance to carb control diet with fiber supplements. PT/OT. Will follow.     Geronimo Ibarra MD          [phone]  [phone]

## 2021-05-06 NOTE — PROGRESS NOTES
Occupational Therapy Daily Treatment Note    Unit: PCU  Date:  5/6/2021  Patient Name:    Jeremy Bach  Admitting diagnosis:  Acute respiratory failure due to COVID-19 [facility]) [U07.1, J96.00]  Admit Date:  4/25/2021  Precautions/Restrictions:  fall risk, IV, bed/chair alarm, simpson catheter , supplemental O2 (4L) and telemetry        Discharge Recommendations: Acute Rehab (ARU)/ Inpatient [facility] ([facility])  DME needs for discharge: defer to facility       Therapy recommendations for staff:   Assist of 1 (moderate assist) with use of WEN STEDY for all transfers to/from BSC/chair    AM-PAC Score: AM-PAC Inpatient Daily Activity Raw Score: Archie S4 Level: NA       Treatment Time:  14:07-15:00  Treatment number:  2    Total Treatment Time:  53  minutes    History of Present Illness: per Grecia DALY H&P 4/25/21:  \"The patient is L 89 y. o. female with no significant   PMH who presented to the ED with complaint of diarrhea and poor PO intake for the past 10 days. Does have a cough productive of clear sputum, no hemoptysis. Denies any fevers or chills at home. No chest pain or SOB. She did have her first COVID vaccine on March 31st. She was scheduled to get her second dose earlier this week but was unable to get the vaccine because of diarrhea. Denies any abdominal pain, melena or hematochezia. Denies any sick contacts. No recent abx.     She does not wear O2 at baseline. \"      Intubated 4/27-5/4/21    Subjective:  Patient supine in bed and agreeable to treatment. Oriented x4. Patient is slow to respond to commands and motor plan simple tasks. Pain   No  Rating: NA  Location:  Pain Medicine Status: Denies need      Bed Mobility:   Supine to Sit:  Min A  Sit to Supine:  Not Tested  Rolling:           Not Tested  Scooting: Mod A    Transfer Training:   Sit to stand:    Mod A from chair to hand held assist. Min-mod A from chair/EOB to STEDY  Stand to sit:  Min A from STEDY  Bed to Chair:  Total A via

## 2021-05-06 NOTE — CARE COORDINATION
INTERDISCIPLINARY PLAN OF CARE CONFERENCE    Date/Time: 5/6/2021 9:27 AM  Completed by: PAUL Gomez. Case Management      Patient Name:  Nathen Tierney  YOB: 1955  Admitting Diagnosis: Acute respiratory failure due to COVID-19 [facility]) [U07.1, J96.00]     Admit Date/Time:  4/25/2021  8:28 PM    Chart reviewed. Interdisciplinary team contacted or reviewed plan related to patient progress and discharge plans. Disciplines included Case Management, Nursing, and Dietitian. Current Status: ongoing. C19 + 4/25/2021   PT/OT recommendation for discharge plan of care: SNF    Expected D/C Disposition:  Skilled nursing facility  Confirmed plan with patient and/or family Yes confirmed with: (name) pt  Met with:pt    Discharge Plan Comments: Chart review completed. Called and spoke with pt via bedside phone due to covid isolation. Discussed PT/OT recommendations for SNF and pt is agreeable to going. She requested referral to [facility] as it is close to family and family has been there in the past. She is aware writer will initiate referral but will need to see if they can accept pt's who have had covid recently. Pt stated understanding. Explained a SNF list would be provided to pt's RN to provide to her for review of other SNF's and she stated understanding. She denied needs or concerns for CM at this time. Message left for [facility], admissions at [facility] [place] requesting a call back for the referral.    Provided SNF list to pt's RN Erasto Acuña to provide to pt on next encounter and she stated she would. Home O2 in place on admit: No    Pt will require a pre-cert for SNF. CM will follow. Please notify CM if needs or concerns arise. Addendum at 10:01am: Received return call from [facility] at [facility] [place].  Facesheet faxed via epic for her to review referral. She is aware of the covid + date of 4/25    Addendum at 3:13pm: Received return call from [facility], admissions at [facility] [place] who stated she can accept pt pending pre-cert and aren't able to start cert until Monday as this is when pt is out of her 14 day isolation period from the +C19 test on 4/25/2021. Her 14th day is Saturday and Rich Patel at [facility] [place] stated she is being told that they can't start cert until out of isolation period. SW notes OT is now recommending ARU. Attempted calling pt's bedside phone but it rang with no answers.

## 2021-05-07 ENCOUNTER — APPOINTMENT (OUTPATIENT)
Dept: GENERAL RADIOLOGY | Age: 66
DRG: 130 | End: 2021-05-07
Payer: MEDICAID

## 2021-05-07 LAB
ANION GAP SERPL CALCULATED.3IONS-SCNC: 8 MMOL/L (ref 3–16)
BASOPHILS ABSOLUTE: 0.1 K/UL (ref 0–0.2)
BASOPHILS RELATIVE PERCENT: 1 %
BUN BLDV-MCNC: 14 MG/DL (ref 7–20)
CALCIUM SERPL-MCNC: 8.5 MG/DL (ref 8.3–10.6)
CHLORIDE BLD-SCNC: 106 MMOL/L (ref 99–110)
CO2: 26 MMOL/L (ref 21–32)
CREAT SERPL-MCNC: <0.5 MG/DL (ref 0.6–1.2)
EOSINOPHILS ABSOLUTE: 0.2 K/UL (ref 0–0.6)
EOSINOPHILS RELATIVE PERCENT: 1.3 %
GFR AFRICAN AMERICAN: >60
GFR NON-AFRICAN AMERICAN: >60
GLUCOSE BLD-MCNC: 139 MG/DL (ref 70–99)
GLUCOSE BLD-MCNC: 152 MG/DL (ref 70–99)
GLUCOSE BLD-MCNC: 74 MG/DL (ref 70–99)
GLUCOSE BLD-MCNC: 83 MG/DL (ref 70–99)
GLUCOSE BLD-MCNC: 87 MG/DL (ref 70–99)
GLUCOSE BLD-MCNC: 88 MG/DL (ref 70–99)
GLUCOSE BLD-MCNC: 88 MG/DL (ref 70–99)
HCT VFR BLD CALC: 34 % (ref 36–48)
HEMOGLOBIN: 11.1 G/DL (ref 12–16)
LYMPHOCYTES ABSOLUTE: 2.6 K/UL (ref 1–5.1)
LYMPHOCYTES RELATIVE PERCENT: 21.7 %
MCH RBC QN AUTO: 30.6 PG (ref 26–34)
MCHC RBC AUTO-ENTMCNC: 32.8 G/DL (ref 31–36)
MCV RBC AUTO: 93.2 FL (ref 80–100)
MONOCYTES ABSOLUTE: 0.6 K/UL (ref 0–1.3)
MONOCYTES RELATIVE PERCENT: 5.2 %
NEUTROPHILS ABSOLUTE: 8.5 K/UL (ref 1.7–7.7)
NEUTROPHILS RELATIVE PERCENT: 70.8 %
PDW BLD-RTO: 13.9 % (ref 12.4–15.4)
PERFORMED ON: ABNORMAL
PERFORMED ON: ABNORMAL
PERFORMED ON: NORMAL
PLATELET # BLD: 293 K/UL (ref 135–450)
PMV BLD AUTO: 7 FL (ref 5–10.5)
POTASSIUM REFLEX MAGNESIUM: 3.7 MMOL/L (ref 3.5–5.1)
RBC # BLD: 3.64 M/UL (ref 4–5.2)
SODIUM BLD-SCNC: 140 MMOL/L (ref 136–145)
WBC # BLD: 12 K/UL (ref 4–11)

## 2021-05-07 PROCEDURE — 97112 NEUROMUSCULAR REEDUCATION: CPT

## 2021-05-07 PROCEDURE — 6370000000 HC RX 637 (ALT 250 FOR IP): Performed by: INTERNAL MEDICINE

## 2021-05-07 PROCEDURE — 2700000000 HC OXYGEN THERAPY PER DAY

## 2021-05-07 PROCEDURE — 6360000002 HC RX W HCPCS: Performed by: INTERNAL MEDICINE

## 2021-05-07 PROCEDURE — 97530 THERAPEUTIC ACTIVITIES: CPT

## 2021-05-07 PROCEDURE — 94761 N-INVAS EAR/PLS OXIMETRY MLT: CPT

## 2021-05-07 PROCEDURE — 99232 SBSQ HOSP IP/OBS MODERATE 35: CPT | Performed by: INTERNAL MEDICINE

## 2021-05-07 PROCEDURE — 36415 COLL VENOUS BLD VENIPUNCTURE: CPT

## 2021-05-07 PROCEDURE — 2580000003 HC RX 258: Performed by: INTERNAL MEDICINE

## 2021-05-07 PROCEDURE — 80048 BASIC METABOLIC PNL TOTAL CA: CPT

## 2021-05-07 PROCEDURE — 2060000000 HC ICU INTERMEDIATE R&B

## 2021-05-07 PROCEDURE — 85025 COMPLETE CBC W/AUTO DIFF WBC: CPT

## 2021-05-07 PROCEDURE — 97535 SELF CARE MNGMENT TRAINING: CPT

## 2021-05-07 PROCEDURE — 99233 SBSQ HOSP IP/OBS HIGH 50: CPT | Performed by: INTERNAL MEDICINE

## 2021-05-07 PROCEDURE — 6370000000 HC RX 637 (ALT 250 FOR IP): Performed by: PHYSICIAN ASSISTANT

## 2021-05-07 RX ADMIN — Medication 10 ML: at 21:44

## 2021-05-07 RX ADMIN — ENOXAPARIN SODIUM 30 MG: 30 INJECTION SUBCUTANEOUS at 21:43

## 2021-05-07 RX ADMIN — PSYLLIUM HUSK 1 PACKET: 3.4 POWDER ORAL at 09:39

## 2021-05-07 RX ADMIN — PSYLLIUM HUSK 1 PACKET: 3.4 POWDER ORAL at 21:43

## 2021-05-07 RX ADMIN — RDII 250 MG CAPSULE 250 MG: at 21:43

## 2021-05-07 RX ADMIN — METOPROLOL TARTRATE 50 MG: 50 TABLET, FILM COATED ORAL at 21:43

## 2021-05-07 RX ADMIN — Medication 2000 UNITS: at 09:40

## 2021-05-07 RX ADMIN — DEXAMETHASONE SODIUM PHOSPHATE 3 MG: 4 INJECTION, SOLUTION INTRA-ARTICULAR; INTRALESIONAL; INTRAMUSCULAR; INTRAVENOUS; SOFT TISSUE at 09:40

## 2021-05-07 RX ADMIN — RDII 250 MG CAPSULE 250 MG: at 09:40

## 2021-05-07 RX ADMIN — METOPROLOL TARTRATE 50 MG: 50 TABLET, FILM COATED ORAL at 09:40

## 2021-05-07 RX ADMIN — ENOXAPARIN SODIUM 30 MG: 30 INJECTION SUBCUTANEOUS at 09:40

## 2021-05-07 RX ADMIN — Medication 10 ML: at 09:41

## 2021-05-07 NOTE — PROGRESS NOTES
Pulmonary & Critical Care Medicine ICU Progress Note    CC: COVID-19 with progressive hypoxemia    Events of Last 24 hours:   Mild SOB    Invasive Lines: RIJ CVC 5/3/21    MV: 4/27/2021-5/4/21  Vent Mode: PS Rate Set: 14 bmp/Vt Ordered: 450 mL/ /FiO2 : 50 %  No results for input(s): PHART, [id number], PO2ART in the last 72 hours. IV:   sodium chloride      sodium chloride      dextrose       Vitals:  Blood pressure 111/66, pulse 99, temperature 98.6 °F (37 °C), temperature source Oral, resp. rate 16, height 4' 11\" (1.499 m), weight 148 lb 3.2 oz (67.2 kg), SpO2 97 %. on 5L  Constitutional:  No acute distress. Eyes: PERRL. Conjunctivae anicteric. ENT: Normal nose. Normal tongue. Neck:  Trachea is midline. No thyroid tenderness. Respiratory: No accessory muscle usage. no wheezes. No rales. No Rhonchi. Cardiovascular: Normal S1S2. No digit clubbing. No digit cyanosis. No LE edema. Psychiatric: No anxiety or Agitation. Alert and Oriented to person, place and time.     Scheduled Meds:   dexamethasone  3 mg Intravenous Q24H    psyllium  1 packet Oral BID    metoprolol tartrate  50 mg Oral BID    insulin lispro  0-18 Units Subcutaneous Q4H    sodium chloride flush  5-40 mL Intravenous 2 times per day    enoxaparin  30 mg Subcutaneous BID    Vitamin D  2,000 Units Oral Daily    saccharomyces boulardii  250 mg Oral BID     PRN Meds:  albuterol-ipratropium, sodium chloride, sodium chloride flush, sodium chloride, promethazine **OR** ondansetron, polyethylene glycol, acetaminophen **OR** acetaminophen, guaiFENesin-dextromethorphan, glucose, dextrose, glucagon (rDNA), dextrose    Results:  CBC:   Recent Labs     05/05/21  0800 05/06/21  0451 05/07/21  0515   WBC 12.3* 10.4 12.0*   HGB 10.7* 11.7* 11.1*   HCT 32.4* 35.2* 34.0*   MCV 91.9 92.8 93.2    321 293     BMP:   Recent Labs     05/05/21  0800 05/06/21  0451 05/07/21  0515    142 140   K 3.8 3.6 3.7    107 106   CO2 28 26 26   BUN 17 13 14   CREATININE <0.5* <0.5* <0.5*     LIVER PROFILE:   Recent Labs     05/05/21  0800 05/06/21  0451   AST 48* 53*   ALT 36 43*   BILIDIR <0.2 <0.2   BILITOT 0.5 0.4   ALKPHOS 91 75     Microbiology  4/25/2021 SARS-CoV-2 positive  4/25/2021 C. difficile negative  4/25/2021 blood NGTD  4/26/2021 urine mixed coral  4/27/2021 tracheal aspirate Candida  4/28/2020 1 GI bacterial pathogen by PCR negative  5/1/21 BAL NRF    Imaging:  Chest imaging was reviewed by me and showed CTPA 4/25/2021  Pulmonary Arteries: Evaluation is compromised by motion artifact.  No   definite evidence of intraluminal filling defect to suggest pulmonary   embolism.  Main pulmonary artery is normal in caliber. Mediastinum: Mediastinal and bilateral hilar lymph nodes are probably   reactive.  The heart and pericardium demonstrate no acute abnormality.  There   is no acute abnormality of the thoracic aorta. Lungs/pleura: There is no pneumothorax or pleural effusion.  There are patchy   airspace opacities throughout both lungs.  Elevation of the left   hemidiaphragm is again noted. Upper Abdomen: There is fatty infiltration of the liver.  The visualized   upper abdomen is otherwise unremarkable. Soft Tissues/Bones: No acute bone or soft tissue abnormality.  Spinal   fixation rods are again seen.       Impression:       1. Motion limited study with no definite scan evidence for pulmonary embolus. 2. Bilateral airspace opacities probably represent a combination of   atelectasis and pneumonia. CT abdomen 4/25/2021  Impression:        1. Motion limited study. 2. Cholelithiasis and probable choledocholithiasis without evidence for acute   cholecystitis. 3. Nonobstructing left renal calculus. CXR 5/4/2021   Tubes and lines are unchanged.  Marked elevation left hemidiaphragm is again   demonstrated.       Given difference technique multifocal bilateral airspace disease is not   significantly changed.

## 2021-05-07 NOTE — FLOWSHEET NOTE
05/06/21 1951   Vitals   Temp 97.8 °F (36.6 °C)   Temp Source Axillary   Pulse 110   Heart Rate Source Monitor   Resp 16   /78   BP Location Left upper arm   BP Upper/Lower Upper   BP Method Automatic   Patient Position Sitting   Level of Consciousness Alert (0)   MEWS Score 2   Patient Currently in Pain No   Cardiac Rhythm NSR   Oxygen Therapy   SpO2 96 %   O2 Device Nasal cannula   O2 Flow Rate (L/min) 4 L/min   Shift assessment completed-see flow sheet. Patient in bed awake,alert and oriented x4. Vitals stable, 96% 4l/min. Evening medications given per order, crushed, tolerated well. Patient helped from chair to bed, changed and repositioned. Patient denies any further needs at this time,will continue to monitor,call light within reach.

## 2021-05-07 NOTE — PROGRESS NOTES
Valderrama catheter removed per protocol without complication.  Patient assisted to [facility] utilizing stedy and 2 assist.

## 2021-05-07 NOTE — CARE COORDINATION
INTERDISCIPLINARY PLAN OF CARE CONFERENCE    Date/Time: 5/7/2021 1:36 PM  Completed by: Miguel Angel Jones, Case Management      Patient Name:  Khloe Rich  YOB: 1955  Admitting Diagnosis: Acute respiratory failure due to COVID-19 [facility]) [U07.1, J96.00]     Admit Date/Time:  4/25/2021  8:28 PM    Chart reviewed. Interdisciplinary team contacted or reviewed plan related to patient progress and discharge plans. Disciplines included Case Management, Nursing, and Dietitian. Current Status:ongoing  PT/OT recommendation for discharge plan of care: SNF    Expected D/C Disposition:  Skilled nursing facility  Confirmed plan with patient and/or family Yes confirmed with: (name) pt's spouse, Wojciech Javed  Met with: pt's spouse, Emiliaadán Loyolamadalyn  Discharge Plan Comments: Reviewed chart. Role of discharge planner explained and patient's spouse, Wojciech Javed, verbalized understanding. Pt is from home with spouse. Pt is aware that PT/OT recommends SNF. Pt and spouse was given a List and chose [facility] [place]. They are aware that precert cannot start until Monday, as pt must be 14 days out form Covid positive test,as pt tested Covid pos as of 4/25/2021. CELSO Cuba with [facility] [place] to start precert on Monday for pt. Pt is currently on 3L, but not on home O2.       Home O2 in place on admit: No  Pt informed of need to bring portable home O2 tank on day of discharge for nursing to connect prior to leaving:  Not Indicated  Verbalized agreement/Understanding:  Not Indicated

## 2021-05-07 NOTE — PROGRESS NOTES
Inpatient Physical Therapy Daily Treatment Note    Unit: PCU  Date:  5/7/2021  Patient Name:    Zoe Jane  Admitting diagnosis:  Acute respiratory failure due to COVID-19 [facility]) [U07.1, J96.00]  Admit Date:  4/25/2021  Precautions/Restrictions:  Fall risk, Bed/chair alarm, Lines -Valderrama catheter, Telemetry, Continuous pulse oximetry and Isolation Precautions: Droplet Plus - COVID      Discharge Recommendations: ARU/IRF (inpatient rehab facility)   DME needs for discharge: defer to facility       Therapy recommendation for EMS Transport: requires transport by cot due to requires lift equipment for transfers    Therapy recommendations for staff:   Assist of 1 with use of WEN STEDY and gait belt for all transfers to/from [facility]  to/from chair   Maxi-move pad under patient when sitting up in chair. Assist of 2 with Maxi-move if pt too fatigued to stand to [place]    History of Present Illness: (Per H&P by John May on 4/26/21)  The patient is a 62 y. o. female with no significant PMH who presented to the ED with complaint of diarrhea and poor PO intake for the past 10 days. Does have a cough productive of clear sputum, no hemoptysis. Denies any fevers or chills at home. No chest pain or SOB. She did have her first COVID vaccine on March 31st. She was scheduled to get her second dose earlier this week but was unable to get the vaccine because of diarrhea. Denies any abdominal pain, melena or hematochezia. Denies any sick contacts. No recent abx. She does not wear O2 at baseline.   cont to be intubated  Became hypotensive overnight.  Central line placed. Currently on Levophed.   Intubated 4/27-5/4/21    Home Health S4 Level Recommendation: NA  AM-PAC Mobility Score   AM-PAC Inpatient Mobility Raw Score : 11       Treatment Time:  10:40-11:28  Treatment number: 2  Timed Code Treatment Minutes: 48 minutes  Total Treatment Minutes:  48  minutes    Cognition    A&O Person, Place and Time (initially states March 4th, but able to get correct month with cues)   Able to follow 2 step commands    Subjective  Patient lying supine in bed with no family present   Pt agreeable to this PT tx. Pain   No  Location: N/A  Rating:    NA/10  Pain Medicine Status: No request made     Bed Mobility   Supine to Sit:    SBA with HOB elevated and use of rail, cues for sequencing  Sit to Supine:   Not Tested  Rolling:   SBA  Scooting:   SBA at EOB and in chair, Mod A of 2 with fatigue, cues for sequencing with all    Transfer Training     Sit to stand:   Min A of 2 from bed  to [place], Mod A of 2 from chair to RW  Stand to sit:   Min-Mod A of 2 persons  Bed to Chair:   Total A with use of gait belt and WEN STEDY    Gait Training gait deferred due to difficulty with transfers; pt ambulated 0 ft. Stair Training deferred, pt unsafe/not appropriate to complete stairs at this time    Therapeutic Exercise Benita deferred secondary to treatment focus on functional mobility  NA    Balance  Sitting:  Good - ; SBA-CGA  Comments: 15 minutes at EOB for lower body dressing with intermittent posterior LOB and assist to recover, improved balance once pt gets feet on floor but continues to have LOB with dynamic sitting    Standing: Good - ; SBA  Comments: 30 sec with B UE support on RW with tactile cues for full hip extension and upright posture    Patient Education      Role of PT, POC, Discharge recommendations, DC recommendations, safety awareness, transfer techniques and calling for assist with mobility. Positioning Needs       Pt up in chair, alarm set, positioned in proper neutral alignment and pressure relief provided. Call light provided and all needs within reach    Activity Tolerance   Pt completed therapy session with SOB noted with transfers, resolves quickly upon return to sitting. Other  See OT note for assist with lower body dressing. Assessment :  Patient tolerated treatment session well.  She continues to improve with cognition and is alert and oriented this date. She does demonstrate delayed motor planning and requires increased time for activities. The patient also fatigues quickly with standing trial to RW. Will continue to progress as able. Pt would benefit from continued co-treatment with occupational therapy for safe transfers and to facilitate functional mobility. Recommending ARU/IRF/Inpatient Rehab Facility upon discharge as patient functioning well below baseline, demonstrates good rehab potential and unable to return home due to inability to negotiate stairs to enter home/bedroom/bathroom, burden of care beyond caregiver ability, home environment not conducive to patient recovery and limited safety awareness. Goals (all goals ongoing unless otherwise indicated)  To be met in 3 visits:  1). Independent with LE Ex x 10 reps     To be met in 6 visits:  1). Supine to/from sit: Min A   2). Sit to/from stand: Max A   3). Bed to chair: Max A   4). Gait: Ambulate 10 ft.   with  Max A  and use of LRAD (least restrictive assistive device)  5). Tolerate B LE exercises 3 sets of 10-15 reps  6). Ascend/descend 2 steps with Max A  with use of hand rail unilateral and LRAD (least restrictive assistive device)    Plan   Continue with plan of care. Signature: Rafaela Love, PT, DPT #[id number]    If patient discharges from this facility prior to next visit, this note will serve as the Discharge Summary.

## 2021-05-07 NOTE — PROGRESS NOTES
PROGRESS NOTE  S:66 yrs Patient  admitted on 4/25/2021 with Acute respiratory failure due to COVID-19 (HCC) [U07.1, J96.00] . Today she complains of coughing up phlegm, early satiety, and dysphagia to medications. She passed one, soft, brown BM this AM.     Exam:   Vitals:    05/07/21 0937   BP: 111/66   Pulse: 99   Resp: 16   Temp: 98.6 °F (37 °C)   SpO2: 97%     Due to the current efforts to prevent transmission of COVID-19 and also the need to preserve PPE for other caregivers, a face-to-face encounter with the patient was not performed. That being said, all relevant records and diagnostic tests were reviewed, including laboratory results and imaging. Please reference any relevant documentation elsewhere. Care will be coordinated with the primary service. Medications: Reviewed    Labs:  CBC:   Recent Labs     05/05/21  0800 05/06/21  0451 05/07/21  0515   WBC 12.3* 10.4 12.0*   HGB 10.7* 11.7* 11.1*   HCT 32.4* 35.2* 34.0*   MCV 91.9 92.8 93.2    321 293     BMP:   Recent Labs     05/05/21  0800 05/06/21  0451 05/07/21  0515    142 140   K 3.8 3.6 3.7    107 106   CO2 28 26 26   BUN 17 13 14   CREATININE <0.5* <0.5* <0.5*     LIVER PROFILE:   Recent Labs     05/05/21  0800 05/06/21  0451   AST 48* 53*   ALT 36 43*   PROT 6.0* 5.7*   BILIDIR <0.2 <0.2   BILITOT 0.5 0.4   ALKPHOS 91 75     Attending Supervising [de-identified] Attestation Statement  The patient is a 61 y.o. female. I have performed a history and physical examination of the patient. I discussed the case with my physician assistant Yanni Jaquez PA-C    I reviewed the patient's Past Medical History, Past Surgical History, Medications, and Allergies.      Physical Exam:  Vitals:    05/06/21 1230 05/06/21 1951 05/07/21 0412 05/07/21 0937   BP: 128/78 134/78 119/64 111/66   Pulse: 85 110 95 99   Resp: 16 16 18 16   Temp: 99 °F (37.2 °C) 97.8 °F (36.6 °C) 98.2 °F (36.8 °C) 98.6 °F (37 °C) TempSrc: Axillary Axillary Oral Oral   SpO2: 98% 96% 98% 97%   Weight:   148 lb 3.2 oz (67.2 kg)    Height:           Due to the current efforts to prevent transmission of COVID-19 and also the need to preserve PPE for other caregivers, a face-to-face encounter with the patient was not performed. That being said, all relevant records and diagnostic tests were reviewed, including laboratory results and imaging. Please reference any relevant documentation elsewhere. Care will be coordinated with the primary service. Impression: 61year old female with history of HTN, arthritis, osteoporosis, admitted with acute respiratory failure secondary COVID19 pneumonia. Diarrhea is likely related to COVID-19 superimposed on IBS. Recommendation:  1. Continue supportive care  2. Monitor and document output  3. Continue probiotics  4. Continue fiber supplements  5. Continue diet as tolerated per SLP recommendations  6. Encourage nutrition  7. PT/OT  8. Will   9. Will consider Dicyclomine if diarrhea persists       Priyanka Bowman  9:51 AM 5/7/2021                      61year old female with history of HTN, arthritis, osteoporosis, admitted with acute respiratory failure secondary COVID19 pneumonia. Diarrhea is likely related to COVID-19 superimposed on IBS     Continue supportive care. Advance to carb control diet with fiber supplements. PT/OT. Will follow up on Monday. On-call GI available over the weekend upon request.    Betsy Ochoa MD          (O) [phone] ([phone]   .

## 2021-05-07 NOTE — PROGRESS NOTES
Occupational Therapy Daily Treatment Note    Unit: PCU  Date:  5/7/2021  Patient Name:    Apolonia Hunter  Admitting diagnosis:  Acute respiratory failure due to COVID-19 [facility]) [U07.1, J96.00]  Admit Date:  4/25/2021  Precautions/Restrictions:  fall risk, IV, bed/chair alarm, simpson catheter , supplemental O2 (3L) and telemetry, COVID        Discharge Recommendations: Acute Rehab (ARU)/ Inpatient Rehab Facility (IRF)  DME needs for discharge: defer to facility       Therapy recommendations for staff:   Assist of 1 (moderate assist) with use of WEN STEDY for all transfers to/from BSC/chair    AM-PAC Score: AM-PAC Inpatient Daily Activity Raw Score: [place] S4 Level: NA       Treatment Time:  6372-6944  Treatment number:  3    Total Treatment Time:  48  minutes    History of Present Illness: per Aaron DALY H&P 4/25/21:  \"The patient is Y 80 y. o. female with no significant   PMH who presented to the ED with complaint of diarrhea and poor PO intake for the past 10 days. Does have a cough productive of clear sputum, no hemoptysis. Denies any fevers or chills at home. No chest pain or SOB. She did have her first COVID vaccine on March 31st. She was scheduled to get her second dose earlier this week but was unable to get the vaccine because of diarrhea. Denies any abdominal pain, melena or hematochezia. Denies any sick contacts. No recent abx.     She does not wear O2 at baseline. \"      Intubated 4/27-5/4/21    Subjective:  Patient supine in bed and agreeable to treatment. Patient is slow to respond to commands and motor plan simple tasks. Pain   No  Rating: NA  Location:  Pain Medicine Status: Denies need      Bed Mobility:   Supine to Sit:  SBA  Sit to Supine:  Not Tested  Rolling:           Not Tested  Scooting:         Mod A    Transfer Training:   Sit to stand:   Min A bed to STEDY, from chair Mod A sit to stand with RW  Stand to sit:  Min A from STEDY, from chair Mod A sit to stand with RW  Bed to

## 2021-05-07 NOTE — PROGRESS NOTES
Hospitalist Progress Note      PCP: Juaquin Duran, APRN - FADY      COVID pna with acute resp failure   Date of Admission: 4/25/2021     cont to be intubated  Became hypotensive overnight. Central line placed. Currently on Levophed. 5/4  Anxious on spontaneous breathing trials. On Precedex. Continues to be on Levophed. 5/5  Patient extubated yesterday and placed on 5 L of oxygen. Off Levophed. 5/7  Now on 3 L of O2      Medications:  Reviewed    Infusion Medications    sodium chloride      sodium chloride      dextrose       Scheduled Medications    dexamethasone  3 mg Intravenous Q24H    psyllium  1 packet Oral BID    metoprolol tartrate  50 mg Oral BID    insulin lispro  0-18 Units Subcutaneous Q4H    sodium chloride flush  5-40 mL Intravenous 2 times per day    enoxaparin  30 mg Subcutaneous BID    Vitamin D  2,000 Units Oral Daily    saccharomyces boulardii  250 mg Oral BID     PRN Meds: albuterol-ipratropium, sodium chloride, sodium chloride flush, sodium chloride, promethazine **OR** ondansetron, polyethylene glycol, acetaminophen **OR** acetaminophen, guaiFENesin-dextromethorphan, glucose, dextrose, glucagon (rDNA), dextrose      Intake/Output Summary (Last 24 hours) at 5/7/2021 1241  Last data filed at 5/7/2021 0909  Gross per 24 hour   Intake 430 ml   Output 800 ml   Net -370 ml       Physical Exam Performed:    /66   Pulse 99   Temp 98.6 °F (37 °C) (Oral)   Resp 16   Ht 4' 11\" (1.499 m)   Wt 148 lb 3.2 oz (67.2 kg) Comment: with ox tank on bed  SpO2 97%   BMI 29.93 kg/m²     General:  Alert and oriented. Awake, alert and oriented. Appears to be not in any distress  Mucous Membranes:  Pink , anicteric  Neck: No JVD, no carotid bruit, no thyromegaly  Chest: scattered rhonchi and diminished pedro   Cardiovascular:  RRR S1S2 heard, no murmurs or gallops  Abdomen:  Soft, undistended, non tender, no organomegaly, BS present  Extremities: No edema or cyanosis.  Distal pulses well felt  Neurological -awake, alert and oriented. Labs:   Recent Labs     05/05/21  0800 05/06/21  0451 05/07/21  0515   WBC 12.3* 10.4 12.0*   HGB 10.7* 11.7* 11.1*   HCT 32.4* 35.2* 34.0*    321 293     Recent Labs     05/05/21  0800 05/06/21  0451 05/07/21  0515    142 140   K 3.8 3.6 3.7    107 106   CO2 28 26 26   BUN 17 13 14   CREATININE <0.5* <0.5* <0.5*   CALCIUM 8.9 8.7 8.5     Recent Labs     05/05/21  0800 05/06/21  0451   AST 48* 53*   ALT 36 43*   BILIDIR <0.2 <0.2   BILITOT 0.5 0.4   ALKPHOS 91 75     No results for input(s): INR in the last 72 hours. No results for input(s): Zo Crumh in the last 72 hours. Urinalysis:      Lab Results   Component Value Date    NITRU Negative 04/26/2021    WBCUA 10-20 04/26/2021    BACTERIA 2+ 04/26/2021    RBCUA 3-4 04/26/2021    BLOODU MODERATE 04/26/2021    SPECGRAV 1.020 04/26/2021    GLUCOSEU Negative 04/26/2021       Radiology:  XR CHEST PORTABLE   Final Result   No significant interval change. XR CHEST PORTABLE   Final Result   Right central venous catheter tip projects at the low SVC. XR CHEST PORTABLE   Final Result   Stable chest.         XR CHEST PORTABLE   Final Result   Slight improved aeration of the lungs. Scattered airspace disease remains. XR CHEST PORTABLE   Final Result   Moderate consolidating airspace disease left lower lobe. Improving aeration with decreased airspace disease right lung and left apex. XR CHEST PORTABLE   Final Result   Patchy airspace opacities bilaterally, may be related to pulmonary edema   versus pneumonia. Mild to moderate left pleural effusion. Asymmetric elevation of the left hemidiaphragm. XR CHEST PORTABLE   Final Result   Persistent moderate bilateral airspace disease unchanged. Endotracheal tube is in satisfactory position at this time.          XR CHEST PORTABLE   Final Result   Endotracheal tube 1 cm above the wyatt, consider 2-3 cm retraction. Multifocal airspace opacities worrisome for multifocal pneumonia or   atelectasis. Mild progressed within right lung base and right upper lung. CT ABDOMEN PELVIS W IV CONTRAST Additional Contrast? None   Final Result   1. Motion limited study. 2. Cholelithiasis and probable choledocholithiasis without evidence for acute   cholecystitis. 3. Nonobstructing left renal calculus. CT CHEST PULMONARY EMBOLISM W CONTRAST   Final Result   1. Motion limited study with no definite scan evidence for pulmonary embolus. 2. Bilateral airspace opacities probably represent a combination of   atelectasis and pneumonia. XR CHEST PORTABLE   Final Result   Nonspecific perihilar opacities. Pulmonary edema and multifocal pneumonia   are in the differential.  Follow-up to resolution recommended. Assessment/Plan:    Active Hospital Problems    Diagnosis    Hypotension due to drugs [I95.2]    Elevated LFTs [R79.89]    Pneumonia due to COVID-19 virus [U07.1, J12.82]    Hypoxia [R09.02]    Transaminitis [E34.83]    Metabolic acidosis [E97.8]    Acute hypoxemic respiratory failure (HCC) [J96.01]    HCAP (healthcare-associated pneumonia) [J18.9]         COVID 19 PNA  Acute Hypoxic Respiratory Failure  - 85% on RA upon arrival to ED  - CT chest with bilateral airspace opacities, no PE  - PCT: 0.18; had 1/2 Pfizer Vaccine on 3/31  - Admitted to Med Surg, droplet + precautions  - worsened quickly leading to ICU transfer, vapotherm and now intubated and on vent support  - continue  Decadron D 12 , completed  Remdesivir for 5 days , s/p  CCP and IL 6 inhibitor infusion   - pulm managing  - s/p bronch 5/1/21  Continue Cefepime and IV vanc( 4 days) -stop with  negative cultures.   Now on 3 L of O2      Diarrhea - likely 2/2 COVID  - diarrhea returned now   - GI f/w       Hypotension  Primarily from excess sedation  Now on levophed  Off pressors now    NAGMA  - CO2 17  - likely 2/2 diarrhea  - improved and off bicarb gtt     Hyperglycemia- A1c at 5.8  - likely 2/2 steroids  -  low dose SSI      Transaminitis  - likely with viral infection   - monitor with Remdesivir use-improving      HTN  - controlled  - cont Toprol XL     DVT Prophylaxis: Lovenox bid  Diet: DIET DYSPHAGIA PUREED;  Carb Control: 4 carb choices (60 gms)/meal; No Drinking Straw  Code Status: Full Code    PT/OT Eval Status: ordered    Rebeca Cook MD

## 2021-05-07 NOTE — PLAN OF CARE
Problem: Airway Clearance - Ineffective  Goal: Achieve or maintain patent airway  Outcome: Ongoing     Problem: Gas Exchange - Impaired  Goal: Absence of hypoxia  Outcome: Ongoing     Problem: Falls - Risk of:  Goal: Will remain free from falls  Description: Will remain free from falls  Outcome: Ongoing

## 2021-05-07 NOTE — PROGRESS NOTES
Comprehensive Nutrition Assessment    Type and Reason for Visit:  Reassess    Nutrition Recommendations/Plan:   1. continue the [facility]3]-4 w/ Pureed       Nutrition Assessment:  patient has slightly improved from a nutritional standpoint since last RD assessment AEB she has been extubated and tolerating the pureed diet; remains at risk d.t intakes are not consistently > 50%; will continue current diet and continue to monitor. Malnutrition Assessment:  Malnutrition Status: At risk for malnutrition (Comment)    Context:  Acute Illness     Findings of the 6 clinical characteristics of malnutrition:  Energy Intake:  7 - 50% or less of estimated energy requirements for 5 or more days  Weight Loss:  Unable to assess(last weight was obtained on 5/2/21)     Body Fat Loss:  Unable to assess(COVID-19 +)     Muscle Mass Loss:  Unable to assess(COVID-19 +)    Fluid Accumulation:  No significant fluid accumulation     Strength:  Not Performed    Estimated Daily Nutrient Needs:  Energy (kcal):  0368-1765 kcals per day based on 20-23 kcals/kg/CBW; Weight Used for Energy Requirements:  Current     Protein (g):  52-65 g protein/day based on 1.2-1.5 g/kg/IBW; Weight Used for Protein Requirements:  Ideal        Fluid (ml/day):  5160-5563 ml/day; Method Used for Fluid Requirements:  1 ml/kcal      Nutrition Related Findings:  remains in isolation d/t + Covid; intakes of Puree fair to good; Glucose 139 mg/dl; [place] stool 5/6;      Wounds:  None       Current Nutrition Therapies:    DIET DYSPHAGIA PUREED;  Carb Control: 4 carb choices (60 gms)/meal; No Drinking Straw    Anthropometric Measures:  · Height: 4' 11\" (149.9 cm)  · Current Body Weight: 148 lb (67.1 kg)   · Admission Body Weight: 128 lb 6.4 oz (58.2 kg)(obtained on 4/26/21; actual weight)    · Usual Body Weight: 131 lb 3.2 oz (59.5 kg)(obtained on 7/9/20; actual weight)     · Ideal Body Weight: 95 lbs; % Ideal Body Weight 139.3 %   · BMI: 29.9  · Adjusted Body Weight:  ; No Adjustment   · BMI Categories: Overweight (BMI 25.0-29. 9)       Nutrition Diagnosis:   · Inadequate oral intake related to inadequate protein-energy intake, impaired respiratory function, altered GI function, increase demand for energy/nutrients as evidenced by intake 26-50%, intake 51-75%      Nutrition Interventions:   Food and/or Nutrient Delivery:  Continue Current Diet  Nutrition Education/Counseling:  No recommendation at this time   Coordination of Nutrition Care:  Continue to monitor while inpatient    Goals:  pt will continue to increase her intake of nutrition by consuming > 50% of most meals and weight will return UBW of 128#       Nutrition Monitoring and Evaluation:   Behavioral-Environmental Outcomes:  None Identified   Food/Nutrient Intake Outcomes:  Diet Advancement/Tolerance, Food and Nutrient Intake  Physical Signs/Symptoms Outcomes:  Biochemical Data, Other (Comment), Diarrhea     Discharge Planning:     Too soon to determine     Electronically signed by Lul Mac RD, LD on 5/7/21 at 2:59 PM EDT    Contact: [phone]

## 2021-05-08 LAB
ANION GAP SERPL CALCULATED.3IONS-SCNC: 8 MMOL/L (ref 3–16)
BASOPHILS ABSOLUTE: 0.1 K/UL (ref 0–0.2)
BASOPHILS RELATIVE PERCENT: 1.4 %
BUN BLDV-MCNC: 11 MG/DL (ref 7–20)
CALCIUM SERPL-MCNC: 8.8 MG/DL (ref 8.3–10.6)
CHLORIDE BLD-SCNC: 108 MMOL/L (ref 99–110)
CO2: 25 MMOL/L (ref 21–32)
CREAT SERPL-MCNC: <0.5 MG/DL (ref 0.6–1.2)
EOSINOPHILS ABSOLUTE: 0.1 K/UL (ref 0–0.6)
EOSINOPHILS RELATIVE PERCENT: 1.4 %
GFR AFRICAN AMERICAN: >60
GFR NON-AFRICAN AMERICAN: >60
GLUCOSE BLD-MCNC: 123 MG/DL (ref 70–99)
GLUCOSE BLD-MCNC: 159 MG/DL (ref 70–99)
GLUCOSE BLD-MCNC: 78 MG/DL (ref 70–99)
GLUCOSE BLD-MCNC: 82 MG/DL (ref 70–99)
GLUCOSE BLD-MCNC: 87 MG/DL (ref 70–99)
GLUCOSE BLD-MCNC: 88 MG/DL (ref 70–99)
GLUCOSE BLD-MCNC: 94 MG/DL (ref 70–99)
HCT VFR BLD CALC: 33.4 % (ref 36–48)
HEMOGLOBIN: 11.2 G/DL (ref 12–16)
LYMPHOCYTES ABSOLUTE: 3 K/UL (ref 1–5.1)
LYMPHOCYTES RELATIVE PERCENT: 29.1 %
MCH RBC QN AUTO: 31.4 PG (ref 26–34)
MCHC RBC AUTO-ENTMCNC: 33.6 G/DL (ref 31–36)
MCV RBC AUTO: 93.5 FL (ref 80–100)
MONOCYTES ABSOLUTE: 0.6 K/UL (ref 0–1.3)
MONOCYTES RELATIVE PERCENT: 6 %
NEUTROPHILS ABSOLUTE: 6.5 K/UL (ref 1.7–7.7)
NEUTROPHILS RELATIVE PERCENT: 62.1 %
PDW BLD-RTO: 14.5 % (ref 12.4–15.4)
PERFORMED ON: ABNORMAL
PERFORMED ON: ABNORMAL
PERFORMED ON: NORMAL
PLATELET # BLD: 274 K/UL (ref 135–450)
PMV BLD AUTO: 7.4 FL (ref 5–10.5)
POTASSIUM REFLEX MAGNESIUM: 3.7 MMOL/L (ref 3.5–5.1)
RBC # BLD: 3.58 M/UL (ref 4–5.2)
SODIUM BLD-SCNC: 141 MMOL/L (ref 136–145)
WBC # BLD: 10.4 K/UL (ref 4–11)

## 2021-05-08 PROCEDURE — 99233 SBSQ HOSP IP/OBS HIGH 50: CPT | Performed by: INTERNAL MEDICINE

## 2021-05-08 PROCEDURE — 2700000000 HC OXYGEN THERAPY PER DAY

## 2021-05-08 PROCEDURE — 6370000000 HC RX 637 (ALT 250 FOR IP): Performed by: INTERNAL MEDICINE

## 2021-05-08 PROCEDURE — 85025 COMPLETE CBC W/AUTO DIFF WBC: CPT

## 2021-05-08 PROCEDURE — 6370000000 HC RX 637 (ALT 250 FOR IP)

## 2021-05-08 PROCEDURE — 6360000002 HC RX W HCPCS: Performed by: INTERNAL MEDICINE

## 2021-05-08 PROCEDURE — 36415 COLL VENOUS BLD VENIPUNCTURE: CPT

## 2021-05-08 PROCEDURE — 99231 SBSQ HOSP IP/OBS SF/LOW 25: CPT | Performed by: INTERNAL MEDICINE

## 2021-05-08 PROCEDURE — 92526 ORAL FUNCTION THERAPY: CPT

## 2021-05-08 PROCEDURE — 2060000000 HC ICU INTERMEDIATE R&B

## 2021-05-08 PROCEDURE — 2580000003 HC RX 258: Performed by: INTERNAL MEDICINE

## 2021-05-08 PROCEDURE — 94761 N-INVAS EAR/PLS OXIMETRY MLT: CPT

## 2021-05-08 PROCEDURE — 80048 BASIC METABOLIC PNL TOTAL CA: CPT

## 2021-05-08 PROCEDURE — 6370000000 HC RX 637 (ALT 250 FOR IP): Performed by: PHYSICIAN ASSISTANT

## 2021-05-08 RX ORDER — DIMETHICONE, OXYBENZONE, AND PADIMATE O 2; 2.5; 6.6 G/100G; G/100G; G/100G
STICK TOPICAL
Status: COMPLETED
Start: 2021-05-08 | End: 2021-05-08

## 2021-05-08 RX ORDER — DEXAMETHASONE SODIUM PHOSPHATE 4 MG/ML
2 INJECTION, SOLUTION INTRA-ARTICULAR; INTRALESIONAL; INTRAMUSCULAR; INTRAVENOUS; SOFT TISSUE EVERY 24 HOURS
Status: DISCONTINUED | OUTPATIENT
Start: 2021-05-09 | End: 2021-05-09

## 2021-05-08 RX ADMIN — METOPROLOL TARTRATE 50 MG: 50 TABLET, FILM COATED ORAL at 21:33

## 2021-05-08 RX ADMIN — Medication 10 ML: at 21:35

## 2021-05-08 RX ADMIN — PSYLLIUM HUSK 1 PACKET: 3.4 POWDER ORAL at 09:51

## 2021-05-08 RX ADMIN — RDII 250 MG CAPSULE 250 MG: at 21:33

## 2021-05-08 RX ADMIN — Medication 10 ML: at 10:05

## 2021-05-08 RX ADMIN — Medication 2000 UNITS: at 09:50

## 2021-05-08 RX ADMIN — RDII 250 MG CAPSULE 250 MG: at 09:50

## 2021-05-08 RX ADMIN — METOPROLOL TARTRATE 50 MG: 50 TABLET, FILM COATED ORAL at 09:50

## 2021-05-08 RX ADMIN — Medication: at 09:49

## 2021-05-08 RX ADMIN — ENOXAPARIN SODIUM 30 MG: 30 INJECTION SUBCUTANEOUS at 09:50

## 2021-05-08 RX ADMIN — DEXAMETHASONE SODIUM PHOSPHATE 3 MG: 4 INJECTION, SOLUTION INTRA-ARTICULAR; INTRALESIONAL; INTRAMUSCULAR; INTRAVENOUS; SOFT TISSUE at 09:52

## 2021-05-08 RX ADMIN — ENOXAPARIN SODIUM 30 MG: 30 INJECTION SUBCUTANEOUS at 21:32

## 2021-05-08 NOTE — FLOWSHEET NOTE
05/08/21 0740   Vital Signs   Temp 97.1 °F (36.2 °C)   Temp Source Oral   Pulse 96   Heart Rate Source Monitor   Resp 16   /65   BP Location Right upper arm   Patient Position High fowlers   Level of Consciousness Alert (0)   MEWS Score 1   Patient Currently in Pain No   Oxygen Therapy   SpO2 96 %   O2 Device Nasal cannula   O2 Flow Rate (L/min) 3 L/min   AM assessment completed, see flow sheet. Pt is alert and oriented. Vital signs are WNL. Respirations are even & easy. Pt wanting to know about being taken off of puree diet. Will call SLP regarding this. Pt denies needs at this time. SR up x 2, and bed in low position. Call light is within reach.

## 2021-05-08 NOTE — PROGRESS NOTES
Hospitalist Progress Note      PCP: ARABELLA Dyer - CNP      COVID pna with acute resp failure   Date of Admission: 4/25/2021     cont to be intubated  Became hypotensive overnight. Central line placed. Currently on Levophed. 5/4  Anxious on spontaneous breathing trials. On Precedex. Continues to be on Levophed. 5/5  Patient extubated yesterday and placed on 5 L of oxygen. Off Levophed.     5/7  Now on 3 L of O2      Medications:  Reviewed    Infusion Medications    sodium chloride      sodium chloride      dextrose       Scheduled Medications    dexamethasone  3 mg Intravenous Q24H    psyllium  1 packet Oral BID    metoprolol tartrate  50 mg Oral BID    insulin lispro  0-18 Units Subcutaneous Q4H    sodium chloride flush  5-40 mL Intravenous 2 times per day    enoxaparin  30 mg Subcutaneous BID    Vitamin D  2,000 Units Oral Daily    saccharomyces boulardii  250 mg Oral BID     PRN Meds: albuterol-ipratropium, sodium chloride, sodium chloride flush, sodium chloride, promethazine **OR** ondansetron, polyethylene glycol, acetaminophen **OR** acetaminophen, guaiFENesin-dextromethorphan, glucose, dextrose, glucagon (rDNA), dextrose      Intake/Output Summary (Last 24 hours) at 5/8/2021 0709  Last data filed at 5/8/2021 0427  Gross per 24 hour   Intake 835 ml   Output 400 ml   Net 435 ml       Physical Exam Performed:    /79   Pulse 98   Temp 98.3 °F (36.8 °C) (Oral)   Resp 16   Ht 4' 11\" (1.499 m)   Wt 142 lb 14.4 oz (64.8 kg)   SpO2 95%   BMI 28.86 kg/m²     PAtient not examined   Spoke with her from door    Labs:   Recent Labs     05/06/21 0451 05/07/21  0515 05/08/21  0430   WBC 10.4 12.0* 10.4   HGB 11.7* 11.1* 11.2*   HCT 35.2* 34.0* 33.4*    293 274     Recent Labs     05/06/21  0451 05/07/21  0515 05/08/21  0430    140 141   K 3.6 3.7 3.7    106 108   CO2 26 26 25   BUN 13 14 11   CREATININE <0.5* <0.5* <0.5*   CALCIUM 8.7 8.5 8.8     Recent Labs 05/05/21  0800 05/06/21  0451   AST 48* 53*   ALT 36 43*   BILIDIR <0.2 <0.2   BILITOT 0.5 0.4   ALKPHOS 91 75     No results for input(s): INR in the last 72 hours. No results for input(s): Charley Loth in the last 72 hours. Urinalysis:      Lab Results   Component Value Date    NITRU Negative 04/26/2021    WBCUA 10-20 04/26/2021    BACTERIA 2+ 04/26/2021    RBCUA 3-4 04/26/2021    BLOODU MODERATE 04/26/2021    SPECGRAV 1.020 04/26/2021    GLUCOSEU Negative 04/26/2021       Radiology:  XR CHEST PORTABLE   Final Result   No significant interval change. XR CHEST PORTABLE   Final Result   Right central venous catheter tip projects at the low SVC. XR CHEST PORTABLE   Final Result   Stable chest.         XR CHEST PORTABLE   Final Result   Slight improved aeration of the lungs. Scattered airspace disease remains. XR CHEST PORTABLE   Final Result   Moderate consolidating airspace disease left lower lobe. Improving aeration with decreased airspace disease right lung and left apex. XR CHEST PORTABLE   Final Result   Patchy airspace opacities bilaterally, may be related to pulmonary edema   versus pneumonia. Mild to moderate left pleural effusion. Asymmetric elevation of the left hemidiaphragm. XR CHEST PORTABLE   Final Result   Persistent moderate bilateral airspace disease unchanged. Endotracheal tube is in satisfactory position at this time. XR CHEST PORTABLE   Final Result   Endotracheal tube 1 cm above the wyatt, consider 2-3 cm retraction. Multifocal airspace opacities worrisome for multifocal pneumonia or   atelectasis. Mild progressed within right lung base and right upper lung. CT ABDOMEN PELVIS W IV CONTRAST Additional Contrast? None   Final Result   1. Motion limited study. 2. Cholelithiasis and probable choledocholithiasis without evidence for acute   cholecystitis. 3. Nonobstructing left renal calculus. CT CHEST PULMONARY EMBOLISM W CONTRAST   Final Result   1. Motion limited study with no definite scan evidence for pulmonary embolus. 2. Bilateral airspace opacities probably represent a combination of   atelectasis and pneumonia. XR CHEST PORTABLE   Final Result   Nonspecific perihilar opacities. Pulmonary edema and multifocal pneumonia   are in the differential.  Follow-up to resolution recommended. XR CHEST PORTABLE    (Results Pending)           Assessment/Plan:    Active Hospital Problems    Diagnosis    Hypotension due to drugs [I95.2]    Elevated LFTs [R79.89]    Pneumonia due to COVID-19 virus [U07.1, J12.82]    Hypoxia [R09.02]    Transaminitis [P57.06]    Metabolic acidosis [D25.8]    Acute hypoxemic respiratory failure (HCC) [J96.01]    HCAP (healthcare-associated pneumonia) [J18.9]         COVID 19 PNA  Acute Hypoxic Respiratory Failure  - 85% on RA upon arrival to ED  - CT chest with bilateral airspace opacities, no PE  - PCT: 0.18; had 1/2 Pfizer Vaccine on 3/31  - Admitted to Med Surg, droplet + precautions  - worsened quickly leading to ICU transfer, vapotherm and now intubated and on vent support  - continue  Decadron D 12 , completed  Remdesivir for 5 days , s/p  CCP and IL 6 inhibitor infusion   - pulm managing  - s/p bronch 5/1/21  Continue Cefepime and IV vanc( 4 days) -stop with  negative cultures. Now on 3 L of O2      Diarrhea - likely 2/2 COVID  - diarrhea returned now   - GI f/w       Hypotension  Primarily from excess sedation  Now on levophed  Off pressors now    NAGMA  - CO2 17  - likely 2/2 diarrhea  - improved and off bicarb gtt     Hyperglycemia- A1c at 5.8  - likely 2/2 steroids  -  low dose SSI      Transaminitis  - likely with viral infection   - monitor with Remdesivir use-improved     HTN  - controlled  - cont Toprol XL     DVT Prophylaxis: Lovenox bid  Diet: DIET DYSPHAGIA PUREED;  Carb Control: 4 carb choices (60 gms)/meal; No Drinking Straw  Code Status: Full Code    PT/OT Eval Status: ordered    Ot/pt   Needs SNF   precert will start Monday-patient is to be 14 days out from Covid positive test.    Amol Bullock MD

## 2021-05-08 NOTE — PROGRESS NOTES
Pt resting in chair quietly at this time. Denies any needs. All needs and call light within reach. Handoff

## 2021-05-08 NOTE — PROGRESS NOTES
Pt resting in recliner quietly at this time. Denies any needs. All needs and call light within reach. Patient is not able to demonstrated the ability to move from a reclining position to an upright position within the recliner. However patient is alert, oriented and able to provide informed consent.

## 2021-05-08 NOTE — PROGRESS NOTES
Pulmonary & Critical Care Medicine ICU Progress Note    CC: COVID-19 with progressive hypoxemia    Events of Last 24 hours:   Talkative today; less sob    Invasive Lines: RIJ CVC 5/3/21    MV: 4/27/2021-5/4/21  Vent Mode: PS Rate Set: 14 bmp/Vt Ordered: 450 mL/ /FiO2 : 50 %  No results for input(s): PHART, HJW2PEG, PO2ART in the last 72 hours. IV:   dextrose       Vitals:  Blood pressure 127/65, pulse 96, temperature 97.1 °F (36.2 °C), temperature source Oral, resp. rate 16, height 4' 11\" (1.499 m), weight 142 lb 14.4 oz (64.8 kg), SpO2 96 %. on 3L  Constitutional:  No acute distress. Eyes: PERRL. Conjunctivae anicteric. ENT: Normal nose. Normal tongue. Neck:  Trachea is midline. No thyroid tenderness. Respiratory: No accessory muscle usage. no wheezes. No rales. No Rhonchi. Cardiovascular: Normal S1S2. No digit clubbing. No digit cyanosis. No LE edema. Psychiatric: No anxiety or Agitation. Alert and Oriented to person, place and time.     Scheduled Meds:   dexamethasone  3 mg Intravenous Q24H    psyllium  1 packet Oral BID    metoprolol tartrate  50 mg Oral BID    sodium chloride flush  5-40 mL Intravenous 2 times per day    enoxaparin  30 mg Subcutaneous BID    Vitamin D  2,000 Units Oral Daily    saccharomyces boulardii  250 mg Oral BID     PRN Meds:  albuterol-ipratropium, sodium chloride flush, promethazine **OR** ondansetron, polyethylene glycol, acetaminophen **OR** acetaminophen, guaiFENesin-dextromethorphan, glucose, dextrose, glucagon (rDNA), dextrose    Results:  CBC:   Recent Labs     05/06/21  0451 05/07/21  0515 05/08/21  0430   WBC 10.4 12.0* 10.4   HGB 11.7* 11.1* 11.2*   HCT 35.2* 34.0* 33.4*   MCV 92.8 93.2 93.5    293 274     BMP:   Recent Labs     05/06/21  0451 05/07/21  0515 05/08/21  0430    140 141   K 3.6 3.7 3.7    106 108   CO2 26 26 25   BUN 13 14 11   CREATININE <0.5* <0.5* <0.5*     LIVER PROFILE:   Recent Labs     05/06/21  0451   AST 53*   ALT lymphadenopathy and parenchymal changes consistent with COVID-19 pneumonia  · Diarrhea  · Transaminitis - improved    PLAN:  COVID-19 isolation, droplet plus  Supplemental oxygen to maintain SaO2 >92%; wean as tolerated   CCP given 4/26/21, Tocilizumab given 4/27/2021, completed 5 days remdesivir  Decadron D#14, change to 2mg  S/p Cefepime and vancomycin day #4 and 5 days ceftriaxone and azithromycin  Inhaled bronchodilators only as needed, MDI preferred  · Prophylaxis: Lovenox 30 twice daily

## 2021-05-08 NOTE — PROGRESS NOTES
Speech Language Pathology  Facility/Department: [facility] ICU  Dysphagia Daily Treatment Note    NAME: Jewel Oar  : 1955  MRN: [id number]    Patient Diagnosis(es):   Patient Active Problem List    Diagnosis Date Noted    Hypotension due to drugs     Elevated LFTs     Pneumonia due to COVID-19 virus 2021    Hypoxia     Transaminitis     Metabolic acidosis     Acute hypoxemic respiratory failure (HCC)     Right carpal tunnel syndrome 2020    Ulnar neuropathy at elbow, right 2020    Hand arthritis 2020    Mixed conductive and sensorineural hearing loss of right ear with restricted hearing of left ear 2019    Sensorineural hearing loss (SNHL) of left ear with restricted hearing of right ear 2019    Tinnitus, right ear 2019    Tachycardia 2019    Essential hypertension 2018    Polymyalgia rheumatica ([facility] [address] 2018    Arthritis     HCAP (healthcare-associated pneumonia)     [facility] 2018     Allergies: Allergies   Allergen Reactions    Penicillins Rash     Onset Date: 2021  Subjective: Pt seen in room with RN permission. Alert and pleasant. Pain: The patient does not complain of pain     Current Diet: DIET DYSPHAGIA PUREED; Carb Control: 4 carb choices (60 gms)/meal; No Drinking Straw    Diet Tolerance:  Patient tolerating current diet level without signs/symptoms of aspiration. Dysphagia Treatment and Impressions:   Pt seen in room at bedside with RN permission  Purnima Black RN reports pt wished to be re-assessed for upgrade to regular solids as she is not eating puree. No concerns regarding coughing or choking reported, which is an improvement from yesterday. Oral care completed by pt after set up.  Respiratory Status: Pt with SPO2% of 95 on 3 LPM NC with RR of 16.   Liquid PO Trials:    IDDSI 0 Thin:  Assessed via cup. No anterior bolus loss this day. oral swallow appears WNL and no clinical s/s of aspiration. Pt does not exhibit behavior of blowing out of her lips prior to the swallow as in prior session.  Solid PO Trials   IDDSI 4 Puree:  Oral swallow appears WNL. No clinical signs of aspiration are observed. Vitals stable  · IDDSI 5 Minced and Moist:  DNT  · IDDSI 6 (soft and bite sized): Oral swallow appears WFL with fruit cocktail. Small piece of fruit residue noted on tongue. Pt continue to speak with residue present but actively attempts to clear residue. Liquid wash cued to aid in clearing residue after independent  lingual sweep and dry swallow is ineffective. · IDDSI 7 (regular): Oral swallow appears WFL. Again, small piece of turkey residue noted on tongue which pt attempts to clear. Pt benefits from cues to reattempt lingual sweep, dry swallow, and subsequent liquid wash to clear small residue. Pt is aware of residue but decreased efficeincy of oral swallow results in trace oral residue requiring liquids wash to completely clear oral cavity.  Education: SLP edu pt re: Role of SLP, Rationale for dysphagia tx, Recommended compensatory strategies, Aspiration precautions, Evidenced based practice for current recommendations and treatment and Importance of oral care to reduce adverse affects in the event of aspiration. Pt verbalized understanding, would benefit from ongoing education and RN aware of recommendations   Assessment: Pt tolerating current diet with no clinical s/s of aspiration and requesting upgrade to solids. Pt benefits from cues to use lingual sweep, dry swallow, and subsequent liquid wash to clear small amounts of oral residue with solids. Pt is aware of residue but decreased efficeincy of oral swallow results in trace oral residue requiring liquids wash to completely clear oral cavity.  Good carryover of recommended compensatory strategies   Recommendations: SLP recommends to upgrade to IDDSI 7, regular solids; IDDSI 0 Thin Liquids; Rn instructed to attempt Meds whole with thin or in puree as able. Small bolus solids followed by thin liquid wash. Downgrade to NPO if pt exhibits worsening respiratory status, worsening chest radiography, or if pt becomes febrile   Risk Management: upright for all intake, stay upright for at least 30 mins after intake, small bites/sips, oral care q4 hrs to reduce adverse affects in the event of aspiration, increase physical mobility as able, alternate bites/sips, slow rate of intake, general aspiration precautions and hold PO and contact SLP if s/s of aspiration or worsening respiratory status develop. If the patient exhibits s/s of aspiration and/or worsening respiratory status, hold PO and contact SLP. Pt's swallow currently being managed clinically d/t limited availability for instrumentation d/t isolation status. Pt would benefit from instrumental swallow assessment when isolation status cleared to determine risks/benefits of diet modification. Dysphagia Goals:  Timeframe for Long-term Goals: 7 days (05/11/2021)  Goal 1: The patient will tolerate LRD with no clinical s/s of aspiration or worsening respiratory status   5/8/2021 : Ongoing, progressing. Short-term Goals  Timeframe for Short-term Goals: 5 days (05/09/2021)  Goal 1: The patient will tolerate recommended diet with no clinical s/s of aspiration 5/5 5/8/2021 : Goal addressed, see above. Ongoing, progressing. Goal 2: The patient will tolerate therapeutic diet upgrade trials with no clinical s/s of aspiration 5/5 5/8/2021 : Goal addressed, see above. Ongoing, progressing. Upgraded to regular solids this day  Goal 3: The patient will recall/perform recommended compensatory strategies, given no cues  5/8/2021 : Goal addressed, see above. Ongoing, progressing.      Speech/Language/Cog Goals: N/A    Recommendations:  Solid Consistency: IDDSI 7 regular Solids  Liquid Consistency: IDDSI 0 Thin Liquids  Medication: Meds whole with thin or in puree as able    Patient/Family/Caregiver Education: Role of SLP, Rationale for dysphagia tx, Recommended compensatory strategies, Aspiration precautions, BSE results, POC, Evidenced based practice for current recommendations and treatment and Importance of oral care to reduce adverse affects in the event of aspiration    Compensatory Strategies: Upright for all intake, Remain upright at least 30 mins after intake, Alternate solids and liquids, Check oral cavity for pocketing after intake, Rinse mouth with water after intake. Plan:    Continued Dysphagia treatment with goals per plan of care. Discharge Recommendations: Recommend SLP tx at [facility]    If pt discharges from hospital prior to Speech/Swallowing discharge, this note serves as tx and discharge summary. Total Treatment Time / Charges     Time in Time out Total Time / units   Cognitive Tx         Speech Tx      Dysphagia Tx 1035 1120 45 mins / 1 unit     Signature:  Marleny Brooks. MITZY Jett Guardian  Speech-Language Pathologist         Lety Sprague, SLP

## 2021-05-09 LAB
GLUCOSE BLD-MCNC: 105 MG/DL (ref 70–99)
GLUCOSE BLD-MCNC: 105 MG/DL (ref 70–99)
GLUCOSE BLD-MCNC: 119 MG/DL (ref 70–99)
GLUCOSE BLD-MCNC: 73 MG/DL (ref 70–99)
GLUCOSE BLD-MCNC: 85 MG/DL (ref 70–99)
PERFORMED ON: ABNORMAL
PERFORMED ON: NORMAL
PERFORMED ON: NORMAL

## 2021-05-09 PROCEDURE — 6370000000 HC RX 637 (ALT 250 FOR IP): Performed by: INTERNAL MEDICINE

## 2021-05-09 PROCEDURE — 6360000002 HC RX W HCPCS: Performed by: INTERNAL MEDICINE

## 2021-05-09 PROCEDURE — 97530 THERAPEUTIC ACTIVITIES: CPT

## 2021-05-09 PROCEDURE — 2060000000 HC ICU INTERMEDIATE R&B

## 2021-05-09 PROCEDURE — 99231 SBSQ HOSP IP/OBS SF/LOW 25: CPT | Performed by: INTERNAL MEDICINE

## 2021-05-09 PROCEDURE — 2580000003 HC RX 258: Performed by: INTERNAL MEDICINE

## 2021-05-09 PROCEDURE — 99233 SBSQ HOSP IP/OBS HIGH 50: CPT | Performed by: INTERNAL MEDICINE

## 2021-05-09 PROCEDURE — 97535 SELF CARE MNGMENT TRAINING: CPT

## 2021-05-09 PROCEDURE — 97116 GAIT TRAINING THERAPY: CPT

## 2021-05-09 RX ORDER — DEXAMETHASONE SODIUM PHOSPHATE 4 MG/ML
1 INJECTION, SOLUTION INTRA-ARTICULAR; INTRALESIONAL; INTRAMUSCULAR; INTRAVENOUS; SOFT TISSUE EVERY 24 HOURS
Status: DISCONTINUED | OUTPATIENT
Start: 2021-05-10 | End: 2021-05-11 | Stop reason: HOSPADM

## 2021-05-09 RX ADMIN — RDII 250 MG CAPSULE 250 MG: at 22:16

## 2021-05-09 RX ADMIN — METOPROLOL TARTRATE 50 MG: 50 TABLET, FILM COATED ORAL at 22:16

## 2021-05-09 RX ADMIN — METOPROLOL TARTRATE 50 MG: 50 TABLET, FILM COATED ORAL at 09:24

## 2021-05-09 RX ADMIN — DEXAMETHASONE SODIUM PHOSPHATE 2 MG: 4 INJECTION, SOLUTION INTRA-ARTICULAR; INTRALESIONAL; INTRAMUSCULAR; INTRAVENOUS; SOFT TISSUE at 09:24

## 2021-05-09 RX ADMIN — Medication 2000 UNITS: at 09:23

## 2021-05-09 RX ADMIN — Medication 10 ML: at 09:25

## 2021-05-09 RX ADMIN — RDII 250 MG CAPSULE 250 MG: at 09:25

## 2021-05-09 RX ADMIN — Medication 10 ML: at 22:09

## 2021-05-09 NOTE — PROGRESS NOTES
Hospitalist Progress Note      PCP: ARABELLA Cobos - CNP      COVID pna with acute resp failure   Date of Admission: 4/25/2021     cont to be intubated  Became hypotensive overnight. Central line placed. Currently on Levophed. 5/4  Anxious on spontaneous breathing trials. On Precedex. Continues to be on Levophed. 5/5  Patient extubated yesterday and placed on 5 L of oxygen. Off Levophed. 5/7  Now on 3 L of O2      Medications:  Reviewed    Infusion Medications    dextrose       Scheduled Medications    dexamethasone  2 mg Intravenous Q24H    psyllium  1 packet Oral BID    metoprolol tartrate  50 mg Oral BID    sodium chloride flush  5-40 mL Intravenous 2 times per day    enoxaparin  30 mg Subcutaneous BID    Vitamin D  2,000 Units Oral Daily    saccharomyces boulardii  250 mg Oral BID     PRN Meds: albuterol-ipratropium, sodium chloride flush, promethazine **OR** ondansetron, polyethylene glycol, acetaminophen **OR** acetaminophen, guaiFENesin-dextromethorphan, glucose, dextrose, glucagon (rDNA), dextrose      Intake/Output Summary (Last 24 hours) at 5/9/2021 0719  Last data filed at 5/8/2021 1818  Gross per 24 hour   Intake 1260 ml   Output --   Net 1260 ml       Physical Exam Performed:    /72   Pulse 81   Temp 97 °F (36.1 °C) (Oral)   Resp 18   Ht 4' 11\" (1.499 m)   Wt 142 lb 12.8 oz (64.8 kg)   SpO2 93%   BMI 28.84 kg/m²     General:  Alert and oriented.   Awake, alert and oriented. Appears to be not in any distress  Mucous Membranes:  Pink , anicteric  Neck: No JVD, no carotid bruit, no thyromegaly  Chest: scattered rhonchi and diminished pedro   Cardiovascular:  RRR S1S2 heard, no murmurs or gallops  Abdomen:  Soft, undistended, non tender, no organomegaly, BS present  Extremities: No edema or cyanosis. Distal pulses well felt  Neurological -awake, alert and oriented.     Labs:   Recent Labs     05/07/21  0515 05/08/21  0430   WBC 12.0* 10.4   HGB 11.1* 11.2*   HCT 34.0* 33.4*    274     Recent Labs     05/07/21  0515 05/08/21  0430    141   K 3.7 3.7    108   CO2 26 25   BUN 14 11   CREATININE <0.5* <0.5*   CALCIUM 8.5 8.8     No results for input(s): AST, ALT, BILIDIR, BILITOT, ALKPHOS in the last 72 hours. No results for input(s): INR in the last 72 hours. No results for input(s): Carlene Anchors in the last 72 hours. Urinalysis:      Lab Results   Component Value Date    NITRU Negative 04/26/2021    WBCUA 10-20 04/26/2021    BACTERIA 2+ 04/26/2021    RBCUA 3-4 04/26/2021    BLOODU MODERATE 04/26/2021    SPECGRAV 1.020 04/26/2021    GLUCOSEU Negative 04/26/2021       Radiology:  XR CHEST PORTABLE   Final Result   No significant interval change. XR CHEST PORTABLE   Final Result   Right central venous catheter tip projects at the low SVC. XR CHEST PORTABLE   Final Result   Stable chest.         XR CHEST PORTABLE   Final Result   Slight improved aeration of the lungs. Scattered airspace disease remains. XR CHEST PORTABLE   Final Result   Moderate consolidating airspace disease left lower lobe. Improving aeration with decreased airspace disease right lung and left apex. XR CHEST PORTABLE   Final Result   Patchy airspace opacities bilaterally, may be related to pulmonary edema   versus pneumonia. Mild to moderate left pleural effusion. Asymmetric elevation of the left hemidiaphragm. XR CHEST PORTABLE   Final Result   Persistent moderate bilateral airspace disease unchanged. Endotracheal tube is in satisfactory position at this time. XR CHEST PORTABLE   Final Result   Endotracheal tube 1 cm above the wyatt, consider 2-3 cm retraction. Multifocal airspace opacities worrisome for multifocal pneumonia or   atelectasis. Mild progressed within right lung base and right upper lung. CT ABDOMEN PELVIS W IV CONTRAST Additional Contrast? None   Final Result   1.  Motion limited study.   2. Cholelithiasis and probable choledocholithiasis without evidence for acute   cholecystitis. 3. Nonobstructing left renal calculus. CT CHEST PULMONARY EMBOLISM W CONTRAST   Final Result   1. Motion limited study with no definite scan evidence for pulmonary embolus. 2. Bilateral airspace opacities probably represent a combination of   atelectasis and pneumonia. XR CHEST PORTABLE   Final Result   Nonspecific perihilar opacities. Pulmonary edema and multifocal pneumonia   are in the differential.  Follow-up to resolution recommended. XR CHEST PORTABLE    (Results Pending)   XR CHEST PORTABLE    (Results Pending)           Assessment/Plan:    Active Hospital Problems    Diagnosis    Hypotension due to drugs [I95.2]    Elevated LFTs [R79.89]    Pneumonia due to COVID-19 virus [U07.1, J12.82]    Hypoxia [R09.02]    Transaminitis [P51.07]    Metabolic acidosis [M10.7]    Acute hypoxemic respiratory failure (HCC) [J96.01]    HCAP (healthcare-associated pneumonia) [J18.9]         COVID 19 PNA  Acute Hypoxic Respiratory Failure  - 85% on RA upon arrival to ED  - CT chest with bilateral airspace opacities, no PE  - PCT: 0.18; had 1/2 Pfizer Vaccine on 3/31  - Admitted to Med Surg, droplet + precautions  - worsened quickly leading to ICU transfer, vapotherm and now intubated and on vent support  - continue  Decadron - being weaned  , completed  Remdesivir for 5 days , s/p  CCP and IL 6 inhibitor infusion   - pulm managing  - s/p bronch 5/1/21  Continue Cefepime and IV vanc( 4 days) -stop with  negative cultures.   Now on 2 L of O2      Diarrhea - likely 2/2 COVID  - diarrhea returned now   - GI f/w       Hypotension  Primarily from excess sedation  Now on levophed  Off pressors now    NAGMA  - CO2 17  - likely 2/2 diarrhea  - improved and off bicarb gtt     Hyperglycemia- A1c at 5.8  - likely 2/2 steroids  -  low dose SSI      Transaminitis  - likely with viral infection   - monitor with Remdesivir use-improved     HTN  - controlled  - cont Toprol XL     DVT Prophylaxis: Lovenox bid  Diet: DIET GENERAL; Carb Control: 4 carb choices (60 gms)/meal  Code Status: Full Code    PT/OT Eval Status: ordered    Ot/pt   Needs SNF   precert will start Monday-patient has  to be 14 days out from Covid positive test to start precert     Iglesia Hopper MD

## 2021-05-09 NOTE — PROGRESS NOTES
Pt resting in bed this evening watching TV. Shift assessment complete and all meds given per MAR. Beverage and snack offered. Bed in lowest position and call light within reach. Pt has no further needs at this time. Will continue to monitor and assess.

## 2021-05-09 NOTE — PROGRESS NOTES
Occupational Therapy Daily Treatment Note    Unit: PCU  Date:  5/9/2021  Patient Name:    Angel Severin  Admitting diagnosis:  Acute respiratory failure due to COVID-19 [facility]) [U07.1, J96.00]  Admit Date:  4/25/2021  Precautions/Restrictions:  fall risk, IV, bed/chair alarm, supplemental O2 (2L), telemetry, continuous pulse ox and droplet plus (COVID +)     Treatment Time:  [id number]  Treatment number:  4   Total Treatment Time:   55 minutes    Patient Goals for Session:  \" get up, do some walking with the walker \"      Discharge Recommendations: ARU/IRF  DME needs for discharge: defer to facility       Therapy recommendations for staff:  Assist of 2 with use of gait belt and STEDY for all transfers to/from BSC/chair    History of Present Illness: trey DALY H&P 4/25/21:  \"The patient is B 80 y. o. female with no significant   PMH who presented to the ED with complaint of diarrhea and poor PO intake for the past 10 days. Does have a cough productive of clear sputum, no hemoptysis. Denies any fevers or chills at home. No chest pain or SOB. She did have her first COVID vaccine on March 31st. She was scheduled to get her second dose earlier this week but was unable to get the vaccine because of diarrhea. Denies any abdominal pain, melena or hematochezia. Denies any sick contacts. No recent abx.     She does not wear O2 at baseline. \"      Intubated 4/27-5/4/21    Home Health S4 Level Recommendation:  NA    AM-PAC Score: AM-PAC Inpatient Daily Activity Raw Score: 14  Pt scored a 14/24 on the AM-PAC ADL Inpatient form. Current research shows that an AM-PAC score of 17 or less is typically not associated with a discharge to the patient's home setting. Based on the patient's AM-PAC ADL score and their current functional mobility deficits, it is recommended that the patient have 3-5 sessions per week of Occupational Therapy at d/c to increased the patient's independence.      Subjective:  Pt seated in bedside chair upon therapist arrival. Pt agreeable to work with therapy this date. Pain   No  Rating: NA  Location:  Pain Medicine Status: No request made      Cognition:    A&O Person, Place, Time and Situation   Able to follow 2 step commands, consistently. Balance:  Functional Sitting Balance: WNL   Comments: Pt completed eating and reaching seated in chair with no LOB. Functional Standing Balance:Diminished   Comments: Required minimal assistance to maintain balance. Benefits from RW. Bed mobility:    Supine to sit:   Not Tested  Sit to supine:   supervision  Rolling:    Not Tested  Scooting in sitting:  supervision  Scooting to head of bed:   supervision   Bridging:   Yes    Transfers:    Sit to stand:  minimal assistance (25%) and with gait belt in STEDY   Stand to sit:  minimal assistance (25%)  Chair to [facility]:  total assistance (100%) and with STEDY    BSC to EOB:   Minimal assistance (25%) and with gait belt and RW     Activities of Daily Living:   UB Dressing:   Not Tested  LB Dressing:    moderate assistance (50%) for balance to pull down depends in the STEDY frame   UB Bathing:  Not Tested  LB Bathing:  Not Tested  Feeding:  Independent  Grooming:   supervision  Toileting:  moderate assistance (50%)    Activity Tolerance:   Pt completed therapy session with No adverse symptoms noted w/activity    Therapeutic Exercise:   Completed exercises within full available AROM and through functional activities     Patient Education:   Role of OT  Recommendations for DC  Safe RW use/hand placement    Positioning Needs: In bed, call light and needs in reach. Family Present:  No    Assessment: Pt with good progress this date. Pt able to meet her personal goal to take steps with the RW this date. Pt may benefit from continued skilled occupational therapy while in the hospital in order to progress to a safe and more indpt level of functioning. GOALS  To be met in 3 Visits:  1).  Bed to toilet/BSC: Mod A  (Goal met and updated, 5/9/2021)   New goal: Walk to toilet and complete janis care with CGA  2). Patient will complete MOCA.      To be met in 5 Visits:  1). Supine to/from Sit:             Mod A , goal met 5/7   New goal: supervision  2). Upper Body Bathing:         Min A   3). Lower Body Bathing:         Mod A   4). Upper Body Dressing:       Min A   5). Lower Body Dressing:       Mod A   6).  Pt to demonstrate UE exs x 15 reps with minimal cues      Plan: cont with POC      ROBERT Jorge, OTR/L   [id number]       If patient discharges from this facility prior to next visit, this note will serve as the Discharge Summary

## 2021-05-09 NOTE — PROGRESS NOTES
Bedside report and transfer of care given to [facility]. Pt currently resting in bed with the call light within reach. Pt denies any other care needs at this time. Pt stable at this time.     Yoni Vincent RN

## 2021-05-09 NOTE — PROGRESS NOTES
Inpatient Physical Therapy Daily Treatment Note    Unit: PCU  Date:  5/9/2021  Patient Name:    Pb Garcia  Admitting diagnosis:  Acute respiratory failure due to COVID-19 [facility]) [U07.1, J96.00]  Admit Date:  4/25/2021  Precautions/Restrictions:  Fall risk, Bed/chair alarm, Lines -IV and Supplemental O2 (2), Telemetry, Continuous pulse oximetry and Isolation Precautions: Droplet Plus - COVID  May benefit from using 3 L with activity    Discharge Recommendations: ARU/IRF (inpatient rehab facility)   DME needs for discharge: defer to facility       Therapy recommendation for EMS Transport: requires transport by cot due to inability to transfer without lift equiptment    Therapy recommendations for staff:   Assist of 2 with stedy and gait belt for transfers to bed/BSC    History of Present Illness: (Per H&P by Riddhi Kirk on 4/26/21)  The patient is [phone] y. o. female with no significant PMH who presented to the ED with complaint of diarrhea and poor PO intake for the past 10 days. Does have a cough productive of clear sputum, no hemoptysis. Denies any fevers or chills at home. No chest pain or SOB. She did have her first COVID vaccine on March 31st. She was scheduled to get her second dose earlier this week but was unable to get the vaccine because of diarrhea. Denies any abdominal pain, melena or hematochezia. Denies any sick contacts. No recent abx. She does not wear O2 at baseline.   cont to be intubated  Became hypotensive overnight.  Central line placed. Currently on Levophed.   Intubated 4/27-5/4/21  Home Health S4 Level Recommendation: NA  AM-PAC Mobility Score   AM-PAC Inpatient Mobility Raw Score : 14       Treatment Time:  13:40-14:35  Treatment number: 4  Timed Code Treatment Minutes: 55 minutes  Total Treatment Minutes:  55  minutes    Cognition    A&O Person, Place, Time and Situation   Able to follow 2 step commands    Subjective  Patient sitting up in chair with no family present   Pt agreeable to this PT tx. Pain   No    Bed Mobility   Supine to Sit:    Not Tested  Sit to Supine:   Supervision  Rolling:   Not Tested  Scooting:   Supervision    Transfer Training     Sit to stand:   Min A with gait belt and stedy  Stand to sit:   Min A   Bed to Chair:   Min A  with use of gait belt and rolling walker (RW)  Chair to [facility]: total assist via stedy  Gait Training gait completed as indicated below  Distance:      10 ft  Deviations (firm surface/linoleum):  decreased jorge, narrow SHERI, forward flexed posture and step to pattern  Assistive Device Used:    gait belt and rolling walker (RW)  Level of Assist:    Min A   Comment: no LOB    Stair Training deferred, pt unsafe/not appropriate to complete stairs at this time    Therapeutic Exercise Benita deferred secondary to treatment focus on functional mobility  NA    Balance  Sitting:  Good ; Supervision  Comments:     Standing: Fair ; Min A   Comments:using RW. Blance was good once standing in stedy    Patient Education      Role of PT, POC, Discharge recommendations, DC recommendations, safety awareness, transfer techniques and calling for assist with mobility. Positioning Needs       Pt in bed, alarm set, positioned in proper neutral alignment and pressure relief provided. Call light provided and all needs within reach      Activity Tolerance   Pt completed therapy session with No adverse symptoms noted w/activity. Spo2 dropped from 94% at rest using 2 L to 87% after ambulation using RW. Returned to > 90% within 1 minute  Other  RN aware of potential need to increase to 3 L with activity. Assessment :  Patient demonstrated good progress this date, ambulating short distance with RW. Patient may benefit from 3 L of oxygen for progression of activity.   Recommending SNF upon discharge as patient functioning well below baseline, demonstrates good rehab potential and unable to return home due to burden of care beyond caregiver ability and home environment not conducive to patient recovery. Goals (all goals ongoing unless otherwise indicated)  To be met in 3 visits:  1). Independent with LE Ex x 10 reps     To be met in 6 visits:  1).  Supine to/from sit: Min A , goal met, upgrade to independent  2).  Sit to/from stand: Max A , goal met, upgrade to cga  3).  Bed to chair: Max A , goal met , upgrade to CGA  4).  Gait: Ambulate 10 ft.   with  Max A  and use of LRAD (least restrictive assistive device), goal met, upgrade to 25 ft with RW CGA  5).  Tolerate B LE exercises 3 sets of 10-15 reps  6).  Ascend/descend 2 steps with Max A  with use of hand rail unilateral and LRAD (least restrictive assistive device)     Plan   Continue with plan of care. Signature: Clifford John, PT #[id number]    If patient discharges from this facility prior to next visit, this note will serve as the Discharge Summary.

## 2021-05-09 NOTE — PROGRESS NOTES
Pulmonary & Critical Care Medicine ICU Progress Note    CC: COVID-19 with progressive hypoxemia    Events of Last 24 hours:   Talkative     Invasive Lines: RIJ CVC 5/3/21    MV: 4/27/2021-5/4/21  Vent Mode: PS Rate Set: 14 bmp/Vt Ordered: 450 mL/ /FiO2 : 50 %  No results for input(s): PHART, [id number], PO2ART in the last 72 hours. IV:   dextrose       Vitals:  Blood pressure 133/66, pulse 114, temperature 96.4 °F (35.8 °C), temperature source Oral, resp. rate 18, height 4' 11\" (1.499 m), weight 142 lb 12.8 oz (64.8 kg), SpO2 93 %. on 2L  Constitutional:  No acute distress. Eyes: PERRL. Conjunctivae anicteric. ENT: Normal nose. Normal tongue. Neck:  Trachea is midline. No thyroid tenderness. Respiratory: No accessory muscle usage. no wheezes. No rales. No Rhonchi. Cardiovascular: Normal S1S2. No digit clubbing. No digit cyanosis. No LE edema. Psychiatric: No anxiety or Agitation. Alert and Oriented to person, place and time. Scheduled Meds:   dexamethasone  2 mg Intravenous Q24H    psyllium  1 packet Oral BID    metoprolol tartrate  50 mg Oral BID    sodium chloride flush  5-40 mL Intravenous 2 times per day    enoxaparin  30 mg Subcutaneous BID    Vitamin D  2,000 Units Oral Daily    saccharomyces boulardii  250 mg Oral BID     PRN Meds:  albuterol-ipratropium, sodium chloride flush, promethazine **OR** ondansetron, polyethylene glycol, acetaminophen **OR** acetaminophen, guaiFENesin-dextromethorphan, glucose, dextrose, glucagon (rDNA), dextrose    Results:  CBC:   Recent Labs     05/07/21  0515 05/08/21  0430   WBC 12.0* 10.4   HGB 11.1* 11.2*   HCT 34.0* 33.4*   MCV 93.2 93.5    274     BMP:   Recent Labs     05/07/21  0515 05/08/21  0430    141   K 3.7 3.7    108   CO2 26 25   BUN 14 11   CREATININE <0.5* <0.5*     LIVER PROFILE:   No results for input(s): AST, ALT, LIPASE, BILIDIR, BILITOT, ALKPHOS in the last 72 hours. Invalid input(s):   AMYLASE, consistent with COVID-19 pneumonia  · Diarrhea  · Transaminitis - improved    PLAN:  COVID-19 isolation, droplet plus  Supplemental oxygen to maintain SaO2 >92%; wean as tolerated   CCP given 4/26/21, Tocilizumab given 4/27/2021, completed 5 days remdesivir  Decadron D#15, change to 1mg  S/p Cefepime and vancomycin day #4 and 5 days ceftriaxone and azithromycin  Inhaled bronchodilators only as needed, MDI preferred  Remove cvc  · Prophylaxis: Lovenox 30 twice daily

## 2021-05-10 LAB
GLUCOSE BLD-MCNC: 102 MG/DL (ref 70–99)
GLUCOSE BLD-MCNC: 109 MG/DL (ref 70–99)
GLUCOSE BLD-MCNC: 85 MG/DL (ref 70–99)
GLUCOSE BLD-MCNC: 94 MG/DL (ref 70–99)
PERFORMED ON: ABNORMAL
PERFORMED ON: ABNORMAL
PERFORMED ON: NORMAL
PERFORMED ON: NORMAL

## 2021-05-10 PROCEDURE — 99233 SBSQ HOSP IP/OBS HIGH 50: CPT | Performed by: INTERNAL MEDICINE

## 2021-05-10 PROCEDURE — 97530 THERAPEUTIC ACTIVITIES: CPT

## 2021-05-10 PROCEDURE — 2580000003 HC RX 258: Performed by: INTERNAL MEDICINE

## 2021-05-10 PROCEDURE — 97535 SELF CARE MNGMENT TRAINING: CPT

## 2021-05-10 PROCEDURE — 2060000000 HC ICU INTERMEDIATE R&B

## 2021-05-10 PROCEDURE — 99232 SBSQ HOSP IP/OBS MODERATE 35: CPT | Performed by: INTERNAL MEDICINE

## 2021-05-10 PROCEDURE — 6360000002 HC RX W HCPCS: Performed by: INTERNAL MEDICINE

## 2021-05-10 PROCEDURE — 6370000000 HC RX 637 (ALT 250 FOR IP): Performed by: INTERNAL MEDICINE

## 2021-05-10 PROCEDURE — 97110 THERAPEUTIC EXERCISES: CPT

## 2021-05-10 PROCEDURE — 6370000000 HC RX 637 (ALT 250 FOR IP): Performed by: PHYSICIAN ASSISTANT

## 2021-05-10 PROCEDURE — 97116 GAIT TRAINING THERAPY: CPT

## 2021-05-10 RX ORDER — PANTOPRAZOLE SODIUM 40 MG/1
40 TABLET, DELAYED RELEASE ORAL
Status: DISCONTINUED | OUTPATIENT
Start: 2021-05-10 | End: 2021-05-11 | Stop reason: HOSPADM

## 2021-05-10 RX ADMIN — DEXAMETHASONE SODIUM PHOSPHATE 1 MG: 4 INJECTION, SOLUTION INTRA-ARTICULAR; INTRALESIONAL; INTRAMUSCULAR; INTRAVENOUS; SOFT TISSUE at 09:37

## 2021-05-10 RX ADMIN — PANTOPRAZOLE SODIUM 40 MG: 40 TABLET, DELAYED RELEASE ORAL at 09:47

## 2021-05-10 RX ADMIN — Medication 10 ML: at 09:47

## 2021-05-10 RX ADMIN — Medication 10 ML: at 20:46

## 2021-05-10 RX ADMIN — Medication 2000 UNITS: at 09:36

## 2021-05-10 RX ADMIN — RDII 250 MG CAPSULE 250 MG: at 09:37

## 2021-05-10 RX ADMIN — METOPROLOL TARTRATE 50 MG: 50 TABLET, FILM COATED ORAL at 20:46

## 2021-05-10 RX ADMIN — METOPROLOL TARTRATE 50 MG: 50 TABLET, FILM COATED ORAL at 09:36

## 2021-05-10 RX ADMIN — RDII 250 MG CAPSULE 250 MG: at 20:46

## 2021-05-10 RX ADMIN — PSYLLIUM HUSK 1 PACKET: 3.4 POWDER ORAL at 20:46

## 2021-05-10 ASSESSMENT — PAIN SCALES - GENERAL: PAINLEVEL_OUTOF10: 0

## 2021-05-10 NOTE — PROGRESS NOTES
Pulmonary & Critical Care MedicineProgress Note    CC: [id number] with progressive hypoxemia    Events of Last 24 hours:   Patient feels slightly better. Vitals:  Blood pressure 131/76, pulse 75, temperature 96.7 °F (35.9 °C), temperature source Oral, resp. rate 18, height 4' 11\" (1.499 m), weight 143 lb 9.6 oz (65.1 kg), SpO2 98 %. on 2L  Constitutional:  No acute distress. Eyes: PERRL. Conjunctivae anicteric. ENT: Normal nose. Normal tongue. Neck:  Trachea is midline. Respiratory: No accessory muscle usage. no wheezes. No rales. No Rhonchi. Cardiovascular: Normal S1S2. No digit clubbing. No digit cyanosis. No LE edema. Neuro: Awake and alert, conversant, moves all extremities    Scheduled Meds:   dexamethasone  1 mg Intravenous Q24H    psyllium  1 packet Oral BID    metoprolol tartrate  50 mg Oral BID    sodium chloride flush  5-40 mL Intravenous 2 times per day    enoxaparin  30 mg Subcutaneous BID    Vitamin D  2,000 Units Oral Daily    saccharomyces boulardii  250 mg Oral BID     PRN Meds:  albuterol-ipratropium, sodium chloride flush, promethazine **OR** ondansetron, polyethylene glycol, acetaminophen **OR** acetaminophen, guaiFENesin-dextromethorphan, glucose, dextrose, glucagon (rDNA), dextrose    Results:  CBC:   Recent Labs     05/08/21  0430   WBC 10.4   HGB 11.2*   HCT 33.4*   MCV 93.5        BMP:   Recent Labs     05/08/21  0430      K 3.7      CO2 25   BUN 11   CREATININE <0.5*     LIVER PROFILE:   No results for input(s): AST, ALT, LIPASE, BILIDIR, BILITOT, ALKPHOS in the last 72 hours. Invalid input(s):   AMYLASE,  ALB  Microbiology  4/25/2021 SARS-CoV-2 positive  4/25/2021 C. difficile negative  4/25/2021 blood NGTD  4/26/2021 urine mixed coral  4/27/2021 tracheal aspirate Candida  4/28/2020 1 GI bacterial pathogen by PCR negative  5/1/21 BAL NRF    Imaging:  Chest imaging was reviewed by me and showed CTPA 4/25/2021  Pulmonary Arteries: Evaluation is compromised by motion artifact.  No   definite evidence of intraluminal filling defect to suggest pulmonary   embolism.  Main pulmonary artery is normal in caliber. Mediastinum: Mediastinal and bilateral hilar lymph nodes are probably   reactive.  The heart and pericardium demonstrate no acute abnormality.  There   is no acute abnormality of the thoracic aorta. Lungs/pleura: There is no pneumothorax or pleural effusion.  There are patchy   airspace opacities throughout both lungs.  Elevation of the left   hemidiaphragm is again noted. Upper Abdomen: There is fatty infiltration of the liver.  The visualized   upper abdomen is otherwise unremarkable. Soft Tissues/Bones: No acute bone or soft tissue abnormality.  Spinal   fixation rods are again seen.       Impression:       1. Motion limited study with no definite scan evidence for pulmonary embolus. 2. Bilateral airspace opacities probably represent a combination of   atelectasis and pneumonia. CT abdomen 4/25/2021  Impression:        1. Motion limited study. 2. Cholelithiasis and probable choledocholithiasis without evidence for acute   cholecystitis. 3. Nonobstructing left renal calculus. CXR 5/4/2021   Tubes and lines are unchanged.  Marked elevation left hemidiaphragm is again   demonstrated.       Given difference technique multifocal bilateral airspace disease is not   significantly changed.        ASSESSMENT:  · Acute hypoxemic respiratory failure -progressive  · COVID-19 pneumonia  · Abnormal CT: Mediastinal lymphadenopathy and parenchymal changes consistent with COVID-19 pneumonia  · Diarrhea  · Transaminitis - improved    PLAN:  COVID-19 isolation, droplet plus  Supplemental oxygen to maintain SaO2 >92%; wean as tolerated   CCP given 4/26/21, Tocilizumab given 4/27/2021, completed 5 days remdesivir  Decadron D#16,  1mg  S/p Cefepime and vancomycin day #4 and 5 days ceftriaxone and azithromycin  Inhaled bronchodilators only as needed, MDI preferred  · Prophylaxis: Lovenox 30 twice daily

## 2021-05-10 NOTE — PROGRESS NOTES
Inpatient Physical Therapy Daily Treatment Note    Unit: PCU  Date:  5/10/2021  Patient Name:    Darren Reynoso  Admitting diagnosis:  Acute respiratory failure due to COVID-19 [facility]) [U07.1, J96.00]  Admit Date:  4/25/2021  Precautions/Restrictions:  Fall risk, Bed/chair alarm, Lines -IV and Supplemental O2 (1L/min, with activity increased to 2L/min of O2), Telemetry, Continuous pulse oximetry and Isolation Precautions: Droplet Plus - COVID      Discharge Recommendations: ARU/IRF (inpatient rehab facility)   DME needs for discharge: defer to facility       Therapy recommendation for EMS Transport: can transport by wheelchair    Therapy recommendations for staff:   Assist of 1 with Rolling walker and gait belt for transfers to bed/chair, walking to bathroom within the room    History of Present Illness: (Per H&P by Joey Higginbotham on 4/26/21)  The patient is a 66 y. o. female with no significant PMH who presented to the ED with complaint of diarrhea and poor PO intake for the past 10 days. Does have a cough productive of clear sputum, no hemoptysis. Denies any fevers or chills at home. No chest pain or SOB. She did have her first COVID vaccine on March 31st. She was scheduled to get her second dose earlier this week but was unable to get the vaccine because of diarrhea. Denies any abdominal pain, melena or hematochezia. Denies any sick contacts. No recent abx. She does not wear O2 at baseline.   cont to be intubated  Became hypotensive overnight.  Central line placed. Currently on Levophed.   Intubated 4/27-5/4/21    Home Health S4 Level Recommendation: NA  AM-PAC Mobility Score   AM-PAC Inpatient Mobility Raw Score : 18     Treatment Time: 1143 - 1230  Treatment number: 5  Timed Code Treatment Minutes: 47 minutes  Total Treatment Minutes: 47 minutes    Cognition    A&O Person, Place, Time and Situation   Able to follow 2 step commands    Subjective  Patient sitting up in chair with no family present   Pt agreeable to this PT tx. Pain   No    Bed Mobility   Supine to Sit:    Not Tested  Sit to Supine:   Not Tested  Rolling:   Not Tested  Scooting:   Supervision    Transfer Training     Sit to stand:   CGA   Stand to sit:   CGA  Bed to Chair:   CGA with use of gait belt and rolling walker (RW)    Gait Training gait completed as indicated below  Distance:      35 ft + 40 ft  Deviations (firm surface/linoleum):  decreased jorge, forward flexed posture, step to pattern and decreased step length bilaterally  Assistive Device Used:    gait belt and rolling walker (RW)  Level of Assist:    CGA  Comment: Patient needed verbal cueing for increasing her step length during ambulation, focusing on pursed lip breathing with activity. Stair Training deferred, pt unsafe/not appropriate to complete stairs at this time    Therapeutic Exercise all completed bilaterally unless indicated  Ankle Pumps x 10 reps  LAQ x 10 reps  thoarcic expansion and mobility exercises in sitting and standing for 10 reps each. Balance  Sitting:  Good ; Supervision  Comments:     Standing: Fair ; CGA  Comments: 2 min tolerance x 2 reps    Patient Education      Role of PT, POC, Discharge recommendations, DC recommendations, safety awareness, transfer techniques and calling for assist with mobility. Positioning Needs       Pt up in chair, alarm set, positioned in proper neutral alignment and pressure relief provided. Call light provided and all needs within reach, lunch tray was set up. Activity Tolerance   Pt completed therapy session with SOB noted with ambulation.   At rest:  SpO2: 92% while on 1L/min of O2    With ambulation 35 ft:  SpO2: 86% recovered within 3 min to 93% with verbal cueing for deep breathing exercises while on 1L/min of O2  (Patient had been mouth breather and dropped in SpO2 while speaking and walking hence needed consistent redirection to improve alertness during ambulation and transfer training with standing breaks during need of conversation)    With ambulation 40 ft:  SpO2: 97% while on 2L/min of O2    RN notified about the treatment response. Other  N/A    Assessment :  Patient demonstrated good progress this date with improved activity tolerance and ambulation tolerance with RW. Patient needed verbal cueing to improve breathing control awareness and activity pacing to avoid SOB. Recommending ARU/IRF/Inpatient Rehab Facility upon discharge as patient functioning well below baseline, demonstrates good rehab potential and unable to return home due to limited or no family support, inability to negotiate stairs to enter home/bedroom/bathroom, burden of care beyond caregiver ability, home environment not conducive to patient recovery and limited safety awareness. Goals (all goals ongoing unless otherwise indicated)  To be met in 3 visits:  1). Independent with LE Ex x 10 reps     To be met in 6 visits:  1).  Supine to/from sit: Vernadine Sleet, (Goal met 5/9/2021) upgrade to independent  2).  Sit to/from stand: Max A , (Goal met 5/9/2021) upgrade to CGA  3).  Bed to chair: Max A , (Goal met 5/9/2021) upgrade to CGA  4).  Gait: Ambulate 10 ft.   with  Max A  and use of LRAD (least restrictive assistive device), (Goal met 5/9/2021) upgrade to 25 ft with RW CGA (Goal met 5/10/2021); upgrade to 50 ft with supervision and LRAD with normal vital parameters  5).  Tolerate B LE exercises 3 sets of 10-15 reps  6).  Ascend/descend 2 steps with Max A  with use of hand rail unilateral and LRAD (least restrictive assistive device)     Plan   Continue with plan of care. Signature: Cy Rossi, MS PT, # [id number]    If patient discharges from this facility prior to next visit, this note will serve as the Discharge Summary.

## 2021-05-10 NOTE — PROGRESS NOTES
HCT 33.4*        Recent Labs     05/08/21  0430      K 3.7      CO2 25   BUN 11   CREATININE <0.5*   CALCIUM 8.8     No results for input(s): AST, ALT, BILIDIR, BILITOT, ALKPHOS in the last 72 hours. No results for input(s): INR in the last 72 hours. No results for input(s): Amanda Martinmith in the last 72 hours. Urinalysis:      Lab Results   Component Value Date    NITRU Negative 04/26/2021    WBCUA 10-20 04/26/2021    BACTERIA 2+ 04/26/2021    RBCUA 3-4 04/26/2021    BLOODU MODERATE 04/26/2021    SPECGRAV 1.020 04/26/2021    GLUCOSEU Negative 04/26/2021       Radiology:  XR CHEST PORTABLE   Final Result   No significant interval change. XR CHEST PORTABLE   Final Result   Right central venous catheter tip projects at the low SVC. XR CHEST PORTABLE   Final Result   Stable chest.         XR CHEST PORTABLE   Final Result   Slight improved aeration of the lungs. Scattered airspace disease remains. XR CHEST PORTABLE   Final Result   Moderate consolidating airspace disease left lower lobe. Improving aeration with decreased airspace disease right lung and left apex. XR CHEST PORTABLE   Final Result   Patchy airspace opacities bilaterally, may be related to pulmonary edema   versus pneumonia. Mild to moderate left pleural effusion. Asymmetric elevation of the left hemidiaphragm. XR CHEST PORTABLE   Final Result   Persistent moderate bilateral airspace disease unchanged. Endotracheal tube is in satisfactory position at this time. XR CHEST PORTABLE   Final Result   Endotracheal tube 1 cm above the wyatt, consider 2-3 cm retraction. Multifocal airspace opacities worrisome for multifocal pneumonia or   atelectasis. Mild progressed within right lung base and right upper lung. CT ABDOMEN PELVIS W IV CONTRAST Additional Contrast? None   Final Result   1. Motion limited study.    2. Cholelithiasis and probable choledocholithiasis without evidence for acute   cholecystitis. 3. Nonobstructing left renal calculus. CT CHEST PULMONARY EMBOLISM W CONTRAST   Final Result   1. Motion limited study with no definite scan evidence for pulmonary embolus. 2. Bilateral airspace opacities probably represent a combination of   atelectasis and pneumonia. XR CHEST PORTABLE   Final Result   Nonspecific perihilar opacities. Pulmonary edema and multifocal pneumonia   are in the differential.  Follow-up to resolution recommended. XR CHEST PORTABLE    (Results Pending)   XR CHEST PORTABLE    (Results Pending)   XR CHEST PORTABLE    (Results Pending)           Assessment/Plan:    Active Hospital Problems    Diagnosis    Hypotension due to drugs [I95.2]    Elevated LFTs [R79.89]    Pneumonia due to COVID-19 virus [U07.1, J12.82]    Hypoxia [R09.02]    Transaminitis [M15.88]    Metabolic acidosis [D93.1]    Acute hypoxemic respiratory failure (HCC) [J96.01]    HCAP (healthcare-associated pneumonia) [J18.9]         COVID 19 PNA  Acute Hypoxic Respiratory Failure  - 85% on RA upon arrival to ED  - CT chest with bilateral airspace opacities, no PE  - PCT: 0.18; had 1/2 Pfizer Vaccine on 3/31  - Admitted to Med Surg, droplet + precautions  - worsened quickly leading to ICU transfer, vapotherm - > subsequently intubated and placed on vent support. Extubated now   - continue  Decadron - being weaned  , completed  Remdesivir for 5 days , s/p  CCP and IL 6 inhibitor infusion   - pulm managing  - s/p bronch 5/1/21. Was on Cefepime and IV vanc( 4 days) -stopped with negative cultures.   Now on 2 L of O2      Diarrhea - likely 2/2 COVID  - diarrhea returned now   - GI f/w      Hypotension  - Primarily from excess sedation  Now on levophed  Off pressors now    NAGMA  - CO2 17  - likely 2/2 diarrhea  - improved and off bicarb gtt     Hyperglycemia  - A1c at 5.8  - likely 2/2 steroids  -  low dose SSI      Transaminitis  - likely with viral infection   - monitor with Remdesivir use-improved     HTN  - controlled  - cont Toprol XL     DVT Prophylaxis: Lovenox bid  Diet: DIET GENERAL; Carb Control: 4 carb choices (60 gms)/meal  Code Status: Full Code      Ot/pt   Needs SNF   precert   Started today       Bisi Valdez MD    5/10/2021

## 2021-05-10 NOTE — CARE COORDINATION
INTERDISCIPLINARY PLAN OF CARE CONFERENCE    Date/Time: 5/10/2021 9:25 AM  Completed by: Latoya Faria, Case Management      Patient Name:  Xin Clinton  YOB: 1955  Admitting Diagnosis: Acute respiratory failure due to COVID-19 [facility]) [U07.1, J96.00]     Admit Date/Time:  4/25/2021  8:28 PM    Chart reviewed. Interdisciplinary team contacted or reviewed plan related to patient progress and discharge plans. Disciplines included Case Management, Nursing, and Dietitian. Current Status:ongoing  PT/OT recommendation for discharge plan of care: ARU    Expected D/C Disposition:  Skilled nursing facility  Confirmed plan with patient and/or family Yes confirmed with: (name) pt and spouse, Matt  Met with:pt, and spouse Matt  Discharge Plan Comments: Reviewed chart. Role of discharge planner explained and patient and spouse [facility], verbalized understanding. Pt is from from home with spouse. Pt is aware that PT. OT recommends ARU. Pt chose [facility], as she states that family has been there in the past.     Pt is Covid pos as of 4/25. Rec'd first dose COVID 19 vaccine 3/31/2021. CM called [facility] and spoke with Kelli Lopez. Per Kelli Lopez, because pt is past her 14 days Covid quarantine, she is able to begin precert on pt and will start that precert today (4/09/2393). Pt's O2 sats are currently at 96% on 1L of O2. Pt is not on home O2.      Home O2 in place on admit: No  Pt informed of need to bring portable home O2 tank on day of discharge for nursing to connect prior to leaving:  Not Indicated  Verbalized agreement/Understanding:  Not Indicated

## 2021-05-10 NOTE — PROGRESS NOTES
PROGRESS NOTE  S:66 yrs Patient  admitted on 4/25/2021 with Acute respiratory failure due to COVID-19 ([facility]) [U07.1, J96.00] . Today she complains of heartburn. She passed multiple BMs yesterday, but denies diarrhea. Exam:   Vitals:    05/10/21 0157   BP: 131/76   Pulse: 75   Resp: 18   Temp: 96.7 °F (35.9 °C)   SpO2: 98%       Due to the current efforts to prevent transmission of COVID-19 and also the need to preserve PPE for other caregivers, a face-to-face encounter with the patient was not performed. That being said, all relevant records and diagnostic tests were reviewed, including laboratory results and imaging. Please reference any relevant documentation elsewhere. Care will be coordinated with the primary service. Medications: Reviewed    Labs:  CBC:   Recent Labs     05/08/21  0430   WBC 10.4   HGB 11.2*   HCT 33.4*   MCV 93.5        BMP:   Recent Labs     05/08/21  0430      K 3.7      CO2 25   BUN 11   CREATININE <0.5*     Attending Supervising [de-identified] Attestation Statement  The patient is a 61 y.o. female. I have performed a history and physical examination of the patient. I discussed the case with my physician assistant Alexander Rubinstein PA-C    I reviewed the patient's Past Medical History, Past Surgical History, Medications, and Allergies. Physical Exam:  Vitals:    05/09/21 2041 05/10/21 0157 05/10/21 0915 05/10/21 1506   BP: 130/71 131/76 126/66 103/65   Pulse: 96 75 88 98   Resp: 16 18 16 19   Temp: 97.4 °F (36.3 °C) 96.7 °F (35.9 °C) 97.1 °F (36.2 °C) 96.8 °F (36 °C)   TempSrc: Oral Oral Oral Oral   SpO2: 97% 98% 96% 96%   Weight:  143 lb 9.6 oz (65.1 kg)     Height:         Due to the current efforts to prevent transmission of COVID-19 and also the need to preserve PPE for other caregivers, a face-to-face encounter with the patient was not performed.  That being said, all relevant records and diagnostic tests were reviewed, including laboratory results and imaging. Please reference any relevant documentation elsewhere. Care will be coordinated with the primary service. Impression: 61year old female with history of HTN, arthritis, osteoporosis, admitted with acute respiratory failure secondary COVID19 pneumonia. Diarrhea is likely related to COVID-19 superimposed on IBS. Recommendation:  1. Continue supportive care  2. Monitor and document output  3. Continue Pantoprazole 40 mg qAM  4. Continue bowel regimen of probiotics and fiber supplementation  5. Continue diet as toelrated per SLP recommendations  6. Encourage nutrition  7. PT/OT  8. Ok to d/c from Sonali Velarde PA-C  8:50 AM 5/10/2021                      61year old female with history of HTN, arthritis, osteoporosis, admitted with acute respiratory failure secondary COVID19 pneumonia. Diarrhea is likely related to COVID-19 superimposed on IBS     Continue supportive care. encourage nutrition with fiber supplements. PT/OT. OK to d/c from GI standpoint. Follow up in clinic upon discharge.     Hillary Barnes MD          [phone]  [phone]

## 2021-05-10 NOTE — PROGRESS NOTES
Occupational Therapy Daily Treatment Note    Unit: PCU  Date:  5/10/2021  Patient Name:    Jacqueline Mchugh  Admitting diagnosis:  Acute respiratory failure due to COVID-19 [facility]) [U07.1, J96.00]  Admit Date:  4/25/2021  Precautions/Restrictions:  fall risk, IV, bed/chair alarm, supplemental O2 (1), telemetry, continuous pulse ox and droplet plus (COVID +)     Treatment Time:  11:10-11:50  Treatment number:  5  Total Treatment Time:  40 minutes    Patient Goals for Session:  \" get up, do some walking with the walker \"      Discharge Recommendations: ARU/IRF  DME needs for discharge: defer to facility       Therapy recommendations for staff:  Assist of 1 with use of rolling walker and gait belt for all ambulation to/from bathroom    History of Present Illness: per Lizbeth DALY H&P 4/25/21:  \"The patient is T 39 y. o. female with no significant   PMH who presented to the ED with complaint of diarrhea and poor PO intake for the past 10 days. Does have a cough productive of clear sputum, no hemoptysis. Denies any fevers or chills at home. No chest pain or SOB. She did have her first COVID vaccine on March 31st. She was scheduled to get her second dose earlier this week but was unable to get the vaccine because of diarrhea. Denies any abdominal pain, melena or hematochezia. Denies any sick contacts. No recent abx.     She does not wear O2 at baseline. \"      Intubated 4/27-5/4/21    Home Health S4 Level Recommendation:  NA    AM-PAC Score: AM-PAC Inpatient Daily Activity Raw Score: 14  Pt scored a 14/24 on the AM-PAC ADL Inpatient form. Current research shows that an AM-PAC score of 17 or less is typically not associated with a discharge to the patient's home setting. Based on the patient's AM-PAC ADL score and their current functional mobility deficits, it is recommended that the patient have 3-5 sessions per week of Occupational Therapy at d/c to increased the patient's independence.      Subjective:  Pt seated in bedside chair upon therapist arrival. Pt agreeable to work with therapy this date. Pain   No  Rating: NA  Location:  Pain Medicine Status: No request made      Cognition:    A&O Person, Place, Time and Situation   Able to follow 2 step commands, consistently. Balance:  Functional Sitting Balance: WNL  Functional Standing Balance:Diminished   Comments: Required minimal assistance to maintain balance when using B UEs during dressing task    Bed mobility:    Supine to sit:   Not Tested  Sit to supine:   Not Tested  Rolling:    Not Tested  Scooting in sitting:  supervision  Scooting to head of bed:   Not Tested   Bridging:   Yes    Transfers:    Sit to stand:  CGA from chair to RW. Min A from standard toilet. Stand to sit:  minimal assistance (25%)  Chair to [facility]:  Not Tested   Chair <> bathroom: CGA with gait belt and RW     Activities of Daily Living:   UB Dressing:   Not Tested  LB Dressing:    minimal assistance (25%) for balance to pull up depends   UB Bathing:  Not Tested  LB Bathing:  Not Tested  Feeding:  Independent  Grooming:   supervision  Toileting:  CGA-min A    Activity Tolerance:   Pt completed therapy session with No adverse symptoms noted w/activity   Patient on 1L of O2 upon therapist arrival. SpO2 at 97%. Doffed NC. SpO2 dropped to 81% with toileting activity. Recovered to 90% in 60 seconds. Donned NC a    Therapeutic Exercise:   Completed exercises within full available AROM and through functional activities     Patient Education:   Role of OT  Recommendations for DC  Safe RW use/hand placement    Positioning Needs:   Up in chair, call light and needs in reach. Alarm Set    Family Present:  No    Assessment: Pt with good progress this date. Pt able to complete functional mobility to and from bathroom with CGA. Patient is anxious to return to work. Pt will benefit from continued skilled occupational therapy at [facility] in order to progress to a safe and more indpt level of functioning.       GOALS  To be met in 3 Visits:  1). Bed to toilet/BSC: Mod A  (Goal met and updated, 5/9/2021)   New goal: Walk to toilet and complete janis care with CGA  2). Patient will complete MOCA.      To be met in 5 Visits:  1). Supine to/from Sit:             Mod A , goal met 5/7   New goal: supervision  2). Upper Body Bathing:         Min A   3). Lower Body Bathing:         Mod A   4). Upper Body Dressing:       Min A   5). Lower Body Dressing:       Mod A   6).  Pt to demonstrate UE exs x 15 reps with minimal cues      Plan: cont with [facility], OTR/L #[id number]    If patient discharges from this facility prior to next visit, this note will serve as the Discharge Summary

## 2021-05-10 NOTE — PROGRESS NOTES
Shift handoff report given to Afia Soto RN at bedside. Pt is  Awake and alert  IV handoff completed. 4 eyes to follow. Call light within reach, bed in lowest position, bed alarm on. End of shift checks completed. Pt has been free of falls for duration of shift. Ronni Rodarte

## 2021-05-11 ENCOUNTER — APPOINTMENT (OUTPATIENT)
Dept: GENERAL RADIOLOGY | Age: 66
DRG: 130 | End: 2021-05-11
Payer: MEDICAID

## 2021-05-11 VITALS
OXYGEN SATURATION: 94 % | SYSTOLIC BLOOD PRESSURE: 127 MMHG | HEIGHT: 59 IN | HEART RATE: 100 BPM | TEMPERATURE: 97.1 F | BODY MASS INDEX: 29.49 KG/M2 | RESPIRATION RATE: 16 BRPM | DIASTOLIC BLOOD PRESSURE: 71 MMHG | WEIGHT: 146.3 LBS

## 2021-05-11 PROCEDURE — 99232 SBSQ HOSP IP/OBS MODERATE 35: CPT | Performed by: INTERNAL MEDICINE

## 2021-05-11 PROCEDURE — 6370000000 HC RX 637 (ALT 250 FOR IP): Performed by: PHYSICIAN ASSISTANT

## 2021-05-11 PROCEDURE — 2580000003 HC RX 258: Performed by: INTERNAL MEDICINE

## 2021-05-11 PROCEDURE — 6360000002 HC RX W HCPCS: Performed by: INTERNAL MEDICINE

## 2021-05-11 PROCEDURE — 99239 HOSP IP/OBS DSCHRG MGMT >30: CPT | Performed by: INTERNAL MEDICINE

## 2021-05-11 PROCEDURE — 6370000000 HC RX 637 (ALT 250 FOR IP): Performed by: INTERNAL MEDICINE

## 2021-05-11 PROCEDURE — 97535 SELF CARE MNGMENT TRAINING: CPT

## 2021-05-11 RX ORDER — DEXAMETHASONE 1 MG
1 TABLET ORAL
Qty: 5 TABLET | Refills: 0
Start: 2021-05-11 | End: 2021-05-16

## 2021-05-11 RX ORDER — ALBUTEROL SULFATE 90 UG/1
2 AEROSOL, METERED RESPIRATORY (INHALATION) EVERY 6 HOURS PRN
Qty: 1 INHALER | Refills: 0 | Status: SHIPPED | OUTPATIENT
Start: 2021-05-11

## 2021-05-11 RX ORDER — METOPROLOL TARTRATE 50 MG/1
50 TABLET, FILM COATED ORAL 2 TIMES DAILY
Qty: 60 TABLET | Refills: 0
Start: 2021-05-11 | End: 2022-08-04 | Stop reason: SDUPTHER

## 2021-05-11 RX ORDER — POLYETHYLENE GLYCOL 3350 17 G/17G
17 POWDER, FOR SOLUTION ORAL DAILY PRN
Qty: 30 EACH | Refills: 0
Start: 2021-05-11 | End: 2021-06-10

## 2021-05-11 RX ORDER — PANTOPRAZOLE SODIUM 40 MG/1
40 TABLET, DELAYED RELEASE ORAL
Qty: 30 TABLET | Refills: 0
Start: 2021-05-12

## 2021-05-11 RX ADMIN — Medication 10 ML: at 09:52

## 2021-05-11 RX ADMIN — PANTOPRAZOLE SODIUM 40 MG: 40 TABLET, DELAYED RELEASE ORAL at 06:14

## 2021-05-11 RX ADMIN — DEXAMETHASONE SODIUM PHOSPHATE 1 MG: 4 INJECTION, SOLUTION INTRA-ARTICULAR; INTRALESIONAL; INTRAMUSCULAR; INTRAVENOUS; SOFT TISSUE at 09:49

## 2021-05-11 RX ADMIN — Medication 2000 UNITS: at 09:49

## 2021-05-11 RX ADMIN — METOPROLOL TARTRATE 50 MG: 50 TABLET, FILM COATED ORAL at 09:52

## 2021-05-11 RX ADMIN — RDII 250 MG CAPSULE 250 MG: at 09:49

## 2021-05-11 RX ADMIN — ENOXAPARIN SODIUM 30 MG: 30 INJECTION SUBCUTANEOUS at 09:48

## 2021-05-11 NOTE — FLOWSHEET NOTE
05/11/21 0930   Vital Signs   Temp 97.1 °F (36.2 °C)   Temp Source Oral   Pulse 100   Heart Rate Source Monitor   Resp 16   /71   BP Location Left lower arm   Patient Position Sitting   Level of Consciousness Alert (0)   MEWS Score 1   Patient Currently in Pain No   Pain Assessment   Pain Assessment 0-10   Pain Level 0   Oxygen Therapy   SpO2 94 %   O2 Device None (Room air)   O2 Flow Rate (L/min) 0 L/min     Pt in bed. meds passed. Pt denies needs. Education on covid and the increasing risk of PE and DVT provided. Pt sitting in chair nonstop talking to RN. CMU called RN and stated pt was 85% while ambulating, and was 93% on 2l ambulating. Pt is currently 93% on RA. PT refused psyllium due to having formed BMs. Will continue to monitor. Bedside Mobility Assessment Tool (BMAT):     Assessment Level 1- Sit and Shake    1. From a semi-reclined position, ask patient to sit up and rotate to a seated position at the side of the bed. Can use the bedrail. 2. Ask patient to reach out and grab your hand and shake making sure patient reaches across his/her midline. Pass- Patient is able to come to a seated position, maintain core strength. Maintains seated balance while reaching across midline. Move on to Assessment Level 2. Assessment Level 2- Stretch and Point   1. With patient in seated position at the side of the bed, have patient place both feet on the floor (or stool) with knees no higher than hips. 2. Ask patient to stretch one leg and straighten the knee, then bend the ankle/flex and point the toes. If appropriate, repeat with the other leg. Pass- Patient is able to demonstrate appropriate quad strength on intended weight bearing limb(s). Move onto Assessment Level 3. Assessment Level 3- Stand   1. Ask patient to elevate off the bed or chair (seated to standing) using an assistive device (cane, bedrail). 2. Patient should be able to raise buttocks off be and hold for a count of five.

## 2021-05-11 NOTE — PROGRESS NOTES
Hospitalist Progress Note      PCP: Mor Gamez, APRN - CNP    COVID pna with acute resp failure       Date of Admission: 4/25/2021    COVID-19 infection. Acute respiratory failure. S/p intubation and mechanical ventilation. Extubated on 5/4/2021. Transferred out of ICU to PCU      5/10  Patient is doing much better today. O2 requirement down to 1 L .  working with PT OT.   Pre-CERT has been started for SNF placement    5/11- patient is awake and alert. She is on 1 L of oxygen. Pre cert pending. Medications:  Reviewed    Infusion Medications    dextrose       Scheduled Medications    pantoprazole  40 mg Oral QAM AC    dexamethasone  1 mg Intravenous Q24H    psyllium  1 packet Oral BID    metoprolol tartrate  50 mg Oral BID    sodium chloride flush  5-40 mL Intravenous 2 times per day    enoxaparin  30 mg Subcutaneous BID    Vitamin D  2,000 Units Oral Daily    saccharomyces boulardii  250 mg Oral BID     PRN Meds: albuterol-ipratropium, sodium chloride flush, promethazine **OR** ondansetron, polyethylene glycol, acetaminophen **OR** acetaminophen, guaiFENesin-dextromethorphan, glucose, dextrose, glucagon (rDNA), dextrose      Intake/Output Summary (Last 24 hours) at 5/11/2021 0929  Last data filed at 5/11/2021 0701  Gross per 24 hour   Intake 480 ml   Output --   Net 480 ml       Physical Exam Performed:    /68   Pulse 84   Temp 97.1 °F (36.2 °C) (Oral)   Resp 16   Ht 4' 11\" (1.499 m)   Wt 146 lb 4.8 oz (66.4 kg)   SpO2 95%   BMI 29.55 kg/m²     General:  Alert and oriented.   Awake, alert and oriented. Appears to be not in any distress  Mucous Membranes:  Pink , anicteric  Neck: No JVD, no carotid bruit, no thyromegaly  Chest: scattered rhonchi and diminished pedro   Cardiovascular:  RRR S1S2 heard, no murmurs or gallops  Abdomen:  Soft, undistended, non tender, no organomegaly, BS present  Extremities: No edema or cyanosis.  Distal pulses well felt  Neurological -awake, alert and oriented. Labs:   No results for input(s): WBC, HGB, HCT, PLT in the last 72 hours. No results for input(s): NA, K, CL, CO2, BUN, CREATININE, CALCIUM, PHOS in the last 72 hours. Invalid input(s): MAGNES  No results for input(s): AST, ALT, BILIDIR, BILITOT, ALKPHOS in the last 72 hours. No results for input(s): INR in the last 72 hours. No results for input(s): Norm Ang in the last 72 hours. Urinalysis:      Lab Results   Component Value Date    NITRU Negative 04/26/2021    WBCUA 10-20 04/26/2021    BACTERIA 2+ 04/26/2021    RBCUA 3-4 04/26/2021    BLOODU MODERATE 04/26/2021    SPECGRAV 1.020 04/26/2021    GLUCOSEU Negative 04/26/2021       Radiology:  XR CHEST PORTABLE   Final Result   No significant interval change. XR CHEST PORTABLE   Final Result   Right central venous catheter tip projects at the low SVC. XR CHEST PORTABLE   Final Result   Stable chest.         XR CHEST PORTABLE   Final Result   Slight improved aeration of the lungs. Scattered airspace disease remains. XR CHEST PORTABLE   Final Result   Moderate consolidating airspace disease left lower lobe. Improving aeration with decreased airspace disease right lung and left apex. XR CHEST PORTABLE   Final Result   Patchy airspace opacities bilaterally, may be related to pulmonary edema   versus pneumonia. Mild to moderate left pleural effusion. Asymmetric elevation of the left hemidiaphragm. XR CHEST PORTABLE   Final Result   Persistent moderate bilateral airspace disease unchanged. Endotracheal tube is in satisfactory position at this time. XR CHEST PORTABLE   Final Result   Endotracheal tube 1 cm above the wyatt, consider 2-3 cm retraction. Multifocal airspace opacities worrisome for multifocal pneumonia or   atelectasis. Mild progressed within right lung base and right upper lung.          CT ABDOMEN PELVIS W IV CONTRAST Additional Contrast? None   Final controlled  - cont Toprol XL     DVT Prophylaxis: Lovenox bid  Diet: DIET GENERAL; Carb Control: 4 carb choices (60 gms)/meal  Code Status: Full Code      Ok for discharge.        Oral Kluver 5/11/2021 9:31 AM

## 2021-05-11 NOTE — PROGRESS NOTES
PROGRESS NOTE  S:66 yrs Patient  admitted on 4/25/2021 with Acute respiratory failure due to COVID-19 ([facility]) [U07.1, J96.00] . Today she feels well. She passed a formed BM this AM. She had heartburn yesterday. Exam:   Vitals:    05/11/21 0930   BP: 127/71   Pulse: 100   Resp: 16   Temp: 97.1 °F (36.2 °C)   SpO2: 94%     Due to the current efforts to prevent transmission of COVID-19 and also the need to preserve PPE for other caregivers, a face-to-face encounter with the patient was not performed. That being said, all relevant records and diagnostic tests were reviewed, including laboratory results and imaging. Please reference any relevant documentation elsewhere. Care will be coordinated with the primary service. Medications: Reviewed    Labs: Attending Supervising [de-identified] Attestation Statement  The patient is a 61 y.o. female. I have performed a history and physical examination of the patient. I discussed the case with my physician assistant Rene Carvajal PA-C    I reviewed the patient's Past Medical History, Past Surgical History, Medications, and Allergies. Physical Exam:  Vitals:    05/10/21 1506 05/10/21 2043 05/11/21 0200 05/11/21 0930   BP: 103/65 124/72 121/68 127/71   Pulse: 98 98 84 100   Resp: 19 19 16 16   Temp: 96.8 °F (36 °C) 98 °F (36.7 °C) 97.1 °F (36.2 °C) 97.1 °F (36.2 °C)   TempSrc: Oral Oral Oral Oral   SpO2: 96%  95% 94%   Weight:   146 lb 4.8 oz (66.4 kg)    Height:           Due to the current efforts to prevent transmission of COVID-19 and also the need to preserve PPE for other caregivers, a face-to-face encounter with the patient was not performed. That being said, all relevant records and diagnostic tests were reviewed, including laboratory results and imaging. Please reference any relevant documentation elsewhere. Care will be coordinated with the primary service.        Impression: 61year old female with history of HTN, arthritis, osteoporosis, admitted with acute respiratory failure secondary COVID-19 pneumonia. Diarrhea is likely related to COVID-19 superimposed on IBS. Recommendation:  1. Continue supportive care  2. Monitor and document output  3. Continue PPI qAM  4. Continue bowel regimen of probiotics and fiber supplementation  5. Continue diet as toelrated  6. Encourage nutrition  7. PT/OT  8. Ok to d/c from GI standpoint  9. F/u in clinic upon d/c       Osmin Jolley PA-C  9:51 AM 5/11/2021                      61year old female with history of HTN, arthritis, osteoporosis, admitted with acute respiratory failure secondary COVID19 pneumonia. Diarrhea is likely related to COVID-19 superimposed on IBS     Continue supportive care. encourage nutrition with fiber supplements. PT/OT. OK to d/c from GI standpoint. Follow up in clinic upon discharge.     Lynette Sams MD          [phone]  [phone]

## 2021-05-11 NOTE — PROGRESS NOTES
Ivs out. Instructions given to transport. RN to call receiving facility and give report. Inhalers given to EMTs.

## 2021-05-11 NOTE — DISCHARGE SUMMARY
Name:  Jalyn Wright  Room:  /7650-91  MRN:    [id number]    Discharge Summary      This discharge summary is in conjunction with a complete physical exam done on the day of discharge. Discharging Physician: Steve Andrea MD       Admit: 4/25/2021  Discharge: 5/11/2021     HPI taken from admission H&P:      The patient is a 61 y.o. female with no significant   PMH who presented to the ED with complaint of diarrhea and poor PO intake for the past 10 days. Does have a cough productive of clear sputum, no hemoptysis. Denies any fevers or chills at home. No chest pain or SOB. She did have her first COVID vaccine on March 31st. She was scheduled to get her second dose earlier this week but was unable to get the vaccine because of diarrhea. Denies any abdominal pain, melena or hematochezia. Denies any sick contacts. No recent abx.     She does not wear O2 at baseline. Diagnoses this Admission and Hospital Course     COVID 19 PNA  Acute Hypoxic Respiratory Failure - Resolved  - 85% on RA upon arrival to ED  - CT chest with bilateral airspace opacities, no PE  - PCT: 0.18; had 1/2 Pfizer Vaccine on 3/31  - Admitted to Med Surg, droplet + precautions  - worsened quickly leading to ICU transfer, vapotherm - > subsequently intubated and placed on vent support > later extubated  - on Decadron - being weaned, completed  Remdesivir for 5 days, s/p  CCP and IL 6 inhibitor infusion   - pulm managed  - s/p bronch 5/1/21. Was on Cefepime and IV vanc( 4 days) - stopped with negative cultures.   - weaned to RA - discharged with Combivent, Decadron x 5 days      Diarrhea - likely 2/2 COVID  - diarrhea returned   - C diff and GI bacterial pathogens were negative   - GI f/w      Hypotension - Resolved  - Primarily from excess sedation  - was on levophed which was weaned off     NAGMA - Resolved  - CO2 17 > now normal  - likely 2/2 diarrhea  - improved and off bicarb gtt     Hyperglycemia - Resolved  - A1c at 5.8  - likely 2/2 steroids  - low dose SSI     Transaminitis  - likely with viral infection   - monitored with Remdesivir use - improved     HTN  - controlled  - was Toprol XL, this was discontinued and pt was discharged on Lopressor    Procedures (Please Review Full Report for Details)  Central Line  Bronchoscopy  Intubation    Consults    Gastroenterology  Pulmonology    Physical Exam at Discharge:    /71   Pulse 100   Temp 97.1 °F (36.2 °C) (Oral)   Resp 16   Ht 4' 11\" (1.499 m)   Wt 146 lb 4.8 oz (66.4 kg)   SpO2 94%   BMI 29.55 kg/m²      See progress note from same day    CULTURES    Resp cx: NGTD    GI bacterial pathogens: not detected    Blood cx x2: NGTD     C diff toxin: negative    RADIOLOGY    XR CHEST PORTABLE 5/4/2021   Final Result   No significant interval change. XR CHEST PORTABLE 5/3/2021   Final Result   Right central venous catheter tip projects at the low SVC. XR CHEST PORTABLE 5/2/2021   Final Result   Stable chest.         XR CHEST PORTABLE 5/1/2021   Final Result   Slight improved aeration of the lungs. Scattered airspace disease remains. XR CHEST PORTABLE 4/30/2021   Final Result   Moderate consolidating airspace disease left lower lobe. Improving aeration with decreased airspace disease right lung and left apex. XR CHEST PORTABLE 4/29/2021   Final Result   Patchy airspace opacities bilaterally, may be related to pulmonary edema   versus pneumonia. Mild to moderate left pleural effusion. Asymmetric elevation of the left hemidiaphragm. XR CHEST PORTABLE 4/28/2021   Final Result   Persistent moderate bilateral airspace disease unchanged. Endotracheal tube is in satisfactory position at this time. XR CHEST PORTABLE 4/27/2021   Final Result   Endotracheal tube 1 cm above the wyatt, consider 2-3 cm retraction. Multifocal airspace opacities worrisome for multifocal pneumonia or   atelectasis.   Mild progressed within right lung base and right upper lung. CT ABDOMEN PELVIS W IV CONTRAST Additional Contrast? None 4/25/2021   Final Result   1. Motion limited study. 2. Cholelithiasis and probable choledocholithiasis without evidence for acute   cholecystitis. 3. Nonobstructing left renal calculus. CT CHEST PULMONARY EMBOLISM W CONTRAST 4/25/2021   Final Result   1. Motion limited study with no definite scan evidence for pulmonary embolus. 2. Bilateral airspace opacities probably represent a combination of   atelectasis and pneumonia. XR CHEST PORTABLE 4/25/2021   Final Result   Nonspecific perihilar opacities. Pulmonary edema and multifocal pneumonia   are in the differential.  Follow-up to resolution recommended. Discharge Medications     Medication List      START taking these medications    albuterol-ipratropium  MCG/ACT Aers inhaler  Commonly known as: COMBIVENT RESPIMAT  Inhale 1 puff into the lungs every 4 hours as needed for Wheezing     dexamethasone 1 MG tablet  Commonly known as: DECADRON  Take 1 tablet by mouth daily (with breakfast) for 5 days     metoprolol tartrate 50 MG tablet  Commonly known as: LOPRESSOR  Take 1 tablet by mouth 2 times daily     pantoprazole 40 MG tablet  Commonly known as: PROTONIX  Take 1 tablet by mouth every morning (before breakfast)  Start taking on:  May 12, 2021     polyethylene glycol 17 g packet  Commonly known as: GLYCOLAX  Take 17 g by mouth daily as needed for Constipation        CONTINUE taking these medications    albuterol sulfate  (90 Base) MCG/ACT inhaler  Commonly known as: ProAir HFA  Inhale 2 puffs into the lungs every 6 hours as needed for Wheezing     clobetasol 0.05 % cream  Commonly known as: TEMOVATE     FOSAMAX PO     hydroCHLOROthiazide 12.5 MG tablet  Commonly known as: HYDRODIURIL     hydroxychloroquine 200 MG tablet  Commonly known as: PLAQUENIL     vitamin D 1.25 MG (40223 UT) Caps capsule  Commonly known as: ERGOCALCIFEROL        STOP taking these medications    metoprolol succinate 100 MG extended release tablet  Commonly known as: TOPROL XL           Where to Get Your Medications      These medications were sent to [facility], [address] [place] [address] Ric Montez [address]    Phone: [phone]   · albuterol sulfate  (90 Base) MCG/ACT inhaler  · albuterol-ipratropium  MCG/ACT Aers inhaler     Information about where to get these medications is not yet available    Ask your nurse or doctor about these medications  · dexamethasone 1 MG tablet  · metoprolol tartrate 50 MG tablet  · pantoprazole 40 MG tablet  · polyethylene glycol 17 g packet           Discharged in stable condition to SNF. Follow Up: Follow up with physician at SNF.     More than 30 minutes spent      Stephanie Brown 5/13/2021 2:57 PM

## 2021-05-11 NOTE — DISCHARGE INSTR - COC
Continuity of Care Form    Patient Name: Brittaney Ortiz   :  1955  MRN:  [id number]    Admit date:  2021  Discharge date:  2021    Code Status Order: Full Code   Advance Directives:      Admitting Physician:  Tiffany Tejada MD  PCP: ARABELLA Loya CNP    Discharging Nurse: FRANCESCO [facility] Unit/Room#: [address] Unit Phone Number: [phone]    Emergency Contact:   Extended Emergency Contact Information  Primary Emergency Contact: Blocher,Robert  Address: [address] [facility], [address] [facility] Phone: [phone]  Work Phone: [phone]  Mobile Phone: [phone]  Relation: Spouse    Past Surgical History:  Past Surgical History:   Procedure Laterality Date    ARM SURGERY Right 2020    RIGHT ULNAR NERVE DECOMPRESSION AT THE ELBOW performed by Little Woodson MD at [facility] rods for scoliosis    [facility] Right 2020    RIGHT CARPAL TUNNEL RELEASE performed by Little Woodson MD at [facility] Bilateral     cataracts    INNER EAR SURGERY Right     replacement of eardrum    TONSILLECTOMY         Immunization History:   Immunization History   Administered Date(s) Administered    COVID-19, Pfizer, PF, 30mcg/0.3mL 2021    DT (pediatric) 2004    Influenza, Quadv, IM, PF (6 mo and older Fluzone, Flulaval, Fluarix, and 3 yrs and older Afluria) 10/03/2019    Influenza, Quadv, Recombinant, IM PF (Flublok 18 yrs and older) 2020    PPD Test 2017    Td (Adult), 5 Lf Tetanus Toxoid, Pf (Tenivac, Decavac) 2004    Tdap (Boostrix, Adacel) 2016    Zoster Recombinant (Shingrix) 2020, 2021, 2021       Active Problems:  Patient Active Problem List   Diagnosis Code    SIRS (systemic inflammatory response syndrome) ([facility] R65.10    Essential hypertension I10    Polymyalgia rheumatica ([facility] M35.3    Arthritis M19.90    HCAP (healthcare-associated pneumonia) J18.9    Tachycardia R00.0    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    Hand arthritis M19.049    Pneumonia due to COVID-19 virus U07.1, J12.82    Hypoxia R09.02    Transaminitis Z90.41    Metabolic acidosis R75.4    Acute hypoxemic respiratory failure (HCC) J96.01    Hypotension due to drugs I95.2    Elevated LFTs R79.89       Isolation/Infection:   Isolation            Droplet Plus          Patient Infection Status       Infection Onset Added Last Indicated Last Indicated By Review Planned Expiration Resolved Resolved By    COVID-19 04/25/21 04/25/21 04/25/21 SARS-CoV-2 NAAT (Rapid) 05/02/21 05/23/21      Resolved    C-diff Rule Out 04/25/21 04/25/21 04/28/21 GI Bacterial Pathogens By PCR (Ordered)   04/28/21 Rule-Out Test Resulted    COVID-19 Rule Out 04/25/21 04/25/21 04/25/21 SARS-CoV-2 NAAT (Rapid) (Ordered)   04/25/21 Rule-Out Test Resulted            Nurse Assessment:  Last Vital Signs: /71   Pulse 100   Temp 97.1 °F (36.2 °C) (Oral)   Resp 16   Ht 4' 11\" (1.499 m)   Wt 146 lb 4.8 oz (66.4 kg)   SpO2 94%   BMI 29.55 kg/m²     Last documented pain score (0-10 scale): Pain Level: 0  Last Weight:   Wt Readings from Last 1 Encounters:   05/11/21 146 lb 4.8 oz (66.4 kg)     Mental Status:  oriented, alert, coherent, logical, thought processes intact and able to concentrate and follow conversation    IV Access:  - None    Nursing Mobility/ADLs:  Walking   Assisted  Transfer  Assisted  Bathing  Assisted  Dressing  Assisted  Toileting  Assisted  Feeding  Assisted  Med Admin  Independent  Med Delivery   whole    Wound Care Documentation and Therapy: Elimination:  Continence:   · Bowel: Yes  · Bladder: Yes  Urinary Catheter: None   Colostomy/Ileostomy/Ileal Conduit: No  [REMOVED] Rectal Tube-Stool Appearance: Loose, Watery  [REMOVED] Rectal Tube-Stool Color: [place]    Date of Last BM: 5/11/21    Intake/Output Summary (Last 24 hours) at 5/11/2021 1204  Last data filed at 5/11/2021 1021  Gross per 24 hour   Intake 660 ml   Output --   Net 660 ml     I/O last 3 completed shifts: In: 5 [P.O.:420]  Out: -     Safety Concerns: At Risk for Falls    Impairments/Disabilities:      None    Nutrition Therapy:  Current Nutrition Therapy:   - Oral Diet:  General    Routes of Feeding: Oral  Liquids: Thin Liquids  Daily Fluid Restriction: no  Last Modified Barium Swallow with Video (Video Swallowing Test): not done    Treatments at the Time of Hospital Discharge:   Respiratory Treatments: ***  Oxygen Therapy:  is not on home oxygen therapy.   Ventilator:    - No ventilator support    Rehab Therapies: Physical Therapy, Occupational Therapy and SN  Weight Bearing Status/Restrictions: No weight bearing restirctions  Other Medical Equipment (for information only, NOT a DME order):  {EQUIPMENT:[id number]}  Other Treatments: ***    Patient's personal belongings (please select all that are sent with patient):  {[facility] DME Belongings:[id number]}    RN SIGNATURE:  Electronically signed by Maru Andrade RN on 5/11/21 at 3:46 PM EDT    CASE MANAGEMENT/SOCIAL WORK SECTION    Inpatient Status Date: 4/26/2021    Readmission Risk Assessment Score:  Readmission Risk              Risk of Unplanned Readmission:        16           Discharging to Facility/ Agency    Name: [facility]   Address: [address], [address] [place] [address] Phone: [phone]   Fax: [phone]          / signature: Electronically signed by Angel Luis Pepe RN on 5/11/21 at 12:05 PM EDT    PHYSICIAN SECTION    Prognosis: Good    Condition at Discharge: Stable    Rehab Potential (if transferring to Rehab): Good    Recommended Labs or Other Treatments After Discharge: PT and OT    Physician Certification: I certify the above information and transfer of Xin Clinton  is necessary for the continuing treatment of the diagnosis listed and that she requires [place] for less 30 days.      Update Admission H&P: No change in H&P    PHYSICIAN SIGNATURE:  Electronically signed by Mery Christopher MD on 5/11/21 at 3:05 PM EDT

## 2021-05-11 NOTE — PROGRESS NOTES
Medicine Status: No request made      Cognition:    A&O Person, Place, Time and Situation   Able to follow 2 step commands, consistently. Balance:  Functional Sitting Balance: WNL  Functional Standing Balance:Diminished   Comments: Required CGA to maintain balance at times during toileting task. Bed mobility:    Supine to sit:   Mod IND  Sit to supine:   Not Tested  Rolling:    Not Tested  Scooting in sitting:  supervision  Scooting to head of bed:   Not Tested   Bridging:   Not tested    Transfers:    Sit to stand:  CGA from chair to RW. Min A from standard toilet. Stand to sit:  minimal assistance (25%) on commode, CGA at chair  Chair to [facility]:  Not Tested   Chair <> bathroom: CGA with gait belt and RW     Activities of Daily Living:   UB Dressing:   Min A  LB Dressing:    Not tested  UB Bathing:  Setup/supervision  LB Bathing:  Not Tested  Feeding:  Not tested  Grooming:   supervision/setup  Toileting:  CGA-mod A-assist to wipe after BM, assist to pull up pants    Activity Tolerance:   Pt completed therapy session with min. SOB  Supine:  BP: 145/69  SpO2: 93% on RA  HR: 99bpm  Sitting:  SpO2: 92% on RA  HR: 114  After ambulation:  SpO2: dropped to 85% after ambulating to bathroom. 89% on 2L after ambulating back to chair (HR: 118). Quickly rebounded to 93%. 92% on RA at end of session  HR: 115 bpm seated end of session    Therapeutic Exercise:   NA    Patient Education:   Role of OT  Recommendations for DC  Safe RW use/hand placement   ADL retraining    Positioning Needs:   Up in chair, call light and needs in reach. Alarm Set    Family Present:  No    Assessment: Pt with good progress this date. Pt able to complete functional mobility to and from bathroom with CGA. Required increased O2 during this task, but remained above 90% otherwise on RA. Pt will benefit from continued skilled occupational therapy at [facility] in order to progress to a safe and more indpt level of functioning.       GOALS  To be met in 3 Visits:  1). Bed to toilet/BSC: Mod A  (Goal met and updated, 5/9/2021)   New goal: Walk to toilet and complete janis care with CGA  2). Patient will complete MOCA.      To be met in 5 Visits:  1). Supine to/from Sit:             Mod A , goal met 5/7/21   New goal: supervision  2). Upper Body Bathing:         Min A , goal met 5/11/21  3). Lower Body Bathing:         Mod A   4). Upper Body Dressing:       Min A  5). Lower Body Dressing:       Mod A   6).  Pt to demonstrate UE exs x 15 reps with minimal cues      Plan: cont with [facility] MS, OTR/L  #[id number]      If patient discharges from this facility prior to next visit, this note will serve as the Discharge Summary

## 2021-05-11 NOTE — CARE COORDINATION
INTERDISCIPLINARY PLAN OF CARE CONFERENCE    Date/Time: 5/11/2021 9:21 AM  Completed by: Phyllis Hickman, Case Management      Patient Name:  Yung July  YOB: 1955  Admitting Diagnosis: Acute respiratory failure due to COVID-19 [facility]) [U07.1, J96.00]     Admit Date/Time:  4/25/2021  8:28 PM    Chart reviewed. Interdisciplinary team contacted or reviewed plan related to patient progress and discharge plans. Disciplines included Case Management, Nursing, and Dietitian. Current Status:ongoing  PT/OT recommendation for discharge plan of care: ARU    Expected D/C Disposition:  Skilled nursing facility  Confirmed plan with patient and/or family Yes confirmed with: (name) pt  Met with:pt  Discharge Plan Comments: Reviewed chart. Role of discharge planner explained and patient verbalized understanding. Pt is from home with spouse and plans to go to [facility] for SNF. Kaylen Brar with [facility] started precert on 5/62/5493. CELSO BASILIO for Daniela with [facility]. Precert is not back yet. Covid pos as of 4/25. Not on home O2    Addendum 3819: Precert is not back yet, per Rosa Elena with [facility]. Addendum 6155: Per [facility] is back and can accept. Kaylen Brar asked for CM to check if a LOC was needed. Kaylen Brar states that pt's spouse needs to bring her inhalers of Proair and Combivent to [facility] [place] prior to admission. No C19 test is needed, per Daniela. CM called Matt (spouse) and he is bringing the inhalers to [facility] [place] at lunchtime (12 noon). CM called [facility] and spoke with [facility] and she states a LOC is not needed for tpt to go to [facility], just a HENS.       Home O2 in place on admit: No  Pt informed of need to bring portable home O2 tank on day of discharge for nursing to connect prior to leaving:  No, Not Indicated  Verbalized agreement/Understanding:  Not Indicated                                                                                                            DISCHARGE ORDER  Date/Time 5/11/2021 11:55 AM  Completed by: Dorita Hollins DOMINIQUE [facility], Case Management    Patient Name: Berlin Cockayne    : 1955      Admit order Date and Status:2021 inpt  Noted discharge order. (verify MD's last order for status of admission/Traditional Medicare 3 [place] Inpatient qualifying stay required for SNF)    Confirmed discharge plan with:              Patient:  pt            When pt confirms DC plan does any support person need to be contacted by CM Yes if yes who______pt's spouse, Matt                      Discharge to Facility: [facility]   · Facility phone number for staff giving report: Name: [facility]  · Address: [address] [place] [address], [address]  · Phone: [phone]  · Fax: [phone]       Pre-certification completed: yes, per [facility] Exemption Notification (HENS) completed: yes   Discharge orders and Continuity of Care faxed to facility:  [phone]      Transportation:               Medical Transport explained with choice list offered to pt/family. Choice:Yes(no preference)  Agency used: [address] [place] [address] Road up time:   1800      Pt/family/Nursing/Facility aware of  time: yes  Yes Names: pt, Matt (spouse), Rishabh Mixon RN, [address] with [facility]  Ambulance form completed:  yes:      Comments:Faxed MEREDITH/AVS to [facility] to [phone]. Pt states that she is out of her inhalers at home and asks that [facility] fill them. CM informed Dr. Isabela Mijares and he will sent a RX to [facility] for Combivent inhaler and ProAir, as these will go to [facility] [place] with pt. CM called Leydi Back with [facility] and informed her,as well. Pt is being d/c'd to [facility] today. Pt's O2 sats are 95% on 1L. Discharge timeout done with STACIE Johnson. All discharge needs and concerns addressed. Discharging nurse to complete MEREDITH, reconcile AVS, and place final copy with patient's discharge packet. Discharging RN to ensure that written prescriptions for  Level II medications are sent with patient to the facility as per protocol.

## 2021-05-11 NOTE — PROGRESS NOTES
4/25/2021  Pulmonary Arteries: Evaluation is compromised by motion artifact.  No   definite evidence of intraluminal filling defect to suggest pulmonary   embolism.  Main pulmonary artery is normal in caliber. Mediastinum: Mediastinal and bilateral hilar lymph nodes are probably   reactive.  The heart and pericardium demonstrate no acute abnormality.  There   is no acute abnormality of the thoracic aorta. Lungs/pleura: There is no pneumothorax or pleural effusion.  There are patchy   airspace opacities throughout both lungs.  Elevation of the left   hemidiaphragm is again noted. Upper Abdomen: There is fatty infiltration of the liver.  The visualized   upper abdomen is otherwise unremarkable. Soft Tissues/Bones: No acute bone or soft tissue abnormality.  Spinal   fixation rods are again seen.       Impression:       1. Motion limited study with no definite scan evidence for pulmonary embolus. 2. Bilateral airspace opacities probably represent a combination of   atelectasis and pneumonia. CT abdomen 4/25/2021  Impression:        1. Motion limited study. 2. Cholelithiasis and probable choledocholithiasis without evidence for acute   cholecystitis. 3. Nonobstructing left renal calculus. CXR 5/4/2021   Tubes and lines are unchanged.  Marked elevation left hemidiaphragm is again   demonstrated.       Given difference technique multifocal bilateral airspace disease is not   significantly changed.        ASSESSMENT:  · Acute hypoxemic respiratory failure -progressive  · COVID-19 pneumonia  · Abnormal CT: Mediastinal lymphadenopathy and parenchymal changes consistent with COVID-19 pneumonia  · Diarrhea- improved  · Transaminitis - improved    PLAN:  COVID-19 isolation, droplet plus  Supplemental oxygen to maintain SaO2 >92%; wean as tolerated   CCP given 4/26/21, Tocilizumab given 4/27/2021, completed 5 days remdesivir  Decadron D#17,  1mg  S/p Cefepime and vancomycin day #4 and 5 days ceftriaxone and azithromycin  Inhaled bronchodilators only as needed, MDI preferred  · Prophylaxis: Lovenox 30 twice daily  · Ok for d/c to rehab from my perspective.

## 2021-05-21 ENCOUNTER — TELEPHONE (OUTPATIENT)
Dept: CARDIOLOGY CLINIC | Age: 66
End: 2021-05-21

## 2021-05-21 NOTE — TELEPHONE ENCOUNTER
JULIAG, OSMINM OOT. Please advise. I believe the pt was started on HCTZ 12.5mg when she was in the hosp on 4/25/21?

## 2021-05-21 NOTE — TELEPHONE ENCOUNTER
Pt had Covid with pneumonia and was placed on hydrochlorothiazide. Pt wants to know if she should be taking this. Please advise.

## 2021-06-01 ENCOUNTER — HOSPITAL ENCOUNTER (OUTPATIENT)
Dept: PHYSICAL THERAPY | Age: 66
Setting detail: THERAPIES SERIES
Discharge: HOME | End: 2021-06-01
Payer: MEDICAID

## 2021-06-01 PROCEDURE — 97162 PT EVAL MOD COMPLEX 30 MIN: CPT

## 2021-06-01 PROCEDURE — 97110 THERAPEUTIC EXERCISES: CPT

## 2021-06-01 NOTE — FLOWSHEET NOTE
[facility], [facility], [address], [address]  Phone: [phone]  Fax [phone]      Physical Therapy Treatment Note/ Progress Report:     Date:  2021    Patient Name:  Nathen Tierney    :  1955  MRN: [id number]  Restrictions/Precautions:   COVID-19 2021, OA, HTN, Osteoporosis, Current Right RTC Injury (postponing surgical repair for later date), Back SX for Cason Rods @ age 8, Inner Ear [address] for Replacement of Ear Drum, Right Ulnar Nerve Decompression @ Elbow Aug. 12,2020, Right Carpal Tunnel Release SX Aug 25, 2020, PT Right Shoulder 2020. Medical/Treatment Diagnosis Information:  · Diagnosis: U07.1 COVID-  · Treatment Diagnosis: M25.551 & M25.552 Left & Right hip Pain, R26.81 Balance Issues, R26.2 Difficulty Walking, 26.9 Gait Abnormality, M62.81 Core and Bilat LE Weakness, R53.83 Generalized Fatigue.   Insurance/Certification information:  PT Insurance Information: Jeffery  Physician Information:  Referring Practitioner: RICHARD Taylor  Has the plan of care been signed (Y/N):        []  Yes  [x]  No     Date of Patient follow up with Physician: PRN      Is this a Progress Report:     []  Yes  [x]  No      If Yes:  Date Range for reporting period:  Beginnin2020  Ending:    Progress report will be due (10 Rx or 30 days whichever is less): [id number]      Recertification will be due (POC Duration  / 90 days whichever is less): 8-     Visit # Insurance Allowable Auth Required   In-person 1 30 []  Yes [x]  No    Telehealth   []  Yes []  No    Total        Functional Scale: LEFS=59%   Date assessed: 2021      Therapy Diagnosis/Practice Pattern: 4C      Number of Comorbidities:  []0     []1-2    [x]3+    Latex Allergy:  [x]NO      []YES  Preferred Language for Healthcare:   [x]English       []other:      Pain level:  2/10 B Hips  (810 right Shld) Generalized Weakness/Balance     SUBJECTIVE:  See eval    OBJECTIVE: See eval   Observation:    Test measurements:      RESTRICTIONS/PRECAUTIONS:  Has Right RTC Tear (postponing surgical repair). Cason Rods. See above. Exercises/Interventions:     Therapeutic Ex (70996) Sets/sec Reps Notes/CUES   Nustep 4 min     Gastroc Stretch on Incline H30x3     Mini Wall Slides H5x7  hep         Supine Clam GTB H3  1x10  hep   Bridge H3  1x10  hep   Supine Psoas Release Stretch H30x3 B  hep                                 Manual Intervention (55642)                                          NMR re-education (19950)   CUES NEEDED                                                         Therapeutic Activity (60969)                                                Therapeutic Exercise and NMR EXR  [x] (05189) Provided verbal/tactile cueing for activities related to strengthening, flexibility, endurance, ROM for improvements in LE, proximal hip, and core control with self care, mobility, lifting, ambulation.  [] (09294) Provided verbal/tactile cueing for activities related to improving balance, coordination, kinesthetic sense, posture, motor skill, proprioception  to assist with LE, proximal hip, and core control in self care, mobility, lifting, ambulation and eccentric single leg control.      NMR and Therapeutic Activities:    [] (80295 or 72472) Provided verbal/tactile cueing for activities related to improving balance, coordination, kinesthetic sense, posture, motor skill, proprioception and motor activation to allow for proper function of core, proximal hip and LE with self care and ADLs  [] (91540) Gait Re-education- Provided training and instruction to the patient for proper LE, core and proximal hip recruitment and positioning and eccentric body weight control with ambulation re-education including up and down stairs     Home Exercise Program:    [x] (54907) Reviewed/Progressed HEP activities related to strengthening, flexibility, endurance, ROM of core, proximal hip and LE for functional self-care, mobility, lifting and ambulation/stair navigation   [] (86611)Reviewed/Progressed HEP activities related to improving balance, coordination, kinesthetic sense, posture, motor skill, proprioception of core, proximal hip and LE for self care, mobility, lifting, and ambulation/stair navigation      Manual Treatments:  PROM / STM / Oscillations-Mobs:  G-I, II, III, IV (PA's, Inf., Post.)  [] (64622) Provided manual therapy to mobilize LE, proximal hip and/or LS spine soft tissue/joints for the purpose of modulating pain, promoting relaxation,  increasing ROM, reducing/eliminating soft tissue swelling/inflammation/restriction, improving soft tissue extensibility and allowing for proper ROM for normal function with self care, mobility, lifting and ambulation. Modalities:  none   [] GAME READY (VASO)- for significant edema, swelling, pain control. Charges  Timed Code Treatment Minutes: 35   Total Treatment Minutes: 65     Medicare total (add KX at $2000)         [facility] time in/ time out:    TE time in /out    Manual time in/out    Neuro re ed time in/out    Untimed minutes        [] EVAL (LOW) 55711   [x] EVAL (MOD) 36574  [] EVAL (HIGH) 90900   [] RE-EVAL     [x] CK(69014) x 2    [] IONTO  [] NMR (22724) x     [] VASO  [] Manual (70519) x      [] Other:  [] TA x      [] Mech Traction (54850)  [] ES(attended) (17451)      [] ES (un) (62656):       GOALS:   Short Term Goals: To be achieved in: 2 weeks  1. Independent in HEP and progression per patient tolerance, in order to prevent re-injury. []? Progressing: []? Met: []? Not Met: []? Adjusted  2. Patient will have a decrease in pain to facilitate improvement in movement, function, and ADLs as indicated by Functional Deficits. []? Progressing: []? Met: []? Not Met: []? Adjusted     Long Term Goals: To be achieved in: 10 weeks  1.  Disability index score of 35% or less for the LEFS to assist with reaching prior level of function. []? Progressing: []? Met: []? Not Met: []? Adjusted  2. Patient will demonstrate increased AROM to both hips to 10 deg extension to allow for proper joint functioning as indicated by patients Functional Deficits. []? Progressing: []? Met: []? Not Met: []? Adjusted  3. Patient will demonstrate an increase in Strength to good proximal hip strength and control, within 5lb HHD in LE to allow for proper functional mobility as indicated by patients Functional Deficits. []? Progressing: []? Met: []? Not Met: []? Adjusted  4. Patient will return to driving herself in community. []? Progressing: []? Met: []? Not Met: []? Adjusted  5. Safely walk Community distance w/o AD or SC.  []? Progressing: []? Met: []? Not Met: []? Adjusted         Progression Towards Functional goals:  [] Patient is progressing as expected towards functional goals listed. [] Progression is slowed due to complexities listed. [] Progression has been slowed due to co-morbidities. [x] Plan just implemented, too soon to assess goals progression  [] Other:     Overall Progression Towards Functional goals/ Treatment Progress Update:  [] Patient is progressing as expected towards functional goals listed. [] Progression is slowed due to complexities/Impairments listed. [] Progression has been slowed due to co-morbidities. [x] Plan just implemented, too soon to assess goals progression <30days   [] Goals require adjustment due to lack of progress  [] Patient is not progressing as expected and requires additional follow up with physician  [] Other    Prognosis for POC: [x] Good [] Fair  [] Poor      Patient requires continued skilled intervention: [x] Yes  [] No    Treatment/Activity Tolerance:  [x] Patient able to complete treatment  [] Patient limited by fatigue  [] Patient limited by pain    [] Patient limited by other medical complications  [] Other:     ASSESSMENT: Pt sage'd eval and RX well. PLAN: See eval  [] Continue per plan of care [] Alter current plan (see comments above)  [x] Plan of care initiated [] Hold pending MD visit [] Discharge      Electronically signed by:  Scar Flower, PT  [id number]    Note: If patient does not return for scheduled/ recommended follow up visits, this note will serve as a discharge from care along with most recent update on progress.

## 2021-06-01 NOTE — PLAN OF CARE
[facility], [facility], [address], [address]  Phone: [phone]  Fax [phone]     Physical Therapy Certification    Dear Referring Practitioner: RICHARD Gutierrez,    We had the pleasure of evaluating the following patient for physical therapy services at [address]. A summary of our findings can be found in the initial assessment below. This includes our plan of care. If you have any questions or concerns regarding these findings, please do not hesitate to contact me at the office phone number checked above. Thank you for the referral.       Physician Signature:_______________________________Date:__________________  By signing above (or electronic signature), therapists plan is approved by physician    Patient: Jeremy Bach   : 1955   MRN: [id number]  Referring Physician: Referring Practitioner: RICHARD Gutierrez      Evaluation Date: 2021      Medical Diagnosis Information:  Diagnosis: U07.1 COVID-19   Treatment Diagnosis: M25.551 & M25.552 Left & Right hip Pain, R26.81 Balance Issues, R26.2 Difficulty Walking, 26.9 Gait Abnormality, M62.81 Core and Bilat LE Weakness, R53.83 Generalized Fatigue. Insurance information: PT Insurance Information: Kristopher Guerrier     Precautions/ Contra-indications: EJCNI-02 2021, OA, HTN, Osteoporosis, Current Right RTC Injury (postponing surgical repair for later date), Back SX for Csaon Rods @ age 8, Inner Ear [address] for Replacement of Ear Drum, Right Ulnar Nerve Decompression @ Elbow Aug. 12,2020, Right Carpal Tunnel Release SX Aug 25, 2020, PT Right Shoulder 2020.     C-SSRS Triggered by Intake questionnaire (Past 2 wk assessment):   [x] No, Questionnaire did not trigger screening.   [] Yes, Patient intake triggered further evaluation      [] C-SSRS Screening completed  [] PCP notified via Status/Prior Level of Function: Lives w/ spouse who is on SS. Pt was able to walk w/o AD and drive prior to BUPNU-86 infection. Independent with ADLs and IADLs. OBJECTIVE:     ROM LEFT RIGHT   HIP Flex     HIP Abd     HIP Ext -10 tight -10 tight    HIP IR 5 deg 15 deg   HIP ER 30 deg 40 deg   Knee ext 0 -10 deg   Knee Flex 133 128   Ankle PF     Ankle DF 6 5   Ankle In     Ankle Ev     Strength  LEFT RIGHT   HIP Flexors 3 3   HIP Abductors 3- 3-   HIP Ext 3 3   Hip ER     Knee EXT (quad) 4- 4-   Eccentric Quad Control 3+  3+   Knee Flex (HS) 4 4   Ankle DF Pt can heel walk Pt can heel walk   Ankle PF Pt can toe walk Pt can toe walk   Ankle Inv     Ankle EV          Circumference  Mid apex  7 cm prox             Reflexes/Sensation:    []Dermatomes/Myotomes intact    [x]Reflexes equal and normal bilaterally   []Other:    Joint mobility:    []Normal    [x]Hypo: hips, spine (Cason Rods)   []Hyper    Palpation: TTP over bilat Greater Trochanters. Functional Mobility/Transfers: Transfers to high mat w/ SBA-min A. Indep bed mobility but slow and challenged. Posture: Lax abdomin. Mild forward flex of trunk    Bandages/Dressings/Incisions: none    Gait:Roller walker amb, short stride length, decrease hip-knee flexion. Stride Balance: L= 0 sec and rR=2 sec  30 sec sit-to-stand: 0 reps  TUG: w/ roller walker: 18.4 sec  SLS: not able to perform on either LE. Orthopedic Special Tests: + Marni's L>R. [x] Patient history, allergies, meds reviewed. Medical chart reviewed. See intake form. Review Of Systems (ROS):  [x]Performed Review of systems (Integumentary, CardioPulmonary, Neurological) by intake and observation. Intake form has been scanned into medical record. Patient has been instructed to contact their primary care physician regarding ROS issues if not already being addressed at this time.       Co-morbidities/Complexities (which will affect course of rehabilitation):   []None and/or comorbidities that impact the plan of care;  []1-2 personal factors and/or comorbidities that impact the plan of care  [x]3 personal factors and/or comorbidities that impact the plan of care  [x] An examination of body systems using standardized tests and measures addressing any of the following: body structures and functions (impairments), activity limitations, and/or participation restrictions;:  [] a total of 1-2 or more elements   [x] a total of 3 or more elements   [] a total of 4 or more elements   [x] A clinical presentation with:  [x] stable and/or uncomplicated characteristics   [] evolving clinical presentation with changing characteristics  [] unstable and unpredictable characteristics;   [x] Clinical decision making of [x] low, [x] moderate, [] high complexity using standardized patient assessment instrument and/or measurable assessment of functional outcome. [] EVAL (LOW) 71915 (typically 20 minutes face-to-face)  [x] EVAL (MOD) 68096 (typically 30 minutes face-to-face)  [] EVAL (HIGH) 25207 (typically 45 minutes face-to-face)  [] RE-EVAL       PLAN:   Frequency/Duration:  2 days per week for 10 Weeks:  Interventions:  [x]  Therapeutic exercise including: strength training, ROM, for Lower extremity and core   [x]  NMR activation and proprioception for LE, Glutes and Core   [x]  Manual therapy as indicated for LE, Hip and spine to include: Dry Needling/IASTM, STM, PROM, Gr I-IV mobilizations, manipulation. [x] Modalities as needed that may include: thermal agents, E-stim, Biofeedback, US, iontophoresis as indicated  [x] Patient education on joint protection, postural re-education, activity modification, progression of HEP. HEP instruction: Pt given written HEP and TB (see scanned forms/Media Tab)    GOALS:  Patient stated goal: Safely walk Community distance w/o AD or SC.  [] Progressing: [] Met: [] Not Met: [] Adjusted    Therapist goals for Patient:   Short Term Goals:  To be achieved in: 2 weeks  1. Independent in HEP and progression per patient tolerance, in order to prevent re-injury. [] Progressing: [] Met: [] Not Met: [] Adjusted  2. Patient will have a decrease in pain to facilitate improvement in movement, function, and ADLs as indicated by Functional Deficits. [] Progressing: [] Met: [] Not Met: [] Adjusted    Long Term Goals: To be achieved in: 10 weeks  1. Disability index score of 35% or less for the LEFS to assist with reaching prior level of function. [] Progressing: [] Met: [] Not Met: [] Adjusted  2. Patient will demonstrate increased AROM to both hips to 10 deg extension to allow for proper joint functioning as indicated by patients Functional Deficits. [] Progressing: [] Met: [] Not Met: [] Adjusted  3. Patient will demonstrate an increase in Strength to good proximal hip strength and control, within 5lb HHD in LE to allow for proper functional mobility as indicated by patients Functional Deficits. [] Progressing: [] Met: [] Not Met: [] Adjusted  4. Patient will return to driving herself in community. [] Progressing: [] Met: [] Not Met: [] Adjusted  5.   Safely walk Community distance w/o AD or SC.  [] Progressing: [] Met: [] Not Met: [] Adjusted     Electronically signed by:  Vicky Quinonez, [address]

## 2021-06-03 ENCOUNTER — HOSPITAL ENCOUNTER (OUTPATIENT)
Dept: PHYSICAL THERAPY | Age: 66
Setting detail: THERAPIES SERIES
Discharge: HOME | End: 2021-06-03
Payer: MEDICAID

## 2021-06-03 PROCEDURE — 97112 NEUROMUSCULAR REEDUCATION: CPT

## 2021-06-03 PROCEDURE — 97110 THERAPEUTIC EXERCISES: CPT

## 2021-06-03 NOTE — FLOWSHEET NOTE
[facility]  [facility], [facility], [address] [facility], [address] [place] [address]  Phone: [phone]  Fax [phone]      Physical Therapy Treatment Note/ Progress Report:     Date:  6/3/2021    Patient Name:  Xin Clinton    :  1955  MRN: [id number]  Restrictions/Precautions:   COVID-19 2021, OA, HTN, Osteoporosis, Current Right RTC Injury (postponing surgical repair for later date), Back SX for Cason Rods @ age 8, Inner Ear [address] for Replacement of Ear Drum, Right Ulnar Nerve Decompression @ Elbow Aug. 12,2020, Right Carpal Tunnel Release SX Aug 25, 2020, PT Right Shoulder 2020. Medical/Treatment Diagnosis Information:  · Diagnosis: U07.1 COVID-  · Treatment Diagnosis: M25.551 & M25.552 Left & Right hip Pain, R26.81 Balance Issues, R26.2 Difficulty Walking, 26.9 Gait Abnormality, M62.81 Core and Bilat LE Weakness, R53.83 Generalized Fatigue.   Insurance/Certification information:  PT Insurance Information: Jeffery  Physician Information:  Referring Practitioner: RICHARD Griffiths  Has the plan of care been signed (Y/N):        []  Yes  [x]  No     Date of Patient follow up with Physician: PRN      Is this a Progress Report:     []  Yes  [x]  No      If Yes:  Date Range for reporting period:  Beginnin2020  Ending:    Progress report will be due (10 Rx or 30 days whichever is less): 8748      Recertification will be due (POC Duration  / 90 days whichever is less): 8-     Visit # Insurance Allowable Auth Required   In-person 2 30 []  Yes [x]  No    Telehealth   []  Yes []  No    Total        Functional Scale: LEFS=59%   Date assessed: 2021      Therapy Diagnosis/Practice Pattern: 4C      Number of Comorbidities:  []0     []1-2    [x]3+    Latex Allergy:  [x]NO      []YES  Preferred Language for Healthcare:   [x]English       []other:      Pain level:  2/10 B Hips  (8/10 right Shld) Generalized Weakness/Balance     SUBJECTIVE:  Compliant w/ HEP. No Issues. Mild discomfort in right hip and iliac crest region this afternoon walking into clinic. OBJECTIVE:    Observation: Improved gait w/ walker today. Pt instructed to bring quad cane NV to begin training with it.  Progressed TE and introduced standing CKC TE w/ good tolerance. Pt still requires frequent rest breaks.  Test measurements:      RESTRICTIONS/PRECAUTIONS:  Has Right RTC Tear (postponing surgical repair). Casno Rods. See above. Exercises/Interventions:     Therapeutic Ex (93749) Sets/sec Reps Notes/CUES   Nustep 5 min     Gastroc Stretch on Incline H30x3     SB Seated Scap Retrac w/ TrA RTB H2 2x10     Corner Pec Stretch H15x3     SB Corner Press-up 2x10     Mini Wall Slides H5x10  hep         Supine Clam GTB H3  1x10  hep   SB Bridge H3  1x10  hep   SB LTR x10     SLR w/ Q set 2x5 B     Supine Psoas Release Stretch H30x3 B  hep   SAQ w/ Ankle Ball Squeeze 2# H2 2x10                             Manual Intervention (27918)                                          NMR re-education (79255)   CUES NEEDED   Airex WS EO & EC x20 ea     Stride Balance H5x10 B     LSU 4\" 1x10B                                         Therapeutic Activity (85190)                                                Therapeutic Exercise and NMR EXR  [x] (25018) Provided verbal/tactile cueing for activities related to strengthening, flexibility, endurance, ROM for improvements in LE, proximal hip, and core control with self care, mobility, lifting, ambulation.  [] (89632) Provided verbal/tactile cueing for activities related to improving balance, coordination, kinesthetic sense, posture, motor skill, proprioception  to assist with LE, proximal hip, and core control in self care, mobility, lifting, ambulation and eccentric single leg control.      NMR and Therapeutic Activities:    [x] (25858 or 23950) Provided verbal/tactile cueing for activities related to improving balance, coordination, kinesthetic sense, posture, motor skill, proprioception and motor activation to allow for proper function of core, proximal hip and LE with self care and ADLs  [] (20594) Gait Re-education- Provided training and instruction to the patient for proper LE, core and proximal hip recruitment and positioning and eccentric body weight control with ambulation re-education including up and down stairs     Home Exercise Program:    [x] (84403) Reviewed/Progressed HEP activities related to strengthening, flexibility, endurance, ROM of core, proximal hip and LE for functional self-care, mobility, lifting and ambulation/stair navigation   [] (06228)Reviewed/Progressed HEP activities related to improving balance, coordination, kinesthetic sense, posture, motor skill, proprioception of core, proximal hip and LE for self care, mobility, lifting, and ambulation/stair navigation      Manual Treatments:  PROM / STM / Oscillations-Mobs:  G-I, II, III, IV (PA's, Inf., Post.)  [] (79378) Provided manual therapy to mobilize LE, proximal hip and/or LS spine soft tissue/joints for the purpose of modulating pain, promoting relaxation,  increasing ROM, reducing/eliminating soft tissue swelling/inflammation/restriction, improving soft tissue extensibility and allowing for proper ROM for normal function with self care, mobility, lifting and ambulation. Modalities:  none   [] GAME READY (VASO)- for significant edema, swelling, pain control.      Charges  Timed Code Treatment Minutes: 50   Total Treatment Minutes: 50     Medicare total (add KX at $2000)         [facility] time in/ time out:    TE time in /out    Manual time in/out    Neuro re ed time in/out    Untimed minutes        [] EVAL (LOW) 61675   [] EVAL (MOD) 20537  [] EVAL (HIGH) 39518   [] RE-EVAL     [x] CZ(65478) x 2    [] IONTO  [x] NMR (50265) x  1   [] VASO  [] Manual (13018) x      [] Other:  [] TA x      [] Mech Traction (10346)  [] ES(attended) (81394)      [] ES (un) (41383):       GOALS:   Short Term Goals: To be achieved in: 2 weeks  1. Independent in HEP and progression per patient tolerance, in order to prevent re-injury. [x]? Progressing: []? Met: []? Not Met: []? Adjusted  2. Patient will have a decrease in pain to facilitate improvement in movement, function, and ADLs as indicated by Functional Deficits. [x]? Progressing: []? Met: []? Not Met: []? Adjusted     Long Term Goals: To be achieved in: 10 weeks  1. Disability index score of 35% or less for the LEFS to assist with reaching prior level of function. []? Progressing: []? Met: []? Not Met: []? Adjusted  2. Patient will demonstrate increased AROM to both hips to 10 deg extension to allow for proper joint functioning as indicated by patients Functional Deficits. []? Progressing: []? Met: []? Not Met: []? Adjusted  3. Patient will demonstrate an increase in Strength to good proximal hip strength and control, within 5lb HHD in LE to allow for proper functional mobility as indicated by patients Functional Deficits. []? Progressing: []? Met: []? Not Met: []? Adjusted  4. Patient will return to driving herself in community. []? Progressing: []? Met: []? Not Met: []? Adjusted  5. Safely walk Community distance w/o AD or SC.  []? Progressing: []? Met: []? Not Met: []? Adjusted         Progression Towards Functional goals:  [] Patient is progressing as expected towards functional goals listed. [] Progression is slowed due to complexities listed. [] Progression has been slowed due to co-morbidities. [x] Plan just implemented, too soon to assess goals progression  [] Other:     Overall Progression Towards Functional goals/ Treatment Progress Update:  [] Patient is progressing as expected towards functional goals listed. [] Progression is slowed due to complexities/Impairments listed. [] Progression has been slowed due to co-morbidities.   [x] Plan just implemented, too soon to assess goals progression <30days   [] Goals require adjustment due to lack of progress  [] Patient is not progressing as expected and requires additional follow up with physician  [] Other    Prognosis for POC: [x] Good [] Fair  [] Poor      Patient requires continued skilled intervention: [x] Yes  [] No    Treatment/Activity Tolerance:  [x] Patient able to complete treatment  [] Patient limited by fatigue  [] Patient limited by pain    [] Patient limited by other medical complications  [] Other:     ASSESSMENT: Pt sage'd TE progression well, but required frequent rest breaks in between sets. Samy Nolan PLAN: Cont 2xwk. [x] Continue per plan of care [] Alter current plan (see comments above)  [] Plan of care initiated [] Hold pending MD visit [] Discharge      Electronically signed by:  Kassie Trevino, PT  [id number]    Note: If patient does not return for scheduled/ recommended follow up visits, this note will serve as a discharge from care along with most recent update on progress.

## 2021-06-08 ENCOUNTER — HOSPITAL ENCOUNTER (OUTPATIENT)
Dept: PHYSICAL THERAPY | Age: 66
Setting detail: THERAPIES SERIES
Discharge: HOME | End: 2021-06-08
Payer: MEDICAID

## 2021-06-08 PROCEDURE — 97112 NEUROMUSCULAR REEDUCATION: CPT

## 2021-06-08 PROCEDURE — 97110 THERAPEUTIC EXERCISES: CPT

## 2021-06-08 NOTE — FLOWSHEET NOTE
[facility], [facility], [facility], [address] [place] [address] Dr  Phone: [phone]  Fax [phone]      Physical Therapy Treatment Note/ Progress Report:     Date:  2021    Patient Name:  Randall Del Angel    :  1955  MRN: [id number]  Restrictions/Precautions:   COVID-19 2021, OA, HTN, Osteoporosis, Current Right RTC Injury (postponing surgical repair for later date), Back SX for Cason Rods @ age 8, Inner Ear [address] for Replacement of Ear Drum, Right Ulnar Nerve Decompression @ Elbow Aug. 12,2020, Right Carpal Tunnel Release SX Aug 25, 2020, PT Right Shoulder 2020. Medical/Treatment Diagnosis Information:  · Diagnosis: U07.1 COVID-  · Treatment Diagnosis: M25.551 & M25.552 Left & Right hip Pain, R26.81 Balance Issues, R26.2 Difficulty Walking, 26.9 Gait Abnormality, M62.81 Core and Bilat LE Weakness, R53.83 Generalized Fatigue.   Insurance/Certification information:  PT Insurance Information: Jeffery  Physician Information:  Referring Practitioner: RICHARD Potts  Has the plan of care been signed (Y/N):        []  Yes  [x]  No     Date of Patient follow up with Physician: PRN      Is this a Progress Report:     []  Yes  [x]  No      If Yes:  Date Range for reporting period:  Beginnin2020  Ending:    Progress report will be due (10 Rx or 30 days whichever is less): 27      Recertification will be due (POC Duration  / 90 days whichever is less): 8-     Visit # Insurance Allowable Auth Required   In-person 3 30 []  Yes [x]  No    Telehealth   []  Yes []  No    Total        Functional Scale: LEFS=59%   Date assessed: 2021      Therapy Diagnosis/Practice Pattern: 4C      Number of Comorbidities:  []0     []1-2    [x]3+    Latex Allergy:  [x]NO      []YES  Preferred Language for Healthcare:   [x]English       []other:      Pain level:  1-2/10 B Hips  (8/10 right Shld) Generalized Weakness/Balance     SUBJECTIVE: Backs of her knees and calves were sore after LV. OBJECTIVE:    Observation: Progression to small quad cane today. Pt given okay to use small quad cane @ home w/ spouses supervision.  Test measurements:      RESTRICTIONS/PRECAUTIONS:  Has Right RTC Tear (postponing surgical repair). Cason Rods. See above. Exercises/Interventions:     Therapeutic Ex (02857) Sets/sec Reps Notes/CUES   Nustep 5 min     Gastroc Stretch on Incline H30x3     SB Seated Scap Retrac w/ TrA RTB H2 2x10     Corner Pec Stretch H15x3     SB Corner Press-up 2x10      hep         Supine Clam GTB H3  1x10  hep   SB Bridge H3  1x10  hep   SB LTR x10     SLR w/ Q set 2x5 B     Supine Psoas Release Stretch H30x3 B  hep   SAQ w/ Ankle Ball Squeeze 2# H2 2x10                             Manual Intervention (97603)                                          NMR re-education (37788)   CUES NEEDED   GT progression to small quad cane 2x60 ft         Feet together EC Balance in II Bars H5 x10     Stride Balance H5x10 B     LSU 4\" 1x10B                                         Therapeutic Activity (75271)                                                Therapeutic Exercise and NMR EXR  [x] (90019) Provided verbal/tactile cueing for activities related to strengthening, flexibility, endurance, ROM for improvements in LE, proximal hip, and core control with self care, mobility, lifting, ambulation.  [] (34174) Provided verbal/tactile cueing for activities related to improving balance, coordination, kinesthetic sense, posture, motor skill, proprioception  to assist with LE, proximal hip, and core control in self care, mobility, lifting, ambulation and eccentric single leg control.      NMR and Therapeutic Activities:    [x] (22589 or 99762) Provided verbal/tactile cueing for activities related to improving balance, coordination, kinesthetic sense, posture, motor skill, proprioception and motor activation to allow for proper function of core, proximal hip and LE with self care and ADLs  [] (90587) Gait Re-education- Provided training and instruction to the patient for proper LE, core and proximal hip recruitment and positioning and eccentric body weight control with ambulation re-education including up and down stairs     Home Exercise Program:    [x] (83043) Reviewed/Progressed HEP activities related to strengthening, flexibility, endurance, ROM of core, proximal hip and LE for functional self-care, mobility, lifting and ambulation/stair navigation   [] (63480)Reviewed/Progressed HEP activities related to improving balance, coordination, kinesthetic sense, posture, motor skill, proprioception of core, proximal hip and LE for self care, mobility, lifting, and ambulation/stair navigation      Manual Treatments:  PROM / STM / Oscillations-Mobs:  G-I, II, III, IV (PA's, Inf., Post.)  [] (71907) Provided manual therapy to mobilize LE, proximal hip and/or LS spine soft tissue/joints for the purpose of modulating pain, promoting relaxation,  increasing ROM, reducing/eliminating soft tissue swelling/inflammation/restriction, improving soft tissue extensibility and allowing for proper ROM for normal function with self care, mobility, lifting and ambulation. Modalities:  none   [] GAME READY (VASO)- for significant edema, swelling, pain control. Charges  Timed Code Treatment Minutes: 50   Total Treatment Minutes: 50     Medicare total (add KX at $2000)         [facility] time in/ time out:    TE time in /out    Manual time in/out    Neuro re ed time in/out    Untimed minutes        [] EVAL (LOW) 42745   [] EVAL (MOD) 94157  [] EVAL (HIGH) 37915   [] RE-EVAL     [x] EC(20552) x 2    [] IONTO  [x] NMR (06574) x  1   [] VASO  [] Manual (19104) x      [] Other:  [] TA x      [] Mech Traction (70768)  [] ES(attended) (13850)      [] ES (un) (46471):       GOALS:   Short Term Goals: To be achieved in: 2 weeks  1.  Independent in HEP and progression per patient tolerance, in order to prevent re-injury. [x]? Progressing: []? Met: []? Not Met: []? Adjusted  2. Patient will have a decrease in pain to facilitate improvement in movement, function, and ADLs as indicated by Functional Deficits. [x]? Progressing: []? Met: []? Not Met: []? Adjusted     Long Term Goals: To be achieved in: 10 weeks  1. Disability index score of 35% or less for the LEFS to assist with reaching prior level of function. []? Progressing: []? Met: []? Not Met: []? Adjusted  2. Patient will demonstrate increased AROM to both hips to 10 deg extension to allow for proper joint functioning as indicated by patients Functional Deficits. []? Progressing: []? Met: []? Not Met: []? Adjusted  3. Patient will demonstrate an increase in Strength to good proximal hip strength and control, within 5lb HHD in LE to allow for proper functional mobility as indicated by patients Functional Deficits. []? Progressing: []? Met: []? Not Met: []? Adjusted  4. Patient will return to driving herself in community. []? Progressing: []? Met: []? Not Met: []? Adjusted  5. Safely walk Community distance w/o AD or SC.  []? Progressing: []? Met: []? Not Met: []? Adjusted         Progression Towards Functional goals:  [x] Patient is progressing as expected towards functional goals listed. [] Progression is slowed due to complexities listed. [] Progression has been slowed due to co-morbidities. [x] Plan just implemented, too soon to assess goals progression  [] Other:     Overall Progression Towards Functional goals/ Treatment Progress Update:  [x] Patient is progressing as expected towards functional goals listed. [] Progression is slowed due to complexities/Impairments listed. [] Progression has been slowed due to co-morbidities.   [] Plan just implemented, too soon to assess goals progression <30days   [] Goals require adjustment due to lack of progress  [] Patient is not progressing as expected

## 2021-06-10 ENCOUNTER — HOSPITAL ENCOUNTER (OUTPATIENT)
Dept: PHYSICAL THERAPY | Age: 66
Setting detail: THERAPIES SERIES
Discharge: HOME | End: 2021-06-10
Payer: MEDICAID

## 2021-06-10 PROCEDURE — 97110 THERAPEUTIC EXERCISES: CPT

## 2021-06-10 PROCEDURE — 97112 NEUROMUSCULAR REEDUCATION: CPT

## 2021-06-10 NOTE — FLOWSHEET NOTE
Weakness/Balance     SUBJECTIVE: Patient states that her hips are feeling better, but she is having some centralized LBP this afternoon. Denies radicular P/T/N.    OBJECTIVE:    Observation: Able to progress GT to No AD in clinic today, but patient encouraged to continue to use small quad cane in community and @ work.  Pt  requiring fewer and shorter rest breaks in between TE. Tolerating progressive standing CKC.  Test measurements:      RESTRICTIONS/PRECAUTIONS:  Has Right RTC Tear (postponing surgical repair). Cason Rods. See above.     Exercises/Interventions:     Therapeutic Ex (02595) Sets/sec Reps Notes/CUES   Nustep 7 min     Gastroc Stretch on Incline H30x3     Trainer stairs 6 lengths     Standing HR 2x10     SB Seated Scap Retrac w/ TrA RTB H2 2x10     Doorway Pec Stretch H15x3     SB Corner Press-up 2x10      hep   TTL Gym Leg Press 2x15     Supine Clam GTB H3  1x10  hep   SB Bridge H3  1x10  hep   SB LTR x10     SLR w/ Q set 2x5 B     Supine Psoas Release Stretch H30x3 B  hep   SAQ w/ Ankle Ball Squeeze 2# H2 2x10                             Manual Intervention (56961)                                          NMR re-education (77865)   CUES NEEDED       Airex EO & EC Feet together balance x20 ea     GT w/o AD 6 min         Posterior Disc Slides @ bar NV     Stride Balance H5x10 B     LSU 4\" 1x10B     SLS Ball Tap w/ Contra 1x10 B     Wobble Board in II Bars Df/pf  2x10 challenging                            Therapeutic Activity (19322)                                            Therapeutic Exercise and NMR EXR  [x] (16036) Provided verbal/tactile cueing for activities related to strengthening, flexibility, endurance, ROM for improvements in LE, proximal hip, and core control with self care, mobility, lifting, ambulation.  [] (42598) Provided verbal/tactile cueing for activities related to improving balance, coordination, kinesthetic sense, posture, motor skill, proprioception  to assist with

## 2021-06-15 ENCOUNTER — HOSPITAL ENCOUNTER (OUTPATIENT)
Dept: PHYSICAL THERAPY | Age: 66
Setting detail: THERAPIES SERIES
Discharge: HOME | End: 2021-06-15
Payer: MEDICAID

## 2021-06-15 PROCEDURE — 97112 NEUROMUSCULAR REEDUCATION: CPT

## 2021-06-15 PROCEDURE — 97110 THERAPEUTIC EXERCISES: CPT

## 2021-06-15 NOTE — FLOWSHEET NOTE
proprioception  to assist with LE, proximal hip, and core control in self care, mobility, lifting, ambulation and eccentric single leg control. NMR and Therapeutic Activities:    [x] (17535 or 78141) Provided verbal/tactile cueing for activities related to improving balance, coordination, kinesthetic sense, posture, motor skill, proprioception and motor activation to allow for proper function of core, proximal hip and LE with self care and ADLs  [] (46129) Gait Re-education- Provided training and instruction to the patient for proper LE, core and proximal hip recruitment and positioning and eccentric body weight control with ambulation re-education including up and down stairs     Home Exercise Program:    [x] (31139) Reviewed/Progressed HEP activities related to strengthening, flexibility, endurance, ROM of core, proximal hip and LE for functional self-care, mobility, lifting and ambulation/stair navigation   [] (61409)Reviewed/Progressed HEP activities related to improving balance, coordination, kinesthetic sense, posture, motor skill, proprioception of core, proximal hip and LE for self care, mobility, lifting, and ambulation/stair navigation      Manual Treatments:  PROM / STM / Oscillations-Mobs:  G-I, II, III, IV (PA's, Inf., Post.)  [] (11257) Provided manual therapy to mobilize LE, proximal hip and/or LS spine soft tissue/joints for the purpose of modulating pain, promoting relaxation,  increasing ROM, reducing/eliminating soft tissue swelling/inflammation/restriction, improving soft tissue extensibility and allowing for proper ROM for normal function with self care, mobility, lifting and ambulation. Modalities:  none   [] GAME READY (VASO)- for significant edema, swelling, pain control.      Charges  Timed Code Treatment Minutes: 50   Total Treatment Minutes: 50     Medicare total (add KX at $2000)         [facility] time in/ time out:    TE time in /out    Manual time in/out    Neuro re ed time in/out Untimed minutes        [] EVAL (LOW) 34252   [] EVAL (MOD) 75393  [] EVAL (HIGH) 60847   [] RE-EVAL     [x] KK(91103) x 2    [] IONTO  [x] NMR (47225) x  1   [] VASO  [] Manual (70728) x      [] Other:  [] TA x      [] Mech Traction (03148)  [] ES(attended) (80653)      [] ES (un) (44345):       GOALS:   Short Term Goals: To be achieved in: 2 weeks  1. Independent in HEP and progression per patient tolerance, in order to prevent re-injury. []? Progressing: [x]? Met: []? Not Met: []? Adjusted  2. Patient will have a decrease in pain to facilitate improvement in movement, function, and ADLs as indicated by Functional Deficits. []? Progressing: [x]? Met: []? Not Met: []? Adjusted     Long Term Goals: To be achieved in: 10 weeks  1. Disability index score of 35% or less for the LEFS to assist with reaching prior level of function. []? Progressing: []? Met: []? Not Met: []? Adjusted  2. Patient will demonstrate increased AROM to both hips to 10 deg extension to allow for proper joint functioning as indicated by patients Functional Deficits. [x]? Progressing: []? Met: []? Not Met: []? Adjusted  3. Patient will demonstrate an increase in Strength to good proximal hip strength and control, within 5lb HHD in LE to allow for proper functional mobility as indicated by patients Functional Deficits. []? Progressing: [x]? Met: []? Not Met: []? Adjusted  4. Patient will return to driving herself in community. [x]? Progressing: []? Met: []? Not Met: []? Adjusted  5. Safely walk Community distance w/o AD or SC.  [x]? Progressing: []? Met: []? Not Met: []? Adjusted         Progression Towards Functional goals:  [x] Patient is progressing as expected towards functional goals listed. [] Progression is slowed due to complexities listed. [] Progression has been slowed due to co-morbidities.   [] Plan just implemented, too soon to assess goals progression  [] Other:     Overall Progression Towards Functional goals/ Treatment Progress Update:  [x] Patient is progressing as expected towards functional goals listed. [] Progression is slowed due to complexities/Impairments listed. [] Progression has been slowed due to co-morbidities. [] Plan just implemented, too soon to assess goals progression <30days   [] Goals require adjustment due to lack of progress  [] Patient is not progressing as expected and requires additional follow up with physician  [] Other    Prognosis for POC: [x] Good [] Fair  [] Poor      Patient requires continued skilled intervention: [x] Yes  [] No    Treatment/Activity Tolerance:  [x] Patient able to complete treatment  [] Patient limited by fatigue  [] Patient limited by pain    [] Patient limited by other medical complications  [] Other:     ASSESSMENT: Still challenged sit-stand, squatting activities. Weak eccentric quads. PLAN: Cont 2xwk. [x] Continue per plan of care [] Alter current plan (see comments above)  [] Plan of care initiated [] Hold pending MD visit [] Discharge      Electronically signed by:  Karla Valdez, PT  [id number]    Note: If patient does not return for scheduled/ recommended follow up visits, this note will serve as a discharge from care along with most recent update on progress.

## 2021-06-17 ENCOUNTER — HOSPITAL ENCOUNTER (OUTPATIENT)
Dept: PHYSICAL THERAPY | Age: 66
Setting detail: THERAPIES SERIES
Discharge: HOME | End: 2021-06-17
Payer: MEDICAID

## 2021-06-17 PROCEDURE — 97112 NEUROMUSCULAR REEDUCATION: CPT

## 2021-06-17 PROCEDURE — 97110 THERAPEUTIC EXERCISES: CPT

## 2021-06-17 NOTE — FLOWSHEET NOTE
[facility], [facility], [address], [address]  Phone: [phone]  Fax [phone]      Physical Therapy Treatment Note/ Progress Report:     Date:  2021    Patient Name:  Lisa Whiteside    :  1955  MRN: [id number]  Restrictions/Precautions:   COVID-19 2021, OA, HTN, Osteoporosis, Current Right RTC Injury (postponing surgical repair for later date), Back SX for Cason Rods @ age 8, Inner Ear [address] for Replacement of Ear Drum, Right Ulnar Nerve Decompression @ Elbow Aug. 12,2020, Right Carpal Tunnel Release SX Aug 25, 2020, PT Right Shoulder 2020. Medical/Treatment Diagnosis Information:  · Diagnosis: U07.1 COVID-  · Treatment Diagnosis: M25.551 & M25.552 Left & Right hip Pain, R26.81 Balance Issues, R26.2 Difficulty Walking, 26.9 Gait Abnormality, M62.81 Core and Bilat LE Weakness, R53.83 Generalized Fatigue.   Insurance/Certification information:  PT Insurance Information: Jeff[employer]  Physician Information:  Referring Practitioner: RICHARD Hugo  Has the plan of care been signed (Y/N):        []  Yes  [x]  No     Date of Patient follow up with Physician: PRN      Is this a Progress Report:     []  Yes  [x]  No      If Yes:  Date Range for reporting period:  Beginnin2020  Ending:    Progress report will be due (10 Rx or 30 days whichever is less): 1131      Recertification will be due (POC Duration  / 90 days whichever is less): 8-     Visit # Insurance Allowable Auth Required   In-person 6 30 []  Yes [x]  No    Telehealth   []  Yes []  No    Total        Functional Scale: LEFS=59%   Date assessed: 2021      Therapy Diagnosis/Practice Pattern: 4C      Number of Comorbidities:  []0     []1-2    [x]3+    Latex Allergy:  [x]NO      []YES  Preferred Language for Healthcare:   [x]English       []other:      Pain level:  1 10 B Hips  (8/10 right Shld and 3-4/10 LBP) Generalized Weakness/Balance     SUBJECTIVE: Pt reports that she is getting better each day - not tiring as quickly. Shoulder is still painful and knows it needs replaced. OBJECTIVE:    Observation: Progressed off of quad cane today. Pt now given okay to walk w/o AD in community.  Test measurements:      RESTRICTIONS/PRECAUTIONS:  Has Right RTC Tear (postponing surgical repair). Cason Rods. See above.     Exercises/Interventions:     Therapeutic Ex (53997) Sets/sec Reps Notes/CUES   Nustep 7 min     Gastroc Stretch on Incline H30x3         Squat to chair @ II Bars 1x10     Sidestepping In II Bars      Standing HR x15     Standing Modified HF/Quad Stretch w/ chairs H30x2 B     SB Seated Scap Retrac w/ TrA RTB H2 2x10     Doorway Pec Stretch            hep   TTL Gym Leg Press 2x20     [facility] Hip Abd 10# 1x10B                 Supine Clam GTB H3  1x10  hep   SB Bridge H3  1x10  hep   SB LTR x10     SLR w/ Q set 1x10 B     Supine Psoas Release Stretch H30x3 B  hep   SAQ w/ Ankle Ball Squeeze 2# H2 2x10                             Manual Intervention (00843)                                          NMR re-education (62503)   CUES NEEDED                   Posterior Disc Slides @ bar NV     Stride Balance H5x10 B     LSU 4\" 1x10B     SLS Ball Tap w/ Contra 1x10 B     Wobble Board in II Bars Df/pf  2x10 challenging    FSU w/ contra hike and posterior tap back 1x10B                       Therapeutic Activity (77433)                                            Therapeutic Exercise and NMR EXR  [x] (28190) Provided verbal/tactile cueing for activities related to strengthening, flexibility, endurance, ROM for improvements in LE, proximal hip, and core control with self care, mobility, lifting, ambulation.  [] (89518) Provided verbal/tactile cueing for activities related to improving balance, coordination, kinesthetic sense, posture, motor skill, proprioception  to assist with LE, proximal hip, and core control in self care, mobility, lifting, ambulation and eccentric single leg control. NMR and Therapeutic Activities:    [x] (26976 or 61619) Provided verbal/tactile cueing for activities related to improving balance, coordination, kinesthetic sense, posture, motor skill, proprioception and motor activation to allow for proper function of core, proximal hip and LE with self care and ADLs  [] (24888) Gait Re-education- Provided training and instruction to the patient for proper LE, core and proximal hip recruitment and positioning and eccentric body weight control with ambulation re-education including up and down stairs     Home Exercise Program:    [x] (63853) Reviewed/Progressed HEP activities related to strengthening, flexibility, endurance, ROM of core, proximal hip and LE for functional self-care, mobility, lifting and ambulation/stair navigation   [] (90689)Reviewed/Progressed HEP activities related to improving balance, coordination, kinesthetic sense, posture, motor skill, proprioception of core, proximal hip and LE for self care, mobility, lifting, and ambulation/stair navigation      Manual Treatments:  PROM / STM / Oscillations-Mobs:  G-I, II, III, IV (PA's, Inf., Post.)  [] (54259) Provided manual therapy to mobilize LE, proximal hip and/or LS spine soft tissue/joints for the purpose of modulating pain, promoting relaxation,  increasing ROM, reducing/eliminating soft tissue swelling/inflammation/restriction, improving soft tissue extensibility and allowing for proper ROM for normal function with self care, mobility, lifting and ambulation. Modalities:  none   [] GAME READY (VASO)- for significant edema, swelling, pain control.      Charges  Timed Code Treatment Minutes: 45   Total Treatment Minutes: 45     Medicare total (add KX at $2000)         [facility] time in/ time out:    TE time in /out    Manual time in/out    Neuro re ed time in/out    Untimed minutes        [] EVAL (LOW) 34710   [] EVAL (MOD) 99729  [] EVAL (HIGH) 53694   [] RE-EVAL     [x] XV(37105) x 2    [] IONTO  [x] NMR (26357) x  1   [] VASO  [] Manual (73134) x      [] Other:  [] TA x      [] Mech Traction (34252)  [] ES(attended) (57012)      [] ES (un) (41979):       GOALS:   Short Term Goals: To be achieved in: 2 weeks  1. Independent in HEP and progression per patient tolerance, in order to prevent re-injury. []? Progressing: [x]? Met: []? Not Met: []? Adjusted  2. Patient will have a decrease in pain to facilitate improvement in movement, function, and ADLs as indicated by Functional Deficits. []? Progressing: [x]? Met: []? Not Met: []? Adjusted     Long Term Goals: To be achieved in: 10 weeks  1. Disability index score of 35% or less for the LEFS to assist with reaching prior level of function. []? Progressing: []? Met: []? Not Met: []? Adjusted  2. Patient will demonstrate increased AROM to both hips to 10 deg extension to allow for proper joint functioning as indicated by patients Functional Deficits. [x]? Progressing: []? Met: []? Not Met: []? Adjusted  3. Patient will demonstrate an increase in Strength to good proximal hip strength and control, within 5lb HHD in LE to allow for proper functional mobility as indicated by patients Functional Deficits. []? Progressing: [x]? Met: []? Not Met: []? Adjusted  4. Patient will return to driving herself in community. [x]? Progressing: []? Met: []? Not Met: []? Adjusted  5. Safely walk Community distance w/o AD or SC.  [x]? Progressing: []? Met: []? Not Met: []? Adjusted         Progression Towards Functional goals:  [x] Patient is progressing as expected towards functional goals listed. [] Progression is slowed due to complexities listed. [] Progression has been slowed due to co-morbidities.   [] Plan just implemented, too soon to assess goals progression  [] Other:     Overall Progression Towards Functional goals/ Treatment Progress Update:  [x] Patient is progressing as expected towards functional goals listed. [] Progression is slowed due to complexities/Impairments listed. [] Progression has been slowed due to co-morbidities. [] Plan just implemented, too soon to assess goals progression <30days   [] Goals require adjustment due to lack of progress  [] Patient is not progressing as expected and requires additional follow up with physician  [] Other    Prognosis for POC: [x] Good [] Fair  [] Poor      Patient requires continued skilled intervention: [x] Yes  [] No    Treatment/Activity Tolerance:  [x] Patient able to complete treatment  [] Patient limited by fatigue  [] Patient limited by pain    [] Patient limited by other medical complications  [] Other:     ASSESSMENT: Still challenged sit-stand, squatting activities. Weak eccentric quads. PLAN: Cont 2xwk. [x] Continue per plan of care [] Alter current plan (see comments above)  [] Plan of care initiated [] Hold pending MD visit [] Discharge      Electronically signed by:  Shae Maldonado, PT  [id number]    Note: If patient does not return for scheduled/ recommended follow up visits, this note will serve as a discharge from care along with most recent update on progress.

## 2021-06-21 ENCOUNTER — HOSPITAL ENCOUNTER (OUTPATIENT)
Dept: PHYSICAL THERAPY | Age: 66
Setting detail: THERAPIES SERIES
Discharge: HOME | End: 2021-06-21
Payer: MEDICAID

## 2021-06-21 PROCEDURE — 97110 THERAPEUTIC EXERCISES: CPT

## 2021-06-21 PROCEDURE — 97112 NEUROMUSCULAR REEDUCATION: CPT

## 2021-06-21 NOTE — FLOWSHEET NOTE
Weakness/Balance     SUBJECTIVE: Pt reports that she fell on her tailbone on Saturday trying to kill a spider. Sore in her buttocks, B hips, and R shoulder. OBJECTIVE:    Observation: Off of quad cane.  Test measurements:      RESTRICTIONS/PRECAUTIONS:  Has Right RTC Tear (postponing surgical repair). Cason Rods. See above.     Exercises/Interventions:     Therapeutic Ex (06168) Sets/sec Reps Notes/CUES   Nustep 7 min     Gastroc Stretch on Incline H30x3         Squat to chair @ II Bars 1x10     Sidestepping In II Bars      Standing HR x15     Standing Modified HF/Quad Stretch w/ chairs H30x2 B     SB Seated Scap Retrac w/ TrA RTB H2 2x10     Doorway Pec Stretch            hep   TTL Gym Leg Press     [facility] Hip Abd     Seated SB roll out all 3 directions for lower back H5x5           Supine Clam GTB H3  1x10  hep   SB Bridge - modified to glute squeeze in hooklying this date  H3  1x10  hep   SB LTR x10     SLR w/ Q set 1x10 B     Supine Psoas Release Stretch H30x3 B  hep   SAQ w/ Ankle Ball Squeeze 2# H2 2x10                             Manual Intervention (54192)                                          NMR re-education (73037)   CUES NEEDED                   Posterior Disc Slides @ bar NV     Stride Balance H5x10 B     LSU 4\" 1x10B     SLS Ball Tap w/ Contra 1x10 B     Wobble Board in II Bars Df/pf/M/L  2x10 challenging CGA   FSU w/ contra hike and posterior tap back 1x10B                       Therapeutic Activity (13982)                                            Therapeutic Exercise and NMR EXR  [x] (88194) Provided verbal/tactile cueing for activities related to strengthening, flexibility, endurance, ROM for improvements in LE, proximal hip, and core control with self care, mobility, lifting, ambulation.  [] (41369) Provided verbal/tactile cueing for activities related to improving balance, coordination, kinesthetic sense, posture, motor skill, proprioception  to assist with LE, proximal hip, and core control in self care, mobility, lifting, ambulation and eccentric single leg control. NMR and Therapeutic Activities:    [x] (50138 or 19532) Provided verbal/tactile cueing for activities related to improving balance, coordination, kinesthetic sense, posture, motor skill, proprioception and motor activation to allow for proper function of core, proximal hip and LE with self care and ADLs  [] (81601) Gait Re-education- Provided training and instruction to the patient for proper LE, core and proximal hip recruitment and positioning and eccentric body weight control with ambulation re-education including up and down stairs     Home Exercise Program:    [x] (83621) Reviewed/Progressed HEP activities related to strengthening, flexibility, endurance, ROM of core, proximal hip and LE for functional self-care, mobility, lifting and ambulation/stair navigation   [] (68266)Reviewed/Progressed HEP activities related to improving balance, coordination, kinesthetic sense, posture, motor skill, proprioception of core, proximal hip and LE for self care, mobility, lifting, and ambulation/stair navigation      Manual Treatments:  PROM / STM / Oscillations-Mobs:  G-I, II, III, IV (PA's, Inf., Post.)  [] (72330) Provided manual therapy to mobilize LE, proximal hip and/or LS spine soft tissue/joints for the purpose of modulating pain, promoting relaxation,  increasing ROM, reducing/eliminating soft tissue swelling/inflammation/restriction, improving soft tissue extensibility and allowing for proper ROM for normal function with self care, mobility, lifting and ambulation. Modalities:  none   [] GAME READY (VASO)- for significant edema, swelling, pain control.      Charges  Timed Code Treatment Minutes: 45   Total Treatment Minutes: 45     Medicare total (add KX at $2000)         BWC time in/ time out:    TE time in /out    Manual time in/out    Neuro re ed time in/out    Untimed minutes        [] EVAL (LOW) 81504   [] TRISTAN expected towards functional goals listed. [] Progression is slowed due to complexities/Impairments listed. [] Progression has been slowed due to co-morbidities. [] Plan just implemented, too soon to assess goals progression <30days   [] Goals require adjustment due to lack of progress  [] Patient is not progressing as expected and requires additional follow up with physician  [] Other    Prognosis for POC: [x] Good [] Fair  [] Poor      Patient requires continued skilled intervention: [x] Yes  [] No    Treatment/Activity Tolerance:  [x] Patient able to complete treatment  [] Patient limited by fatigue  [] Patient limited by pain    [] Patient limited by other medical complications  [] Other:     ASSESSMENT: Still challenged sit-stand, squatting activities. Weak eccentric quads. Modified a few exercises due to increased R hip pain from fall over the weekend. PLAN: Cont 2xwk. [x] Continue per plan of care [] Alter current plan (see comments above)  [] Plan of care initiated [] Hold pending MD visit [] Discharge      Electronically signed by:  Jie Lucio, PT  [id number]    Note: If patient does not return for scheduled/ recommended follow up visits, this note will serve as a discharge from care along with most recent update on progress.

## 2021-06-24 ENCOUNTER — HOSPITAL ENCOUNTER (OUTPATIENT)
Dept: PHYSICAL THERAPY | Age: 66
Setting detail: THERAPIES SERIES
Discharge: HOME | End: 2021-06-24
Payer: MEDICAID

## 2021-06-24 PROCEDURE — 97140 MANUAL THERAPY 1/> REGIONS: CPT

## 2021-06-24 PROCEDURE — 97112 NEUROMUSCULAR REEDUCATION: CPT

## 2021-06-24 PROCEDURE — 97110 THERAPEUTIC EXERCISES: CPT

## 2021-06-24 NOTE — FLOWSHEET NOTE
Weakness/Balance     SUBJECTIVE: Pt states that she has had increase right sided LBP since her fall last week. Denies radicular P/T/N. Denies bowel/bladder changes. OBJECTIVE:    Observation: Very TTP right L5-S1. Elevated right IC. Initiated MT with improved IC Height and increase lumbar ROM following RX.  Test measurements:  FIS'in to 30 deg entering clinic; increased to 60 deg by end of RX. RESTRICTIONS/PRECAUTIONS:  Has Right RTC Tear (postponing surgical repair). Cason Rods. See above.     Exercises/Interventions:     Therapeutic Ex (23560) Sets/sec Reps Notes/CUES   Nustep 7 min     Gastroc Stretch on Incline H30x3     Trainer stairs 6 lengths     Squat to chair @ II Bars 1x10     Sidestepping In II Bars YTB @ Knees 2 laps     Standing HR x15     Standing Modified HF/Quad Stretch w/ chairs H30x2 B     SB Seated Scap Retrac w/ TrA RTB H2 2x10     Doorway Pec Stretch       SB Corner Press-up 2x10      hep   TTL Gym Leg Press     [facility] Hip Abd 15# 1x10B    Seated SB roll out all 3 directions for lower back H5x5           Supine Clam GTB H3  1x10  hep   SB Bridge - modified to glute squeeze in hooklying this date  H3  1x10  hep   SB LTR x10     SLR w/ Q set 1x10 B     Supine Psoas Release Stretch H30x3 B  hep   SAQ w/ Ankle Ball Squeeze 2# H2 2x10                             Manual Intervention (48889)      Left SL'ing/ STM//SI JM, followed by manual hip flexor stretching 8 min     Supine: Long LE distraction 3 min                             NMR re-education (40658)   CUES NEEDED                   Posterior Disc Slides @ bar 1x10 B Manual cuing @ knee/hip  mirror    Stride Balance w/ TB Pulldown RTB   2 x10 B     LSU 4\" 1x10B     SLS Ball Tap w/ Contra 1x10 B     Wobble Board in II Bars Df/pf/M/L  2x10 challenging CGA                         Therapeutic Activity (26575)                                            Therapeutic Exercise and NMR EXR  [x] (21346) Provided verbal/tactile cueing for activities related to strengthening, flexibility, endurance, ROM for improvements in LE, proximal hip, and core control with self care, mobility, lifting, ambulation.  [] (14998) Provided verbal/tactile cueing for activities related to improving balance, coordination, kinesthetic sense, posture, motor skill, proprioception  to assist with LE, proximal hip, and core control in self care, mobility, lifting, ambulation and eccentric single leg control.      NMR and Therapeutic Activities:    [x] (15981 or 98783) Provided verbal/tactile cueing for activities related to improving balance, coordination, kinesthetic sense, posture, motor skill, proprioception and motor activation to allow for proper function of core, proximal hip and LE with self care and ADLs  [] (71251) Gait Re-education- Provided training and instruction to the patient for proper LE, core and proximal hip recruitment and positioning and eccentric body weight control with ambulation re-education including up and down stairs     Home Exercise Program:    [x] (17205) Reviewed/Progressed HEP activities related to strengthening, flexibility, endurance, ROM of core, proximal hip and LE for functional self-care, mobility, lifting and ambulation/stair navigation   [] (41384)Reviewed/Progressed HEP activities related to improving balance, coordination, kinesthetic sense, posture, motor skill, proprioception of core, proximal hip and LE for self care, mobility, lifting, and ambulation/stair navigation      Manual Treatments:  PROM / STM / Oscillations-Mobs:  G-I, II, III, IV (PA's, Inf., Post.)  [x] (57167) Provided manual therapy to mobilize LE, proximal hip and/or LS spine soft tissue/joints for the purpose of modulating pain, promoting relaxation,  increasing ROM, reducing/eliminating soft tissue swelling/inflammation/restriction, improving soft tissue extensibility and allowing for proper ROM for normal function with self care, mobility, lifting and ambulation. Modalities:  none   [] GAME READY (VASO)- for significant edema, swelling, pain control. Charges  Timed Code Treatment Minutes: 60   Total Treatment Minutes: 60     Medicare total (add KX at $2000)         BWC time in/ time out:    TE time in /out    Manual time in/out    Neuro re ed time in/out    Untimed minutes        [] EVAL (LOW) 52751   [] EVAL (MOD) 33647  [] EVAL (HIGH) 83425   [] RE-EVAL     [x] SR(54463) x 2    [] IONTO  [x] NMR (13770) x  1   [] VASO  [x] Manual (15354) x  1    [] Other:  [] TA x      [] Mech Traction (75719)  [] ES(attended) (49547)      [] ES (un) (74042):       GOALS:   Short Term Goals: To be achieved in: 2 weeks  1. Independent in HEP and progression per patient tolerance, in order to prevent re-injury. []? Progressing: [x]? Met: []? Not Met: []? Adjusted  2. Patient will have a decrease in pain to facilitate improvement in movement, function, and ADLs as indicated by Functional Deficits. []? Progressing: [x]? Met: []? Not Met: []? Adjusted     Long Term Goals: To be achieved in: 10 weeks  1. Disability index score of 35% or less for the LEFS to assist with reaching prior level of function. []? Progressing: []? Met: []? Not Met: []? Adjusted  2. Patient will demonstrate increased AROM to both hips to 10 deg extension to allow for proper joint functioning as indicated by patients Functional Deficits. [x]? Progressing: []? Met: []? Not Met: []? Adjusted  3. Patient will demonstrate an increase in Strength to good proximal hip strength and control, within 5lb HHD in LE to allow for proper functional mobility as indicated by patients Functional Deficits. []? Progressing: [x]? Met: []? Not Met: []? Adjusted  4. Patient will return to driving herself in community. [x]? Progressing: []? Met: []? Not Met: []? Adjusted  5. Safely walk Community distance w/o AD or SC.  [x]? Progressing: []? Met: []? Not Met: []?  Adjusted         Progression Towards Functional goals:  [x] Patient is progressing as expected towards functional goals listed. [] Progression is slowed due to complexities listed. [] Progression has been slowed due to co-morbidities. [] Plan just implemented, too soon to assess goals progression  [] Other:     Overall Progression Towards Functional goals/ Treatment Progress Update:  [x] Patient is progressing as expected towards functional goals listed. [] Progression is slowed due to complexities/Impairments listed. [] Progression has been slowed due to co-morbidities. [] Plan just implemented, too soon to assess goals progression <30days   [] Goals require adjustment due to lack of progress  [] Patient is not progressing as expected and requires additional follow up with physician  [] Other    Prognosis for POC: [x] Good [] Fair  [] Poor      Patient requires continued skilled intervention: [x] Yes  [] No    Treatment/Activity Tolerance:  [x] Patient able to complete treatment  [] Patient limited by fatigue  [] Patient limited by pain    [] Patient limited by other medical complications  [] Other:     ASSESSMENT: 40% relief in right sided LBP after MT. Increase TE tolerance w/ increase in standing CKC TE activities. PLAN: Cont 2xwk. [x] Continue per plan of care [] Alter current plan (see comments above)  [] Plan of care initiated [] Hold pending MD visit [] Discharge      Electronically signed by:  Saskia Chin, PT  [id number]    Note: If patient does not return for scheduled/ recommended follow up visits, this note will serve as a discharge from care along with most recent update on progress.

## 2021-06-29 ENCOUNTER — HOSPITAL ENCOUNTER (OUTPATIENT)
Dept: PHYSICAL THERAPY | Age: 66
Setting detail: THERAPIES SERIES
Discharge: HOME | End: 2021-06-29
Payer: MEDICAID

## 2021-06-29 PROCEDURE — 97110 THERAPEUTIC EXERCISES: CPT

## 2021-06-29 PROCEDURE — 97112 NEUROMUSCULAR REEDUCATION: CPT

## 2021-06-29 NOTE — FLOWSHEET NOTE
[facility], [facility], [address] [facility], [address] [place] [address]  Phone: [phone]  Fax [phone]      Physical Therapy Treatment Note/ Progress Report:     Date:  2021    Patient Name:  Xin Clinton    :  1955  MRN: [id number]  Restrictions/Precautions:   COVID-19 2021, OA, HTN, Osteoporosis, Current Right RTC Injury (postponing surgical repair for later date), Back SX for Cason Rods @ age 8, Inner Ear [address] for Replacement of Ear Drum, Right Ulnar Nerve Decompression @ Elbow Aug. 12,2020, Right Carpal Tunnel Release SX Aug 25, 2020, PT Right Shoulder 2020. Medical/Treatment Diagnosis Information:  · Diagnosis: U07.1 COVID-  · Treatment Diagnosis: M25.551 & M25.552 Left & Right hip Pain, R26.81 Balance Issues, R26.2 Difficulty Walking, 26.9 Gait Abnormality, M62.81 Core and Bilat LE Weakness, R53.83 Generalized Fatigue.   Insurance/Certification information:  PT Insurance Information: [employer]  Physician Information:  Referring Practitioner: RICHARD Griffiths  Has the plan of care been signed (Y/N):        []  Yes  [x]  No     Date of Patient follow up with Physician: PRN      Is this a Progress Report:     []  Yes  [x]  No      If Yes:  Date Range for reporting period:  Beginnin2020  Ending:    Progress report will be due (10 Rx or 30 days whichever is less):       Recertification will be due (POC Duration  / 90 days whichever is less): 8-     Visit # Insurance Allowable Auth Required   In-person 9 30 []  Yes [x]  No    Telehealth   []  Yes []  No    Total        Functional Scale: LEFS=59%   Date assessed: 2021      Therapy Diagnosis/Practice Pattern: 4C      Number of Comorbidities:  []0     []1-2    [x]3+    Latex Allergy:  [x]NO      []YES  Preferred Language for Healthcare:   [x]English       []other:      Pain level:  1/10  Right LBP  (4-8/10 right Shld) Generalized Weakness/Balance     SUBJECTIVE: LB feeling much better after last treatment. High humidity/hot weather/seasonal allergies making it more difficult for her to breath today. OBJECTIVE: Progress Note/LEFS NV.   Observation:Pt required a few more and longer rest breaks between sets today, but was able to fully complete her program.   Test measurements:      RESTRICTIONS/PRECAUTIONS:  Has Right RTC Tear (postponing surgical repair). Cason Rods. See above.     Exercises/Interventions:     Therapeutic Ex (52157) Sets/sec Reps Notes/CUES   Nustep 7 min     Gastroc Stretch on Incline H30x3     Trainer stairs 6 lengths     Squat to chair @ II Bars 1x10     Sidestepping In II Bars YTB @ ankle 1 laps     Standing HR x15     Standing Modified HF/Quad Stretch w/ chairs H30x2 B         Doorway Pec Stretch       SB Corner Press-up 2x10      hep   TTL Gym Leg Press 2x20    [facility] Hip Abd 15# 1x10B         hep    hep   SB LTR x10     SLR w/ Q set 1x10 B     Supine Psoas Release Stretch H30x3 B  hep   SAQ w/ Ankle Ball Squeeze 2# H2 2x10                             Manual Intervention (35278)                                      NMR re-education (62013)   CUES NEEDED   Posterior Disc Slides @ bar 1x10 B     Stride Balance w/ TB Pulldown RTB   2 x10 B     LSU 4\" 1x10B         Wobble Board in II Bars Df/pf/M/L  2x10  CGA       Airex SLS w/ Posterior Tapback 1x10B                 Therapeutic Activity (52091)                                            Therapeutic Exercise and NMR EXR  [x] (41155) Provided verbal/tactile cueing for activities related to strengthening, flexibility, endurance, ROM for improvements in LE, proximal hip, and core control with self care, mobility, lifting, ambulation.  [] (24743) Provided verbal/tactile cueing for activities related to improving balance, coordination, kinesthetic sense, posture, motor skill, proprioception  to assist with LE, proximal hip, and core control in self care, mobility, lifting, ambulation and eccentric single leg control. NMR and Therapeutic Activities:    [x] (30675 or 52472) Provided verbal/tactile cueing for activities related to improving balance, coordination, kinesthetic sense, posture, motor skill, proprioception and motor activation to allow for proper function of core, proximal hip and LE with self care and ADLs  [] (01084) Gait Re-education- Provided training and instruction to the patient for proper LE, core and proximal hip recruitment and positioning and eccentric body weight control with ambulation re-education including up and down stairs     Home Exercise Program:    [x] (87649) Reviewed/Progressed HEP activities related to strengthening, flexibility, endurance, ROM of core, proximal hip and LE for functional self-care, mobility, lifting and ambulation/stair navigation   [] (50215)Reviewed/Progressed HEP activities related to improving balance, coordination, kinesthetic sense, posture, motor skill, proprioception of core, proximal hip and LE for self care, mobility, lifting, and ambulation/stair navigation      Manual Treatments:  PROM / STM / Oscillations-Mobs:  G-I, II, III, IV (PA's, Inf., Post.)  [x] (83089) Provided manual therapy to mobilize LE, proximal hip and/or LS spine soft tissue/joints for the purpose of modulating pain, promoting relaxation,  increasing ROM, reducing/eliminating soft tissue swelling/inflammation/restriction, improving soft tissue extensibility and allowing for proper ROM for normal function with self care, mobility, lifting and ambulation. Modalities:  none   [] GAME READY (VASO)- for significant edema, swelling, pain control.      Charges  Timed Code Treatment Minutes: 50   Total Treatment Minutes: 60     Medicare total (add KX at $2000)         [facility] time in/ time out:    TE time in /out    Manual time in/out    Neuro re ed time in/out    Untimed minutes        [] EVAL (LOW) 28734   [] EVAL (MOD) 39930  [] EVAL (HIGH) 72672 [] RE-EVAL     [x] AV(46969) x 2    [] IONTO  [x] NMR (66925) x  1   [] VASO  [] Manual (49323) x      [] Other:  [] TA x      [] Mech Traction (20844)  [] ES(attended) (65474)      [] ES (un) (07677):       GOALS:   Short Term Goals: To be achieved in: 2 weeks  1. Independent in HEP and progression per patient tolerance, in order to prevent re-injury. []? Progressing: [x]? Met: []? Not Met: []? Adjusted  2. Patient will have a decrease in pain to facilitate improvement in movement, function, and ADLs as indicated by Functional Deficits. []? Progressing: [x]? Met: []? Not Met: []? Adjusted     Long Term Goals: To be achieved in: 10 weeks  1. Disability index score of 35% or less for the LEFS to assist with reaching prior level of function. []? Progressing: []? Met: []? Not Met: []? Adjusted  2. Patient will demonstrate increased AROM to both hips to 10 deg extension to allow for proper joint functioning as indicated by patients Functional Deficits. [x]? Progressing: []? Met: []? Not Met: []? Adjusted  3. Patient will demonstrate an increase in Strength to good proximal hip strength and control, within 5lb HHD in LE to allow for proper functional mobility as indicated by patients Functional Deficits. []? Progressing: [x]? Met: []? Not Met: []? Adjusted  4. Patient will return to driving herself in community. []? Progressing: [x]? Met: []? Not Met: []? Adjusted  5. Safely walk Community distance w/o AD or SC.  []? Progressing: [x]? Met: []? Not Met: []? Adjusted         Progression Towards Functional goals:  [x] Patient is progressing as expected towards functional goals listed. [] Progression is slowed due to complexities listed. [] Progression has been slowed due to co-morbidities.   [] Plan just implemented, too soon to assess goals progression  [] Other:     Overall Progression Towards Functional goals/ Treatment Progress Update:  [x] Patient is progressing as expected towards functional goals listed. [] Progression is slowed due to complexities/Impairments listed. [] Progression has been slowed due to co-morbidities. [] Plan just implemented, too soon to assess goals progression <30days   [] Goals require adjustment due to lack of progress  [] Patient is not progressing as expected and requires additional follow up with physician  [] Other    Prognosis for POC: [x] Good [] Fair  [] Poor      Patient requires continued skilled intervention: [x] Yes  [] No    Treatment/Activity Tolerance:  [x] Patient able to complete treatment  [] Patient limited by fatigue  [] Patient limited by pain    [] Patient limited by other medical complications  [] Other:     ASSESSMENT: Patient has returned to driving and walking safely in her community w/o AD. PLAN: Cont 2xwk. [x] Continue per plan of care [] Alter current plan (see comments above)  [] Plan of care initiated [] Hold pending MD visit [] Discharge      Electronically signed by:  Jo Meehan, PT  [id number]    Note: If patient does not return for scheduled/ recommended follow up visits, this note will serve as a discharge from care along with most recent update on progress.

## 2021-07-01 ENCOUNTER — HOSPITAL ENCOUNTER (OUTPATIENT)
Dept: PHYSICAL THERAPY | Age: 66
Setting detail: THERAPIES SERIES
Discharge: HOME | End: 2021-07-01
Payer: MEDICAID

## 2021-07-01 PROCEDURE — 97110 THERAPEUTIC EXERCISES: CPT

## 2021-07-01 PROCEDURE — 97112 NEUROMUSCULAR REEDUCATION: CPT

## 2021-07-01 NOTE — FLOWSHEET NOTE
[facility], [facility], [address] [place], [address]  Phone: [phone]  Fax [phone]      Physical Therapy Treatment Note/ Progress Report:     Date:  2021    Patient Name:  Mignon Betancourt    :  1955  MRN: [id number]  Restrictions/Precautions:   COVID-19 2021, OA, HTN, Osteoporosis, Current Right RTC Injury (postponing surgical repair for later date), Back SX for Cason Rods @ age 8, Inner Ear [address] for Replacement of Ear Drum, Right Ulnar Nerve Decompression @ Elbow Aug. 12,2020, Right Carpal Tunnel Release SX Aug 25, 2020, PT Right Shoulder 2020. Medical/Treatment Diagnosis Information:  · Diagnosis: U07.1 COVID-19  · Treatment Diagnosis: M25.551 & M25.552 Left & Right hip Pain, R26.81 Balance Issues, R26.2 Difficulty Walking, 26.9 Gait Abnormality, M62.81 Core and Bilat LE Weakness, R53.83 Generalized Fatigue.   Insurance/Certification information:  PT Insurance Information: Jeffery  Physician Information:  Referring Practitioner: RICHARD Lamb  Has the plan of care been signed (Y/N):        [x]  Yes  []  No     Date of Patient follow up with Physician: PRN      Is this a Progress Report:     [x]  Yes  []  No      If Yes:  Date Range for reporting period:  Beginnin2020  Endin2021    Progress report will be due (10 Rx or 30 days whichever is less):       Recertification will be due (POC Duration  / 90 days whichever is less): 8-     Visit # Insurance Allowable Auth Required   In-person 10 30 []  Yes [x]  No    Telehealth   []  Yes []  No    Total        Functional Scale: LEFS=       %   Date assessed:       2021      Therapy Diagnosis/Practice Pattern: 4C      Number of Comorbidities:  []0     []1-2    [x]3+    Latex Allergy:  [x]NO      []YES  Preferred Language for Healthcare:   [x]English       []other:    Pain level:  0/10  Right LBP  (4-8/10 right Shld) Generalized Weakness/Balance     SUBJECTIVE: Patient states that she is now able to walk 2,000- 2,500 step/day w/o AD. OBJECTIVE: See Progress Note   Observation:.  Test measurements:      RESTRICTIONS/PRECAUTIONS:  Has Right RTC Tear (postponing surgical repair). Cason Rods. See above. Exercises/Interventions:     Therapeutic Ex (37927) Sets/sec Reps Notes/CUES   Nustep 5 min     Gastroc Stretch on Incline H30x3     Trainer stairs 8 lengths     Squat to chair @ II Bars 1x10         Standing HR x15     Standing Modified HF/Quad Stretch w/ chairs H30x2 B             SB Corner Press-up 2x10      hep   Thread the Needle Wall Thoracic Stretch H5x5 B    TTL Gym Leg Press 2x20    [facility] Hip Abd 10# 2x10B         hep    hep   SB LTR x10     SLR w/ Q set 1x10 B     Supine Psoas Release Stretch w/ knee flex stretch H30x3 B  hep   SAQ w/ Ankle Ball Squeeze 3# H2 2x10                             Manual Intervention (37031)                                      NMR re-education (97195)   CUES NEEDED   Posterior Disc Slides @ bar 1x10 B     Stride Balance w/ TB Pulldown RTB   2 x10 B     LSU  4\" 2x10B         Wobble Board in II Bars Df/pf/M/L  2x10  CGA   FSU w/ contra hike and posterior tap back 4\" 2fx10B                 Therapeutic Activity (71205)                                            Therapeutic Exercise and NMR EXR  [x] (66926) Provided verbal/tactile cueing for activities related to strengthening, flexibility, endurance, ROM for improvements in LE, proximal hip, and core control with self care, mobility, lifting, ambulation.  [] (77668) Provided verbal/tactile cueing for activities related to improving balance, coordination, kinesthetic sense, posture, motor skill, proprioception  to assist with LE, proximal hip, and core control in self care, mobility, lifting, ambulation and eccentric single leg control.      NMR and Therapeutic Activities:    [x] (18608 or 58101) Provided verbal/tactile cueing for activities related to improving balance, coordination, kinesthetic sense, posture, motor skill, proprioception and motor activation to allow for proper function of core, proximal hip and LE with self care and ADLs  [] (61553) Gait Re-education- Provided training and instruction to the patient for proper LE, core and proximal hip recruitment and positioning and eccentric body weight control with ambulation re-education including up and down stairs     Home Exercise Program:    [x] (84046) Reviewed/Progressed HEP activities related to strengthening, flexibility, endurance, ROM of core, proximal hip and LE for functional self-care, mobility, lifting and ambulation/stair navigation   [] (31285)Reviewed/Progressed HEP activities related to improving balance, coordination, kinesthetic sense, posture, motor skill, proprioception of core, proximal hip and LE for self care, mobility, lifting, and ambulation/stair navigation      Manual Treatments:  PROM / STM / Oscillations-Mobs:  G-I, II, III, IV (PA's, Inf., Post.)  [x] (14220) Provided manual therapy to mobilize LE, proximal hip and/or LS spine soft tissue/joints for the purpose of modulating pain, promoting relaxation,  increasing ROM, reducing/eliminating soft tissue swelling/inflammation/restriction, improving soft tissue extensibility and allowing for proper ROM for normal function with self care, mobility, lifting and ambulation. Modalities:  none   [] GAME READY (VASO)- for significant edema, swelling, pain control.      Charges  Timed Code Treatment Minutes: 58   Total Treatment Minutes: 58     Medicare total (add KX at $2000)         [facility] time in/ time out:    TE time in /out    Manual time in/out    Neuro re ed time in/out    Untimed minutes        [] EVAL (LOW) 33699   [] EVAL (MOD) 10140  [] EVAL (HIGH) 68783   [] RE-EVAL     [x] KJ(47764) x 2    [] IONTO  [x] NMR (09469) x  2   [] VASO  [] Manual (76516) x      [] Other:  [] TA x      [] Mech Traction (06023)  [] ES(attended) to assess goals progression <30days   [] Goals require adjustment due to lack of progress  [] Patient is not progressing as expected and requires additional follow up with physician  [] Other    Prognosis for POC: [x] Good [] Fair  [] Poor      Patient requires continued skilled intervention: [x] Yes  [] No    Treatment/Activity Tolerance:  [x] Patient able to complete treatment  [] Patient limited by fatigue  [] Patient limited by pain    [] Patient limited by other medical complications  [] Other:     ASSESSMENT:Pt making significant progress and is approaching all LTG's. Will plan to decrease RX frequency to 1xwk for 3 additional weeks and work towards DC to [employer]. PLAN: Cont 2xwk. [x] Continue per plan of care [] Alter current plan (see comments above)  [] Plan of care initiated [] Hold pending MD visit [] Discharge      Electronically signed by:  Carlyle Duverney, PT  [id number]    Note: If patient does not return for scheduled/ recommended follow up visits, this note will serve as a discharge from care along with most recent update on progress.

## 2021-07-01 NOTE — PROGRESS NOTES
[facility], [facility], [address] [place], [address]  Phone: [phone]  Fax [phone]     Physical Therapy 10th Visit / 30 Day Progress Note    Date: 2021        Patient Name:  Michelle Guevara    :  1955  MRN: [id number]  Referring [facility], APRN-CNP   Diagnosis: U07.1 COVID-19                     ICD Code:U07.1, M25.551 & M25.552 Left & Right hip Pain, R26.81 Balance Issues, R26.2 Difficulty Walking, 26.9 Gait Abnormality, M62.81 Core and Bilat LE Weakness, R53.83 Generalized Fatigue.   Therapy Diagnosis/Practice Pattern: 4C      Total number of visits: 10  Reporting Period:   Beginning [id number]  End Date: 2021    OBJECTIVE  Test used Initial score Current Score   Pain Summary 0-10 2/10 hips  8/10 R SHLD 0/10 hips  6/10 R SHLD   Functional questionnaire LEFS 59% 6%   Gait  Roller walk, forward flexed at trunk over walker, very short stride length No AD,  Upright posture, average stride length   Functional Testing 30 sec sit stand 0 reps 11 reps    TUG 18.4 sec w/ roller walker & arm rail assist to stand 10.2 sec  No AD, no assist to stand    Stride Balance L= 0 sec  R= 2 sec L= 11 sec  R= 10 sec    SLS L= 0 sec  R= 0 sec L= 5 sec  L=5 sec         ROM Knee Extension R= -10 deg  L= 0 deg R= 0 deg  L= 0 deg    Hip Extension R= -10 deg  L= -10 deg R= 0 deg  L = + 5 deg   Strength SLR L & R= 3/5 L & R= 4-/5    Hip Abd L & R = -3/5 L& R = 4-/5        Functional Limitation G-Code (if applicable):    LEFS = 6%         Test/tests used to determine % limitation: LEFS  Actual Score used to drive % limitation: 61%    Treatment to date:  [x] Therapeutic Exercise    [] Modalities:  [] Therapeutic Activity             []Ultrasound            []Electrical Stimulation  [x] Gait Training     []Cervical Traction    [] Lumbar Traction  [x] Neuromuscular Re-education [] Cold/hotpack         []Iontophoresis  [x] Instruction in HEP      Other:  [x] Manual Therapy                   []                                  []  Assessment:   [x] Improvement noted relative to goals:  1. Disability index score of 35% or less for the LEFS to assist with reaching prior level of function.   []?? Progressing: [x]? ? Met:to 6%  []? ? Not Met: []?? Adjusted  2. Patient will demonstrate increased AROM to both hips to 10 deg extension to allow for proper joint functioning as indicated by patients Functional Deficits. [x]? ? Approaching: []?? Met: []?? Not Met: []?? Adjusted  3. Patient will demonstrate an increase in Strength to good proximal hip strength and control, within 5lb HHD in LE to allow for proper functional mobility as indicated by patients Functional Deficits. [x]? ? Progressing: []?? Met: []?? Not Met: []?? Adjusted  4. Patient will return to driving herself in community.  []?? Progressing: [x]? ? Met: []?? Not Met: []?? Adjusted  5.  Safely walk Community distance w/o AD or SC.  []?? Progressing: [x]? ? Met: []?? Not Met: []?? Adjusted        [] No Improvement noted related to goals:/Patient's response to treatment/Additional assessment:    New or Updated Goals (if applicable):  [x] No change to goals established upon initial eval/last progress note:  New Goals:    Electronically signed by:  Keisha Troy, [address]

## 2021-07-08 ENCOUNTER — HOSPITAL ENCOUNTER (OUTPATIENT)
Dept: PHYSICAL THERAPY | Age: 66
Setting detail: THERAPIES SERIES
Discharge: HOME | End: 2021-07-08
Payer: MEDICAID

## 2021-07-08 PROCEDURE — 97110 THERAPEUTIC EXERCISES: CPT

## 2021-07-08 PROCEDURE — 97112 NEUROMUSCULAR REEDUCATION: CPT

## 2021-07-08 NOTE — FLOWSHEET NOTE
[facility], [facility], [address] [facility] [address], [address]  Phone: [phone]  Fax [phone]      Physical Therapy Treatment Note/ Progress Report:     Date:  2021    Patient Name:  Saloni Monk    :  1955  MRN: [id number]  Restrictions/Precautions:   COVID-19 2021, OA, HTN, Osteoporosis, Current Right RTC Injury (postponing surgical repair for later date), Back SX for Cason Rods @ age 8, Inner Ear [address] for Replacement of Ear Drum, Right Ulnar Nerve Decompression @ Elbow Aug. 12,2020, Right Carpal Tunnel Release SX Aug 25, 2020, PT Right Shoulder 2020. Medical/Treatment Diagnosis Information:  · Diagnosis: U07.1 COVID-  · Treatment Diagnosis: M25.551 & M25.552 Left & Right hip Pain, R26.81 Balance Issues, R26.2 Difficulty Walking, 26.9 Gait Abnormality, M62.81 Core and Bilat LE Weakness, R53.83 Generalized Fatigue.   Insurance/Certification information:  PT Insurance Information: Jeffery  Physician Information:  Referring Practitioner: RICHARD Barrientos  Has the plan of care been signed (Y/N):        [x]  Yes  []  No     Date of Patient follow up with Physician: PRN      Is this a Progress Report:     []  Yes  [x]  No      If Yes:  Date Range for reporting period:  Beginnin2020  Ending:     Progress report will be due (10 Rx or 30 days whichever is less):   4620      Recertification will be due (POC Duration  / 90 days whichever is less): 8-     Visit # Insurance Allowable Auth Required   In-person 11 30 []  Yes [x]  No    Telehealth   []  Yes []  No    Total        Functional Scale: LEFS=      6 %   Date assessed:       2021      Therapy Diagnosis/Practice Pattern: 4C      Number of Comorbidities:  []0     []1-2    [x]3+    Latex Allergy:  [x]NO      []YES  Preferred Language for Healthcare:   [x]English       []other:    Pain level:  2/10  Bilat Hips  (4-8/10 right Shld) Generalized Weakness/Balance     SUBJECTIVE: Pt states that both of her hips are aching entering clinic, but reports relief in hip pain after TE and stretching exiting clinic. OBJECTIVE: Pt encouraged to increase consistency w/ HEP to maintain hip flexibility and strength.  Observation: Improved core & gluteal activation during standing CKC and mat TE.  Test measurements:      RESTRICTIONS/PRECAUTIONS:  Has Right RTC Tear (postponing surgical repair). Cason Rods. See above.     Exercises/Interventions:     Therapeutic Ex (83520) Sets/sec Reps Notes/CUES   Nustep 5 min     Gastroc Stretch on Incline H30x3         Reverse BOSU Balance w/ ball lift 3x5     Squat to chair @ II Bars 1x10         Seated HS Stretch w/ Chairs H60x2 B     Standing HR x15     Standing Modified HF/Quad Stretch w/ chairs H30x2 B             SB Corner Press-up 2x10      hep   Thread the Needle Wall Thoracic Stretch H5x5 B    TTL Gym Leg Press 2x20    [facility] Hip Abd 10# 2x10B         hep    hep   SB LTR x10     SLR w/ Q set 1x10 B     Supine Psoas Release Stretch w/ knee flex stretch H30x3 B  hep   SAQ w/ Ankle Ball Squeeze 3# H2 2x10     Mat SB core press 3 direc                       Manual Intervention (93882)                                      NMR re-education (15697)   CUES NEEDED   Posterior Disc Slides @ bar 1x10 B         LSU  4\" 2x10B         Wobble Board in II Bars Df/pf/M/L  2x10  CGA   FSU w/ contra hike and posterior tap back RTB Pulldown 2x10B                 Therapeutic Activity (28524)                                            Therapeutic Exercise and NMR EXR  [x] (98601) Provided verbal/tactile cueing for activities related to strengthening, flexibility, endurance, ROM for improvements in LE, proximal hip, and core control with self care, mobility, lifting, ambulation.  [] (70536) Provided verbal/tactile cueing for activities related to improving balance, coordination, kinesthetic sense, posture, motor skill, proprioception  to assist with LE, proximal hip, and core control in self care, mobility, lifting, ambulation and eccentric single leg control. NMR and Therapeutic Activities:    [x] (46295 or 01654) Provided verbal/tactile cueing for activities related to improving balance, coordination, kinesthetic sense, posture, motor skill, proprioception and motor activation to allow for proper function of core, proximal hip and LE with self care and ADLs  [] (71444) Gait Re-education- Provided training and instruction to the patient for proper LE, core and proximal hip recruitment and positioning and eccentric body weight control with ambulation re-education including up and down stairs     Home Exercise Program:    [x] (41444) Reviewed/Progressed HEP activities related to strengthening, flexibility, endurance, ROM of core, proximal hip and LE for functional self-care, mobility, lifting and ambulation/stair navigation   [] (39760)Reviewed/Progressed HEP activities related to improving balance, coordination, kinesthetic sense, posture, motor skill, proprioception of core, proximal hip and LE for self care, mobility, lifting, and ambulation/stair navigation      Manual Treatments:  PROM / STM / Oscillations-Mobs:  G-I, II, III, IV (PA's, Inf., Post.)  [x] (92024) Provided manual therapy to mobilize LE, proximal hip and/or LS spine soft tissue/joints for the purpose of modulating pain, promoting relaxation,  increasing ROM, reducing/eliminating soft tissue swelling/inflammation/restriction, improving soft tissue extensibility and allowing for proper ROM for normal function with self care, mobility, lifting and ambulation. Modalities:  none   [] GAME READY (VASO)- for significant edema, swelling, pain control.      Charges  Timed Code Treatment Minutes: 50   Total Treatment Minutes: 50     Medicare total (add KX at $2000)         [facility] time in/ time out:    TE time in /out    Manual time in/out    Neuro re ed time in/out Untimed minutes        [] EVAL (LOW) 00617   [] EVAL (MOD) 01589  [] EVAL (HIGH) 65136   [] RE-EVAL     [x] CRUZ(78272) x 2    [] IONTO  [x] NMR (33943) x  1   [] VASO  [] Manual (30452) x      [] Other:  [] TA x      [] Mech Traction (63745)  [] ES(attended) (82573)      [] ES (un) (51204):       GOALS:   Short Term Goals: To be achieved in: 2 weeks  1. Independent in HEP and progression per patient tolerance, in order to prevent re-injury. []? Progressing: [x]? Met: []? Not Met: []? Adjusted  2. Patient will have a decrease in pain to facilitate improvement in movement, function, and ADLs as indicated by Functional Deficits. []? Progressing: [x]? Met: []? Not Met: []? Adjusted     Long Term Goals: To be achieved in: 10 weeks  1. Disability index score of 35% or less for the LEFS to assist with reaching prior level of function. []? Progressing: [x]? Met:to 6%  []? Not Met: []? Adjusted  2. Patient will demonstrate increased AROM to both hips to 10 deg extension to allow for proper joint functioning as indicated by patients Functional Deficits. [x]? Approaching: []? Met: []? Not Met: []? Adjusted  3. Patient will demonstrate an increase in Strength to good proximal hip strength and control, within 5lb HHD in LE to allow for proper functional mobility as indicated by patients Functional Deficits. [x]? Progressing: []? Met: []? Not Met: []? Adjusted  4. Patient will return to driving herself in community. []? Progressing: [x]? Met: []? Not Met: []? Adjusted  5. Safely walk Community distance w/o AD or SC.  []? Progressing: [x]? Met: []? Not Met: []? Adjusted         Progression Towards Functional goals:  [x] Patient is progressing as expected towards functional goals listed. [] Progression is slowed due to complexities listed. [] Progression has been slowed due to co-morbidities.   [] Plan just implemented, too soon to assess goals progression  [] Other:     Overall Progression Towards Functional goals/ Treatment Progress Update:  [x] Patient is progressing as expected towards functional goals listed. [] Progression is slowed due to complexities/Impairments listed. [] Progression has been slowed due to co-morbidities. [] Plan just implemented, too soon to assess goals progression <30days   [] Goals require adjustment due to lack of progress  [] Patient is not progressing as expected and requires additional follow up with physician  [] Other    Prognosis for POC: [x] Good [] Fair  [] Poor      Patient requires continued skilled intervention: [x] Yes  [] No    Treatment/Activity Tolerance:  [x] Patient able to complete treatment  [] Patient limited by fatigue  [] Patient limited by pain    [] Patient limited by other medical complications  [] Other:     ASSESSMENT: Pt able to complete her program in less time. Requires shorter and less frequent rest breaks. Increase stamina, stregth and flexibility. PLAN: Cont 1xwk. [x] Continue per plan of care [] Alter current plan (see comments above)  [] Plan of care initiated [] Hold pending MD visit [] Discharge      Electronically signed by:  Ivan Card, PT  [id number]    Note: If patient does not return for scheduled/ recommended follow up visits, this note will serve as a discharge from care along with most recent update on progress.

## 2021-07-15 ENCOUNTER — HOSPITAL ENCOUNTER (OUTPATIENT)
Dept: PHYSICAL THERAPY | Age: 66
Setting detail: THERAPIES SERIES
Discharge: HOME | End: 2021-07-15
Payer: MEDICAID

## 2021-07-15 PROCEDURE — 97112 NEUROMUSCULAR REEDUCATION: CPT

## 2021-07-15 PROCEDURE — 97110 THERAPEUTIC EXERCISES: CPT

## 2021-07-15 NOTE — FLOWSHEET NOTE
Cesia  81. and Therapy, [facility], [facility], [address] [place] [address]  Phone: [phone]  Fax [phone]      Physical Therapy Treatment Note/ Progress Report:     Date:  7/15/2021    Patient Name:  Michelle Guevara    :  1955  MRN: [id number]  Restrictions/Precautions:   COVID-19 2021, OA, HTN, Osteoporosis, Current Right RTC Injury (postponing surgical repair for later date), Back SX for Cason Rods @ age 8, Inner Ear [address] for Replacement of Ear Drum, Right Ulnar Nerve Decompression @ Elbow Aug. 12,2020, Right Carpal Tunnel Release SX Aug 25, 2020, PT Right Shoulder 2020. Medical/Treatment Diagnosis Information:  · Diagnosis: U07.1 COVID-  · Treatment Diagnosis: M25.551 & M25.552 Left & Right hip Pain, R26.81 Balance Issues, R26.2 Difficulty Walking, 26.9 Gait Abnormality, M62.81 Core and Bilat LE Weakness, R53.83 Generalized Fatigue.   Insurance/Certification information:  PT Insurance Information: Jeffery  Physician Information:  Referring Practitioner: RICHARD Graves  Has the plan of care been signed (Y/N):        [x]  Yes  []  No     Date of Patient follow up with Physician: PRN      Is this a Progress Report:     []  Yes  [x]  No      If Yes:  Date Range for reporting period:  Beginnin2020  Ending:     Progress report will be due (10 Rx or 30 days whichever is less):   1811      Recertification will be due (POC Duration  / 90 days whichever is less): 8-     Visit # Insurance Allowable Auth Required   In-person 12 30 []  Yes [x]  No    Telehealth   []  Yes []  No    Total        Functional Scale: LEFS=      6 %   Date assessed:       2021      Therapy Diagnosis/Practice Pattern: 4C      Number of Comorbidities:  []0     []1-2    [x]3+    Latex Allergy:  [x]NO      []YES  Preferred Language for Healthcare:   [x]English       []other:    Pain level: 2-3 /10    (4-8/10 right Shld) Generalized Weakness/Balance     SUBJECTIVE: Pt reports low grade LBP entering clinic. Denies radicular P/T/N. Not feeling as tired after PT appointments. No falls or loss of balance. OBJECTIVE:    Observation: Able to progress TE reps w/ good tolerance.  Test measurements:      RESTRICTIONS/PRECAUTIONS:  Has Right RTC Tear (postponing surgical repair). Cason Rods. See above.     Exercises/Interventions:     Therapeutic Ex (22329) Sets/sec Reps Notes/CUES   Nustep 6 min     Gastroc Stretch on Incline H30x3     Reverse BOSU Balance w/ ball lift 3x5     Squat to chair @ II Bars 2x10     Sidestepping In II Bars YTB @ ankle 1 laps     Seated HS Stretch w/ Chairs H60x2 B     Standing HR x15     Standing Modified HF/Quad Stretch w/ chairs H30x2 B     SB Seated Scap Retrac w/ TrA RTB H2 2x10         SB Corner Press-up 2x10      hep   Thread the Needle Wall Thoracic Stretch H5x5 B    TTL Gym Leg Press 4 min    AUSTEN TKE     AUSTEN Hip Abd 10# 2x10B         hep    hep   SB LTR x10         Supine Psoas Release Stretch w/ knee flex stretch H30x3 B  hep   SAQ w/ Ankle Ball Squeeze 3# H2 2x10     Mat SB core press 3 direc                       Manual Intervention (20355)                                      NMR re-education (94822)   CUES NEEDED       Stride Balance w/ TB Pulldown RTB   2 x10 B     LSU  4\" 2x10B         Wobble Board in II Bars Df/pf/M/L  2x10  CGA   FSU w/ contra hike and posterior tap back RTB Pulldown 2x10B                 Therapeutic Activity (37367)                                            Therapeutic Exercise and NMR EXR  [x] (24701) Provided verbal/tactile cueing for activities related to strengthening, flexibility, endurance, ROM for improvements in LE, proximal hip, and core control with self care, mobility, lifting, ambulation.  [] (98616) Provided verbal/tactile cueing for activities related to improving balance, coordination, kinesthetic sense, posture, motor skill, proprioception  to assist with LE, proximal hip, and core control in self care, mobility, lifting, ambulation and eccentric single leg control. NMR and Therapeutic Activities:    [x] (20399 or 29297) Provided verbal/tactile cueing for activities related to improving balance, coordination, kinesthetic sense, posture, motor skill, proprioception and motor activation to allow for proper function of core, proximal hip and LE with self care and ADLs  [] (83569) Gait Re-education- Provided training and instruction to the patient for proper LE, core and proximal hip recruitment and positioning and eccentric body weight control with ambulation re-education including up and down stairs     Home Exercise Program:    [x] (14811) Reviewed/Progressed HEP activities related to strengthening, flexibility, endurance, ROM of core, proximal hip and LE for functional self-care, mobility, lifting and ambulation/stair navigation   [] (16316)Reviewed/Progressed HEP activities related to improving balance, coordination, kinesthetic sense, posture, motor skill, proprioception of core, proximal hip and LE for self care, mobility, lifting, and ambulation/stair navigation      Manual Treatments:  PROM / STM / Oscillations-Mobs:  G-I, II, III, IV (PA's, Inf., Post.)  [x] (52931) Provided manual therapy to mobilize LE, proximal hip and/or LS spine soft tissue/joints for the purpose of modulating pain, promoting relaxation,  increasing ROM, reducing/eliminating soft tissue swelling/inflammation/restriction, improving soft tissue extensibility and allowing for proper ROM for normal function with self care, mobility, lifting and ambulation. Modalities:  none   [] GAME READY (VASO)- for significant edema, swelling, pain control.      Charges  Timed Code Treatment Minutes: 50   Total Treatment Minutes: 50     Medicare total (add KX at $2000)         BWC time in/ time out:    TE time in /out    Manual time in/out    Neuro re ed time in/out    Untimed minutes        [] EVAL (LOW) 19264   [] EVAL (MOD) 93344  [] EVAL (HIGH) 56601   [] RE-EVAL     [x] JY(17481) x 2    [] IONTO  [x] NMR (43679) x  1   [] VASO  [] Manual (12076) x      [] Other:  [] TA x      [] Mech Traction (20898)  [] ES(attended) (42507)      [] ES (un) (50925):       GOALS:   Short Term Goals: To be achieved in: 2 weeks  1. Independent in HEP and progression per patient tolerance, in order to prevent re-injury. []? Progressing: [x]? Met: []? Not Met: []? Adjusted  2. Patient will have a decrease in pain to facilitate improvement in movement, function, and ADLs as indicated by Functional Deficits. []? Progressing: [x]? Met: []? Not Met: []? Adjusted     Long Term Goals: To be achieved in: 10 weeks  1. Disability index score of 35% or less for the LEFS to assist with reaching prior level of function. []? Progressing: [x]? Met:to 6%  []? Not Met: []? Adjusted  2. Patient will demonstrate increased AROM to both hips to 10 deg extension to allow for proper joint functioning as indicated by patients Functional Deficits. [x]? Approaching: []? Met: []? Not Met: []? Adjusted  3. Patient will demonstrate an increase in Strength to good proximal hip strength and control, within 5lb HHD in LE to allow for proper functional mobility as indicated by patients Functional Deficits. [x]? Approaching: []? Met: []? Not Met: []? Adjusted  4. Patient will return to driving herself in community. []? Progressing: [x]? Met: []? Not Met: []? Adjusted  5. Safely walk Community distance w/o AD or SC.  []? Progressing: [x]? Met: []? Not Met: []? Adjusted         Progression Towards Functional goals:  [x] Patient is progressing as expected towards functional goals listed. [] Progression is slowed due to complexities listed. [] Progression has been slowed due to co-morbidities.   [] Plan just implemented, too soon to assess goals progression  [] Other:     Overall Progression Towards Functional goals/ Treatment Progress Update:  [x] Patient is progressing as expected towards functional goals listed. [] Progression is slowed due to complexities/Impairments listed. [] Progression has been slowed due to co-morbidities. [] Plan just implemented, too soon to assess goals progression <30days   [] Goals require adjustment due to lack of progress  [] Patient is not progressing as expected and requires additional follow up with physician  [] Other    Prognosis for POC: [x] Good [] Fair  [] Poor      Patient requires continued skilled intervention: [x] Yes  [] No    Treatment/Activity Tolerance:  [x] Patient able to complete treatment  [] Patient limited by fatigue  [] Patient limited by pain    [] Patient limited by other medical complications  [] Other:     ASSESSMENT: Pt approaching all LTG's. PLAN: Cont 1xwk  [x] Continue per plan of care [] Alter current plan (see comments above)  [] Plan of care initiated [] Hold pending MD visit [] Discharge      Electronically signed by:  Pam Ingram, PT  [id number]    Note: If patient does not return for scheduled/ recommended follow up visits, this note will serve as a discharge from care along with most recent update on progress.

## 2021-07-20 ENCOUNTER — APPOINTMENT (OUTPATIENT)
Dept: PHYSICAL THERAPY | Age: 66
End: 2021-07-20
Payer: MEDICAID

## 2021-07-22 ENCOUNTER — HOSPITAL ENCOUNTER (OUTPATIENT)
Dept: PHYSICAL THERAPY | Age: 66
Setting detail: THERAPIES SERIES
Discharge: HOME | End: 2021-07-22
Payer: MEDICAID

## 2021-07-22 PROCEDURE — 97112 NEUROMUSCULAR REEDUCATION: CPT

## 2021-07-22 PROCEDURE — 97110 THERAPEUTIC EXERCISES: CPT

## 2021-07-22 NOTE — DISCHARGE SUMMARY
Cesia  81. and Therapy, [facility], [address] [facility], [address] [place] [address]  Phone: [phone]  Fax [phone]         Physical Therapy Discharge  Date: 2021        Patient Name:  Tiki Galicia    :  1955  MRN: [id number]  Referring Hermilo ARABELLA Barahona-CNP  Diagnosis: U07.1 COVID-19                       ICD Code:U07.1 COVID-19, M25.551 & M25.552 Left & Right hip Pain, R26.81 Balance Issues, R26.2 Difficulty Walking, 26.9 Gait Abnormality, M62.81 Core and Bilat LE Weakness, R53.83 Generalized Fatigue  [] Surgical [x] Conservative   Therapy Diagnosis/Practice Pattern:4C     Number of Comorbidities:  []0     []1-2    [x]3+  Total number of visits: 13   Reporting Period:   Beginning CQYC:   End XDJJ:    OBJECTIVE  Test used Initial score Current Score   Pain Summary 0-10 2/10 hips  8/10 R SHLD 0/10 hips  6/10 R SHLD   Functional questionnaire LEFS 59% 6%   Gait  Roller walk, forward flexed at trunk over walker, very short stride length No AD,  Upright posture, average stride length   Functional Testing 30 sec sit stand 0 reps 15 reps    TUG 18.4 sec w/ roller walker & arm rail assist to stand 9.4 sec  No AD, no assist to stand    Stride Balance L= 0 sec  R= 2 sec L= 30+ sec  R= 30+sec    SLS L= 0 sec  R= 0 sec L= 8 sec  R=7 sec         ROM Knee Extension R= -10 deg  L= 0 deg R= 0 deg  L= 0 deg    Hip Extension R= -10 deg  L= -10 deg R= +10 deg  L = +10 deg   Strength SLR L & R= 3/5 L & R= 4/5    Hip Abd L & R = -3/5 L& R = 4/5     Treatment to date:  [x] Therapeutic Exercise    [] Modalities:  [] Therapeutic Activity             []Ultrasound            []Electrical Stimulation  [x] Gait Training     []Cervical Traction    [] Lumbar Traction  [x] Neuromuscular Re-education [] Cold/hotpack         []Iontophoresis  [x] Instruction in HEP      Other:  [x] Manual Therapy                   []                                  [] Assessment:   [x] All Goals were achieved. 1. Disability index score of 35% or less for the LEFS to assist with reaching prior level of function.   []?? Progressing: [x]? ? Met:to 6%  []? ? Not Met: []?? Adjusted  2. Patient will demonstrate increased AROM to both hips to 10 deg extension to allow for proper joint functioning as indicated by patients Functional Deficits. []?? Approaching: [x]? ? Met: []?? Not Met: []?? Adjusted  3. Patient will demonstrate an increase in Strength to good proximal hip strength and control, within 5lb HHD in LE to allow for proper functional mobility as indicated by patients Functional Deficits. []?? Approaching: [x]? ? Met: []?? Not Met: []?? Adjusted  4. Patient will return to driving herself in community.  []?? Progressing: [x]? ? Met: []?? Not Met: []?? Adjusted  5.  Safely walk Community distance w/o AD or SC.  []?? Progressing: [x]? ? Met: []?? Not Met: []?? Adjusted          [] The following goals were achieved (#'s):   [] The following goals were not achieved for the following reasons:/assessmen of improvement as it relates to each goal:    Plan of Care:  [x] Discharge from Therapy Services due to: All LTG's met and pt is indep in HEP. Reason for Discharge:   [x] All goals achieved    [] Patient having surgery  [] Physician discontinued therapy  [] Insurance/Financial Limitations [] Patient did not return for follow up visits [] Home program/1 visit only   [] No subjective or objective improvement [] Plateaued   [] Patient was unable to adhere to the plan of care established at evaluation. [] Referred back to physician for re-evaluation and did not return.      [] Other:       Electronically signed by:  Carlyle Duverney, [place]

## 2021-07-22 NOTE — FLOWSHEET NOTE
Cesia  81. and Therapy, [facility], [facility], [address] [place] [address]  Phone: [phone]  Fax [phone]      Physical Therapy Treatment Note/ Progress Report:     Date:  2021    Patient Name:  Homero Cadet    :  1955  MRN: [id number]  Restrictions/Precautions:   COVID-19 2021, OA, HTN, Osteoporosis, Current Right RTC Injury (postponing surgical repair for later date), Back SX for Cason Rods @ age 8, Inner Ear [address] for Replacement of Ear Drum, Right Ulnar Nerve Decompression @ Elbow Aug. 12,2020, Right Carpal Tunnel Release SX Aug 25, 2020, PT Right Shoulder 2020. Medical/Treatment Diagnosis Information:  · Diagnosis: U07.1 COVID-  · Treatment Diagnosis: M25.551 & M25.552 Left & Right hip Pain, R26.81 Balance Issues, R26.2 Difficulty Walking, 26.9 Gait Abnormality, M62.81 Core and Bilat LE Weakness, R53.83 Generalized Fatigue.   Insurance/Certification information:  PT Insurance Information: [employer]  Physician Information:  Referring Practitioner: RICHARD Harris  Has the plan of care been signed (Y/N):        [x]  Yes  []  No     Date of Patient follow up with Physician: PRN      Is this a Progress Report:     []  Yes  [x]  No      If Yes:  Date Range for reporting period:  Beginnin2020  Ending:     Progress report will be due (10 Rx or 30 days whichever is less):   8292      Recertification will be due (POC Duration  / 90 days whichever is less): 8-     Visit # Insurance Allowable Auth Required   In-person 13 30 []  Yes [x]  No    Telehealth   []  Yes []  No    Total        Functional Scale: LEFS=      6 %   Date assessed:       2021      Therapy Diagnosis/Practice Pattern: 4C      Number of Comorbidities:  []0     []1-2    [x]3+    Latex Allergy:  [x]NO      []YES  Preferred Language for Healthcare:   [x]English       []other:    Pain level: 0/10 LBP    (4-8/10 right Shld) Generalized Weakness/Balance     SUBJECTIVE: Hands hurting and fingers locking up. Will see rheumatologist next week. Has been able to return to a very active week. Went to social activities three days in a row, w/o issues. No loss of balance. Feels she is ready to be DC'd and will continue with her HEP on her own. OBJECTIVE: See Discharge Note   Observation:    Test measurements:      RESTRICTIONS/PRECAUTIONS:  Has Right RTC Tear (postponing surgical repair). Cason Rods. See above.     Exercises/Interventions:     Therapeutic Ex (09232) Sets/sec Reps Notes/CUES   Nustep 6 min     Gastroc Stretch on Incline H30x3     Reverse BOSU Balance w/ ball lift 3x5     Squat to chair @ II Bars 2x10     Sidestepping In II Bars YTB @ ankle 1 laps     Seated HS Stretch w/ Chairs H60x2 B     Standing HR x15     Standing Modified HF/Quad Stretch w/ chairs H30x2 B     SB Seated Scap Retrac w/ TrA RTB H2 2x10         SB Corner Press-up 2x10      hep   Thread the Needle Wall Thoracic Stretch H5x5 B    TTL Gym Leg Press 4 min    AUSTEN TKE 35# 2x10 B    AUSTEN Hip Abd 15# 2x10B         hep    hep   SB LTR x10     SLR w/ Q set 1x10 B     Supine Psoas Release Stretch w/ knee flex stretch H30x3 B  hep   SAQ w/ Ankle Ball Squeeze 3# H2 2x10     Mat SB core press 3 direc                       Manual Intervention (52080)                                      NMR re-education (40608)   CUES NEEDED       Stride Balance w/ TB Pulldown RTB   2 x10 B     LSU  4\" 2x10B         Wobble Board in II Bars Df/pf/M/L  2x10  CGA   FSU w/ contra hike and posterior tap back RTB Pulldown 2x10B                 Therapeutic Activity (36589)                                            Therapeutic Exercise and NMR EXR  [x] (77826) Provided verbal/tactile cueing for activities related to strengthening, flexibility, endurance, ROM for improvements in LE, proximal hip, and core control with self care, mobility, lifting, ambulation.  [] (56462) Provided verbal/tactile cueing for activities related to improving balance, coordination, kinesthetic sense, posture, motor skill, proprioception  to assist with LE, proximal hip, and core control in self care, mobility, lifting, ambulation and eccentric single leg control. NMR and Therapeutic Activities:    [x] (34068 or 02657) Provided verbal/tactile cueing for activities related to improving balance, coordination, kinesthetic sense, posture, motor skill, proprioception and motor activation to allow for proper function of core, proximal hip and LE with self care and ADLs  [] (22356) Gait Re-education- Provided training and instruction to the patient for proper LE, core and proximal hip recruitment and positioning and eccentric body weight control with ambulation re-education including up and down stairs     Home Exercise Program:    [x] (96932) Reviewed/Progressed HEP activities related to strengthening, flexibility, endurance, ROM of core, proximal hip and LE for functional self-care, mobility, lifting and ambulation/stair navigation   [] (39063)Reviewed/Progressed HEP activities related to improving balance, coordination, kinesthetic sense, posture, motor skill, proprioception of core, proximal hip and LE for self care, mobility, lifting, and ambulation/stair navigation      Manual Treatments:  PROM / STM / Oscillations-Mobs:  G-I, II, III, IV (PA's, Inf., Post.)  [x] (93915) Provided manual therapy to mobilize LE, proximal hip and/or LS spine soft tissue/joints for the purpose of modulating pain, promoting relaxation,  increasing ROM, reducing/eliminating soft tissue swelling/inflammation/restriction, improving soft tissue extensibility and allowing for proper ROM for normal function with self care, mobility, lifting and ambulation. Modalities:  none   [] GAME READY (VASO)- for significant edema, swelling, pain control.      Charges  Timed Code Treatment Minutes: 50   Total Treatment Minutes: 50     Medicare total (add KX at $2000)         [facility] time in/ time out:    TE time in /out    Manual time in/out    Neuro re ed time in/out    Untimed minutes        [] EVAL (LOW) 52792   [] EVAL (MOD) 47815  [] EVAL (HIGH) 86990   [] RE-EVAL     [x] GB(32314) x 2    [] IONTO  [x] NMR (33787) x  1   [] VASO  [] Manual (71168) x      [] Other:  [] TA x      [] Mech Traction (07668)  [] ES(attended) (88734)      [] ES (un) (87089):       GOALS:   Short Term Goals: To be achieved in: 2 weeks  1. Independent in HEP and progression per patient tolerance, in order to prevent re-injury. []? Progressing: [x]? Met: []? Not Met: []? Adjusted  2. Patient will have a decrease in pain to facilitate improvement in movement, function, and ADLs as indicated by Functional Deficits. []? Progressing: [x]? Met: []? Not Met: []? Adjusted     Long Term Goals: To be achieved in: 10 weeks  1. Disability index score of 35% or less for the LEFS to assist with reaching prior level of function. []? Progressing: [x]? Met:to 6%  []? Not Met: []? Adjusted  2. Patient will demonstrate increased AROM to both hips to 10 deg extension to allow for proper joint functioning as indicated by patients Functional Deficits. []? Approaching: [x]? Met: []? Not Met: []? Adjusted  3. Patient will demonstrate an increase in Strength to good proximal hip strength and control, within 5lb HHD in LE to allow for proper functional mobility as indicated by patients Functional Deficits. []? Approaching: [x]? Met: []? Not Met: []? Adjusted  4. Patient will return to driving herself in community. []? Progressing: [x]? Met: []? Not Met: []? Adjusted  5. Safely walk Community distance w/o AD or SC.  []? Progressing: [x]? Met: []? Not Met: []? Adjusted         Progression Towards Functional goals:  [] Patient is progressing as expected towards functional goals listed. [] Progression is slowed due to complexities listed. [] Progression has been slowed due to co-morbidities.   [] Plan just implemented, too soon to assess goals progression  [x] Other: All LTG's met. Overall Progression Towards Functional goals/ Treatment Progress Update:  [] Patient is progressing as expected towards functional goals listed. [] Progression is slowed due to complexities/Impairments listed. [] Progression has been slowed due to co-morbidities. [] Plan just implemented, too soon to assess goals progression <30days   [] Goals require adjustment due to lack of progress  [] Patient is not progressing as expected and requires additional follow up with physician  [x] Other All LTG's met. Prognosis for POC: [x] Good [] Fair  [] Poor      Patient requires continued skilled intervention: [] Yes  [x] No    Treatment/Activity Tolerance:  [x] Patient able to complete treatment  [] Patient limited by fatigue  [] Patient limited by pain    [] Patient limited by other medical complications  [] Other:     ASSESSMENT: All LTG's met and patient is indep in HEP. PLAN:  [] Continue per plan of care [] Alter current plan (see comments above)  [] Plan of care initiated [] Hold pending MD visit [x] Discharge      Electronically signed by:  Mathew Haywood, PT  [id number]    Note: If patient does not return for scheduled/ recommended follow up visits, this note will serve as a discharge from care along with most recent update on progress.

## 2021-08-17 RX ORDER — METOPROLOL TARTRATE 100 MG/1
50 TABLET ORAL 2 TIMES DAILY
Qty: 90 TABLET | Refills: 1 | OUTPATIENT
Start: 2021-08-17

## 2021-08-17 RX ORDER — METOPROLOL TARTRATE 50 MG/1
50 TABLET, FILM COATED ORAL 2 TIMES DAILY
Qty: 180 TABLET | Refills: 3 | Status: SHIPPED | OUTPATIENT
Start: 2021-08-17 | End: 2022-01-06

## 2021-11-08 ENCOUNTER — TELEPHONE (OUTPATIENT)
Dept: ENT CLINIC | Age: 66
End: 2021-11-08

## 2021-11-08 NOTE — TELEPHONE ENCOUNTER
LVM to call back to change appointment provider or to reschedule in [facility] with Dr. Stella Quintero.

## 2021-12-09 ENCOUNTER — HOSPITAL ENCOUNTER (OUTPATIENT)
Dept: PHYSICAL THERAPY | Age: 66
Setting detail: THERAPIES SERIES
Discharge: HOME | End: 2021-12-09
Payer: MEDICAID

## 2021-12-09 PROCEDURE — 97110 THERAPEUTIC EXERCISES: CPT

## 2021-12-09 PROCEDURE — 97161 PT EVAL LOW COMPLEX 20 MIN: CPT

## 2021-12-09 NOTE — FLOWSHEET NOTE
[facility], [address] [facility]  Phone: [phone]  Fax [phone]    Date:  2021    Patient Name:  Pedro Narvaez    :  1955  MRN: [id number]  Restrictions/Precautions:   Right TTL SHLD Post-op Protocol (See MEDIA TAB),  [id number] 2021 w/ hospitalization and f/u PT 21 to 21,  OA, HTN, Osteoporosis , Back SX for Cason Rods @ age 8, Inner Ear [address] for Replacement of Ear Drum, Right Ulnar Nerve Decompression @ Elbow Aug. 12,2020, Right Carpal Tunnel Release SX Aug 25, 2020, PT Right Shoulder 2020    Medical/Treatment Diagnosis Information:  Diagnosis: M75.101 Right Shld RTC Tear & OA s/p Total SHLD SX (DOS: 2021)  Treatment Diagnosis: M25.511 Right SHLD Pain, M25.521 Right Elbow Pain,M25.611 Rigth SHLD Stiffness, M25.621 Rigth Elbow Stiffness, M62.81 Right UE Weakness , G25.89 Scapular Dyskinesis  Insurance/Certification information:  PT Insurance Information: [facility]  Physician Information:  Referring Practitioner: Dr. Gordon Amor.  Xavier Jay  Has the plan of care been signed (Y/N):        []  Yes  [x]  No     Date of Patient follow up with Physician:     Is this a Progress Report:     []  Yes  [x]  No      If Yes:  Date Range for reporting period:  Beginnin2021  Ending    Progress report will be due (10 Rx or 30 days whichever is less): 9729    Recertification will be due (POC Duration  / 90 days whichever is less): 3-3-2021     Visit # Insurance Allowable Auth Required   In-person  30 []  Yes []  No    Telehealth   []  Yes []  No    Total        Functional Scale: Quick Dash= 84%   Date assessed:  2021     Therapy Diagnosis/Practice Pattern: 4H     Number of Comorbidities:  []0     [x]1-2    []3+    Latex Allergy:  [x]NO      []YES  Preferred Language for Healthcare:   [x]English       []other:    Pain level:  9/10 right shoulder and elbow in sling     SUBJECTIVE: See eval    OBJECTIVE: See eval  3 weeks post op (11-)   Observation:    Test measurements:      RESTRICTIONS/PRECAUTIONS: Right TTL SHLD Post-op Protocol (See MEDIA TAB),   0-6 weeks: plane of scapula to 120 deg, ext rot to 30 deg, sling all times, except when exercising. Exercises/Interventions:   Access Code: [email or web address]  URL: [email or web address]  Date: 12/09/2021  Prepared by: Annalise Myers    Exercises  Seated Shoulder Flexion Towel Slide at Table Top - 3 x daily - 7 x weekly - 1 sets - 10 reps - 5 hold  Seated Scapular Retraction - 3 x daily - 7 x weekly - 1 sets - 10 reps - 5 hold  Seated Shoulder External Rotation AAROM with Cane and Hand in Neutral - 3 x daily - 7 x weekly - 1 sets - 10 reps - 5 hold  Gentle Levator Scapulae Stretch - 3 x daily - 7 x weekly - 1 sets - 3-5 reps - 10 hold  Supine Elbow Extension Stretch in Supination - 3 x daily - 7 x weekly - 1 sets - 10 reps - 5 hold  Seated Forearm Pronation and Supination AROM - 3 x daily - 7 x weekly - 1 sets - 10 reps - 5 hold    Therapeutic Ex (06832) Sets/sec Reps Notes/CUES   Table Slides H5 1x10  hep   Scap squeeze H5 x10  hep   IR/ER Table Wipes w/ opposite hand H5 x10 Stop @ neutral hep   Shld Shrugs H5 x10  hep   Active Levator Stretch H10 x5  hep   Forearm pron/supin H5 x10  hep   Elbow Extension w/ supination H5 x10  hep   Ball Squeezs H5x10  hep   Codmens x10 ea  hep                     Manual Intervention (26436)      Light PROM right elbow and shoulder in sitting  4 min           PROM right shld on elbow in supine w/ head on wedge NV                       NMR re-education (96585)   CUES NEEDED                                       Therapeutic Activity (34536)                          Therapeutic Exercise and NMR EXR  [x] (07039) Provided verbal/tactile cueing for activities related to strengthening, flexibility, endurance, ROM  for improvements in scapular, scapulothoracic and UE control with self care, reaching, carrying, lifting, house/yardwork, driving/computer work. [x] (19245) Provided verbal/tactile cueing for activities related to improving balance, coordination, kinesthetic sense, posture, motor skill, proprioception  to assist with  scapular, scapulothoracic and UE control with self care, reaching, carrying, lifting, house/yardwork, driving/computer work. Therapeutic Activities:    [] (71104 or 37223) Provided verbal/tactile cueing for activities related to improving balance, coordination, kinesthetic sense, posture, motor skill, proprioception and motor activation to allow for proper function of scapular, scapulothoracic and UE control with self care, carrying, lifting, driving/computer work.      Home Exercise Program:    [x] (27182) Reviewed/Progressed HEP activities related to strengthening, flexibility, endurance, ROM of scapular, scapulothoracic and UE control with self care, reaching, carrying, lifting, house/yardwork, driving/computer work  [] (40923) Reviewed/Progressed HEP activities related to improving balance, coordination, kinesthetic sense, posture, motor skill, proprioception of scapular, scapulothoracic and UE control with self care, reaching, carrying, lifting, house/yardwork, driving/computer work      Manual Treatments:  PROM / STM / Oscillations-Mobs:  G-I, II, III, IV (PA's, Inf., Post.)  [x] (67391) Provided manual therapy to mobilize soft tissue/joints of cervical/CT, scapular GHJ and UE for the purpose of modulating pain, promoting relaxation,  increasing ROM, reducing/eliminating soft tissue swelling/inflammation/restriction, improving soft tissue extensibility and allowing for proper ROM for normal function with self care, reaching, carrying, lifting, house/yardwork, driving/computer work    Modalities:  CP right shoulder, HMP right elbow x10 min    Charges  Timed Code Treatment Minutes: 35   Total Treatment Minutes: 65     Medicare total (add KX at $2000)         BWC time in/ time out:    TE time in /out    Manual time in/out    Neuro re ed time in/out    Untimed minutes      [x] EVAL (LOW) 56321   [] EVAL (MOD) 27842   [] EVAL (HIGH) 25986   [] RE-EVAL     [x] OG(79868) x 2    [] IONTO  [] NMR (13618) x     [] VASO  [] Manual (92906) x      [x] Other:CP/HMP  [] TA x      [] Mech Traction (75717)  [] ES(attended) (99952)      [] ES (un) (72728):     GOALS  Short Term Goals: To be achieved in: 2 weeks  1. Independent in HEP and progression per patient tolerance, in order to prevent re-injury. []? Progressing: []? Met: []? Not Met: []? Adjusted  2. Patient will have a decrease in pain to facilitate improvement in movement, function, and ADLs as indicated by Functional Deficits. []? Progressing: []? Met: []? Not Met: []? Adjusted     Long Term Goals: To be achieved in: 12 weeks  1. Disability index score of 42% or less for the [facility] to assist with reaching prior level of function. []? Progressing: []? Met: []? Not Met: []? Adjusted  2. Patient will demonstrate increased AROM to 140 deg flexion to allow for proper joint functioning as indicated by patients Functional Deficits. []? Progressing: []? Met: []? Not Met: []? Adjusted  3. Patient will demonstrate an increase in Strength to 4/5 to allow for proper functional mobility as indicated by patients Functional Deficits. []? Progressing: []? Met: []? Not Met: []? Adjusted  4. Patient will retrieve plate from overhead shelf with her right hand without increased symptoms or restriction. []? Progressing: []? Met: []? Not Met: []? Adjusted  5. Wash and fix hair with right hand. []? Progressing: []? Met: []? Not Met: []? Adjusted         Progression Towards Functional goals:  [] Patient is progressing as expected towards functional goals listed. [] Progression is slowed due to complexities listed. [] Progression has been slowed due to co-morbidities.   [x] Plan just implemented, too soon to assess goals progression  [] Other:     ASSESSMENT: See eval    Overall Progression Towards Functional goals/ Treatment Progress Update:  [] Patient is progressing as expected towards functional goals listed. [] Progression is slowed due to complexities/Impairments listed. [] Progression has been slowed due to co-morbidities. [x] Plan just implemented, too soon to assess goals progression <30days   [] Goals require adjustment due to lack of progress  [] Patient is not progressing as expected and requires additional follow up with physician  [] Other    Prognosis for POC: [x] Good [] Fair  [] Poor      Patient requires continued skilled intervention: [x] Yes  [] No    Treatment/Activity Tolerance:  [x] Patient able to complete treatment  [] Patient limited by fatigue  [x] Patient limited by pain    [] Patient limited by other medical complications  [x] Other: Pt limited by post-op restrictions. PLAN: See eval  [] Continue per plan of care [] Alter current plan (see comments above)  [x] Plan of care initiated [] Hold pending MD visit [] Discharge    Electronically signed by:  Jose Anna, PT  [id number]    Note: If patient does not return for scheduled/ recommended follow up visits, this note will serve as a discharge from care along with most recent update on progress.

## 2021-12-09 NOTE — PROGRESS NOTES
Patient reassessed. Stool obtained. x1 loose stool today. Unchanged otherwise. Held Metoprolol dose for SBP less than 100.      Electronically signed by Sanaz Ramos RN on 4/28/2021 at 3:43 PM [FreeTextEntry1] : 64-year-old female is known to presents for annual exam, no complaints offered. Denies chest pain shortness of breath palpitations dizziness\par \par Review diet stressed the importance of regular exercise\par \par Call next week to review labs

## 2021-12-09 NOTE — PLAN OF CARE
[facility], [address]  Phone: [phone]  Fax [phone]     Physical Therapy Certification    Dear Referring Practitioner: Dr. Guy Lopez. Cora Kirkpatrick,    We had the pleasure of evaluating the following patient for physical therapy services at [address] [place] [address]. A summary of our findings can be found in the initial assessment below. This includes our plan of care. If you have any questions or concerns regarding these findings, please do not hesitate to contact me at the office phone number checked above. Thank you for the referral.       Physician Signature:_______________________________Date:__________________  By signing above (or electronic signature), therapists plan is approved by physician    Patient: Jay Dodson   : 1955   MRN: [id number]  Referring Physician: Referring Practitioner: Dr. Guy Lopez. Kostas      Evaluation Date: 2021      Medical Diagnosis Information:  Diagnosis: M75.101 Right Shld RTC Tear & OA s/p Total SHLD SX (DOS: 2021)   Treatment Diagnosis: M25.511 Right SHLD Pain, M25.521 Right Elbow Pain,M25.611 Rigth SHLD Stiffness, M25.621 Rigth Elbow Stiffness, M62.81 Right UE Weakness , G25.89 Scapular Dyskinesis                                         Insurance information: PT Insurance Information: [employer]    Precautions/ Contra-indications:   Right TTL SHLD Post-op Protocol (See MEDIA TAB),  COVID-19 2021 w/ hospitalization and f/u PT 21 to 21,  OA, HTN, Osteoporosis , Back SX for Cason Rods @ age 8, Inner Ear [address] for Replacement of Ear Drum, Right Ulnar Nerve Decompression @ Elbow Aug. 12,2020, Right Carpal Tunnel Release SX Aug 25, 2020, PT Right Shoulder 2020.     C-SSRS Triggered by Intake questionnaire (Past 2 wk assessment):   [x] No, Questionnaire did not trigger screening.   [] Yes, Patient intake triggered further evaluation      [] C-SSRS Screening completed  [] PCP notified via Plan of Care  [] Emergency services notified     Latex Allergy:  [x]NO      []YES  Preferred Language for Healthcare:   [x]English       []other:    SUBJECTIVE: Patient reports years of progressive right shoulder pain and increasing difficulty reaching, carrying, dressing, and bathing led her to undergo right TTL SHLD SX 3 weeks ago (DOS 11-). Is anxious about moving her arm. Having a lot of right shoulder and elbow pain w/ the slightest motion. Is right UE dominant. Sutures removed about 6 days ago. Taking anti-inflammatory meds at night, but not taking pain meds because they make her ill. MD is aware of this. Uses polar pack @ home. Relevant Medical History:  Right TTL SHLD Post-op Protocol (See MEDIA TAB),    Pt does not tolerate laying in full supine. COVID-19 4- w/ hospitalization and f/u PT 6-1-21 to 7-22-21    Functional Disability Index:  Quick Dash= 84%  Pain Scale: 7/10  Easing factors: sling, meds, CP  Provocative factors: any motion of right elbow or shoulder, reach, lift, carry, recumbanet    Type: [x]Constant   []Intermittent  []Radiating []Localized []other:     Numbness/Tingling: diffuse right hand    Occupation/School: Clerical - desk, files affidavits. Living Status/Prior Level of Function: Lives w/ spouse who is on SS. Independent with ADLs and IADLs.     OBJECTIVE:     CERV ROM     Cervical Flexion 40 deg    Cervical Extension - 15 deg    Cervical SB     Cervical rotation 28 deg 33 deg        ROM Left Right   Shoulder Flex  PROM 20 deg   Shoulder Abd  PROM 20 deg   Shoulder ER  PROM -33 deg   Shoulder IR  PROM 41 deg   Elbow Flex  PROM 62 deg pain   Elbow Ext  PROM -28 deg pain   Forearm Supination  PROM -74 deg        Strength  Left Right   Shoulder Flex  NT   Shoulder Scap  NT   Shoulder ER  NT   Shoulder IR  NT               Reflexes/Sensation:    [x]Dermatomes/Myotomes: Numbness lateral upper arm   []Reflexes equal and normal bilaterally   []Other:    Joint mobility:    []Normal    [x]Hypo   []Hyper    Palpation: Right UE pulses strong. Functional Mobility/Transfers: Can not lay fully recumbent. Can not transfer to right side or roll right. Posture: Kyphosis. Forward head. Moderate Scapular hike on right. Bandages/Dressings/Incisions: Incison closed. Post-op bruising about right shoulder and upper arm. Gait: (include devices/WB status): WNL, no AD    Orthopedic Special Tests: [place]                       [x] Patient history, allergies, meds reviewed. Medical chart reviewed. See intake form. Review Of Systems (ROS):  [x]Performed Review of systems (Integumentary, CardioPulmonary, Neurological) by intake and observation. Intake form has been scanned into medical record. Patient has been instructed to contact their primary care physician regarding ROS issues if not already being addressed at this time. 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 [] Falls Risk assessed and Patient requires intervention due to being higher risk   TUG score (>12s at risk):   8.7 sec  [] Falls education provided, including     ASSESSMENT:   Functional Impairments   [x]Noted spinal or UE joint hypomobility   []Noted spinal or UE joint hypermobility   [x]Decreased UE functional ROM   [x]Decreased UE functional strength   [x]Abnormal reflexes/sensation/myotomal/dermatomal deficits   [x]Decreased RC/scapular/core strength and neuromuscular control   []other:      Functional Activity Limitations (from functional questionnaire and intake)   [x]Reduced ability to tolerate prolonged functional positions   [x]Reduced ability or difficulty with changes of positions or transfers between positions   [x]Reduced ability to maintain good posture and demonstrate good body mechanics with sitting, bending, and lifting   [x] Reduced ability or tolerance with driving and/or computer work   [x]Reduced ability to sleep   [x]Reduced ability to perform lifting, reaching, carrying tasks   [x]Reduced ability to tolerate impact through UE   [x]Reduced ability to reach behind back   [x]Reduced ability to  or hold objects   [x]Reduced ability to throw or toss an object   []other:    Participation Restrictions   [x]Reduced participation in self care activities   [x]Reduced participation in home management activities   [x]Reduced participation in work activities   [x]Reduced participation in social activities. [x]Reduced participation in sport/recreation activities.     Classification:   [x]Signs/symptoms consistent with post-surgical status including decreased ROM, strength and function. []Signs/symptoms consistent with joint sprain/strain   []Signs/symptoms consistent with shoulder impingement   []Signs/symptoms consistent with shoulder/elbow/wrist tendinopathy   []Signs/symptoms consistent with Rotator cuff tear   []Signs/symptoms consistent with labral tear   []Signs/symptoms consistent with postural dysfunction    []Signs/symptoms consistent with Glenohumeral IR Deficit - <45 degrees   []Signs/symptoms consistent with facet dysfunction of cervical/thoracic spine    []Signs/symptoms consistent with pathology which may benefit from Dry needling     []other:     Prognosis/Rehab Potential:      []Excellent   [x]Good    []Fair   []Poor    Tolerance of evaluation/treatment:    []Excellent   [x]Good    []Fair   []Poor  Physical Therapy Evaluation Complexity Justification  [x] A history of present problem with:  [] no personal factors and/or comorbidities that impact the plan of care;  [x]1-2 personal factors and/or comorbidities that impact the plan of care  []3 personal factors and/or comorbidities that impact the plan of care  [x] An examination of body systems using standardized tests and measures addressing any of the following: body structures and functions (impairments), activity limitations, and/or participation restrictions;:  [x] a total of 1-2 or more elements   [] a total of 3 or more elements   [] a total of 4 or more elements   [x] A clinical presentation with:  [x] stable and/or uncomplicated characteristics   [] evolving clinical presentation with changing characteristics  [] unstable and unpredictable characteristics;   [x] Clinical decision making of [x] low, [] moderate, [] high complexity using standardized patient assessment instrument and/or measurable assessment of functional outcome.     [x] EVAL (LOW) 50600 (typically 20 minutes face-to-face)  [] EVAL (MOD) 22782 (typically 30 minutes face-to-face)  [] EVAL (HIGH) 29396 (typically 45 minutes face-to-face)  [] RE-EVAL       PLAN:  Frequency/Duration:  2 days per week for 12 Weeks:  INTERVENTIONS:  [x] Therapeutic exercise including: strength training, ROM, for Upper extremity and core   [x]  NMR activation and proprioception for UE, scap and Core   [x] Manual therapy as indicated for shoulder, scapula and spine to include: Dry Needling/IASTM, STM, PROM, Gr I-IV mobilizations, manipulation. [x] Modalities as needed that may include: thermal agents, E-stim, Biofeedback, US, iontophoresis as indicated  [x] Patient education on joint protection, postural re-education, activity modification, progression of HEP. HEP instruction: Pt given written HEP (see scanned forms)    GOALS:  Patient stated goal: Wash and fix hair with right hand. [] Progressing: [] Met: [] Not Met: [] Adjusted    Therapist goals for Patient:   Short Term Goals: To be achieved in: 2 weeks  1. Independent in HEP and progression per patient tolerance, in order to prevent re-injury. [] Progressing: [] Met: [] Not Met: [] Adjusted  2. Patient will have a decrease in pain to facilitate improvement in movement, function, and ADLs as indicated by Functional Deficits. [] Progressing: [] Met: [] Not Met: [] Adjusted    Long Term Goals: To be achieved in: 12 weeks  1. Disability index score of 42% or less for the [facility] to assist with reaching prior level of function. [] Progressing: [] Met: [] Not Met: [] Adjusted  2. Patient will demonstrate increased AROM to 140 deg flexion to allow for proper joint functioning as indicated by patients Functional Deficits. [] Progressing: [] Met: [] Not Met: [] Adjusted  3. Patient will demonstrate an increase in Strength to 4/5 to allow for proper functional mobility as indicated by patients Functional Deficits. [] Progressing: [] Met: [] Not Met: [] Adjusted  4.  Patient will retrieve plate from overhead shelf with her right hand without increased symptoms or restriction. [] Progressing: [] Met: [] Not Met: [] Adjusted  5. Wash and fix hair with right hand.    [] Progressing: [] Met: [] Not Met: [] Adjusted       Electronically signed by:  Jase Bishop, [id number] [facility] [address]

## 2021-12-14 ENCOUNTER — HOSPITAL ENCOUNTER (OUTPATIENT)
Dept: PHYSICAL THERAPY | Age: 66
Setting detail: THERAPIES SERIES
Discharge: HOME | End: 2021-12-14
Payer: MEDICAID

## 2021-12-14 PROCEDURE — 97112 NEUROMUSCULAR REEDUCATION: CPT | Performed by: PHYSICAL THERAPIST

## 2021-12-14 PROCEDURE — 97140 MANUAL THERAPY 1/> REGIONS: CPT | Performed by: PHYSICAL THERAPIST

## 2021-12-14 PROCEDURE — 97110 THERAPEUTIC EXERCISES: CPT | Performed by: PHYSICAL THERAPIST

## 2021-12-14 NOTE — FLOWSHEET NOTE
[facility], [address] [place]  Phone: [phone]  Fax [phone]    Date:  2021    Patient Name:  Darryl Yang    :  1955  MRN: [id number]  Restrictions/Precautions:   Right TTL SHLD Post-op Protocol (See MEDIA TAB),  [id number] 2021 w/ hospitalization and f/u PT 21 to 21,  OA, HTN, Osteoporosis , Back SX for Cason Rods @ age 8, Inner Ear [address] for Replacement of Ear Drum, Right Ulnar Nerve Decompression @ Elbow Aug. 12,2020, Right Carpal Tunnel Release SX Aug 25, 2020, PT Right Shoulder 2020    Medical/Treatment Diagnosis Information:  Diagnosis: M75.101 Right Shld RTC Tear & OA s/p Total SHLD SX (DOS: 2021)  Treatment Diagnosis: M25.511 Right SHLD Pain, M25.521 Right Elbow Pain,M25.611 Rigth SHLD Stiffness, M25.621 Rigth Elbow Stiffness, M62.81 Right UE Weakness , G25.89 Scapular Dyskinesis  Insurance/Certification information:  PT Insurance Information: [facility]  Physician Information:  Referring Practitioner: Dr. Wilder Perdomo.  Tomasa Woods  Has the plan of care been signed (Y/N):        []  Yes  [x]  No     Date of Patient follow up with Physician:     Is this a Progress Report:     []  Yes  [x]  No      If Yes:  Date Range for reporting period:  Beginnin2021  Ending    Progress report will be due (10 Rx or 30 days whichever is less): 0-3-1232    Recertification will be due (POC Duration  / 90 days whichever is less): 3-3-2021     Visit # Insurance Allowable Auth Required   In-person 2 30 []  Yes []  No    Telehealth   []  Yes []  No    Total        Functional Scale: Quick Dash= 84%   Date assessed:  2021     Therapy Diagnosis/Practice Pattern: 4H     Number of Comorbidities:  []0     [x]1-2    []3+    Latex Allergy:  [x]NO      []YES  Preferred Language for Healthcare:   [x]English       []other:    Pain level:  0/10 right shoulder    SUBJECTIVE:  Reports being told that she could wean out of the sling at the end of the week. Reports that she has a call into MD regarding swelling noted around R elbow and R ankle. Denies being SOB and having any calf pain or tenderness. Upon arrival reports not having any pain in shoulder. Elbow still feel tight. OBJECTIVE: See eval  3 weeks post op (11-)   Observation:    Test measurements:  PROM: flex: 80, Er: 30    RESTRICTIONS/PRECAUTIONS: Right TTL SHLD Post-op Protocol (See MEDIA TAB),   0-6 weeks: plane of scapula to 120 deg, ext rot to 30 deg, sling all times, except when exercising. Exercises/Interventions:   Access Code: [email or web address]  URL: [email or web address]  Date: 12/09/2021  Prepared by: Dino Shaikh    Exercises  Seated Shoulder Flexion Towel Slide at Table Top - 3 x daily - 7 x weekly - 1 sets - 10 reps - 5 hold  Seated Scapular Retraction - 3 x daily - 7 x weekly - 1 sets - 10 reps - 5 hold  Seated Shoulder External Rotation AAROM with Cane and Hand in Neutral - 3 x daily - 7 x weekly - 1 sets - 10 reps - 5 hold  Gentle Levator Scapulae Stretch - 3 x daily - 7 x weekly - 1 sets - 3-5 reps - 10 hold  Supine Elbow Extension Stretch in Supination - 3 x daily - 7 x weekly - 1 sets - 10 reps - 5 hold  Seated Forearm Pronation and Supination AROM - 3 x daily - 7 x weekly - 1 sets - 10 reps - 5 hold    Therapeutic Ex (02198) Sets/sec Reps Notes/CUES   Table Slides H5 1x10  hep   Scap squeeze H5 x10  hep   IR/ER Table Wipes w/ opposite hand H5 x10 Stop @ neutral hep   Shld Shrugs H5 x10  hep   Active Levator Stretch H10 x5  hep   Forearm pron/supin H5 x10  hep   Elbow Extension w/ supination H5 x10  hep   Ball Squeezs H5x10  hep   Codmens x10 ea  hep                     Manual Intervention (26204)      Light PROM right elbow and shoulder in sitting  4 min           PROM right shld on elbow in supine w/ head on wedge 15'                       NMR re-education (74099)   CUES NEEDED Therapeutic Activity (13523)                          Therapeutic Exercise and NMR EXR  [x] (59254) Provided verbal/tactile cueing for activities related to strengthening, flexibility, endurance, ROM  for improvements in scapular, scapulothoracic and UE control with self care, reaching, carrying, lifting, house/yardwork, driving/computer work. [x] (67292) Provided verbal/tactile cueing for activities related to improving balance, coordination, kinesthetic sense, posture, motor skill, proprioception  to assist with  scapular, scapulothoracic and UE control with self care, reaching, carrying, lifting, house/yardwork, driving/computer work. Therapeutic Activities:    [] (79168 or 38182) Provided verbal/tactile cueing for activities related to improving balance, coordination, kinesthetic sense, posture, motor skill, proprioception and motor activation to allow for proper function of scapular, scapulothoracic and UE control with self care, carrying, lifting, driving/computer work.      Home Exercise Program:    [x] (63207) Reviewed/Progressed HEP activities related to strengthening, flexibility, endurance, ROM of scapular, scapulothoracic and UE control with self care, reaching, carrying, lifting, house/yardwork, driving/computer work  [] (99825) Reviewed/Progressed HEP activities related to improving balance, coordination, kinesthetic sense, posture, motor skill, proprioception of scapular, scapulothoracic and UE control with self care, reaching, carrying, lifting, house/yardwork, driving/computer work      Manual Treatments:  PROM / STM / Oscillations-Mobs:  G-I, II, III, IV (PA's, Inf., Post.)  [x] (35923) Provided manual therapy to mobilize soft tissue/joints of cervical/CT, scapular GHJ and UE for the purpose of modulating pain, promoting relaxation,  increasing ROM, reducing/eliminating soft tissue swelling/inflammation/restriction, improving soft tissue extensibility and allowing for proper ROM for normal function with self care, reaching, carrying, lifting, house/yardwork, driving/computer work    Modalities:  CP right shoulder, CP right elbow x10 min    Charges  Timed Code Treatment Minutes: 39'   Total Treatment Minutes: 54'     Medicare total (add KX at $2000)         BWC time in/ time out:    TE time in /out    Manual time in/out    Neuro re ed time in/out    Untimed minutes      [] EVAL (LOW) 01833   [] EVAL (MOD) 63940   [] EVAL (HIGH) 18142   [] RE-EVAL     [x] PU(00462) x 1   [] IONTO  [x] NMR (06381) x  1  [] VASO  [x] Manual (36880) x   1   [x] Other:CP/HMP  [] TA x      [] Mech Traction (19582)  [] ES(attended) (55845)      [] ES (un) (88788):     GOALS  Short Term Goals: To be achieved in: 2 weeks  1. Independent in HEP and progression per patient tolerance, in order to prevent re-injury. []? Progressing: []? Met: []? Not Met: []? Adjusted  2. Patient will have a decrease in pain to facilitate improvement in movement, function, and ADLs as indicated by Functional Deficits. []? Progressing: []? Met: []? Not Met: []? Adjusted     Long Term Goals: To be achieved in: 12 weeks  1. Disability index score of 42% or less for the [facility] to assist with reaching prior level of function. []? Progressing: []? Met: []? Not Met: []? Adjusted  2. Patient will demonstrate increased AROM to 140 deg flexion to allow for proper joint functioning as indicated by patients Functional Deficits. []? Progressing: []? Met: []? Not Met: []? Adjusted  3. Patient will demonstrate an increase in Strength to 4/5 to allow for proper functional mobility as indicated by patients Functional Deficits. []? Progressing: []? Met: []? Not Met: []? Adjusted  4. Patient will retrieve plate from overhead shelf with her right hand without increased symptoms or restriction. []? Progressing: []? Met: []? Not Met: []? Adjusted  5. Wash and fix hair with right hand. []? Progressing: []? Met: []? Not Met: []?  Adjusted Progression Towards Functional goals:  [] Patient is progressing as expected towards functional goals listed. [] Progression is slowed due to complexities listed. [] Progression has been slowed due to co-morbidities. [x] Plan just implemented, too soon to assess goals progression  [] Other:     ASSESSMENT:  Increased shld PROM noted this visit. Elbow extension still limited,painful and has increased swelling. Patient able to D/C sling at of this week which hopefully will help the elbow ext ROM. Encourage patient to perform elbow extension in supine for gravity assistance to help with elbow extension. Overall Progression Towards Functional goals/ Treatment Progress Update:  [] Patient is progressing as expected towards functional goals listed. [] Progression is slowed due to complexities/Impairments listed. [] Progression has been slowed due to co-morbidities. [x] Plan just implemented, too soon to assess goals progression <30days   [] Goals require adjustment due to lack of progress  [] Patient is not progressing as expected and requires additional follow up with physician  [] Other    Prognosis for POC: [x] Good [] Fair  [] Poor      Patient requires continued skilled intervention: [x] Yes  [] No    Treatment/Activity Tolerance:  [x] Patient able to complete treatment  [] Patient limited by fatigue  [x] Patient limited by pain    [] Patient limited by other medical complications  [x] Other: Pt limited by post-op restrictions. PLAN: Progress ROM as tolerated. [x] Continue per plan of care [] Alter current plan (see comments above)  [] Plan of care initiated [] Hold pending MD visit [] Discharge    Electronically signed by:  Rica Mcburney, PT  [id number]    Note: If patient does not return for scheduled/ recommended follow up visits, this note will serve as a discharge from care along with most recent update on progress.

## 2021-12-17 ENCOUNTER — HOSPITAL ENCOUNTER (OUTPATIENT)
Dept: PHYSICAL THERAPY | Age: 66
Setting detail: THERAPIES SERIES
Discharge: HOME | End: 2021-12-17
Payer: MEDICAID

## 2021-12-17 PROCEDURE — 97140 MANUAL THERAPY 1/> REGIONS: CPT | Performed by: PHYSICAL THERAPIST

## 2021-12-17 PROCEDURE — 97112 NEUROMUSCULAR REEDUCATION: CPT | Performed by: PHYSICAL THERAPIST

## 2021-12-17 PROCEDURE — 97110 THERAPEUTIC EXERCISES: CPT | Performed by: PHYSICAL THERAPIST

## 2021-12-17 NOTE — FLOWSHEET NOTE
[facility], [address]  Phone: [phone]  Fax [phone]    Date:  2021    Patient Name:  Ching Mcdonough    :  1955  MRN: [id number]  Restrictions/Precautions:   Right TTL SHLD Post-op Protocol (See MEDIA TAB),  [id number] 2021 w/ hospitalization and f/u PT 21 to 21,  OA, HTN, Osteoporosis , Back SX for Cason Rods @ age 8, Inner Ear [address] for Replacement of Ear Drum, Right Ulnar Nerve Decompression @ Elbow Aug. 12,2020, Right Carpal Tunnel Release SX Aug 25, 2020, PT Right Shoulder 2020    Medical/Treatment Diagnosis Information:  Diagnosis: M75.101 Right Shld RTC Tear & OA s/p Total SHLD SX (DOS: 2021)  Treatment Diagnosis: M25.511 Right SHLD Pain, M25.521 Right Elbow Pain,M25.611 Rigth SHLD Stiffness, M25.621 Rigth Elbow Stiffness, M62.81 Right UE Weakness , G25.89 Scapular Dyskinesis  Insurance/Certification information:  PT Insurance Information: [facility]  Physician Information:  Referring Practitioner: Dr. Maddy Gusman.  Cletis Sicard  Has the plan of care been signed (Y/N):        []  Yes  [x]  No     Date of Patient follow up with Physician:     Is this a Progress Report:     []  Yes  [x]  No      If Yes:  Date Range for reporting period:  Beginnin2021  Ending    Progress report will be due (10 Rx or 30 days whichever is less):     Recertification will be due (POC Duration  / 90 days whichever is less): 3-3-2021     Visit # Insurance Allowable Auth Required   In-person 3 30 []  Yes []  No    Telehealth   []  Yes []  No    Total        Functional Scale: Quick Dash= 84%   Date assessed:  2021     Therapy Diagnosis/Practice Pattern: 4H     Number of Comorbidities:  []0     [x]1-2    []3+    Latex Allergy:  [x]NO      []YES  Preferred Language for Healthcare:   [x]English       []other:    Pain level:  0/10 right shoulder    SUBJECTIVE:  Reports being told that she could wean out of the sling at the end of the week. Spoke with MD and able to D/C sling. Told to wear compression stocking for LE swelling. Working on elbow extension with gravity assistance. OBJECTIVE: See eval. Patient arrived to clinic without sling. MD D/C.  4 weeks post op (11- DOS)  Kevin Zamora Observation:    Test measurements:  PROM: flex: 100 in supine on wedge midline, Er: 20     RESTRICTIONS/PRECAUTIONS: Right TTL SHLD Post-op Protocol (See MEDIA TAB),   0-6 weeks: plane of scapula to 120 deg, ext rot to 30 deg, sling all times, except when exercising. 6 weeks post-op 12/30/21. Exercises/Interventions:   Access Code: [email or web address]  URL: [email or web address]  Date: 12/09/2021  Prepared by: Henrique Adhikari    Exercises  Seated Shoulder Flexion Towel Slide at Table Top - 3 x daily - 7 x weekly - 1 sets - 10 reps - 5 hold  Seated Scapular Retraction - 3 x daily - 7 x weekly - 1 sets - 10 reps - 5 hold  Seated Shoulder External Rotation AAROM with Cane and Hand in Neutral - 3 x daily - 7 x weekly - 1 sets - 10 reps - 5 hold  Gentle Levator Scapulae Stretch - 3 x daily - 7 x weekly - 1 sets - 3-5 reps - 10 hold  Supine Elbow Extension Stretch in Supination - 3 x daily - 7 x weekly - 1 sets - 10 reps - 5 hold  Seated Forearm Pronation and Supination AROM - 3 x daily - 7 x weekly - 1 sets - 10 reps - 5 hold    Therapeutic Ex (35072) Sets/sec Reps Notes/CUES   Table Slides H5 1x10  hep   Scap squeeze H5 x10  hep   IR/ER Table Wipes w/ opposite hand H5 x10 Stop @ neutral hep   Shld Shrugs H5 x10  hep   Active Levator Stretch   hep   Forearm pron/supin H5 x10  hep   Elbow Extension w/ supination H5 x10  hep   Ball Squeezs H5x10  hep   Codmens x10 ea  hep         Supine ER with cane H10 10x 10 degrees past neutral   Pulleys H10 10x    Manual Intervention (64252)      Light PROM right elbow and shoulder in sitting  4 min           PROM right shld on elbow in supine w/ head on wedge 15' NMR re-education (19557)   CUES NEEDED                                       Therapeutic Activity (80550)                          Therapeutic Exercise and NMR EXR  [x] (35553) Provided verbal/tactile cueing for activities related to strengthening, flexibility, endurance, ROM  for improvements in scapular, scapulothoracic and UE control with self care, reaching, carrying, lifting, house/yardwork, driving/computer work. [x] (57413) Provided verbal/tactile cueing for activities related to improving balance, coordination, kinesthetic sense, posture, motor skill, proprioception  to assist with  scapular, scapulothoracic and UE control with self care, reaching, carrying, lifting, house/yardwork, driving/computer work. Therapeutic Activities:    [] (86005 or 93168) Provided verbal/tactile cueing for activities related to improving balance, coordination, kinesthetic sense, posture, motor skill, proprioception and motor activation to allow for proper function of scapular, scapulothoracic and UE control with self care, carrying, lifting, driving/computer work.      Home Exercise Program:    [x] (20121) Reviewed/Progressed HEP activities related to strengthening, flexibility, endurance, ROM of scapular, scapulothoracic and UE control with self care, reaching, carrying, lifting, house/yardwork, driving/computer work  [] (32803) Reviewed/Progressed HEP activities related to improving balance, coordination, kinesthetic sense, posture, motor skill, proprioception of scapular, scapulothoracic and UE control with self care, reaching, carrying, lifting, house/yardwork, driving/computer work      Manual Treatments:  PROM / STM / Oscillations-Mobs:  G-I, II, III, IV (PA's, Inf., Post.)  [x] (63185) Provided manual therapy to mobilize soft tissue/joints of cervical/CT, scapular GHJ and UE for the purpose of modulating pain, promoting relaxation,  increasing ROM, reducing/eliminating soft tissue swelling/inflammation/restriction, improving soft tissue extensibility and allowing for proper ROM for normal function with self care, reaching, carrying, lifting, house/yardwork, driving/computer work    Modalities:  CP right shoulder, CP right elbow x10 min    Charges  Timed Code Treatment Minutes: 39'   Total Treatment Minutes: 54'     Medicare total (add KX at $2000)         BWC time in/ time out:    TE time in /out    Manual time in/out    Neuro re ed time in/out    Untimed minutes      [] EVAL (LOW) 48008   [] EVAL (MOD) 53326   [] EVAL (HIGH) 74289   [] RE-EVAL     [x] NW(46063) x 1   [] IONTO  [x] NMR (76404) x  1  [] VASO  [x] Manual (48438) x   1   [x] Other:CP  [] TA x      [] Mech Traction (98537)  [] ES(attended) (42275)      [] ES (un) (03936):     GOALS  Short Term Goals: To be achieved in: 2 weeks  1. Independent in HEP and progression per patient tolerance, in order to prevent re-injury. []? Progressing: []? Met: []? Not Met: []? Adjusted  2. Patient will have a decrease in pain to facilitate improvement in movement, function, and ADLs as indicated by Functional Deficits. []? Progressing: []? Met: []? Not Met: []? Adjusted     Long Term Goals: To be achieved in: 12 weeks  1. Disability index score of 42% or less for the [facility] to assist with reaching prior level of function. []? Progressing: []? Met: []? Not Met: []? Adjusted  2. Patient will demonstrate increased AROM to 140 deg flexion to allow for proper joint functioning as indicated by patients Functional Deficits. []? Progressing: []? Met: []? Not Met: []? Adjusted  3. Patient will demonstrate an increase in Strength to 4/5 to allow for proper functional mobility as indicated by patients Functional Deficits. []? Progressing: []? Met: []? Not Met: []? Adjusted  4. Patient will retrieve plate from overhead shelf with her right hand without increased symptoms or restriction. []? Progressing: []? Met: []? Not Met: []? Adjusted  5. Wash and fix hair with right hand. []? Progressing: []? Met: []? Not Met: []? Adjusted         Progression Towards Functional goals:  [] Patient is progressing as expected towards functional goals listed. [] Progression is slowed due to complexities listed. [] Progression has been slowed due to co-morbidities. [x] Plan just implemented, too soon to assess goals progression  [] Other:      ASSESSMENT:  Increased shld PROM noted this visit. Elbow extension still limited,painful and has increased swelling. Patient able to D/C sling at of this week which hopefully will help the elbow ext ROM. Encourage patient to perform elbow extension in supine for gravity assistance to help with elbow extension. Overall Progression Towards Functional goals/ Treatment Progress Update:  [] Patient is progressing as expected towards functional goals listed. [] Progression is slowed due to complexities/Impairments listed. [] Progression has been slowed due to co-morbidities. [x] Plan just implemented, too soon to assess goals progression <30days   [] Goals require adjustment due to lack of progress  [] Patient is not progressing as expected and requires additional follow up with physician  [] Other    Prognosis for POC: [x] Good [] Fair  [] Poor      Patient requires continued skilled intervention: [x] Yes  [] No    Treatment/Activity Tolerance:  [x] Patient able to complete treatment  [] Patient limited by fatigue  [x] Patient limited by pain    [] Patient limited by other medical complications  [x] Other: Pt limited by post-op restrictions. PLAN: Progress ROM as tolerated.   [x] Continue per plan of care [] Alter current plan (see comments above)  [] Plan of care initiated [] Hold pending MD visit [] Discharge    Electronically signed by:  Annalisa Miles, PT  [id number]    Note: If patient does not return for scheduled/ recommended follow up visits, this note will serve as a discharge from care along with most recent update on progress.

## 2021-12-21 ENCOUNTER — HOSPITAL ENCOUNTER (OUTPATIENT)
Dept: PHYSICAL THERAPY | Age: 66
Setting detail: THERAPIES SERIES
Discharge: HOME | End: 2021-12-21
Payer: MEDICAID

## 2021-12-21 PROCEDURE — 97140 MANUAL THERAPY 1/> REGIONS: CPT

## 2021-12-21 PROCEDURE — 97110 THERAPEUTIC EXERCISES: CPT

## 2021-12-21 NOTE — FLOWSHEET NOTE
[facility], [address]  Phone: [phone]  Fax [phone]    Date:  2021    Patient Name:  Emily Nicole    :  1955  MRN: [id number]  Restrictions/Precautions:   Right TTL SHLD Post-op Protocol (See MEDIA TAB),  JNBPE-09 2021 w/ hospitalization and f/u PT 21 to 21,  OA, HTN, Osteoporosis , Back SX for Cason Rods @ age 8, Inner Ear [address] for Replacement of Ear Drum, Right Ulnar Nerve Decompression @ Elbow Aug. 12,2020, Right Carpal Tunnel Release SX Aug 25, 2020, PT Right Shoulder 2020    Medical/Treatment Diagnosis Information:  Diagnosis: M75.101 Right Shld RTC Tear & OA s/p Total SHLD SX (DOS: 2021)  Treatment Diagnosis: M25.511 Right SHLD Pain, M25.521 Right Elbow Pain,M25.611 Rigth SHLD Stiffness, M25.621 Rigth Elbow Stiffness, M62.81 Right UE Weakness , G25.89 Scapular Dyskinesis  Insurance/Certification information:  PT Insurance Information: [facility]  Physician Information:  Referring Practitioner: Dr. Christina Leslie.  [facility]  Has the plan of care been signed (Y/N):        []  Yes  [x]  No     Date of Patient follow up with Physician:     Is this a Progress Report:     []  Yes  [x]  No      If Yes:  Date Range for reporting period:  Beginnin2021  Ending    Progress report will be due (10 Rx or 30 days whichever is less): 1236    Recertification will be due (POC Duration  / 90 days whichever is less): 3-3-2021     Visit # Insurance Allowable Auth Required   In-person 4 30 []  Yes []  No    Telehealth   []  Yes []  No    Total        Functional Scale: Quick Dash= 84%   Date assessed:  2021     Therapy Diagnosis/Practice Pattern: 4H     Number of Comorbidities:  []0     [x]1-2    []3+    Latex Allergy:  [x]NO      []YES  Preferred Language for Healthcare:   [x]English       []other:    Pain level:  5-9 /10 R SHLD,  3-5/10 R Elbow    SUBJECTIVE: Pt states that she had intense right shoulder pain over the weekend on both Saturday and Sunday for no apparent reason. Patient states that she hurt all over on those days and didn't do much at home but sit in her recliner. She states that her  manages the cooking and cleaning, but he just fell over the weekend and is now not able to do as much. Compliant w/ her HEP when she feels up to it. (Previous: Reports being told that she could wean out of the sling at the end of the week. Spoke with MD and able to D/C sling. Told to wear compression stocking for LE swelling. Working on elbow extension with gravity assistance.)    OBJECTIVE:   4.75 weeks post op (11- DOS)   Observation: Pt enters clinic w/o sling.  Test measurements:  PROM: flex: 90 in supine on wedge midline, Er: 20     RESTRICTIONS/PRECAUTIONS: Right TTL SHLD Post-op Protocol (See MEDIA TAB),   0-6 weeks: plane of scapula to 120 deg, ext rot to 30 deg, sling all times, except when exercising. 6 weeks post-op 12/30/21. Exercises/Interventions:   Access Code: [email or web address]  URL: [email or web address]  Date: 12/09/2021  Prepared by: Domingo Gonzáles    Exercises  Seated Shoulder Flexion Towel Slide at Table Top - 3 x daily - 7 x weekly - 1 sets - 10 reps - 5 hold  Seated Scapular Retraction - 3 x daily - 7 x weekly - 1 sets - 10 reps - 5 hold  Seated Shoulder External Rotation AAROM with Cane and Hand in Neutral - 3 x daily - 7 x weekly - 1 sets - 10 reps - 5 hold  Gentle Levator Scapulae Stretch - 3 x daily - 7 x weekly - 1 sets - 3-5 reps - 10 hold  Supine Elbow Extension Stretch in Supination - 3 x daily - 7 x weekly - 1 sets - 10 reps - 5 hold  Seated Forearm Pronation and Supination AROM - 3 x daily - 7 x weekly - 1 sets - 10 reps - 5 hold    Therapeutic Ex (51776) Sets/sec Reps Notes/CUES   Table Slides w/ SB H5 1x10  hep   Scap squeeze H5 x10  hep   IR/ER Table Wipes w/ opposite hand H5 x10 Stop @ neutral hep   Shld Shrugs H5 x10 hep   Levator Stretch H15 x3  hep   Forearm pron/supin H5 x10  hep   Elbow Extension w/ supination H5 x10  hep    hep   Codmens x10 ea  hep   Finisher 3 min     Supine ER with cane H10 10x 10 degrees past neutral   Pulleys 2x10                 Manual Intervention (58375)            PROM right shld on elbow in supine w/ head on wedge 25'                       NMR re-education (21574)   CUES NEEDED                                       Therapeutic Activity (82909)                          Therapeutic Exercise and NMR EXR  [x] (76190) Provided verbal/tactile cueing for activities related to strengthening, flexibility, endurance, ROM  for improvements in scapular, scapulothoracic and UE control with self care, reaching, carrying, lifting, house/yardwork, driving/computer work. [x] (01420) Provided verbal/tactile cueing for activities related to improving balance, coordination, kinesthetic sense, posture, motor skill, proprioception  to assist with  scapular, scapulothoracic and UE control with self care, reaching, carrying, lifting, house/yardwork, driving/computer work. Therapeutic Activities:    [] (72956 or 84310) Provided verbal/tactile cueing for activities related to improving balance, coordination, kinesthetic sense, posture, motor skill, proprioception and motor activation to allow for proper function of scapular, scapulothoracic and UE control with self care, carrying, lifting, driving/computer work.      Home Exercise Program:    [x] (55393) Reviewed/Progressed HEP activities related to strengthening, flexibility, endurance, ROM of scapular, scapulothoracic and UE control with self care, reaching, carrying, lifting, house/yardwork, driving/computer work  [] (26564) Reviewed/Progressed HEP activities related to improving balance, coordination, kinesthetic sense, posture, motor skill, proprioception of scapular, scapulothoracic and UE control with self care, reaching, carrying, lifting, house/yardwork, driving/computer work      Manual Treatments:  PROM / STM / Oscillations-Mobs:  G-I, II, III, IV (PA's, Inf., Post.)  [x] (86958) Provided manual therapy to mobilize soft tissue/joints of cervical/CT, scapular GHJ and UE for the purpose of modulating pain, promoting relaxation,  increasing ROM, reducing/eliminating soft tissue swelling/inflammation/restriction, improving soft tissue extensibility and allowing for proper ROM for normal function with self care, reaching, carrying, lifting, house/yardwork, driving/computer work    Modalities:  CP right shoulder, CP right elbow x10 min    Charges  Timed Code Treatment Minutes: 39'   Total Treatment Minutes: 54'     Medicare total (add KX at $2000)         BWC time in/ time out:    TE time in /out    Manual time in/out    Neuro re ed time in/out    Untimed minutes      [] EVAL (LOW) 65940   [] EVAL (MOD) 22086   [] EVAL (HIGH) 01555   [] RE-EVAL     [x] YL(88273) x 1   [] IONTO  [] NMR (63667) x    [] VASO  [x] Manual (60058) x  2   [x] Other:CP  [] TA x      [] Mech Traction (18235)  [] ES(attended) (76029)      [] ES (un) (25826):     GOALS  Short Term Goals: To be achieved in: 2 weeks  1. Independent in HEP and progression per patient tolerance, in order to prevent re-injury. []? Progressing: []? Met: []? Not Met: []? Adjusted  2. Patient will have a decrease in pain to facilitate improvement in movement, function, and ADLs as indicated by Functional Deficits. []? Progressing: []? Met: []? Not Met: []? Adjusted     Long Term Goals: To be achieved in: 12 weeks  1. Disability index score of 42% or less for the [facility] to assist with reaching prior level of function. []? Progressing: []? Met: []? Not Met: []? Adjusted  2. Patient will demonstrate increased AROM to 140 deg flexion to allow for proper joint functioning as indicated by patients Functional Deficits. []? Progressing: []? Met: []? Not Met: []? Adjusted  3.  Patient will demonstrate an increase in

## 2021-12-23 ENCOUNTER — HOSPITAL ENCOUNTER (OUTPATIENT)
Dept: PHYSICAL THERAPY | Age: 66
Setting detail: THERAPIES SERIES
Discharge: HOME | End: 2021-12-23
Payer: MEDICAID

## 2021-12-23 PROCEDURE — 97140 MANUAL THERAPY 1/> REGIONS: CPT

## 2021-12-23 PROCEDURE — 97110 THERAPEUTIC EXERCISES: CPT

## 2021-12-23 NOTE — FLOWSHEET NOTE
[facility], [address] [place]  Phone: [phone]  Fax [phone]    Date:  2021    Patient Name:  Chely Andersen    :  1955  MRN: [id number]  Restrictions/Precautions:   Right TTL SHLD Post-op Protocol (See MEDIA TAB),  OMRRE-25 2021 w/ hospitalization and f/u PT 21 to 21,  OA, HTN, Osteoporosis , Back SX for Cason Rods @ age 8, Inner Ear [address] for Replacement of Ear Drum, Right Ulnar Nerve Decompression @ Elbow Aug. 12,2020, Right Carpal Tunnel Release SX Aug 25, 2020, PT Right Shoulder 2020    Medical/Treatment Diagnosis Information:  Diagnosis: M75.101 Right Shld RTC Tear & OA s/p Total SHLD SX (DOS: 2021)  Treatment Diagnosis: M25.511 Right SHLD Pain, M25.521 Right Elbow Pain,M25.611 Rigth SHLD Stiffness, M25.621 Rigth Elbow Stiffness, M62.81 Right UE Weakness , G25.89 Scapular Dyskinesis  Insurance/Certification information:  PT Insurance Information: [facility]  Physician Information:  Referring Practitioner: Dr. Steve Hilton.  [facility] Gilmore  Has the plan of care been signed (Y/N):        []  Yes  [x]  No     Date of Patient follow up with Physician:     Is this a Progress Report:     []  Yes  [x]  No      If Yes:  Date Range for reporting period:  Beginnin2021  Ending    Progress report will be due (10 Rx or 30 days whichever is less): 6943    Recertification will be due (POC Duration  / 90 days whichever is less): 3-3-2021     Visit # Insurance Allowable Auth Required   In-person 5 30 []  Yes []  No    Telehealth   []  Yes []  No    Total        Functional Scale: Quick Dash= 84%   Date assessed:  2021     Therapy Diagnosis/Practice Pattern: 4H     Number of Comorbidities:  []0     [x]1-2    []3+    Latex Allergy:  [x]NO      []YES  Preferred Language for Healthcare:   [x]English       []other:    Pain level: 2-7 /10 R SHLD,  3-10 R Elbow    SUBJECTIVE: Pt states that she tries to do her HEP when she is at work. Pt's spouse states that patient holds right UE out stiffly in front of her when she walks about the house. Does not do her HEP at home where he can witness. (Previous: Reports being told that she could wean out of the sling at the end of the week. Spoke with MD and able to D/C sling. Told to wear compression stocking for LE swelling. Working on elbow extension with gravity assistance.)    OBJECTIVE:   4.75 weeks post op (11- DOS)   Observation: Pt enters clinic holding right UE stiffly out in approximate 30 deg scapation w/ scapula hiked. Therapist again reviewed scapular depression and retraction w/ shoulder relaxed using mirror and manual cuing.  PROM of right shld limited to 55 degrees scaption @ beginning of RX w/ patient guarding shoulder PROMmotions.  Progressed HEP. Pt given new handout. Pt instructed to increase PROM HEP to every 2 hours @ home.  Test measurements:  Pt required deep breahing relaxation techniques/ light STM/ visualization/ and double time for manual ROM to make PROM: scapation: 90 in supine on wedge midline, Er: 20     RESTRICTIONS/PRECAUTIONS: Right TTL SHLD Post-op Protocol (See MEDIA TAB),   0-6 weeks: plane of scapula to 120 deg, ext rot to 30 deg, sling all times, except when exercising. 6 weeks post-op 12/30/21. Exercises/Interventions:   Access Code: [email or web address]  URL: [employer]/  Date: 12/23/2021  Prepared by: Sinda Farm    Exercises  Supine Shoulder Press with Dowel - 1 x daily - 7 x weekly - 2 sets - 10 reps - 2 hold  Supine Shoulder Flexion AAROM with Hands Clasped - 1 x daily - 7 x weekly - 1-2 sets - 10 reps - 5 hold      Access Code: [email or web address]  URL: [email or web address]  Date: 12/09/2021  Prepared by: Sinkatya Farm    Exercises  Seated Shoulder Flexion Towel Slide at Table Top - 3 x daily - 7 x weekly - 1 sets - 10 reps - 5 hold  Seated Scapular Retraction - 3 x daily - 7 x weekly - 1 sets - 10 reps - 5 hold  Seated Shoulder External Rotation AAROM with Cane and Hand in Neutral - 3 x daily - 7 x weekly - 1 sets - 10 reps - 5 hold  Gentle Levator Scapulae Stretch - 3 x daily - 7 x weekly - 1 sets - 3-5 reps - 10 hold  Supine Elbow Extension Stretch in Supination - 3 x daily - 7 x weekly - 1 sets - 10 reps - 5 hold  Seated Forearm Pronation and Supination AROM - 3 x daily - 7 x weekly - 1 sets - 10 reps - 5 hold    Therapeutic Ex (93488) Sets/sec Reps Notes/CUES   Table Slides w/ SB H5 1x10  hep   Scap squeeze H5 x10  hep   IR/ER Table Wipes w/ opposite hand H5 x10 Stop @ neutral hep    hep   Levator Stretch H15 x3  hep    hep    hep   Codmens x10 ea  hep   Finisher 3 min     Supine ER with cane H10 10x 10 degrees past neutral   Pulleys 2x10     Supine Cane Chest Press 2x10  +08-   Supine SHLD flex w/ Contra arm assist. 1x10  +94-                           Manual Intervention (60917)      SL scap JM 5'     PROM right shld & elbow in supine w/ head on wedge 22'     STM scap and shld mm 5'                 NMR re-education (09130)   CUES NEEDED                                       Therapeutic Activity (11038)                          Therapeutic Exercise and NMR EXR  [x] (24732) Provided verbal/tactile cueing for activities related to strengthening, flexibility, endurance, ROM  for improvements in scapular, scapulothoracic and UE control with self care, reaching, carrying, lifting, house/yardwork, driving/computer work. [x] (65442) Provided verbal/tactile cueing for activities related to improving balance, coordination, kinesthetic sense, posture, motor skill, proprioception  to assist with  scapular, scapulothoracic and UE control with self care, reaching, carrying, lifting, house/yardwork, driving/computer work.     Therapeutic Activities:    [] (96614 or 56572) Provided verbal/tactile cueing for activities related to improving balance, coordination, kinesthetic sense, posture, motor skill, proprioception and motor activation to allow for proper function of scapular, scapulothoracic and UE control with self care, carrying, lifting, driving/computer work. Home Exercise Program:    [x] (41049) Reviewed/Progressed HEP activities related to strengthening, flexibility, endurance, ROM of scapular, scapulothoracic and UE control with self care, reaching, carrying, lifting, house/yardwork, driving/computer work  [] (26877) Reviewed/Progressed HEP activities related to improving balance, coordination, kinesthetic sense, posture, motor skill, proprioception of scapular, scapulothoracic and UE control with self care, reaching, carrying, lifting, house/yardwork, driving/computer work      Manual Treatments:  PROM / STM / Oscillations-Mobs:  G-I, II, III, IV (PA's, Inf., Post.)  [x] (56676) Provided manual therapy to mobilize soft tissue/joints of cervical/CT, scapular GHJ and UE for the purpose of modulating pain, promoting relaxation,  increasing ROM, reducing/eliminating soft tissue swelling/inflammation/restriction, improving soft tissue extensibility and allowing for proper ROM for normal function with self care, reaching, carrying, lifting, house/yardwork, driving/computer work    Modalities:  CP right shoulder, CP right elbow x10 min    Charges  Timed Code Treatment Minutes: 50'   Total Treatment Minutes: 61'     Medicare total (add KX at $2000)         BWC time in/ time out:    TE time in /out    Manual time in/out    Neuro re ed time in/out    Untimed minutes      [] EVAL (LOW) 41895   [] EVAL (MOD) 08312   [] EVAL (HIGH) 66433   [] RE-EVAL     [x] LY(68804) x 1   [] IONTO  [] NMR (15105) x    [] VASO  [x] Manual (89173) x  2   [x] Other:CP  [] TA x      [] Mech Traction (86804)  [] ES(attended) (06872)      [] ES (un) (76507):     GOALS  Short Term Goals: To be achieved in: 2 weeks  1. Independent in HEP and progression per patient tolerance, in order to prevent re-injury. [x]? require adjustment due to lack of progress  [] Patient is not progressing as expected and requires additional follow up with physician  [] Other    Prognosis for POC: [x] Good [] Fair  [] Poor      Patient requires continued skilled intervention: [x] Yes  [] No    Treatment/Activity Tolerance:  [x] Patient able to complete treatment  [] Patient limited by fatigue  [x] Patient limited by pain    [] Patient limited by other medical complications  [x] Other: Pt limited by post-op restrictions. PLAN: Progress PROM as tolerated. [x] Continue per plan of care [] Alter current plan (see comments above)  [] Plan of care initiated [] Hold pending MD visit [] Discharge    Electronically signed by:  Andrzej Joseph, PT  [id number]    Note: If patient does not return for scheduled/ recommended follow up visits, this note will serve as a discharge from care along with most recent update on progress.

## 2021-12-28 ENCOUNTER — HOSPITAL ENCOUNTER (OUTPATIENT)
Dept: PHYSICAL THERAPY | Age: 66
Setting detail: THERAPIES SERIES
Discharge: HOME | End: 2021-12-28
Payer: MEDICAID

## 2021-12-28 PROCEDURE — 97110 THERAPEUTIC EXERCISES: CPT

## 2021-12-28 PROCEDURE — 97140 MANUAL THERAPY 1/> REGIONS: CPT

## 2021-12-28 NOTE — FLOWSHEET NOTE
[facility], [address]  Phone: [phone]  Fax [phone]    Date:  2021    Patient Name:  Rox Toscano    :  1955  MRN: [id number]  Restrictions/Precautions:   Right TTL SHLD Post-op Protocol (See MEDIA TAB),  [id number] 2021 w/ hospitalization and f/u PT 21 to 21,  OA, HTN, Osteoporosis , Back SX for Cason Rods @ age 8, Inner Ear [address] for Replacement of Ear Drum, Right Ulnar Nerve Decompression @ Elbow Aug. 12,2020, Right Carpal Tunnel Release SX Aug 25, 2020, PT Right Shoulder 2020    Medical/Treatment Diagnosis Information:  Diagnosis: M75.101 Right Shld RTC Tear & OA s/p Total SHLD SX (DOS: 2021)  Treatment Diagnosis: M25.511 Right SHLD Pain, M25.521 Right Elbow Pain,M25.611 Rigth SHLD Stiffness, M25.621 Rigth Elbow Stiffness, M62.81 Right UE Weakness , G25.89 Scapular Dyskinesis  Insurance/Certification information:  PT Insurance Information: [facility]  Physician Information:  Referring Practitioner: Dr. Lito Sandoval.  Florencia Junior  Has the plan of care been signed (Y/N):        []  Yes  [x]  No     Date of Patient follow up with Physician:     Is this a Progress Report:     []  Yes  [x]  No      If Yes:  Date Range for reporting period:  Beginnin2021  Ending    Progress report will be due (10 Rx or 30 days whichever is less): 3403    Recertification will be due (POC Duration  / 90 days whichever is less): 3-3-2021     Visit # Insurance Allowable Auth Required   In-person 6 30 []  Yes []  No    Telehealth   []  Yes []  No    Total        Functional Scale: Quick Dash= 84%   Date assessed:  2021     Therapy Diagnosis/Practice Pattern: 4H     Number of Comorbidities:  []0     [x]1-2    []3+    Latex Allergy:  [x]NO      []YES  Preferred Language for Healthcare:   [x]English       []other:    Pain level: 2-4 /10 R SHLD,  0-2/10 R Elbow    SUBJECTIVE: Pt states that she has increased her compliance with HEP and her right shoulder is feeling a little better. \"Catching and discomfort\" in chase minor. (Previous: Reports being told that she could wean out of the sling at the end of the week. Spoke with MD and able to D/C sling. Told to wear compression stocking for LE swelling. Working on elbow extension with gravity assistance.)    OBJECTIVE: Last RX 1 week ago. 5.75 weeks post op (11- DOS)   Observation: TTP to light touch about right shoulder girdle and upper arm. Light stroking and STM for desensitization with gradual decrease in hypersensitivity/      Test measurements:  Pt required deep breahing relaxation techniques/ light STM/ visualization/ and double time for manual ROM to make PROM: scapation: 105 in supine on wedge midline, Er: 25     RESTRICTIONS/PRECAUTIONS: Right TTL SHLD Post-op Protocol (See MEDIA TAB),   0-6 weeks: plane of scapula to 120 deg, ext rot to 30 deg, sling all times, except when exercising. 6 weeks post-op 12/30/21. Exercises/Interventions:   Access Code: [email or web address]  URL: [email or web address]  Date: 12/23/2021  Prepared by: Shaq Pena    Exercises  Supine Shoulder Press with Dowel - 1 x daily - 7 x weekly - 2 sets - 10 reps - 2 hold  Supine Shoulder Flexion AAROM with Hands Clasped - 1 x daily - 7 x weekly - 1-2 sets - 10 reps - 5 hold      Access Code: [email or web address]  URL: [email or web address]  Date: 12/09/2021  Prepared by: Shaq Pena    Exercises  Seated Shoulder Flexion Towel Slide at Table Top - 3 x daily - 7 x weekly - 1 sets - 10 reps - 5 hold  Seated Scapular Retraction - 3 x daily - 7 x weekly - 1 sets - 10 reps - 5 hold  Seated Shoulder External Rotation AAROM with Cane and Hand in Neutral - 3 x daily - 7 x weekly - 1 sets - 10 reps - 5 hold  Gentle Levator Scapulae Stretch - 3 x daily - 7 x weekly - 1 sets - 3-5 reps - 10 hold  Supine Elbow Extension Stretch in Supination - 3 x daily - 7 x weekly - 1 sets - 10 reps - 5 hold  Seated Forearm Pronation and Supination AROM - 3 x daily - 7 x weekly - 1 sets - 10 reps - 5 hold    Therapeutic Ex (40871) Sets/sec Reps Notes/CUES   Table Slides w/ SB H5 1x10  hep   Scap squeeze H5 x10  hep   IR/ER Table Wipes w/ opposite hand H5 x10 Stop @ neutral hep    hep   Levator Stretch H15 x3  hep    hep    hep   Codmens x10 ea  hep   Finisher 3 min     Supine ER with cane H10 10x 10 degrees past neutral   Pulleys 2x10     Supine Cane Chest Press 2x10  +28-   Supine SHLD flex w/ Contra arm assist. 1x10  +22-                           Manual Intervention (00716)      SL scap JM 5'     PROM right shld & elbow in supine w/ head on wedge 22'     STM scap and shld mm 5'                 NMR re-education (51435)   CUES NEEDED                                       Therapeutic Activity (36636)                          Therapeutic Exercise and NMR EXR  [x] (17938) Provided verbal/tactile cueing for activities related to strengthening, flexibility, endurance, ROM  for improvements in scapular, scapulothoracic and UE control with self care, reaching, carrying, lifting, house/yardwork, driving/computer work. [x] (17976) Provided verbal/tactile cueing for activities related to improving balance, coordination, kinesthetic sense, posture, motor skill, proprioception  to assist with  scapular, scapulothoracic and UE control with self care, reaching, carrying, lifting, house/yardwork, driving/computer work. Therapeutic Activities:    [] (64043 or 80399) Provided verbal/tactile cueing for activities related to improving balance, coordination, kinesthetic sense, posture, motor skill, proprioception and motor activation to allow for proper function of scapular, scapulothoracic and UE control with self care, carrying, lifting, driving/computer work.      Home Exercise Program:    [x] (69152) Reviewed/Progressed HEP activities related to strengthening, flexibility, endurance, ROM of scapular, scapulothoracic and UE control with self care, reaching, carrying, lifting, house/yardwork, driving/computer work  [] (24780) Reviewed/Progressed HEP activities related to improving balance, coordination, kinesthetic sense, posture, motor skill, proprioception of scapular, scapulothoracic and UE control with self care, reaching, carrying, lifting, house/yardwork, driving/computer work      Manual Treatments:  PROM / STM / Oscillations-Mobs:  G-I, II, III, IV (PA's, Inf., Post.)  [x] (89617) Provided manual therapy to mobilize soft tissue/joints of cervical/CT, scapular GHJ and UE for the purpose of modulating pain, promoting relaxation,  increasing ROM, reducing/eliminating soft tissue swelling/inflammation/restriction, improving soft tissue extensibility and allowing for proper ROM for normal function with self care, reaching, carrying, lifting, house/yardwork, driving/computer work    Modalities:  CP right shoulder, CP right elbow x10 min    Charges  Timed Code Treatment Minutes: 48'   Total Treatment Minutes: 61'     Medicare total (add KX at $2000)         BWC time in/ time out:    TE time in /out    Manual time in/out    Neuro re ed time in/out    Untimed minutes      [] EVAL (LOW) 78501   [] EVAL (MOD) 73404   [] EVAL (HIGH) 98107   [] RE-EVAL     [x] XL(13203) x 1   [] IONTO  [] NMR (49273) x    [] VASO  [x] Manual (85387) x  2   [x] Other:CP  [] TA x      [] [facility] (23233)  [] ES(attended) (26310)      [] ES (un) (09065):     GOALS  Short Term Goals: To be achieved in: 2 weeks  1. Independent in HEP and progression per patient tolerance, in order to prevent re-injury. [x]? Progressing: []? Met: []? Not Met: []? Adjusted  2. Patient will have a decrease in pain to facilitate improvement in movement, function, and ADLs as indicated by Functional Deficits. []? Progressing: []? Met: []? Not Met: []? Adjusted     Long Term Goals: To be achieved in: 12 weeks  1.  Disability index score of 42% Patient limited by pain    [] Patient limited by other medical complications  [x] Other: Pt limited by post-op restrictions. PLAN: Progress PROM as tolerated. [x] Continue per plan of care [] Alter current plan (see comments above)  [] Plan of care initiated [] Hold pending MD visit [] Discharge    Electronically signed by:  Jose Anna, PT  [id number]    Note: If patient does not return for scheduled/ recommended follow up visits, this note will serve as a discharge from care along with most recent update on progress.

## 2021-12-30 ENCOUNTER — HOSPITAL ENCOUNTER (OUTPATIENT)
Dept: PHYSICAL THERAPY | Age: 66
Setting detail: THERAPIES SERIES
Discharge: HOME | End: 2021-12-30
Payer: MEDICAID

## 2021-12-30 PROCEDURE — 97110 THERAPEUTIC EXERCISES: CPT

## 2021-12-30 PROCEDURE — 97140 MANUAL THERAPY 1/> REGIONS: CPT

## 2021-12-30 NOTE — FLOWSHEET NOTE
elbow and shoulder feeling a lot better now that she is more compliant w/ her HEP. Using computer mouse w/ right hand at work until the front of her right arm aches. OBJECTIVE:   6 weeks post op (11- DOS)   Observation: Spasm right biceps. Patient encouraged to use computer mouse with left hand.  Test measurements:   PROM: scapation:110 deg,   Er: 30 @ 20 .  Right elbow AROM -5 to 1118 deg. Supination increased to 80 deg. RESTRICTIONS/PRECAUTIONS: Right TTL SHLD Post-op Protocol (See MEDIA TAB),   0-6 weeks: plane of scapula to 120 deg, ext rot to 30 deg, sling all times, except when exercising. 6 weeks post-op 12/30/21. Exercises/Interventions:   Access Code: [email or web address]  URL: [email or web address]  Date: 12/23/2021  Prepared by: Shaq Pena    Exercises  Supine Shoulder Press with Dowel - 1 x daily - 7 x weekly - 2 sets - 10 reps - 2 hold  Supine Shoulder Flexion AAROM with Hands Clasped - 1 x daily - 7 x weekly - 1-2 sets - 10 reps - 5 hold      Access Code: [email or web address]  URL: [email or web address]  Date: 12/09/2021  Prepared by: Shaq Pena    Exercises  Seated Shoulder Flexion Towel Slide at Table Top - 3 x daily - 7 x weekly - 1 sets - 10 reps - 5 hold  Seated Scapular Retraction - 3 x daily - 7 x weekly - 1 sets - 10 reps - 5 hold  Seated Shoulder External Rotation AAROM with Cane and Hand in Neutral - 3 x daily - 7 x weekly - 1 sets - 10 reps - 5 hold  Gentle Levator Scapulae Stretch - 3 x daily - 7 x weekly - 1 sets - 3-5 reps - 10 hold  Supine Elbow Extension Stretch in Supination - 3 x daily - 7 x weekly - 1 sets - 10 reps - 5 hold  Seated Forearm Pronation and Supination AROM - 3 x daily - 7 x weekly - 1 sets - 10 reps - 5 hold    Therapeutic Ex (91297) Sets/sec Reps Notes/CUES   Finisher 5 min     SB Table Rolls H510     Scap squeeze H5 x10  hep   IR/ER Table Wipes w/ opposite hand H5 x10 Stop @ neutral hep   Levator Stretch H15 x3  hep   Codmens x10 ea  hep Supine ER with cane H10 10x 10 degrees past neutral   Pulleys 2x10     Supine Cane Chest Press 2x10  +12-   Supine SHLD flex w/ Contra arm assist. 1x10  +12-   Supine SA 2x10 Min A    Supine Active Elbow Ext 2x10                 Manual Intervention (90968)      SL scap JM 5'     PROM right shld & elbow in supine w/ head on wedge 22'     STM scap and shld mm 5'                 NMR re-education (88208)   CUES NEEDED                                       Therapeutic Activity (73485)                          Therapeutic Exercise and NMR EXR  [x] (60934) Provided verbal/tactile cueing for activities related to strengthening, flexibility, endurance, ROM  for improvements in scapular, scapulothoracic and UE control with self care, reaching, carrying, lifting, house/yardwork, driving/computer work. [x] (77246) Provided verbal/tactile cueing for activities related to improving balance, coordination, kinesthetic sense, posture, motor skill, proprioception  to assist with  scapular, scapulothoracic and UE control with self care, reaching, carrying, lifting, house/yardwork, driving/computer work. Therapeutic Activities:    [] (85117 or 65286) Provided verbal/tactile cueing for activities related to improving balance, coordination, kinesthetic sense, posture, motor skill, proprioception and motor activation to allow for proper function of scapular, scapulothoracic and UE control with self care, carrying, lifting, driving/computer work.      Home Exercise Program:    [x] (38342) Reviewed/Progressed HEP activities related to strengthening, flexibility, endurance, ROM of scapular, scapulothoracic and UE control with self care, reaching, carrying, lifting, house/yardwork, driving/computer work  [] (84838) Reviewed/Progressed HEP activities related to improving balance, coordination, kinesthetic sense, posture, motor skill, proprioception of scapular, scapulothoracic and UE control with self care, reaching, carrying, lifting, house/yardwork, driving/computer work      Manual Treatments:  PROM / STM / Oscillations-Mobs:  G-I, II, III, IV (PA's, Inf., Post.)  [x] (25717) Provided manual therapy to mobilize soft tissue/joints of cervical/CT, scapular GHJ and UE for the purpose of modulating pain, promoting relaxation,  increasing ROM, reducing/eliminating soft tissue swelling/inflammation/restriction, improving soft tissue extensibility and allowing for proper ROM for normal function with self care, reaching, carrying, lifting, house/yardwork, driving/computer work    Modalities:  CP right shoulder, CP right elbow x10 min    Charges  Timed Code Treatment Minutes: 50'   Total Treatment Minutes: [address] Medicare total (add KX at $2000)         BWC time in/ time out:    TE time in /out    Manual time in/out    Neuro re ed time in/out    Untimed minutes      [] EVAL (LOW) 26747   [] EVAL (MOD) 83206   [] EVAL (HIGH) 72300   [] RE-EVAL     [x] HB(60837) x 1   [] IONTO  [] NMR (84064) x    [] VASO  [x] Manual (13015) x  2   [x] Other:CP  [] TA x      [] Mech Traction (99601)  [] ES(attended) (75138)      [] ES (un) (35318):     GOALS  Short Term Goals: To be achieved in: 2 weeks  1. Independent in HEP and progression per patient tolerance, in order to prevent re-injury. [x]? Progressing: []? Met: []? Not Met: []? Adjusted  2. Patient will have a decrease in pain to facilitate improvement in movement, function, and ADLs as indicated by Functional Deficits. []? Progressing: []? Met: []? Not Met: []? Adjusted     Long Term Goals: To be achieved in: 12 weeks  1. Disability index score of 42% or less for the [facility] to assist with reaching prior level of function. []? Progressing: []? Met: []? Not Met: []? Adjusted  2. Patient will demonstrate increased AROM to 140 deg flexion to allow for proper joint functioning as indicated by patients Functional Deficits. [x]? Progressing: []? Met: []? Not Met: []? Adjusted  3.  Patient Discharge    Electronically signed by:  Patel Caldwell, PT  [id number]    Note: If patient does not return for scheduled/ recommended follow up visits, this note will serve as a discharge from care along with most recent update on progress.

## 2022-01-04 ENCOUNTER — HOSPITAL ENCOUNTER (OUTPATIENT)
Dept: PHYSICAL THERAPY | Age: 67
Setting detail: THERAPIES SERIES
Discharge: HOME | End: 2022-01-04
Payer: MEDICAID

## 2022-01-04 PROCEDURE — 97110 THERAPEUTIC EXERCISES: CPT

## 2022-01-04 PROCEDURE — 97140 MANUAL THERAPY 1/> REGIONS: CPT

## 2022-01-04 NOTE — FLOWSHEET NOTE
[facility], [address] [facility] [place]  Phone: [phone]  Fax [phone]    Date:  2022    Patient Name:  Ander Beck    :  1955  MRN: [id number]  Restrictions/Precautions:   Right TTL SHLD Post-op Protocol (See MEDIA TAB),  [id number] 2021 w/ hospitalization and f/u PT 21 to 21,  OA, HTN, Osteoporosis , Back SX for Cason Rods @ age 8, Inner Ear [address] for Replacement of Ear Drum, Right Ulnar Nerve Decompression @ Elbow Aug. 12,2020, Right Carpal Tunnel Release SX Aug 25, 2020, PT Right Shoulder 2020    Medical/Treatment Diagnosis Information:  Diagnosis: M75.101 Right Shld RTC Tear & OA s/p Total SHLD SX (DOS: 2021)  Treatment Diagnosis: M25.511 Right SHLD Pain, M25.521 Right Elbow Pain,M25.611 Rigth SHLD Stiffness, M25.621 Rigth Elbow Stiffness, M62.81 Right UE Weakness , G25.89 Scapular Dyskinesis  Insurance/Certification information:  PT Insurance Information: [facility]  Physician Information:  Referring Practitioner: Dr. Janeth Cummings  Has the plan of care been signed (Y/N):        []  Yes  [x]  No     Date of Patient follow up with Physician:     Is this a Progress Report:     []  Yes  [x]  No      If Yes:  Date Range for reporting period:  Beginnin2021  Ending    Progress report will be due (10 Rx or 30 days whichever is less): 2213    Recertification will be due (POC Duration  / 90 days whichever is less): 3-3-2021     Visit # Insurance Allowable Auth Required   In-person 8 30 []  Yes []  No    Telehealth   []  Yes []  No    Total        Functional Scale: Quick Dash= 84%   Date assessed:  2021     Therapy Diagnosis/Practice Pattern: 4H     Number of Comorbidities:  []0     [x]1-2    []3+    Latex Allergy:  [x]NO      []YES  Preferred Language for Healthcare:   [x]English       []other:    Pain level: 2-4 /10 R SHLD,  0/10 R Elbow    SUBJECTIVE:Had F/U w/ Dr. Harriet DALY today and she was please w/ patient's progress. X-rays taken showed good prosthesis alignment. OBJECTIVE:   6.75 weeks post op (11- DOS)   Observation:  Progressed HEP. Added submax isometrics w/ elbow @ side and IR ROM w/ cane to HEP as per post-op protocol. Pt given new written handout: Lori Hodgson.   Patient continues to require additional MT to make PROM measurements listed below.  Test measurements:   PROM: scapation:110 deg,   Er: 35 @ 20 .  Right elbow AROM -5 to 118 deg. Supination increased to 80 deg. RESTRICTIONS/PRECAUTIONS: Right TTL SHLD Post-op Protocol (See MEDIA TAB),   6-12 weeks: plane of scapula to 150 deg, ext rot to 45 deg, DC sling. 6 weeks post-op 2/10/22. Exercises/Interventions:  Access Code: [email or web address]  URL: [email or web address]  Date: 01/04/2022  Prepared by: [employer]    Exercises  Supine Shoulder Flexion AAROM with Hands Clasped - 3-4 x daily - 7 x weekly - 1 sets - 10 reps - 5 hold  Standing Bilateral Shoulder Internal Rotation AAROM with Dowel - 3-4 x daily - 7 x weekly - 1 sets - 10 reps - 5 hold  Standing Isometric Shoulder Internal Rotation with Towel Roll at Doorway - 2 x daily - 7 x weekly - 1 sets - 10 reps - 5 hold  Standing Isometric Shoulder External Rotation with Doorway and Towel Roll - 2 x daily - 7 x weekly - 1 sets - 10 reps - 5 hold  Standing Isometric Shoulder Extension with Doorway - Arm Bent - 2 x daily - 7 x weekly - 1 sets - 10 reps - 5 hold  Standing Isometric Shoulder Flexion with Doorway - Arm Bent - 2 x daily - 7 x weekly - 1 sets - 10 reps - 5 hold    Access Code: [email or web address]  URL: [email or web address]/  Date: 12/23/2021  Prepared by: [employer]    Access Code: [email or web address]  URL: [email or web address]  Date: 12/09/2021  Prepared by: [employer]    Therapeutic Ex (45546) Sets/sec Reps Notes/CUES   Finisher 5 min     SB Table Rolls H510     Levator Stretch H15 x3  hep   Supine ER with cane H10 30 degrees past neutral   Airdyne 3 min SBA    Pulleys 2x10     Supine Cane Chest Press 2x10  +15-   Supine SHLD flex w/ Contra arm assist. 1x10  +46-   Sub max Isometrics (IR/ER/Ext/Punch) H5 x10 ea  +hep 1-4-2022   Standing Extension ROM w/ cane behind back H5 x10     Standing IR ROM w/ cane behind back   +hep 1-4-2021   Supine SA 2x10     Supine RS @ 90 deg 3x30\"     Supine Active Elbow Ext 2x10                 Manual Intervention (51854)      SL scap JM 3'     PROM right shld & elbow in supine w/ head on wedge 24'     STM scap and shld mm 4'                 NMR re-education (03131)   CUES NEEDED                                       Therapeutic Activity (70770)                          Therapeutic Exercise and NMR EXR  [x] (60261) Provided verbal/tactile cueing for activities related to strengthening, flexibility, endurance, ROM  for improvements in scapular, scapulothoracic and UE control with self care, reaching, carrying, lifting, house/yardwork, driving/computer work. [x] (01609) Provided verbal/tactile cueing for activities related to improving balance, coordination, kinesthetic sense, posture, motor skill, proprioception  to assist with  scapular, scapulothoracic and UE control with self care, reaching, carrying, lifting, house/yardwork, driving/computer work. Therapeutic Activities:    [] (77185 or 15039) Provided verbal/tactile cueing for activities related to improving balance, coordination, kinesthetic sense, posture, motor skill, proprioception and motor activation to allow for proper function of scapular, scapulothoracic and UE control with self care, carrying, lifting, driving/computer work.      Home Exercise Program:    [x] (52186) Reviewed/Progressed HEP activities related to strengthening, flexibility, endurance, ROM of scapular, scapulothoracic and UE control with self care, reaching, carrying, lifting, house/yardwork, driving/computer work  [] (63138) Reviewed/Progressed HEP activities related to improving balance, coordination, kinesthetic sense, posture, motor skill, proprioception of scapular, scapulothoracic and UE control with self care, reaching, carrying, lifting, house/yardwork, driving/computer work      Manual Treatments:  PROM / STM / Oscillations-Mobs:  G-I, II, III, IV (PA's, Inf., Post.)  [x] (55340) Provided manual therapy to mobilize soft tissue/joints of cervical/CT, scapular GHJ and UE for the purpose of modulating pain, promoting relaxation,  increasing ROM, reducing/eliminating soft tissue swelling/inflammation/restriction, improving soft tissue extensibility and allowing for proper ROM for normal function with self care, reaching, carrying, lifting, house/yardwork, driving/computer work    Modalities:  CP right shoulder, CP right elbow x10 min    Charges  Timed Code Treatment Minutes: 54'   Total Treatment Minutes: 72'     Medicare total (add KX at $2000)         BWC time in/ time out:    TE time in /out    Manual time in/out    Neuro re ed time in/out    Untimed minutes      [] EVAL (LOW) 12229   [] EVAL (MOD) 01672   [] EVAL (HIGH) 13548   [] RE-EVAL     [x] PP(46543) x 2   [] IONTO  [] NMR (76982) x    [] VASO  [x] Manual (37426) x  2   [x] Other:CP  [] TA x      [] Mech Traction (40833)  [] ES(attended) (20757)      [] ES (un) (40828):     GOALS  Short Term Goals: To be achieved in: 2 weeks  1. Independent in HEP and progression per patient tolerance, in order to prevent re-injury. [x]? Progressing: []? Met: []? Not Met: []? Adjusted  2. Patient will have a decrease in pain to facilitate improvement in movement, function, and ADLs as indicated by Functional Deficits. []? Progressing: []? Met: []? Not Met: []? Adjusted     Long Term Goals: To be achieved in: 12 weeks  1. Disability index score of 42% or less for the [facility] to assist with reaching prior level of function. []? Progressing: []? Met: []? Not Met: []? Adjusted  2.  Patient will demonstrate increased AROM to 140 deg flexion to allow for proper joint functioning as indicated by patients Functional Deficits. [x]? Progressing: []? Met: []? Not Met: []? Adjusted  3. Patient will demonstrate an increase in Strength to 4/5 to allow for proper functional mobility as indicated by patients Functional Deficits. []? Progressing: []? Met: []? Not Met: []? Adjusted  4. Patient will retrieve plate from overhead shelf with her right hand without increased symptoms or restriction. []? Progressing: []? Met: []? Not Met: []? Adjusted  5. Wash and fix hair with right hand. []? Progressing: []? Met: []? Not Met: []? Adjusted         Progression Towards Functional goals:  [] Patient is progressing as expected towards functional goals listed. [x] Progression is slowed due to complexities listed. [] Progression has been slowed due to co-morbidities. [] Plan just implemented, too soon to assess goals progression  [] Other:      ASSESSMENT:Cornelio'd TE progression well. Overall Progression Towards Functional goals/ Treatment Progress Update:  [] Patient is progressing as expected towards functional goals listed. [x] Progression is slowed due to complexities/Impairments listed. [] Progression has been slowed due to co-morbidities. [] Plan just implemented, too soon to assess goals progression <30days   [] Goals require adjustment due to lack of progress  [] Patient is not progressing as expected and requires additional follow up with physician  [] Other    Prognosis for POC: [x] Good [] Fair  [] Poor      Patient requires continued skilled intervention: [x] Yes  [] No    Treatment/Activity Tolerance:  [x] Patient able to complete treatment  [] Patient limited by fatigue  [x] Patient limited by pain    [] Patient limited by other medical complications  [x] Other: Pt limited by post-op restrictions. PLAN: Progress PROM as tolerated.   [x] Continue per plan of care [] Alter current plan (see comments above)  [] Plan of care initiated [] Hold pending MD visit [] Discharge    Electronically signed by:  Kylah Terrazas, PT  [id number]    Note: If patient does not return for scheduled/ recommended follow up visits, this note will serve as a discharge from care along with most recent update on progress.

## 2022-01-06 ENCOUNTER — OFFICE VISIT (OUTPATIENT)
Dept: ENT CLINIC | Age: 67
End: 2022-01-06
Payer: MEDICAID

## 2022-01-06 ENCOUNTER — HOSPITAL ENCOUNTER (OUTPATIENT)
Dept: PHYSICAL THERAPY | Age: 67
Setting detail: THERAPIES SERIES
Discharge: HOME | End: 2022-01-06
Payer: MEDICAID

## 2022-01-06 VITALS — WEIGHT: 126 LBS | BODY MASS INDEX: 25.4 KG/M2 | TEMPERATURE: 97.2 F | HEIGHT: 59 IN

## 2022-01-06 DIAGNOSIS — H72.92 TYMPANIC MEMBRANE PERFORATION, LEFT: ICD-10-CM

## 2022-01-06 DIAGNOSIS — H90.A31 MIXED CONDUCTIVE AND SENSORINEURAL HEARING LOSS OF RIGHT EAR WITH RESTRICTED HEARING OF LEFT EAR: ICD-10-CM

## 2022-01-06 DIAGNOSIS — H61.21 IMPACTED CERUMEN, RIGHT EAR: ICD-10-CM

## 2022-01-06 DIAGNOSIS — H95.191 OTHER DISORDERS FOLLOWING MASTOIDECTOMY, RIGHT EAR: Primary | ICD-10-CM

## 2022-01-06 PROCEDURE — 69220 CLEAN OUT MASTOID CAVITY: CPT | Performed by: OTOLARYNGOLOGY

## 2022-01-06 PROCEDURE — 99214 OFFICE O/P EST MOD 30 MIN: CPT | Performed by: OTOLARYNGOLOGY

## 2022-01-06 PROCEDURE — 3017F COLORECTAL CA SCREEN DOC REV: CPT | Performed by: OTOLARYNGOLOGY

## 2022-01-06 PROCEDURE — 97140 MANUAL THERAPY 1/> REGIONS: CPT

## 2022-01-06 PROCEDURE — G8419 CALC BMI OUT NRM PARAM NOF/U: HCPCS | Performed by: OTOLARYNGOLOGY

## 2022-01-06 PROCEDURE — 1036F TOBACCO NON-USER: CPT | Performed by: OTOLARYNGOLOGY

## 2022-01-06 PROCEDURE — 97110 THERAPEUTIC EXERCISES: CPT

## 2022-01-06 PROCEDURE — G8427 DOCREV CUR MEDS BY ELIG CLIN: HCPCS | Performed by: OTOLARYNGOLOGY

## 2022-01-06 PROCEDURE — G8484 FLU IMMUNIZE NO ADMIN: HCPCS | Performed by: OTOLARYNGOLOGY

## 2022-01-06 PROCEDURE — G0283 ELEC STIM OTHER THAN WOUND: HCPCS

## 2022-01-06 RX ORDER — CIPROFLOXACIN AND DEXAMETHASONE 3; 1 MG/ML; MG/ML
4 SUSPENSION/ DROPS AURICULAR (OTIC) 2 TIMES DAILY
Qty: 7.5 ML | Refills: 0 | Status: SHIPPED | OUTPATIENT
Start: 2022-01-06 | End: 2022-01-11

## 2022-01-06 ASSESSMENT — ENCOUNTER SYMPTOMS
ABDOMINAL PAIN: 0
SHORTNESS OF BREATH: 0
WHEEZING: 0

## 2022-01-06 NOTE — FLOWSHEET NOTE
[facility], [address], [address] [place] [address] [place]  Phone: [phone]  Fax [phone]    Date:  2022    Patient Name:  Samantha Blancas    :  1955  MRN: [id number]  Restrictions/Precautions:   Right TTL SHLD Post-op Protocol (See MEDIA TAB),  [id number] 2021 w/ hospitalization and f/u PT 21 to 21,  OA, HTN, Osteoporosis , Back SX for Cason Rods @ age 8, Inner Ear [address] for Replacement of Ear Drum, Right Ulnar Nerve Decompression @ Elbow Aug. 12,2020, Right Carpal Tunnel Release SX Aug 25, 2020, PT Right Shoulder 2020    Medical/Treatment Diagnosis Information:  Diagnosis: M75.101 Right Shld RTC Tear & OA s/p Total SHLD SX (DOS: 2021)  Treatment Diagnosis: M25.511 Right SHLD Pain, M25.521 Right Elbow Pain,M25.611 Rigth SHLD Stiffness, M25.621 Rigth Elbow Stiffness, M62.81 Right UE Weakness , G25.89 Scapular Dyskinesis  Insurance/Certification information:  PT Insurance Information: [facility]  Physician Information:  Referring Practitioner: Dr. Alden Bustos.  Aracelis Adorno  Has the plan of care been signed (Y/N):        []  Yes  [x]  No     Date of Patient follow up with Physician:     Is this a Progress Report:     []  Yes  [x]  No      If Yes:  Date Range for reporting period:  Beginnin2021  Ending    Progress report will be due (10 Rx or 30 days whichever is less): 3247    Recertification will be due (POC Duration  / 90 days whichever is less): 3-3-2021     Visit # Insurance Allowable Auth Required   In-person  30 []  Yes []  No    Telehealth   []  Yes []  No    Total        Functional Scale: Quick Dash= 84%   Date assessed:  2021     Therapy Diagnosis/Practice Pattern: 4H     Number of Comorbidities:  []0     [x]1-2    []3+    Latex Allergy:  [x]NO      []YES  Preferred Language for Healthcare:   [x]English       []other:    Pain level: 2-4 /10 R SHLD,  0-2/10 R Elbow    SUBJECTIVE:Rough night last night. Could not get comfortable and having some GI issues. Increase right shoulder and elbow pain this afternoon. OBJECTIVE:   7 weeks post op (11- DOS)   Observation:  Myofascial release to right subsapularis and teres minor w/ increase right passive shoulder flexion.  Initiated trial IFC ES for right shoulder pain control.  Test measurements:   PROM: scapation:120 deg,   Er: 35 @ 20 .  (Previous: Right elbow AROM -5 to 118 deg. Supination increased to 80 deg.)    RESTRICTIONS/PRECAUTIONS: Right TTL SHLD Post-op Protocol (See MEDIA TAB),   6-12 weeks: plane of scapula to 150 deg, ext rot to 45 deg, DC sling. 6 weeks post-op 2/10/22. Exercises/Interventions:  Access Code: [email or web address]  URL: [email or web address]  Date: 01/04/2022  Prepared by: Ting Delarosa    Access Code: [email or web address]  URL: [email or web address]  Date: 12/23/2021  Prepared by: Ting Delarosa    Access Code: [email or web address]  URL: [email or web address]  Date: 12/09/2021  Prepared by: Ting Delarosa    Therapeutic Ex (59299) Sets/sec Reps Notes/CUES   Finisher 5 min     Bolster Table Rolls H510     Levator Stretch H15 x3  hep   Supine ER with cane H10  30 degrees past neutral   Airdyne 3 min SBA    Pulleys flex 2x10     Pulley pulldown w/ scap retrac 3# 2x10 MC @ scap Close supervision to avoid compensation   Standing Scap retract RTB 2x10           Supine Cane Chest Press 1# 2x10  +57-   Supine SHLD flex w/ Contra arm assist. 1x10  +11-    +hep 1-4-2022   Standing Extension ROM w/ cane behind back H5 x10     Standing IR ROM w/ cane behind back   +hep 1-4-2021   Supine SA 1# 2x10     Supine RS @ 90 deg 1# 3x30\"     Supine Active Elbow Ext 1# 2x10                 Manual Intervention (27434)      Myofascial release subscap & teres minor 5'     PROM right shld & elbow in supine w/ head on wedge 22'     STM scap and shld mm 4'                 NMR re-education (58296)   CUES NEEDED Therapeutic Activity (05875)                          Therapeutic Exercise and NMR EXR  [x] (97680) Provided verbal/tactile cueing for activities related to strengthening, flexibility, endurance, ROM  for improvements in scapular, scapulothoracic and UE control with self care, reaching, carrying, lifting, house/yardwork, driving/computer work. [x] (59338) Provided verbal/tactile cueing for activities related to improving balance, coordination, kinesthetic sense, posture, motor skill, proprioception  to assist with  scapular, scapulothoracic and UE control with self care, reaching, carrying, lifting, house/yardwork, driving/computer work. Therapeutic Activities:    [] (90282 or 52915) Provided verbal/tactile cueing for activities related to improving balance, coordination, kinesthetic sense, posture, motor skill, proprioception and motor activation to allow for proper function of scapular, scapulothoracic and UE control with self care, carrying, lifting, driving/computer work.      Home Exercise Program:    [x] (82712) Reviewed/Progressed HEP activities related to strengthening, flexibility, endurance, ROM of scapular, scapulothoracic and UE control with self care, reaching, carrying, lifting, house/yardwork, driving/computer work  [] (21231) Reviewed/Progressed HEP activities related to improving balance, coordination, kinesthetic sense, posture, motor skill, proprioception of scapular, scapulothoracic and UE control with self care, reaching, carrying, lifting, house/yardwork, driving/computer work      Manual Treatments:  PROM / STM / Oscillations-Mobs:  G-I, II, III, IV (PA's, Inf., Post.)  [x] (95305) Provided manual therapy to mobilize soft tissue/joints of cervical/CT, scapular GHJ and UE for the purpose of modulating pain, promoting relaxation,  increasing ROM, reducing/eliminating soft tissue swelling/inflammation/restriction, improving soft tissue extensibility and allowing for proper ROM for normal function with self care, reaching, carrying, lifting, house/yardwork, driving/computer work    Modalities:  IFC/ES & CP right shoulder, CP right elbow x15 min    Charges  Timed Code Treatment Minutes: 54'   Total Treatment Minutes: 79'     Medicare total (add KX at $2000)         BWC time in/ time out:    TE time in /out    Manual time in/out    Neuro re ed time in/out    Untimed minutes      [] EVAL (LOW) 57207   [] EVAL (MOD) 96404   [] EVAL (HIGH) 27247   [] RE-EVAL     [x] LV(81121) x 2   [] IONTO  [] NMR (71443) x    [] VASO  [x] Manual (46201) x  2   [x] Other:CP  [] TA x      [] Mech Traction (05376)  [] ES(attended) (77177)      [x] ES (un) (55122):     GOALS  Short Term Goals: To be achieved in: 2 weeks  1. Independent in HEP and progression per patient tolerance, in order to prevent re-injury. [x]? Progressing: []? Met: []? Not Met: []? Adjusted  2. Patient will have a decrease in pain to facilitate improvement in movement, function, and ADLs as indicated by Functional Deficits. []? Progressing: []? Met: []? Not Met: []? Adjusted     Long Term Goals: To be achieved in: 12 weeks  1. Disability index score of 42% or less for the [facility] to assist with reaching prior level of function. []? Progressing: []? Met: []? Not Met: []? Adjusted  2. Patient will demonstrate increased AROM to 140 deg flexion to allow for proper joint functioning as indicated by patients Functional Deficits. [x]? Progressing: []? Met: []? Not Met: []? Adjusted  3. Patient will demonstrate an increase in Strength to 4/5 to allow for proper functional mobility as indicated by patients Functional Deficits. []? Progressing: []? Met: []? Not Met: []? Adjusted  4. Patient will retrieve plate from overhead shelf with her right hand without increased symptoms or restriction. []? Progressing: []? Met: []? Not Met: []? Adjusted  5. Wash and fix hair with right hand. []? Progressing: []? Met: []? Not Met: []?  Adjusted Progression Towards Functional goals:  [] Patient is progressing as expected towards functional goals listed. [x] Progression is slowed due to complexities listed. [] Progression has been slowed due to co-morbidities. [] Plan just implemented, too soon to assess goals progression  [] Other:      ASSESSMENT:Pt continues to require additional MT to make right shoulder ROM gains. Reached 6 week post op goal for passive shoulder flexion for first time today. Overall Progression Towards Functional goals/ Treatment Progress Update:  [] Patient is progressing as expected towards functional goals listed. [x] Progression is slowed due to complexities/Impairments listed. [] Progression has been slowed due to co-morbidities. [] Plan just implemented, too soon to assess goals progression <30days   [] Goals require adjustment due to lack of progress  [] Patient is not progressing as expected and requires additional follow up with physician  [] Other    Prognosis for POC: [x] Good [] Fair  [] Poor      Patient requires continued skilled intervention: [x] Yes  [] No    Treatment/Activity Tolerance:  [x] Patient able to complete treatment  [] Patient limited by fatigue  [x] Patient limited by pain    [] Patient limited by other medical complications  [x] Other: Pt limited by post-op restrictions. PLAN:   [x] Continue per plan of care [] Alter current plan (see comments above)  [] Plan of care initiated [] Hold pending MD visit [] Discharge    Electronically signed by:  Charlene Engel, PT  [id number]    Note: If patient does not return for scheduled/ recommended follow up visits, this note will serve as a discharge from care along with most recent update on progress.

## 2022-01-06 NOTE — PROGRESS NOTES
[facility], [address], [address], [address] [place] [address]  P: [phone]       Patient     Jennifer Watson  1955    Chief Concern     Chief Complaint   Patient presents with    Other     Cerumen removal     Assessment and Plan      Diagnosis Orders   1. Other disorders following mastoidectomy, right ear  ciprofloxacin-dexamethasone (CIPRODEX) 0.3-0.1 % otic suspension   2. Mixed conductive and sensorineural hearing loss of right ear with restricted hearing of left ear     3. Impacted cerumen, right ear  IN DEBRIDE MASTOID CAVITY,SIMPLE   4. Tympanic membrane perforation, left       61 y.o. female s/p right canal wall down mastoidectomy - no cholesteatoma noted, mastoid bowl debrided. Left tympanic membrane perforation (posterosuperior) with global pars tensa retraction. Will re-order audiogram, have discussed CROS hearing aids (if can be fitted to ear canal) vs. BAHA - she is interested and will return for HAE. Mastoid bowl debridement every 6 months, will continue observing perforation. Return in about 6 months (around 7/6/2022). History of Present Illness     Jennifer Watson is a 61 y.o. female has a history of a right canal wall down mastoidectomy for cholesteatoma per Dr. Kelsy Smalls at [facility] completed 2015 with revision tympanoplasty and TORP reconstruction 2016. Interval History No otorrhea, no otalgia, no aural fullness, decreased hearing in the right ear (states 90% loss).      Past Medical History     Past Medical History:   Diagnosis Date    Arthritis     COVID-19 04/25/2021    Hypertension     Osteoporosis     Pneumonia 2017    PONV (postoperative nausea and vomiting)        Past Surgical History     Past Surgical History:   Procedure Laterality Date    ARM SURGERY Right 8/12/2020    RIGHT ULNAR NERVE DECOMPRESSION AT THE ELBOW performed by Jose Eduardo Diaz MD at [address] [place] [address] rods for scoliosis    CARPAL TUNNEL RELEASE Right 8/12/2020    RIGHT CARPAL TUNNEL RELEASE performed by Nithin Chavez MD at [facility] Bilateral     cataracts    INNER EAR SURGERY Right     replacement of eardrum    TONSILLECTOMY         Family History     Family History   Problem Relation Age of Onset    Diabetes Mother     Cancer Father         pancreas       Social History     Social History     Tobacco Use    Smoking status: Never Smoker    Smokeless tobacco: Never Used   Vaping Use    Vaping Use: Never used   Substance Use Topics    Alcohol use: Yes     Comment: 0-1 drinks per month    Drug use: No        Allergies     Allergies   Allergen Reactions    Penicillins Rash       Medications     Current Outpatient Medications   Medication Sig Dispense Refill    ciprofloxacin-dexamethasone (CIPRODEX) 0.3-0.1 % otic suspension Place 4 drops into the right ear 2 times daily for 5 days 7.5 mL 0    albuterol sulfate HFA (PROAIR HFA) 108 (90 Base) MCG/ACT inhaler Inhale 2 puffs into the lungs every 6 hours as needed for Wheezing 1 Inhaler 0    metoprolol tartrate (LOPRESSOR) 50 MG tablet Take 1 tablet by mouth 2 times daily 60 tablet 0    pantoprazole (PROTONIX) 40 MG tablet Take 1 tablet by mouth every morning (before breakfast) 30 tablet 0    albuterol-ipratropium (COMBIVENT RESPIMAT)  MCG/ACT AERS inhaler Inhale 1 puff into the lungs every 4 hours as needed for Wheezing 1 Inhaler 0    clobetasol (TEMOVATE) 0.05 % cream Apply twice daily as needed to flared areas.  vitamin D (ERGOCALCIFEROL) 1.25 MG (62547 UT) CAPS capsule       hydroxychloroquine (PLAQUENIL) 200 MG tablet Take 200 mg by mouth daily       Alendronate Sodium (FOSAMAX PO) Take by mouth once a week        No current facility-administered medications for this visit. Review of Systems     Review of Systems   Constitutional: Negative for activity change, chills, diaphoresis, fatigue and fever. HENT: Positive for hearing loss.  Negative for congestion. Eyes: Negative for visual disturbance. Respiratory: Negative for shortness of breath and wheezing. Cardiovascular: Negative for chest pain. Gastrointestinal: Negative for abdominal pain. Musculoskeletal: Negative for neck pain and neck stiffness. Skin: Negative for rash. Neurological: Negative for dizziness, light-headedness and headaches. Hematological: Negative for adenopathy. PhysicalExam     Vitals:    01/06/22 1452   Temp: 97.2 °F (36.2 °C)   Weight: 126 lb (57.2 kg)   Height: 4' 11\" (1.499 m)       Physical Exam  Vitals reviewed. Constitutional:       Appearance: Normal appearance. HENT:      Head: Normocephalic and atraumatic. Right Ear: External ear normal. There is impacted cerumen. Left Ear: Ear canal and external ear normal. There is no impacted cerumen. Tympanic membrane is perforated. Ears:      Rodrigez exam findings: lateralizes right. Right Rinne: BC > AC. Left Rinne: AC > BC. Comments: Significant cerumen and squamous debris in the right      Nose: No congestion or rhinorrhea. Mouth/Throat:      Lips: Pink. No lesions. Mouth: Mucous membranes are moist. No oral lesions. Tongue: No lesions. Tongue does not deviate from midline. Palate: No mass and lesions. Pharynx: Oropharynx is clear. Uvula midline. No oropharyngeal exudate or posterior oropharyngeal erythema. Eyes:      Extraocular Movements: Extraocular movements intact. Pupils: Pupils are equal, round, and reactive to light. Musculoskeletal:      Cervical back: Normal range of motion and neck supple. Lymphadenopathy:      Cervical: No cervical adenopathy. Neurological:      Mental Status: She is alert. Cranial Nerves: No cranial nerve deficit. Data Review      See scanned audiogram dated 9/27/19 for results.                Procedure     Mastoid Debridement Right Ear    Pre op: Cerumen impaction of the Right ears.    Post op: Canal wall down mastoidectomy of the right ear, left posterosuperior perforation of the left ear (15%, central)  Procedure : Binocular otomicroscopy with debridement of cerumen  Surgeon: ROSETTE Chen  Estimated Blood Loss: None    Procedure:   Binocular otomicroscopy with debridement of cerumen impaction    After obtaining consent, the patient was placed in the examination chair in the reclined position.      -Right ear: External auditory canal intact, canal wall down mastoidectomy noted, completely occluded with skin/cerumen removed with suction, angled pick and alligator forceps  -Left ear: External auditory canal with no stenosis Left tympanic membrane visible without with global retraction over the pars tensa - 10-15% posterosuperior perforation noted through which the stapes tendon and facial nerve are appreciated     * Patient tolerated the procedure well with no complications    Cat Chanel MD  1/6/22    Portions of this note were dictated using Dragon.  There may be linguistic errors secondary to the use of this program.

## 2022-01-07 ENCOUNTER — TELEPHONE (OUTPATIENT)
Dept: ENT CLINIC | Age: 67
End: 2022-01-07

## 2022-01-07 NOTE — TELEPHONE ENCOUNTER
Patient called in to let Dr. Locke Guess know her insurance will not approve her medication of Ciprodex and would like to know if another medication can be called in to her pharmacy. Patient was seen in office 1/6/22.     [facility] [phone]

## 2022-01-11 ENCOUNTER — HOSPITAL ENCOUNTER (OUTPATIENT)
Dept: PHYSICAL THERAPY | Age: 67
Setting detail: THERAPIES SERIES
Discharge: HOME | End: 2022-01-11
Payer: MEDICAID

## 2022-01-11 PROCEDURE — 97016 VASOPNEUMATIC DEVICE THERAPY: CPT

## 2022-01-11 PROCEDURE — 97140 MANUAL THERAPY 1/> REGIONS: CPT

## 2022-01-11 PROCEDURE — 97110 THERAPEUTIC EXERCISES: CPT

## 2022-01-11 NOTE — FLOWSHEET NOTE
[facility], [address] [place]  Phone: [phone]  Fax [phone]    Date:  2022    Patient Name:  Smith Ayala    :  1955  MRN: [id number]  Restrictions/Precautions:   Right TTL SHLD Post-op Protocol (See MEDIA TAB),  [id number] 2021 w/ hospitalization and f/u PT 21 to 21,  OA, HTN, Osteoporosis , Back SX for Cason Rods @ age 8, Inner Ear [address] for Replacement of Ear Drum, Right Ulnar Nerve Decompression @ Elbow Aug. 12,2020, Right Carpal Tunnel Release SX Aug 25, 2020, PT Right Shoulder 2020    Medical/Treatment Diagnosis Information:  Diagnosis: M75.101 Right Shld RTC Tear & OA s/p Total SHLD SX (DOS: 2021)  Treatment Diagnosis: M25.511 Right SHLD Pain, M25.521 Right Elbow Pain,M25.611 Rigth SHLD Stiffness, M25.621 Rigth Elbow Stiffness, M62.81 Right UE Weakness , G25.89 Scapular Dyskinesis  Insurance/Certification information:  PT Insurance Information: [facility]  Physician Information:  Referring Practitioner: Dr. Stefan Brown.  Dilshad Schmitt  Has the plan of care been signed (Y/N):        []  Yes  [x]  No     Date of Patient follow up with Physician:     Is this a Progress Report:     []  Yes  [x]  No      If Yes:  Date Range for reporting period:  Beginnin2021  Ending    Progress report will be due (10 Rx or 30 days whichever is less):     Recertification will be due (POC Duration  / 90 days whichever is less): 3-3-2021     Visit # Insurance Allowable Auth Required   In-person  30 []  Yes []  No    Telehealth   []  Yes []  No    Total        Functional Scale: Quick Dash= 84%   Date assessed:  2021     Therapy Diagnosis/Practice Pattern: 4H     Number of Comorbidities:  []0     [x]1-2    []3+    Latex Allergy:  [x]NO      []YES  Preferred Language for Healthcare:   [x]English       []other:    Pain level: 2-4 /10 R SHLD,  0-2/10 R Elbow    SUBJECTIVE:  Patient reports no new issue with her shoulder, she did wear the sling out last night because she went out to dinner. OBJECTIVE:   7 weeks post op (11- DOS)   Observation:  Myofascial release to right subsapularis and teres minor w/ increase right passive shoulder flexion.  Initiated trial IFC ES for right shoulder pain control.  Test measurements:   PROM: scapation:120 deg,   Er: 35 @ 20 .  (Previous: Right elbow AROM -5 to 118 deg. Supination increased to 80 deg.)    RESTRICTIONS/PRECAUTIONS: Right TTL SHLD Post-op Protocol (See MEDIA TAB),   6-12 weeks: plane of scapula to 150 deg, ext rot to 45 deg, DC sling. 6 weeks post-op 2/10/22. Exercises/Interventions:  Access Code: [email or web address]  URL: [email or web address]  Date: 01/04/2022  Prepared by: Tino Mc    Access Code: [email or web address]  URL: [employer]/  Date: 12/23/2021  Prepared by: Tino Mc    Access Code: [email or web address]  URL: [email or web address]  Date: 12/09/2021  Prepared by: Tino Mc    Therapeutic Ex (50758) Sets/sec Reps Notes/CUES   Finisher 5 min     Bolster Table Rolls     Levator Stretch  hep   Supine ER with cane  30 degrees past neutral   Airdyne SBA    Pulleys flex 2x10     Pulley pulldown w/ scap retrac 3# 2x10 MC @ scap Close supervision to avoid compensation   Standing Scap retract RTB 2x10           Supine Cane Chest Press 1# 2x10  +18-   Supine SHLD flex w/ Contra arm assist.  +75-    +hep 1-4-2022   Standing Extension ROM w/ cane behind back     Standing IR ROM w/ cane behind back   +hep 1-4-2021   Supine SA 1# 2x10     Supine RS @ 90 deg 1# 3x30\"     Supine Active Elbow Ext      Isometrics  ER/IR/Flex   10x10\" ea           Manual Intervention (32607)      Myofascial release subscap & teres minor      PROM right shld & elbow in supine w/ head on wedge 15'     STM scap and shld mm                  NMR re-education (32190)   CUES NEEDED                                       Therapeutic Activity (61480)                          Therapeutic Exercise and NMR EXR  [x] (00647) Provided verbal/tactile cueing for activities related to strengthening, flexibility, endurance, ROM  for improvements in scapular, scapulothoracic and UE control with self care, reaching, carrying, lifting, house/yardwork, driving/computer work. [x] (72212) Provided verbal/tactile cueing for activities related to improving balance, coordination, kinesthetic sense, posture, motor skill, proprioception  to assist with  scapular, scapulothoracic and UE control with self care, reaching, carrying, lifting, house/yardwork, driving/computer work. Therapeutic Activities:    [] (11958 or 52760) Provided verbal/tactile cueing for activities related to improving balance, coordination, kinesthetic sense, posture, motor skill, proprioception and motor activation to allow for proper function of scapular, scapulothoracic and UE control with self care, carrying, lifting, driving/computer work.      Home Exercise Program:    [x] (78753) Reviewed/Progressed HEP activities related to strengthening, flexibility, endurance, ROM of scapular, scapulothoracic and UE control with self care, reaching, carrying, lifting, house/yardwork, driving/computer work  [] (69944) Reviewed/Progressed HEP activities related to improving balance, coordination, kinesthetic sense, posture, motor skill, proprioception of scapular, scapulothoracic and UE control with self care, reaching, carrying, lifting, house/yardwork, driving/computer work      Manual Treatments:  PROM / STM / Oscillations-Mobs:  G-I, II, III, IV (PA's, Inf., Post.)  [x] (01671) Provided manual therapy to mobilize soft tissue/joints of cervical/CT, scapular GHJ and UE for the purpose of modulating pain, promoting relaxation,  increasing ROM, reducing/eliminating soft tissue swelling/inflammation/restriction, improving soft tissue extensibility and allowing for proper ROM for normal function with self care, reaching, carrying, lifting, house/yardwork, driving/computer work    Modalities:  I GR low to shoulder 10' (declined ice and stim)     Charges  Timed Code Treatment Minutes: 40   Total Treatment Minutes: 54'       [] EVAL (LOW) 455 1011   [] EVAL (MOD) 21825   [] EVAL (HIGH) 22368   [] RE-EVAL     [x] RJ(18867) x 2   [] IONTO  [] NMR (74701) x    [x] VASO  [x] Manual (51187) x  1   [] Other:CP  [] TA x      [] Mech Traction (28914)  [] ES(attended) (26898)      [] ES (un) (72173):     GOALS  Short Term Goals: To be achieved in: 2 weeks  1. Independent in HEP and progression per patient tolerance, in order to prevent re-injury. [x]? Progressing: []? Met: []? Not Met: []? Adjusted  2. Patient will have a decrease in pain to facilitate improvement in movement, function, and ADLs as indicated by Functional Deficits. []? Progressing: []? Met: []? Not Met: []? Adjusted     Long Term Goals: To be achieved in: 12 weeks  1. Disability index score of 42% or less for the [facility] to assist with reaching prior level of function. []? Progressing: []? Met: []? Not Met: []? Adjusted  2. Patient will demonstrate increased AROM to 140 deg flexion to allow for proper joint functioning as indicated by patients Functional Deficits. [x]? Progressing: []? Met: []? Not Met: []? Adjusted  3. Patient will demonstrate an increase in Strength to 4/5 to allow for proper functional mobility as indicated by patients Functional Deficits. []? Progressing: []? Met: []? Not Met: []? Adjusted  4. Patient will retrieve plate from overhead shelf with her right hand without increased symptoms or restriction. []? Progressing: []? Met: []? Not Met: []? Adjusted  5. Wash and fix hair with right hand. []? Progressing: []? Met: []? Not Met: []? Adjusted         Progression Towards Functional goals:  [] Patient is progressing as expected towards functional goals listed. [x] Progression is slowed due to complexities listed.   [] Progression has been slowed due to co-morbidities. [] Plan just implemented, too soon to assess goals progression  [] Other:      ASSESSMENT: Pt tolerated increased strengthening today without any issue. She does continue to require hands on ROM care. Overall Progression Towards Functional goals/ Treatment Progress Update:  [] Patient is progressing as expected towards functional goals listed. [x] Progression is slowed due to complexities/Impairments listed. [] Progression has been slowed due to co-morbidities. [] Plan just implemented, too soon to assess goals progression <30days   [] Goals require adjustment due to lack of progress  [] Patient is not progressing as expected and requires additional follow up with physician  [] Other    Prognosis for POC: [x] Good [] Fair  [] Poor      Patient requires continued skilled intervention: [x] Yes  [] No    Treatment/Activity Tolerance:  [x] Patient able to complete treatment  [] Patient limited by fatigue  [x] Patient limited by pain    [] Patient limited by other medical complications  [x] Other: Pt limited by post-op restrictions. PLAN:   [x] Continue per plan of care [] Alter current plan (see comments above)  [] Plan of care initiated [] Hold pending MD visit [] Discharge    Electronically signed by:  Roseanne Tapia PT      Note: If patient does not return for scheduled/ recommended follow up visits, this note will serve as a discharge from care along with most recent update on progress.

## 2022-01-13 ENCOUNTER — HOSPITAL ENCOUNTER (OUTPATIENT)
Dept: PHYSICAL THERAPY | Age: 67
Setting detail: THERAPIES SERIES
Discharge: HOME | End: 2022-01-13
Payer: MEDICAID

## 2022-01-13 PROCEDURE — 97140 MANUAL THERAPY 1/> REGIONS: CPT

## 2022-01-13 PROCEDURE — 97016 VASOPNEUMATIC DEVICE THERAPY: CPT

## 2022-01-13 PROCEDURE — 97110 THERAPEUTIC EXERCISES: CPT

## 2022-01-13 NOTE — FLOWSHEET NOTE
[facility], [address] [place]  Phone: [phone]  Fax [phone]    Date:  2022    Patient Name:  Kai Delcid    :  1955  MRN: [id number]  Restrictions/Precautions:   Right TTL SHLD Post-op Protocol (See MEDIA TAB),  [id number]-[id number] 2021 w/ hospitalization and f/u PT 21 to 21,  OA, HTN, Osteoporosis , Back SX for Cason Rods @ age 8, Inner Ear [address] for Replacement of Ear Drum, Right Ulnar Nerve Decompression @ Elbow Aug. 12,2020, Right Carpal Tunnel Release SX Aug 25, 2020, PT Right Shoulder 2020    Medical/Treatment Diagnosis Information:  Diagnosis: M75.101 Right Shld RTC Tear & OA s/p Total SHLD SX (DOS: 2021)  Treatment Diagnosis: M25.511 Right SHLD Pain, M25.521 Right Elbow Pain,M25.611 Rigth SHLD Stiffness, M25.621 Rigth Elbow Stiffness, M62.81 Right UE Weakness , G25.89 Scapular Dyskinesis  Insurance/Certification information:  PT Insurance Information: [facility]  Physician Information:  Referring Practitioner: Dr. Nicolle Gill  Has the plan of care been signed (Y/N):        []  Yes  [x]  No     Date of Patient follow up with Physician:     Is this a Progress Report:     []  Yes  [x]  No      If Yes:  Date Range for reporting period:  Beginnin2021  Ending    Progress report will be due (10 Rx or 30 days whichever is less): 8799    Recertification will be due (POC Duration  / 90 days whichever is less): 3-3-2021     Visit # Insurance Allowable Auth Required   In-person  30 []  Yes []  No    Telehealth   []  Yes []  No    Total        Functional Scale: Quick Dash= 84%   Date assessed:  2021     Therapy Diagnosis/Practice Pattern: 4H     Number of Comorbidities:  []0     [x]1-2    []3+    Latex Allergy:  [x]NO      []YES  Preferred Language for Healthcare:   [x]English       []other:    Pain level: 2-4 /10 R SHLD,  0-2/10 R Elbow    SUBJECTIVE:  Patient reports no new issue with her shoulder, she did wear the sling out last night because she went out to dinner. OBJECTIVE:   7 weeks post op (11- DOS)   Observation:  Myofascial release to right subsapularis and teres minor w/ increase right passive shoulder flexion.  Initiated trial IFC ES for right shoulder pain control.  Test measurements:   PROM: scapation:120 deg,   Er: 35 @ 20 .  (Previous: Right elbow AROM -5 to 118 deg. Supination increased to 80 deg.)    RESTRICTIONS/PRECAUTIONS: Right TTL SHLD Post-op Protocol (See MEDIA TAB),   6-12 weeks: plane of scapula to 150 deg, ext rot to 45 deg, DC sling. 6 weeks post-op 2/10/22. Exercises/Interventions:  Access Code: [email or web address]  URL: [email or web address]/  Date: 01/04/2022  Prepared by: Samson Moore    Access Code: [email or web address]  URL: [email or web address]/  Date: 12/23/2021  Prepared by: Samson Moore    Access Code: [email or web address]  URL: [email or web address]  Date: 12/09/2021  Prepared by: Samson Moore    Therapeutic Ex (57908) Sets/sec Reps Notes/CUES   Finisher 5 min     Bolster Table Rolls     Levator Stretch  hep   Supine ER with cane  30 degrees past neutral   Airdyne SBA    Pulleys flex 2x10     Pulley pulldown w/ scap retrac 3# 2x10 MC @ scap Close supervision to avoid compensation   Standing Scap retract RTB 2x10           Supine Cane Chest Press 1# 2x10  +83-   Supine SHLD flex w/ Contra arm assist.  +15-    +hep 1-4-2022   Standing Extension ROM w/ cane behind back     Standing IR ROM w/ cane behind back   +hep 1-4-2021   Supine SA 1# 2x10     Supine RS @ 90 deg 1# 3x30\"     Supine Active Elbow Ext      Isometrics  ER/IR/Flex   10x10\" ea           Manual Intervention (40754)      Myofascial release subscap & teres minor      PROM right shld & elbow in supine w/ head on wedge 15'     STM scap and shld mm                  NMR re-education (52680)   CUES NEEDED                                       Therapeutic Activity (23648)                          Therapeutic Exercise and NMR EXR  [x] (37863) Provided verbal/tactile cueing for activities related to strengthening, flexibility, endurance, ROM  for improvements in scapular, scapulothoracic and UE control with self care, reaching, carrying, lifting, house/yardwork, driving/computer work. [x] (30774) Provided verbal/tactile cueing for activities related to improving balance, coordination, kinesthetic sense, posture, motor skill, proprioception  to assist with  scapular, scapulothoracic and UE control with self care, reaching, carrying, lifting, house/yardwork, driving/computer work. Therapeutic Activities:    [] (28808 or 37699) Provided verbal/tactile cueing for activities related to improving balance, coordination, kinesthetic sense, posture, motor skill, proprioception and motor activation to allow for proper function of scapular, scapulothoracic and UE control with self care, carrying, lifting, driving/computer work.      Home Exercise Program:    [x] (84229) Reviewed/Progressed HEP activities related to strengthening, flexibility, endurance, ROM of scapular, scapulothoracic and UE control with self care, reaching, carrying, lifting, house/yardwork, driving/computer work  [] (77945) Reviewed/Progressed HEP activities related to improving balance, coordination, kinesthetic sense, posture, motor skill, proprioception of scapular, scapulothoracic and UE control with self care, reaching, carrying, lifting, house/yardwork, driving/computer work      Manual Treatments:  PROM / STM / Oscillations-Mobs:  G-I, II, III, IV (PA's, Inf., Post.)  [x] (15215) Provided manual therapy to mobilize soft tissue/joints of cervical/CT, scapular GHJ and UE for the purpose of modulating pain, promoting relaxation,  increasing ROM, reducing/eliminating soft tissue swelling/inflammation/restriction, improving soft tissue extensibility and allowing for proper ROM for normal function with self care, reaching, carrying, lifting, house/yardwork, driving/computer work    Modalities:  I GR low to shoulder 10' (declined ice and stim)     Charges  Timed Code Treatment Minutes: 40   Total Treatment Minutes: 54'       [] EVAL (LOW) 455 1011   [] EVAL (MOD) 11279   [] EVAL (HIGH) 40627   [] RE-EVAL     [x] BW(13972) x 2   [] IONTO  [] NMR (94444) x    [x] VASO  [x] Manual (00667) x  1   [] Other:CP  [] TA x      [] Mech Traction (65675)  [] ES(attended) (55369)      [] ES (un) (96649):     GOALS  Short Term Goals: To be achieved in: 2 weeks  1. Independent in HEP and progression per patient tolerance, in order to prevent re-injury. [x]? Progressing: []? Met: []? Not Met: []? Adjusted  2. Patient will have a decrease in pain to facilitate improvement in movement, function, and ADLs as indicated by Functional Deficits. []? Progressing: []? Met: []? Not Met: []? Adjusted     Long Term Goals: To be achieved in: 12 weeks  1. Disability index score of 42% or less for the [facility] to assist with reaching prior level of function. []? Progressing: []? Met: []? Not Met: []? Adjusted  2. Patient will demonstrate increased AROM to 140 deg flexion to allow for proper joint functioning as indicated by patients Functional Deficits. [x]? Progressing: []? Met: []? Not Met: []? Adjusted  3. Patient will demonstrate an increase in Strength to 4/5 to allow for proper functional mobility as indicated by patients Functional Deficits. []? Progressing: []? Met: []? Not Met: []? Adjusted  4. Patient will retrieve plate from overhead shelf with her right hand without increased symptoms or restriction. []? Progressing: []? Met: []? Not Met: []? Adjusted  5. Wash and fix hair with right hand. []? Progressing: []? Met: []? Not Met: []? Adjusted         Progression Towards Functional goals:  [] Patient is progressing as expected towards functional goals listed. [x] Progression is slowed due to complexities listed.   [] Progression has been slowed due to co-morbidities. [] Plan just implemented, too soon to assess goals progression  [] Other:      ASSESSMENT: Pt tolerated increased strengthening today without any issue. She does continue to require hands on ROM care. Overall Progression Towards Functional goals/ Treatment Progress Update:  [] Patient is progressing as expected towards functional goals listed. [x] Progression is slowed due to complexities/Impairments listed. [] Progression has been slowed due to co-morbidities. [] Plan just implemented, too soon to assess goals progression <30days   [] Goals require adjustment due to lack of progress  [] Patient is not progressing as expected and requires additional follow up with physician  [] Other    Prognosis for POC: [x] Good [] Fair  [] Poor      Patient requires continued skilled intervention: [x] Yes  [] No    Treatment/Activity Tolerance:  [x] Patient able to complete treatment  [] Patient limited by fatigue  [x] Patient limited by pain    [] Patient limited by other medical complications  [x] Other: Pt limited by post-op restrictions. PLAN:   [x] Continue per plan of care [] Alter current plan (see comments above)  [] Plan of care initiated [] Hold pending MD visit [] Discharge    Electronically signed by:  Lamont Saeed PT      Note: If patient does not return for scheduled/ recommended follow up visits, this note will serve as a discharge from care along with most recent update on progress.

## 2022-01-18 ENCOUNTER — HOSPITAL ENCOUNTER (OUTPATIENT)
Dept: PHYSICAL THERAPY | Age: 67
Setting detail: THERAPIES SERIES
Discharge: HOME | End: 2022-01-18
Payer: MEDICAID

## 2022-01-18 PROCEDURE — 97140 MANUAL THERAPY 1/> REGIONS: CPT

## 2022-01-18 PROCEDURE — 97110 THERAPEUTIC EXERCISES: CPT

## 2022-01-18 PROCEDURE — 97016 VASOPNEUMATIC DEVICE THERAPY: CPT

## 2022-01-18 NOTE — FLOWSHEET NOTE
[facility], [address], [address] [place] [address]  Phone: [phone]  Fax [phone]    Date:  2022    Patient Name:  Jacqueline Mchugh    :  1955  MRN: [id number]  Restrictions/Precautions:   Right TTL SHLD Post-op Protocol (See MEDIA TAB),  UBOGH-57 2021 w/ hospitalization and f/u PT 21 to 21,  OA, HTN, Osteoporosis , Back SX for Cason Rods @ age 8, Inner Ear [address] for Replacement of Ear Drum, Right Ulnar Nerve Decompression @ Elbow Aug. 12,2020, Right Carpal Tunnel Release SX Aug 25, 2020, PT Right Shoulder 2020    Medical/Treatment Diagnosis Information:  Diagnosis: M75.101 Right Shld RTC Tear & OA s/p Total SHLD SX (DOS: 2021)  Treatment Diagnosis: M25.511 Right SHLD Pain, M25.521 Right Elbow Pain,M25.611 Rigth SHLD Stiffness, M25.621 Rigth Elbow Stiffness, M62.81 Right UE Weakness , G25.89 Scapular Dyskinesis  Insurance/Certification information:  PT Insurance Information: [facility]  Physician Information:  Referring Practitioner: Dr. Genna Murphy.  Manoj Lynn  Has the plan of care been signed (Y/N):        []  Yes  [x]  No     Date of Patient follow up with Physician:     Is this a Progress Report:     [x]  Yes  []  No      If Yes:  Date Range for reporting period:  Beginnin2021  Ending 2022    Progress report will be due (10 Rx or 30 days whichever is less):   8198    Recertification will be due (POC Duration  / 90 days whichever is less): 3-3-2022     Visit # Insurance Allowable Auth Required   In-person 12 30 []  Yes []  No    Telehealth   []  Yes []  No    Total        Functional Scale: Quick Dash= 84%   Date assessed:  2021     Therapy Diagnosis/Practice Pattern: 4H     Number of Comorbidities:  []0     [x]1-2    []3+    Latex Allergy:  [x]NO      []YES  Preferred Language for Healthcare:   [x]English       []other:    Pain level: 0-2 /10 R SHLD,  0/10 R Elbow    SUBJECTIVE:  Pt states that her right shoulder and elbow feel a little better each week. Able to now use computer mouse with less discomfort, but still finds that she hurst in region of biceps long head when she plays on her computer too long. OBJECTIVE: See Progress Note. 8.75 weeks post op (11- DOS)   Observation: TTP biceps long head and teres minor.  Test measurements:    RESTRICTIONS/PRECAUTIONS: Right TTL SHLD Post-op Protocol (See MEDIA TAB),   6-12 weeks: plane of scapula to 150 deg, ext rot to 45 deg, DC sling. 6 weeks post-op 2/10/22. Exercises/Interventions:  Access Code: [email or web address]  URL: [email or web address]  Date: 01/04/2022  Prepared by: Rory Ayala    Access Code: [email or web address]  URL: [email or web address]  Date: 12/23/2021  Prepared by: Rory Ayala    Access Code: [email or web address]  URL: [email or web address]  Date: 12/09/2021  Prepared by: Rory Ayala    Therapeutic Ex (86504) Sets/sec Reps Notes/CUES   Finisher 5 min     Bolster Table Rolls H510     Levator Stretch H15 x3  hep   Supine ER with cane  30 degrees past neutral   Airdyne SBA    Pulleys flex 2x10     Pulley pulldown w/ scap retrac 3# 2x10 MC @ scap Close supervision to avoid compensation   Standing Int Rot Step out stretch BTB H5 1x10     Standing IR Stepout Isometric      Standing Scap retract RTB 2x10     Standing Thread the Needle Posteior Capsule Stretch High Mat H5 x19     SL Ext Rot 1# 2x10     SL Flex and w/ elbow flex 0# 1x10     Supine Cane Chest Press 1# 2x10  +04-   Supine SHLD flex w/ Contra arm assist.  +28-   Standing Extension ROM w/ cane behind back     Standing IR ROM w/ cane behind back H5 x10  +hep 1-4-2021   Supine SA 1# 2x10     Supine RS @ 90 deg 1# 3x30\"     Supine Active Elbow Ext                Manual Intervention (50767)      Myofascial release subscap & teres minor 5'     PROM right shld & elbow in supine w/ head on wedge 15'     STM scap and shld mm NMR re-education (25995)   CUES NEEDED                                       Therapeutic Activity (76399)                          Therapeutic Exercise and NMR EXR  [x] (11307) Provided verbal/tactile cueing for activities related to strengthening, flexibility, endurance, ROM  for improvements in scapular, scapulothoracic and UE control with self care, reaching, carrying, lifting, house/yardwork, driving/computer work. [x] (02583) Provided verbal/tactile cueing for activities related to improving balance, coordination, kinesthetic sense, posture, motor skill, proprioception  to assist with  scapular, scapulothoracic and UE control with self care, reaching, carrying, lifting, house/yardwork, driving/computer work. Therapeutic Activities:    [] (49461 or 10830) Provided verbal/tactile cueing for activities related to improving balance, coordination, kinesthetic sense, posture, motor skill, proprioception and motor activation to allow for proper function of scapular, scapulothoracic and UE control with self care, carrying, lifting, driving/computer work.      Home Exercise Program:    [x] (03512) Reviewed/Progressed HEP activities related to strengthening, flexibility, endurance, ROM of scapular, scapulothoracic and UE control with self care, reaching, carrying, lifting, house/yardwork, driving/computer work  [] (02691) Reviewed/Progressed HEP activities related to improving balance, coordination, kinesthetic sense, posture, motor skill, proprioception of scapular, scapulothoracic and UE control with self care, reaching, carrying, lifting, house/yardwork, driving/computer work      Manual Treatments:  PROM / STM / Oscillations-Mobs:  G-I, II, III, IV (PA's, Inf., Post.)  [x] (64056) Provided manual therapy to mobilize soft tissue/joints of cervical/CT, scapular GHJ and UE for the purpose of modulating pain, promoting relaxation,  increasing ROM, reducing/eliminating soft tissue swelling/inflammation/restriction, improving soft tissue extensibility and allowing for proper ROM for normal function with self care, reaching, carrying, lifting, house/yardwork, driving/computer work    Modalities:  Vaso  right shoulder x15 min,     Charges  Timed Code Treatment Minutes: 40   Total Treatment Minutes: 54'       [] EVAL (LOW) 99599   [] EVAL (MOD) 28885   [] EVAL (HIGH) 22997   [] RE-EVAL     [x] JG(07050) x 2   [] IONTO  [] NMR (02447) x    [x] VASO  [x] Manual (92532) x  1   [] Other:CP  [] TA x      [] Mech Traction (71828)  [] ES(attended) (06570)      [] ES (un) (05167):     GOALS  Short Term Goals: To be achieved in: 2 weeks  1. Independent in HEP and progression per patient tolerance, in order to prevent re-injury. []? Progressing: [x]? Met: []? Not Met: []? Adjusted  2. Patient will have a decrease in pain to facilitate improvement in movement, function, and ADLs as indicated by Functional Deficits. []? Progressing: [x]? Met: []? Not Met: []? Adjusted     Long Term Goals: To be achieved in: 12 weeks  1. Disability index score of 42% or less for the [facility] to assist with reaching prior level of function. [x]? Progressing: to 63% 1- []? Met: []? Not Met: []? Adjusted  2. Patient will demonstrate increased AROM to 140 deg flexion to allow for proper joint functioning as indicated by patients Functional Deficits. [x]? Progressing: []? Met: []? Not Met: []? Adjusted  3. Patient will demonstrate an increase in Strength to 4/5 to allow for proper functional mobility as indicated by patients Functional Deficits. [x]? Progressing: []? Met: []? Not Met: []? Adjusted  4. Patient will retrieve plate from overhead shelf with her right hand without increased symptoms or restriction. [x]? Progressing: []? Met: []? Not Met: []? Adjusted  5. Wash and fix hair with right hand. [x]? Progressing: []? Met: []? Not Met: []?  Adjusted         Progression Towards Functional goals:  [] Patient is progressing as expected towards functional goals listed. [x] Progression is slowed due to complexities listed. [] Progression has been slowed due to co-morbidities. [] Plan just implemented, too soon to assess goals progression  [] Other:      ASSESSMENT: Slow but steady progress in AROM. Overall Progression Towards Functional goals/ Treatment Progress Update:  [] Patient is progressing as expected towards functional goals listed. [x] Progression is slowed due to complexities/Impairments listed. [] Progression has been slowed due to co-morbidities. [] Plan just implemented, too soon to assess goals progression <30days   [] Goals require adjustment due to lack of progress  [] Patient is not progressing as expected and requires additional follow up with physician  [] Other    Prognosis for POC: [x] Good [] Fair  [] Poor      Patient requires continued skilled intervention: [x] Yes  [] No    Treatment/Activity Tolerance:  [x] Patient able to complete treatment  [] Patient limited by fatigue  [x] Patient limited by pain    [] Patient limited by other medical complications  [x] Other: Pt limited by post-op restrictions. PLAN:   [x] Continue per plan of care [] Alter current plan (see comments above)  [] Plan of care initiated [] Hold pending MD visit [] Discharge    Electronically signed by:  Sloane Johnson PT      Note: If patient does not return for scheduled/ recommended follow up visits, this note will serve as a discharge from care along with most recent update on progress.

## 2022-01-18 NOTE — PROGRESS NOTES
[facility], [address]     Physical Therapy 30 Day Progress Note    Date: 2022        Patient Name:  Nathen Tierney    :  1955  MRN: [id number]  Referring Physician:Dr. Philly Aguilera. Romero Gavin      Diagnosis: M75.101 Right Shld RTC Tear & OA s/p Total SHLD SX (DOS: 2021)                          ICD Code:M75.101M25.511 Right SHLD Pain, M25.521 Right Elbow Pain,M25.611 Rigth SHLD Stiffness, M25.621 Rigth Elbow Stiffness, M62.81 Right UE Weakness , G25.89 Scapular Dyskinesis        Therapy Diagnosis/Practice Pattern:4H    Total number of visits:12   Reporting Period:   Beginning Date:2021   End WYRT:    OBJECTIVE  Test used Initial score Current Score   Pain Summary 0-10 7/10 0-2/10 @ end ranges of motion. Functional questionnaire Quick Dash 84% 63%   Functional Testing  Dominant right UE in sling No sling. Not able to actively lift right hand above waist level. ROM  Right shoulder flex PROM  20 deg PROM 110 deg    scap PROM  20 deg PROM  120 deg    ER PROM  -33 deg w/ 0 abd PROM  -40 deg @ 0 abd    IR PROM  41 deg @ 0 abd PROM  65 deg @ 0 abd                Right Elbow Ext PROM  -28 deg pain   PROM -2 deg    Flex PROM  62 deg pain PROM 100 deg    Supination PROM  -74 de pain PROM neutral               Strength [place]/postop 2= to 3-/5               Functional Limitation G-Code (if applicable):      Quick Dash= 63%        Test/tests used to determine % limitation:Qucik Dash  Actual Score used to drive % MBPGMBSDQX:15%    Treatment to date:  [x] Therapeutic Exercise    [x] Modalities:  [] Therapeutic Activity             []Ultrasound            [x]Electrical Stimulation  [] Gait Training     []Cervical Traction    [] Lumbar Traction  [x] Neuromuscular Re-education [x] Cold/hotpack         []Iontophoresis  [x] Instruction in HEP      Other:  [x] Manual Therapy                   [x]Vaso

## 2022-01-20 ENCOUNTER — HOSPITAL ENCOUNTER (OUTPATIENT)
Dept: PHYSICAL THERAPY | Age: 67
Setting detail: THERAPIES SERIES
Discharge: HOME | End: 2022-01-20
Payer: MEDICAID

## 2022-01-20 PROCEDURE — 97016 VASOPNEUMATIC DEVICE THERAPY: CPT

## 2022-01-20 PROCEDURE — 97140 MANUAL THERAPY 1/> REGIONS: CPT

## 2022-01-20 PROCEDURE — 97110 THERAPEUTIC EXERCISES: CPT

## 2022-01-20 NOTE — FLOWSHEET NOTE
[facility], [address] [place]  Phone: [phone]  Fax [phone]    Date:  2022    Patient Name:  Jaden Malik    :  1955  MRN: [id number]  Restrictions/Precautions:   Right TTL SHLD Post-op Protocol (See MEDIA TAB),  JYJDV-28 2021 w/ hospitalization and f/u PT 21 to 21,  OA, HTN, Osteoporosis , Back SX for Cason Rods @ age 8, Inner Ear [address] for Replacement of Ear Drum, Right Ulnar Nerve Decompression @ Elbow Aug. 12,2020, Right Carpal Tunnel Release SX Aug 25, 2020, PT Right Shoulder 2020    Medical/Treatment Diagnosis Information:  Diagnosis: M75.101 Right Shld RTC Tear & OA s/p Total SHLD SX (DOS: 2021)  Treatment Diagnosis: M25.511 Right SHLD Pain, M25.521 Right Elbow Pain,M25.611 Rigth SHLD Stiffness, M25.621 Rigth Elbow Stiffness, M62.81 Right UE Weakness , G25.89 Scapular Dyskinesis  Insurance/Certification information:  PT Insurance Information: [facility]  Physician Information:  Referring Practitioner: Dr. Dean Conklin  Has the plan of care been signed (Y/N):        [x]  Yes  []  No     Date of Patient follow up with Physician:     Is this a Progress Report:     []  Yes  [x]  No      If Yes:  Date Range for reporting period:  Beginnin2022  Ending     Progress report will be due (10 Rx or 30 days whichever is less):       Recertification will be due (POC Duration  / 90 days whichever is less): 3-3-2022     Visit # Insurance Allowable Auth Required   In-person 13 30 []  Yes []  No    Telehealth   []  Yes []  No    Total        Functional Scale: Quick Dash= 84%   Date assessed:  2021     Therapy Diagnosis/Practice Pattern: 4H     Number of Comorbidities:  []0     [x]1-2    []3+    Latex Allergy:  [x]NO      []YES  Preferred Language for Healthcare:   [x]English       []other:    Pain level: 0-2 /10 R SHLD    SUBJECTIVE: Pt states that most of her shoulder discomfort is @ anterior aspect. This area gets aggravated when she plays on her computer too long moving the mouse around. OBJECTIVE:   9 weeks post op (11- DOS)   Observation: Initiated wall scapation slides @ low reps to 80 deg. Pt required min assist @ elbow to accomplish this through limited ROM.  Test measurements:    RESTRICTIONS/PRECAUTIONS: Right TTL SHLD Post-op Protocol (See MEDIA TAB),   6-12 weeks: plane of scapula to 150 deg, ext rot to 45 deg, DC sling. 6 weeks post-op 2/10/22. Exercises/Interventions:  Access Code: [email or web address]  URL: [email or web address]  Date: 01/04/2022  Prepared by: Jasmeet Ortega    Access Code: [email or web address]  URL: [employer]/  Date: 12/23/2021  Prepared by: Jasmeet Ortega    Access Code: [email or web address]  URL: [email or web address]  Date: 12/09/2021  Prepared by: Jasmeet Ortega    Therapeutic Ex (89421) Sets/sec Reps Notes/CUES   Finisher 5 min     Bolster Table Rolls H510      hep    30 degrees past neutral   Airdyne SBA    Pulleys flex 2x10     Pulley pulldown w/ scap retrac 5# 2x10 MC @ scap Close supervision to avoid compensation   Standing Wall Wipes Scapation 2x5 Min asist to 80 deg    Standing Wall Scap Matrix YTB 2x5     Standing Int Rot Step out stretch BTB H5 1x10     Standing IR Stepout Isometric RTB H5 x10     Standing Scap retract RTB 2x10                 Standing Thread the Needle Posteior Capsule Stretch High Mat H5 x19     SL Ext Rot 1# 2x10     SL Flex and w/ elbow flex 0# 2x10     SL Assisted abd 2x10 Min A-SBA    Supine Cane Chest Press # 2x10  +40-   Supine SHLD flex w/ abd  +12-   Standing Extension ROM w/ cane behind back     SL Int Rot behind back H5 x10  +hep 1-4-2021   Supine SA 1# 2x10     Supine RS @ 90 deg 1# 3x30\"     Supine Active Elbow Ext                Manual Intervention (93403)      Myofascial release subscap & teres minor 5'     PROM right shld & elbow in supine w/ head on wedge 15' STM scap and shld mm                  NMR re-education (00240)   CUES NEEDED                                       Therapeutic Activity (36700)                          Therapeutic Exercise and NMR EXR  [x] (37056) Provided verbal/tactile cueing for activities related to strengthening, flexibility, endurance, ROM  for improvements in scapular, scapulothoracic and UE control with self care, reaching, carrying, lifting, house/yardwork, driving/computer work. [x] (01832) Provided verbal/tactile cueing for activities related to improving balance, coordination, kinesthetic sense, posture, motor skill, proprioception  to assist with  scapular, scapulothoracic and UE control with self care, reaching, carrying, lifting, house/yardwork, driving/computer work. Therapeutic Activities:    [] (69976 or 17056) Provided verbal/tactile cueing for activities related to improving balance, coordination, kinesthetic sense, posture, motor skill, proprioception and motor activation to allow for proper function of scapular, scapulothoracic and UE control with self care, carrying, lifting, driving/computer work.      Home Exercise Program:    [x] (96229) Reviewed/Progressed HEP activities related to strengthening, flexibility, endurance, ROM of scapular, scapulothoracic and UE control with self care, reaching, carrying, lifting, house/yardwork, driving/computer work  [] (82594) Reviewed/Progressed HEP activities related to improving balance, coordination, kinesthetic sense, posture, motor skill, proprioception of scapular, scapulothoracic and UE control with self care, reaching, carrying, lifting, house/yardwork, driving/computer work      Manual Treatments:  PROM / STM / Oscillations-Mobs:  G-I, II, III, IV (PA's, Inf., Post.)  [x] (67280) Provided manual therapy to mobilize soft tissue/joints of cervical/CT, scapular GHJ and UE for the purpose of modulating pain, promoting relaxation,  increasing ROM, reducing/eliminating soft tissue swelling/inflammation/restriction, improving soft tissue extensibility and allowing for proper ROM for normal function with self care, reaching, carrying, lifting, house/yardwork, driving/computer work    Modalities:  Vaso  right shoulder x15 min,     Charges  Timed Code Treatment Minutes: 50   Total Treatment Minutes: 72'       [] EVAL (LOW) 29626   [] EVAL (MOD) 85544   [] EVAL (HIGH) 82495   [] RE-EVAL     [x] WV(32085) x 2   [] IONTO  [] NMR (61138) x    [x] VASO  [x] Manual (70004) x  1   [] Other:CP  [] TA x      [] Mech Traction (41523)  [] ES(attended) (67705)      [] ES (un) (89080):     GOALS  Short Term Goals: To be achieved in: 2 weeks  1. Independent in HEP and progression per patient tolerance, in order to prevent re-injury. []? Progressing: [x]? Met: []? Not Met: []? Adjusted  2. Patient will have a decrease in pain to facilitate improvement in movement, function, and ADLs as indicated by Functional Deficits. []? Progressing: [x]? Met: []? Not Met: []? Adjusted     Long Term Goals: To be achieved in: 12 weeks  1. Disability index score of 42% or less for the [facility] to assist with reaching prior level of function. [x]? Progressing: to 63% 1- []? Met: []? Not Met: []? Adjusted  2. Patient will demonstrate increased AROM to 140 deg flexion to allow for proper joint functioning as indicated by patients Functional Deficits. [x]? Progressing: []? Met: []? Not Met: []? Adjusted  3. Patient will demonstrate an increase in Strength to 4/5 to allow for proper functional mobility as indicated by patients Functional Deficits. [x]? Progressing: []? Met: []? Not Met: []? Adjusted  4. Patient will retrieve plate from overhead shelf with her right hand without increased symptoms or restriction. [x]? Progressing: []? Met: []? Not Met: []? Adjusted  5. Wash and fix hair with right hand. [x]? Progressing: []? Met: []? Not Met: []?  Adjusted         Progression Towards Functional goals:  [] Patient is progressing as expected towards functional goals listed. [x] Progression is slowed due to complexities listed. [] Progression has been slowed due to co-morbidities. [] Plan just implemented, too soon to assess goals progression  [] Other:      ASSESSMENT: Still note able to reach beyond passive 120 deg shoulder flexion. Pt requires most of the session to obtain this. Overall Progression Towards Functional goals/ Treatment Progress Update:  [] Patient is progressing as expected towards functional goals listed. [x] Progression is slowed due to complexities/Impairments listed. [] Progression has been slowed due to co-morbidities. [] Plan just implemented, too soon to assess goals progression <30days   [] Goals require adjustment due to lack of progress  [] Patient is not progressing as expected and requires additional follow up with physician  [] Other    Prognosis for POC: [x] Good [] Fair  [] Poor      Patient requires continued skilled intervention: [x] Yes  [] No    Treatment/Activity Tolerance:  [x] Patient able to complete treatment  [] Patient limited by fatigue  [x] Patient limited by pain    [] Patient limited by other medical complications  [x] Other: Pt limited by post-op restrictions. PLAN:   [x] Continue per plan of care [] Alter current plan (see comments above)  [] Plan of care initiated [] Hold pending MD visit [] Discharge    Electronically signed by:  David Meier PT      Note: If patient does not return for scheduled/ recommended follow up visits, this note will serve as a discharge from care along with most recent update on progress.

## 2022-01-25 ENCOUNTER — HOSPITAL ENCOUNTER (OUTPATIENT)
Dept: PHYSICAL THERAPY | Age: 67
Setting detail: THERAPIES SERIES
Discharge: HOME | End: 2022-01-25
Payer: MEDICAID

## 2022-01-25 PROCEDURE — 97110 THERAPEUTIC EXERCISES: CPT

## 2022-01-25 PROCEDURE — 97016 VASOPNEUMATIC DEVICE THERAPY: CPT

## 2022-01-25 PROCEDURE — 97140 MANUAL THERAPY 1/> REGIONS: CPT

## 2022-01-25 NOTE — FLOWSHEET NOTE
[facility], [address] [place] [address]  Phone: [phone]  Fax [phone]    Date:  2022    Patient Name:  Jacqueline Mchugh    :  1955  MRN: [id number]  Restrictions/Precautions:   Right TTL SHLD Post-op Protocol (See MEDIA TAB),  BLJDU-23 2021 w/ hospitalization and f/u PT 21 to 21,  OA, HTN, Osteoporosis , Back SX for Cason Rods @ age 8, Inner Ear [address] for Replacement of Ear Drum, Right Ulnar Nerve Decompression @ Elbow Aug. 12,2020, Right Carpal Tunnel Release SX Aug 25, 2020, PT Right Shoulder 2020    Medical/Treatment Diagnosis Information:  Diagnosis: M75.101 Right Shld RTC Tear & OA s/p Total SHLD SX (DOS: 2021)  Treatment Diagnosis: M25.511 Right SHLD Pain, M25.521 Right Elbow Pain,M25.611 Rigth SHLD Stiffness, M25.621 Rigth Elbow Stiffness, M62.81 Right UE Weakness , G25.89 Scapular Dyskinesis  Insurance/Certification information:  PT Insurance Information: [facility]  Physician Information:  Referring Practitioner: Dr. Genna Murphy.  Manoj Lynn  Has the plan of care been signed (Y/N):        [x]  Yes  []  No     Date of Patient follow up with Physician:     Is this a Progress Report:     []  Yes  [x]  No      If Yes:  Date Range for reporting period:  Beginnin2022  Ending     Progress report will be due (10 Rx or 30 days whichever is less):       Recertification will be due (POC Duration  / 90 days whichever is less): 3-3-2022     Visit # Insurance Allowable Auth Required   In-person 14 30 []  Yes []  No    Telehealth   []  Yes []  No    Total        Functional Scale: Quick Dash=   63%   Date assessed:   2022    Therapy Diagnosis/Practice Pattern: 4H     Number of Comorbidities:  []0     [x]1-2    []3+    Latex Allergy:  [x]NO      []YES  Preferred Language for Healthcare:   [x]English       []other:    Pain level: 0 /10 R SHLD    SUBJECTIVE:     OBJECTIVE:   9.75 weeks post op (11- DOS)   Observation:  Progressed HEP. Added TB @ neutral w/ good tolerance. Pt given new written handout (media tab).  Test measurements:PROM 0-130 degrees scapation by end of RX. RESTRICTIONS/PRECAUTIONS: Right TTL SHLD Post-op Protocol (See MEDIA TAB),   6-12 weeks: plane of scapula to 150 deg, ext rot to 45 deg, DC sling. 6 weeks post-op 2/10/22. Exercises/Interventions:  Access Code: [email or web address]  URL: [email or web address]  Date: 01/04/2022    Access Code: [email or web address]  URL: [email or web address]  Date: 12/23/2021    Access Code: [email or web address]  URL: [email or web address]  Date: 12/09/2021  Therapeutic Ex (53789) Sets/sec Reps Notes/CUES   Finisher 5 min     Bolster Table Rolls H510     Levator Stretch H15 x3  hep   Pulleys flex 2x10     Standing Wall Wipes Scapation 2x5 Min asist to 80 deg    Standing Wall Scap Matrix YTB 2x5     Standing Int Rot Step out stretch BTB H5 1x10     Standing IR  RTB H2 2x10     Standing ER RTB H 2x10     Standing Ext w/ elbow ext RTB H2  2x10     [employer] Scap retract/depression GTB 2x10                 Standing Thread the Needle Posteior Capsule Stretch High Mat H5 x19     SL Ext Rot 2# 3x5     SL Flex and w/ elbow flex 0# 2x10     SL Assisted abd 3x5 SBA    SL scap RS @ 80 deg 3x30\"     Supine Cane Chest Press # 2x10  +75-   Supine SHLD flex w/ abd OTB H5 2x10  +12-   SL Int Rot behind back H5 x10  +hep 1-4-2021   Supine SA 2# 2x10     Supine RS @ 90 deg 2# 3x30\"     Supine Triceps 2# 2x10                       Manual Intervention (14814)      Myofascial release subscap & teres minor 5'     PROM right shld & elbow in supine w/ head on wedge 19'     STM scap and shld mm 4'                 NMR re-education (38705)   CUES NEEDED                                       Therapeutic Activity (88299)                          Therapeutic Exercise and NMR EXR  [x] (08721) Provided verbal/tactile cueing for activities related to strengthening, flexibility, endurance, ROM  for improvements in scapular, scapulothoracic and UE control with self care, reaching, carrying, lifting, house/yardwork, driving/computer work. [x] (58495) Provided verbal/tactile cueing for activities related to improving balance, coordination, kinesthetic sense, posture, motor skill, proprioception  to assist with  scapular, scapulothoracic and UE control with self care, reaching, carrying, lifting, house/yardwork, driving/computer work. Therapeutic Activities:    [] (57627 or 59523) Provided verbal/tactile cueing for activities related to improving balance, coordination, kinesthetic sense, posture, motor skill, proprioception and motor activation to allow for proper function of scapular, scapulothoracic and UE control with self care, carrying, lifting, driving/computer work.      Home Exercise Program:    [x] (18085) Reviewed/Progressed HEP activities related to strengthening, flexibility, endurance, ROM of scapular, scapulothoracic and UE control with self care, reaching, carrying, lifting, house/yardwork, driving/computer work  [] (93129) Reviewed/Progressed HEP activities related to improving balance, coordination, kinesthetic sense, posture, motor skill, proprioception of scapular, scapulothoracic and UE control with self care, reaching, carrying, lifting, house/yardwork, driving/computer work      Manual Treatments:  PROM / STM / Oscillations-Mobs:  G-I, II, III, IV (PA's, Inf., Post.)  [x] (37344) Provided manual therapy to mobilize soft tissue/joints of cervical/CT, scapular GHJ and UE for the purpose of modulating pain, promoting relaxation,  increasing ROM, reducing/eliminating soft tissue swelling/inflammation/restriction, improving soft tissue extensibility and allowing for proper ROM for normal function with self care, reaching, carrying, lifting, house/yardwork, driving/computer work    Modalities:  Vaso  right shoulder x15 min,     Charges  Timed Code Treatment Minutes: 60   Total Treatment Minutes: 70'       [] EVAL (LOW) 56186   [] EVAL (MOD) 25439   [] EVAL (HIGH) 70569   [] RE-EVAL     [x] PX(74444) x 2   [] IONTO  [] NMR (21296) x    [x] VASO  [x] Manual (71310) x  2   [] Other:CP  [] TA x      [] Mech Traction (69808)  [] ES(attended) (48973)      [] ES (un) (78069):     GOALS  Short Term Goals: To be achieved in: 2 weeks  1. Independent in HEP and progression per patient tolerance, in order to prevent re-injury. []? Progressing: [x]? Met: []? Not Met: []? Adjusted  2. Patient will have a decrease in pain to facilitate improvement in movement, function, and ADLs as indicated by Functional Deficits. []? Progressing: [x]? Met: []? Not Met: []? Adjusted     Long Term Goals: To be achieved in: 12 weeks  1. Disability index score of 42% or less for the [facility] to assist with reaching prior level of function. [x]? Progressing: to 63% 1- []? Met: []? Not Met: []? Adjusted  2. Patient will demonstrate increased AROM to 140 deg flexion to allow for proper joint functioning as indicated by patients Functional Deficits. [x]? Progressing: []? Met: []? Not Met: []? Adjusted  3. Patient will demonstrate an increase in Strength to 4/5 to allow for proper functional mobility as indicated by patients Functional Deficits. [x]? Progressing: []? Met: []? Not Met: []? Adjusted  4. Patient will retrieve plate from overhead shelf with her right hand without increased symptoms or restriction. [x]? Progressing: []? Met: []? Not Met: []? Adjusted  5. Wash and fix hair with right hand. [x]? Progressing: []? Met: []? Not Met: []? Adjusted         Progression Towards Functional goals:  [] Patient is progressing as expected towards functional goals listed. [x] Progression is slowed due to complexities listed. [] Progression has been slowed due to co-morbidities.   [] Plan just implemented, too soon to assess goals progression  [] Other:      ASSESSMENT: Increase PROM into scapation, but patient requires additional MT to achieve this. Overall Progression Towards Functional goals/ Treatment Progress Update:  [] Patient is progressing as expected towards functional goals listed. [x] Progression is slowed due to complexities/Impairments listed. [] Progression has been slowed due to co-morbidities. [] Plan just implemented, too soon to assess goals progression <30days   [] Goals require adjustment due to lack of progress  [] Patient is not progressing as expected and requires additional follow up with physician  [] Other    Prognosis for POC: [x] Good [] Fair  [] Poor      Patient requires continued skilled intervention: [x] Yes  [] No    Treatment/Activity Tolerance:  [x] Patient able to complete treatment  [] Patient limited by fatigue  [x] Patient limited by pain    [] Patient limited by other medical complications  [x] Other: Pt limited by post-op restrictions. PLAN:   [x] Continue per plan of care [] Alter current plan (see comments above)  [] Plan of care initiated [] Hold pending MD visit [] Discharge    Electronically signed by:  Kylah Terrazas, PT  [id number]    Note: If patient does not return for scheduled/ recommended follow up visits, this note will serve as a discharge from care along with most recent update on progress.

## 2022-01-27 ENCOUNTER — HOSPITAL ENCOUNTER (OUTPATIENT)
Dept: PHYSICAL THERAPY | Age: 67
Setting detail: THERAPIES SERIES
Discharge: HOME | End: 2022-01-27
Payer: MEDICAID

## 2022-01-27 PROCEDURE — 97110 THERAPEUTIC EXERCISES: CPT

## 2022-01-27 PROCEDURE — 97140 MANUAL THERAPY 1/> REGIONS: CPT

## 2022-01-27 NOTE — FLOWSHEET NOTE
[facility], [address] [place]  Phone: [phone]  Fax [phone]    Date:  2022    Patient Name:  Matt Sal    :  1955  MRN: [id number]  Restrictions/Precautions:   Right TTL SHLD Post-op Protocol (See MEDIA TAB),  [id number] 2021 w/ hospitalization and f/u PT 21 to 21,  OA, HTN, Osteoporosis , Back SX for Cason Rods @ age 8, Inner Ear [address] for Replacement of Ear Drum, Right Ulnar Nerve Decompression @ Elbow Aug. 12,2020, Right Carpal Tunnel Release SX Aug 25, 2020, PT Right Shoulder 2020    Medical/Treatment Diagnosis Information:  Diagnosis: M75.101 Right Shld RTC Tear & OA s/p Total SHLD SX (DOS: 2021)  Treatment Diagnosis: M25.511 Right SHLD Pain, M25.521 Right Elbow Pain,M25.611 Rigth SHLD Stiffness, M25.621 Rigth Elbow Stiffness, M62.81 Right UE Weakness , G25.89 Scapular Dyskinesis  Insurance/Certification information:  PT Insurance Information: [facility]  Physician Information:  Referring Practitioner: Dr. Lorie Mark.  Aide White  Has the plan of care been signed (Y/N):        [x]  Yes  []  No     Date of Patient follow up with Physician:     Is this a Progress Report:     []  Yes  [x]  No   If Yes:  Date Range for reporting period:  Beginnin2022  Ending     Progress report will be due (10 Rx or 30 days whichever is less):       Recertification will be due (POC Duration  / 90 days whichever is less): 3-3-2022     Visit # Insurance Allowable Auth Required   In-person 15 30 []  Yes []  No    Telehealth   []  Yes []  No    Total        Functional Scale: Quick Dash=   63%   Date assessed:   2022    Therapy Diagnosis/Practice Pattern: 4H     Number of Comorbidities:  []0     [x]1-2    []3+    Latex Allergy:  [x]NO      []YES  Preferred Language for Healthcare:   [x]English       []other:    Pain level: 0 /10 R SHLD    SUBJECTIVE: Only has right shoulder discomfort now @ end ROM w/ stretch, although sore @ proximal biceps long head. STM to this area helps relieve discomfort. Doing HEP when she has time. Pt now able to reach behind her buttocks. OBJECTIVE:   10 weeks post op (11- DOS)   Observation:     Test measurements:PROM 0-135 degrees scapation, 90 abd, Ext Rot to 45 @ 45 deg abd  by end of RX. RESTRICTIONS/PRECAUTIONS: Right TTL SHLD Post-op Protocol (See MEDIA TAB),   6-12 weeks: plane of scapula to 150 deg, ext rot to 45 deg, DC sling. 6 weeks post-op 2/10/22. Exercises/Interventions:  Access Code: [email or web address]  URL: [email or web address]  Date: 01/04/2022    Access Code: [email or web address]  URL: Norse/  Date: 12/23/2021    Access Code: [email or web address]  URL: [email or web address]  Date: 12/09/2021  Therapeutic Ex (62170) Sets/sec Reps Notes/CUES   Finisher 5 min     Bolster Table Rolls H510     Levator Stretch H15 x3  hep   Pulleys flex 2x10     Standing Wall Wipes Scapation 2x5 Min asist to 80 deg    Standing Wall Scap Matrix YTB 2x5     Standing Int Rot Step out stretch BTB H5 1x10     Standing IR  RTB H2 2x10     Standing ER RTB H 2x10     Standing Ext w/ elbow ext RTB H2  2x10     Seated Scap retract/depression GTB 2x10     Standing Thread the Needle Posteior Capsule Stretch High Mat H5 x19                 SL Ext Rot 2# 3x5     SL Flex and w/ elbow flex 0# 2x10     SL abd 1# 2x10     SL scap RS @ 80 deg 3x30\"     Supine Cane Chest Press #3 2x10  [phone]   Supine SHLD flex w/ abd OTB H5 2x10  +12-   SL Int Rot behind back H5 x10  +hep 1-4-2021   Supine SA 2# 2x10     Supine RS @ 90 deg 2# 3x30\"     Supine Triceps 2# 2x10                       Manual Intervention (50126)      Myofascial release subscap & teres minor 5'     PROM right shld & elbow in supine w/ head on wedge 19'     STM scap and shld mm 4'                 NMR re-education (68441)   CUES NEEDED                                       Therapeutic Activity (60556)                          Therapeutic Exercise and NMR EXR  [x] (21647) Provided verbal/tactile cueing for activities related to strengthening, flexibility, endurance, ROM  for improvements in scapular, scapulothoracic and UE control with self care, reaching, carrying, lifting, house/yardwork, driving/computer work. [x] (16155) Provided verbal/tactile cueing for activities related to improving balance, coordination, kinesthetic sense, posture, motor skill, proprioception  to assist with  scapular, scapulothoracic and UE control with self care, reaching, carrying, lifting, house/yardwork, driving/computer work. Therapeutic Activities:    [] (27130 or 81445) Provided verbal/tactile cueing for activities related to improving balance, coordination, kinesthetic sense, posture, motor skill, proprioception and motor activation to allow for proper function of scapular, scapulothoracic and UE control with self care, carrying, lifting, driving/computer work.      Home Exercise Program:    [x] (71469) Reviewed/Progressed HEP activities related to strengthening, flexibility, endurance, ROM of scapular, scapulothoracic and UE control with self care, reaching, carrying, lifting, house/yardwork, driving/computer work  [] (31889) Reviewed/Progressed HEP activities related to improving balance, coordination, kinesthetic sense, posture, motor skill, proprioception of scapular, scapulothoracic and UE control with self care, reaching, carrying, lifting, house/yardwork, driving/computer work      Manual Treatments:  PROM / STM / Oscillations-Mobs:  G-I, II, III, IV (PA's, Inf., Post.)  [x] (42920) Provided manual therapy to mobilize soft tissue/joints of cervical/CT, scapular GHJ and UE for the purpose of modulating pain, promoting relaxation,  increasing ROM, reducing/eliminating soft tissue swelling/inflammation/restriction, improving soft tissue extensibility and allowing for proper ROM for normal function with self care, reaching, carrying, lifting, house/yardwork, driving/computer work    Modalities: CP to right shoulder x10 min        Charges  Timed Code Treatment Minutes: 60   Total Treatment Minutes: 70'       [] EVAL (LOW) 455 1011   [] EVAL (MOD) 57398   [] EVAL (HIGH) 14170   [] RE-EVAL     [x] QW(19933) x 2   [] IONTO  [] NMR (22008) x    [x] VASO  [x] Manual (03075) x  2   [] Other:CP  [] TA x      [] Mech Traction (83401)  [] ES(attended) (03585)      [] ES (un) (88308):     GOALS  Short Term Goals: To be achieved in: 2 weeks  1. Independent in HEP and progression per patient tolerance, in order to prevent re-injury. []? Progressing: [x]? Met: []? Not Met: []? Adjusted  2. Patient will have a decrease in pain to facilitate improvement in movement, function, and ADLs as indicated by Functional Deficits. []? Progressing: [x]? Met: []? Not Met: []? Adjusted     Long Term Goals: To be achieved in: 12 weeks  1. Disability index score of 42% or less for the [facility] to assist with reaching prior level of function. [x]? Progressing: to 63% 1- []? Met: []? Not Met: []? Adjusted  2. Patient will demonstrate increased AROM to 140 deg flexion to allow for proper joint functioning as indicated by patients Functional Deficits. [x]? Progressing: []? Met: []? Not Met: []? Adjusted  3. Patient will demonstrate an increase in Strength to 4/5 to allow for proper functional mobility as indicated by patients Functional Deficits. [x]? Progressing: []? Met: []? Not Met: []? Adjusted  4. Patient will retrieve plate from overhead shelf with her right hand without increased symptoms or restriction. [x]? Progressing: []? Met: []? Not Met: []? Adjusted  5. Wash and fix hair with right hand. [x]? Progressing: []? Met: []? Not Met: []? Adjusted         Progression Towards Functional goals:  [] Patient is progressing as expected towards functional goals listed. [x] Progression is slowed due to complexities listed.   []

## 2022-02-01 ENCOUNTER — HOSPITAL ENCOUNTER (OUTPATIENT)
Dept: PHYSICAL THERAPY | Age: 67
Setting detail: THERAPIES SERIES
Discharge: HOME | End: 2022-02-01
Payer: MEDICAID

## 2022-02-01 PROCEDURE — 97140 MANUAL THERAPY 1/> REGIONS: CPT

## 2022-02-01 PROCEDURE — 97110 THERAPEUTIC EXERCISES: CPT

## 2022-02-01 NOTE — FLOWSHEET NOTE
[facility], [address], [address] [place] [address]  Phone: [phone]  Fax [phone]    Date:  2022    Patient Name:  Jacqueline Mchugh    :  1955  MRN: [id number]  Restrictions/Precautions:   Right TTL SHLD Post-op Protocol (See MEDIA TAB),  [id number] 2021 w/ hospitalization and f/u PT 21 to 21,  OA, HTN, Osteoporosis , Back SX for Cason Rods @ age 8, Inner Ear [address] for Replacement of Ear Drum, Right Ulnar Nerve Decompression @ Elbow Aug. 12,2020, Right Carpal Tunnel Release SX Aug 25, 2020, PT Right Shoulder 2020    Medical/Treatment Diagnosis Information:  Diagnosis: M75.101 Right Shld RTC Tear & OA s/p Total SHLD SX (DOS: 2021)  Treatment Diagnosis: M25.511 Right SHLD Pain, M25.521 Right Elbow Pain,M25.611 Rigth SHLD Stiffness, M25.621 Rigth Elbow Stiffness, M62.81 Right UE Weakness , G25.89 Scapular Dyskinesis  Insurance/Certification information:  PT Insurance Information: [facility]  Physician Information:  Referring Practitioner: Dr. Genna Murphy.  Manoj Lynn  Has the plan of care been signed (Y/N):        [x]  Yes  []  No     Date of Patient follow up with Physician:     Is this a Progress Report:     []  Yes  [x]  No   If Yes:  Date Range for reporting period:  Beginnin2022  Ending     Progress report will be due (10 Rx or 30 days whichever is less):   3-    Recertification will be due (POC Duration  / 90 days whichever is less): 3-3-2022     Visit # Insurance Allowable Auth Required   In-person 16 30 []  Yes []  No    Telehealth   []  Yes []  No    Total        Functional Scale: Quick Dash=   63%   Date assessed:   2022    Therapy Diagnosis/Practice Pattern: 4H     Number of Comorbidities:  []0     [x]1-2    []3+    Latex Allergy:  [x]NO      []YES  Preferred Language for Healthcare:   [x]English       []other:    Pain level: 0-2/10 R SHLD    SUBJECTIVE: Right shoulder hurting more since yesterday evening. Not sure what she did over the weekend to aggravate it. Feels the colder weather makes her shoulder more stiff and uncomfortable. OBJECTIVE:   10.75 weeks post op (11- DOS)   Observation:     Test measurements:PROM 0-135 degrees scapation, 90 abd, Ext Rot to 45 @ 45 deg abd  by end of RX. RESTRICTIONS/PRECAUTIONS: Right TTL SHLD Post-op Protocol (See MEDIA TAB),   6-12 weeks: plane of scapula to 150 deg, ext rot to 45 deg, DC sling. 6 weeks post-op 2/10/22. Exercises/Interventions:  Access Code: [email or web address]  URL: [email or web address]  Date: 01/04/2022    Access Code: [email or web address]  URL: [email or web address]  Date: 12/23/2021    Access Code: [email or web address]  URL: [email or web address]  Date: 12/09/2021  Therapeutic Ex (91038) Sets/sec Reps Notes/CUES   Finisher 5 min     Bolster Table Rolls H510     Levator Stretch H15 x3  hep   Pulleys flex 2x10     Standing Wall Scap Matrix YTB  x10 Each     Standing Wall Wipes Scapation 3x5 Min asist to 80 deg    Standing Wall Scap Matrix YTB 2x5     Standing Int Rot Step out stretch BTB H5 1x10                 Seated Scap retract/depression GTB 2x10     Standing Thread the Needle Posteior Capsule Stretch High Mat H5 x19                 SL Ext Rot 2# 3x5     SL abd 1# 2x10     SL scap RS @ 80 deg 3x30\"     Supine Cane Chest Press #3 2x10  +89-   Supine SHLD flex w/ abd OTB H5 2x10  +12-   SL Int Rot behind back H5 x10  +hep 1-4-2021   Supine SA 2# 2x10     Supine RS @ 90 deg 2# 3x30\"     Supine Triceps 2# 2x10     Supine Shld Flex w/ elbow flex 1# 2x10                 Manual Intervention (16651)      Myofascial release subscap & teres minor 5'     PROM right shld & elbow in supine w/ head on wedge 20'     STM scap and shld mm 4'                 NMR re-education (65587)   CUES NEEDED                                       Therapeutic Activity (22897)                          Therapeutic Exercise and NMR EXR  [x] (52298) Provided verbal/tactile cueing for activities related to strengthening, flexibility, endurance, ROM  for improvements in scapular, scapulothoracic and UE control with self care, reaching, carrying, lifting, house/yardwork, driving/computer work. [x] (53711) Provided verbal/tactile cueing for activities related to improving balance, coordination, kinesthetic sense, posture, motor skill, proprioception  to assist with  scapular, scapulothoracic and UE control with self care, reaching, carrying, lifting, house/yardwork, driving/computer work. Therapeutic Activities:    [] (68092 or 86218) Provided verbal/tactile cueing for activities related to improving balance, coordination, kinesthetic sense, posture, motor skill, proprioception and motor activation to allow for proper function of scapular, scapulothoracic and UE control with self care, carrying, lifting, driving/computer work.      Home Exercise Program:    [x] (10377) Reviewed/Progressed HEP activities related to strengthening, flexibility, endurance, ROM of scapular, scapulothoracic and UE control with self care, reaching, carrying, lifting, house/yardwork, driving/computer work  [] (47051) Reviewed/Progressed HEP activities related to improving balance, coordination, kinesthetic sense, posture, motor skill, proprioception of scapular, scapulothoracic and UE control with self care, reaching, carrying, lifting, house/yardwork, driving/computer work      Manual Treatments:  PROM / STM / Oscillations-Mobs:  G-I, II, III, IV (PA's, Inf., Post.)  [x] (12407) Provided manual therapy to mobilize soft tissue/joints of cervical/CT, scapular GHJ and UE for the purpose of modulating pain, promoting relaxation,  increasing ROM, reducing/eliminating soft tissue swelling/inflammation/restriction, improving soft tissue extensibility and allowing for proper ROM for normal function with self care, reaching, carrying, lifting, house/yardwork, driving/computer work    Modalities: CP to right shoulder x10 min        Charges  Timed Code Treatment Minutes: 60   Total Treatment Minutes: 70'       [] EVAL (LOW) 56983   [] EVAL (MOD) 60300   [] EVAL (HIGH) 45847   [] RE-EVAL     [x] UX(17860) x 2   [] IONTO  [] NMR (88830) x    [x] VASO  [x] Manual (10785) x  2   [] Other:CP  [] TA x      [] Mech Traction (50220)  [] ES(attended) (05659)      [] ES (un) (11563):     GOALS  Short Term Goals: To be achieved in: 2 weeks  1. Independent in HEP and progression per patient tolerance, in order to prevent re-injury. []? Progressing: [x]? Met: []? Not Met: []? Adjusted  2. Patient will have a decrease in pain to facilitate improvement in movement, function, and ADLs as indicated by Functional Deficits. []? Progressing: [x]? Met: []? Not Met: []? Adjusted     Long Term Goals: To be achieved in: 12 weeks  1. Disability index score of 42% or less for the [facility] to assist with reaching prior level of function. [x]? Progressing: to 63% 1- []? Met: []? Not Met: []? Adjusted  2. Patient will demonstrate increased AROM to 140 deg flexion to allow for proper joint functioning as indicated by patients Functional Deficits. [x]? Progressing: []? Met: []? Not Met: []? Adjusted  3. Patient will demonstrate an increase in Strength to 4/5 to allow for proper functional mobility as indicated by patients Functional Deficits. [x]? Progressing: []? Met: []? Not Met: []? Adjusted  4. Patient will retrieve plate from overhead shelf with her right hand without increased symptoms or restriction. [x]? Progressing: []? Met: []? Not Met: []? Adjusted  5. Wash and fix hair with right hand. [x]? Progressing: []? Met: []? Not Met: []? Adjusted         Progression Towards Functional goals:  [] Patient is progressing as expected towards functional goals listed. [x] Progression is slowed due to complexities listed. [] Progression has been slowed due to co-morbidities.   [] Plan just implemented, too soon to assess goals progression  [] Other:      ASSESSMENT: Increase PROM into scapation, but patient continues to require additional MT to achieve this. Overall Progression Towards Functional goals/ Treatment Progress Update:  [] Patient is progressing as expected towards functional goals listed. [x] Progression is slowed due to complexities/Impairments listed. [] Progression has been slowed due to co-morbidities. [] Plan just implemented, too soon to assess goals progression <30days   [] Goals require adjustment due to lack of progress  [] Patient is not progressing as expected and requires additional follow up with physician  [] Other    Prognosis for POC: [x] Good [] Fair  [] Poor    Patient requires continued skilled intervention: [x] Yes  [] No    Treatment/Activity Tolerance:  [x] Patient able to complete treatment  [] Patient limited by fatigue  [x] Patient limited by pain    [] Patient limited by other medical complications  [x] Other: Pt limited by post-op restrictions. PLAN:   [x] Continue per plan of care [] Alter current plan (see comments above)  [] Plan of care initiated [] Hold pending MD visit [] Discharge    Electronically signed by:  Leanna [place], [place]  [id number]    Note: If patient does not return for scheduled/ recommended follow up visits, this note will serve as a discharge from care along with most recent update on progress.

## 2022-02-03 ENCOUNTER — APPOINTMENT (OUTPATIENT)
Dept: PHYSICAL THERAPY | Age: 67
End: 2022-02-03
Payer: MEDICAID

## 2022-02-08 ENCOUNTER — HOSPITAL ENCOUNTER (OUTPATIENT)
Dept: PHYSICAL THERAPY | Age: 67
Setting detail: THERAPIES SERIES
Discharge: HOME | End: 2022-02-08
Payer: MEDICAID

## 2022-02-08 PROCEDURE — 97110 THERAPEUTIC EXERCISES: CPT

## 2022-02-08 PROCEDURE — 97140 MANUAL THERAPY 1/> REGIONS: CPT

## 2022-02-08 NOTE — FLOWSHEET NOTE
[facility], [address]  Phone: [phone]  Fax [phone]    Date:  2022    Patient Name:  Jennifer Watson    :  1955  MRN: [id number]  Restrictions/Precautions:   Right TTL SHLD Post-op Protocol (See MEDIA TAB),  [id number] 2021 w/ hospitalization and f/u PT 21 to 21,  OA, HTN, Osteoporosis , Back SX for Cason Rods @ age 8, Inner Ear [address] for Replacement of Ear Drum, Right Ulnar Nerve Decompression @ Elbow Aug. 12,2020, Right Carpal Tunnel Release SX Aug 25, 2020, PT Right Shoulder 2020    Medical/Treatment Diagnosis Information:  Diagnosis: M75.101 Right Shld RTC Tear & OA s/p Total SHLD SX (DOS: 2021)  Treatment Diagnosis: M25.511 Right SHLD Pain, M25.521 Right Elbow Pain,M25.611 Rigth SHLD Stiffness, M25.621 Rigth Elbow Stiffness, M62.81 Right UE Weakness , G25.89 Scapular Dyskinesis  Insurance/Certification information:  PT Insurance Information: [facility]  Physician Information:  Referring Practitioner: Dr. Jaret Garcia.  Latricia Stewart  Has the plan of care been signed (Y/N):        [x]  Yes  []  No     Date of Patient follow up with Physician:     Is this a Progress Report:     []  Yes  [x]  No   If Yes:  Date Range for reporting period:  Beginnin2022  Ending     Progress report will be due (10 Rx or 30 days whichever is less):       Recertification will be due (POC Duration  / 90 days whichever is less): 3-3-2022     Visit # Insurance Allowable Auth Required   In-person  30 []  Yes []  No    Telehealth   []  Yes []  No    Total        Functional Scale: Quick Dash=   63%   Date assessed:   2022    Therapy Diagnosis/Practice Pattern: 4H     Number of Comorbidities:  []0     [x]1-2    []3+    Latex Allergy:  [x]NO      []YES  Preferred Language for Healthcare:   [x]English       []other:    Pain level: 0-3/10 R SHLD    SUBJECTIVE: Had COVID booster vaccination last week and then felt terrible all week away from PT so she did not do much of her HEP. Right shoulder is very stiff this afternoon. Pain at all end ranges of motion. OBJECTIVE: Last RX 1 week ago. 11.75 weeks post op (11- DOS)  [facility] Observation:  Enters clinic w/ AAROM right shoulder flexion limited to 80 degrees should flexion. Therapist e-stressed the importance of daily ROM HEP to maintain motion gains made in the clinic.  Test measurements:PROM 0-135 degrees scapation, 90 abd, Ext Rot to 45 @ 45 deg abd  by end of RX. RESTRICTIONS/PRECAUTIONS: Right TTL SHLD Post-op Protocol (See MEDIA TAB),   6-12 weeks: plane of scapula to 150 deg, ext rot to 45 deg, DC sling. 6 weeks post-op 2/10/22. Exercises/Interventions:  Access Code: [email or web address]  URL: [email or web address]  Date: 01/04/2022    Access Code: [email or web address]  URL: [email or web address]/  Date: 12/23/2021    Access Code: [email or web address]  URL: [email or web address]  Date: 12/09/2021  Therapeutic Ex (96779) Sets/sec Reps Notes/CUES   Finisher 5 min     Bolster Table Rolls H510     Levator Stretch H15 x3  hep   Pulleys flex 2x10     Standing Wall Scap Matrix YTB  x10 Each     Standing Wall Wipes Scapation 3x5 Min asist to 80 deg    Standing Wall Scap Matrix YTB 2x5     Standing Int Rot Step out stretch BTB H5 1x10                 Seated Scap retract/depression GTB 2x10     Standing Thread the Needle Posteior Capsule Stretch High Mat H5 x19                 SL Ext Rot 2# 3x5     SL abd 1# 2x10     SL scap RS @ 80 deg 3x30\"     Supine Cane Chest Press #3 2x10  +82-   Supine SHLD flex w/ abd OTB H5 2x10  +12-   SL Int Rot behind back H5 x10  +hep 1-4-2021   Supine SA 2# 2x10     Supine RS @ 90 deg 2# 3x30\"     Supine Triceps 2# 2x10     Supine Shld Flex w/ elbow flex 1# 2x10                 Manual Intervention (52873)      Myofascial release subscap & teres minor 5'     PROM right shld & elbow in supine w/ head on wedge 20'     STM scap and shld mm 4'                 NMR re-education (95936)   CUES NEEDED                                       Therapeutic Activity (72500)                          Therapeutic Exercise and NMR EXR  [x] (31424) Provided verbal/tactile cueing for activities related to strengthening, flexibility, endurance, ROM  for improvements in scapular, scapulothoracic and UE control with self care, reaching, carrying, lifting, house/yardwork, driving/computer work. [x] (59054) Provided verbal/tactile cueing for activities related to improving balance, coordination, kinesthetic sense, posture, motor skill, proprioception  to assist with  scapular, scapulothoracic and UE control with self care, reaching, carrying, lifting, house/yardwork, driving/computer work. Therapeutic Activities:    [] (58121 or 73896) Provided verbal/tactile cueing for activities related to improving balance, coordination, kinesthetic sense, posture, motor skill, proprioception and motor activation to allow for proper function of scapular, scapulothoracic and UE control with self care, carrying, lifting, driving/computer work.      Home Exercise Program:    [x] (60472) Reviewed/Progressed HEP activities related to strengthening, flexibility, endurance, ROM of scapular, scapulothoracic and UE control with self care, reaching, carrying, lifting, house/yardwork, driving/computer work  [] (49081) Reviewed/Progressed HEP activities related to improving balance, coordination, kinesthetic sense, posture, motor skill, proprioception of scapular, scapulothoracic and UE control with self care, reaching, carrying, lifting, house/yardwork, driving/computer work      Manual Treatments:  PROM / STM / Oscillations-Mobs:  G-I, II, III, IV (PA's, Inf., Post.)  [x] (40556) Provided manual therapy to mobilize soft tissue/joints of cervical/CT, scapular GHJ and UE for the purpose of modulating pain, promoting relaxation,  increasing ROM, reducing/eliminating soft tissue swelling/inflammation/restriction, improving soft tissue extensibility and allowing for proper ROM for normal function with self care, reaching, carrying, lifting, house/yardwork, driving/computer work    Modalities: CP to right shoulder x10 min        Charges  Timed Code Treatment Minutes: 60   Total Treatment Minutes: 70'       [] EVAL (LOW) 69536   [] EVAL (MOD) 02112   [] EVAL (HIGH) 43056   [] RE-EVAL     [x] UA(44596) x 2   [] IONTO  [] NMR (18574) x    [x] VASO  [x] Manual (85521) x  2   [] Other:CP  [] TA x      [] Mech Traction (71825)  [] ES(attended) (47210)      [] ES (un) (64787):     GOALS  Short Term Goals: To be achieved in: 2 weeks  1. Independent in HEP and progression per patient tolerance, in order to prevent re-injury. []? Progressing: [x]? Met: []? Not Met: []? Adjusted  2. Patient will have a decrease in pain to facilitate improvement in movement, function, and ADLs as indicated by Functional Deficits. []? Progressing: [x]? Met: []? Not Met: []? Adjusted     Long Term Goals: To be achieved in: 12 weeks  1. Disability index score of 42% or less for the [facility] to assist with reaching prior level of function. [x]? Progressing: to 63% 1- []? Met: []? Not Met: []? Adjusted  2. Patient will demonstrate increased AROM to 140 deg flexion to allow for proper joint functioning as indicated by patients Functional Deficits. [x]? Progressing: []? Met: []? Not Met: []? Adjusted  3. Patient will demonstrate an increase in Strength to 4/5 to allow for proper functional mobility as indicated by patients Functional Deficits. [x]? Progressing: []? Met: []? Not Met: []? Adjusted  4. Patient will retrieve plate from overhead shelf with her right hand without increased symptoms or restriction. [x]? Progressing: []? Met: []? Not Met: []? Adjusted  5. Wash and fix hair with right hand. [x]? Progressing: []? Met: []? Not Met: []?  Adjusted         Progression Towards Functional goals:  [] Patient is progressing as expected towards functional goals listed. [x] Progression is slowed due to complexities listed. [] Progression has been slowed due to co-morbidities. [] Plan just implemented, too soon to assess goals progression  [] Other:      ASSESSMENT: Poor compliance w/ HEP. Pt does not maintain ROM gains made in clinic and requires additional MT each session to regain shoulder and scapular mobility. Overall Progression Towards Functional goals/ Treatment Progress Update:  [] Patient is progressing as expected towards functional goals listed. [x] Progression is slowed due to complexities/Impairments listed. [] Progression has been slowed due to co-morbidities. [] Plan just implemented, too soon to assess goals progression <30days   [] Goals require adjustment due to lack of progress  [] Patient is not progressing as expected and requires additional follow up with physician  [] Other    Prognosis for POC: [x] Good [] Fair  [] Poor    Patient requires continued skilled intervention: [x] Yes  [] No    Treatment/Activity Tolerance:  [x] Patient able to complete treatment  [] Patient limited by fatigue  [x] Patient limited by pain    [] Patient limited by other medical complications  [x] Other: Pt limited by post-op restrictions. PLAN:   [x] Continue per plan of care [] Alter current plan (see comments above)  [] Plan of care initiated [] Hold pending MD visit [] Discharge    Electronically signed by:  Lisa Sheldon, PT  [id number]    Note: If patient does not return for scheduled/ recommended follow up visits, this note will serve as a discharge from care along with most recent update on progress.

## 2022-02-10 ENCOUNTER — HOSPITAL ENCOUNTER (OUTPATIENT)
Dept: PHYSICAL THERAPY | Age: 67
Setting detail: THERAPIES SERIES
Discharge: HOME | End: 2022-02-10
Payer: MEDICAID

## 2022-02-10 ENCOUNTER — HOSPITAL ENCOUNTER (OUTPATIENT)
Dept: OCCUPATIONAL THERAPY | Age: 67
Setting detail: THERAPIES SERIES
Discharge: HOME | End: 2022-02-10
Payer: MEDICAID

## 2022-02-10 PROCEDURE — 97140 MANUAL THERAPY 1/> REGIONS: CPT

## 2022-02-10 PROCEDURE — 97165 OT EVAL LOW COMPLEX 30 MIN: CPT | Performed by: OCCUPATIONAL THERAPIST

## 2022-02-10 PROCEDURE — 97535 SELF CARE MNGMENT TRAINING: CPT | Performed by: OCCUPATIONAL THERAPIST

## 2022-02-10 PROCEDURE — 97018 PARAFFIN BATH THERAPY: CPT | Performed by: OCCUPATIONAL THERAPIST

## 2022-02-10 PROCEDURE — 97110 THERAPEUTIC EXERCISES: CPT

## 2022-02-10 NOTE — FLOWSHEET NOTE
[facility], [address], [address] [place] [address]  Phone: [phone]  Fax [phone]    Date:  2/10/2022    Patient Name:  Jaden Malik    :  1955  MRN: [id number]  Restrictions/Precautions:   Right TTL SHLD Post-op Protocol (See MEDIA TAB),  [id number] 2021 w/ hospitalization and f/u PT 21 to 21,  OA, HTN, Osteoporosis , Back SX for Cason Rods @ age 8, Inner Ear [address] for Replacement of Ear Drum, Right Ulnar Nerve Decompression @ Elbow Aug. 12,2020, Right Carpal Tunnel Release SX Aug 25, 2020, PT Right Shoulder 2020    Medical/Treatment Diagnosis Information:  Diagnosis: M75.101 Right Shld RTC Tear & OA s/p Total SHLD SX (DOS: 2021)  Treatment Diagnosis: M25.511 Right SHLD Pain, M25.521 Right Elbow Pain,M25.611 Rigth SHLD Stiffness, M25.621 Rigth Elbow Stiffness, M62.81 Right UE Weakness , G25.89 Scapular Dyskinesis  Insurance/Certification information:  PT Insurance Information: [facility]  Physician Information:  Referring Practitioner: Dr. Dean Conklin  Has the plan of care been signed (Y/N):        [x]  Yes  []  No     Date of Patient follow up with Physician:     Is this a Progress Report:     []  Yes  [x]  No   If Yes:  Date Range for reporting period:  Beginnin2022  Ending     Progress report will be due (10 Rx or 30 days whichever is less):       Recertification will be due (POC Duration  / 90 days whichever is less): 3-3-2022     Visit # Insurance Allowable Auth Required   In-person 18 30 []  Yes []  No    Telehealth   []  Yes []  No    Total        Functional Scale: Quick Dash=   63%   Date assessed:   2022    Therapy Diagnosis/Practice Pattern: 4H     Number of Comorbidities:  []0     [x]1-2    []3+    Latex Allergy:  [x]NO      []YES  Preferred Language for Healthcare:   [x]English       []other:    Pain level: 5/10 R SHLD    SUBJECTIVE: Increase right shoulder discomfort entering clinic. \"I'm trying to do my HEP. But I haven't hurt this bad in a while. \"  Next F/U with MD moved to 2-. OBJECTIVE:   12 weeks post op (11- DOS)  [facility] Observation: Enters clinic w/ 85 deg AAROM right shoulder flexion.  Test measurements:PROM 0-135 degrees scapation, 90 abd, Ext Rot to 45 @ 45 deg abd  by end of RX. RESTRICTIONS/PRECAUTIONS: Right TTL SHLD Post-op Protocol (See MEDIA TAB),   6-12 weeks: plane of scapula to 150 deg, ext rot to 45 deg, DC sling. 6 weeks post-op 2/10/22. Exercises/Interventions:  Access Code: [email or web address]  URL: [email or web address]  Date: 01/04/2022    Access Code: [email or web address]  URL: [email or web address]  Date: 12/23/2021    Access Code: [email or web address]  URL: [email or web address]  Date: 12/09/2021  Therapeutic Ex (10118) Sets/sec Reps Notes/CUES   Finisher 5 min     Bolster Table Rolls H510     Levator Stretch H15 x3  hep   Pulleys flex 2x10     Seated Pulldowns 4# x15     Standing Wall Scap Matrix YTB  x10 Each     Standing Wall Wipes Scapation 3x5 Min asist to 80 deg    Standing Wall Scap Matrix YTB 2x5     Standing Int Rot Step out stretch BTB H5 1x10         Seated Bilat ER OTB H2 2x10         Seated Scap retract/depression GTB 2x10     Standing Thread the Needle Posteior Capsule Stretch High Mat H5 x19                 SL Ext Rot 2# 3x5     SL abd 1# 2x10     SL Horz Abd 0# 2x7 Min A    SL scap RS @ 80 deg 3x30\"     Supine Cane Chest Press #4 2x10  +10-   Supine SHLD flex w/ abd OTB H5 2x10  +12-   SL Int Rot behind back H5 x10  +hep 1-4-2021   Supine SA 2# 2x10     Supine RS @ 90 deg 2# 3x30\"     Supine Triceps 2# 2x10     Supine Shld Flex w/ elbow flex 1# 2x10                 Manual Intervention (23637)      Myofascial release subscap & teres minor 5'     PROM right shld & elbow in supine w/ head on wedge 20'     STM scap and shld mm 4'                 NMR re-education (00990)   CUES NEEDED Therapeutic Activity (99048)                          Therapeutic Exercise and NMR EXR  [x] (98721) Provided verbal/tactile cueing for activities related to strengthening, flexibility, endurance, ROM  for improvements in scapular, scapulothoracic and UE control with self care, reaching, carrying, lifting, house/yardwork, driving/computer work. [x] (57964) Provided verbal/tactile cueing for activities related to improving balance, coordination, kinesthetic sense, posture, motor skill, proprioception  to assist with  scapular, scapulothoracic and UE control with self care, reaching, carrying, lifting, house/yardwork, driving/computer work. Therapeutic Activities:    [] (38431 or 28563) Provided verbal/tactile cueing for activities related to improving balance, coordination, kinesthetic sense, posture, motor skill, proprioception and motor activation to allow for proper function of scapular, scapulothoracic and UE control with self care, carrying, lifting, driving/computer work.      Home Exercise Program:    [x] (91150) Reviewed/Progressed HEP activities related to strengthening, flexibility, endurance, ROM of scapular, scapulothoracic and UE control with self care, reaching, carrying, lifting, house/yardwork, driving/computer work  [] (98303) Reviewed/Progressed HEP activities related to improving balance, coordination, kinesthetic sense, posture, motor skill, proprioception of scapular, scapulothoracic and UE control with self care, reaching, carrying, lifting, house/yardwork, driving/computer work      Manual Treatments:  PROM / STM / Oscillations-Mobs:  G-I, II, III, IV (PA's, Inf., Post.)  [x] (87382) Provided manual therapy to mobilize soft tissue/joints of cervical/CT, scapular GHJ and UE for the purpose of modulating pain, promoting relaxation,  increasing ROM, reducing/eliminating soft tissue swelling/inflammation/restriction, improving soft tissue extensibility and allowing for proper ROM for normal function with self care, reaching, carrying, lifting, house/yardwork, driving/computer work    Modalities: CP to right shoulder x10 min        Charges  Timed Code Treatment Minutes: 60   Total Treatment Minutes: 70'       [] EVAL (LOW) 39723   [] EVAL (MOD) 64003   [] EVAL (HIGH) 74488   [] RE-EVAL     [x] PZ(69585) x 2   [] IONTO  [] NMR (00615) x    [x] VASO  [x] Manual (29785) x  2   [] Other:CP  [] TA x      [] Mech Traction (57722)  [] ES(attended) (28819)      [] ES (un) (14612):     GOALS  Short Term Goals: To be achieved in: 2 weeks  1. Independent in HEP and progression per patient tolerance, in order to prevent re-injury. []? Progressing: [x]? Met: []? Not Met: []? Adjusted  2. Patient will have a decrease in pain to facilitate improvement in movement, function, and ADLs as indicated by Functional Deficits. []? Progressing: [x]? Met: []? Not Met: []? Adjusted     Long Term Goals: To be achieved in: 12 weeks  1. Disability index score of 42% or less for the [facility] to assist with reaching prior level of function. [x]? Progressing: to 63% 1- []? Met: []? Not Met: []? Adjusted  2. Patient will demonstrate increased AROM to 140 deg flexion to allow for proper joint functioning as indicated by patients Functional Deficits. [x]? Progressing: []? Met: []? Not Met: []? Adjusted  3. Patient will demonstrate an increase in Strength to 4/5 to allow for proper functional mobility as indicated by patients Functional Deficits. [x]? Progressing: []? Met: []? Not Met: []? Adjusted  4. Patient will retrieve plate from overhead shelf with her right hand without increased symptoms or restriction. [x]? Progressing: []? Met: []? Not Met: []? Adjusted  5. Wash and fix hair with right hand. [x]? Progressing: []? Met: []? Not Met: []? Adjusted         Progression Towards Functional goals:  [] Patient is progressing as expected towards functional goals listed.     [x] Progression is slowed due to complexities listed. [] Progression has been slowed due to co-morbidities. [] Plan just implemented, too soon to assess goals progression  [] Other:      ASSESSMENT: Continue to use much of PT session restoring right shoulder ROM to previous levels. Therapist continues to encourage increase compliance w/ table slides and supine shld flex w/ contra arm assist HEP. Overall Progression Towards Functional goals/ Treatment Progress Update:  [] Patient is progressing as expected towards functional goals listed. [x] Progression is slowed due to complexities/Impairments listed. [] Progression has been slowed due to co-morbidities. [] Plan just implemented, too soon to assess goals progression <30days   [] Goals require adjustment due to lack of progress  [] Patient is not progressing as expected and requires additional follow up with physician  [] Other    Prognosis for POC: [x] Good [] Fair  [] Poor    Patient requires continued skilled intervention: [x] Yes  [] No    Treatment/Activity Tolerance:  [x] Patient able to complete treatment  [] Patient limited by fatigue  [x] Patient limited by pain    [] Patient limited by other medical complications  [x] Other: Pt limited by post-op restrictions. PLAN:   [x] Continue per plan of care [] Alter current plan (see comments above)  [] Plan of care initiated [] Hold pending MD visit [] Discharge    Electronically signed by:  Nancy Almazan, PT  [id number]    Note: If patient does not return for scheduled/ recommended follow up visits, this note will serve as a discharge from care along with most recent update on progress.

## 2022-02-10 NOTE — PLAN OF CARE
[facility], [address] [place] [address]  Phone: [phone]  Fax [phone]    Occupational Therapy/Hand Therapy Certification  Dear Referring Practitioner: Jolie Moody    We had the pleasure of evaluating the following patient for occupational therapy services at [address] [place] [address]. A summary of our findings can be found in the initial assessment below. This includes our plan of care. If you have any questions or concerns regarding these findings, please do not hesitate to contact me at the office phone number checked above. Thank you for the referral.     Physician Signature:_______________________________Date:__________________  By signing above (or electronic signature), therapists plan is approved by physician      Patient: Cristo Huerta   : 1955   MRN: [id number]  Referring Physician: Referring Practitioner: Oj Finney       Evaluation Date: 2/10/2022      Medical Diagnosis Information:  Diagnosis: B hand OA B hand stiffness M25.641 M25.642                                             Insurance information:  medicare     Date of Injury: 2-3 years ago   Date of Surgery:     Date of Patient follow up with Physician:     RESTRICTIONS/PRECAUTIONS:     Latex Allergy:  [x]No      []Yes  Pacemaker:  [x] No       [] Yes     Preferred Language for Healthcare:   [x]English       []other:     Functional Scale: 53% disability (Quick DASH)   Date assessed:  2/10/2022    C-SSRS Triggered by Intake questionnaire (Past 2 wk assessment):    No, Questionnaire did not trigger screening.       SUBJECTIVE: Patient reported deficits/history of current problem: progressive onset over the years     Pain Scale: 6/10  [x]Constant      []Intermittent    []other:  Pain Location:  Tip of left finger  Easing factors: rest  Provocative factors: use     [x] Patient reported history, allergies, and medications reviewed - see intake form. Comorbidities Affecting Functional Performance:     []Anxiety (F41.9)/Depression (F32.9) []Hypertension  []Diabetes Type 1(E10.65) or 2 (E11.65)     []CVA   []Rheumatoid Arthritis (M05.9)                     []Aherisclerosis  []Fibromyalgia (M79.7)                                 []Angina Pectoris  []Neuropathy(G60.9)                                    []Disc Pathology  [x]Osteoarthritis(M19.91)                               [x]Osteoporosis  []None                                                           []Morbid Obesity  [x]Other: recent total shoulder R shoulder. []COPD    OBJECTIVE:   Date:  Hand Dominance:     [x]  Right    [] Left 2/10/2022     Objective Measures:    PAIN 6/10   Quick DASH 53%   Digit  ROM         Index     MP:  PIP:  DIP: 30/61                              Long MP:  PIP:  DIP:                              Ring MP:  PIP:  DIP:                              Small MP:  PIP:  DIP:   Digits tip to DPFC in cm Index:  Long:  Ring:  Small:   Thumb ROM MP  IP   Thumb opposition  R:  L:   Thumb Radial/Palmar abd ROM R:  L:   Wrist ROM Ext/Flex R:  L:   Rad/Uln dev ROM R:  L:   Forearm ROM  Sup/pron R:  L:   Elbow ROM Ext/flex R:  L:   Shoulder Flex  Shoulder Abd  Shoulder IR/ER    Edema in cm circumf. MCPJs R:  L:   Edema in cm circumf.   Wrist R:  L:    strength in lbs R: 23#  L: 21#  2/10   Pinch Strengthin lbs: lat  R:  L:   Pinch Strength in lbs:  3 point R:  L:     MMT:     Observations:  (including splints, bandages, incisions, scars):    Sensation:  [x] No reported deficits  [] Intact to light touch    [] [place] Rebel test completed, findings as noted:  [] Other:        Occupational Profile:  Home Enviroment: lives with  [x] spouse,  [] family,  [] alone,  [] significant other,   [] other:    Occupation/School: typing, writing, phone     Recreational Activities/Meaningful Interests: play games on Excellent [] Good [] Fair  [] Poor    PLAN OF CARE:  Interventions:   [x] Therapeutic Exercise [x] Therapeutic Activity    [x] Activities of Daily Living [x] Neuromuscular Re-education      [x] Patient Education  [x] Manual Therapy      [x] Modalities as needed, and not otherwise contraindicated, including: ultrasound,paraffin,moist heat/cold pack, electrical stimulation, contrast bath, iontophoresis  [] Splinting    Frequency/Duration:  1x/week for 8 weeks       GOALS:  Patient stated goal:     [] Progressing: [] Met: [] Not Met: [] Adjusted    Therapist goals for Patient:   Short Term Goals: To be achieved in: 2 weeks  1. Independent in HEP and progression per patient tolerance, in order to prevent re-injury. [] Progressing: [] Met: [] Not Met: [] Adjusted   2. Patient will have a decrease in pain to facilitate improvement in movement, function, and ADLs as indicated by Functional Deficits. [] Progressing: [] Met: [] Not Met: [] Adjusted    Long Term Goals to be achieved in 8 weeks (through 4/10/22), including patient directed goals to address patient identified performance deficits:  1) Pt to be independent in graded HEP progression with a good level of effort and compliance. [] Progressing: [] Met: [] Not Met: [] Adjusted   2) Pt to report a score of </= 20% on the Quick DASH disability questionnaire for increased performance with carrying, moving, and handling objects. [] Progressing: [] Met: [] Not Met: [] Adjusted   5) Pt will have a decrease in pain to 0-2/10 to facilitate typing.   [] Progressing: [] Met: [] Not Met: [] Adjusted        OCCUPATIONAL THERAPY EVALUATION COMPLEXITY JUSTIFICATION:    [x] An occupational profile and medical/therapy history, which includes:   [x] a brief history including medical and/or therapy records relating to the     presenting problem   [] an expanded review of medical and/or therapy records and additional review     of physical, cognitive or psychosocial history related to current functional    performance   [] an extensive additional review of review of medical and/or therapy records   and physical, cognitive, or psychosocial history related to current    functional performance    [x] An assessment that identifies performance deficits (relating to physical, cognitive, or psychosocial skills) that result in activity limitations and/or participation restrictions:   [x] 1-3 performance deficits   [] 3-5 performance deficits   [] 5 or more performance deficits    [x] Clinical decision making of:   [x] low complexity, including analysis of occupational profile, data analysis from problem focused assessment, and consideration of a limited number of treatment options. No comorbidities affect occupational performance. No task modifications or assistance needed to complete evaluation. [] moderate complexity, including analysis of occupational profile, data analysis from detailed assessment and consideration of several treatment options. Comorbidities that affect occupational performance may be present. Minimal to moderate task modifications or assistance needed to complete assessment. [] high complexity, including analysis of occupational profile, analysis of data from comprehensive assessment and consideration of multiple treatment options. Multiple comorbidities present that affect occupational performance. Significant task modifications or assistance needed to complete assessment.     Evaluation Code:  [x] Low Complexity EVAL 78235 (typically 30 minutes face to face)  [] Mod Complexity EVAL 84600 (typically 45 minutes face to face)  [] High Complexity EVAL 66654 (typically 60 minutes face to face)    Electronically signed by:  Jayce Tong  , 4031 Qx-23, [id number]

## 2022-02-10 NOTE — FLOWSHEET NOTE
[facility] [facility], [address] [place] [address] Juan Torres  Phone: [phone]  Fax [phone]    Occupational Therapy Treatment Note/ Progress Report:     Is this a Progress Report:     []  Yes  [x]  No      If Yes:  Date Range for reporting period:  Beginning 2/10/22  Ending 2/10/2022  Date:  2/10/2022  Patient Name:  Khloe Rich    :  1955  MRN: [id number]  Medical/Treatment Diagnosis Information:  · Diagnosis: B hand OA B hand stiffness M25.641 M25.642   ·       Insurance/Certification information:   Armaan Bustillos   Physician Information:  Referring Practitioner: Pricila Gillette  Has the plan of care been signed (Y/N):        []  Yes  [x]  No     Visit # Insurance Allowable Auth Required   1  []  Yes []  No    From 2/10/22  to 2/10/2022    Comorbidities Affecting Functional Performance:     []Anxiety (F41.9)/Depression (F32.9)   []Diabetes Type 1(E10.65) or 2 (E11.65)   []Rheumatoid Arthritis (M05.9)  []Fibromyalgia (M79.7)  []Neuropathy(G60.9)  [x]Osteoarthritis(M19.91)  []None   []Other:  Date of Injury: 2-3 years ago   Date of Surgery:      Date of Patient follow up with Physician:      RESTRICTIONS/PRECAUTIONS:      Latex Allergy:  [x]? No      []? Yes                    Pacemaker:  [x]? No       []? Yes      Preferred Language for Healthcare:   [x]? English       []? other:      Functional Scale: 53% disability (Quick DASH)                            Date assessed:  2/10/2022     C-SSRS Triggered by Intake questionnaire (Past 2 wk assessment):    No, Questionnaire did not trigger screening.      SUBJECTIVE: Patient reported deficits/history of current problem: progressive onset over the years      Pain Scale: 6/10          [x]? Constant                []?Intermittent              []?other:  Pain Location:  Tip of left finger  Easing factors: rest  Provocative factors: use      [x]?  Patient reported history, allergies, and medications reviewed - see intake form.        Comorbidities Affecting Functional Performance:             []?Anxiety (F41.9)/Depression (F32.9)           []? Hypertension  []? Diabetes Type 1(E10.65) or 2 (E11.65)     []? CVA   []? Rheumatoid Arthritis (M05.9)                     []?Aherisclerosis  []? Fibromyalgia (M79.7)                                 []?Angina Pectoris  []? Neuropathy(G60.9)                                    []?Disc Pathology  [x]? Osteoarthritis(M19.91)                               [x]? Osteoporosis  []? None                                                           []?Morbid Obesity  [x]? Other: recent total shoulder R shoulder.                                                        []?COPD     OBJECTIVE:   Date:  Hand Dominance:     [x]? Right    []? Left 2/10/2022      Objective Measures:     PAIN 6/10   Quick DASH 53%   Digit  ROM         Index       MP:  PIP:  DIP: 30/61                              Long MP:  PIP:  DIP:                              Ring MP:  PIP:  DIP:                              Small MP:  PIP:  DIP:   Digits tip to DPFC in cm Index:  Long:  Ring:  Small:   Thumb ROM MP  IP   Thumb opposition  R:  L:   Thumb Radial/Palmar abd ROM R:  L:   Wrist ROM Ext/Flex R:  L:   Rad/Uln dev ROM R:  L:   Forearm ROM  Sup/pron R:  L:   Elbow ROM Ext/flex R:  L:   Shoulder Flex  Shoulder Abd  Shoulder IR/ER     Edema in cm circumf. MCPJs R:  L:   Edema in cm circumf.   Wrist R:  L:    strength in lbs R: 23#  L: 21#  2/10   Pinch Strengthin lbs: lat  R:  L:   Pinch Strength in lbs:  3 point R:  L:      MMT:      Observations:  (including splints, bandages, incisions, scars):           MODALITIES: 2/10/22      Fluidotherapy (16603)      Estim (57907/79229)      Paraffin (94144) HP and para 10'     US (44956)      Iontophoresis (36603)      Hot Pack      Cold Pack            INTERVENTIONS:      Therapeutic Exercise (64586)                              Therapeutic Activity (56848)                  Manual Therapy (51660)      (IASTM, Dry Needling, manual mobilization)            Neuromuscular Reeducation (32089) Education on joint protection                 ADL Training (05228)                  HEP Training/Review See sheet(s)                 Splinting      Lcode:      Orthotic Mgmt, Subsequent Enc (91234)      Orthotic Mgmt & Training (29176)            Other:        Therapeutic Exercise & NMR:  [] (03827) Provided verbal/tactile cueing for activities related to strengthening, flexibility, endurance, ROM  for improvements in scapular, scapulothoracic and UE control with self care, reaching, carrying, lifting, house/yardwork, driving/computer work.    [] (63671) Provided verbal/tactile cueing for activities related to improving balance, coordination, kinesthetic sense, posture, motor skill, proprioception  to assist with  scapular, scapulothoracic and UE control with self care, reaching, carrying, lifting, house/yardwork, driving/computer work.     Therapeutic Activities & NMR:    [] (94067 or 69991) Provided verbal/tactile cueing for activities related to improving balance, coordination, kinesthetic sense, posture, motor skill, proprioception and motor activation to allow for proper function of scapular, scapulothoracic and UE control with self care, carrying, lifting, driving/computer work    Home Exercise Program:    [] (40354) Reviewed/Progressed HEP activities related to strengthening, flexibility, endurance, ROM of scapular, scapulothoracic and UE control with self care, reaching, carrying, lifting, house/yardwork, driving/computer work  [] (69974) Reviewed/Progressed HEP activities related to improving balance, coordination, kinesthetic sense, posture, motor skill, proprioception of scapular, scapulothoracic and UE control with self care, reaching, carrying, lifting, house/yardwork, driving/computer work      Manual Treatments:  PROM / STM / Oscillations-Mobs:  G-I, II, III, IV (PA's, Inf., Post.)  [] ([address] [facility]) Provided manual therapy to mobilize soft tissue/joints of cervical/CT, scapular GHJ and UE for the purpose of modulating pain, promoting relaxation,  increasing ROM, reducing/eliminating soft tissue swelling/inflammation/restriction, improving soft tissue extensibility and allowing for proper ROM for normal function with self care, reaching, carrying, lifting, house/yardwork, driving/computer work    ADL Training:  [x] (47053) Provided self-care/home management training related to activities of daily living and compensatory training, and/or use of adaptive equipment      Charges:  Timed Code Treatment Minutes: 10   Total Treatment Minutes: 30   Worker's Comp: Time In/Time Out     [x] EVAL (LOW) 09090 (typically 20 minutes face-to-face)    [] EVAL (MOD) 81499 (typically 30 minutes face-to-face)  [] EVAL (HIGH) 94897 (typically 45 minutes face-to-face)  [] OT Re-eval (92542)       [] Chichi [phone]) x      [] KWTEM(43004)  [] NMR (25447) x      [] Estim (attended) (37273)   [] Manual ([phone]) x      [] US (99064)  [] TA ([id number]) x      [x] Paraffin (91504)  [x] ADL  (46633) x 1    [] Splint/L code:    [] Estim (unattended) [phone]  [] Fluidotherapy (91452)  [] DN 1-2 (66276)   [] DN 3+ (55635)  [] Orthotic Mgmt, Subsequent Enc (73478)  [] Orthotic Mgmt & Training (75037)  [] Other:      Overall Progression Towards Functional Goals/Treatment Progress Update:  [] Patient is progressing as expected towards functional goals listed. [] Progression is slowed due to complexities/impairments listed. [] Progression has been slowed due to co-morbidities.   [x] Plan just implemented, too soon to assess goals progression <30 days  [] Goals require adjustment due to lack of progress  [] Patient is not progressing as expected and requires additional follow up with physician  [] All goals are met  [] Other:     Prognosis for POC: [x] Good [] Fair  [] Poor    Patient requires continued skilled intervention: [x] Yes  [] No    Treatment/Activity Tolerance:  [x] Patient able to complete treatment  [] Patient limited by fatigue  [] Patient limited by pain    [] Patient limited by other medical complications  [] Other:                  PLAN: See eval  [] Continue per plan of care [] Alter current plan (see comments above)  [x] Plan of care initiated [] Hold pending MD visit [] Discharge    Electronically signed by:  Makenna Buenrostro OT, OTR\L, 6676     Note: If patient does not return for scheduled/ recommended follow up visits, this note will serve as a discharge from care along with most recent update on progress.

## 2022-02-15 ENCOUNTER — HOSPITAL ENCOUNTER (OUTPATIENT)
Dept: PHYSICAL THERAPY | Age: 67
Setting detail: THERAPIES SERIES
Discharge: HOME | End: 2022-02-15
Payer: MEDICAID

## 2022-02-15 PROCEDURE — 97140 MANUAL THERAPY 1/> REGIONS: CPT

## 2022-02-15 PROCEDURE — 97110 THERAPEUTIC EXERCISES: CPT

## 2022-02-15 NOTE — FLOWSHEET NOTE
[facility], [address], [address] [place] [address]  Phone: [phone]  Fax [phone]    Date:  2/15/2022    Patient Name:  Janet Espinoza    :  1955  MRN: [id number]  Restrictions/Precautions:   Right TTL SHLD Post-op Protocol (See MEDIA TAB),  BNONU-[id number] 2021 w/ hospitalization and f/u PT 21 to 21,  OA, HTN, Osteoporosis , Back SX for Cason Rods @ age 8, Inner Ear [address] for Replacement of Ear Drum, Right Ulnar Nerve Decompression @ Elbow Aug. 12,2020, Right Carpal Tunnel Release SX Aug 25, 2020, PT Right Shoulder 2020    Medical/Treatment Diagnosis Information:  Diagnosis: M75.101 Right Shld RTC Tear & OA s/p Total SHLD SX (DOS: 2021)  Treatment Diagnosis: M25.511 Right SHLD Pain, M25.521 Right Elbow Pain,M25.611 Rigth SHLD Stiffness, M25.621 Rigth Elbow Stiffness, M62.81 Right UE Weakness , G25.89 Scapular Dyskinesis  Insurance/Certification information:  PT Insurance Information: [facility]  Physician Information:  Referring Practitioner: Dr. Lissette Chin.  Jaxon Wheatley  Has the plan of care been signed (Y/N):        [x]  Yes  []  No     Date of Patient follow up with Physician:     Is this a Progress Report:     []  Yes  [x]  No   If Yes:  Date Range for reporting period:  Beginnin2022  Ending     Progress report will be due (10 Rx or 30 days whichever is less):   4945    Recertification will be due (POC Duration  / 90 days whichever is less): 3-3-2022     Visit # Insurance Allowable Auth Required   In-person  30 []  Yes []  No    Telehealth   []  Yes []  No    Total        Functional Scale: Quick Dash=   63%   Date assessed:   2022    Therapy Diagnosis/Practice Pattern: 4H     Number of Comorbidities:  []0     [x]1-2    []3+    Latex Allergy:  [x]NO      []YES  Preferred Language for Healthcare:   [x]English       []other:    Pain level: 3/10 R SHLD    SUBJECTIVE: Started OT last week for stiffness and pain in bilat hands and fingers due to OA. Next F/U with MD moved to 2-. OBJECTIVE: Quick Dash/Progress Note NV.  12.75 weeks post op (11- DOS)  [facility] Observation: Enters clinic w/ 90 deg AAROM right shoulder flexion.  Test measurements:PROM 0-138 degrees scapation, 90 abd, Ext Rot to 45 @ 50 deg abd  by end of RX. RESTRICTIONS/PRECAUTIONS: Right TTL SHLD Post-op Protocol (See MEDIA TAB),   6-12 weeks: plane of scapula to 150 deg, ext rot to 45 deg, DC sling. 6 weeks post-op 2/10/22. Exercises/Interventions:  Access Code: [email or web address]  URL: [email or web address]  Date: 01/04/2022    Access Code: [email or web address]  URL: [employer]/  Date: 12/23/2021    Access Code: [email or web address]  URL: [email or web address]  Date: 12/09/2021  Therapeutic Ex (64056) Sets/sec Reps Notes/CUES   Finisher 5 min     Bolster Table Rolls H510     Levator Stretch H15 x3  hep   Pulleys flex 2x10     Seated single Pulldowns 4# x20     Standing Wall Scap Matrix YTB  x10 Each     Standing Wall Wipes Scapation 4x5 SBA-Min asist to 90 deg    Standing Wall Scap Matrix YTB 2x5     Wall Wipes Thread The Needle Posterior Capsule Stretch H5 2x10     Equip Lat Pulldown to chest 25# 2x10     Standing Scapation 1# 4x5 Min A @ scap to prevent hike And mirror   Standing Int Rot Step out stretch BTB H5 1x10     Seated Bilat ER OTB H2 2x10     Seated Scap retract/depression GTB 2x10                 SL Ext Rot 2# 3x5     SL abd 1# 2x10     SL Horz Abd 0# 2x7 Min A    SL scap RS @ 80 deg 3x30\"     Supine Cane Chest Press #4 2x10  +91-   Supine SHLD flex w/ abd OTB H5 2x10  +12-   SL Int Rot behind back H5 x10  +hep 1-4-2021   Supine SA 2# 2x10     Supine RS @ 90 deg 2# 3x30\"     Supine Triceps 2# 2x10     Supine Shld Flex w/ elbow flex 1# 2x10                 Manual Intervention (59774)      Myofascial release subscap & teres minor 5'     PROM right shld & elbow in supine w/ head on wedge 20' STM scap and shld mm 4'                 NMR re-education (62312)   CUES NEEDED                                       Therapeutic Activity (79800)                          Therapeutic Exercise and NMR EXR  [x] (17334) Provided verbal/tactile cueing for activities related to strengthening, flexibility, endurance, ROM  for improvements in scapular, scapulothoracic and UE control with self care, reaching, carrying, lifting, house/yardwork, driving/computer work. [x] (29306) Provided verbal/tactile cueing for activities related to improving balance, coordination, kinesthetic sense, posture, motor skill, proprioception  to assist with  scapular, scapulothoracic and UE control with self care, reaching, carrying, lifting, house/yardwork, driving/computer work. Therapeutic Activities:    [] (70167 or 41763) Provided verbal/tactile cueing for activities related to improving balance, coordination, kinesthetic sense, posture, motor skill, proprioception and motor activation to allow for proper function of scapular, scapulothoracic and UE control with self care, carrying, lifting, driving/computer work.      Home Exercise Program:    [x] (70074) Reviewed/Progressed HEP activities related to strengthening, flexibility, endurance, ROM of scapular, scapulothoracic and UE control with self care, reaching, carrying, lifting, house/yardwork, driving/computer work  [] (11004) Reviewed/Progressed HEP activities related to improving balance, coordination, kinesthetic sense, posture, motor skill, proprioception of scapular, scapulothoracic and UE control with self care, reaching, carrying, lifting, house/yardwork, driving/computer work      Manual Treatments:  PROM / STM / Oscillations-Mobs:  G-I, II, III, IV (PA's, Inf., Post.)  [x] (72250) Provided manual therapy to mobilize soft tissue/joints of cervical/CT, scapular GHJ and UE for the purpose of modulating pain, promoting relaxation,  increasing ROM, reducing/eliminating soft tissue swelling/inflammation/restriction, improving soft tissue extensibility and allowing for proper ROM for normal function with self care, reaching, carrying, lifting, house/yardwork, driving/computer work    Modalities: CP to right shoulder x10 min        Charges  Timed Code Treatment Minutes: 60   Total Treatment Minutes: 70'       [] EVAL (LOW) 64822   [] EVAL (MOD) 43846   [] EVAL (HIGH) 25350   [] RE-EVAL     [x] BEBO(66145) x 2   [] IONTO  [] NMR (41375) x    [x] VASO  [x] Manual (60197) x  2   [] Other:CP  [] TA x      [] Mech Traction (49998)  [] ES(attended) (17217)      [] ES (un) (52781):     GOALS  Short Term Goals: To be achieved in: 2 weeks  1. Independent in HEP and progression per patient tolerance, in order to prevent re-injury. []? Progressing: [x]? Met: []? Not Met: []? Adjusted  2. Patient will have a decrease in pain to facilitate improvement in movement, function, and ADLs as indicated by Functional Deficits. []? Progressing: [x]? Met: []? Not Met: []? Adjusted     Long Term Goals: To be achieved in: 12 weeks  1. Disability index score of 42% or less for the [facility] to assist with reaching prior level of function. [x]? Progressing: to 63% 1- []? Met: []? Not Met: []? Adjusted  2. Patient will demonstrate increased AROM to 140 deg flexion to allow for proper joint functioning as indicated by patients Functional Deficits. [x]? Progressing: []? Met: []? Not Met: []? Adjusted  3. Patient will demonstrate an increase in Strength to 4/5 to allow for proper functional mobility as indicated by patients Functional Deficits. [x]? Progressing: []? Met: []? Not Met: []? Adjusted  4. Patient will retrieve plate from overhead shelf with her right hand without increased symptoms or restriction. [x]? Progressing: []? Met: []? Not Met: []? Adjusted  5. Wash and fix hair with right hand. [x]? Progressing: []? Met: []? Not Met: []?  Adjusted         Progression Towards Functional goals:  [] Patient is progressing as expected towards functional goals listed. [x] Progression is slowed due to complexities listed. [] Progression has been slowed due to co-morbidities. [] Plan just implemented, too soon to assess goals progression  [] Other:      ASSESSMENT: Continue to use much of PT session restoring right shoulder ROM to previous levels. Therapist continues to encourage increase compliance w/ table slides and supine shld flex w/ contra arm assist HEP. Overall Progression Towards Functional goals/ Treatment Progress Update:  [] Patient is progressing as expected towards functional goals listed. [x] Progression is slowed due to complexities/Impairments listed. [] Progression has been slowed due to co-morbidities. [] Plan just implemented, too soon to assess goals progression <30days   [] Goals require adjustment due to lack of progress  [] Patient is not progressing as expected and requires additional follow up with physician  [] Other    Prognosis for POC: [x] Good [] Fair  [] Poor    Patient requires continued skilled intervention: [x] Yes  [] No    Treatment/Activity Tolerance:  [x] Patient able to complete treatment  [] Patient limited by fatigue  [x] Patient limited by pain    [] Patient limited by other medical complications  [x] Other: Pt limited by post-op restrictions. PLAN:   [x] Continue per plan of care [] Alter current plan (see comments above)  [] Plan of care initiated [] Hold pending MD visit [] Discharge    Electronically signed by:  Todd Tejeda, PT  [id number]    Note: If patient does not return for scheduled/ recommended follow up visits, this note will serve as a discharge from care along with most recent update on progress.

## 2022-02-17 ENCOUNTER — HOSPITAL ENCOUNTER (OUTPATIENT)
Dept: PHYSICAL THERAPY | Age: 67
Setting detail: THERAPIES SERIES
Discharge: HOME | End: 2022-02-17
Payer: MEDICAID

## 2022-02-17 ENCOUNTER — HOSPITAL ENCOUNTER (OUTPATIENT)
Dept: OCCUPATIONAL THERAPY | Age: 67
Setting detail: THERAPIES SERIES
Discharge: HOME | End: 2022-02-17
Payer: MEDICAID

## 2022-02-17 PROCEDURE — 97110 THERAPEUTIC EXERCISES: CPT | Performed by: OCCUPATIONAL THERAPIST

## 2022-02-17 PROCEDURE — 97018 PARAFFIN BATH THERAPY: CPT | Performed by: OCCUPATIONAL THERAPIST

## 2022-02-17 PROCEDURE — 97110 THERAPEUTIC EXERCISES: CPT

## 2022-02-17 PROCEDURE — 97140 MANUAL THERAPY 1/> REGIONS: CPT

## 2022-02-17 NOTE — FLOWSHEET NOTE
[address] [facility], [address] [place] [address] Juan Torres  Phone: [phone]  Fax [phone]    Occupational Therapy Treatment Note/ Progress Report:     Is this a Progress Report:     []  Yes  [x]  No      If Yes:  Date Range for reporting period:  Beginning 2/10/22  Ending 2022  Date:  2022  Patient Name:  Marie Mirza    :  1955  MRN: [id number]  Medical/Treatment Diagnosis Information:  · Diagnosis: B hand OA B hand stiffness M25.641 M25.642   ·       Insurance/Certification information:   [facility]   Physician Information:  Referring Practitioner: George Hardwick  Has the plan of care been signed (Y/N):        []  Yes  [x]  No     Visit # Insurance Allowable Auth Required   2  []  Yes []  No    From 2/10/22  to 2022    Comorbidities Affecting Functional Performance:     []Anxiety (F41.9)/Depression (F32.9)   []Diabetes Type 1(E10.65) or 2 (E11.65)   []Rheumatoid Arthritis (M05.9)  []Fibromyalgia (M79.7)  []Neuropathy(G60.9)  [x]Osteoarthritis(M19.91)  []None   []Other:  Date of Injury: 2-3 years ago   Date of Surgery:      Date of Patient follow up with Physician:      RESTRICTIONS/PRECAUTIONS:      Latex Allergy:  [x]? No      []? Yes                    Pacemaker:  [x]? No       []? Yes      Preferred Language for Healthcare:   [x]? English       []? other:      Functional Scale: 53% disability (Quick DASH)                            Date assessed:  2/10/2022     C-SSRS Triggered by Intake questionnaire (Past 2 wk assessment):    No, Questionnaire did not trigger screening.      SUBJECTIVE: Pain has been less. Patient reported deficits/history of current problem: progressive onset over the years      Pain Scale: 3/10          [x]? Constant                []?Intermittent              []?other:  Pain Location:  Tip of left finger  Easing factors: rest  Provocative factors: use      [x]?  Patient reported history, allergies, and medications reviewed - see intake form.        Comorbidities Affecting Functional Performance:             []?Anxiety (F41.9)/Depression (F32.9)           []? Hypertension  []? Diabetes Type 1(E10.65) or 2 (E11.65)     []? CVA   []? Rheumatoid Arthritis (M05.9)                     []?Aherisclerosis  []? Fibromyalgia (M79.7)                                 []?Angina Pectoris  []? Neuropathy(G60.9)                                    []?Disc Pathology  [x]? Osteoarthritis(M19.91)                               [x]? Osteoporosis  []? None                                                           []?Morbid Obesity  [x]? Other: recent total shoulder R shoulder.                                                        []?COPD     OBJECTIVE:   Date:  Hand Dominance:     [x]? Right    []? Left 2/10/2022      Objective Measures:     PAIN 6/10   Quick DASH 53%   Digit  ROM         Index       MP:  PIP:  DIP: 30/61                              Long MP:  PIP:  DIP:                              Ring MP:  PIP:  DIP:                              Small MP:  PIP:  DIP:   Digits tip to DPFC in cm Index:  Long:  Ring:  Small:   Thumb ROM MP  IP   Thumb opposition  R:  L:   Thumb Radial/Palmar abd ROM R:  L:   Wrist ROM Ext/Flex R:  L:   Rad/Uln dev ROM R:  L:   Forearm ROM  Sup/pron R:  L:   Elbow ROM Ext/flex R:  L:   Shoulder Flex  Shoulder Abd  Shoulder IR/ER     Edema in cm circumf. MCPJs R:  L:   Edema in cm circumf.   Wrist R:  L:    strength in lbs R: 23#  L: 21#  2/10   Pinch Strengthin lbs: lat  R:  L:   Pinch Strength in lbs:  3 point R:  L:      MMT:      Observations:  (including splints, bandages, incisions, scars):           MODALITIES: 2/10/22  2/17/22     Fluidotherapy (23716)      Estim (08125/61561)      Paraffin (59803) HP and para 10' HP and para     US (85368)      Iontophoresis (94007)      Hot Pack      Cold Pack            INTERVENTIONS:      Therapeutic Exercise (05246)        Gripping putty 5 min B hands      Red digiflex x 20 x 2   L index isolated x 20                 Therapeutic Activity (64041)                  Manual Therapy (61341)      (IASTM, Dry Needling, manual mobilization)            Neuromuscular Reeducation (17442) Education on joint protection                 ADL Training (54700)                  HEP Training/Review See sheet(s)                 Splinting      Lcode:      Orthotic Mgmt, Subsequent Enc (18861)      Orthotic Mgmt & Training (23455)            Other:        Therapeutic Exercise & NMR:  [] (33250) Provided verbal/tactile cueing for activities related to strengthening, flexibility, endurance, ROM  for improvements in scapular, scapulothoracic and UE control with self care, reaching, carrying, lifting, house/yardwork, driving/computer work.    [] (53261) Provided verbal/tactile cueing for activities related to improving balance, coordination, kinesthetic sense, posture, motor skill, proprioception  to assist with  scapular, scapulothoracic and UE control with self care, reaching, carrying, lifting, house/yardwork, driving/computer work.     Therapeutic Activities & NMR:    [] (27609 or 65561) Provided verbal/tactile cueing for activities related to improving balance, coordination, kinesthetic sense, posture, motor skill, proprioception and motor activation to allow for proper function of scapular, scapulothoracic and UE control with self care, carrying, lifting, driving/computer work    Home Exercise Program:    [] (67065) Reviewed/Progressed HEP activities related to strengthening, flexibility, endurance, ROM of scapular, scapulothoracic and UE control with self care, reaching, carrying, lifting, house/yardwork, driving/computer work  [] (38035) Reviewed/Progressed HEP activities related to improving balance, coordination, kinesthetic sense, posture, motor skill, proprioception of scapular, scapulothoracic and UE control with self care, reaching, carrying, lifting, house/yardwork, driving/computer work      Manual Treatments:  PROM / STM / Oscillations-Mobs:  G-I, II, III, IV (PA's, Inf., Post.)  [] (03192) Provided manual therapy to mobilize soft tissue/joints of cervical/CT, scapular GHJ and UE for the purpose of modulating pain, promoting relaxation,  increasing ROM, reducing/eliminating soft tissue swelling/inflammation/restriction, improving soft tissue extensibility and allowing for proper ROM for normal function with self care, reaching, carrying, lifting, house/yardwork, driving/computer work    ADL Training:  [x] (01227) Provided self-care/home management training related to activities of daily living and compensatory training, and/or use of adaptive equipment      Charges:  Timed Code Treatment Minutes: 8   Total Treatment Minutes: 18   Worker's Comp: Time In/Time Out     [] EVAL (LOW) 16062 (typically 20 minutes face-to-face)    [] EVAL (MOD) 27043 (typically 30 minutes face-to-face)  [] EVAL (HIGH) 41825 (typically 45 minutes face-to-face)  [] OT Re-eval (89972)       [x] Chichi ([id number]) x 1     [] EXOFT(34543)  [] NMR (05569) x      [] Estim (attended) (38449)   [] Manual ([id number]) x      [] US (66165)  [] TA () x      [x] Paraffin (66561)  [] ADL  ([phone]) x 1    [] Splint/L code:    [] Estim (unattended) 33 93 31)  [] Fluidotherapy (40469)  [] DN 1-2 (02506)   [] DN 3+ (75844)  [] Orthotic Mgmt, Subsequent Enc (17170)  [] Orthotic Mgmt & Training (49159)  [] Other:      Overall Progression Towards Functional Goals/Treatment Progress Update:  [] Patient is progressing as expected towards functional goals listed. [] Progression is slowed due to complexities/impairments listed. [] Progression has been slowed due to co-morbidities.   [x] Plan just implemented, too soon to assess goals progression <30 days  [] Goals require adjustment due to lack of progress  [] Patient is not progressing as expected and requires additional follow up with physician  [] All goals are met  [] Other: Prognosis for POC: [x] Good [] Fair  [] Poor    Patient requires continued skilled intervention: [x] Yes  [] No    Treatment/Activity Tolerance:  [x] Patient able to complete treatment  [] Patient limited by fatigue  [] Patient limited by pain    [] Patient limited by other medical complications  [] Other:                  PLAN: See eval  [] Continue per plan of care [] Alter current plan (see comments above)  [x] Plan of care initiated [] Hold pending MD visit [] Discharge    Electronically signed by:  Armani Maguire OT, OTR\L, [id number]     Note: If patient does not return for scheduled/ recommended follow up visits, this note will serve as a discharge from care along with most recent update on progress.

## 2022-02-17 NOTE — FLOWSHEET NOTE
[facility], [address] [place]  Phone: [phone]  Fax [phone]    Date:  2022    Patient Name:  Solis Coleman    :  1955  MRN: [id number]  Restrictions/Precautions:   Right TTL SHLD Post-op Protocol (See MEDIA TAB),  HUYOM-18 2021 w/ hospitalization and f/u PT 21 to 21,  OA, HTN, Osteoporosis , Back SX for Cason Rods @ age 8, Inner Ear [address] for Replacement of Ear Drum, Right Ulnar Nerve Decompression @ Elbow Aug. 12,2020, Right Carpal Tunnel Release SX Aug 25, 2020, PT Right Shoulder 2020    Medical/Treatment Diagnosis Information:  Diagnosis: M75.101 Right Shld RTC Tear & OA s/p Total SHLD SX (DOS: 2021)  Treatment Diagnosis: M25.511 Right SHLD Pain, M25.521 Right Elbow Pain,M25.611 Rigth SHLD Stiffness, M25.621 Rigth Elbow Stiffness, M62.81 Right UE Weakness , G25.89 Scapular Dyskinesis  Insurance/Certification information:  PT Insurance Information: [facility]  Physician Information:  Referring Practitioner: Dr. Nehal Elliott.  Genesis Rodriguez  Has the plan of care been signed (Y/N):        [x]  Yes  []  No     Date of Patient follow up with Physician:     Is this a Progress Report:     [x]  Yes  []  No   If Yes:  Date Range for reporting period:  Beginnin2022  Endin2022     Progress report will be due (10 Rx or 30 days whichever is less):   074352    Recertification will be due (POC Duration  / 90 days whichever is less): 3-3-2022     Visit # Insurance Allowable Auth Required   In-person  30 []  Yes []  No    Telehealth   []  Yes []  No    Total        Functional Scale: Quick Dash=       18%  Date assessed:      2022    Therapy Diagnosis/Practice Pattern: 4H     Number of Comorbidities:  []0     [x]1-2    []3+    Latex Allergy:  [x]NO      []YES  Preferred Language for Healthcare:   [x]English       []other:    Pain level: 0-2/10 R SHLD    SUBJECTIVE: F/U w/ MD tomorrow. Able to wash and fix hair w/ left hand now, but still not able to reach top shelf with left hand. Able to reach behind to left pocket to pull up pants now with left hand. OBJECTIVE: See Progress Note. 13 weeks post op (11- DOS)  Flowers Observation: Enters clinic w/ 92 deg AAROM right shoulder flexion.  Test measurements:PROM 0-135 degrees scapation, 90 abd, Ext Rot to 45 @ 50 deg abd  by end of RX. RESTRICTIONS/PRECAUTIONS: Right TTL SHLD Post-op Protocol (See MEDIA TAB),     Exercises/Interventions:  Access Code: [email or web address]  URL: [email or web address]  Date: 01/04/2022    Access Code: [email or web address]  URL: [email or web address]  Date: 12/23/2021    Access Code: [email or web address]  URL: [email or web address]  Date: 12/09/2021  Therapeutic Ex (32947) Sets/sec Reps Notes/CUES   Finisher 5 min     Bolster Table Rolls H5 2x10     Levator Stretch H15 x3  hep   Pulleys flex 2x10     Seated single Pulldowns 4# x20     Standing Wall Scap Matrix YTB  x10 Each     Standing Wall Wipes Scapation 4x5 SBA-Min asist to 90 deg    Standing Wall Scap Matrix YTB 2x5     Wall Wipes Thread The Needle Posterior Capsule Stretch H5 2x10     Equip Lat Pulldown to chest 30# 2x10     Standing Scapation 1# 4x5 Min A @ scap to prevent hike And mirror   Standing Int Rot Step out stretch BTB H5 1x10     Seated Bilat ER OTB H2 2x10     Seated Scap retract/depression GTB 2x10                 SL Ext Rot 2# 3x5     SL abd 1# 2x10     SL Horz Abd 0# 2x7 Min A    SL scap RS @ 80 deg 3x30\"     Supine Cane Chest Press #4 2x10  +45-   Supine SHLD flex w/ abd OTB H5 2x10  +12-   SL Int Rot behind back H5 x10  +hep 1-4-2021   Supine SA 2# 2x10     Supine RS @ 90 deg 2# 3x30\"     Supine Triceps 2# 2x10     Supine Shld Flex w/ elbow flex 1# 2x10                 Manual Intervention (42390)      Myofascial release subscap & teres minor 5'     PROM right shld & elbow in supine w/ head on wedge 20'     STM scap and shld mm 4'                 NMR re-education (99661)   CUES NEEDED                                       Therapeutic Activity (60782)                          Therapeutic Exercise and NMR EXR  [x] (58930) Provided verbal/tactile cueing for activities related to strengthening, flexibility, endurance, ROM  for improvements in scapular, scapulothoracic and UE control with self care, reaching, carrying, lifting, house/yardwork, driving/computer work. [x] (85399) Provided verbal/tactile cueing for activities related to improving balance, coordination, kinesthetic sense, posture, motor skill, proprioception  to assist with  scapular, scapulothoracic and UE control with self care, reaching, carrying, lifting, house/yardwork, driving/computer work. Therapeutic Activities:    [] (64790 or 60831) Provided verbal/tactile cueing for activities related to improving balance, coordination, kinesthetic sense, posture, motor skill, proprioception and motor activation to allow for proper function of scapular, scapulothoracic and UE control with self care, carrying, lifting, driving/computer work.      Home Exercise Program:    [x] (80847) Reviewed/Progressed HEP activities related to strengthening, flexibility, endurance, ROM of scapular, scapulothoracic and UE control with self care, reaching, carrying, lifting, house/yardwork, driving/computer work  [] (74894) Reviewed/Progressed HEP activities related to improving balance, coordination, kinesthetic sense, posture, motor skill, proprioception of scapular, scapulothoracic and UE control with self care, reaching, carrying, lifting, house/yardwork, driving/computer work      Manual Treatments:  PROM / STM / Oscillations-Mobs:  G-I, II, III, IV (PA's, Inf., Post.)  [x] (90302) Provided manual therapy to mobilize soft tissue/joints of cervical/CT, scapular GHJ and UE for the purpose of modulating pain, promoting relaxation,  increasing ROM, reducing/eliminating soft tissue swelling/inflammation/restriction, improving soft tissue extensibility and allowing for proper ROM for normal function with self care, reaching, carrying, lifting, house/yardwork, driving/computer work    Modalities: CP to right shoulder x10 min      Charges  Timed Code Treatment Minutes: 60   Total Treatment Minutes: 70'       [] EVAL (LOW) 14094   [] EVAL (MOD) 02329   [] EVAL (HIGH) 87037   [] RE-EVAL     [x] RE(37328) x 2   [] IONTO  [] NMR (00898) x    [x] VASO  [x] Manual (18106) x  2   [] Other:CP  [] TA x      [] Mech Traction (37648)  [] ES(attended) (21500)      [] ES (un) (92468):     GOALS  Short Term Goals: To be achieved in: 2 weeks  1. Independent in HEP and progression per patient tolerance, in order to prevent re-injury. []? Progressing: [x]? Met: []? Not Met: []? Adjusted  2. Patient will have a decrease in pain to facilitate improvement in movement, function, and ADLs as indicated by Functional Deficits. []? Progressing: [x]? Met: []? Not Met: []? Adjusted     Long Term Goals: To be achieved in: 12 weeks  1. Disability index score of 42% or less for the [facility] to assist with reaching prior level of function. []? Progressing:  [x]? Met: to 18 deg 2-17-22 []? Not Met: []? Adjusted  2. Patient will demonstrate increased AROM to 140 deg flexion to allow for proper joint functioning as indicated by patients Functional Deficits. [x]? Progressing: []? Met: []? Not Met: []? Adjusted  3. Patient will demonstrate an increase in Strength to 4/5 to allow for proper functional mobility as indicated by patients Functional Deficits. [x]? Progressing: []? Met: []? Not Met: []? Adjusted  4. Patient will retrieve plate from overhead shelf with her right hand without increased symptoms or restriction. [x]? Progressing: []? Met: []? Not Met: []? Adjusted  5. Wash and fix hair with right hand. []? Progressing: [x]? Met: []? Not Met: []?  Adjusted         Progression Towards Functional goals:  [] Patient is progressing as expected towards functional goals listed. [x] Progression is slowed due to complexities listed. [] Progression has been slowed due to co-morbidities. [] Plan just implemented, too soon to assess goals progression  [] Other:      ASSESSMENT: Continue to use much of PT session restoring right shoulder ROM to previous levels.  Therapist continues to encourage increase compliance w/ HEP. Increase functional use of left UE, but patient continues to employ compensatory hiking of left shoulder to elevate left arm over head. Pt now able to reach behind her back to back pocket to pull up her pants. Overall Progression Towards Functional goals/ Treatment Progress Update:  [] Patient is progressing as expected towards functional goals listed. [x] Progression is slowed due to complexities/Impairments listed. [] Progression has been slowed due to co-morbidities. [] Plan just implemented, too soon to assess goals progression <30days   [] Goals require adjustment due to lack of progress  [] Patient is not progressing as expected and requires additional follow up with physician  [] Other    Prognosis for POC: [x] Good [] Fair  [] Poor    Patient requires continued skilled intervention: [x] Yes  [] No    Treatment/Activity Tolerance:  [x] Patient able to complete treatment  [] Patient limited by fatigue  [x] Patient limited by pain    [] Patient limited by other medical complications  [x] Other: Pt limited by post-op restrictions. PLAN:   [x] Continue per plan of care [] Alter current plan (see comments above)  [] Plan of care initiated [] Hold pending MD visit [] Discharge    Electronically signed by:  Leanna Villa, PT  [id number]    Note: If patient does not return for scheduled/ recommended follow up visits, this note will serve as a discharge from care along with most recent update on progress.

## 2022-02-17 NOTE — PROGRESS NOTES
[facility], [address]     Physical Therapy 30 Day Progress Note    Date: 2022        Patient Name:  Cristo Huerta    :  1955  MRN: [id number]  Referring Physician:Dr. Alethea Gregg. Verlin Hammans      Diagnosis: M75.101 Right Shld RTC Tear & OA s/p Total SHLD SX (DOS: 2021)                          ICD Code:M75.101M25.511 Right SHLD Pain, M25.521 Right Elbow Pain,M25.611 Rigth SHLD Stiffness, M25.621 Rigth Elbow Stiffness, M62.81 Right UE Weakness , G25.89 Scapular Dyskinesis        Therapy Diagnosis/Practice Pattern:4H    Total number of visits:19   Reporting Period:   Beginning Date:2021   End EJRO:    OBJECTIVE  Test used Initial score Current Score   Pain Summary 0-10 7/10 0-2/10 @ end ranges of motion. Functional questionnaire Quick Dash 84% 18%   Functional Testing  Dominant right UE in sling Able to wash and fix her hair now. Still not able to wash her back         ROM  Right shoulder flex PROM  20 deg AAROM@ beginning of session 92 deg    AAROM 125 deg after 60 min TE & MT    scap PROM  20 deg PROM  135 deg after 60 min TE & MT    abd  90 deg    ER PROM  -33 deg w/ 0 abd PROM  45 deg @ 50 abd    IR PROM  41 deg @ 0 abd PROM  65 deg @50 abd               Strength NT/postop 2= to 3-/5 3- to 3/5      Compensatory scap hiking     Assessment:Continue to use much of PT session restoring right shoulder ROM to previous levels. Therapist continues to encourage increase compliance w/ HEP. Increase functional use of left UE, but patient continues to employ compensatory hiking of left shoulder to elevate left arm over head. Pt now able to reach behind her back to back pocket to pull up her pants. Functional Limitation G-Code (if applicable):      Quick Dash=  18%        Test/tests used to determine % limitation:Qucik Dash  Actual Score used to drive % CDOIHWYBLK:95%    Treatment to date:  [x] Therapeutic Exercise    [x] Modalities:  [] Therapeutic Activity             []Ultrasound            [x]Electrical Stimulation  [] Gait Training     []Cervical Traction    [] Lumbar Traction  [x] Neuromuscular Re-education [x] Cold/hotpack         []Iontophoresis  [x] Instruction in HEP      Other:  [x] Manual Therapy                   []Vaso                                  []  Assessment:   [x] Improvement noted relative to goals:  1. Disability index score of 42% or less for the [facility] to assist with reaching prior level of function.   []?? Progressing:  [x]? ? Met: to 18 deg 2-17-22 []? ? Not Met: []?? Adjusted  2. Patient will demonstrate increased AROM to 140 deg flexion to allow for proper joint functioning as indicated by patients Functional Deficits. [x]? ? Progressing: []?? Met: []?? Not Met: []?? Adjusted  3. Patient will demonstrate an increase in Strength to 4/5 to allow for proper functional mobility as indicated by patients Functional Deficits. [x]? ? Progressing: []?? Met: []?? Not Met: []?? Adjusted  4. Patient will retrieve plate from overhead shelf with her right hand without increased symptoms or restriction. [x]? ? Progressing: []?? Met: []?? Not Met: []?? Adjusted  5.  Wash and fix hair with right hand.   []?? Progressing: [x]? ? Met: []?? Not Met: []?? Adjusted          [] No Improvement noted related to goals:/Patient's response to treatment/Additional assessment:    New or Updated Goals (if applicable):  [x] No change to goals established upon initial eval/last progress note:  New Goals:    Electronically signed by:  Eugene Bowman, [address]

## 2022-02-22 ENCOUNTER — HOSPITAL ENCOUNTER (OUTPATIENT)
Dept: PHYSICAL THERAPY | Age: 67
Setting detail: THERAPIES SERIES
Discharge: HOME | End: 2022-02-22
Payer: MEDICAID

## 2022-02-22 PROCEDURE — 97110 THERAPEUTIC EXERCISES: CPT

## 2022-02-22 PROCEDURE — 97140 MANUAL THERAPY 1/> REGIONS: CPT

## 2022-02-22 NOTE — FLOWSHEET NOTE
SHLD    SUBJECTIVE: Cold weather making shoulder ache a little more than ususal.  To see Dr. Bacon Favors for f/u. He was pleased with her internal rotation, but stressed that she needs to improve her shoulder flexion and scapation AROM. Pt states MD want her to continue w/her Pt util next F/U, May 13, 2022. OBJECTIVE:   13.75 weeks post op (11- DOS)  Flowers Observation: Enters clinic w/ 82 deg AAROM right shoulder flexion. Pt continues to require additional MT to restore right shoulder AAROM to prior levels. Able to work through increase active scapation ROM during wall slides today to 110 deg.  Test measurements:PROM 0-137 degrees scapation, 90 abd, Ext Rot to 45 @ 50 deg abd, and Int Rot 45 @ 50 abd  by end of RX. RESTRICTIONS/PRECAUTIONS: Right TTL SHLD Post-op Protocol (See MEDIA TAB),     Exercises/Interventions:  Access Code: [email or web address]  URL: Relive/  Date: 01/04/2022    Access Code: [email or web address]  URL: Relive/  Date: 12/23/2021    Access Code: [email or web address]  URL: [email or web address]  Date: 12/09/2021  Therapeutic Ex (23727) Sets/sec Reps Notes/CUES   Finisher 5 min     Bolster Table Rolls H5 2x10     Levator Stretch H15 x3  hep   Pulleys flex 2x10     Seated single Pulldowns 4# x20     Standing Wall Scap Matrix YTB  x10 Each     Standing Wall Wipes Scapation 4x5 SBA-Min asist to 110 deg    Standing Wall Scap Matrix YTB 2x5     Wall Wipes Thread The Needle Posterior Capsule Stretch H5 2x10     Wedge on High Mat Circles @ 90 deg CW 2x10  CCW  2x10     Equip Lat Pulldown to chest 30# 2x10     And mirror   Standing Int Rot Step out stretch BTB H5 1x10     Seated Bilat ER OTB H2 2x10     Seated Scap retract/depression GTB 2x10                 SL Ext Rot 2# 3x5     SL abd 1# 2x10     SL Horz Abd 0# 2x7 Min A    SL scap RS @ 80 deg 3x30\"     Supine Cane Chest Press #4 2x10  +83-   Supine SHLD flex w/ abd OTB H5 2x10  +99-   SL Int Rot behind back H5 x10 +hep 1-4-2021   Supine SA 2# 2x10     Supine RS @ 90 deg 2# 3x30\"     Supine Triceps 2# 2x10     Supine Shld Flex w/ elbow flex 1# 2x10                 Manual Intervention (94960)      Myofascial release subscap & teres minor 5'     PROM right shld & elbow in supine w/ head on wedge 20'     STM scap and shld mm 5'                 NMR re-education (06027)   CUES NEEDED                                       Therapeutic Activity (48525)                          Therapeutic Exercise and NMR EXR  [x] (73447) Provided verbal/tactile cueing for activities related to strengthening, flexibility, endurance, ROM  for improvements in scapular, scapulothoracic and UE control with self care, reaching, carrying, lifting, house/yardwork, driving/computer work. [x] (08035) Provided verbal/tactile cueing for activities related to improving balance, coordination, kinesthetic sense, posture, motor skill, proprioception  to assist with  scapular, scapulothoracic and UE control with self care, reaching, carrying, lifting, house/yardwork, driving/computer work. Therapeutic Activities:    [] (65492 or 24322) Provided verbal/tactile cueing for activities related to improving balance, coordination, kinesthetic sense, posture, motor skill, proprioception and motor activation to allow for proper function of scapular, scapulothoracic and UE control with self care, carrying, lifting, driving/computer work.      Home Exercise Program:    [x] (08630) Reviewed/Progressed HEP activities related to strengthening, flexibility, endurance, ROM of scapular, scapulothoracic and UE control with self care, reaching, carrying, lifting, house/yardwork, driving/computer work  [] (07159) Reviewed/Progressed HEP activities related to improving balance, coordination, kinesthetic sense, posture, motor skill, proprioception of scapular, scapulothoracic and UE control with self care, reaching, carrying, lifting, house/yardwork, driving/computer work      Manual Treatments:  PROM / STM / Oscillations-Mobs:  G-I, II, III, IV (PA's, Inf., Post.)  [x] (81520) Provided manual therapy to mobilize soft tissue/joints of cervical/CT, scapular GHJ and UE for the purpose of modulating pain, promoting relaxation,  increasing ROM, reducing/eliminating soft tissue swelling/inflammation/restriction, improving soft tissue extensibility and allowing for proper ROM for normal function with self care, reaching, carrying, lifting, house/yardwork, driving/computer work    Modalities: CP to right shoulder x10 min      Charges  Timed Code Treatment Minutes: 60   Total Treatment Minutes: 70'       [] EVAL (LOW) 34854   [] EVAL (MOD) 31235   [] EVAL (HIGH) 66310   [] RE-EVAL     [x] FA(35295) x 2   [] IONTO  [] NMR (27867) x    [x] VASO  [x] Manual (98023) x  2   [] Other:CP  [] TA x      [] Mech Traction (51943)  [] ES(attended) (99775)      [] ES (un) (40278):     GOALS  Short Term Goals: To be achieved in: 2 weeks  1. Independent in HEP and progression per patient tolerance, in order to prevent re-injury. []? Progressing: [x]? Met: []? Not Met: []? Adjusted  2. Patient will have a decrease in pain to facilitate improvement in movement, function, and ADLs as indicated by Functional Deficits. []? Progressing: [x]? Met: []? Not Met: []? Adjusted     Long Term Goals: To be achieved in: 12 weeks  1. Disability index score of 42% or less for the [facility] to assist with reaching prior level of function. []? Progressing:  [x]? Met: to 18 deg 2-17-22 []? Not Met: []? Adjusted  2. Patient will demonstrate increased AROM to 140 deg flexion to allow for proper joint functioning as indicated by patients Functional Deficits. [x]? Progressing: []? Met: []? Not Met: []? Adjusted  3. Patient will demonstrate an increase in Strength to 4/5 to allow for proper functional mobility as indicated by patients Functional Deficits. [x]? Progressing: []? Met: []? Not Met: []? Adjusted  4.  Patient will retrieve plate from overhead shelf with her right hand without increased symptoms or restriction. [x]? Progressing: []? Met: []? Not Met: []? Adjusted  5. Wash and fix hair with right hand. []? Progressing: [x]? Met: []? Not Met: []? Adjusted         Progression Towards Functional goals:  [] Patient is progressing as expected towards functional goals listed. [x] Progression is slowed due to complexities listed. [] Progression has been slowed due to co-morbidities. [] Plan just implemented, too soon to assess goals progression  [] Other:      ASSESSMENT: Increase AAROM during scapation wall slides, but deltoids still weak and overhead functional use of right hand is limited. Overall Progression Towards Functional goals/ Treatment Progress Update:  [] Patient is progressing as expected towards functional goals listed. [x] Progression is slowed due to complexities/Impairments listed. [] Progression has been slowed due to co-morbidities. [] Plan just implemented, too soon to assess goals progression <30days   [] Goals require adjustment due to lack of progress  [] Patient is not progressing as expected and requires additional follow up with physician  [] Other    Prognosis for POC: [x] Good [] Fair  [] Poor    Patient requires continued skilled intervention: [x] Yes  [] No    Treatment/Activity Tolerance:  [x] Patient able to complete treatment  [] Patient limited by fatigue  [x] Patient limited by pain    [] Patient limited by other medical complications  [x] Other: Pt limited by post-op restrictions. PLAN:   [x] Continue per plan of care [] Alter current plan (see comments above)  [] Plan of care initiated [] Hold pending MD visit [] Discharge    Electronically signed by:  Deondre Donis, PT  [id number]    Note: If patient does not return for scheduled/ recommended follow up visits, this note will serve as a discharge from care along with most recent update on progress.

## 2022-02-24 ENCOUNTER — HOSPITAL ENCOUNTER (OUTPATIENT)
Dept: PHYSICAL THERAPY | Age: 67
Setting detail: THERAPIES SERIES
Discharge: HOME | End: 2022-02-24
Payer: MEDICAID

## 2022-02-24 ENCOUNTER — HOSPITAL ENCOUNTER (OUTPATIENT)
Dept: OCCUPATIONAL THERAPY | Age: 67
Setting detail: THERAPIES SERIES
Discharge: HOME | End: 2022-02-24
Payer: MEDICAID

## 2022-02-24 PROCEDURE — 97110 THERAPEUTIC EXERCISES: CPT | Performed by: OCCUPATIONAL THERAPIST

## 2022-02-24 PROCEDURE — 97018 PARAFFIN BATH THERAPY: CPT | Performed by: OCCUPATIONAL THERAPIST

## 2022-02-24 PROCEDURE — 97110 THERAPEUTIC EXERCISES: CPT

## 2022-02-24 PROCEDURE — 97140 MANUAL THERAPY 1/> REGIONS: CPT

## 2022-02-24 NOTE — FLOWSHEET NOTE
[address], [address] [place] [address] Juan Torres  Phone: [phone]  Fax [phone]    Occupational Therapy Treatment Note/ Progress Report:     Is this a Progress Report:     [x]  Yes  []  No      If Yes:  Date Range for reporting period:  Beginning 2/10/22  Ending 2022  Date:  2022  Patient Name:  Shasta Lara    :  1955  MRN: [id number]  Medical/Treatment Diagnosis Information:  · Diagnosis: B hand OA B hand stiffness M25.641 M25.642   ·       Insurance/Certification information:   Tashia Disla   Physician Information:  Referring Practitioner: Aron Carroll  Has the plan of care been signed (Y/N):        []  Yes  [x]  No     Visit # Insurance Allowable Auth Required   3  []  Yes []  No    From 2/10/22  to 2022    Comorbidities Affecting Functional Performance:     []Anxiety (F41.9)/Depression (F32.9)   []Diabetes Type 1(E10.65) or 2 (E11.65)   []Rheumatoid Arthritis (M05.9)  []Fibromyalgia (M79.7)  []Neuropathy(G60.9)  [x]Osteoarthritis(M19.91)  []None   []Other:  Date of Injury: 2-3 years ago   Date of Surgery:      Date of Patient follow up with Physician:      RESTRICTIONS/PRECAUTIONS:      Latex Allergy:  [x]? No      []? Yes                    Pacemaker:  [x]? No       []? Yes      Preferred Language for Healthcare:   [x]? English       []? other:      Functional Scale: 53% disability (Quick DASH)                            Date assessed:  2/10/2022     C-SSRS Triggered by Intake questionnaire (Past 2 wk assessment):    No, Questionnaire did not trigger screening.      SUBJECTIVE: Pain is less often    Patient reported deficits/history of current problem: progressive onset over the years      Pain Scale: 1/10          [x]? Constant                []?Intermittent              []?other:  Pain Location:  Tip of left finger  Easing factors: rest  Provocative factors: use      [x]?  Patient reported history, allergies, and medications reviewed - see intake form.        Comorbidities Affecting Functional Performance:             []?Anxiety (F41.9)/Depression (F32.9)           []? Hypertension  []? Diabetes Type 1(E10.65) or 2 (E11.65)     []? CVA   []? Rheumatoid Arthritis (M05.9)                     []?Aherisclerosis  []? Fibromyalgia (M79.7)                                 []?Angina Pectoris  []? Neuropathy(G60.9)                                    []?Disc Pathology  [x]? Osteoarthritis(M19.91)                               [x]? Osteoporosis  []? None                                                           []?Morbid Obesity  [x]? Other: recent total shoulder R shoulder.                                                        []?COPD     OBJECTIVE:   Date:  Hand Dominance:     [x]? Right    []? Left 2/10/2022    2/24/22    Objective Measures:      PAIN 6/10 1/10   Quick DASH 53% 18%   Digit  ROM         Index       MP:  PIP:  DIP: 30/61                               Long MP:  PIP:  DIP:                               Ring MP:  PIP:  DIP:                               Small MP:  PIP:  DIP:    Digits tip to DPFC in cm Index:  Long:  Ring:  Small:    Thumb ROM MP  IP    Thumb opposition  R:  L:    Thumb Radial/Palmar abd ROM R:  L:    Wrist ROM Ext/Flex R:  L:    Rad/Uln dev ROM R:  L:    Forearm ROM  Sup/pron R:  L:    Elbow ROM Ext/flex R:  L:    Shoulder Flex  Shoulder Abd  Shoulder IR/ER      Edema in cm circumf. MCPJs R:  L:    Edema in cm circumf.   Wrist R:  L:     strength in lbs R: 23#  L: 21#  2/10 R: 23#  L: 24#  0/10 pain   Pinch Strengthin lbs: lat  R:  L:    Pinch Strength in lbs:  3 point R:  L:       MMT:       Observations:  (including splints, bandages, incisions, scars):            MODALITIES: 2/10/22  2/17/22  2/24/22    Fluidotherapy (13453)      Estim (56761/67634)      Paraffin (43559) HP and para 10' HP and para  HP and para    US (33524)      Iontophoresis (38909)      Hot Pack      Cold Pack INTERVENTIONS:      Therapeutic Exercise (95065)        Gripping putty 5 min B hands  and roll putty B hands     Red digiflex x 20 x 2      Red digiflex x 20 x 2   L index isolated x 20                 Therapeutic Activity (04137)                  Manual Therapy (15439)      (IASTM, Dry Needling, manual mobilization)            Neuromuscular Reeducation (43809) Education on joint protection                 ADL Training (39735)                  HEP Training/Review See sheet(s)                 Splinting      Lcode:      Orthotic Mgmt, Subsequent Enc (63640)      Orthotic Mgmt & Training (69299)            Other:        Therapeutic Exercise & NMR:  [] (23095) Provided verbal/tactile cueing for activities related to strengthening, flexibility, endurance, ROM  for improvements in scapular, scapulothoracic and UE control with self care, reaching, carrying, lifting, house/yardwork, driving/computer work.    [] (39976) Provided verbal/tactile cueing for activities related to improving balance, coordination, kinesthetic sense, posture, motor skill, proprioception  to assist with  scapular, scapulothoracic and UE control with self care, reaching, carrying, lifting, house/yardwork, driving/computer work.     Therapeutic Activities & NMR:    [] (95645 or 41244) Provided verbal/tactile cueing for activities related to improving balance, coordination, kinesthetic sense, posture, motor skill, proprioception and motor activation to allow for proper function of scapular, scapulothoracic and UE control with self care, carrying, lifting, driving/computer work    Home Exercise Program:    [] (39655) Reviewed/Progressed HEP activities related to strengthening, flexibility, endurance, ROM of scapular, scapulothoracic and UE control with self care, reaching, carrying, lifting, house/yardwork, driving/computer work  [] (31775) Reviewed/Progressed HEP activities related to improving balance, coordination, kinesthetic sense, posture, motor skill, proprioception of scapular, scapulothoracic and UE control with self care, reaching, carrying, lifting, house/yardwork, driving/computer work      Manual Treatments:  PROM / STM / Oscillations-Mobs:  G-I, II, III, IV (PA's, Inf., Post.)  [] (80486) Provided manual therapy to mobilize soft tissue/joints of cervical/CT, scapular GHJ and UE for the purpose of modulating pain, promoting relaxation,  increasing ROM, reducing/eliminating soft tissue swelling/inflammation/restriction, improving soft tissue extensibility and allowing for proper ROM for normal function with self care, reaching, carrying, lifting, house/yardwork, driving/computer work    ADL Training:  [x] (09768) Provided self-care/home management training related to activities of daily living and compensatory training, and/or use of adaptive equipment      Charges:  Timed Code Treatment Minutes: 8   Total Treatment Minutes: 18   Worker's Comp: Time In/Time Out     [] EVAL (LOW) 34535 (typically 20 minutes face-to-face)    [] EVAL (MOD) 29426 (typically 30 minutes face-to-face)  [] EVAL (HIGH) 69743 (typically 45 minutes face-to-face)  [] OT Re-eval (42764)       [x] Chichi [phone] x 1     [] DBSXM(49949)  [] NMR (63460) x      [] Estim (attended) (98276)   [] Manual ([address] [facility]) x      [] US (20214)  [] TA () x      [x] Paraffin (52328)  [] ADL  ([phone]) x 1    [] Splint/L code:    [] Estim (unattended) [phone])  [] Fluidotherapy (26382)  [] DN 1-2 (14662)   [] DN 3+ (95823)  [] Orthotic Mgmt, Subsequent Enc (18263)  [] Orthotic Mgmt & Training (76093)  [] Other:      Overall Progression Towards Functional Goals/Treatment Progress Update:  [] Patient is progressing as expected towards functional goals listed. [] Progression is slowed due to complexities/impairments listed. [] Progression has been slowed due to co-morbidities.   [] Plan just implemented, too soon to assess goals progression <30 days  [] Goals require adjustment due to lack of progress  [] Patient is not progressing as expected and requires additional follow up with physician  [x] All goals are met except goal for  strength. [] Other:     Prognosis for POC: [x] Good [] Fair  [] Poor    Patient requires continued skilled intervention: [x] Yes  [] No    Treatment/Activity Tolerance:  [x] Patient able to complete treatment  [] Patient limited by fatigue  [] Patient limited by pain    [] Patient limited by other medical complications  [] Other:                  PLAN: See eval  [] Continue per plan of care [] Alter current plan (see comments above)  [] Plan of care initiated [] Hold pending MD visit [x] Discharge    Electronically signed by:  Pooja Yost, OT, OTR\L, [id number]     Note: If patient does not return for scheduled/ recommended follow up visits, this note will serve as a discharge from care along with most recent update on progress.

## 2022-02-24 NOTE — FLOWSHEET NOTE
[facility], [address], [address] [place] [address] [facility]  Phone: [phone]  Fax [phone]    Date:  2022    Patient Name:  Wanda Garcia    :  1955  MRN: [id number]  Restrictions/Precautions:   Right TTL SHLD Post-op Protocol (See MEDIA TAB),  [id number] 2021 w/ hospitalization and f/u PT 21 to 21,  OA, HTN, Osteoporosis , Back SX for Cason Rods @ age 8, Inner Ear [address] for Replacement of Ear Drum, Right Ulnar Nerve Decompression @ Elbow Aug. 12,2020, Right Carpal Tunnel Release SX Aug 25, 2020, PT Right Shoulder 2020    Medical/Treatment Diagnosis Information:  Diagnosis: M75.101 Right Shld RTC Tear & OA s/p Total SHLD SX (DOS: 2021)  Treatment Diagnosis: M25.511 Right SHLD Pain, M25.521 Right Elbow Pain,M25.611 Rigth SHLD Stiffness, M25.621 Rigth Elbow Stiffness, M62.81 Right UE Weakness , G25.89 Scapular Dyskinesis  Insurance/Certification information:  PT Insurance Information: [facility]  Physician Information:  Referring Practitioner: Dr. Sharifa Herron.  Jose C Lopez  Has the plan of care been signed (Y/N):        [x]  Yes  []  No     Date of Patient follow up with Physician: May 13, 2020    Is this a Progress Report:     []  Yes  [x]  No   If Yes:  Date Range for reporting period:  Beginnin2022  Ending:     Progress report will be due (10 Rx or 30 days whichever is less):      POC 4-3-8822    Recertification will be due (POC Duration  / 90 days whichever is less): 3-3-2022     Visit # Insurance Allowable Auth Required   In-person  []  Yes []  No    Telehealth   []  Yes []  No    Total        Functional Scale: Quick Dash=       18%  Date assessed:      2022    Therapy Diagnosis/Practice Pattern: 4H     Number of Comorbidities:  []0     [x]1-2    []3+    Latex Allergy:  [x]NO      []YES  Preferred Language for Healthcare:   [x]English       []other:    Pain level: 0-3 /10 R SHLD    SUBJECTIVE: Tired after PT and has to take a nap. Right shld feels more mobile after PT for about a day and then stiffness returns. Has been more compliant w/ HEP :table slides, wall slides, and supine shld flex w/ contra hand assistance. Does TB when she has time. Very busy @ work, on the co,mputer a lot and this has aggravated her right elbow and shoulder. OBJECTIVE:   14 weeks post op (11- DOS)   Observation: .  Test measurements:PROM 0-137 degrees scapation, 100 abd, Ext Rot to 50 @ 50 deg abd, and Int Rot 50 @ 50 abd  by end of RX.  AROM scapation to 93 deg by end of RX. Still has compensatory scap hike w/o manual cuing. Slow to regain strength in RTC and deltoid, but good strength gain in scapular musculature. RESTRICTIONS/PRECAUTIONS: Right TTL SHLD Post-op Protocol (See MEDIA TAB),     Exercises/Interventions:  Access Code: [email or web address]  URL: [email or web address]  Date: 01/04/2022    Access Code: [email or web address]  URL: [email or web address]/  Date: 12/23/2021    Access Code: [email or web address]  URL: [email or web address]  Date: 12/09/2021  Therapeutic Ex (61970) Sets/sec Reps Notes/CUES   Finisher 5 min     Bolster Table Rolls H5 2x10     Levator Stretch H15 x3  hep   Pulleys flex 2x10     Seated single Pulldowns 30# 2x10     Standing Wall Scap Matrix YTB  x10 Each     Standing Wall Wipes Scapation 4x5 SBA-Min asist to 110 deg    Standing Wall Scap Matrix YTB 2x5     Wall Wipes Thread The Needle Posterior Capsule Stretch H5 2x10     Wedge on High Mat Circles @ 90 deg CW 2x10  CCW  2x10     Equip Lat Pulldown to chest 30# 2x10     And mirror   Standing Ext Rot elbow @ side RTB   H2 2x10     Standing Int Rot Step out stretch BTB H5 1x10     Standing Int Rot elbow @ side GTB H2  2x10                     SL Ext Rot 2# 3x5     SL abd 1# 2x10     SL Horz Abd 0# 2x7 Min A    SL scap RS @ 80 deg 3x30\"     Supine Cane Chest Press #4 2x10  +85-   Supine SHLD flex w/ abd OTB H5 2x10  +45-   SL Int Rot behind back H5 x10  +hep 1-4-2021   Supine SA 2# 2x10     Supine RS @ 90 deg 2# 3x30\"     Supine Triceps 2# 2x10     Supine Shld Flex w/ elbow flex 1# 2x10                 Manual Intervention (21666)      Myofascial release subscap & teres minor 5'     PROM right shld & elbow in supine w/ head on wedge 20'     STM scap and shld mm 5'                 NMR re-education (48187)   CUES NEEDED                                       Therapeutic Activity (00075)                          Therapeutic Exercise and NMR EXR  [x] (36296) Provided verbal/tactile cueing for activities related to strengthening, flexibility, endurance, ROM  for improvements in scapular, scapulothoracic and UE control with self care, reaching, carrying, lifting, house/yardwork, driving/computer work. [x] (49283) Provided verbal/tactile cueing for activities related to improving balance, coordination, kinesthetic sense, posture, motor skill, proprioception  to assist with  scapular, scapulothoracic and UE control with self care, reaching, carrying, lifting, house/yardwork, driving/computer work. Therapeutic Activities:    [] (54799 or 41367) Provided verbal/tactile cueing for activities related to improving balance, coordination, kinesthetic sense, posture, motor skill, proprioception and motor activation to allow for proper function of scapular, scapulothoracic and UE control with self care, carrying, lifting, driving/computer work.      Home Exercise Program:    [x] (04690) Reviewed/Progressed HEP activities related to strengthening, flexibility, endurance, ROM of scapular, scapulothoracic and UE control with self care, reaching, carrying, lifting, house/yardwork, driving/computer work  [] (33677) Reviewed/Progressed HEP activities related to improving balance, coordination, kinesthetic sense, posture, motor skill, proprioception of scapular, scapulothoracic and UE control with self care, reaching, carrying, lifting, house/yardwork, driving/computer work      Manual Treatments:  PROM / STM / Oscillations-Mobs:  G-I, II, III, IV (PA's, Inf., Post.)  [x] (88382) Provided manual therapy to mobilize soft tissue/joints of cervical/CT, scapular GHJ and UE for the purpose of modulating pain, promoting relaxation,  increasing ROM, reducing/eliminating soft tissue swelling/inflammation/restriction, improving soft tissue extensibility and allowing for proper ROM for normal function with self care, reaching, carrying, lifting, house/yardwork, driving/computer work    Modalities: CP to right shoulder x10 min      Charges  Timed Code Treatment Minutes: 60   Total Treatment Minutes: 70'       [] EVAL (LOW) 40732   [] EVAL (MOD) 48168   [] EVAL (HIGH) 26768   [] RE-EVAL     [x] JX(09791) x 2   [] IONTO  [] NMR (19995) x    [x] VASO  [x] Manual (42583) x  2   [] Other:CP  [] TA x      [] Mech Traction (85658)  [] ES(attended) (40017)      [] ES (un) (06596):     GOALS  Short Term Goals: To be achieved in: 2 weeks  1. Independent in HEP and progression per patient tolerance, in order to prevent re-injury. []? Progressing: [x]? Met: []? Not Met: []? Adjusted  2. Patient will have a decrease in pain to facilitate improvement in movement, function, and ADLs as indicated by Functional Deficits. []? Progressing: [x]? Met: []? Not Met: []? Adjusted     Long Term Goals: To be achieved in: 12 weeks  1. Disability index score of 42% or less for the [facility] to assist with reaching prior level of function. []? Progressing:  [x]? Met: to 18 deg 2-17-22 []? Not Met: []? Adjusted  2. Patient will demonstrate increased AROM to 140 deg flexion to allow for proper joint functioning as indicated by patients Functional Deficits. [x]? Progressing: []? Met: []? Not Met: []? Adjusted  3. Patient will demonstrate an increase in Strength to 4/5 to allow for proper functional mobility as indicated by patients Functional Deficits. [x]?  Progressing: []? Met: []? Not Met: []? Adjusted  4. Patient will retrieve plate from overhead shelf with her right hand without increased symptoms or restriction. [x]? Progressing: []? Met: []? Not Met: []? Adjusted  5. Wash and fix hair with right hand. []? Progressing: [x]? Met: []? Not Met: []? Adjusted         Progression Towards Functional goals:  [] Patient is progressing as expected towards functional goals listed. [x] Progression is slowed due to complexities listed. [] Progression has been slowed due to co-morbidities. [] Plan just implemented, too soon to assess goals progression  [] Other:      ASSESSMENT: Slowly increasing AAROM & AROM right shoulder. Pt continues to require additional MT to make ROM measurements in clinic. She is not meeting post-op goals for ROM. Overall Progression Towards Functional goals/ Treatment Progress Update:  [] Patient is progressing as expected towards functional goals listed. [x] Progression is slowed due to complexities/Impairments listed. [] Progression has been slowed due to co-morbidities. [] Plan just implemented, too soon to assess goals progression <30days   [] Goals require adjustment due to lack of progress  [] Patient is not progressing as expected and requires additional follow up with physician  [] Other    Prognosis for POC: [x] Good [] Fair  [] Poor    Patient requires continued skilled intervention: [x] Yes  [] No    Treatment/Activity Tolerance:  [x] Patient able to complete treatment  [] Patient limited by fatigue  [x] Patient limited by pain    [] Patient limited by other medical complications  [x] Other: Pt limited by post-op restrictions.     PLAN:   [x] Continue per plan of care [] Alter current plan (see comments above)  [] Plan of care initiated [] Hold pending MD visit [] Discharge    Electronically signed by:  Lul Zavala, PT  [id number]    Note: If patient does not return for scheduled/ recommended follow up visits, this note will serve as a discharge from care along with most recent update on progress.

## 2022-03-01 ENCOUNTER — HOSPITAL ENCOUNTER (OUTPATIENT)
Dept: PHYSICAL THERAPY | Age: 67
Setting detail: THERAPIES SERIES
Discharge: HOME | End: 2022-03-01
Payer: MEDICARE

## 2022-03-01 PROCEDURE — 97110 THERAPEUTIC EXERCISES: CPT

## 2022-03-01 PROCEDURE — 97140 MANUAL THERAPY 1/> REGIONS: CPT

## 2022-03-01 NOTE — FLOWSHEET NOTE
[facility], [address] [place]  Phone: [phone]  Fax [phone]    Date:  3/1/2022    Patient Name:  Samantha Blancas    :  1955  MRN: [id number]  Restrictions/Precautions:   Right TTL SHLD Post-op Protocol (See MEDIA TAB),  [id number] 2021 w/ hospitalization and f/u PT 21 to 21,  OA, HTN, Osteoporosis , Back SX for Cason Rods @ age 8, Inner Ear [address] for Replacement of Ear Drum, Right Ulnar Nerve Decompression @ Elbow Aug. 12,2020, Right Carpal Tunnel Release SX Aug 25, 2020, PT Right Shoulder 2020    Medical/Treatment Diagnosis Information:  Diagnosis: M75.101 Right Shld RTC Tear & OA s/p Total SHLD SX (DOS: 2021)  Treatment Diagnosis: M25.511 Right SHLD Pain, M25.521 Right Elbow Pain,M25.611 Rigth SHLD Stiffness, M25.621 Rigth Elbow Stiffness, M62.81 Right UE Weakness , G25.89 Scapular Dyskinesis  Insurance/Certification information:  PT Insurance Information: [facility]  Physician Information:  Referring Practitioner: Dr. Alden Bustos.  Aracelis Adorno  Has the plan of care been signed (Y/N):        [x]  Yes  []  No     Date of Patient follow up with Physician: May 13, 2020    Is this a Progress Report:     []  Yes  [x]  No   If Yes:  Date Range for reporting period:  Beginnin2022  Ending:     Progress report will be due (10 Rx or 30 days whichever is less):      POC 3429    Recertification will be due (POC Duration  / 90 days whichever is less): 3-3-2022     Visit # Insurance Allowable Auth Required   In-person  30 []  Yes []  No    Telehealth   []  Yes []  No    Total        Functional Scale: Quick Dash=       18%  Date assessed:      2022    Therapy Diagnosis/Practice Pattern: 4H     Number of Comorbidities:  []0     [x]1-2    []3+    Latex Allergy:  [x]NO      []YES  Preferred Language for Healthcare:   [x]English       []other:    Pain level: 0-3 /10 R SHLD    SUBJECTIVE: Tired entering clinic. Has been running around at doctor's appointments all morning. Try to do more of her home exercises while away from the clinic and finds that shoulder doesn't ache as much when she does. Sore after stretching in therapy    OBJECTIVE:   14.75 weeks post op (11- DOS)   Observation: Able to actively do scapation wall slides to 120 deg for the first time today. Still quick to fatigue and requires mild manual support at elbow after 3 reps to complete reps of 7. Test measurementse. RESTRICTIONS/PRECAUTIONS: Right TTL SHLD Post-op Protocol (See MEDIA TAB),     Exercises/Interventions:  Access Code: [email or web address]  URL: [email or web address]  Date: 01/04/2022    Access Code: [email or web address]  URL: [email or web address]  Date: 12/23/2021    Access Code: [email or web address]  URL: [email or web address]  Date: 12/09/2021  Therapeutic Ex (69114) Sets/sec Reps Notes/CUES   Finisher 5 min     Bolster Table Rolls H5 2x10     Levator Stretch H15 x3  hep   Pulleys flex 2x10     Seated single Pulldowns 30# 2x10     Standing Wall Scap Matrix YTB  x10 Each     Standing Wall Wipes Scapation 4x5 SBA to 110 deg    Standing Wall Scap Matrix YTB 2x5     Wall Wipes Thread The Needle Posterior Capsule Stretch H5 2x10     Wedge on High Mat Circles @ 90 deg CW 2x10  CCW  2x10     Equip Lat Pulldown to chest 30# 2x10     And mirror   Standing Ext Rot elbow @ side RTB   H2 2x10     Standing Int Rot Step out stretch BTB H5 1x10     Standing Int Rot elbow @ side GTB H2  2x10                     SL Ext Rot 2# 3x5     SL abd 1# 2x10     SL Horz Abd to 90 through 40% ROM 1# 2x7 SBA-Min A    SL scap RS @ 80 deg 3x30\"     Supine Cane Chest Press #4 2x10  +50-   Supine SHLD flex w/ abd OTB H5 2x10  +12-   SL Int Rot behind back H5 x10  +hep 1-4-2021   Supine SA 2# 2x10     Supine RS @ 90 deg 2# 3x30\"     Supine Triceps 2# 2x10     Supine Shld Flex w/ elbow flex 1# 2x10 Manual Intervention (99364)      Myofascial release subscap & teres minor 5'     PROM right shld & elbow in supine w/ head on wedge 20'     STM scap and shld mm 5'                 NMR re-education (04567)   CUES NEEDED                                       Therapeutic Activity (69810)                          Therapeutic Exercise and NMR EXR  [x] (02295) Provided verbal/tactile cueing for activities related to strengthening, flexibility, endurance, ROM  for improvements in scapular, scapulothoracic and UE control with self care, reaching, carrying, lifting, house/yardwork, driving/computer work. [x] (64781) Provided verbal/tactile cueing for activities related to improving balance, coordination, kinesthetic sense, posture, motor skill, proprioception  to assist with  scapular, scapulothoracic and UE control with self care, reaching, carrying, lifting, house/yardwork, driving/computer work. Therapeutic Activities:    [] (53439 or 38365) Provided verbal/tactile cueing for activities related to improving balance, coordination, kinesthetic sense, posture, motor skill, proprioception and motor activation to allow for proper function of scapular, scapulothoracic and UE control with self care, carrying, lifting, driving/computer work.      Home Exercise Program:    [x] (20438) Reviewed/Progressed HEP activities related to strengthening, flexibility, endurance, ROM of scapular, scapulothoracic and UE control with self care, reaching, carrying, lifting, house/yardwork, driving/computer work  [] (74870) Reviewed/Progressed HEP activities related to improving balance, coordination, kinesthetic sense, posture, motor skill, proprioception of scapular, scapulothoracic and UE control with self care, reaching, carrying, lifting, house/yardwork, driving/computer work      Manual Treatments:  PROM / STM / Oscillations-Mobs:  G-I, II, III, IV (PA's, Inf., Post.)  [x] (26918) Provided manual therapy to mobilize soft tissue/joints of cervical/CT, scapular GHJ and UE for the purpose of modulating pain, promoting relaxation,  increasing ROM, reducing/eliminating soft tissue swelling/inflammation/restriction, improving soft tissue extensibility and allowing for proper ROM for normal function with self care, reaching, carrying, lifting, house/yardwork, driving/computer work    Modalities: CP to right shoulder x10 min      Charges  Timed Code Treatment Minutes: 60   Total Treatment Minutes: 70'       [] EVAL (LOW) 42840   [] EVAL (MOD) 81689   [] EVAL (HIGH) 88230   [] RE-EVAL     [x] GL(19087) x 2   [] IONTO  [] NMR (18733) x    [x] VASO  [x] Manual (59554) x  2   [] Other:CP  [] TA x      [] Mech Traction (71181)  [] ES(attended) (94745)      [] ES (un) (83295):     GOALS  Short Term Goals: To be achieved in: 2 weeks  1. Independent in HEP and progression per patient tolerance, in order to prevent re-injury. []? Progressing: [x]? Met: []? Not Met: []? Adjusted  2. Patient will have a decrease in pain to facilitate improvement in movement, function, and ADLs as indicated by Functional Deficits. []? Progressing: [x]? Met: []? Not Met: []? Adjusted     Long Term Goals: To be achieved in: 12 weeks  1. Disability index score of 42% or less for the [facility] to assist with reaching prior level of function. []? Progressing:  [x]? Met: to 18 deg 2-17-22 []? Not Met: []? Adjusted  2. Patient will demonstrate increased AROM to 140 deg flexion to allow for proper joint functioning as indicated by patients Functional Deficits. [x]? Progressing: []? Met: []? Not Met: []? Adjusted  3. Patient will demonstrate an increase in Strength to 4/5 to allow for proper functional mobility as indicated by patients Functional Deficits. [x]? Progressing: []? Met: []? Not Met: []? Adjusted  4. Patient will retrieve plate from overhead shelf with her right hand without increased symptoms or restriction. [x]? Progressing: []? Met: []?  Not Met: []? Adjusted  5. Wash and fix hair with right hand. []? Progressing: [x]? Met: []? Not Met: []? Adjusted         Progression Towards Functional goals:  [] Patient is progressing as expected towards functional goals listed. [x] Progression is slowed due to complexities listed. [] Progression has been slowed due to co-morbidities. [] Plan just implemented, too soon to assess goals progression  [] Other:      ASSESSMENT: Increase wall slide active flexion. Overall Progression Towards Functional goals/ Treatment Progress Update:  [] Patient is progressing as expected towards functional goals listed. [x] Progression is slowed due to complexities/Impairments listed. [] Progression has been slowed due to co-morbidities. [] Plan just implemented, too soon to assess goals progression <30days   [] Goals require adjustment due to lack of progress  [] Patient is not progressing as expected and requires additional follow up with physician  [] Other    Prognosis for POC: [x] Good [] Fair  [] Poor    Patient requires continued skilled intervention: [x] Yes  [] No    Treatment/Activity Tolerance:  [x] Patient able to complete treatment  [] Patient limited by fatigue  [x] Patient limited by pain    [] Patient limited by other medical complications  [x] Other: Pt limited by post-op restrictions. PLAN:   [x] Continue per plan of care [] Alter current plan (see comments above)  [] Plan of care initiated [] Hold pending MD visit [] Discharge    Electronically signed by:  Eugene Bowman, PT  [id number]    Note: If patient does not return for scheduled/ recommended follow up visits, this note will serve as a discharge from care along with most recent update on progress.

## 2022-03-03 ENCOUNTER — HOSPITAL ENCOUNTER (OUTPATIENT)
Dept: PHYSICAL THERAPY | Age: 67
Setting detail: THERAPIES SERIES
Discharge: HOME | End: 2022-03-03
Payer: MEDICARE

## 2022-03-03 ENCOUNTER — HOSPITAL ENCOUNTER (OUTPATIENT)
Dept: OCCUPATIONAL THERAPY | Age: 67
Setting detail: THERAPIES SERIES
End: 2022-03-03
Payer: MEDICARE

## 2022-03-03 PROCEDURE — 97110 THERAPEUTIC EXERCISES: CPT

## 2022-03-03 PROCEDURE — 97140 MANUAL THERAPY 1/> REGIONS: CPT

## 2022-03-03 NOTE — FLOWSHEET NOTE
Everything hurt on her yesterday. Not sure why. Feeling better today, but right shoulder is sore. OBJECTIVE:   15 weeks post op (11- DOS)   Observation:    Test measurementsPROM 0-137 degrees scapation, 100 abd, Ext Rot to 45 @ 50 deg abd, and Int Rot 65 @ 50 abd  by end of RX. Wall slide scapation to 120 deg by end of RX. Minimal scap hike, but still requires manual cuing to prevent futher hike. RESTRICTIONS/PRECAUTIONS: Right TTL SHLD Post-op Protocol (See MEDIA TAB),     Exercises/Interventions:  Access Code: [email or web address]  URL: [email or web address]  Date: 01/04/2022    Access Code: [email or web address]  URL: [email or web address] com/  Date: 12/23/2021    Access Code: [email or web address]  URL: [email or web address]  Date: 12/09/2021  Therapeutic Ex (95247) Sets/sec Reps Notes/CUES   Finisher 5 min     Bolster Table Rolls H5 2x10     Levator Stretch H15 x3  hep   Pulleys flex 2x10     Seated single Pulldowns 4# 2x10     Standing Wall Scap Matrix YTB  x10 Each     Standing Wall Wipes Scapation 2x10 SBA to 115 deg    Standing Wall Scap Matrix YTB 2x5     Wall Wipes Thread The Needle Posterior Capsule Stretch H5 2x10     Wedge on High Mat Circles @ 90 deg CW 2x10  CCW  2x10     Equip Lat Pulldown to chest 30# 2x10     And mirror   Standing Ext Rot elbow @ side RTB   H2 2x10     Standing Int Rot Step out stretch BTB H5 1x10     Standing Int Rot elbow @ side GTB H2  2x10                     SL Ext Rot 2# 3x5     SL abd 1# 2x10     SL Horz Abd to 90 through 40% ROM 1# 2x7 SBA-Min A    SL scap RS @ 80 deg 3x30\"     Supine Cane Chest Press #4 2x10  [phone]    +54-   Supine Shld w/ DE Abd 2x5     SL Int Rot behind back H5 x10  +hep 1-4-2021   Supine SA 2# 2x10     Supine RS @ 90 deg 2# 3x30\"     Supine Triceps 2# 2x10     Supine Shld Flex w/ elbow flex 1# 2x10                 Manual Intervention (23056)      Myofascial release subscap & teres minor 5'     PROM right shld & elbow in supine w/ head on wedge 20'     STM scap and shld mm 5'                 NMR re-education (70491)   CUES NEEDED                                       Therapeutic Activity (95701)                          Therapeutic Exercise and NMR EXR  [x] (98326) Provided verbal/tactile cueing for activities related to strengthening, flexibility, endurance, ROM  for improvements in scapular, scapulothoracic and UE control with self care, reaching, carrying, lifting, house/yardwork, driving/computer work. [x] (60537) Provided verbal/tactile cueing for activities related to improving balance, coordination, kinesthetic sense, posture, motor skill, proprioception  to assist with  scapular, scapulothoracic and UE control with self care, reaching, carrying, lifting, house/yardwork, driving/computer work. Therapeutic Activities:    [] (17753 or 91607) Provided verbal/tactile cueing for activities related to improving balance, coordination, kinesthetic sense, posture, motor skill, proprioception and motor activation to allow for proper function of scapular, scapulothoracic and UE control with self care, carrying, lifting, driving/computer work.      Home Exercise Program:    [x] (64480) Reviewed/Progressed HEP activities related to strengthening, flexibility, endurance, ROM of scapular, scapulothoracic and UE control with self care, reaching, carrying, lifting, house/yardwork, driving/computer work  [] (70950) Reviewed/Progressed HEP activities related to improving balance, coordination, kinesthetic sense, posture, motor skill, proprioception of scapular, scapulothoracic and UE control with self care, reaching, carrying, lifting, house/yardwork, driving/computer work      Manual Treatments:  PROM / STM / Oscillations-Mobs:  G-I, II, III, IV (PA's, Inf., Post.)  [x] (51729) Provided manual therapy to mobilize soft tissue/joints of cervical/CT, scapular GHJ and UE for the purpose of modulating pain, promoting relaxation,  increasing ROM, reducing/eliminating soft tissue swelling/inflammation/restriction, improving soft tissue extensibility and allowing for proper ROM for normal function with self care, reaching, carrying, lifting, house/yardwork, driving/computer work    Modalities: CP to right shoulder x10 min      Charges  Timed Code Treatment Minutes: 60   Total Treatment Minutes: 70'       [] EVAL (LOW) 59034   [] EVAL (MOD) 79899   [] EVAL (HIGH) 61939   [] RE-EVAL     [x] CF(29634) x 2   [] IONTO  [] NMR (91734) x    [x] VASO  [x] Manual (72206) x  2   [] Other:CP  [] TA x      [] Mech Traction (75815)  [] ES(attended) (01415)      [] ES (un) (86211):     GOALS  Short Term Goals: To be achieved in: 2 weeks  1. Independent in HEP and progression per patient tolerance, in order to prevent re-injury. []? Progressing: [x]? Met: []? Not Met: []? Adjusted  2. Patient will have a decrease in pain to facilitate improvement in movement, function, and ADLs as indicated by Functional Deficits. []? Progressing: [x]? Met: []? Not Met: []? Adjusted     Long Term Goals: To be achieved in: 12 weeks  1. Disability index score of 42% or less for the [facility] to assist with reaching prior level of function. []? Progressing:  [x]? Met: to 18% 3-3-22 []? Not Met: []? Adjusted  2. Patient will demonstrate increased AROM to 140 deg flexion to allow for proper joint functioning as indicated by patients Functional Deficits. [x]? Progressing:Scapation wall wipe to 120 deg , []? Met: []? Not Met: []? Adjusted  3. Patient will demonstrate an increase in Strength to 4/5 to allow for proper functional mobility as indicated by patients Functional Deficits. [x]? Progressing: []? Met: []? Not Met: []? Adjusted  4. Patient will retrieve plate from overhead shelf with her right hand without increased symptoms or restriction. [x]? Progressing: []? Met: []? Not Met: []? Adjusted  5. Wash and fix hair with right hand. []? Progressing: [x]? Met: []?  Not Met: []? Adjusted         Progression Towards Functional goals:  [] Patient is progressing as expected towards functional goals listed. [x] Progression is slowed due to complexities listed. [] Progression has been slowed due to co-morbidities. [] Plan just implemented, too soon to assess goals progression  [] Other:      ASSESSMENT: Gradual increase in functional overhead and behind the back motions w/ right UE. Still quick to fatigue after 3 reps and still requires manual cuing support and cuing to complete low rep sets after that, but now compensatory scap hiking is minimal.    Overall Progression Towards Functional goals/ Treatment Progress Update:  [] Patient is progressing as expected towards functional goals listed. [x] Progression is slowed due to complexities/Impairments listed. [] Progression has been slowed due to co-morbidities. [] Plan just implemented, too soon to assess goals progression <30days   [] Goals require adjustment due to lack of progress  [] Patient is not progressing as expected and requires additional follow up with physician  [] Other    Prognosis for POC: [x] Good [] Fair  [] Poor    Patient requires continued skilled intervention: [x] Yes  [] No    Treatment/Activity Tolerance:  [x] Patient able to complete treatment  [] Patient limited by fatigue  [x] Patient limited by pain    [] Patient limited by other medical complications  [x] Other: Pt limited by post-op restrictions. PLAN:   [x] Continue per plan of care [] Alter current plan (see comments above)  [] Plan of care initiated [] Hold pending MD visit [] Discharge    Electronically signed by:  Delaney Rivas, PT  [id number]    Note: If patient does not return for scheduled/ recommended follow up visits, this note will serve as a discharge from care along with most recent update on progress.

## 2022-03-03 NOTE — PLAN OF CARE
[facility], [address] [facility]  [address] [place] [address]  Phone: [phone]  Fax [phone]     Physical Therapy Re-Certification Plan of Care    Dear  Dr. Aron Carroll,    We had the pleasure of treating the following patient for physical therapy services at [address] [place] [address]. A summary of our findings can be found in the updated assessment below. This includes our plan of care. If you have any questions or concerns regarding these findings, please do not hesitate to contact me at the office phone number checked above. Thank you for the referral.     Physician Signature:________________________________Date:__________________  By signing above, therapists plan is approved by physician      Patient: Shasta Lara   : 1955   MRN: [id number]  Referring Physician:   Dr. Marco Bosch. Kostas    Evaluation Date: 3/3/2022      Medical Diagnosis Information:  ·  Diagnosis: M75.101 Right Shld RTC Tear & OA s/p Total SHLD SX (DOS: 2021)   ·  Treatment Diagnosis: M25.511 Right SHLD Pain, M25.521 Right Elbow Pain,M25.611 Rigth SHLD Stiffness, M25.621 Rigth Elbow Stiffness, M62.81 Right UE Weakness , G25.89 Scapular Dyskinesis  ·   Insurance information:    [facility] / Medicare begins 3-    Date Range:2021 to 3-3-2022  Total visits:23     Functional Index used: Jorden Mcintyre = 18%    Current Pain Scale: 0-2/10    OBJECTIVE  Test used Initial score Current Score   Pain Summary 0-10 7/10 0-2/10 @ end ranges of motion. Functional questionnaire Quick Dash 84% 18%   Functional Testing  Dominant right UE in sling Able to wash and fix her hair now.   Still not able to wash her back, but can reach bck to pull up her pants         ROM  Right shoulder flex PROM  20 deg AAROM@ beginning of session 95 deg    AAROM 137 deg after 60 min TE & MT    scap PROM  20 deg PROM  100 deg after 60 min TE & MT    abd  90 deg    ER PROM  -33 deg w/ 0 abd PROM  45 deg @ 50 abd    IR PROM  41 deg @ 0 abd PROM  65 deg @50 abd               Strength NT/postop 2= to 3-/5 3- to 3/5  Able to scapation wall slide to 120 deg @ 3 reps before faticgue. Compensatory scap hiking minimal w/ wall slides/wipes     Assessment: Gradual increase in functional overhead and behind the back motions w/ right UE. Still quick to fatigue after 3 reps and still requires manual cuing support and cuing to complete low rep sets after that, but now compensatory scap hiking is minimal.    Response to Treatment:   [x]Patient is slowly responding well to treatment and improvement is noted with regards to goals   []Patient should continue to improve in reasonable time if they continue HEP   []Patient has plateaued and is no longer responding to skilled PT intervention    []Patient is getting worse and would benefit from return to referring MD   []Patient unable to adhere to initial POC      Functional deficiencies which affect ADL's and Reduce overall functional level:     [x]decreased RC/scapular strength and neuromuscular control - Reduced overall  functional level with carrying /lifting   [x]decreased UE ROM/joint mobility- Reduced overall functional level with  carrying /lifting    []pain/difficulty with driving and/or computer work- Reduced overall functional  level    [x]pain at end of day with ADL tasks- Reduced overall functional level   [x]pain/difficulty with lifting/reaching/carrying - Reduced overall functional level  with carrying and lifting   [x]unable to perform sport/recreational activity due to pain and dysfunction   []other:       Prognosis/Rehab Potential:    []Excellent   [x]Good    [x]Fair   []Poor:     Toleration of evaluation or treatment:    []Excellent   [x]Good    []Fair   []Poor     New or Updated Goals (if applicable):  [x] No change to goals established upon initial eval/last progress note:  New Goals:    GOALS:   . Disability index score of 42% or less for the [facility] to assist with reaching prior level of function.   []?? Progressing:  [x]? ? Met: to 18% 3-3-22 []? ? Not Met: []?? Adjusted  2. Patient will demonstrate increased AROM to 140 deg flexion to allow for proper joint functioning as indicated by patients Functional Deficits. [x]? ? Progressing:Scapation wall wipe to 120 deg , []? ? Met: []?? Not Met: []?? Adjusted  3. Patient will demonstrate an increase in Strength to 4/5 to allow for proper functional mobility as indicated by patients Functional Deficits. [x]? ? Progressing: []?? Met: []?? Not Met: []?? Adjusted  4. Patient will retrieve plate from overhead shelf with her right hand without increased symptoms or restriction. [x]? ? Progressing: []?? Met: []?? Not Met: []?? Adjusted  5.  Wash and fix hair with right hand.   []?? Progressing: [x]? ? Met: []?? Not Met: []?? Adjusted         Rehab Potential:   []Excellent   [x] Good   [] Fair   [] Poor    Plan of Care:  [] Continue Current Therapy Intervention    Frequency/Duration:  1-2 days per week for  8 Weeks:  HEP instruction: Pt is receiving continuous progression of HEP. 1. Therapeutic exercise including: strength training, ROM, NMR and proprioception for the scapula, core and Upper extremity  2. Manual therapy as indicated including Dry Needling/IASTM, STM, PROM, Gr I-IV mobilizations, spinal mobilization/manipulation. 3. Modalities as needed including: thermal agents, E-stim, US, iontophoresis as indicated. 4. Patient education on joint protection, activity modification, progression of HEP.        Electronically signed by:  Sheila Gaspar

## 2022-03-08 ENCOUNTER — HOSPITAL ENCOUNTER (OUTPATIENT)
Dept: PHYSICAL THERAPY | Age: 67
Setting detail: THERAPIES SERIES
Discharge: HOME | End: 2022-03-08
Payer: MEDICARE

## 2022-03-08 PROCEDURE — 97110 THERAPEUTIC EXERCISES: CPT

## 2022-03-08 PROCEDURE — 97140 MANUAL THERAPY 1/> REGIONS: CPT

## 2022-03-08 NOTE — FLOWSHEET NOTE
[facility], [address], [address] [place] [address]  Phone: [phone]  Fax [phone]    Date:  3/8/2022    Patient Name:  Jaden Malik    :  1955  MRN: [id number]  Restrictions/Precautions:   Right TTL SHLD Post-op Protocol (See MEDIA TAB),  [id number] 2021 w/ hospitalization and f/u PT 21 to 21,  OA, HTN, Osteoporosis , Back SX for Cason Rods @ age 8, Inner Ear [address] for Replacement of Ear Drum, Right Ulnar Nerve Decompression @ Elbow Aug. 12,2020, Right Carpal Tunnel Release SX Aug 25, 2020, PT Right Shoulder 2020    Medical/Treatment Diagnosis Information:  Diagnosis: M75.101 Right Shld RTC Tear & OA s/p Total SHLD SX (DOS: 2021)  Treatment Diagnosis: M25.511 Right SHLD Pain, M25.521 Right Elbow Pain,M25.611 Rigth SHLD Stiffness, M25.621 Rigth Elbow Stiffness, M62.81 Right UE Weakness , G25.89 Scapular Dyskinesis  Insurance/Certification information:  PT Insurance Information: [facility]  Physician Information:  Referring Practitioner: Dr. Dean Conklin  Has the plan of care been signed (Y/N):        [x]  Yes  []  No     Date of Patient follow up with Physician: May 13, 2020    Is this a Progress Report:     []  Yes  [x]  No   If Yes:  Date Range for reporting period:  Beginning: 3-3-2022  Ending:     Progress report will be due (10 Rx or 30 days whichever is less):        699    Recertification will be due (POC Duration  / 90 days whichever is less):   5-3-2022     Visit # Insurance Allowable Auth Required   In-person 24 30 []  Yes []  No    Telehealth   []  Yes []  No    Total        Functional Scale: Quick Dash=         18%  Date assessed:       3-3-2022    Therapy Diagnosis/Practice Pattern: 4H     Number of Comorbidities:  []0     [x]1-2    []3+    Latex Allergy:  [x]NO      []YES  Preferred Language for Healthcare:   [x]English       []other:    Pain level: 5 /10 R SHLD    SUBJECTIVE: Pt enters clinic stating that the anterior & posterior aspect of her right shoulder is sore & painful. Nothing she does eases this discomfort. OBJECTIVE:   15.75 weeks post op (11- DOS)   Observation: Multiple trigger points in traps, rhomboids, biceps, triceps, and deltoids when stimulated reproduce patient's discomfort. Trigger point release and STM relieve some discomfort in these areas.  Test measurementsPROM 0-137 degrees scapation, 100 abd, Ext Rot to 45 @ 50 deg abd, and Int Rot 65 @ 50 abd  by end of RX. Wall slide scapation to 120 deg by end of RX. Minimal scap hike, but still requires manual cuing to prevent futher hike. RESTRICTIONS/PRECAUTIONS: Right TTL SHLD Post-op Protocol (See MEDIA TAB),     Exercises/Interventions:  Access Code: [email or web address]  URL: [employer] It/  Date: 01/04/2022    Access Code: [email or web address]  URL: [email or web address]  Date: 12/23/2021    Access Code: [email or web address]  URL: [email or web address]  Date: 12/09/2021  Therapeutic Ex (64660) Sets/sec Reps Notes/CUES   Finisher 5 min     Bolster Table Rolls H5 2x10     Levator Stretch H15 x3  hep   Pulleys flex 2x10     Seated single Pulldowns 4# 2x10     Standing Scap Wall Ball Circles 2x10 @ 80 deg scap        Trustretch Bilat Scapation Stretch H5 x10     Standing Wall Wipes Scapation 2x10 SBA to 115 deg    Wall Wipes Thread The Needle Posterior Capsule Stretch H5 2x10     Wedge on High Mat Circles @ 90 deg CW 2x10  CCW  2x10     Equip Lat Pulldown to chest 30# 2x10     And mirror   Standing Ext Rot elbow @ side RTB   H2 2x10     Standing Int Rot Step out stretch BTB H5 1x10     Standing Int Rot elbow @ side GTB H2  2x10                     SL Ext Rot 1# 2x10     SL Abd  1# 2x10 SBA-    SL scap RS @ 80 deg 3x30\"     Supine Cane Chest Press #4 2x10  +40-    +41-   Supine Shld w/ AR Abd 2x5     SL Int Rot behind back H5 x10  +hep 1-4-2021   Supine SA 3# 2x10     Supine RS @ 90 deg 2# 3x30\"     Supine Triceps 2# 2x10     Supine Shld Flex w/ elbow flex 1# 2x10                 Manual Intervention (61076)      Myofascial release /trigger point stim/ STM right upper quadrant 8'     PROM right shld & elbow in supine w/ head on wedge 20'     SLScap clocks 2 min                 NMR re-education (53967)   CUES NEEDED                                       Therapeutic Activity (52367)                          Therapeutic Exercise and NMR EXR  [x] (02785) Provided verbal/tactile cueing for activities related to strengthening, flexibility, endurance, ROM  for improvements in scapular, scapulothoracic and UE control with self care, reaching, carrying, lifting, house/yardwork, driving/computer work. [x] (81513) Provided verbal/tactile cueing for activities related to improving balance, coordination, kinesthetic sense, posture, motor skill, proprioception  to assist with  scapular, scapulothoracic and UE control with self care, reaching, carrying, lifting, house/yardwork, driving/computer work. Therapeutic Activities:    [] (43563 or 87059) Provided verbal/tactile cueing for activities related to improving balance, coordination, kinesthetic sense, posture, motor skill, proprioception and motor activation to allow for proper function of scapular, scapulothoracic and UE control with self care, carrying, lifting, driving/computer work.      Home Exercise Program:    [x] (12171) Reviewed/Progressed HEP activities related to strengthening, flexibility, endurance, ROM of scapular, scapulothoracic and UE control with self care, reaching, carrying, lifting, house/yardwork, driving/computer work  [] (33631) Reviewed/Progressed HEP activities related to improving balance, coordination, kinesthetic sense, posture, motor skill, proprioception of scapular, scapulothoracic and UE control with self care, reaching, carrying, lifting, house/yardwork, driving/computer work      Manual Treatments:  PROM / STM / Oscillations-Mobs:  G-I, II, III, IV (PA's, Inf., Post.)  [x] (31812) Provided manual therapy to mobilize soft tissue/joints of cervical/CT, scapular GHJ and UE for the purpose of modulating pain, promoting relaxation,  increasing ROM, reducing/eliminating soft tissue swelling/inflammation/restriction, improving soft tissue extensibility and allowing for proper ROM for normal function with self care, reaching, carrying, lifting, house/yardwork, driving/computer work    Modalities: CP to right shoulder x10 min      Charges  Timed Code Treatment Minutes: 55   Total Treatment Minutes: 70'       [] EVAL (LOW) 59584   [] EVAL (MOD) 29711   [] EVAL (HIGH) 70352   [] RE-EVAL     [x] RV(72919) x 2   [] IONTO  [] NMR (23095) x    [x] VASO  [x] Manual (27208) x  2   [] Other:CP  [] TA x      [] Mech Traction (55067)  [] ES(attended) (84914)      [] ES (un) (69189):     GOALS  Short Term Goals: To be achieved in: 2 weeks  1. Independent in HEP and progression per patient tolerance, in order to prevent re-injury. []? Progressing: [x]? Met: []? Not Met: []? Adjusted  2. Patient will have a decrease in pain to facilitate improvement in movement, function, and ADLs as indicated by Functional Deficits. []? Progressing: [x]? Met: []? Not Met: []? Adjusted     Long Term Goals: To be achieved in: 12 weeks  1. Disability index score of 42% or less for the [facility] to assist with reaching prior level of function. []? Progressing:  [x]? Met: to 18% 3-3-22 []? Not Met: []? Adjusted  2. Patient will demonstrate increased AROM to 140 deg flexion to allow for proper joint functioning as indicated by patients Functional Deficits. [x]? Progressing:Scapation wall wipe to 120 deg , []? Met: []? Not Met: []? Adjusted  3. Patient will demonstrate an increase in Strength to 4/5 to allow for proper functional mobility as indicated by patients Functional Deficits. [x]? Progressing: []? Met: []? Not Met: []? Adjusted  4.  Patient will retrieve plate from overhead shelf with her right hand without increased symptoms or restriction. [x]? Progressing: []? Met: []? Not Met: []? Adjusted  5. Wash and fix hair with right hand. []? Progressing: [x]? Met: []? Not Met: []? Adjusted         Progression Towards Functional goals:  [] Patient is progressing as expected towards functional goals listed. [x] Progression is slowed due to complexities listed. [] Progression has been slowed due to co-morbidities. [] Plan just implemented, too soon to assess goals progression  [] Other:      ASSESSMENT: Extended periods of computer activities fr work and edda w/ increase scap mm soreness. Pt instructed to limit time on computer and to interrupt computer sessions w/ HEP TE. Overall Progression Towards Functional goals/ Treatment Progress Update:  [] Patient is progressing as expected towards functional goals listed. [x] Progression is slowed due to complexities/Impairments listed. [] Progression has been slowed due to co-morbidities. [] Plan just implemented, too soon to assess goals progression <30days   [] Goals require adjustment due to lack of progress  [] Patient is not progressing as expected and requires additional follow up with physician  [] Other    Prognosis for POC: [x] Good [] Fair  [] Poor    Patient requires continued skilled intervention: [x] Yes  [] No    Treatment/Activity Tolerance:  [x] Patient able to complete treatment  [] Patient limited by fatigue  [x] Patient limited by pain    [] Patient limited by other medical complications  [x] Other: Pt limited by post-op restrictions.     PLAN:   [x] Continue per plan of care [] Alter current plan (see comments above)  [] Plan of care initiated [] Hold pending MD visit [] Discharge    Electronically signed by:  Michael Bach, PT  [id number]    Note: If patient does not return for scheduled/ recommended follow up visits, this note will serve as a discharge from care along with most recent update on progress.

## 2022-03-10 ENCOUNTER — HOSPITAL ENCOUNTER (OUTPATIENT)
Dept: PHYSICAL THERAPY | Age: 67
Setting detail: THERAPIES SERIES
Discharge: HOME | End: 2022-03-10
Payer: MEDICARE

## 2022-03-10 PROCEDURE — 97110 THERAPEUTIC EXERCISES: CPT

## 2022-03-10 PROCEDURE — 97140 MANUAL THERAPY 1/> REGIONS: CPT

## 2022-03-10 NOTE — FLOWSHEET NOTE
[facility], [address], [address] [place] [address]  Phone: [phone]  Fax [phone]    Date:  3/10/2022    Patient Name:  Cristo Huerta    :  1955  MRN: [id number]  Restrictions/Precautions:   Right TTL SHLD Post-op Protocol (See MEDIA TAB),  ALRHZ-08 2021 w/ hospitalization and f/u PT 21 to 21,  OA, HTN, Osteoporosis , Back SX for Cason Rods @ age 8, Inner Ear [address] for Replacement of Ear Drum, Right Ulnar Nerve Decompression @ Elbow Aug. 12,2020, Right Carpal Tunnel Release SX Aug 25, 2020, PT Right Shoulder 2020    Medical/Treatment Diagnosis Information:  Diagnosis: M75.101 Right Shld RTC Tear & OA s/p Total SHLD SX (DOS: 2021)  Treatment Diagnosis: M25.511 Right SHLD Pain, M25.521 Right Elbow Pain,M25.611 Rigth SHLD Stiffness, M25.621 Rigth Elbow Stiffness, M62.81 Right UE Weakness , G25.89 Scapular Dyskinesis  Insurance/Certification information:  PT Insurance Information: [facility]  Physician Information:  Referring Practitioner: Dr. Alethea Gregg. Verlin Hammans  Has the plan of care been signed (Y/N):        [x]  Yes  []  No     Date of Patient follow up with Physician: May 13, 2020    Is this a Progress Report:     []  Yes  [x]  No   If Yes:  Date Range for reporting period:  Beginning: 3-3-2022  Ending:     Progress report will be due (10 Rx or 30 days whichever is less):            Recertification will be due (POC Duration  / 90 days whichever is less):    May 5, 2022     Visit # Insurance Allowable Auth Required   In-person 24 30 []  Yes []  No    Telehealth   []  Yes []  No    Total        Functional Scale: Quick Dash=        18%  Date assessed:        3-    Therapy Diagnosis/Practice Pattern: 4H     Number of Comorbidities:  []0     [x]1-2    []3+    Latex Allergy:  [x]NO      []YES  Preferred Language for Healthcare:   [x]English       []other:    Pain level: 0-3 /10 R SHLD    SUBJECTIVE: Right shoulder felt \"great\" after last Rx until yesterday afternoon. OBJECTIVE:   15.25 weeks post op (11- DOS). 3-: New order received to continue PT additional 2xwk for 8 weeks.  Observation:    Test measurementsPROM 0-137 degrees scapation, 100 abd, Ext Rot to 45 @ 50 deg abd, and Int Rot 65 @ 50 abd  by end of RX. Wall slide scapation to 120 deg by end of RX. Minimal scap hike, but still requires manual cuing to prevent futher hike. RESTRICTIONS/PRECAUTIONS: Right TTL SHLD Post-op Protocol (See MEDIA TAB),     Exercises/Interventions:  Access Code: [email or web address]  URL: [email or web address]  Date: 01/04/2022    Access Code: [email or web address]  URL: [email or web address]  Date: 12/23/2021    Access Code: [email or web address]  URL: [email or web address]  Date: 12/09/2021  Therapeutic Ex (16342) Sets/sec Reps Notes/CUES   Finisher 5 min     Bolster Table Rolls H5 2x10     Levator Stretch H15 x3  hep   Pulleys flex 2x10     Seated single Pulldowns 4# 2x10     Standing Wall Scap Matrix YTB  x10 Each     Trustetch SHLD Flex Stretch w/ Trunk Flexion H5 x10     Standing Wall Wipes Scapation 2x10 SBA to 115 deg    Wall Wipes Thread The Needle Posterior Capsule Stretch H5 2x10     Wedge on High Mat Circles @ 90 deg CW 2x10  CCW  2x10     Equip Lat Pulldown to chest 30# 2x10     And mirror   Standing Horz Abd w/ Trunk Roation H2 2x10 MC & Min Assist @ forearm     Standing Ext Rot elbow @ side RTB   H2 2x10     Standing Int Rot Step out stretch BTB H5 1x10     Standing Int Rot elbow @ side GTB H2  2x10                     SL Ext Rot 2# 3x5     SL abd 1# 2x10     SL Horz Abd to 90 through 40% ROM 1# 2x7 SBA-Min A    SL scap RS @ 80 deg 3x30\"     Supine Cane Chest Press #4 2x10  [id number]    +01-   Supine Shld w/ NM Abd 2x5     SL Int Rot behind back H5 x10  +hep 1-4-2021   Supine SA 2# 2x10     Supine RS @ 90 deg 2# 3x30\"     Supine Triceps 2# 2x10     Supine Shld Flex w/ elbow flex 1# 2x10                 Manual Intervention (16897)      Myofascial release subscap & teres minor 5'     PROM right shld & elbow in supine w/ head on wedge 20'     STM scap and shld mm 5'                 NMR re-education (64751)   CUES NEEDED                                       Therapeutic Activity (23746)                          Therapeutic Exercise and NMR EXR  [x] (75292) Provided verbal/tactile cueing for activities related to strengthening, flexibility, endurance, ROM  for improvements in scapular, scapulothoracic and UE control with self care, reaching, carrying, lifting, house/yardwork, driving/computer work. [x] (86598) Provided verbal/tactile cueing for activities related to improving balance, coordination, kinesthetic sense, posture, motor skill, proprioception  to assist with  scapular, scapulothoracic and UE control with self care, reaching, carrying, lifting, house/yardwork, driving/computer work. Therapeutic Activities:    [] (08097 or 60650) Provided verbal/tactile cueing for activities related to improving balance, coordination, kinesthetic sense, posture, motor skill, proprioception and motor activation to allow for proper function of scapular, scapulothoracic and UE control with self care, carrying, lifting, driving/computer work.      Home Exercise Program:    [x] (82491) Reviewed/Progressed HEP activities related to strengthening, flexibility, endurance, ROM of scapular, scapulothoracic and UE control with self care, reaching, carrying, lifting, house/yardwork, driving/computer work  [] (83254) Reviewed/Progressed HEP activities related to improving balance, coordination, kinesthetic sense, posture, motor skill, proprioception of scapular, scapulothoracic and UE control with self care, reaching, carrying, lifting, house/yardwork, driving/computer work      Manual Treatments:  PROM / STM / Oscillations-Mobs:  G-I, II, III, IV (PA's, Inf., Post.)  [x] (94892) Provided manual therapy to mobilize soft tissue/joints of cervical/CT, scapular GHJ and UE for the purpose of modulating pain, promoting relaxation,  increasing ROM, reducing/eliminating soft tissue swelling/inflammation/restriction, improving soft tissue extensibility and allowing for proper ROM for normal function with self care, reaching, carrying, lifting, house/yardwork, driving/computer work    Modalities: CP to right shoulder x10 min      Charges  Timed Code Treatment Minutes: 60   Total Treatment Minutes: 70'       [] EVAL (LOW) 67495   [] EVAL (MOD) 07024   [] EVAL (HIGH) 42458   [] RE-EVAL     [x] QU(77924) x 2   [] IONTO  [] NMR (46128) x    [x] VASO  [x] Manual (68965) x  2   [] Other:CP  [] TA x      [] Mech Traction (05534)  [] ES(attended) (60594)      [] ES (un) (47508):     GOALS  Short Term Goals: To be achieved in: 2 weeks  1. Independent in HEP and progression per patient tolerance, in order to prevent re-injury. []? Progressing: [x]? Met: []? Not Met: []? Adjusted  2. Patient will have a decrease in pain to facilitate improvement in movement, function, and ADLs as indicated by Functional Deficits. []? Progressing: [x]? Met: []? Not Met: []? Adjusted     Long Term Goals: To be achieved in: 12 weeks  1. Disability index score of 42% or less for the [facility] to assist with reaching prior level of function. []? Progressing:  [x]? Met: to 18% 3-3-22 []? Not Met: []? Adjusted  2. Patient will demonstrate increased AROM to 140 deg flexion to allow for proper joint functioning as indicated by patients Functional Deficits. [x]? Progressing:Scapation wall wipe to 120 deg , []? Met: []? Not Met: []? Adjusted  3. Patient will demonstrate an increase in Strength to 4/5 to allow for proper functional mobility as indicated by patients Functional Deficits. [x]? Progressing: []? Met: []? Not Met: []? Adjusted  4.  Patient will retrieve plate from overhead shelf with her right hand without increased symptoms or

## 2022-03-15 ENCOUNTER — HOSPITAL ENCOUNTER (OUTPATIENT)
Dept: PHYSICAL THERAPY | Age: 67
Setting detail: THERAPIES SERIES
Discharge: HOME | End: 2022-03-15
Payer: MEDICARE

## 2022-03-15 PROCEDURE — 97140 MANUAL THERAPY 1/> REGIONS: CPT

## 2022-03-15 PROCEDURE — 97110 THERAPEUTIC EXERCISES: CPT

## 2022-03-15 NOTE — FLOWSHEET NOTE
[facility], [address] [place]  Phone: [phone]  Fax [phone]    Date:  3/15/2022    Patient Name:  Kai Delcid    :  1955  MRN: [id number]  Restrictions/Precautions:   Right TTL SHLD Post-op Protocol (See MEDIA TAB),  [id number] 2021 w/ hospitalization and f/u PT 21 to 21,  OA, HTN, Osteoporosis , Back SX for Cason Rods @ age 8, Inner Ear [address] for Replacement of Ear Drum, Right Ulnar Nerve Decompression @ Elbow Aug. 12,2020, Right Carpal Tunnel Release SX Aug 25, 2020, PT Right Shoulder 2020    Medical/Treatment Diagnosis Information:  Diagnosis: M75.101 Right Shld RTC Tear & OA s/p Total SHLD SX (DOS: 2021)  Treatment Diagnosis: M25.511 Right SHLD Pain, M25.521 Right Elbow Pain,M25.611 Rigth SHLD Stiffness, M25.621 Rigth Elbow Stiffness, M62.81 Right UE Weakness , G25.89 Scapular Dyskinesis  Insurance/Certification information:  PT Insurance Information: [facility]  Physician Information:  Referring Practitioner: Dr. Nicolle Kaminski. Kenyatta Gill  Has the plan of care been signed (Y/N):        [x]  Yes  []  No     Date of Patient follow up with Physician: May 13, 2020    MEDICARE CAP EXCEPTION DOCUMENTATION  I certify that this patient meets one of the below criteria necessary for becoming an exception to the Medicare cap on therapy services:    [x]  The patient has a condition identified by an ICD-10 code that has a direct and significant impact on the need for therapy. (Significantly impacts the rate of recovery.)                   []  The patient has a complexity identified by an ICD-10 code that has a direct and significant impact on the need for therapy.   (Significantly impacts the rate of recovery and is associated with a primary condition.)         []  The patient has associated variables that influence the amount of treatment to include:  Social support, self-efficacy/motivation, prognosis, time since onset/acuity. [x]  The patient has generalized musculoskeletal conditions or a condition affecting multiple sites that will have a direct impact on the rate of recovery. []  The patient had a prior episode of outpatient therapy during this calendar year for a different condition. []  The patient has a mental or cognitive disorder in addition to the condition being treated that will have a direct and significant impact on the rate of recovery. Is this a Progress Report:     []  Yes  [x]  No   If Yes:  Date Range for reporting period:  Beginning: 3-3-2022  Ending:     Progress report will be due (10 Rx or 30 days whichever is less):        7-3-2548    Recertification will be due (POC Duration  / 90 days whichever is less): May 5, 2022     Visit # Insurance Allowable Auth Required   In-person 25 30 []  Yes []  No    Telehealth   []  Yes []  No    Total        Functional Scale: Quick Dash=        18%  Date assessed:        3-    Therapy Diagnosis/Practice Pattern: 4H     Number of Comorbidities:  []0     [x]1-2    []3+    Latex Allergy:  [x]NO      []YES  Preferred Language for Healthcare:   [x]English       []other:    Pain level: 6/10 R SHLD    SUBJECTIVE: Patient enters clinic reporting significant increase pain in anterior aspect of right shoulder since yesterday afternoon. Reports working for a long time @ computer @ work and playing game son her phone. Pt states that she her a cracking sound in the front of her shoulder when she went to reach something @ work. OBJECTIVE:    (11- DOS). 3-: New order received to continue PT additional 2xwk for 8 weeks.  Observation: Very TTP and spasm over biceps long head. Also TTP @ trigger points and increase tone  in subscap & teres minor, as well as, superior scapular angle.  Held many strengthening TE today and concentrated on relaxing right shoulder girdle and restoring right shoulder AAROM. AROM 74 deg flex/scapation   PROM 0-137 degrees scapation, 100 abd, Ext Rot to 45 @ 50 deg abd, and Int Rot 65 @ 50 abd  by end of RX. Wall slide scapation to 120 deg by end of RX. Minimal scap hike, but still requires manual cuing to prevent futher hike. RESTRICTIONS/PRECAUTIONS: Right TTL SHLD Post-op Protocol (See MEDIA TAB),     Exercises/Interventions:  Access Code: [email or web address]  URL: [email or web address]  Date: 01/04/2022    Access Code: [email or web address]  URL: [email or web address]  Date: 12/23/2021    Access Code: [email or web address]  URL: [email or web address]  Date: 12/09/2021  Therapeutic Ex (01987) Sets/sec Reps Notes/CUES   Finisher 5 min         Levator Stretch H15 x3  hep   Pulleys flex 2x10     Seated single Pulldowns 4# 2x10     Standing Wall Scap Matrix YTB  x10 Each     Trustetch SHLD Flex Stretch w/ Trunk Flexion H5 x10     Standing Wall Wipes Scapation 2x10     Wall Wipes Thread The Needle Posterior Capsule Stretch H5 2x10     Wedge on High Mat Circles @ 90 deg CW 2x10  CCW  2x10     Equip Lat Pulldown to chest 30# 2x10     And mirror   Standing Horz Abd w/ Trunk Roation H2 2x10 MC & Min Assist @ forearm     Standing Ext Rot elbow @ side RTB   H2 2x10     Standing Int Rot Step out stretch BTB H5 1x10     Standing Int Rot elbow @ side GTB H2  2x10                             SBA-Min A         +38-    +12-   Supine Shld w/ OK Abd 2x5     SL Int Rot behind back H5 x10  +hep 1-4-2021   Supine SA 2# 2x10     Supine RS @ 90 deg 2# 3x30\"     Supine Triceps 2# 2x10     Supine Shld Flex w/ elbow flex 1# 2x10                                   Manual Intervention (72596)      Myofascial release subscap & teres minor 5'     PROM right shld & elbow in supine w/ head on wedge 20'     STM scap and shld mm 5'                 NMR re-education (13641)   CUES NEEDED                                       Therapeutic Activity (39811)                          Therapeutic Exercise and NMR EXR  [x] (89663) Provided verbal/tactile cueing for activities related to strengthening, flexibility, endurance, ROM  for improvements in scapular, scapulothoracic and UE control with self care, reaching, carrying, lifting, house/yardwork, driving/computer work. [x] (34055) Provided verbal/tactile cueing for activities related to improving balance, coordination, kinesthetic sense, posture, motor skill, proprioception  to assist with  scapular, scapulothoracic and UE control with self care, reaching, carrying, lifting, house/yardwork, driving/computer work. Therapeutic Activities:    [] (18313 or 60747) Provided verbal/tactile cueing for activities related to improving balance, coordination, kinesthetic sense, posture, motor skill, proprioception and motor activation to allow for proper function of scapular, scapulothoracic and UE control with self care, carrying, lifting, driving/computer work.      Home Exercise Program:    [x] (70057) Reviewed/Progressed HEP activities related to strengthening, flexibility, endurance, ROM of scapular, scapulothoracic and UE control with self care, reaching, carrying, lifting, house/yardwork, driving/computer work  [] (13007) Reviewed/Progressed HEP activities related to improving balance, coordination, kinesthetic sense, posture, motor skill, proprioception of scapular, scapulothoracic and UE control with self care, reaching, carrying, lifting, house/yardwork, driving/computer work      Manual Treatments:  PROM / STM / Oscillations-Mobs:  G-I, II, III, IV (PA's, Inf., Post.)  [x] (70726) Provided manual therapy to mobilize soft tissue/joints of cervical/CT, scapular GHJ and UE for the purpose of modulating pain, promoting relaxation,  increasing ROM, reducing/eliminating soft tissue swelling/inflammation/restriction, improving soft tissue extensibility and allowing for proper ROM for normal function with self care, reaching, carrying, lifting, house/yardwork, driving/computer work    Modalities: CP to right shoulder x10 min      Charges  Timed Code Treatment Minutes: 60   Total Treatment Minutes: 70'       [] EVAL (LOW) 01981   [] EVAL (MOD) 53009   [] EVAL (HIGH) 21874   [] RE-EVAL     [x] RQ(76460) x 2   [] IONTO  [] NMR (29871) x    [x] VASO  [x] Manual (06846) x  2   [] Other:CP  [] TA x      [] Mech Traction (49947)  [] ES(attended) (35013)      [] ES (un) (02787):     GOALS  Short Term Goals: To be achieved in: 2 weeks  1. Independent in HEP and progression per patient tolerance, in order to prevent re-injury. []? Progressing: [x]? Met: []? Not Met: []? Adjusted  2. Patient will have a decrease in pain to facilitate improvement in movement, function, and ADLs as indicated by Functional Deficits. []? Progressing: [x]? Met: []? Not Met: []? Adjusted     Long Term Goals: To be achieved in: 12 weeks  1. Disability index score of 42% or less for the [facility] to assist with reaching prior level of function. []? Progressing:  [x]? Met: to 18% 3-3-22 []? Not Met: []? Adjusted  2. Patient will demonstrate increased AROM to 140 deg flexion to allow for proper joint functioning as indicated by patients Functional Deficits. [x]? Progressing:Scapation wall wipe to 120 deg , []? Met: []? Not Met: []? Adjusted  3. Patient will demonstrate an increase in Strength to 4/5 to allow for proper functional mobility as indicated by patients Functional Deficits. [x]? Progressing: []? Met: []? Not Met: []? Adjusted  4. Patient will retrieve plate from overhead shelf with her right hand without increased symptoms or restriction. [x]? Progressing: []? Met: []? Not Met: []? Adjusted  5. Wash and fix hair with right hand. []? Progressing: [x]? Met: []? Not Met: []? Adjusted         Progression Towards Functional goals:  [] Patient is progressing as expected towards functional goals listed. [x] Progression is slowed due to complexities listed.   [] Progression has been slowed due to co-morbidities. [] Plan just implemented, too soon to assess goals progression  [] Other:      ASSESSMENT: Increase pain anterior aspect right shoulder entering clinik after increase home work time on computer and \"cracking\" incident @ home w/ reaching. Pt continues to require a majority of PT session restoring AAROM of shoulder to  Prior ranges of motion. She is slow to regain her strength and functional use of her right shoulder. Overall Progression Towards Functional goals/ Treatment Progress Update:  [] Patient is progressing as expected towards functional goals listed. [x] Progression is slowed due to complexities/Impairments listed. [] Progression has been slowed due to co-morbidities. [] Plan just implemented, too soon to assess goals progression <30days   [] Goals require adjustment due to lack of progress  [] Patient is not progressing as expected and requires additional follow up with physician  [] Other    Prognosis for POC: [x] Good [] Fair  [] Poor    Patient requires continued skilled intervention: [x] Yes  [] No    Treatment/Activity Tolerance:  [x] Patient able to complete treatment  [] Patient limited by fatigue  [x] Patient limited by pain    [] Patient limited by other medical complications  [x] Other: Pt limited by post-op restrictions. PLAN: Cont PT additional 8 weeks per Dr. Fracisco Tyson orders. [x] Continue per plan of care [] Alter current plan (see comments above)  [] Plan of care initiated [] Hold pending MD visit [] Discharge    Electronically signed by:  Taylor tOto, PT  [id number]    Note: If patient does not return for scheduled/ recommended follow up visits, this note will serve as a discharge from care along with most recent update on progress.

## 2022-03-17 ENCOUNTER — HOSPITAL ENCOUNTER (OUTPATIENT)
Dept: PHYSICAL THERAPY | Age: 67
Setting detail: THERAPIES SERIES
Discharge: HOME | End: 2022-03-17
Payer: MEDICARE

## 2022-03-17 PROCEDURE — 97110 THERAPEUTIC EXERCISES: CPT

## 2022-03-17 PROCEDURE — 97140 MANUAL THERAPY 1/> REGIONS: CPT

## 2022-03-17 NOTE — FLOWSHEET NOTE
[facility], [address] [place]  Phone: [phone]  Fax [phone]    Date:  3/17/2022    Patient Name:  Leslye Neri    :  1955  MRN: [id number]  Restrictions/Precautions:   Right TTL SHLD Post-op Protocol (See MEDIA TAB),  [id number] 2021 w/ hospitalization and f/u PT 21 to 21,  OA, HTN, Osteoporosis , Back SX for Cason Rods @ age 8, Inner Ear [address] for Replacement of Ear Drum, Right Ulnar Nerve Decompression @ Elbow Aug. 12,2020, Right Carpal Tunnel Release SX Aug 25, 2020, PT Right Shoulder 2020    Medical/Treatment Diagnosis Information:  Diagnosis: M75.101 Right Shld RTC Tear & OA s/p Total SHLD SX (DOS: 2021)  Treatment Diagnosis: M25.511 Right SHLD Pain, M25.521 Right Elbow Pain,M25.611 Rigth SHLD Stiffness, M25.621 Rigth Elbow Stiffness, M62.81 Right UE Weakness , G25.89 Scapular Dyskinesis  Insurance/Certification information:  PT Insurance Information: [facility]  Physician Information:  Referring Practitioner: Dr. Devon Feldman  Has the plan of care been signed (Y/N):        [x]  Yes  []  No     Date of Patient follow up with Physician: May 13, 2020    MEDICARE CAP EXCEPTION DOCUMENTATION  I certify that this patient meets one of the below criteria necessary for becoming an exception to the Medicare cap on therapy services:    [x]  The patient has a condition identified by an ICD-10 code that has a direct and significant impact on the need for therapy. (Significantly impacts the rate of recovery.)                   []  The patient has a complexity identified by an ICD-10 code that has a direct and significant impact on the need for therapy.   (Significantly impacts the rate of recovery and is associated with a primary condition.)         []  The patient has associated variables that influence the amount of treatment to include:  Social support, self-efficacy/motivation, prognosis, time since onset/acuity. [x]  The patient has generalized musculoskeletal conditions or a condition affecting multiple sites that will have a direct impact on the rate of recovery. []  The patient had a prior episode of outpatient therapy during this calendar year for a different condition. []  The patient has a mental or cognitive disorder in addition to the condition being treated that will have a direct and significant impact on the rate of recovery. Is this a Progress Report:     []  Yes  [x]  No   If Yes:  Date Range for reporting period:  Beginning: 3-3-2022  Ending:     Progress report will be due (10 Rx or 30 days whichever is less):        5-1-6029    Recertification will be due (POC Duration  / 90 days whichever is less): May 5, 2022     Visit # Insurance Allowable Auth Required   In-person 26 30 []  Yes []  No    Telehealth   []  Yes []  No    Total        Functional Scale: Quick Dash=        18%  Date assessed:        3-    Therapy Diagnosis/Practice Pattern: 4H     Number of Comorbidities:  []0     [x]1-2    []3+    Latex Allergy:  [x]NO      []YES  Preferred Language for Healthcare:   [x]English       []other:    Pain level: 2/10 R SHLD    SUBJECTIVE: Pt states that her right shoulder has been feeling much better since last Rx    OBJECTIVE:    (11- DOS). 3-: New order received to continue PT additional 2xwk for 8 weeks.  Observation: Able to resume strengthening protocol today with good tolerance. Test measurementsAROM 74 deg flex/scapation   PROM 0-137 degrees scapation, 100 abd, Ext Rot to 45 @ 50 deg abd, and Int Rot 65 @ 50 abd  by end of RX. Wall slide scapation to 120 deg by end of RX. Minimal scap hike, but still requires manual cuing to prevent futher hike. RESTRICTIONS/PRECAUTIONS: Right TTL SHLD Post-op Protocol (See MEDIA TAB),     Exercises/Interventions:  Access Code: [email or web address]  URL: [email or web address]  Date: 01/04/2022    Access Code: [email or web address]  URL: [email or web address] [email or web address]  Date: 12/23/2021    Access Code: [email or web address]  URL: [email or web address]  Date: 12/09/2021  Therapeutic Ex (62502) Sets/sec Reps Notes/CUES   Finisher 5 min     Levator Stretch H15 x3  hep   Pulleys flex 2x10         Standing Shld Flex wall Walks x5 MC @ scap and elbow    Standing Wall Scap Matrix YTB  x10 Each MC @ scap    Trustetch SHLD Flex Stretch w/ Trunk Flexion H5 x10     Standing Wall Wipes Scapation 2x10     Wall Wipes Thread The Needle Posterior Capsule Stretch H5 2x10     Wedge on High Mat Circles @ 90 deg CW 2x10  CCW  2x10     Equip Lat Pulldown to chest 30# 2x10     And mirror      Standing Ovrer head arm Pulldowns in Trustretch RTB 2x10 MC @ shld and elbow    Standing Ext Rot elbow @ side RTB   H2 2x10     Standing Int Rot Step out stretch BTB H5 1x10     Standing Int Rot elbow @ side GTB H2  2x10     Standing Shld Ext w/ Elbow Ext GTB H2  2x10           SL Ext Rot 2# 2x10     SL abd 1# 2x10    SL Horz Abd to 90 through 40% ROM 0# 2x5 SBA-Min A    SL scap RS @ 80 deg 3x30\"     Supine Shld w/ TN Abd 2x5     SL Int Rot behind back H5 x10  +hep 1-4-2021   Supine SA 2# 2x10     Supine RS @ 90 deg 2# 3x30\"     Supine No Money YTB H2 2x10                                         Manual Intervention (64808)      Myofascial release subscap & teres minor 5'     PROM right shld & elbow in supine w/ head on wedge 20'     STM scap and shld mm 5'                 NMR re-education (92631)   CUES NEEDED                                       Therapeutic Activity (30319)                          Therapeutic Exercise and NMR EXR  [x] (38995) Provided verbal/tactile cueing for activities related to strengthening, flexibility, endurance, ROM  for improvements in scapular, scapulothoracic and UE control with self care, reaching, carrying, lifting, house/yardwork, driving/computer work.     [x] (81173) Provided verbal/tactile cueing for activities related to improving balance, coordination, kinesthetic sense, posture, motor skill, proprioception  to assist with  scapular, scapulothoracic and UE control with self care, reaching, carrying, lifting, house/yardwork, driving/computer work. Therapeutic Activities:    [] (26548 or 59436) Provided verbal/tactile cueing for activities related to improving balance, coordination, kinesthetic sense, posture, motor skill, proprioception and motor activation to allow for proper function of scapular, scapulothoracic and UE control with self care, carrying, lifting, driving/computer work.      Home Exercise Program:    [x] (70098) Reviewed/Progressed HEP activities related to strengthening, flexibility, endurance, ROM of scapular, scapulothoracic and UE control with self care, reaching, carrying, lifting, house/yardwork, driving/computer work  [] (00629) Reviewed/Progressed HEP activities related to improving balance, coordination, kinesthetic sense, posture, motor skill, proprioception of scapular, scapulothoracic and UE control with self care, reaching, carrying, lifting, house/yardwork, driving/computer work      Manual Treatments:  PROM / STM / Oscillations-Mobs:  G-I, II, III, IV (PA's, Inf., Post.)  [x] (88623) Provided manual therapy to mobilize soft tissue/joints of cervical/CT, scapular GHJ and UE for the purpose of modulating pain, promoting relaxation,  increasing ROM, reducing/eliminating soft tissue swelling/inflammation/restriction, improving soft tissue extensibility and allowing for proper ROM for normal function with self care, reaching, carrying, lifting, house/yardwork, driving/computer work    Modalities: CP to right shoulder x10 min      Charges  Timed Code Treatment Minutes: 60   Total Treatment Minutes: 70'       [] EVAL (LOW) 12233   [] EVAL (MOD) 02366   [] EVAL (HIGH) 32664   [] RE-EVAL     [x] BX(46332) x 2   [] IONTO  [] NMR (61950) x    [x] VASO  [x] Manual (97212) x  2   [x] Other:CP  [] TA x [] [facility] Traction (19684)  [] ES(attended) (51368)      [] ES (un) (80188):     GOALS  Short Term Goals: To be achieved in: 2 weeks  1. Independent in HEP and progression per patient tolerance, in order to prevent re-injury. []? Progressing: [x]? Met: []? Not Met: []? Adjusted  2. Patient will have a decrease in pain to facilitate improvement in movement, function, and ADLs as indicated by Functional Deficits. []? Progressing: [x]? Met: []? Not Met: []? Adjusted     Long Term Goals: To be achieved in: 12 weeks  1. Disability index score of 42% or less for the [facility] to assist with reaching prior level of function. []? Progressing:  [x]? Met: to 18% 3-3-22 []? Not Met: []? Adjusted  2. Patient will demonstrate increased AROM to 140 deg flexion to allow for proper joint functioning as indicated by patients Functional Deficits. [x]? Progressing:Scapation wall wipe to 120 deg , []? Met: []? Not Met: []? Adjusted  3. Patient will demonstrate an increase in Strength to 4/5 to allow for proper functional mobility as indicated by patients Functional Deficits. [x]? Progressing: []? Met: []? Not Met: []? Adjusted  4. Patient will retrieve plate from overhead shelf with her right hand without increased symptoms or restriction. [x]? Progressing: []? Met: []? Not Met: []? Adjusted  5. Wash and fix hair with right hand. []? Progressing: [x]? Met: []? Not Met: []? Adjusted         Progression Towards Functional goals:  [] Patient is progressing as expected towards functional goals listed. [x] Progression is slowed due to complexities listed. [] Progression has been slowed due to co-morbidities. [] Plan just implemented, too soon to assess goals progression  [] Other:      ASSESSMENT: Much less guarding of right shoulder today w/ decrease pain rating. Pt still very quick to fatigue w/ over head strengthening TE.   Tires @ 3 reps and requires manual assist to complete last 2 reps of set w/o compensations. Overall Progression Towards Functional goals/ Treatment Progress Update:  [] Patient is progressing as expected towards functional goals listed. [x] Progression is slowed due to complexities/Impairments listed. [] Progression has been slowed due to co-morbidities. [] Plan just implemented, too soon to assess goals progression <30days   [] Goals require adjustment due to lack of progress  [] Patient is not progressing as expected and requires additional follow up with physician  [] Other    Prognosis for POC: [x] Good [] Fair  [] Poor    Patient requires continued skilled intervention: [x] Yes  [] No    Treatment/Activity Tolerance:  [x] Patient able to complete treatment  [] Patient limited by fatigue  [x] Patient limited by pain    [] Patient limited by other medical complications  [x] Other: Pt limited by post-op restrictions. PLAN: Cont PT additional 8 weeks per Dr. Dilan Cameron orders. [x] Continue per plan of care [] Alter current plan (see comments above)  [] Plan of care initiated [] Hold pending MD visit [] Discharge    Electronically signed by:  Elizabeth Connor, PT  [id number]    Note: If patient does not return for scheduled/ recommended follow up visits, this note will serve as a discharge from care along with most recent update on progress.

## 2022-03-22 ENCOUNTER — HOSPITAL ENCOUNTER (OUTPATIENT)
Dept: PHYSICAL THERAPY | Age: 67
Setting detail: THERAPIES SERIES
End: 2022-03-22
Payer: MEDICARE

## 2022-03-24 ENCOUNTER — HOSPITAL ENCOUNTER (OUTPATIENT)
Dept: PHYSICAL THERAPY | Age: 67
Setting detail: THERAPIES SERIES
Discharge: HOME | End: 2022-03-24
Payer: MEDICARE

## 2022-03-24 PROCEDURE — 97140 MANUAL THERAPY 1/> REGIONS: CPT

## 2022-03-24 PROCEDURE — 97110 THERAPEUTIC EXERCISES: CPT

## 2022-03-24 NOTE — FLOWSHEET NOTE
[facility], [address] [place]  Phone: [phone]  Fax [phone]    Date:  3/24/2022    Patient Name:  Marie Mirza    :  1955  MRN: [id number]  Restrictions/Precautions:   Right TTL SHLD Post-op Protocol (See MEDIA TAB),  [id number] 2021 w/ hospitalization and f/u PT 21 to 21,  OA, HTN, Osteoporosis , Back SX for Cason Rods @ age 8, Inner Ear [address] for Replacement of Ear Drum, Right Ulnar Nerve Decompression @ Elbow Aug. 12,2020, Right Carpal Tunnel Release SX Aug 25, 2020, PT Right Shoulder 2020    Medical/Treatment Diagnosis Information:  Diagnosis: M75.101 Right Shld RTC Tear & OA s/p Total SHLD SX (DOS: 2021)  Treatment Diagnosis: M25.511 Right SHLD Pain, M25.521 Right Elbow Pain,M25.611 Rigth SHLD Stiffness, M25.621 Rigth Elbow Stiffness, M62.81 Right UE Weakness , G25.89 Scapular Dyskinesis  Insurance/Certification information:  PT Insurance Information: [facility]  Physician Information:  Referring Practitioner: Dr. Aleah Larios. Ilene Phillips  Has the plan of care been signed (Y/N):        [x]  Yes  []  No     Date of Patient follow up with Physician: May 13, 2020    MEDICARE CAP EXCEPTION DOCUMENTATION  I certify that this patient meets one of the below criteria necessary for becoming an exception to the Medicare cap on therapy services:    [x]  The patient has a condition identified by an ICD-10 code that has a direct and significant impact on the need for therapy. (Significantly impacts the rate of recovery.)                   []  The patient has a complexity identified by an ICD-10 code that has a direct and significant impact on the need for therapy.   (Significantly impacts the rate of recovery and is associated with a primary condition.)         []  The patient has associated variables that influence the amount of treatment to include:  Social support, self-efficacy/motivation, prognosis, time since onset/acuity. [x]  The patient has generalized musculoskeletal conditions or a condition affecting multiple sites that will have a direct impact on the rate of recovery. []  The patient had a prior episode of outpatient therapy during this calendar year for a different condition. []  The patient has a mental or cognitive disorder in addition to the condition being treated that will have a direct and significant impact on the rate of recovery. Is this a Progress Report:     []  Yes  [x]  No   If Yes:  Date Range for reporting period:  Beginning: 3-3-2022  Ending:     Progress report will be due (10 Rx or 30 days whichever is less):        [id number]    Recertification will be due (POC Duration  / 90 days whichever is less): May 5, 2022     Visit # Insurance Allowable Auth Required   In-person 26 30 []  Yes []  No    Telehealth   []  Yes []  No    Total        Functional Scale: Quick Dash=        18%  Date assessed:        3-    Therapy Diagnosis/Practice Pattern: 4H     Number of Comorbidities:  []0     [x]1-2    []3+    Latex Allergy:  [x]NO      []YES  Preferred Language for Healthcare:   [x]English       []other:    Pain level: 6/10 R SHLD    SUBJECTIVE: Patient reports increase pain in the lateral aspect of her right upper arm for the past two days. Has been away from PT for a week because her schedule was too busy with other doctor's appointments and work. OBJECTIVE: Last RX 1 week ago. (11- DOS). 3-: New order received to continue PT additional 2xwk for 8 weeks.  Observation: Significant loss in AAROM right shoulder while away form PT, even though patient reports compliance w/ HEP. Therapist has encouraged patient to purchase home pulley for doorway to help maintain right shoulder ROM gains made in clinic in between session, but she has failed to do so.    AAROM right shoulder entering clinic limited to: flex 80 deg, abd 70 deg, ER 25 deg @ 45 abd, IR 45 deg @ 45 abd. Pt has multiple trigger points in mid deltoid, sub scap/teres minor,  infraspinatus/ and levator that respond well to trigger point stim and myofascial release for increase right shoulder ROM when accompanied by gentle progressive ROM & stretching. Test measurementsBy end of RX able to restore: PROM 0-135 degrees scapation, 100 abd, Ext Rot to 45 @ 50 deg abd, and Int Rot 65 @ 50 abd  by end of RX. RESTRICTIONS/PRECAUTIONS: Right TTL SHLD Post-op Protocol (See MEDIA TAB),     Exercises/Interventions:  Access Code: [email or web address]  URL: [email or web address]  Date: 01/04/2022    Access Code: [email or web address]  URL: [email or web address]  Date: 12/23/2021    Access Code: [email or web address]  URL: [email or web address]  Date: 12/09/2021  Therapeutic Ex (83304) Sets/sec Reps Notes/CUES   Finisher 5 min     Levator Stretch H15 x3  hep   Pulleys flex 2x10     Standing Shld Flex wall Walks x5 MC @ scap and elbow    Standing Wall Scap Matrix YTB  x10 Each MC @ scap    Trustetch SHLD Flex Stretch w/ Trunk Flexion H5 x10     Standing Wall Wipes Scapation 2x10     Wall Wipes Thread The Needle Posterior Capsule Stretch H5 2x10     Wedge on High Mat Circles @ 90 deg CW 2x10  CCW  2x10     Equip Lat Pulldown to chest 35# 2x10     Trustretch Pec minor [place]  MC @ scap and trunk    Equip Triceps Pull downs 10# 2x10     Standing Ovrer head arm Pulldowns in Trustretch RTB 2x10 MC @ shld and elbow    Standing Ext Rot elbow @ side RTB   H2 2x10     Standing Int Rot Step out stretch BTB H5 1x10     Standing Int Rot elbow @ side GTB H2  2x10     Standing Shld Ext w/ Elbow Ext GTB H2  2x10           SL Ext Rot 2# 2x10     SL abd 1# 2x10    SL Horz Abd to 90 through 40% ROM 0# 2x5 SBA-Min A    SL scap RS @ 80 deg 3x30\"     Supine Shld w/ WA Abd 2x5     SL Int Rot behind back H5 x10  +hep 1-4-2021   Supine SA 2# 2x10     Supine RS @ 90 deg 2# 3x30\"     Supine No Money YTB H2 2x10 Manual Intervention (39361)      Myofascial release subscap & teres minor 5'     PROM right shld & elbow in supine w/ head on wedge 20'     STM scap and shld mm 5'                 NMR re-education (71060)   CUES NEEDED                                       Therapeutic Activity (18043)                          Therapeutic Exercise and NMR EXR  [x] (19350) Provided verbal/tactile cueing for activities related to strengthening, flexibility, endurance, ROM  for improvements in scapular, scapulothoracic and UE control with self care, reaching, carrying, lifting, house/yardwork, driving/computer work. [x] (65275) Provided verbal/tactile cueing for activities related to improving balance, coordination, kinesthetic sense, posture, motor skill, proprioception  to assist with  scapular, scapulothoracic and UE control with self care, reaching, carrying, lifting, house/yardwork, driving/computer work. Therapeutic Activities:    [] (98496 or 11302) Provided verbal/tactile cueing for activities related to improving balance, coordination, kinesthetic sense, posture, motor skill, proprioception and motor activation to allow for proper function of scapular, scapulothoracic and UE control with self care, carrying, lifting, driving/computer work.      Home Exercise Program:    [x] (08833) Reviewed/Progressed HEP activities related to strengthening, flexibility, endurance, ROM of scapular, scapulothoracic and UE control with self care, reaching, carrying, lifting, house/yardwork, driving/computer work  [] (60817) Reviewed/Progressed HEP activities related to improving balance, coordination, kinesthetic sense, posture, motor skill, proprioception of scapular, scapulothoracic and UE control with self care, reaching, carrying, lifting, house/yardwork, driving/computer work      Manual Treatments:  PROM / STM / Oscillations-Mobs:  G-I, II, III, IV (PA's, Inf., Post.)  [x] (09892) Provided manual therapy to mobilize soft tissue/joints of cervical/CT, scapular GHJ and UE for the purpose of modulating pain, promoting relaxation,  increasing ROM, reducing/eliminating soft tissue swelling/inflammation/restriction, improving soft tissue extensibility and allowing for proper ROM for normal function with self care, reaching, carrying, lifting, house/yardwork, driving/computer work    Modalities: CP to right shoulder x10 min      Charges  Timed Code Treatment Minutes: 60   Total Treatment Minutes: 70'       [] EVAL (LOW) 23931   [] EVAL (MOD) 60146   [] EVAL (HIGH) 04422   [] RE-EVAL     [x] JW(25500) x 2   [] IONTO  [] NMR (47890) x    [x] VASO  [x] Manual (66487) x  2   [x] Other:CP  [] TA x      [] Mech Traction (70219)  [] ES(attended) (95489)      [] ES (un) (30441):     GOALS  Short Term Goals: To be achieved in: 2 weeks  1. Independent in HEP and progression per patient tolerance, in order to prevent re-injury. []? Progressing: [x]? Met: []? Not Met: []? Adjusted  2. Patient will have a decrease in pain to facilitate improvement in movement, function, and ADLs as indicated by Functional Deficits. []? Progressing: [x]? Met: []? Not Met: []? Adjusted     Long Term Goals: To be achieved in: 12 weeks  1. Disability index score of 42% or less for the [facility] to assist with reaching prior level of function. []? Progressing:  [x]? Met: to 18% 3-3-22 []? Not Met: []? Adjusted  2. Patient will demonstrate increased AROM to 140 deg flexion to allow for proper joint functioning as indicated by patients Functional Deficits. [x]? Progressing:Scapation wall wipe to 120 deg , []? Met: []? Not Met: []? Adjusted  3. Patient will demonstrate an increase in Strength to 4/5 to allow for proper functional mobility as indicated by patients Functional Deficits. [x]? Progressing: []? Met: []? Not Met: []? Adjusted  4. Patient will retrieve plate from overhead shelf with her right hand without increased symptoms or restriction. [x]?  Progressing: []? Met: []? Not Met: []? Adjusted  5. Wash and fix hair with right hand. []? Progressing: [x]? Met: []? Not Met: []? Adjusted         Progression Towards Functional goals:  [] Patient is progressing as expected towards functional goals listed. [x] Progression is slowed due to complexities listed. [] Progression has been slowed due to co-morbidities. [] Plan just implemented, too soon to assess goals progression  [] Other:      ASSESSMENT:Loss of right shoulder ROM and insignificant increase right shoulder pain while away form PT for one week. Pt required extra MT to restore ROM to prior levels. Overall Progression Towards Functional goals/ Treatment Progress Update:  [] Patient is progressing as expected towards functional goals listed. [x] Progression is slowed due to complexities/Impairments listed. [] Progression has been slowed due to co-morbidities. [] Plan just implemented, too soon to assess goals progression <30days   [] Goals require adjustment due to lack of progress  [] Patient is not progressing as expected and requires additional follow up with physician  [] Other    Prognosis for POC: [x] Good [] Fair  [] Poor    Patient requires continued skilled intervention: [x] Yes  [] No    Treatment/Activity Tolerance:  [x] Patient able to complete treatment  [] Patient limited by fatigue  [x] Patient limited by pain    [] Patient limited by other medical complications  [x] Other: Pt limited by post-op restrictions. PLAN: Resume PT 2xwk  [x] Continue per plan of care [] Alter current plan (see comments above)  [] Plan of care initiated [] Hold pending MD visit [] Discharge    Electronically signed by:  David Meier, PT  [id number]    Note: If patient does not return for scheduled/ recommended follow up visits, this note will serve as a discharge from care along with most recent update on progress.

## 2022-03-29 ENCOUNTER — HOSPITAL ENCOUNTER (OUTPATIENT)
Dept: PHYSICAL THERAPY | Age: 67
Setting detail: THERAPIES SERIES
Discharge: HOME | End: 2022-03-29
Payer: MEDICARE

## 2022-03-29 PROCEDURE — 97110 THERAPEUTIC EXERCISES: CPT

## 2022-03-29 PROCEDURE — 97140 MANUAL THERAPY 1/> REGIONS: CPT

## 2022-03-29 NOTE — FLOWSHEET NOTE
[facility],  [address] [place]  Phone: [phone]  Fax [phone]    Date:  3/29/2022    Patient Name:  Kori Luna    :  1955  MRN: [id number]  Restrictions/Precautions:   Right TTL SHLD Post-op Protocol (See MEDIA TAB),  [id number]-88 2021 w/ hospitalization and f/u PT 21 to 21,  OA, HTN, Osteoporosis , Back SX for Cason Rods @ age 8, Inner Ear [address] for Replacement of Ear Drum, Right Ulnar Nerve Decompression @ Elbow Aug. 12,2020, Right Carpal Tunnel Release SX Aug 25, 2020, PT Right Shoulder 2020    Medical/Treatment Diagnosis Information:  Diagnosis: M75.101 Right Shld RTC Tear & OA s/p Total SHLD SX (DOS: 2021)  Treatment Diagnosis: M25.511 Right SHLD Pain, M25.521 Right Elbow Pain,M25.611 Rigth SHLD Stiffness, M25.621 Rigth Elbow Stiffness, M62.81 Right UE Weakness , G25.89 Scapular Dyskinesis  Insurance/Certification information:  PT Insurance Information: [facility]  Physician Information:  Referring Practitioner: Dr. Isael Vargas. Olga Frazier  Has the plan of care been signed (Y/N):        [x]  Yes  []  No     Date of Patient follow up with Physician: May 13, 2020    MEDICARE CAP EXCEPTION DOCUMENTATION  I certify that this patient meets one of the below criteria necessary for becoming an exception to the Medicare cap on therapy services:    [x]  The patient has a condition identified by an ICD-10 code that has a direct and significant impact on the need for therapy. (Significantly impacts the rate of recovery.)                   []  The patient has a complexity identified by an ICD-10 code that has a direct and significant impact on the need for therapy.   (Significantly impacts the rate of recovery and is associated with a primary condition.)         []  The patient has associated variables that influence the amount of treatment to include:  Social support, self-efficacy/motivation, prognosis, time since onset/acuity. [x]  The patient has generalized musculoskeletal conditions or a condition affecting multiple sites that will have a direct impact on the rate of recovery. []  The patient had a prior episode of outpatient therapy during this calendar year for a different condition. []  The patient has a mental or cognitive disorder in addition to the condition being treated that will have a direct and significant impact on the rate of recovery. Is this a Progress Report:     []  Yes  [x]  No   If Yes:  Date Range for reporting period:  Beginning: 3-3-2022  Ending:     Progress report will be due (10 Rx or 30 days whichever is less):        [id number]    Recertification will be due (POC Duration  / 90 days whichever is less): May 5, 2022     Visit # Insurance Allowable Auth Required   In-person 26 30 []  Yes []  No    Telehealth   []  Yes []  No    Total        Functional Scale: Quick Dash=        18%  Date assessed:        3-    Therapy Diagnosis/Practice Pattern: 4H     Number of Comorbidities:  []0     [x]1-2    []3+    Latex Allergy:  [x]NO      []YES  Preferred Language for Healthcare:   [x]English       []other:    Pain level:  2-3/10 R SHLD    SUBJECTIVE: Patient states that her right shoulder has been feeling a lot better since LV, but that it did hurt some on Sunday evening and made it difficult for her to sleep. Patient states that she has finally ordered an overhead shoulder pulley to use at home but has not received it yet. Pt states that she is compliant with her HEP, however, she continually returns to clinic with significant loss of AAROM. OBJECTIVE: Last RX 1 week ago. (11- DOS). 3-: New order received to continue PT additional 2xwk for 8 weeks.    Observation: Pt continues to come into clinic w/ her right shoulder AAROM limited to about 80-90 deg, however therapist was able to restore her AAROM back to 135 deg today without additional MT. Test measurementsBy end of RX able to restore: PROM 0-135 degrees scapation, 100 abd, Ext Rot to 45 @ 50 deg abd, and Int Rot 65 @ 50 abd  by end of RX. RESTRICTIONS/PRECAUTIONS: Right TTL SHLD Post-op Protocol (See MEDIA TAB),     Exercises/Interventions:  Access Code: [email or web address]  URL: [email or web address]  Date: 01/04/2022    Access Code: [email or web address]  URL: [email or web address]  Date: 12/23/2021    Access Code: [email or web address]  URL: [email or web address]  Date: 12/09/2021  Therapeutic Ex (12912) Sets/sec Reps Notes/CUES   Finisher 5 min     Levator Stretch H15 x3  hep   Pulleys flex 2x10     High Mat Plank w/ shld horz abd and trunk rotation YTB 2x10     Standing Shld Flex wall Walks x5 MC @ scap and elbow    Standing Wall Scap Matrix YTB  x10 Each MC @ scap    Trustetch SHLD Flex Stretch w/ Trunk Flexion H5 x10         Wall Wipes Thread The Needle Posterior Capsule Stretch H5 2x10     Wedge on High Mat Circles @ 90 deg CW 2x10  CCW  2x10     Equip Lat Pulldown to chest 35# 2x10     Trustretch Pec minor [place]  MC @ scap and trunk    Equip Triceps Pull downs 10# 2x10     Standing Ovrer head arm Pulldowns in Trustretch RTB 2x10 MC @ shld and elbow    Standing Ext Rot elbow @ side RTB   H2 2x10 MC @ trunk to stop trunk rotation    Standing Int Rot Step out stretch BTB H5 2x10 MC @ trunk to stop trunk rotation    Standing Int Rot elbow @ side GTB H2  2x10     Standing Shld Ext w/ Elbow Ext GTB H2  2x10           SL Ext Rot 2# 2x10 SBA-Rosales    SL abd 1# [id number]    SL Horz Abd to 90 through 40% ROM 0# 2x5 SBA-Rosales Had to increase rest breaks today       Supine Shld w/ SD Abd 2x5     SL Int Rot behind back H5 x10  +hep 1-4-2021   Supine SA 2# 2x10                                                 Manual Intervention (84574)      Myofascial release subscap & teres minor 1'     PROM right shld & elbow in supine w/ head on wedge 12'     STM scap and shld mm 3'                 NMR re-education (21848)   CUES NEEDED                                       Therapeutic Activity (55099)                          Therapeutic Exercise and NMR EXR  [x] (52046) Provided verbal/tactile cueing for activities related to strengthening, flexibility, endurance, ROM  for improvements in scapular, scapulothoracic and UE control with self care, reaching, carrying, lifting, house/yardwork, driving/computer work. [x] (50041) Provided verbal/tactile cueing for activities related to improving balance, coordination, kinesthetic sense, posture, motor skill, proprioception  to assist with  scapular, scapulothoracic and UE control with self care, reaching, carrying, lifting, house/yardwork, driving/computer work. Therapeutic Activities:    [] (94765 or 44470) Provided verbal/tactile cueing for activities related to improving balance, coordination, kinesthetic sense, posture, motor skill, proprioception and motor activation to allow for proper function of scapular, scapulothoracic and UE control with self care, carrying, lifting, driving/computer work.      Home Exercise Program:    [x] (20152) Reviewed/Progressed HEP activities related to strengthening, flexibility, endurance, ROM of scapular, scapulothoracic and UE control with self care, reaching, carrying, lifting, house/yardwork, driving/computer work  [] (28511) Reviewed/Progressed HEP activities related to improving balance, coordination, kinesthetic sense, posture, motor skill, proprioception of scapular, scapulothoracic and UE control with self care, reaching, carrying, lifting, house/yardwork, driving/computer work      Manual Treatments:  PROM / STM / Oscillations-Mobs:  G-I, II, III, IV (PA's, Inf., Post.)  [x] (78122) Provided manual therapy to mobilize soft tissue/joints of cervical/CT, scapular GHJ and UE for the purpose of modulating pain, promoting relaxation,  increasing ROM, reducing/eliminating soft tissue swelling/inflammation/restriction, improving soft functional goals listed. [x] Progression is slowed due to complexities listed. [] Progression has been slowed due to co-morbidities. [] Plan just implemented, too soon to assess goals progression  [] Other:      ASSESSMENT:Pt still not able to maintain right shoulder ROM gains made in clinic. Therapist has recommended that she purchase overhead pulley to assist her with this. Overall Progression Towards Functional goals/ Treatment Progress Update:  [] Patient is progressing as expected towards functional goals listed. [x] Progression is slowed due to complexities/Impairments listed. [] Progression has been slowed due to co-morbidities. [] Plan just implemented, too soon to assess goals progression <30days   [] Goals require adjustment due to lack of progress  [] Patient is not progressing as expected and requires additional follow up with physician  [] Other    Prognosis for POC: [x] Good [] Fair  [] Poor    Patient requires continued skilled intervention: [x] Yes  [] No    Treatment/Activity Tolerance:  [x] Patient able to complete treatment  [] Patient limited by fatigue  [x] Patient limited by pain    [] Patient limited by other medical complications  [x] Other: Pt limited by post-op restrictions. PLAN: Resume PT 2xwk  [x] Continue per plan of care [] Alter current plan (see comments above)  [] Plan of care initiated [] Hold pending MD visit [] Discharge    Electronically signed by:  Deondre Donis, PT  [id number]    Note: If patient does not return for scheduled/ recommended follow up visits, this note will serve as a discharge from care along with most recent update on progress.

## 2022-03-31 ENCOUNTER — HOSPITAL ENCOUNTER (OUTPATIENT)
Dept: PHYSICAL THERAPY | Age: 67
Setting detail: THERAPIES SERIES
Discharge: HOME | End: 2022-03-31
Payer: MEDICARE

## 2022-03-31 PROCEDURE — 97140 MANUAL THERAPY 1/> REGIONS: CPT

## 2022-03-31 PROCEDURE — 97110 THERAPEUTIC EXERCISES: CPT

## 2022-03-31 NOTE — FLOWSHEET NOTE
[facility], [address] [place]  Phone: [phone]  Fax [phone]    Date:  3/31/2022    Patient Name:  Raheel Gutierrez    :  1955  MRN: [id number]  Restrictions/Precautions:   Right TTL SHLD Post-op Protocol (See MEDIA TAB),  [id number] 2021 w/ hospitalization and f/u PT 21 to 21,  OA, HTN, Osteoporosis , Back SX for Cason Rods @ age 8, Inner Ear [address] for Replacement of Ear Drum, Right Ulnar Nerve Decompression @ Elbow Aug. 12,2020, Right Carpal Tunnel Release SX Aug 25, 2020, PT Right Shoulder 2020    Medical/Treatment Diagnosis Information:  Diagnosis: M75.101 Right Shld RTC Tear & OA s/p Total SHLD SX (DOS: 2021)  Treatment Diagnosis: M25.511 Right SHLD Pain, M25.521 Right Elbow Pain,M25.611 Rigth SHLD Stiffness, M25.621 Rigth Elbow Stiffness, M62.81 Right UE Weakness , G25.89 Scapular Dyskinesis  Insurance/Certification information:  PT Insurance Information: [facility]  Physician Information:  Referring Practitioner: Dr. Florin Alamo. Cammie Trejo  Has the plan of care been signed (Y/N):        [x]  Yes  []  No     Date of Patient follow up with Physician: May 13, 2020    MEDICARE CAP EXCEPTION DOCUMENTATION  I certify that this patient meets one of the below criteria necessary for becoming an exception to the Medicare cap on therapy services:    [x]  The patient has a condition identified by an ICD-10 code that has a direct and significant impact on the need for therapy. (Significantly impacts the rate of recovery.)                   []  The patient has a complexity identified by an ICD-10 code that has a direct and significant impact on the need for therapy.   (Significantly impacts the rate of recovery and is associated with a primary condition.)         []  The patient has associated variables that influence the amount of treatment to include:  Social support, self-efficacy/motivation, prognosis, time since onset/acuity. [x]  The patient has generalized musculoskeletal conditions or a condition affecting multiple sites that will have a direct impact on the rate of recovery. []  The patient had a prior episode of outpatient therapy during this calendar year for a different condition. []  The patient has a mental or cognitive disorder in addition to the condition being treated that will have a direct and significant impact on the rate of recovery. Is this a Progress Report:     []  Yes  [x]  No   If Yes:  Date Range for reporting period:  Beginning: 3-3-2022  Ending:     Progress report will be due (10 Rx or 30 days whichever is less):        [id number]    Recertification will be due (POC Duration  / 90 days whichever is less): May 5, 2022     Visit # Insurance Allowable Auth Required   In-person 26 30 []  Yes []  No    Telehealth   []  Yes []  No    Total        Functional Scale: Quick Dash=        18%  Date assessed:        3-    Therapy Diagnosis/Practice Pattern: 4H     Number of Comorbidities:  []0     [x]1-2    []3+    Latex Allergy:  [x]NO      []YES  Preferred Language for Healthcare:   [x]English       []other:    Pain level:  0-3/10 R SHLD    SUBJECTIVE: No longer having any discomfort in anterior aspect of right shoulder, but now having lateral upper arm pain when she attempts to raise her right hand over head. Has received her UE pulley, but still has not used it. Will start using it this weekend to help maintain ROM gains made in clinic. OBJECTIVE:.  Observation: Mod resistance @ end ranges of motion. Test measurementsBy end of RX able to restore: PROM 0-135 degrees scapation, 100 abd, Ext Rot to 45 @ 50 deg abd, and Int Rot 65 @ 50 abd  by end of RX. RESTRICTIONS/PRECAUTIONS: Right TTL SHLD Post-op Protocol (See MEDIA TAB),     Exercises/Interventions:  Access Code: [email or web address]  URL: [email or web address] com/  Date: 01/04/2022    Access Code: [email or web address]  URL: [email or web address]. com/  Date: 12/23/2021    Access Code: [email or web address]  URL: [email or web address]/  Date: 12/09/2021  Therapeutic Ex (01454) Sets/sec Reps Notes/CUES   Finisher 5 min     Levator Stretch H15 x3  hep   Pulleys flex 2x10     High Mat Plank w/ shld horz abd and trunk rotation YTB 2x10     Standiing SHLD Flex w/ light resist wall slides YTB  4x5     Standing Shld Flex wall slides 4x5 MC @ scap and elbow    Standing Wall Scap Matrix YTB  x10 Each MC @ scap    Trustetch SHLD Flex Stretch w/ Trunk Flexion H5 x10         Wall Wipes Thread The Needle Posterior Capsule Stretch H5 2x10     Wedge on High Mat Circles @ 90 deg CW 2x10  CCW  2x10     Equip Lat Pulldown to chest 35# 2x10     Trustretch Pec minor [place]ocks  MC @ scap and trunk    Equip Triceps Pull downs 10# 2x10     Standing Ovrer head arm Pulldowns in Trustretch RTB 2x10 MC @ shld and elbow    Standing Ext Rot elbow @ side RTB   H2 2x10 MC @ trunk to stop trunk rotation    Standing Int Rot Step out stretch BTB H5 2x10 MC @ trunk to stop trunk rotation    Standing Int Rot elbow @ side GTB H2  2x10     Standing Shld Ext w/ Elbow Ext GTB H2  2x10           SL Ext Rot 1# 3x10 SBA-Rosales    SL abd 1# [id number]    SL Horz Abd to 90 through 40% ROM 0# 2x5 SBA-Rosales Had to increase rest breaks today       Supine Shld w/ DE Abd 2x5     SL Int Rot behind back H5 x10  +hep 1-4-2021   Supine SA 2# 2x10                                                 Manual Intervention (30210)      Myofascial release subscap & teres minor 5''     PROM right shld & elbow in supine w/ head on wedge 15'     STM scap and shld mm 3'                 NMR re-education (24444)   CUES NEEDED                                       Therapeutic Activity (74606)                          Therapeutic Exercise and NMR EXR  [x] (92492) Provided verbal/tactile cueing for activities related to strengthening, flexibility, endurance, ROM  for improvements in scapular, scapulothoracic and UE control with self care, reaching, carrying, lifting, house/yardwork, driving/computer work. [x] (79412) Provided verbal/tactile cueing for activities related to improving balance, coordination, kinesthetic sense, posture, motor skill, proprioception  to assist with  scapular, scapulothoracic and UE control with self care, reaching, carrying, lifting, house/yardwork, driving/computer work. Therapeutic Activities:    [] (28010 or 53527) Provided verbal/tactile cueing for activities related to improving balance, coordination, kinesthetic sense, posture, motor skill, proprioception and motor activation to allow for proper function of scapular, scapulothoracic and UE control with self care, carrying, lifting, driving/computer work.      Home Exercise Program:    [x] (96083) Reviewed/Progressed HEP activities related to strengthening, flexibility, endurance, ROM of scapular, scapulothoracic and UE control with self care, reaching, carrying, lifting, house/yardwork, driving/computer work  [] (73337) Reviewed/Progressed HEP activities related to improving balance, coordination, kinesthetic sense, posture, motor skill, proprioception of scapular, scapulothoracic and UE control with self care, reaching, carrying, lifting, house/yardwork, driving/computer work      Manual Treatments:  PROM / STM / Oscillations-Mobs:  G-I, II, III, IV (PA's, Inf., Post.)  [x] (29175) Provided manual therapy to mobilize soft tissue/joints of cervical/CT, scapular GHJ and UE for the purpose of modulating pain, promoting relaxation,  increasing ROM, reducing/eliminating soft tissue swelling/inflammation/restriction, improving soft tissue extensibility and allowing for proper ROM for normal function with self care, reaching, carrying, lifting, house/yardwork, driving/computer work    Modalities:   Declined     Charges  Timed Code Treatment Minutes: 60   Total Treatment Minutes: 65'       [] TRISTAN (LOW) 455 1011   [] TRISTAN (MOD) 53087   [] EVAL (HIGH) 53577   [] RE-EVAL     [x] HZ(81787) x 2   [] IONTO  [] NMR (22929) x    [] VASO  [x] Manual (41955) x  2   [] Other:CP  [] TA x      [] Mech Traction (45895)  [] ES(attended) (02974)      [] ES (un) (47680):     GOALS  Short Term Goals: To be achieved in: 2 weeks  1. Independent in HEP and progression per patient tolerance, in order to prevent re-injury. []? Progressing: [x]? Met: []? Not Met: []? Adjusted  2. Patient will have a decrease in pain to facilitate improvement in movement, function, and ADLs as indicated by Functional Deficits. []? Progressing: [x]? Met: []? Not Met: []? Adjusted     Long Term Goals: To be achieved in: 12 weeks  1. Disability index score of 42% or less for the [facility] to assist with reaching prior level of function. []? Progressing:  [x]? Met: to 18% 3-3-22 []? Not Met: []? Adjusted  2. Patient will demonstrate increased AROM to 140 deg flexion to allow for proper joint functioning as indicated by patients Functional Deficits. [x]? Progressing:Scapation wall wipe to 120 deg , []? Met: []? Not Met: []? Adjusted  3. Patient will demonstrate an increase in Strength to 4/5 to allow for proper functional mobility as indicated by patients Functional Deficits. [x]? Progressing: []? Met: []? Not Met: []? Adjusted  4. Patient will retrieve plate from overhead shelf with her right hand without increased symptoms or restriction. [x]? Progressing: []? Met: []? Not Met: []? Adjusted  5. Wash and fix hair with right hand. []? Progressing: [x]? Met: []? Not Met: []? Adjusted         Progression Towards Functional goals:  [] Patient is progressing as expected towards functional goals listed. [x] Progression is slowed due to complexities listed. [] Progression has been slowed due to co-morbidities.   [] Plan just implemented, too soon to assess goals progression  [] Other:      ASSESSMENT:Pt still not able to maintain right shoulder ROM gains made in clinic. Requires 60+ min sessions to return right shoulder ROM to prior RX levels. Overall Progression Towards Functional goals/ Treatment Progress Update:  [] Patient is progressing as expected towards functional goals listed. [x] Progression is slowed due to complexities/Impairments listed. [] Progression has been slowed due to co-morbidities. [] Plan just implemented, too soon to assess goals progression <30days   [] Goals require adjustment due to lack of progress  [] Patient is not progressing as expected and requires additional follow up with physician  [] Other    Prognosis for POC: [x] Good [] Fair  [] Poor    Patient requires continued skilled intervention: [x] Yes  [] No    Treatment/Activity Tolerance:  [x] Patient able to complete treatment  [] Patient limited by fatigue  [x] Patient limited by pain    [] Patient limited by other medical complications  [] Other:     PLAN:Cont 2xwk  [x] Continue per plan of care [] Alter current plan (see comments above)  [] Plan of care initiated [] Hold pending MD visit [] Discharge    Electronically signed by:  Mathew Haywood, PT  [id number]    Note: If patient does not return for scheduled/ recommended follow up visits, this note will serve as a discharge from care along with most recent update on progress.

## 2022-04-05 ENCOUNTER — HOSPITAL ENCOUNTER (OUTPATIENT)
Dept: PHYSICAL THERAPY | Age: 67
Setting detail: THERAPIES SERIES
Discharge: HOME | End: 2022-04-05
Payer: MEDICARE

## 2022-04-05 PROCEDURE — 97140 MANUAL THERAPY 1/> REGIONS: CPT

## 2022-04-05 PROCEDURE — 97110 THERAPEUTIC EXERCISES: CPT

## 2022-04-05 NOTE — FLOWSHEET NOTE
[facility], [address] [place]  Phone: [phone]  Fax [phone]    Date:  2022    Patient Name:  Ellie [facility]    :  1955  MRN: [id number]  Restrictions/Precautions:   Right TTL SHLD Post-op Protocol (See MEDIA TAB),  [id number] 2021 w/ hospitalization and f/u PT 21 to 21,  OA, HTN, Osteoporosis , Back SX for Cason Rods @ age 8, Inner Ear [address] for Replacement of Ear Drum, Right Ulnar Nerve Decompression @ Elbow Aug. 12,2020, Right Carpal Tunnel Release SX Aug 25, 2020, PT Right Shoulder 2020    Medical/Treatment Diagnosis Information:  Diagnosis: M75.101 Right Shld RTC Tear & OA s/p Total SHLD SX (DOS: 2021)  Treatment Diagnosis: M25.511 Right SHLD Pain, M25.521 Right Elbow Pain,M25.611 Rigth SHLD Stiffness, M25.621 Rigth Elbow Stiffness, M62.81 Right UE Weakness , G25.89 Scapular Dyskinesis  Insurance/Certification information:  PT Insurance Information: [facility]  Physician Information:  Referring Practitioner: Dr. Jacqui Bautista. Alan Haley  Has the plan of care been signed (Y/N):        [x]  Yes  []  No     Date of Patient follow up with Physician: May 13, 2020    MEDICARE CAP EXCEPTION DOCUMENTATION  I certify that this patient meets one of the below criteria necessary for becoming an exception to the Medicare cap on therapy services:    [x]  The patient has a condition identified by an ICD-10 code that has a direct and significant impact on the need for therapy. (Significantly impacts the rate of recovery.)                   []  The patient has a complexity identified by an ICD-10 code that has a direct and significant impact on the need for therapy.   (Significantly impacts the rate of recovery and is associated with a primary condition.)         []  The patient has associated variables that influence the amount of treatment to include:  Social support, self-efficacy/motivation, prognosis, time since onset/acuity. [x]  The patient has generalized musculoskeletal conditions or a condition affecting multiple sites that will have a direct impact on the rate of recovery. []  The patient had a prior episode of outpatient therapy during this calendar year for a different condition. []  The patient has a mental or cognitive disorder in addition to the condition being treated that will have a direct and significant impact on the rate of recovery. Is this a Progress Report:     [x]  Yes  []  No   If Yes:  Date Range for reporting period:  Beginning: 3-3-2022  Endin-3-2022    Progress report will be due (10 Rx or 30 days whichever is less):           POC 8-3-4227    Recertification will be due (POC Duration  / 90 days whichever is less): May 5, 2022     Visit # Insurance Allowable Auth Required   In-person  []  Yes []  No    Telehealth   []  Yes []  No    Total        Functional Scale: Quick Dash=       18%     Date assessed:          2022    Therapy Diagnosis/Practice Pattern: 4H     Number of Comorbidities:  []0     [x]1-2    []3+    Latex Allergy:  [x]NO      []YES  Preferred Language for Healthcare:   [x]English       []other:    Pain level:  0-3/10 R SHLD    SUBJECTIVE:. Has begun using overhead pulley @ home on occasion. OBJECTIVE: See Prog Note.  Observation: Mod resistance @ end ranges of motion. Test measurementsBy end of RX able to restore: PROM 0-135 degrees scapation, 100 abd, Ext Rot to 45 @ 50 deg abd, and Int Rot 65 @ 50 abd  by end of RX. RESTRICTIONS/PRECAUTIONS: Right TTL SHLD Post-op Protocol (See MEDIA TAB),     Exercises/Interventions:  Access Code: [email or web address]  URL: [email or web address] com/  Date: 2022    Access Code: [email or web address]  URL: [employer]/  Date: 2021    Access Code: [email or web address]  URL: [email or web address] com/  Date: 2021  Therapeutic Ex (87937) Sets/sec Reps Notes/CUES   Finisher 5 min Levator Stretch H15 x3  hep   Pulleys flex 2x10     High Mat Plank w/ shld horz abd and trunk rotation YTB 2x10     Standiing SHLD Flex w/ light resist wall slides YTB  2x10 MC       Standing Wall Scap Matrix YTB  x10 Each MC @ scap    Trustetch SHLD Flex Stretch w/ Trunk Flexion H5 x10         Wall Wipes Thread The Needle Posterior Capsule Stretch H5 2x10     Wall Circles @ 90 deg CW 2x10  CCW  2x10     Equip Lat Pulldown to chest 35# 2x10     Trustretch Pec minor [place] H10x4 MC @ scap and trunk                      SL Ext Rot 2# 2x10 SBA-Rosales    SL abd 1# [id number]    SL Horz Abd to 90  1# 4x5 SBA-Rosales Had to increase rest breaks today   Supine Shld w/ ND Abd 2x5     SL Int Rot behind back H5 x10  +hep 1-4-2021   Supine SA 3# 2x10             Supine Flex 2# 2x10 Min A    Supine Flies 3# 2x10 SBA                            Manual Intervention (43224)      Myofascial release subscap & teres minor 5''     PROM right shld & elbow in supine w/ head on wedge 15'     STM scap and shld mm 3'                 NMR re-education (35983)   CUES NEEDED                                       Therapeutic Activity (91583)                          Therapeutic Exercise and NMR EXR  [x] (86093) Provided verbal/tactile cueing for activities related to strengthening, flexibility, endurance, ROM  for improvements in scapular, scapulothoracic and UE control with self care, reaching, carrying, lifting, house/yardwork, driving/computer work. [x] (02001) Provided verbal/tactile cueing for activities related to improving balance, coordination, kinesthetic sense, posture, motor skill, proprioception  to assist with  scapular, scapulothoracic and UE control with self care, reaching, carrying, lifting, house/yardwork, driving/computer work.     Therapeutic Activities:    [] (86219 or 78925) Provided verbal/tactile cueing for activities related to improving balance, coordination, kinesthetic sense, posture, motor skill, proprioception and motor activation to allow for proper function of scapular, scapulothoracic and UE control with self care, carrying, lifting, driving/computer work. Home Exercise Program:    [x] (24693) Reviewed/Progressed HEP activities related to strengthening, flexibility, endurance, ROM of scapular, scapulothoracic and UE control with self care, reaching, carrying, lifting, house/yardwork, driving/computer work  [] (41227) Reviewed/Progressed HEP activities related to improving balance, coordination, kinesthetic sense, posture, motor skill, proprioception of scapular, scapulothoracic and UE control with self care, reaching, carrying, lifting, house/yardwork, driving/computer work      Manual Treatments:  PROM / STM / Oscillations-Mobs:  G-I, II, III, IV (PA's, Inf., Post.)  [x] (72706) Provided manual therapy to mobilize soft tissue/joints of cervical/CT, scapular GHJ and UE for the purpose of modulating pain, promoting relaxation,  increasing ROM, reducing/eliminating soft tissue swelling/inflammation/restriction, improving soft tissue extensibility and allowing for proper ROM for normal function with self care, reaching, carrying, lifting, house/yardwork, driving/computer work    Modalities: CP to right shoulder x10 min    Charges  Timed Code Treatment Minutes: 55   Total Treatment Minutes: 72'       [] EVAL (LOW) 63156   [] EVAL (MOD) 66889   [] EVAL (HIGH) 65926   [] RE-EVAL     [x] QV(37874) x 2   [] IONTO  [] NMR (97651) x    [] VASO  [x] Manual (45893) x  2   [] Other:CP  [] TA x      [] Mech Traction (64895)  [] ES(attended) (42757)      [] ES (un) (42186):     GOALS  Short Term Goals: To be achieved in: 2 weeks  1. Independent in HEP and progression per patient tolerance, in order to prevent re-injury. []? Progressing: [x]? Met: []? Not Met: []? Adjusted  2. Patient will have a decrease in pain to facilitate improvement in movement, function, and ADLs as indicated by Functional Deficits. []? Progressing: [x]? <30days   [] Goals require adjustment due to lack of progress  [] Patient is not progressing as expected and requires additional follow up with physician  [] Other    Prognosis for POC: [x] Good [] Fair  [] Poor    Patient requires continued skilled intervention: [x] Yes  [] No    Treatment/Activity Tolerance:  [x] Patient able to complete treatment  [] Patient limited by fatigue  [x] Patient limited by pain    [] Patient limited by other medical complications  [] Other:     PLAN:Cont 2xwk  [x] Continue per plan of care [] Alter current plan (see comments above)  [] Plan of care initiated [] Hold pending MD visit [] Discharge    Electronically signed by:  Jarod Cam, PT  [id number]    Note: If patient does not return for scheduled/ recommended follow up visits, this note will serve as a discharge from care along with most recent update on progress.

## 2022-04-07 ENCOUNTER — HOSPITAL ENCOUNTER (OUTPATIENT)
Dept: PHYSICAL THERAPY | Age: 67
Setting detail: THERAPIES SERIES
Discharge: HOME | End: 2022-04-07
Payer: MEDICARE

## 2022-04-07 PROCEDURE — 97140 MANUAL THERAPY 1/> REGIONS: CPT

## 2022-04-07 PROCEDURE — 97110 THERAPEUTIC EXERCISES: CPT

## 2022-04-07 NOTE — FLOWSHEET NOTE
[facility], [address] [place]  Phone: [phone]  Fax [phone]    Date:  2022    Patient Name:  Phoenix Olmstead    :  1955  MRN: [id number]  Restrictions/Precautions:   Right TTL SHLD Post-op Protocol (See MEDIA TAB),  [id number] 2021 w/ hospitalization and f/u PT 21 to 21,  OA, HTN, Osteoporosis , Back SX for Cason Rods @ age 8, Inner Ear [address] for Replacement of Ear Drum, Right Ulnar Nerve Decompression @ Elbow Aug. 12,2020, Right Carpal Tunnel Release SX Aug 25, 2020, PT Right Shoulder 2020    Medical/Treatment Diagnosis Information:  Diagnosis: M75.101 Right Shld RTC Tear & OA s/p Total SHLD SX (DOS: 2021)  Treatment Diagnosis: M25.511 Right SHLD Pain, M25.521 Right Elbow Pain,M25.611 Rigth SHLD Stiffness, M25.621 Rigth Elbow Stiffness, M62.81 Right UE Weakness , G25.89 Scapular Dyskinesis  Insurance/Certification information:  PT Insurance Information: [facility]  Physician Information:  Referring Practitioner: Dr. Candance Noa. [place]  Has the plan of care been signed (Y/N):        [x]  Yes  []  No     Date of Patient follow up with Physician: May 13, 2020    MEDICARE CAP EXCEPTION DOCUMENTATION  I certify that this patient meets one of the below criteria necessary for becoming an exception to the Medicare cap on therapy services:    [x]  The patient has a condition identified by an ICD-10 code that has a direct and significant impact on the need for therapy. (Significantly impacts the rate of recovery.)                   []  The patient has a complexity identified by an ICD-10 code that has a direct and significant impact on the need for therapy.   (Significantly impacts the rate of recovery and is associated with a primary condition.)         []  The patient has associated variables that influence the amount of treatment to include:  Social support, self-efficacy/motivation, prognosis, time since onset/acuity. [x]  The patient has generalized musculoskeletal conditions or a condition affecting multiple sites that will have a direct impact on the rate of recovery. []  The patient had a prior episode of outpatient therapy during this calendar year for a different condition. []  The patient has a mental or cognitive disorder in addition to the condition being treated that will have a direct and significant impact on the rate of recovery. Is this a Progress Report:     []  Yes  [x]  No   If Yes:  Date Range for reporting period:  Beginnin-3-2022  Ending:     Progress report will be due (10 Rx or 30 days whichever is less):           POC 0    Recertification will be due (POC Duration  / 90 days whichever is less): May 5, 2022     Visit # Insurance Allowable Auth Required   In-person 28 30 []  Yes []  No    Telehealth   []  Yes []  No    Total        Functional Scale: Quick Dash=       18%     Date assessed:          2022    Therapy Diagnosis/Practice Pattern: 4H     Number of Comorbidities:  []0     [x]1-2    []3+    Latex Allergy:  [x]NO      []YES  Preferred Language for Healthcare:   [x]English       []other:    Pain level:  6/10 R SHLD    SUBJECTIVE: Has been working extra hard on computer today @ work which makes right shoulder painful. Cramping in biceps and triceps. OBJECTIVE:    Observation: Able to make ROM measurements today w/o extra MT time. Test measurementsBy end of RX able to restore: PROM 0-135 degrees scapation, 100 abd, Ext Rot to 45 @ 50 deg abd, and Int Rot 65 @ 50 abd  by end of RX. RESTRICTIONS/PRECAUTIONS: Right TTL SHLD Post-op Protocol (See MEDIA TAB),     Exercises/Interventions:  Access Code: [email or web address]  URL: [email or web address] [employer]/  Date: 2022    Access Code: [email or web address]  URL: [email or web address]  Date: 2021    Access Code: [email or web address]  URL: [employer]/  Date: 12/09/2021  Therapeutic Ex (79078) Sets/sec Reps Notes/CUES   Finisher 5 min     Levator Stretch H15 x3  hep   Pulleys flex 2x10     High Mat Plank w/ shld horz abd and trunk rotation YTB 2x10     Standiing SHLD Flex w/ light resist wall slides YTB  2x10 MC       Standing Wall Scap Matrix YTB  x10 Each MC @ scap    Trustetch SHLD Flex Stretch w/ Trunk Flexion H5 x10         Wall Wipes Thread The Needle Posterior Capsule Stretch H5 2x10     Wall Circles @ 90 deg CW 2x10  CCW  2x10     Equip Lat Pulldown to chest 35# 2x10     Trustretch Pec minor [place]ocks H10x4 MC @ scap and trunk                      SL Ext Rot 2# 2x10 SBA-Rosales    SL abd 1# [id number]    SL Horz Abd to 90  1# 4x5 SBA-Rosales Had to increase rest breaks today   Supine Shld w/ MS Abd 2x5     SL Int Rot behind back H5 x10  +hep 1-4-2021   Supine SA 3# 2x10             Supine Flex 2# 2x10 Min A    Supine Flies 3# 2x10 SBA                            Manual Intervention (15891)      Myofascial release subscap & teres minor 5''     PROM right shld & elbow in supine w/ head on wedge 15'     STM scap and shld mm 3'                 NMR re-education (54720)   CUES NEEDED                                       Therapeutic Activity (89206)                          Therapeutic Exercise and NMR EXR  [x] (70857) Provided verbal/tactile cueing for activities related to strengthening, flexibility, endurance, ROM  for improvements in scapular, scapulothoracic and UE control with self care, reaching, carrying, lifting, house/yardwork, driving/computer work. [x] (48282) Provided verbal/tactile cueing for activities related to improving balance, coordination, kinesthetic sense, posture, motor skill, proprioception  to assist with  scapular, scapulothoracic and UE control with self care, reaching, carrying, lifting, house/yardwork, driving/computer work.     Therapeutic Activities:    [] (02137 or ) Provided verbal/tactile cueing for activities related to improving balance, coordination, kinesthetic sense, posture, motor skill, proprioception and motor activation to allow for proper function of scapular, scapulothoracic and UE control with self care, carrying, lifting, driving/computer work. Home Exercise Program:    [x] (74106) Reviewed/Progressed HEP activities related to strengthening, flexibility, endurance, ROM of scapular, scapulothoracic and UE control with self care, reaching, carrying, lifting, house/yardwork, driving/computer work  [] (78722) Reviewed/Progressed HEP activities related to improving balance, coordination, kinesthetic sense, posture, motor skill, proprioception of scapular, scapulothoracic and UE control with self care, reaching, carrying, lifting, house/yardwork, driving/computer work      Manual Treatments:  PROM / STM / Oscillations-Mobs:  G-I, II, III, IV (PA's, Inf., Post.)  [x] (84290) Provided manual therapy to mobilize soft tissue/joints of cervical/CT, scapular GHJ and UE for the purpose of modulating pain, promoting relaxation,  increasing ROM, reducing/eliminating soft tissue swelling/inflammation/restriction, improving soft tissue extensibility and allowing for proper ROM for normal function with self care, reaching, carrying, lifting, house/yardwork, driving/computer work    Modalities: CP to right shoulder x10 min    Charges  Timed Code Treatment Minutes: 45   Total Treatment Minutes: 55'       [] EVAL (LOW) 33490   [] EVAL (MOD) 09861   [] EVAL (HIGH) 19115   [] RE-EVAL     [x] IG(78039) x 2   [] IONTO  [] NMR (21182) x    [] VASO  [x] Manual (03761) x 1   [x] Other:CP  [] TA x      [] Mech Traction (02327)  [] ES(attended) (03510)      [] ES (un) (66043):     GOALS  Short Term Goals: To be achieved in: 2 weeks  1. Independent in HEP and progression per patient tolerance, in order to prevent re-injury. []? Progressing: [x]? Met: []? Not Met: []? Adjusted  2.  Patient will have a decrease in pain to facilitate improvement in movement, function, and ADLs as indicated by Functional Deficits. []? Progressing: [x]? Met: []? Not Met: []? Adjusted     Long Term Goals: To be achieved in: 12 weeks  1. Disability index score of 42% or less for the [facility] to assist with reaching prior level of function. []? Progressing:  [x]? Met: to 18% 4-5-22 []? Not Met: []? Adjusted  2. Patient will demonstrate increased AROM to 140 deg flexion to allow for proper joint functioning as indicated by patients Functional Deficits. [x]? Progressing:Scapation wall wipe to 125 deg , []? Met: []? Not Met: []? Adjusted  3. Patient will demonstrate an increase in Strength to 4/5 to allow for proper functional mobility as indicated by patients Functional Deficits. [x]? Progressing: []? Met: []? Not Met: []? Adjusted  4. Patient will retrieve plate from overhead shelf with her right hand without increased symptoms or restriction. [x]? Progressing: []? Met: []? Not Met: []? Adjusted  5. Wash and fix hair with right hand. []? Progressing: [x]? Met: []? Not Met: []? Adjusted         Progression Towards Functional goals:  [] Patient is progressing as expected towards functional goals listed. [x] Progression is slowed due to complexities listed. [] Progression has been slowed due to co-morbidities. [] Plan just implemented, too soon to assess goals progression  [] Other:      ASSESSMENT: Increase compliance w/ HEP and home pulley helping to maintain ROM gains made in clinic. Overall Progression Towards Functional goals/ Treatment Progress Update:  [] Patient is progressing as expected towards functional goals listed. [x] Progression is slowed due to complexities/Impairments listed. [] Progression has been slowed due to co-morbidities.   [] Plan just implemented, too soon to assess goals progression <30days   [] Goals require adjustment due to lack of progress  [] Patient is not progressing as expected and requires additional follow up with physician  [] Other    Prognosis for POC: [x] Good [] Fair  [] Poor    Patient requires continued skilled intervention: [x] Yes  [] No    Treatment/Activity Tolerance:  [x] Patient able to complete treatment  [] Patient limited by fatigue  [x] Patient limited by pain    [] Patient limited by other medical complications  [] Other:     PLAN:Cont 2xwk  [x] Continue per plan of care [] Alter current plan (see comments above)  [] Plan of care initiated [] Hold pending MD visit [] Discharge    Electronically signed by:  Андрей Nolasco, PT  [id number]    Note: If patient does not return for scheduled/ recommended follow up visits, this note will serve as a discharge from care along with most recent update on progress.

## 2022-04-12 ENCOUNTER — HOSPITAL ENCOUNTER (OUTPATIENT)
Dept: PHYSICAL THERAPY | Age: 67
Setting detail: THERAPIES SERIES
Discharge: HOME | End: 2022-04-12
Payer: MEDICARE

## 2022-04-12 PROCEDURE — 97140 MANUAL THERAPY 1/> REGIONS: CPT

## 2022-04-12 PROCEDURE — 97110 THERAPEUTIC EXERCISES: CPT

## 2022-04-12 NOTE — FLOWSHEET NOTE
[facility], [address], [address] [place] [address] [place]  Phone: [phone]  Fax [phone]    Date:  2022    Patient Name:  Brianna Grace    :  1955  MRN: [id number]  Restrictions/Precautions:   Right TTL SHLD Post-op Protocol (See MEDIA TAB),  [id number] 2021 w/ hospitalization and f/u PT 21 to 21,  OA, HTN, Osteoporosis , Back SX for Cason Rods @ age 8, Inner Ear [address] for Replacement of Ear Drum, Right Ulnar Nerve Decompression @ Elbow Aug. 12,2020, Right Carpal Tunnel Release SX Aug 25, 2020, PT Right Shoulder 2020    Medical/Treatment Diagnosis Information:  Diagnosis: M75.101 Right Shld RTC Tear & OA s/p Total SHLD SX (DOS: 2021)  Treatment Diagnosis: M25.511 Right SHLD Pain, M25.521 Right Elbow Pain,M25.611 Rigth SHLD Stiffness, M25.621 Rigth Elbow Stiffness, M62.81 Right UE Weakness , G25.89 Scapular Dyskinesis  Insurance/Certification information:  PT Insurance Information: [facility]  Physician Information:  Referring Practitioner: Dr. Tony Harrison. Nate Baker  Has the plan of care been signed (Y/N):        [x]  Yes  []  No     Date of Patient follow up with Physician: May 13, 2020    MEDICARE CAP EXCEPTION DOCUMENTATION  I certify that this patient meets one of the below criteria necessary for becoming an exception to the Medicare cap on therapy services:    [x]  The patient has a condition identified by an ICD-10 code that has a direct and significant impact on the need for therapy. (Significantly impacts the rate of recovery.)                   []  The patient has a complexity identified by an ICD-10 code that has a direct and significant impact on the need for therapy.   (Significantly impacts the rate of recovery and is associated with a primary condition.)         []  The patient has associated variables that influence the amount of treatment to include:  Social support, self-efficacy/motivation, prognosis, time since onset/acuity. [x]  The patient has generalized musculoskeletal conditions or a condition affecting multiple sites that will have a direct impact on the rate of recovery. []  The patient had a prior episode of outpatient therapy during this calendar year for a different condition. []  The patient has a mental or cognitive disorder in addition to the condition being treated that will have a direct and significant impact on the rate of recovery. Is this a Progress Report:     []  Yes  [x]  No   If Yes:  Date Range for reporting period:  Beginnin-3-2022  Ending:     Progress report will be due (10 Rx or 30 days whichever is less):           POC 8352    Recertification will be due (POC Duration  / 90 days whichever is less): May 5, 2022     Visit # Insurance Allowable Auth Required   In-person 34 30 []  Yes []  No    Telehealth   []  Yes []  No    Total        Functional Scale: Quick Dash=       18%     Date assessed:          2022    Therapy Diagnosis/Practice Pattern: 4H     Number of Comorbidities:  []0     [x]1-2    []3+    Latex Allergy:  [x]NO      []YES  Preferred Language for Healthcare:   [x]English       []other:    Pain level:   8/10    SUBJECTIVE: Patient enters clinic reporting increase anterior right shoulder pain last evening (2022) that prevented her from sleeping. Denies re-injury, but having increase pain whenever she attempts to raise her right hand up. OBJECTIVE       Observation: + pain w/ IR and flex. Very TTP anterior right shoulder. Therapist contacted Dr. Willy Foote office to report increase in patient's pain rating and decreased function over the past two days. Pt instructed to hold therapy and return to MD for f/u this Friday 4-. Patient had prior episode of anterior shoulder pain approximately 3 weeks ago while away from PT for 1.5 weeks. Pt only seen for light ROM of her right shoulder today.    Test measurementsAAROM limited to 90 deg by end of RX. RESTRICTIONS/PRECAUTIONS: Right TTL SHLD Post-op Protocol (See MEDIA TAB),     Exercises/Interventions:  Access Code: [email or web address]  URL: [email or web address] com/  Date: 01/04/2022    Access Code: [email or web address]  URL: [email or web address] com/  Date: 12/23/2021    Access Code: [email or web address]  URL: [email or web address] com/  Date: 12/09/2021  Therapeutic Ex (45866) Sets/sec Reps Notes/CUES        hep           MC       MC @ scap                        MC @ scap and trunk                            Had to increase rest breaks today      +hep 1-4-2021                                       Manual Intervention (08284)          PROM right shld & elbow in supine w/ head on wedge 5'     STM scap and shld mm 5'                 NMR re-education (75819)   CUES NEEDED                                       Therapeutic Activity (44700)                          Therapeutic Exercise and NMR EXR  [x] (22481) Provided verbal/tactile cueing for activities related to strengthening, flexibility, endurance, ROM  for improvements in scapular, scapulothoracic and UE control with self care, reaching, carrying, lifting, house/yardwork, driving/computer work. [x] (72501) Provided verbal/tactile cueing for activities related to improving balance, coordination, kinesthetic sense, posture, motor skill, proprioception  to assist with  scapular, scapulothoracic and UE control with self care, reaching, carrying, lifting, house/yardwork, driving/computer work. Therapeutic Activities:    [] (25055 or 96509) Provided verbal/tactile cueing for activities related to improving balance, coordination, kinesthetic sense, posture, motor skill, proprioception and motor activation to allow for proper function of scapular, scapulothoracic and UE control with self care, carrying, lifting, driving/computer work.      Home Exercise Program:    [x] (14013) Reviewed/Progressed HEP activities related to strengthening, flexibility, endurance, ROM of scapular, scapulothoracic and UE control with self care, reaching, carrying, lifting, house/yardwork, driving/computer work  [] (54183) Reviewed/Progressed HEP activities related to improving balance, coordination, kinesthetic sense, posture, motor skill, proprioception of scapular, scapulothoracic and UE control with self care, reaching, carrying, lifting, house/yardwork, driving/computer work      Manual Treatments:  PROM / STM / Oscillations-Mobs:  G-I, II, III, IV (PA's, Inf., Post.)  [x] (28469) Provided manual therapy to mobilize soft tissue/joints of cervical/CT, scapular GHJ and UE for the purpose of modulating pain, promoting relaxation,  increasing ROM, reducing/eliminating soft tissue swelling/inflammation/restriction, improving soft tissue extensibility and allowing for proper ROM for normal function with self care, reaching, carrying, lifting, house/yardwork, driving/computer work    Modalities: CP to right shoulder x10 min    Charges  Timed Code Treatment Minutes: 24   Total Treatment Minutes: 35       [] EVAL (LOW) 98372   [] EVAL (MOD) 52364   [] EVAL (HIGH) 28643   [] RE-EVAL     [x] GX(06240) x 1   [] IONTO  [] NMR (12450) x    [] VASO  [x] Manual (56960) x 1   [x] Other:CP  [] TA x      [] Mech Traction (99420)  [] ES(attended) (53790)      [] ES (un) (91743):     GOALS  Short Term Goals: To be achieved in: 2 weeks  1. Independent in HEP and progression per patient tolerance, in order to prevent re-injury. []? Progressing: [x]? Met: []? Not Met: []? Adjusted  2. Patient will have a decrease in pain to facilitate improvement in movement, function, and ADLs as indicated by Functional Deficits. []? Progressing: [x]? Met: []? Not Met: []? Adjusted     Long Term Goals: To be achieved in: 12 weeks  1. Disability index score of 42% or less for the [facility] to assist with reaching prior level of function. []? Progressing:  [x]? Met: to 18% 4-5-22 []?  Not Met: []? Adjusted  2. Patient will demonstrate increased AROM to 140 deg flexion to allow for proper joint functioning as indicated by patients Functional Deficits. [x]? Progressing:Scapation wall wipe to 125 deg , []? Met: []? Not Met: []? Adjusted  3. Patient will demonstrate an increase in Strength to 4/5 to allow for proper functional mobility as indicated by patients Functional Deficits. [x]? Progressing: []? Met: []? Not Met: []? Adjusted  4. Patient will retrieve plate from overhead shelf with her right hand without increased symptoms or restriction. [x]? Progressing: []? Met: []? Not Met: []? Adjusted  5. Wash and fix hair with right hand. []? Progressing: [x]? Met: []? Not Met: []? Adjusted         Progression Towards Functional goals:  [] Patient is progressing as expected towards functional goals listed. [x] Progression is slowed due to complexities listed. [] Progression has been slowed due to co-morbidities. [] Plan just implemented, too soon to assess goals progression  [] Other:      ASSESSMENT: Possible subscap avulsion while away from clinic? Returning patient early for f/u with Dr. Dieudonne Fierro and possible CT scan this Friday. Overall Progression Towards Functional goals/ Treatment Progress Update:  [] Patient is progressing as expected towards functional goals listed. [x] Progression is slowed due to complexities/Impairments listed. [] Progression has been slowed due to co-morbidities.   [] Plan just implemented, too soon to assess goals progression <30days   [] Goals require adjustment due to lack of progress  [] Patient is not progressing as expected and requires additional follow up with physician  [] Other    Prognosis for POC: [x] Good [] Fair  [] Poor    Patient requires continued skilled intervention: [x] Yes  [] No    Treatment/Activity Tolerance:  [x] Patient able to complete treatment  [] Patient limited by fatigue  [x] Patient limited by pain    [] Patient limited by other medical complications  [] Other:     PLAN:Hold PT until next f/u w/ Dr. Mj Mena.  [] Continue per plan of care [] Yessica Lehman current plan (see comments above)  [] Plan of care initiated [x] Hold pending MD visit [] Discharge    Electronically signed by:  Camila Olvera, PT  [id number]    Note: If patient does not return for scheduled/ recommended follow up visits, this note will serve as a discharge from care along with most recent update on progress.

## 2022-04-14 ENCOUNTER — APPOINTMENT (OUTPATIENT)
Dept: PHYSICAL THERAPY | Age: 67
End: 2022-04-14
Payer: MEDICARE

## 2022-04-19 ENCOUNTER — HOSPITAL ENCOUNTER (OUTPATIENT)
Dept: PHYSICAL THERAPY | Age: 67
Setting detail: THERAPIES SERIES
Discharge: HOME | End: 2022-04-19
Payer: MEDICARE

## 2022-04-19 PROCEDURE — 97140 MANUAL THERAPY 1/> REGIONS: CPT | Performed by: PHYSICAL THERAPIST

## 2022-04-19 PROCEDURE — 97110 THERAPEUTIC EXERCISES: CPT | Performed by: PHYSICAL THERAPIST

## 2022-04-19 NOTE — FLOWSHEET NOTE
[facility], [address] [place]  Phone: [phone]  Fax [phone]    Date:  2022    Patient Name:  Alec Alexander    :  1955  MRN: [id number]  Restrictions/Precautions:   Right TTL SHLD Post-op Protocol (See MEDIA TAB),  [id number] 2021 w/ hospitalization and f/u PT 21 to 21,  OA, HTN, Osteoporosis , Back SX for Cason Rods @ age 8, Inner Ear [address] for Replacement of Ear Drum, Right Ulnar Nerve Decompression @ Elbow Aug. 12,2020, Right Carpal Tunnel Release SX Aug 25, 2020, PT Right Shoulder 2020    Medical/Treatment Diagnosis Information:  Diagnosis: M75.101 Right Shld RTC Tear & OA s/p Total SHLD SX (DOS: 2021)  Treatment Diagnosis: M25.511 Right SHLD Pain, M25.521 Right Elbow Pain,M25.611 Rigth SHLD Stiffness, M25.621 Rigth Elbow Stiffness, M62.81 Right UE Weakness , G25.89 Scapular Dyskinesis  Insurance/Certification information:  PT Insurance Information: [facility]  Physician Information:  Referring Practitioner: Dr. Ander Parker. Alvarez Huerta  Has the plan of care been signed (Y/N):        [x]  Yes  []  No     Date of Patient follow up with Physician: May 13, 2020    MEDICARE CAP EXCEPTION DOCUMENTATION  I certify that this patient meets one of the below criteria necessary for becoming an exception to the Medicare cap on therapy services:    [x]  The patient has a condition identified by an ICD-10 code that has a direct and significant impact on the need for therapy. (Significantly impacts the rate of recovery.)                   []  The patient has a complexity identified by an ICD-10 code that has a direct and significant impact on the need for therapy.   (Significantly impacts the rate of recovery and is associated with a primary condition.)         []  The patient has associated variables that influence the amount of treatment to include:  Social support, self-efficacy/motivation, prognosis, time since onset/acuity. [x]  The patient has generalized musculoskeletal conditions or a condition affecting multiple sites that will have a direct impact on the rate of recovery. []  The patient had a prior episode of outpatient therapy during this calendar year for a different condition. []  The patient has a mental or cognitive disorder in addition to the condition being treated that will have a direct and significant impact on the rate of recovery. Is this a Progress Report:     []  Yes  [x]  No   If Yes:  Date Range for reporting period:  Beginnin-3-2022  Ending:     Progress report will be due (10 Rx or 30 days whichever is less):           POC [id number]    Recertification will be due (POC Duration  / 90 days whichever is less): May 5, 2022     Visit # Insurance Allowable Auth Required   In-person 30 30 []  Yes []  No    Telehealth   []  Yes []  No    Total        Functional Scale: Quick Dash=       18%     Date assessed:          2022    Therapy Diagnosis/Practice Pattern: 4H     Number of Comorbidities:  []0     [x]1-2    []3+    Latex Allergy:  [x]NO      []YES  Preferred Language for Healthcare:   [x]English       []other:    Pain level:   1-8 10    SUBJECTIVE:     Pt has been sleeping better and able to play computer games without pain since getting cortisone injection. Doc diagnosed pt with adhesive capsulitis. OBJECTIVE       Observation: + pain w/ IR and flex. Very TTP anterior right shoulder. Therapist contacted Dr. Sarah Barlow office to report increase in patient's pain rating and decreased function over the past two days. Pt instructed to hold therapy and return to MD for f/u this Friday 4-. Patient had prior episode of anterior shoulder pain approximately 3 weeks ago while away from PT for 1.5 weeks. Pt only seen for light ROM of her right shoulder today.  Test measurementsAAROM limited to 90 deg by end of RX.     RESTRICTIONS/PRECAUTIONS: Right TTL SHLD Post-op Protocol (See MEDIA TAB),     Exercises/Interventions:  Access Code: [email or web address]  URL: [email or web address]  Date: 01/04/2022    Access Code: [email or web address]  URL: [email or web address]  Date: 12/23/2021    Access Code: [email or web address]  URL: [email or web address]  Date: 12/09/2021  Therapeutic Ex (44871) Sets/sec Reps Notes/CUES        hep       MC       MC @ scap                        MC @ scap and trunk                            Had to increase rest breaks today      +hep 1-4-2021                     Supine cane flex :05 X 10     Supine post cap s :30 6x     No $  2 x 10    Table slides flex / ER :05 X 10 each    Pulleys  X 20     Wall walks  unable    Manual Intervention ([address] [place] [address])          PROM right shld /  Grade 2-3 mobs. 15 min     STM  Post cap/ lat/ subscap 8 min     sidelying scap mobs            NMR re-education (23663)   CUES NEEDED                           Therapeutic Activity (01152)                        Access Code: [email or web address]  URL: [email or web address]  Date: 23/56/0864  Prepared by: Fatuma Addison    Exercises  Supine Shoulder Flexion Extension AAROM with Dowel - 3 x daily - 7 x weekly - 1 sets - 10 reps - 5 hold  Supine Cross Body Shoulder Stretch - 3 x daily - 7 x weekly - 1 sets - 3 reps - 30 hold  Shoulder External Rotation and Scapular Retraction - 3 x daily - 7 x weekly - 2 sets - 10 reps - 2 hold  Seated Shoulder Flexion Towel Slide at Table Top - 3 x daily - 7 x weekly - 1 sets - 10 reps - 5 hold  Seated Shoulder External Rotation PROM on Table - 3 x daily - 7 x weekly - 1 sets - 10 reps - 5 hold  Standing Shoulder Internal Rotation Stretch with Hands Behind Back - 3 x daily - 7 x weekly - 1 sets - 5 reps - 10 hold  Seated Shoulder Flexion AAROM with Pulley Behind - 3 x daily - 7 x weekly - 3 sets - 10 reps - 2 hold    Therapeutic Exercise and NMR EXR  [x] (13811) Provided verbal/tactile cueing for activities related to strengthening, flexibility, endurance, ROM  for improvements in scapular, scapulothoracic and UE control with self care, reaching, carrying, lifting, house/yardwork, driving/computer work. [x] (38871) Provided verbal/tactile cueing for activities related to improving balance, coordination, kinesthetic sense, posture, motor skill, proprioception  to assist with  scapular, scapulothoracic and UE control with self care, reaching, carrying, lifting, house/yardwork, driving/computer work. Therapeutic Activities:    [] (12567 or 63506) Provided verbal/tactile cueing for activities related to improving balance, coordination, kinesthetic sense, posture, motor skill, proprioception and motor activation to allow for proper function of scapular, scapulothoracic and UE control with self care, carrying, lifting, driving/computer work.      Home Exercise Program:    [x] (28424) Reviewed/Progressed HEP activities related to strengthening, flexibility, endurance, ROM of scapular, scapulothoracic and UE control with self care, reaching, carrying, lifting, house/yardwork, driving/computer work  [] (32158) Reviewed/Progressed HEP activities related to improving balance, coordination, kinesthetic sense, posture, motor skill, proprioception of scapular, scapulothoracic and UE control with self care, reaching, carrying, lifting, house/yardwork, driving/computer work      Manual Treatments:  PROM / STM / Oscillations-Mobs:  G-I, II, III, IV (PA's, Inf., Post.)  [x] (18132) Provided manual therapy to mobilize soft tissue/joints of cervical/CT, scapular GHJ and UE for the purpose of modulating pain, promoting relaxation,  increasing ROM, reducing/eliminating soft tissue swelling/inflammation/restriction, improving soft tissue extensibility and allowing for proper ROM for normal function with self care, reaching, carrying, lifting, house/yardwork, driving/computer work    Modalities: CP to right shoulder x10 min    Charges  Timed Code Treatment Minutes: 40   Total Treatment Minutes: 50       [] EVAL (LOW) 34090   [] EVAL (MOD) 50652   [] EVAL (HIGH) 50102   [] RE-EVAL     [x] ZN(49642) x 1   [] IONTO  [] NMR (21809) x    [] VASO  [x] Manual (34746) x 2   [x] Other:CP  [] TA x      [] Mech Traction (82117)  [] ES(attended) (70790)      [] ES (un) (67369):     GOALS  Short Term Goals: To be achieved in: 2 weeks  1. Independent in HEP and progression per patient tolerance, in order to prevent re-injury. []? Progressing: [x]? Met: []? Not Met: []? Adjusted  2. Patient will have a decrease in pain to facilitate improvement in movement, function, and ADLs as indicated by Functional Deficits. []? Progressing: [x]? Met: []? Not Met: []? Adjusted     Long Term Goals: To be achieved in: 12 weeks  1. Disability index score of 42% or less for the [facility] to assist with reaching prior level of function. []? Progressing:  [x]? Met: to 18% 4-5-22 []? Not Met: []? Adjusted  2. Patient will demonstrate increased AROM to 140 deg flexion to allow for proper joint functioning as indicated by patients Functional Deficits. [x]? Progressing:Scapation wall wipe to 125 deg , []? Met: []? Not Met: []? Adjusted  3. Patient will demonstrate an increase in Strength to 4/5 to allow for proper functional mobility as indicated by patients Functional Deficits. [x]? Progressing: []? Met: []? Not Met: []? Adjusted  4. Patient will retrieve plate from overhead shelf with her right hand without increased symptoms or restriction. [x]? Progressing: []? Met: []? Not Met: []? Adjusted  5. Wash and fix hair with right hand. []? Progressing: [x]? Met: []? Not Met: []? Adjusted         Progression Towards Functional goals:  [] Patient is progressing as expected towards functional goals listed. [x] Progression is slowed due to complexities listed. [] Progression has been slowed due to co-morbidities.   [] Plan just implemented, too soon to assess goals progression  [] Other:      ASSESSMENT:   Pt is tight in her posterior capsule. Pt did benefit from STM, joint mobs, and stretching. Pt's HEP has focus on ROM. Doc prescribed another month of PT. 2x a week for 4 weeks. Overall Progression Towards Functional goals/ Treatment Progress Update:  [] Patient is progressing as expected towards functional goals listed. [x] Progression is slowed due to complexities/Impairments listed. [] Progression has been slowed due to co-morbidities. [] Plan just implemented, too soon to assess goals progression <30days   [] Goals require adjustment due to lack of progress  [] Patient is not progressing as expected and requires additional follow up with physician  [] Other    Prognosis for POC: [x] Good [] Fair  [] Poor    Patient requires continued skilled intervention: [x] Yes  [] No    Treatment/Activity Tolerance:  [x] Patient able to complete treatment  [] Patient limited by fatigue  [x] Patient limited by pain    [] Patient limited by other medical complications  [] Other:     PLAN:Hold PT until next f/u w/ Dr. Alvarez Huerta.  [] Continue per plan of care [] Alter current plan (see comments above)  [] Plan of care initiated [] Hold pending MD visit [] Discharge    Electronically signed by:  Eladio Costello PT      Note: If patient does not return for scheduled/ recommended follow up visits, this note will serve as a discharge from care along with most recent update on progress.

## 2022-04-21 ENCOUNTER — APPOINTMENT (OUTPATIENT)
Dept: PHYSICAL THERAPY | Age: 67
End: 2022-04-21
Payer: MEDICARE

## 2022-04-22 ENCOUNTER — HOSPITAL ENCOUNTER (OUTPATIENT)
Dept: PHYSICAL THERAPY | Age: 67
Setting detail: THERAPIES SERIES
Discharge: HOME | End: 2022-04-22
Payer: MEDICARE

## 2022-04-22 PROCEDURE — 97110 THERAPEUTIC EXERCISES: CPT | Performed by: PHYSICAL THERAPIST

## 2022-04-22 PROCEDURE — 97140 MANUAL THERAPY 1/> REGIONS: CPT | Performed by: PHYSICAL THERAPIST

## 2022-04-22 NOTE — FLOWSHEET NOTE
prognosis, time since onset/acuity. [x]  The patient has generalized musculoskeletal conditions or a condition affecting multiple sites that will have a direct impact on the rate of recovery. []  The patient had a prior episode of outpatient therapy during this calendar year for a different condition. []  The patient has a mental or cognitive disorder in addition to the condition being treated that will have a direct and significant impact on the rate of recovery. Is this a Progress Report:     []  Yes  [x]  No   If Yes:  Date Range for reporting period:  Beginnin-3-2022  Ending:     Progress report will be due (10 Rx or 30 days whichever is less):           POC 3163    Recertification will be due (POC Duration  / 90 days whichever is less): May 5, 2022     Visit # Insurance Allowable Auth Required   In-person  + 8 until 22 []  Yes []  No    Telehealth   []  Yes []  No    Total        Functional Scale: Quick Dash=       18%     Date assessed:          2022    Therapy Diagnosis/Practice Pattern: 4H     Number of Comorbidities:  []0     [x]1-2    []3+    Latex Allergy:  [x]NO      []YES  Preferred Language for Healthcare:   [x]English       []other:    Pain level:   0 /10    SUBJECTIVE:      Pt reports that she was sore in lat and post shoulder after last visit. Pt reports no c/o pain today. She didn't sleep well last night, but that is unrelated to shoulder. Pt dresses without pain, but she does have some modifications. The shoulder is not interfering with her work day since her recent cortisone injection. OBJECTIVE             22 - ER 70, IR 60, Flex 140 with OP    RESTRICTIONS/PRECAUTIONS: Right TTL SHLD Post-op Protocol (See MEDIA TAB),     Exercises/Interventions:  Access Code: [email or web address]  URL: [email or web address]  Date: 2022    Access Code: [email or web address]  URL: [email or web address]  Date: 2021    Access Code: [email or web address]  URL: [email or web address]  Date: 12/09/2021  Therapeutic Ex (26981) Sets/sec Reps Notes/CUES   Finisher 5 min      hep       MC       MC @ scap                        MC @ scap and trunk             Standing Shld Ext w/ Elbow Ext GTB H2  2x10          SL Ext Rot  2x10     SL abd  4x5     SL Horz Abd to 90   4x5  Had to increase rest breaks today      SL Int Rot behind back H5 x10 +hep 1-4-2021                     Supine cane flex :05 X 10     Supine post cap s :30 6x     No $  2 x 10    Table slides flex / ER :05 X 10 each    Pulleys  X 20     Wall walks  unable    Manual Intervention ([phone])          PROM right shld /  Grade 2-3 mobs. 15 min     STM  Teres/ lat/ subscap/SL scap mobs 8 min                 NMR re-education (40024)   CUES NEEDED                           Therapeutic Activity (78759)                        Access Code: [email or web address]  URL: [email or web address]  Date: 17/75/6635  Prepared by: Magan Joy    Exercises  Supine Shoulder Flexion Extension AAROM with Dowel - 3 x daily - 7 x weekly - 1 sets - 10 reps - 5 hold  Supine Cross Body Shoulder Stretch - 3 x daily - 7 x weekly - 1 sets - 3 reps - 30 hold  Shoulder External Rotation and Scapular Retraction - 3 x daily - 7 x weekly - 2 sets - 10 reps - 2 hold  Seated Shoulder Flexion Towel Slide at Table Top - 3 x daily - 7 x weekly - 1 sets - 10 reps - 5 hold  Seated Shoulder External Rotation PROM on Table - 3 x daily - 7 x weekly - 1 sets - 10 reps - 5 hold  Standing Shoulder Internal Rotation Stretch with Hands Behind Back - 3 x daily - 7 x weekly - 1 sets - 5 reps - 10 hold  Seated Shoulder Flexion AAROM with Pulley Behind - 3 x daily - 7 x weekly - 3 sets - 10 reps - 2 hold    Therapeutic Exercise and NMR EXR  [x] (37026) Provided verbal/tactile cueing for activities related to strengthening, flexibility, endurance, ROM  for improvements in scapular, scapulothoracic and UE control with self care, reaching, carrying, lifting, house/yardwork, driving/computer work. [x] (51179) Provided verbal/tactile cueing for activities related to improving balance, coordination, kinesthetic sense, posture, motor skill, proprioception  to assist with  scapular, scapulothoracic and UE control with self care, reaching, carrying, lifting, house/yardwork, driving/computer work. Therapeutic Activities:    [] (81120 or 75466) Provided verbal/tactile cueing for activities related to improving balance, coordination, kinesthetic sense, posture, motor skill, proprioception and motor activation to allow for proper function of scapular, scapulothoracic and UE control with self care, carrying, lifting, driving/computer work.      Home Exercise Program:    [x] (07229) Reviewed/Progressed HEP activities related to strengthening, flexibility, endurance, ROM of scapular, scapulothoracic and UE control with self care, reaching, carrying, lifting, house/yardwork, driving/computer work  [] (75259) Reviewed/Progressed HEP activities related to improving balance, coordination, kinesthetic sense, posture, motor skill, proprioception of scapular, scapulothoracic and UE control with self care, reaching, carrying, lifting, house/yardwork, driving/computer work      Manual Treatments:  PROM / STM / Oscillations-Mobs:  G-I, II, III, IV (PA's, Inf., Post.)  [x] (54329) Provided manual therapy to mobilize soft tissue/joints of cervical/CT, scapular GHJ and UE for the purpose of modulating pain, promoting relaxation,  increasing ROM, reducing/eliminating soft tissue swelling/inflammation/restriction, improving soft tissue extensibility and allowing for proper ROM for normal function with self care, reaching, carrying, lifting, house/yardwork, driving/computer work    Modalities: CP to right shoulder x10 min    Charges  Timed Code Treatment Minutes: 40   Total Treatment Minutes: 50       [] EVAL (LOW) 89216   [] EVAL (MOD) 81938   [] EVAL (HIGH) 73357   [] RE-EVAL     [x] NF(24784) x 1   [] IONTO  [] NMR (05870) x    [] VASO  [x] Manual (06019) x 2   [x] Other:CP  [] TA x      [] Mech Traction (72880)  [] ES(attended) (16290)      [] ES (un) (35890):     GOALS  Short Term Goals: To be achieved in: 2 weeks  1. Independent in HEP and progression per patient tolerance, in order to prevent re-injury. []? Progressing: [x]? Met: []? Not Met: []? Adjusted  2. Patient will have a decrease in pain to facilitate improvement in movement, function, and ADLs as indicated by Functional Deficits. []? Progressing: [x]? Met: []? Not Met: []? Adjusted     Long Term Goals: To be achieved in: 12 weeks  1. Disability index score of 42% or less for the [facility] to assist with reaching prior level of function. []? Progressing:  [x]? Met: to 18% 4-5-22 []? Not Met: []? Adjusted  2. Patient will demonstrate increased AROM to 140 deg flexion to allow for proper joint functioning as indicated by patients Functional Deficits. [x]? Progressing:Scapation wall wipe to 125 deg , []? Met: []? Not Met: []? Adjusted  3. Patient will demonstrate an increase in Strength to 4/5 to allow for proper functional mobility as indicated by patients Functional Deficits. [x]? Progressing: []? Met: []? Not Met: []? Adjusted  4. Patient will retrieve plate from overhead shelf with her right hand without increased symptoms or restriction. [x]? Progressing: []? Met: []? Not Met: []? Adjusted  5. Wash and fix hair with right hand. []? Progressing: [x]? Met: []? Not Met: []? Adjusted         Progression Towards Functional goals:  [] Patient is progressing as expected towards functional goals listed. [x] Progression is slowed due to complexities listed. [] Progression has been slowed due to co-morbidities. [] Plan just implemented, too soon to assess goals progression  [] Other:      ASSESSMENT:   Cont to focus on ROM.   With the decrease in pain, started to resume some of the strengthening program.      Overall Progression Towards Functional goals/ Treatment Progress Update:  [] Patient is progressing as expected towards functional goals listed. [x] Progression is slowed due to complexities/Impairments listed. [] Progression has been slowed due to co-morbidities. [] Plan just implemented, too soon to assess goals progression <30days   [] Goals require adjustment due to lack of progress  [] Patient is not progressing as expected and requires additional follow up with physician  [] Other    Prognosis for POC: [x] Good [] Fair  [] Poor    Patient requires continued skilled intervention: [x] Yes  [] No    Treatment/Activity Tolerance:  [x] Patient able to complete treatment  [] Patient limited by fatigue  [x] Patient limited by pain    [] Patient limited by other medical complications  [] Other:     PLAN:Hold PT until next f/u w/ Dr. Cammie Trejo.  [] Continue per plan of care [] Alter current plan (see comments above)  [] Plan of care initiated [] Hold pending MD visit [] Discharge    Electronically signed by:  Marleni Alcaraz PT      Note: If patient does not return for scheduled/ recommended follow up visits, this note will serve as a discharge from care along with most recent update on progress.

## 2022-04-26 ENCOUNTER — HOSPITAL ENCOUNTER (OUTPATIENT)
Dept: PHYSICAL THERAPY | Age: 67
Setting detail: THERAPIES SERIES
Discharge: HOME | End: 2022-04-26
Payer: MEDICARE

## 2022-04-26 PROCEDURE — 97140 MANUAL THERAPY 1/> REGIONS: CPT

## 2022-04-26 PROCEDURE — 97110 THERAPEUTIC EXERCISES: CPT

## 2022-04-26 NOTE — FLOWSHEET NOTE
[facility], [address] [facility] [place]  Phone: [phone]  Fax [phone]    Date:  2022    Patient Name:  Brianna Grace    :  1955  MRN: [id number]  Restrictions/Precautions:   Right TTL SHLD Post-op Protocol (See MEDIA TAB),  [id number] 2021 w/ hospitalization and f/u PT 21 to 21,  OA, HTN, Osteoporosis , Back SX for Cason Rods @ age 8, Inner Ear [address] for Replacement of Ear Drum, Right Ulnar Nerve Decompression @ Elbow Aug. 12,2020, Right Carpal Tunnel Release SX Aug 25, 2020, PT Right Shoulder 2020    Medical/Treatment Diagnosis Information:  Diagnosis: M75.101 Right Shld RTC Tear & OA s/p Total SHLD SX (DOS: 2021)  Treatment Diagnosis: M25.511 Right SHLD Pain, M25.521 Right Elbow Pain,M25.611 Rigth SHLD Stiffness, M25.621 Rigth Elbow Stiffness, M62.81 Right UE Weakness , G25.89 Scapular Dyskinesis  Insurance/Certification information:  PT Insurance Information: [facility]  Physician Information:  Referring Practitioner: Dr. Tony Harrison. Nate Baker  Has the plan of care been signed (Y/N):        [x]  Yes  []  No     Date of Patient follow up with Physician: May 13, 2020    MEDICARE CAP EXCEPTION DOCUMENTATION  I certify that this patient meets one of the below criteria necessary for becoming an exception to the Medicare cap on therapy services:    [x]  The patient has a condition identified by an ICD-10 code that has a direct and significant impact on the need for therapy. (Significantly impacts the rate of recovery.)                   []  The patient has a complexity identified by an ICD-10 code that has a direct and significant impact on the need for therapy.   (Significantly impacts the rate of recovery and is associated with a primary condition.)         []  The patient has associated variables that influence the amount of treatment to include:  Social support, self-efficacy/motivation, prognosis, time since onset/acuity. [x]  The patient has generalized musculoskeletal conditions or a condition affecting multiple sites that will have a direct impact on the rate of recovery. []  The patient had a prior episode of outpatient therapy during this calendar year for a different condition. []  The patient has a mental or cognitive disorder in addition to the condition being treated that will have a direct and significant impact on the rate of recovery. Is this a Progress Report:     []  Yes  [x]  No   If Yes:  Date Range for reporting period:  Beginnin-3-2022  Ending:     Progress report will be due (10 Rx or 30 days whichever is less):           POC 5475    Recertification will be due (POC Duration  / 90 days whichever is less): May 5, 2022     Visit # Insurance Allowable Auth Required   In-person 32 30 + 8 until 22 []  Yes []  No    Telehealth   []  Yes []  No    Total        Functional Scale: Quick Dash=       18%     Date assessed:          2022    Therapy Diagnosis/Practice Pattern: 4H     Number of Comorbidities:  []0     [x]1-2    []3+    Latex Allergy:  [x]NO      []YES  Preferred Language for Healthcare:   [x]English       []other:    Pain level:   0 /10    SUBJECTIVE:  Pt states that her right shoulder has felt much better since receiving cortisone injection. Is now able to do her work on her computer w/o increase right anterior shoulder pain. Only occasionally has disrupted sleep now because of her right shoulder. OBJECTIVE   Observation:Able to resume light strengthening with good tolerance. Test measurements:   22 - ER 70, IR 60, Flex 140 with OP    RESTRICTIONS/PRECAUTIONS: Right TTL SHLD Post-op Protocol (See MEDIA TAB),     Exercises/Interventions:  Access Code: [email or web address]  URL: [employer]/  Date: 2022    Access Code: [email or web address]  URL: [employer]/  Date: 2021    Access Code: [email or web address]  URL: [email or web address]/  Date: 12/09/2021  Therapeutic Ex (13413) Sets/sec Reps Notes/CUES   Finisher 5 min      hep          Standing Shld Flex wall slides 4x5 MC @ scap and elbow    MC @ scap    Trustetch SHLD Flex Stretch w/ Trunk Flexion H5 x10     Standing Wall Wipes Scapation/ Bilat 3x5 MC @ scap & elbow    Wall Wipes Thread The Needle Posterior Capsule Stretch H5 1x10         Equip Lat Pulldown to chest 25# 2x10     MC @ scap and trunk             Standing Shld Ext w/ Elbow Ext GTB H2  2x10          SL Ext Rot 1# 2x10 SBA for technique    SL abd  2x10 SBA for technique    SL Horz Abd to 90   4x5  Had to increase rest breaks today   Supine Flex w/ elbow flex / bilat 1# 2x10 SBA    Supine cane flex :05 X 10     Supine post cap s :30 6x     No $  2 x 10    Table slides flex / ER :05 X 10 each    Pulleys  X 20           Manual Intervention (81819)          PROM right shld /  Grade 2-3 mobs. 15 min     STM  Teres/ lat/ subscap/SL scap mobs 8 min                 NMR re-education (46274)   CUES NEEDED                           Therapeutic Activity (97944)                        Access Code: [email or web address]  URL: [email or web address]  Date: 84/58/3451  Prepared by: Rahel Blanco    Exercises  Supine Shoulder Flexion Extension AAROM with Dowel - 3 x daily - 7 x weekly - 1 sets - 10 reps - 5 hold  Supine Cross Body Shoulder Stretch - 3 x daily - 7 x weekly - 1 sets - 3 reps - 30 hold  Shoulder External Rotation and Scapular Retraction - 3 x daily - 7 x weekly - 2 sets - 10 reps - 2 hold  Seated Shoulder Flexion Towel Slide at Table Top - 3 x daily - 7 x weekly - 1 sets - 10 reps - 5 hold  Seated Shoulder External Rotation PROM on Table - 3 x daily - 7 x weekly - 1 sets - 10 reps - 5 hold  Standing Shoulder Internal Rotation Stretch with Hands Behind Back - 3 x daily - 7 x weekly - 1 sets - 5 reps - 10 hold  Seated Shoulder Flexion AAROM with Pulley Behind - 3 x daily - 7 x weekly - 3 sets - 10 reps - 2 hold    Therapeutic Exercise and NMR EXR  [x] (08822) Provided verbal/tactile cueing for activities related to strengthening, flexibility, endurance, ROM  for improvements in scapular, scapulothoracic and UE control with self care, reaching, carrying, lifting, house/yardwork, driving/computer work. [x] (83906) Provided verbal/tactile cueing for activities related to improving balance, coordination, kinesthetic sense, posture, motor skill, proprioception  to assist with  scapular, scapulothoracic and UE control with self care, reaching, carrying, lifting, house/yardwork, driving/computer work. Therapeutic Activities:    [] (31041 or 91569) Provided verbal/tactile cueing for activities related to improving balance, coordination, kinesthetic sense, posture, motor skill, proprioception and motor activation to allow for proper function of scapular, scapulothoracic and UE control with self care, carrying, lifting, driving/computer work.      Home Exercise Program:    [x] (28175) Reviewed/Progressed HEP activities related to strengthening, flexibility, endurance, ROM of scapular, scapulothoracic and UE control with self care, reaching, carrying, lifting, house/yardwork, driving/computer work  [] (92116) Reviewed/Progressed HEP activities related to improving balance, coordination, kinesthetic sense, posture, motor skill, proprioception of scapular, scapulothoracic and UE control with self care, reaching, carrying, lifting, house/yardwork, driving/computer work      Manual Treatments:  PROM / STM / Oscillations-Mobs:  G-I, II, III, IV (PA's, Inf., Post.)  [x] (73470) Provided manual therapy to mobilize soft tissue/joints of cervical/CT, scapular GHJ and UE for the purpose of modulating pain, promoting relaxation,  increasing ROM, reducing/eliminating soft tissue swelling/inflammation/restriction, improving soft tissue extensibility and allowing for proper ROM for normal function with self care, reaching, carrying, lifting, house/yardwork, driving/computer work    Modalities: HMP to neck and CP to right shoulder x10 min    Charges  Timed Code Treatment Minutes: 55   Total Treatment Minutes: 65       [] EVAL (LOW) 99325   [] EVAL (MOD) 06161   [] EVAL (HIGH) 23840   [] RE-EVAL     [x] IV(61171) x 2   [] IONTO  [] NMR (02140) x    [] VASO  [x] Manual (48303) x 2   [x] Other:CP  [] TA x      [] Mech Traction (08957)  [] ES(attended) (59825)      [] ES (un) (98421):     GOALS  Short Term Goals: To be achieved in: 2 weeks  1. Independent in HEP and progression per patient tolerance, in order to prevent re-injury. []? Progressing: [x]? Met: []? Not Met: []? Adjusted  2. Patient will have a decrease in pain to facilitate improvement in movement, function, and ADLs as indicated by Functional Deficits. []? Progressing: [x]? Met: []? Not Met: []? Adjusted     Long Term Goals: To be achieved in: 12 weeks  1. Disability index score of 42% or less for the [facility] to assist with reaching prior level of function. []? Progressing:  [x]? Met: to 18% 4-5-22 []? Not Met: []? Adjusted  2. Patient will demonstrate increased AROM to 140 deg flexion to allow for proper joint functioning as indicated by patients Functional Deficits. [x]? Progressing:Scapation wall wipe to 125 deg , []? Met: []? Not Met: []? Adjusted  3. Patient will demonstrate an increase in Strength to 4/5 to allow for proper functional mobility as indicated by patients Functional Deficits. [x]? Progressing: []? Met: []? Not Met: []? Adjusted  4. Patient will retrieve plate from overhead shelf with her right hand without increased symptoms or restriction. [x]? Progressing: []? Met: []? Not Met: []? Adjusted  5. Wash and fix hair with right hand. []? Progressing: [x]? Met: []? Not Met: []? Adjusted         Progression Towards Functional goals:  [] Patient is progressing as expected towards functional goals listed.     [x] Progression is slowed due to complexities listed. [] Progression has been slowed due to co-morbidities. [] Plan just implemented, too soon to assess goals progression  [] Other:      ASSESSMENT:         Overall Progression Towards Functional goals/ Treatment Progress Update:  [] Patient is progressing as expected towards functional goals listed. [x] Progression is slowed due to complexities/Impairments listed. [] Progression has been slowed due to co-morbidities. [] Plan just implemented, too soon to assess goals progression <30days   [] Goals require adjustment due to lack of progress  [] Patient is not progressing as expected and requires additional follow up with physician  [] Other    Prognosis for POC: [x] Good [] Fair  [] Poor    Patient requires continued skilled intervention: [x] Yes  [] No    Treatment/Activity Tolerance:  [x] Patient able to complete treatment  [] Patient limited by fatigue  [x] Patient limited by pain    [] Patient limited by other medical complications  [] Other:     PLAN:  [x] Continue per plan of care [] Alter current plan (see comments above)  [] Plan of care initiated [] Hold pending MD visit [] Discharge    Electronically signed by:  Yuridia Meyers, PT  [id number]    Note: If patient does not return for scheduled/ recommended follow up visits, this note will serve as a discharge from care along with most recent update on progress.

## 2022-04-28 ENCOUNTER — HOSPITAL ENCOUNTER (OUTPATIENT)
Dept: PHYSICAL THERAPY | Age: 67
Setting detail: THERAPIES SERIES
Discharge: HOME | End: 2022-04-28
Payer: MEDICARE

## 2022-04-28 PROCEDURE — 97140 MANUAL THERAPY 1/> REGIONS: CPT

## 2022-04-28 PROCEDURE — 97110 THERAPEUTIC EXERCISES: CPT

## 2022-04-28 NOTE — FLOWSHEET NOTE
[facility], [address] [place]  Phone: [phone]  Fax [phone]    Date:  2022    Patient Name:  Homero Cadet    :  1955  MRN: [id number]  Restrictions/Precautions:   Right TTL SHLD Post-op Protocol (See MEDIA TAB),  QUBEO-67 2021 w/ hospitalization and f/u PT 21 to 21,  OA, HTN, Osteoporosis , Back SX for Cason Rods @ age 8, [facility] for Replacement of Ear Drum, Right Ulnar Nerve Decompression @ Elbow Aug. 12,2020, Right Carpal Tunnel Release SX Aug 25, 2020, PT Right Shoulder 2020    Medical/Treatment Diagnosis Information:  Diagnosis: M75.101 Right Shld RTC Tear & OA s/p Total SHLD SX (DOS: 2021)  Treatment Diagnosis: M25.511 Right SHLD Pain, M25.521 Right Elbow Pain,M25.611 Rigth SHLD Stiffness, M25.621 Rigth Elbow Stiffness, M62.81 Right UE Weakness , G25.89 Scapular Dyskinesis  Insurance/Certification information:  PT Insurance Information: [facility]  Physician Information:  Referring Practitioner: Dr. Lana Rodriguez. Erin Srinivasan  Has the plan of care been signed (Y/N):        [x]  Yes  []  No     Date of Patient follow up with Physician: May 13, 2020    MEDICARE CAP EXCEPTION DOCUMENTATION  I certify that this patient meets one of the below criteria necessary for becoming an exception to the Medicare cap on therapy services:    [x]  The patient has a condition identified by an ICD-10 code that has a direct and significant impact on the need for therapy. (Significantly impacts the rate of recovery.)                   []  The patient has a complexity identified by an ICD-10 code that has a direct and significant impact on the need for therapy.   (Significantly impacts the rate of recovery and is associated with a primary condition.)         []  The patient has associated variables that influence the amount of treatment to include:  Social support, self-efficacy/motivation, prognosis, time since onset/acuity. [x]  The patient has generalized musculoskeletal conditions or a condition affecting multiple sites that will have a direct impact on the rate of recovery. []  The patient had a prior episode of outpatient therapy during this calendar year for a different condition. []  The patient has a mental or cognitive disorder in addition to the condition being treated that will have a direct and significant impact on the rate of recovery. Is this a Progress Report:     []  Yes  [x]  No   If Yes:  Date Range for reporting period:  Beginnin-3-2022  Ending:     Progress report will be due (10 Rx or 30 days whichever is less):           POC 8644    Recertification will be due (POC Duration  / 90 days whichever is less): May 5, 2022     Visit # Insurance Allowable Auth Required   In-person 33 30 + 8 until 22 []  Yes []  No    Telehealth   []  Yes []  No    Total        Functional Scale: Quick Dash=       18%     Date assessed:          2022    Therapy Diagnosis/Practice Pattern: 4H     Number of Comorbidities:  []0     [x]1-2    []3+    Latex Allergy:  [x]NO      []YES  Preferred Language for Healthcare:   [x]English       []other:    Pain level:   0 /10    SUBJECTIVE: Pt denies right shoulder discomfort until end ranges of motion. Able to dress and wash/fix hair comfortably with right hand now, but still having difficulty reaching high shelf. OBJECTIVE   Observation:Test measurements:   22 - ER 70, IR 60, Flex 140     RESTRICTIONS/PRECAUTIONS: Right TTL SHLD Post-op Protocol (See MEDIA TAB),     Exercises/Interventions:  Access Code: [email or web address]  URL: [email or web address] com/  Date: 2022    Access Code: [email or web address]  URL: [facility]/  Date: 2021    Access Code: [email or web address]  URL: [email or web address] com/  Date: 2021  Therapeutic Ex (07050) Sets/sec Reps Notes/CUES   Finisher 5 min      hep          Standing Shld Flex wall slides 4x5 MC @ scap and elbow    MC @ scap    Trustetch SHLD Flex Stretch w/ Trunk Flexion H5 x10     Standing Wall Wipes Scapation/ Bilat 3x5 MC @ scap & elbow    Wall Wipes Thread The Needle Posterior Capsule Stretch H5 1x10         Equip Lat Pulldown to chest 25# 2x10     MC @ scap and trunk             Standing Shld Ext w/ Elbow Ext GTB H2  2x10    Supine Ext Rot/ bilat RTB H2 2x10     SL Ext Rot 1# 2x10 SBA for technique    SL abd  2x10 SBA for technique    SL Horz Abd to 90   1# 2x10  Had to increase rest breaks today   Supine Flex w/ elbow flex / bilat 1# 2x10 SBA    Supine cane flex :05 X 10     Supine post cap s :30 6x     Table slides flex / ER :05 X 10 each    Pulleys  X 20           Manual Intervention (79225)          PROM right shld /  Grade 2-3 mobs. 15 min     STM  Teres/ lat/ subscap/SL scap mobs 8 min                 NMR re-education (87144)   CUES NEEDED                           Therapeutic Activity (19610)                        Access Code: [email or web address]  URL: [email or web address]  Date: 34/47/7313  Prepared by: Amanda Case    Exercises  Supine Shoulder Flexion Extension AAROM with Dowel - 3 x daily - 7 x weekly - 1 sets - 10 reps - 5 hold  Supine Cross Body Shoulder Stretch - 3 x daily - 7 x weekly - 1 sets - 3 reps - 30 hold  Shoulder External Rotation and Scapular Retraction - 3 x daily - 7 x weekly - 2 sets - 10 reps - 2 hold  Seated Shoulder Flexion Towel Slide at Table Top - 3 x daily - 7 x weekly - 1 sets - 10 reps - 5 hold  Seated Shoulder External Rotation PROM on Table - 3 x daily - 7 x weekly - 1 sets - 10 reps - 5 hold  Standing Shoulder Internal Rotation Stretch with Hands Behind Back - 3 x daily - 7 x weekly - 1 sets - 5 reps - 10 hold  Seated Shoulder Flexion AAROM with Pulley Behind - 3 x daily - 7 x weekly - 3 sets - 10 reps - 2 hold    Therapeutic Exercise and NMR EXR  [x] (66744) Provided verbal/tactile cueing for activities related to strengthening, flexibility, endurance, ROM  for improvements in scapular, scapulothoracic and UE control with self care, reaching, carrying, lifting, house/yardwork, driving/computer work. [x] (83769) Provided verbal/tactile cueing for activities related to improving balance, coordination, kinesthetic sense, posture, motor skill, proprioception  to assist with  scapular, scapulothoracic and UE control with self care, reaching, carrying, lifting, house/yardwork, driving/computer work. Therapeutic Activities:    [] (08658 or 96863) Provided verbal/tactile cueing for activities related to improving balance, coordination, kinesthetic sense, posture, motor skill, proprioception and motor activation to allow for proper function of scapular, scapulothoracic and UE control with self care, carrying, lifting, driving/computer work.      Home Exercise Program:    [x] (24626) Reviewed/Progressed HEP activities related to strengthening, flexibility, endurance, ROM of scapular, scapulothoracic and UE control with self care, reaching, carrying, lifting, house/yardwork, driving/computer work  [] (35508) Reviewed/Progressed HEP activities related to improving balance, coordination, kinesthetic sense, posture, motor skill, proprioception of scapular, scapulothoracic and UE control with self care, reaching, carrying, lifting, house/yardwork, driving/computer work      Manual Treatments:  PROM / STM / Oscillations-Mobs:  G-I, II, III, IV (PA's, Inf., Post.)  [x] (23242) Provided manual therapy to mobilize soft tissue/joints of cervical/CT, scapular GHJ and UE for the purpose of modulating pain, promoting relaxation,  increasing ROM, reducing/eliminating soft tissue swelling/inflammation/restriction, improving soft tissue extensibility and allowing for proper ROM for normal function with self care, reaching, carrying, lifting, house/yardwork, driving/computer work    Modalities: HMP to neck and CP to right shoulder x10 min    Charges  Timed Code Treatment Minutes: 45   Total Treatment Minutes: 60       [] EVAL (LOW) 55931   [] EVAL (MOD) 71041   [] EVAL (HIGH) 00294   [] RE-EVAL     [x] EH(98531) x 2   [] IONTO  [] NMR (56762) x    [] VASO  [x] Manual (80655) x 1   [x] Other:CP  [] TA x      [] Mech Traction (97666)  [] ES(attended) (99122)      [] ES (un) (65417):     GOALS  Short Term Goals: To be achieved in: 2 weeks  1. Independent in HEP and progression per patient tolerance, in order to prevent re-injury. []? Progressing: [x]? Met: []? Not Met: []? Adjusted  2. Patient will have a decrease in pain to facilitate improvement in movement, function, and ADLs as indicated by Functional Deficits. []? Progressing: [x]? Met: []? Not Met: []? Adjusted     Long Term Goals: To be achieved in: 12 weeks  1. Disability index score of 42% or less for the [facility] to assist with reaching prior level of function. []? Progressing:  [x]? Met: to 18% 4-5-22 []? Not Met: []? Adjusted  2. Patient will demonstrate increased AROM to 140 deg flexion to allow for proper joint functioning as indicated by patients Functional Deficits. [x]? Progressing:Scapation wall wipe to 125 deg , []? Met: []? Not Met: []? Adjusted  3. Patient will demonstrate an increase in Strength to 4/5 to allow for proper functional mobility as indicated by patients Functional Deficits. [x]? Progressing: []? Met: []? Not Met: []? Adjusted  4. Patient will retrieve plate from overhead shelf with her right hand without increased symptoms or restriction. [x]? Progressing: []? Met: []? Not Met: []? Adjusted  5. Wash and fix hair with right hand. []? Progressing: [x]? Met: []? Not Met: []? Adjusted         Progression Towards Functional goals:  [] Patient is progressing as expected towards functional goals listed. [x] Progression is slowed due to complexities listed. [] Progression has been slowed due to co-morbidities.   [] Plan just implemented, too soon to assess goals progression  [] Other: ASSESSMENT: Significant decrease right shoulder pain rating following recent cortisone injection. Resuming strengthening TE w/ good tolerance. Overall Progression Towards Functional goals/ Treatment Progress Update:  [] Patient is progressing as expected towards functional goals listed. [x] Progression is slowed due to complexities/Impairments listed. [] Progression has been slowed due to co-morbidities. [] Plan just implemented, too soon to assess goals progression <30days   [] Goals require adjustment due to lack of progress  [] Patient is not progressing as expected and requires additional follow up with physician  [] Other    Prognosis for POC: [x] Good [] Fair  [] Poor    Patient requires continued skilled intervention: [x] Yes  [] No    Treatment/Activity Tolerance:  [x] Patient able to complete treatment  [] Patient limited by fatigue  [] Patient limited by pain    [] Patient limited by other medical complications  [] Other:     PLAN:  [x] Continue per plan of care [] Alter current plan (see comments above)  [] Plan of care initiated [] Hold pending MD visit [] Discharge    Electronically signed by:  Katiana Sotelo, PT  [id number]    Note: If patient does not return for scheduled/ recommended follow up visits, this note will serve as a discharge from care along with most recent update on progress.

## 2022-05-03 ENCOUNTER — APPOINTMENT (OUTPATIENT)
Dept: PHYSICAL THERAPY | Age: 67
End: 2022-05-03
Payer: MEDICARE

## 2022-05-03 ENCOUNTER — HOSPITAL ENCOUNTER (OUTPATIENT)
Dept: PHYSICAL THERAPY | Age: 67
Setting detail: THERAPIES SERIES
Discharge: HOME | End: 2022-05-03
Payer: MEDICARE

## 2022-05-03 PROCEDURE — 97110 THERAPEUTIC EXERCISES: CPT

## 2022-05-03 PROCEDURE — 97140 MANUAL THERAPY 1/> REGIONS: CPT

## 2022-05-03 NOTE — FLOWSHEET NOTE
[facility], [address] [place]  Phone: [phone]  Fax [phone]    Date:  5/3/2022    Patient Name:  Jeff Coleman    :  1955  MRN: [id number]  Restrictions/Precautions:   Right TTL SHLD Post-op Protocol (See MEDIA TAB),  FAGAG-95 2021 w/ hospitalization and f/u PT 21 to 21,  OA, HTN, Osteoporosis , Back SX for Cason Rods @ age 8, Inner Ear [address] for Replacement of Ear Drum, Right Ulnar Nerve Decompression @ Elbow Aug. 12,2020, Right Carpal Tunnel Release SX Aug 25, 2020, PT Right Shoulder 2020    Medical/Treatment Diagnosis Information:  Diagnosis: M75.101 Right Shld RTC Tear & OA s/p Total SHLD SX (DOS: 2021)  Treatment Diagnosis: M25.511 Right SHLD Pain, M25.521 Right Elbow Pain,M25.611 Rigth SHLD Stiffness, M25.621 Rigth Elbow Stiffness, M62.81 Right UE Weakness , G25.89 Scapular Dyskinesis  Insurance/Certification information:  PT Insurance Information: [facility]  Physician Information:  Referring Practitioner: Dr. Demetrice Pacheco. Monico Stephens  Has the plan of care been signed (Y/N):        [x]  Yes  []  No     Date of Patient follow up with Physician: May 13, 2020    MEDICARE CAP EXCEPTION DOCUMENTATION  I certify that this patient meets one of the below criteria necessary for becoming an exception to the Medicare cap on therapy services:    [x]  The patient has a condition identified by an ICD-10 code that has a direct and significant impact on the need for therapy. (Significantly impacts the rate of recovery.)                   []  The patient has a complexity identified by an ICD-10 code that has a direct and significant impact on the need for therapy.   (Significantly impacts the rate of recovery and is associated with a primary condition.)         []  The patient has associated variables that influence the amount of treatment to include:  Social support, self-efficacy/motivation, prognosis, time since onset/acuity. [x]  The patient has generalized musculoskeletal conditions or a condition affecting multiple sites that will have a direct impact on the rate of recovery. []  The patient had a prior episode of outpatient therapy during this calendar year for a different condition. []  The patient has a mental or cognitive disorder in addition to the condition being treated that will have a direct and significant impact on the rate of recovery. Is this a Progress Report:     []  Yes  [x]  No   If Yes:  Date Range for reporting period:  Beginnin-3-2022  Ending:     Progress report will be due (10 Rx or 30 days whichever is less):           POC 3570    Recertification will be due (POC Duration  / 90 days whichever is less): May 5, 2022     Visit # Insurance Allowable Auth Required   In-person  39 + 8 until 22 []  Yes []  No    Telehealth   []  Yes []  No    Total        Functional Scale: Quick Dash=       18%     Date assessed:          2022    Therapy Diagnosis/Practice Pattern: 4H     Number of Comorbidities:  []0     [x]1-2    []3+    Latex Allergy:  [x]NO      []YES  Preferred Language for Healthcare:   [x]English       []other:    Pain level:   0 /10    SUBJECTIVE: Pt states she drove for the 1st time herself this past week. She is feeling much better pain-wise with ADLs, can do her hair comfortably now. Her biggest limitation is still reaching her R arm overhead. OBJECTIVE   Observation:Test measurements:   22 - ER 70, IR 60, Flex 140     RESTRICTIONS/PRECAUTIONS: Right TTL SHLD Post-op Protocol (See MEDIA TAB),     Exercises/Interventions:  Access Code: [email or web address]  URL: [email or web address] com/  Date: 2022    Access Code: [email or web address]  URL: [email or web address]  Date: 2021    Access Code: [email or web address]  URL: [email or web address] [email or web address]  Date: 2021  Therapeutic Ex (17021) Sets/sec Reps Notes/CUES   Finisher: flex, scap, cw, ccw x10 ea  4#    hep          Standing Shld Flex wall slides 4x5 MC @ scap and elbow    MC @ scap    Trustetch SHLD Flex Stretch w/ Trunk Flexion H5 x10     Standing Wall Wipes Scapation/ Bilat 3x5 MC @ scap & elbow    Wall Wipes Thread The Needle Posterior Capsule Stretch H5 1x10         Equip Lat Pulldown to chest  row 30# 2x10  20#x15     Trustretch Pec minor Rocks H10x4 MC @ scap and trunk             Standing Shld Ext w/ Elbow Ext GTB H2  2x10    Supine Ext Rot/ bilat RTB H2 2x10     SL Ext Rot 1# 2x10 SBA for technique    SL abd  2x10 SBA for technique    SL Horz Abd to 90   1# 2x10  Had to increase rest breaks today   Supine Flex w/ elbow flex / bilat 1# 2x10 SBA    Supine cane flex :05 X 10     Supine post cap s :30 6x     Table slides flex / ER :05 X 10 each    Pulleys  X 20           Manual Intervention (45310)          PROM right shld /  Grade 2-3 mobs. 15 min     STM  Teres/ lat/ subscap/SL scap mobs 8 min                 NMR re-education (25432)   CUES NEEDED                           Therapeutic Activity (86792)                        Access Code: [email or web address]  URL: [email or web address]  Date: 77/90/9597  Prepared by: Janeth Hicks    Exercises  Supine Shoulder Flexion Extension AAROM with Dowel - 3 x daily - 7 x weekly - 1 sets - 10 reps - 5 hold  Supine Cross Body Shoulder Stretch - 3 x daily - 7 x weekly - 1 sets - 3 reps - 30 hold  Shoulder External Rotation and Scapular Retraction - 3 x daily - 7 x weekly - 2 sets - 10 reps - 2 hold  Seated Shoulder Flexion Towel Slide at Table Top - 3 x daily - 7 x weekly - 1 sets - 10 reps - 5 hold  Seated Shoulder External Rotation PROM on Table - 3 x daily - 7 x weekly - 1 sets - 10 reps - 5 hold  Standing Shoulder Internal Rotation Stretch with Hands Behind Back - 3 x daily - 7 x weekly - 1 sets - 5 reps - 10 hold  Seated Shoulder Flexion AAROM with Pulley Behind - 3 x daily - 7 x weekly - 3 sets - 10 reps - 2 hold    Therapeutic Exercise and NMR EXR  [x] (49366) Provided verbal/tactile cueing for activities related to strengthening, flexibility, endurance, ROM  for improvements in scapular, scapulothoracic and UE control with self care, reaching, carrying, lifting, house/yardwork, driving/computer work. [x] (01072) Provided verbal/tactile cueing for activities related to improving balance, coordination, kinesthetic sense, posture, motor skill, proprioception  to assist with  scapular, scapulothoracic and UE control with self care, reaching, carrying, lifting, house/yardwork, driving/computer work. Therapeutic Activities:    [] (51523 or 74193) Provided verbal/tactile cueing for activities related to improving balance, coordination, kinesthetic sense, posture, motor skill, proprioception and motor activation to allow for proper function of scapular, scapulothoracic and UE control with self care, carrying, lifting, driving/computer work.      Home Exercise Program:    [x] (86178) Reviewed/Progressed HEP activities related to strengthening, flexibility, endurance, ROM of scapular, scapulothoracic and UE control with self care, reaching, carrying, lifting, house/yardwork, driving/computer work  [] (42563) Reviewed/Progressed HEP activities related to improving balance, coordination, kinesthetic sense, posture, motor skill, proprioception of scapular, scapulothoracic and UE control with self care, reaching, carrying, lifting, house/yardwork, driving/computer work      Manual Treatments:  PROM / STM / Oscillations-Mobs:  G-I, II, III, IV (PA's, Inf., Post.)  [x] (02097) Provided manual therapy to mobilize soft tissue/joints of cervical/CT, scapular GHJ and UE for the purpose of modulating pain, promoting relaxation,  increasing ROM, reducing/eliminating soft tissue swelling/inflammation/restriction, improving soft tissue extensibility and allowing for proper ROM for normal function with self care, reaching, carrying, lifting, house/yardwork, driving/computer work    Modalities: HMP to neck and CP to right shoulder x10 min    Charges  Timed Code Treatment Minutes: 50   Total Treatment Minutes: 60       [] EVAL (LOW) 90725   [] EVAL (MOD) 85208   [] EVAL (HIGH) 01483   [] RE-EVAL      [x] IO(07521) x 2   [] IONTO  [] NMR (80904) x    [] VASO  [x] Manual (51166) x 1   [x] Other:CP  [] TA x      [] Mech Traction (79344)  [] ES(attended) (69711)      [] ES (un) (78899):     GOALS  Short Term Goals: To be achieved in: 2 weeks  1. Independent in HEP and progression per patient tolerance, in order to prevent re-injury. []? Progressing: [x]? Met: []? Not Met: []? Adjusted  2. Patient will have a decrease in pain to facilitate improvement in movement, function, and ADLs as indicated by Functional Deficits. []? Progressing: [x]? Met: []? Not Met: []? Adjusted     Long Term Goals: To be achieved in: 12 weeks  1. Disability index score of 42% or less for the [facility] to assist with reaching prior level of function. []? Progressing:  [x]? Met: to 18% 4-5-22 []? Not Met: []? Adjusted  2. Patient will demonstrate increased AROM to 140 deg flexion to allow for proper joint functioning as indicated by patients Functional Deficits. [x]? Progressing:Scapation wall wipe to 125 deg , []? Met: []? Not Met: []? Adjusted  3. Patient will demonstrate an increase in Strength to 4/5 to allow for proper functional mobility as indicated by patients Functional Deficits. [x]? Progressing: []? Met: []? Not Met: []? Adjusted  4. Patient will retrieve plate from overhead shelf with her right hand without increased symptoms or restriction. [x]? Progressing: []? Met: []? Not Met: []? Adjusted  5. Wash and fix hair with right hand. []? Progressing: [x]? Met: []? Not Met: []? Adjusted         Progression Towards Functional goals:  [] Patient is progressing as expected towards functional goals listed. [x] Progression is slowed due to complexities listed.   [] Progression has been slowed due to co-morbidities. [] Plan just implemented, too soon to assess goals progression  [] Other:      ASSESSMENT: Pt has good tolerance for added strengthening, still gets very sore in posterior deltoid and fatigues quickly. Overall Progression Towards Functional goals/ Treatment Progress Update:  [] Patient is progressing as expected towards functional goals listed. [x] Progression is slowed due to complexities/Impairments listed. [] Progression has been slowed due to co-morbidities. [] Plan just implemented, too soon to assess goals progression <30days   [] Goals require adjustment due to lack of progress  [] Patient is not progressing as expected and requires additional follow up with physician  [] Other    Prognosis for POC: [x] Good [] Fair  [] Poor    Patient requires continued skilled intervention: [x] Yes  [] No    Treatment/Activity Tolerance:  [x] Patient able to complete treatment  [] Patient limited by fatigue  [] Patient limited by pain    [] Patient limited by other medical complications  [] Other:     PLAN: Wean to 1x/week. [x] Continue per plan of care [] Alter current plan (see comments above)  [] Plan of care initiated [] Hold pending MD visit [] Discharge    Electronically signed by:  Xiomy Vásquez PT, DPT    Note: If patient does not return for scheduled/ recommended follow up visits, this note will serve as a discharge from care along with most recent update on progress.

## 2022-05-05 ENCOUNTER — HOSPITAL ENCOUNTER (OUTPATIENT)
Dept: PHYSICAL THERAPY | Age: 67
Setting detail: THERAPIES SERIES
Discharge: HOME | End: 2022-05-05
Payer: MEDICARE

## 2022-05-05 ENCOUNTER — APPOINTMENT (OUTPATIENT)
Dept: PHYSICAL THERAPY | Age: 67
End: 2022-05-05
Payer: MEDICARE

## 2022-05-05 PROCEDURE — 97110 THERAPEUTIC EXERCISES: CPT

## 2022-05-05 PROCEDURE — 97140 MANUAL THERAPY 1/> REGIONS: CPT

## 2022-05-05 NOTE — PLAN OF CARE
[facility], [address] [place]  Phone: [phone]  Fax [phone]    Date:  2022    Patient Name:  Neyda Dickerson    :  1955  MRN: [id number]  Restrictions/Precautions:   Right TTL SHLD Post-op Protocol (See MEDIA TAB),  IGYGO-64 2021 w/ hospitalization and f/u PT 21 to 21,  OA, HTN, Osteoporosis , Back SX for Cason Rods @ age 8, Inner Ear [address] for Replacement of Ear Drum, Right Ulnar Nerve Decompression @ Elbow Aug. 12,2020, Right Carpal Tunnel Release SX Aug 25, 2020, PT Right Shoulder 2020    Medical/Treatment Diagnosis Information:  Diagnosis: M75.101 Right Shld RTC Tear & OA s/p Total SHLD SX (DOS: 2021)  Treatment Diagnosis: M25.511 Right SHLD Pain, M25.521 Right Elbow Pain,M25.611 Rigth SHLD Stiffness, M25.621 Rigth Elbow Stiffness, M62.81 Right UE Weakness , G25.89 Scapular Dyskinesis  Insurance/Certification information:  PT Insurance Information: [facility]  Physician Information:  Referring Practitioner: Dr. Chastity Jones. Delilah Saldivar  Has the plan of care been signed (Y/N):        [x]  Yes  []  No     Date of Patient follow up with Physician: May 13, 2020    MEDICARE CAP EXCEPTION DOCUMENTATION  I certify that this patient meets one of the below criteria necessary for becoming an exception to the Medicare cap on therapy services:    [x]  The patient has a condition identified by an ICD-10 code that has a direct and significant impact on the need for therapy. (Significantly impacts the rate of recovery.)                   []  The patient has a complexity identified by an ICD-10 code that has a direct and significant impact on the need for therapy.   (Significantly impacts the rate of recovery and is associated with a primary condition.)         []  The patient has associated variables that influence the amount of treatment to include:  Social support, self-efficacy/motivation, prognosis, time since onset/acuity. [x]  The patient has generalized musculoskeletal conditions or a condition affecting multiple sites that will have a direct impact on the rate of recovery. []  The patient had a prior episode of outpatient therapy during this calendar year for a different condition. []  The patient has a mental or cognitive disorder in addition to the condition being treated that will have a direct and significant impact on the rate of recovery. Is this a Progress Report:     []  Yes  [x]  No   If Yes:  Date Range for reporting period:  Beginnin-3-2022  Endin22    Progress report will be due (10 Rx or 30 days whichever is less):           POC 4691    Recertification will be due (POC Duration  / 90 days whichever is less):   2022     Visit # Insurance Allowable Auth Required   In-person  30 + 8 until 22 [x]  Yes []  No    Telehealth   []  Yes []  No    Total        Functional Scale: Quick Dash=            7%   Date assessed:            22    Therapy Diagnosis/Practice Pattern: 4H     Number of Comorbidities:  []0     [x]1-2    []3+    Latex Allergy:  [x]NO      []YES  Preferred Language for Healthcare:   [x]English       []other:    Pain level:   0 /10    SUBJECTIVE: Pt continues to feel she's made progress with pain, function (is driving, can was her hair, can reach overhead occasionally for ADLs, and 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 She feels her biggest limitation is still strength in her R arm.     OBJECTIVE   Observation:Test measurements: AROM flex 85  scap 55  Functional ER thumb to R ear lobe Functional IR thumb to R PSIS   PROM flex 141   scap 120    ER 58 at deg 75 abd  80 IR at deg 55 abd  MMT flex 3-/5  abd 3-/5  3+ ER in neutral  4 IR in neutral   Biceps  4+/5  Triceps  4/5   Rhomboids 4/5 (seated)  RESTRICTIONS/PRECAUTIONS: Right TTL SHLD Post-op Protocol (See MEDIA TAB),     Exercises/Interventions:  Access Code: [email or web address]  URL: [email or web address] com/  Date: 01/04/2022    Access Code: [email or web address]  URL: [email or web address] com/  Date: 12/23/2021    Access Code: [email or web address]  URL: [email or web address] com/  Date: 12/09/2021  Therapeutic Ex (87093) Sets/sec Reps Notes/CUES   Finisher: flex, scap, cw, ccw x10 ea  5#    hep          Standing Shld Flex wall slides 4x5 MC @ scap and elbow    MC @ scap    Trustetch SHLD Flex Stretch w/ Trunk Flexion H5 x10     Standing Wall Wipes Scapation/ Bilat 3x5 MC @ scap & elbow    Wall Wipes Thread The Needle Posterior Capsule Stretch H5 1x10     Wall Circles @ 90 degCW, CCW 2x5 ea    Equip Lat Pulldown to chest  row 30# 2x10  20#x15     Trustretch Pec minor Rocks H10x4 MC @ scap and trunk             Standing Shld Ext w/ Elbow Ext  Resume NV   Supine Ext Rot/ bilat RTB H2 2x10     SL Ext Rot SBA for technique Resume NV   SL abd SBA for technique Resume NV   SL Horz Abd to 90   Had to increase rest breaks today   Supine Flex w/ elbow flex / bilat 1# 2x10 SBA    Supine cane flex :05 X 10     Supine post cap s :30 6x     Table slides flex / ER :05 X 10 each    Pulleys  X 20     Standing horiz abd to ~45 (bent over hi-low table)  2x5    Manual Intervention (92273)      Re-assess time5'        PROM right shld /  Grade 2-3 mobs. 10 min     STM  Teres/ lat/ subscap/SL scap mobs 8 min                 NMR re-education (41126)   CUES NEEDED                           Therapeutic Activity (90145)                        Access Code: [email or web address]  URL: [employer]/  Date: 26/73/8038  Prepared by: Ki Getting    Exercises  Supine Shoulder Flexion Extension AAROM with Dowel - 3 x daily - 7 x weekly - 1 sets - 10 reps - 5 hold  Supine Cross Body Shoulder Stretch - 3 x daily - 7 x weekly - 1 sets - 3 reps - 30 hold  Shoulder External Rotation and Scapular Retraction - 3 x daily - 7 x weekly - 2 sets - 10 reps - 2 hold  Seated Shoulder Flexion Towel Slide at Table Top - 3 x daily - 7 x weekly - 1 sets - 10 reps - 5 hold  Seated Shoulder External Rotation PROM on Table - 3 x daily - 7 x weekly - 1 sets - 10 reps - 5 hold  Standing Shoulder Internal Rotation Stretch with Hands Behind Back - 3 x daily - 7 x weekly - 1 sets - 5 reps - 10 hold  Seated Shoulder Flexion AAROM with Pulley Behind - 3 x daily - 7 x weekly - 3 sets - 10 reps - 2 hold    Therapeutic Exercise and NMR EXR  [x] (18209) Provided verbal/tactile cueing for activities related to strengthening, flexibility, endurance, ROM  for improvements in scapular, scapulothoracic and UE control with self care, reaching, carrying, lifting, house/yardwork, driving/computer work. [x] (91972) Provided verbal/tactile cueing for activities related to improving balance, coordination, kinesthetic sense, posture, motor skill, proprioception  to assist with  scapular, scapulothoracic and UE control with self care, reaching, carrying, lifting, house/yardwork, driving/computer work. Therapeutic Activities:    [] (35499 or 05322) Provided verbal/tactile cueing for activities related to improving balance, coordination, kinesthetic sense, posture, motor skill, proprioception and motor activation to allow for proper function of scapular, scapulothoracic and UE control with self care, carrying, lifting, driving/computer work.      Home Exercise Program:    [x] (37565) Reviewed/Progressed HEP activities related to strengthening, flexibility, endurance, ROM of scapular, scapulothoracic and UE control with self care, reaching, carrying, lifting, house/yardwork, driving/computer work  [] (91368) Reviewed/Progressed HEP activities related to improving balance, coordination, kinesthetic sense, posture, motor skill, proprioception of scapular, scapulothoracic and UE control with self care, reaching, carrying, lifting, house/yardwork, driving/computer work      Manual Treatments:  PROM / STM / Oscillations-Mobs:  G-I, II, III, IV (PA's, Inf., Post.)  [x] (81338) Provided manual therapy to mobilize soft tissue/joints of cervical/CT, scapular GHJ and UE for the purpose of modulating pain, promoting relaxation,  increasing ROM, reducing/eliminating soft tissue swelling/inflammation/restriction, improving soft tissue extensibility and allowing for proper ROM for normal function with self care, reaching, carrying, lifting, house/yardwork, driving/computer work    Modalities: CP to right shoulder x10 min    Charges  Timed Code Treatment Minutes: 45   Total Treatment Minutes: 55       [] EVAL (LOW) 36985   [] EVAL (MOD) 23952   [] EVAL (HIGH) 94701   [] RE-EVAL      [x] WE(96051) x 1   [] IONTO  [] NMR (58062) x    [] VASO  [x] Manual (13674) x 2   [x] Other:CP  [] TA x      [] Mech Traction (48618)  [] ES(attended) (73443)      [] ES (un) (50580):     GOALS  Short Term Goals: To be achieved in: 2 weeks  1. Independent in HEP and progression per patient tolerance, in order to prevent re-injury. []? Progressing: [x]? Met: []? Not Met: []? Adjusted  2. Patient will have a decrease in pain to facilitate improvement in movement, function, and ADLs as indicated by Functional Deficits. []? Progressing: [x]? Met: []? Not Met: []? Adjusted     Long Term Goals: To be achieved in: 12 weeks  1. Disability index score of 42% or less for the [facility] to assist with reaching prior level of function. []? Progressing:  [x]? Met: to 7% 4-5-22 []? Not Met: []? Adjusted  2. Patient will demonstrate increased AROM to 140 deg flexion to allow for proper joint functioning as indicated by patients Functional Deficits. [x]? Progressing:AAROM to ~120, but AROM limited by strength still , []? Met: []? Not Met: []? Adjusted  3. Patient will demonstrate an increase in Strength to 4/5 to allow for proper functional mobility as indicated by patients Functional Deficits. [x]? Progressing: []? Met: []? Not Met: []? Adjusted  4.  Patient will retrieve plate from overhead shelf with her right hand without increased symptoms or restriction. []? Progressing: [x]? Met: []? Not Met: []? Adjusted  5. Wash and fix hair with right hand. []? Progressing: [x]? Met: []? Not Met: []? Adjusted         Progression Towards Functional goals:  [] Patient is progressing as expected towards functional goals listed. [x] Progression is slowed due to complexities listed. [] Progression has been slowed due to co-morbidities. [] Plan just implemented, too soon to assess goals progression  [] Other:      ASSESSMENT: Pt has made progress towards all PT goals. She is most limited by strength still, and differences of PROM vs AROM demonstrate lack of strength/endurance for overhead ADLs/lifting. She will benefit from continued PT to maintain ROM, progress strength exc's. She can decrease frequency to only f/u 1x/week now. Overall Progression Towards Functional goals/ Treatment Progress Update:  [] Patient is progressing as expected towards functional goals listed. [x] Progression is slowed due to complexities/Impairments listed. [] Progression has been slowed due to co-morbidities. [] Plan just implemented, too soon to assess goals progression <30days   [] Goals require adjustment due to lack of progress  [] Patient is not progressing as expected and requires additional follow up with physician  [] Other    Prognosis for POC: [x] Good [] Fair  [] Poor    Patient requires continued skilled intervention: [x] Yes  [] No    Treatment/Activity Tolerance:  [x] Patient able to complete treatment  [] Patient limited by fatigue  [] Patient limited by pain    [] Patient limited by other medical complications  [] Other:     PLAN: Wean to 1x/week.   [x] Continue per plan of care [] Alter current plan (see comments above)  [] Plan of care initiated [] Hold pending MD visit [] Discharge    Electronically signed by:  Xavier Tate, PT, DPT    Note: If patient does not return for scheduled/ recommended follow up visits, this note will serve as a discharge from care along with most recent update on progress.

## 2022-05-13 ENCOUNTER — HOSPITAL ENCOUNTER (OUTPATIENT)
Dept: PHYSICAL THERAPY | Age: 67
Setting detail: THERAPIES SERIES
Discharge: HOME | End: 2022-05-13
Payer: MEDICARE

## 2022-05-13 PROCEDURE — 97110 THERAPEUTIC EXERCISES: CPT

## 2022-05-13 PROCEDURE — 97140 MANUAL THERAPY 1/> REGIONS: CPT

## 2022-05-13 NOTE — FLOWSHEET NOTE
[facility], [address] [place]  Phone: [phone]  Fax [phone]    Date:  2022    Patient Name:  Mignon Betancourt    :  1955  MRN: [id number]  Restrictions/Precautions:   Right TTL SHLD Post-op Protocol (See MEDIA TAB),  [id number] 2021 w/ hospitalization and f/u PT 21 to 21,  OA, HTN, Osteoporosis , Back SX for Cason Rods @ age 8, Inner Ear [address] for Replacement of Ear Drum, Right Ulnar Nerve Decompression @ Elbow Aug. 12,2020, Right Carpal Tunnel Release SX Aug 25, 2020, PT Right Shoulder 2020    Medical/Treatment Diagnosis Information:  Diagnosis: M75.101 Right Shld RTC Tear & OA s/p Total SHLD SX (DOS: 2021)  Treatment Diagnosis: M25.511 Right SHLD Pain, M25.521 Right Elbow Pain,M25.611 Rigth SHLD Stiffness, M25.621 Rigth Elbow Stiffness, M62.81 Right UE Weakness , G25.89 Scapular Dyskinesis  Insurance/Certification information:  PT Insurance Information: [facility]  Physician Information:  Referring Practitioner: Dr. Kenia Wise. Shanti Kay  Has the plan of care been signed (Y/N):        [x]  Yes  []  No     Date of Patient follow up with Physician: May 13, 2020    MEDICARE CAP EXCEPTION DOCUMENTATION  I certify that this patient meets one of the below criteria necessary for becoming an exception to the Medicare cap on therapy services:    [x]  The patient has a condition identified by an ICD-10 code that has a direct and significant impact on the need for therapy. (Significantly impacts the rate of recovery.)                   []  The patient has a complexity identified by an ICD-10 code that has a direct and significant impact on the need for therapy.   (Significantly impacts the rate of recovery and is associated with a primary condition.)         []  The patient has associated variables that influence the amount of treatment to include:  Social support, self-efficacy/motivation, prognosis, time since onset/acuity. [x]  The patient has generalized musculoskeletal conditions or a condition affecting multiple sites that will have a direct impact on the rate of recovery. []  The patient had a prior episode of outpatient therapy during this calendar year for a different condition. []  The patient has a mental or cognitive disorder in addition to the condition being treated that will have a direct and significant impact on the rate of recovery. Is this a Progress Report:     []  Yes  [x]  No   If Yes:  Date Range for reporting period:  Beginnin-3-2022  Endin22    Progress report will be due (10 Rx or 30 days whichever is less):           POC 9980    Recertification will be due (POC Duration  / 90 days whichever is less):   2022     Visit # Insurance Allowable Auth Required   In-person  30 + 8 until 22 [x]  Yes []  No    Telehealth   []  Yes []  No    Total        Functional Scale: Quick Dash=            7%   Date assessed:            22    Therapy Diagnosis/Practice Pattern: 4H     Number of Comorbidities:  []0     [x]1-2    []3+    Latex Allergy:  [x]NO      []YES  Preferred Language for Healthcare:   [x]English       []other:    Pain level:   0 /10    SUBJECTIVE: Pt continues to feel she's made progress with pain, function (is driving, can was her hair, can reach overhead occasionally for ADLs, and 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 She feels her biggest limitation is still strength in her R arm. OBJECTIVE   Observation:Test measurements:   RESTRICTIONS/PRECAUTIONS: Right TTL SHLD Post-op Protocol (See MEDIA TAB),     Exercises/Interventions:  Access Code: [email or web address]  URL: [employer]/  Date: 2022    Access Code: [email or web address]  URL: [employer]/  Date: 2021    Access Code: [email or web address]  URL: [employer] com/  Date: 2021  Therapeutic Ex (80404) Sets/sec Reps Notes/CUES   Finisher: flex, scap, cw, ccw x10 ea  5#    hep          Standing Shld Flex wall slides 4x5 MC @ scap and elbow    Slide \"up\" step [id number]    MC @ scap    Trustetch SHLD Flex Stretch w/ Trunk Flexion H5 x10     Standing Wall Wipes Scapation/ Bilat 3x5 MC @ scap & elbow    Wall Wipes Thread The Needle Posterior Capsule Stretch H5 1x10     Wall Circles @ 90 degCW, CCW 2x5 ea    Equip Lat Pulldown to chest  row 30# 2x10  20#x15     Trustretch Pec minor [place]  MC @ scap and trunk             Standing Shld Ext w/ Elbow Ext  Resume NV   Supine Ext Rot/ bilat RTB H2 2x10     SL Ext Rot SBA for technique Resume NV   SL abd SBA for technique Resume NV   SL Horz Abd to 90   Had to increase rest breaks today   Supine Flex w/ elbow flex / bilat 1# 2x10 SBA    Supine cane flex :05 X 10     Supine post cap s :30 6x     Table slides flex / ER :05 X 10 each    Pulleys  X 20     Biceps curls  Triceps ext standing bend over  x10 ea 3#  3#   Standing horiz abd to ~45 (bent over hi-low table)  2x5    Manual Intervention ([phone])      Re-assess time        PROM right shld /  Grade 2-3 mobs. 8 min     STM  Teres/ lat/ subscap/SL scap mobs 8 min                 NMR re-education (45537)   CUES NEEDED   theraflex bar hold at ~30 deg flex with wrist flex/ext  Hold at ~40 deg flex with rhythmic stab  \" \" 40 deg scap with \" \" x10 ea    15\"   15\"                       Therapeutic Activity (56614)                        Access Code: [email or web address]  URL: [employer]. com/  Date: 56/90/6148  Prepared by: Rahel Blanco    Exercises  Supine Shoulder Flexion Extension AAROM with Dowel - 3 x daily - 7 x weekly - 1 sets - 10 reps - 5 hold  Supine Cross Body Shoulder Stretch - 3 x daily - 7 x weekly - 1 sets - 3 reps - 30 hold  Shoulder External Rotation and Scapular Retraction - 3 x daily - 7 x weekly - 2 sets - 10 reps - 2 hold  Seated Shoulder Flexion Towel Slide at Table Top - 3 x daily - 7 x weekly - 1 sets - 10 reps - 5 hold  Seated Shoulder External Rotation PROM on Table - 3 x daily - 7 x weekly - 1 sets - 10 reps - 5 hold  Standing Shoulder Internal Rotation Stretch with Hands Behind Back - 3 x daily - 7 x weekly - 1 sets - 5 reps - 10 hold  Seated Shoulder Flexion AAROM with Pulley Behind - 3 x daily - 7 x weekly - 3 sets - 10 reps - 2 hold    Therapeutic Exercise and NMR EXR  [x] (46468) Provided verbal/tactile cueing for activities related to strengthening, flexibility, endurance, ROM  for improvements in scapular, scapulothoracic and UE control with self care, reaching, carrying, lifting, house/yardwork, driving/computer work. [x] (84524) Provided verbal/tactile cueing for activities related to improving balance, coordination, kinesthetic sense, posture, motor skill, proprioception  to assist with  scapular, scapulothoracic and UE control with self care, reaching, carrying, lifting, house/yardwork, driving/computer work. Therapeutic Activities:    [] (51427 or 00024) Provided verbal/tactile cueing for activities related to improving balance, coordination, kinesthetic sense, posture, motor skill, proprioception and motor activation to allow for proper function of scapular, scapulothoracic and UE control with self care, carrying, lifting, driving/computer work.      Home Exercise Program:    [x] (70928) Reviewed/Progressed HEP activities related to strengthening, flexibility, endurance, ROM of scapular, scapulothoracic and UE control with self care, reaching, carrying, lifting, house/yardwork, driving/computer work  [] (74321) Reviewed/Progressed HEP activities related to improving balance, coordination, kinesthetic sense, posture, motor skill, proprioception of scapular, scapulothoracic and UE control with self care, reaching, carrying, lifting, house/yardwork, driving/computer work      Manual Treatments:  PROM / STM / Oscillations-Mobs:  G-I, II, III, IV (PA's, Inf., Post.)  [x] (26356) Provided manual therapy to mobilize soft tissue/joints of cervical/CT, scapular GHJ and UE for the purpose of modulating pain, promoting relaxation,  increasing ROM, reducing/eliminating soft tissue swelling/inflammation/restriction, improving soft tissue extensibility and allowing for proper ROM for normal function with self care, reaching, carrying, lifting, house/yardwork, driving/computer work    Modalities: CP to right shoulder x10 min    Charges  Timed Code Treatment Minutes: 45   Total Treatment Minutes: 55       [] EVAL (LOW) 12925   [] EVAL (MOD) 65256   [] EVAL (HIGH) 23946   [] RE-EVAL      [x] JS(47992) x 2  [] IONTO  [] NMR (01144) x    [] VASO  [x] Manual (51435) x 1   [x] Other:CP  [] TA x      [] Mech Traction (64770)  [] ES(attended) (36142)      [] ES (un) (75831):     GOALS  Short Term Goals: To be achieved in: 2 weeks  1. Independent in HEP and progression per patient tolerance, in order to prevent re-injury. []? Progressing: [x]? Met: []? Not Met: []? Adjusted  2. Patient will have a decrease in pain to facilitate improvement in movement, function, and ADLs as indicated by Functional Deficits. []? Progressing: [x]? Met: []? Not Met: []? Adjusted     Long Term Goals: To be achieved in: 12 weeks  1. Disability index score of 42% or less for the [facility] to assist with reaching prior level of function. []? Progressing:  [x]? Met: to 7% 4-5-22 []? Not Met: []? Adjusted  2. Patient will demonstrate increased AROM to 140 deg flexion to allow for proper joint functioning as indicated by patients Functional Deficits. [x]? Progressing:AAROM to ~120, but AROM limited by strength still , []? Met: []? Not Met: []? Adjusted  3. Patient will demonstrate an increase in Strength to 4/5 to allow for proper functional mobility as indicated by patients Functional Deficits. [x]? Progressing: []? Met: []? Not Met: []? Adjusted  4. Patient will retrieve plate from overhead shelf with her right hand without increased symptoms or restriction. []? Progressing: [x]? Met: []? Not Met: []? Adjusted  5. Wash and fix hair with right hand. []? Progressing: [x]? Met: []? Not Met: []? Adjusted         Progression Towards Functional goals:  [] Patient is progressing as expected towards functional goals listed. [x] Progression is slowed due to complexities listed. [] Progression has been slowed due to co-morbidities. [] Plan just implemented, too soon to assess goals progression  [] Other:      ASSESSMENT: Pt fatigued with strength exc's, but pain remains much better since injection. She is most limited by strength still, and differences of PROM vs AROM demonstrate lack of strength/endurance for overhead ADLs/lifting. She will benefit from continued PT to maintain ROM, progress strength exc's. She can decrease frequency to only f/u 1x/week now. Overall Progression Towards Functional goals/ Treatment Progress Update:  [] Patient is progressing as expected towards functional goals listed. [x] Progression is slowed due to complexities/Impairments listed. [] Progression has been slowed due to co-morbidities. [] Plan just implemented, too soon to assess goals progression <30days   [] Goals require adjustment due to lack of progress  [] Patient is not progressing as expected and requires additional follow up with physician  [] Other    Prognosis for POC: [x] Good [] Fair  [] Poor    Patient requires continued skilled intervention: [x] Yes  [] No    Treatment/Activity Tolerance:  [x] Patient able to complete treatment  [] Patient limited by fatigue  [] Patient limited by pain    [] Patient limited by other medical complications  [] Other:     PLAN: Wean to 1x/week. Can ask for more visits to extend POC with pt's upcoming insurance change June 1.   [x] Continue per plan of care [] Alter current plan (see comments above)  [] Plan of care initiated [] Hold pending MD visit [] Discharge    Electronically signed by:  Kiara Zavala, PT, DPT    Note: If patient does not return for scheduled/ recommended follow up visits, this note will serve as a discharge from care along with most recent update on progress.

## 2022-05-19 ENCOUNTER — HOSPITAL ENCOUNTER (OUTPATIENT)
Dept: PHYSICAL THERAPY | Age: 67
Setting detail: THERAPIES SERIES
Discharge: HOME | End: 2022-05-19
Payer: MEDICARE

## 2022-05-19 PROCEDURE — 97110 THERAPEUTIC EXERCISES: CPT

## 2022-05-19 PROCEDURE — 97140 MANUAL THERAPY 1/> REGIONS: CPT

## 2022-05-19 NOTE — FLOWSHEET NOTE
[facility], [address] [facility] [place]  Phone: [phone]  Fax [phone]    Date:  2022    Patient Name:  Michelle Mcconnell    :  1955  MRN: [id number]  Restrictions/Precautions:   Right TTL SHLD Post-op Protocol (See MEDIA TAB),  [id number] 2021 w/ hospitalization and f/u PT 21 to 21,  OA, HTN, Osteoporosis , Back SX for Cason Rods @ age 8, Inner Ear [address] for Replacement of Ear Drum, Right Ulnar Nerve Decompression @ Elbow Aug. 12,2020, Right Carpal Tunnel Release SX Aug 25, 2020, PT Right Shoulder 2020    Medical/Treatment Diagnosis Information:  Diagnosis: M75.101 Right Shld RTC Tear & OA s/p Total SHLD SX (DOS: 2021)  Treatment Diagnosis: M25.511 Right SHLD Pain, M25.521 Right Elbow Pain,M25.611 Rigth SHLD Stiffness, M25.621 Rigth Elbow Stiffness, M62.81 Right UE Weakness , G25.89 Scapular Dyskinesis  Insurance/Certification information:  PT Insurance Information: [facility]  Physician Information:  Referring Practitioner: Dr. Elida Maurer. Ayanna Monte  Has the plan of care been signed (Y/N):        [x]  Yes  []  No     Date of Patient follow up with Physician: May 13, 2020    MEDICARE CAP EXCEPTION DOCUMENTATION  I certify that this patient meets one of the below criteria necessary for becoming an exception to the Medicare cap on therapy services:    [x]  The patient has a condition identified by an ICD-10 code that has a direct and significant impact on the need for therapy. (Significantly impacts the rate of recovery.)                   []  The patient has a complexity identified by an ICD-10 code that has a direct and significant impact on the need for therapy.   (Significantly impacts the rate of recovery and is associated with a primary condition.)         []  The patient has associated variables that influence the amount of treatment to include:  Social support, self-efficacy/motivation, prognosis, time since onset/acuity. [x]  The patient has generalized musculoskeletal conditions or a condition affecting multiple sites that will have a direct impact on the rate of recovery. []  The patient had a prior episode of outpatient therapy during this calendar year for a different condition. []  The patient has a mental or cognitive disorder in addition to the condition being treated that will have a direct and significant impact on the rate of recovery. Is this a Progress Report:     []  Yes  [x]  No   If Yes:  Date Range for reporting period:  Beginnin-3-2022  Endin22    Progress report will be due (10 Rx or 30 days whichever is less):           POC 3-1-4950    Recertification will be due (POC Duration  / 90 days whichever is less):   2022     Visit # Insurance Allowable Auth Required   In-person -PN and visit request NV 30 + 8 until 22 [x]  Yes []  No    Telehealth   []  Yes []  No    Total        Functional Scale: Quick Dash=            7%   Date assessed:            22    Therapy Diagnosis/Practice Pattern: 4H     Number of Comorbidities:  []0     [x]1-2    []3+    Latex Allergy:  [x]NO      []YES  Preferred Language for Healthcare:   [x]English       []other:    Pain level:   0 /10    SUBJECTIVE: Pt will see MD after next PT visit then is switching ins providers, so she hopefully can continue PT. Her pain remains better still since inj; she still gets sore in deltoid mainly now with UE flex/scap and ADLs involving lifting. OBJECTIVE   Observation:Test measurements:   RESTRICTIONS/PRECAUTIONS: Right TTL SHLD Post-op Protocol (See MEDIA TAB),     Exercises/Interventions:  Access Code: [email or web address]  URL: [email or web address]/  Date: 2022    Access Code: [email or web address]  URL: [email or web address]. com/  Date: 2021    Access Code: [email or web address]  URL: [email or web address]. com/  Date: 2021  Therapeutic Ex (13520) Sets/sec Reps - 7 x weekly - 1 sets - 10 reps - 5 hold  Seated Shoulder External Rotation PROM on Table - 3 x daily - 7 x weekly - 1 sets - 10 reps - 5 hold  Standing Shoulder Internal Rotation Stretch with Hands Behind Back - 3 x daily - 7 x weekly - 1 sets - 5 reps - 10 hold  Seated Shoulder Flexion AAROM with Pulley Behind - 3 x daily - 7 x weekly - 3 sets - 10 reps - 2 hold    Therapeutic Exercise and NMR EXR  [x] (42331) Provided verbal/tactile cueing for activities related to strengthening, flexibility, endurance, ROM  for improvements in scapular, scapulothoracic and UE control with self care, reaching, carrying, lifting, house/yardwork, driving/computer work. [x] (07508) Provided verbal/tactile cueing for activities related to improving balance, coordination, kinesthetic sense, posture, motor skill, proprioception  to assist with  scapular, scapulothoracic and UE control with self care, reaching, carrying, lifting, house/yardwork, driving/computer work. Therapeutic Activities:    [] (30307 or 04874) Provided verbal/tactile cueing for activities related to improving balance, coordination, kinesthetic sense, posture, motor skill, proprioception and motor activation to allow for proper function of scapular, scapulothoracic and UE control with self care, carrying, lifting, driving/computer work.      Home Exercise Program:    [x] (55053) Reviewed/Progressed HEP activities related to strengthening, flexibility, endurance, ROM of scapular, scapulothoracic and UE control with self care, reaching, carrying, lifting, house/yardwork, driving/computer work  [] (30363) Reviewed/Progressed HEP activities related to improving balance, coordination, kinesthetic sense, posture, motor skill, proprioception of scapular, scapulothoracic and UE control with self care, reaching, carrying, lifting, house/yardwork, driving/computer work      Manual Treatments:  PROM / STM / Oscillations-Mobs:  G-I, II, III, IV (PA's, Inf., Post.)  [x] (39983) Provided manual therapy to mobilize soft tissue/joints of cervical/CT, scapular GHJ and UE for the purpose of modulating pain, promoting relaxation,  increasing ROM, reducing/eliminating soft tissue swelling/inflammation/restriction, improving soft tissue extensibility and allowing for proper ROM for normal function with self care, reaching, carrying, lifting, house/yardwork, driving/computer work    Modalities:    Declined   Charges  Timed Code Treatment Minutes: 45   Total Treatment Minutes: 45       [] EVAL (LOW) 60028   [] EVAL (MOD) 60809   [] EVAL (HIGH) 56868   [] RE-EVAL      [x] PK(04355) x 2  [] IONTO  [] NMR (70552) x    [] VASO   [x] Manual (27592) x 1   [] Other:CP  [] TA x      [] Mech Traction (26814)  [] ES(attended) (34345)      [] ES (un) (21369):     GOALS  Short Term Goals: To be achieved in: 2 weeks  1. Independent in HEP and progression per patient tolerance, in order to prevent re-injury. []? Progressing: [x]? Met: []? Not Met: []? Adjusted  2. Patient will have a decrease in pain to facilitate improvement in movement, function, and ADLs as indicated by Functional Deficits. []? Progressing: [x]? Met: []? Not Met: []? Adjusted     Long Term Goals: To be achieved in: 12 weeks  1. Disability index score of 42% or less for the [facility] to assist with reaching prior level of function. []? Progressing:  [x]? Met: to 7% 4-5-22 []? Not Met: []? Adjusted  2. Patient will demonstrate increased AROM to 140 deg flexion to allow for proper joint functioning as indicated by patients Functional Deficits. [x]? Progressing:AAROM to ~120, but AROM limited by strength still , []? Met: []? Not Met: []? Adjusted  3. Patient will demonstrate an increase in Strength to 4/5 to allow for proper functional mobility as indicated by patients Functional Deficits. [x]? Progressing: []? Met: []? Not Met: []? Adjusted  4.  Patient will retrieve plate from overhead shelf with her right hand without increased symptoms or restriction. []? Progressing: [x]? Met: []? Not Met: []? Adjusted  5. Wash and fix hair with right hand. []? Progressing: [x]? Met: []? Not Met: []? Adjusted         Progression Towards Functional goals:  [] Patient is progressing as expected towards functional goals listed. [x] Progression is slowed due to complexities listed. [] Progression has been slowed due to co-morbidities. [] Plan just implemented, too soon to assess goals progression  [] Other:      ASSESSMENT: Pt remains fatitued fatigued with strength exc's, but pain remains much better since injection. She is most limited by strength still, and differences of PROM vs AROM demonstrate lack of strength/endurance for overhead ADLs/lifting. She will benefit from continued PT to maintain ROM, progress strength exc's. She can decrease frequency to only f/u 1x/week now. Will need to ask for addit auth vs extend POC based on insurance changes. Overall Progression Towards Functional goals/ Treatment Progress Update:  [] Patient is progressing as expected towards functional goals listed. [x] Progression is slowed due to complexities/Impairments listed. [] Progression has been slowed due to co-morbidities. [] Plan just implemented, too soon to assess goals progression <30days   [] Goals require adjustment due to lack of progress  [] Patient is not progressing as expected and requires additional follow up with physician  [] Other    Prognosis for POC: [x] Good [] Fair  [] Poor    Patient requires continued skilled intervention: [x] Yes  [] No    Treatment/Activity Tolerance:  [x] Patient able to complete treatment  [] Patient limited by fatigue  [] Patient limited by pain    [] Patient limited by other medical complications  [] Other:     PLAN: Wean to 1x/week. Can ask for more visits to extend POC with pt's upcoming insurance change June 1.   [x] Continue per plan of care [] Alter current plan (see comments above)  [] Plan of care initiated [] Hold pending MD visit [] Discharge    Electronically signed by:  Jovon Lopez PT, DPT    Note: If patient does not return for scheduled/ recommended follow up visits, this note will serve as a discharge from care along with most recent update on progress.

## 2022-05-26 ENCOUNTER — HOSPITAL ENCOUNTER (OUTPATIENT)
Dept: PHYSICAL THERAPY | Age: 67
Setting detail: THERAPIES SERIES
Discharge: HOME | End: 2022-05-26
Payer: MEDICARE

## 2022-05-26 PROCEDURE — 97140 MANUAL THERAPY 1/> REGIONS: CPT

## 2022-05-26 PROCEDURE — 97110 THERAPEUTIC EXERCISES: CPT

## 2022-05-26 NOTE — PLAN OF CARE
[facility], [address] [place]  Phone: [phone]  Fax [phone]    Date:  2022    Patient Name:  Felipe Hutchison    :  1955  MRN: [id number]  Restrictions/Precautions:   Right TTL SHLD Post-op Protocol (See MEDIA TAB),  NVWCE-23 2021 w/ hospitalization and f/u PT 21 to 21,  OA, HTN, Osteoporosis , Back SX for Cason Rods @ age 8, Inner Ear [address] for Replacement of Ear Drum, Right Ulnar Nerve Decompression @ Elbow Aug. 12,2020, Right Carpal Tunnel Release SX Aug 25, 2020, PT Right Shoulder 2020    Medical/Treatment Diagnosis Information:  Diagnosis: M75.101 Right Shld RTC Tear & OA s/p Total SHLD SX (DOS: 2021)  Treatment Diagnosis: M25.511 Right SHLD Pain, M25.521 Right Elbow Pain,M25.611 Rigth SHLD Stiffness, M25.621 Rigth Elbow Stiffness, M62.81 Right UE Weakness , G25.89 Scapular Dyskinesis  Insurance/Certification information:  PT Insurance Information: [facility]  Physician Information:  Referring Practitioner: Dr. Michael Arthur. Steph Lezama  Has the plan of care been signed (Y/N):        [x]  Yes  []  No     Date of Patient follow up with Physician: May 13, 2020    MEDICARE CAP EXCEPTION DOCUMENTATION  I certify that this patient meets one of the below criteria necessary for becoming an exception to the Medicare cap on therapy services:    [x]  The patient has a condition identified by an ICD-10 code that has a direct and significant impact on the need for therapy. (Significantly impacts the rate of recovery.)                   []  The patient has a complexity identified by an ICD-10 code that has a direct and significant impact on the need for therapy.   (Significantly impacts the rate of recovery and is associated with a primary condition.)         []  The patient has associated variables that influence the amount of treatment to include:  Social support, self-efficacy/motivation, prognosis, time since onset/acuity. [x]  The patient has generalized musculoskeletal conditions or a condition affecting multiple sites that will have a direct impact on the rate of recovery. []  The patient had a prior episode of outpatient therapy during this calendar year for a different condition. []  The patient has a mental or cognitive disorder in addition to the condition being treated that will have a direct and significant impact on the rate of recovery. Is this a Progress Report:     []  Yes  [x]  No   If Yes:  Date Range for reporting period:  Beginnin-3-2022  Endin22    Progress report will be due (10 Rx or 30 days whichever is less):             Recertification will be due (POC Duration  / 90 days whichever is less):   2022     Visit # Insurance Allowable Auth Required   In-person -PN and visit request written  until 22 [x]  Yes []  No    Telehealth   []  Yes []  No    Total      11%   Date assessed:            22    Therapy Diagnosis/Practice Pattern: 4H     Number of Comorbidities:  []0     [x]1-2    []3+    Latex Allergy:  [x]NO      []YES  Preferred Language for Healthcare:   [x]English       []other:    Pain level:   0 /10    SUBJECTIVE: Pt will see MD then is switching ins providers, so she hopefully can continue PT. She feels her pain is excellent, mostly fatigue/soreness with inc'd activity from ADLs involving lifting, carrying, reaching.     OBJECTIVE   Observation:Test measurements: AROM flex 85 with UT sub,  scap 65 with UT sub  Functional ER thumb to R ear lobe Functional IR thumb to R PSIS  PROM flex 135   scap 130    ER 68 at deg 70 abd  80 IR at deg 60 abdMMT flex 3-/5  abd 3-/5  3+ ER in neutral  4 IR in neutral   Biceps  4+/5  Triceps  4/5   Rhomboids 4/5 (seated)  RESTRICTIONS/PRECAUTIONS: Right TTL SHLD Post-op Protocol (See MEDIA TAB),     Exercises/Interventions:  Access Code: [email or web address]  URL: [email or web address]  Date: 01/04/2022    Access Code: [email or web address]  URL: [employer]/  Date: 12/23/2021    Access Code: [email or web address]  URL: [email or web address]  Date: 12/09/2021  Therapeutic Ex (70539) Sets/sec Reps Notes/CUES   Finisher: flex, scap, cw, ccw x10 ea  5#    hep          Standing Shld Flex wall slides, scaption 2x5 MC @ scap and elbow    Slide \"up\" step [id number]    MC @ scap    Trustetch SHLD Flex Stretch w/ Trunk Flexion H5 x10           Wall Wipes Thread The Needle Posterior Capsule Stretch H5 1x10     Wall Circles @ 90 degCW, CCW 2x5 ea    Equip Lat Pulldown to chest  Row  Single arm row 30# 2x10  20# 2x10  5# x10     Trustretch Pec minor [place]  MC @ scap and trunk             Standing Shld Ext w/ Elbow Ext  Resume NV   Supine Ext Rot/ bilat RTB H2 2x10     SL Ext Rot 2x10 SBA for technique 1#   SL abd  2x10 SBA for technique 1#   SL Horz Abd to 90  2x10    Supine Flex w/ elbow flex / bilat 1# 2x10 SBA    Supine cane flex :05 X 10     Supine post cap s :30 6x     Table slides flex / ER :05 X 10 each    Pulleys  X 20     Biceps curls  Triceps ext standing bend over  x10 ea 3#  3# progress NV   Standing horiz abd to ~45 (bent over hi-low table)  2x5    Manual Intervention ([phone])      Re-assess time        PROM right shld /  Grade 2-3 mobs. 8 min     STM  Teres/ lat/ subscap/SL scap mobs 8 min     2nd rib mob R supine gr III  + L SB            NMR re-education (68662)   CUES NEEDED   theraflex bar hold at ~30 deg flex with wrist flex/ext  Hold at ~40 deg flex with rhythmic stab  \" \" 40 deg scap with \" \"                        Therapeutic Activity (18729)                        Access Code: [email or web address]  URL: [employer]/  Date: 67/37/1236  Prepared by: Armando Long    Exercises  Supine Shoulder Flexion Extension AAROM with Dowel - 3 x daily - 7 x weekly - 1 sets - 10 reps - 5 hold  Supine Cross Body Shoulder Stretch - 3 x daily - 7 x weekly - 1 sets - 3 reps - 30 hold  Shoulder External Rotation and Scapular Retraction - 3 x daily - 7 x weekly - 2 sets - 10 reps - 2 hold  Seated Shoulder Flexion Towel Slide at Table Top - 3 x daily - 7 x weekly - 1 sets - 10 reps - 5 hold  Seated Shoulder External Rotation PROM on Table - 3 x daily - 7 x weekly - 1 sets - 10 reps - 5 hold  Standing Shoulder Internal Rotation Stretch with Hands Behind Back - 3 x daily - 7 x weekly - 1 sets - 5 reps - 10 hold  Seated Shoulder Flexion AAROM with Pulley Behind - 3 x daily - 7 x weekly - 3 sets - 10 reps - 2 hold    Therapeutic Exercise and NMR EXR  [x] (54205) Provided verbal/tactile cueing for activities related to strengthening, flexibility, endurance, ROM  for improvements in scapular, scapulothoracic and UE control with self care, reaching, carrying, lifting, house/yardwork, driving/computer work. [x] (33107) Provided verbal/tactile cueing for activities related to improving balance, coordination, kinesthetic sense, posture, motor skill, proprioception  to assist with  scapular, scapulothoracic and UE control with self care, reaching, carrying, lifting, house/yardwork, driving/computer work. Therapeutic Activities:    [] (61245 or 77045) Provided verbal/tactile cueing for activities related to improving balance, coordination, kinesthetic sense, posture, motor skill, proprioception and motor activation to allow for proper function of scapular, scapulothoracic and UE control with self care, carrying, lifting, driving/computer work.      Home Exercise Program:    [x] (06952) Reviewed/Progressed HEP activities related to strengthening, flexibility, endurance, ROM of scapular, scapulothoracic and UE control with self care, reaching, carrying, lifting, house/yardwork, driving/computer work  [] (04217) Reviewed/Progressed HEP activities related to improving balance, coordination, kinesthetic sense, posture, motor skill, proprioception of scapular, scapulothoracic and UE control with self care, reaching, carrying, lifting, house/yardwork, driving/computer work      Manual Treatments:  PROM / STM / Oscillations-Mobs:  G-I, II, III, IV (PA's, Inf., Post.)  [x] (57583) Provided manual therapy to mobilize soft tissue/joints of cervical/CT, scapular GHJ and UE for the purpose of modulating pain, promoting relaxation,  increasing ROM, reducing/eliminating soft tissue swelling/inflammation/restriction, improving soft tissue extensibility and allowing for proper ROM for normal function with self care, reaching, carrying, lifting, house/yardwork, driving/computer work    Modalities:    Declined   Charges  Timed Code Treatment Minutes: 45   Total Treatment Minutes: 45       [] EVAL (LOW) 26303   [] EVAL (MOD) 34920   [] EVAL (HIGH) 50290   [] RE-EVAL      [x] PQ(67367) x 2  [] IONTO  [] NMR (61015) x    [] VASO   [x] Manual (09039) x 1   [] Other:CP  [] TA x      [] Mech Traction (81610)  [] ES(attended) (44157)      [] ES (un) (51905):     GOALS  Short Term Goals: To be achieved in: 2 weeks  1. Independent in HEP and progression per patient tolerance, in order to prevent re-injury. []? Progressing: [x]? Met: []? Not Met: []? Adjusted  2. Patient will have a decrease in pain to facilitate improvement in movement, function, and ADLs as indicated by Functional Deficits. []? Progressing: [x]? Met: []? Not Met: []? Adjusted     Long Term Goals: To be achieved in: 12 weeks  1. Disability index score of 42% or less for the [facility] to assist with reaching prior level of function. []? Progressing:  [x]? Met: to 11% 5-26-22 []? Not Met: []? Adjusted  2. Patient will demonstrate increased AROM to 140 deg flexion to allow for proper joint functioning as indicated by patients Functional Deficits. [x]? Progressing:AAROM to ~120, but AROM limited by strength still , []? Met: []? Not Met: []? Adjusted  3.  Patient will demonstrate an increase in Strength to 4/5 to allow for proper functional mobility as indicated by patients Functional Deficits. [x]? Progressing: []? Met: []? Not Met: []? Adjusted  4. Patient will retrieve plate from overhead shelf with her right hand without increased symptoms or restriction. []? Progressing: [x]? Met: []? Not Met: []? Adjusted  5. Wash and fix hair with right hand. []? Progressing: [x]? Met: []? Not Met: []? Adjusted         Progression Towards Functional goals:  [] Patient is progressing as expected towards functional goals listed. [x] Progression is slowed due to complexities listed. [] Progression has been slowed due to co-morbidities. [] Plan just implemented, too soon to assess goals progression  [] Other:      ASSESSMENT: Pt remains fatitued fatigued with strength exc's, but pain remains much better since injection. She is most limited by strength still, and differences of PROM vs AROM demonstrate lack of strength/endurance for overhead ADLs/lifting. She will benefit from continued PT to maintain ROM, progress strength exc's. She can decrease frequency to only f/u 1x/week now. Request additional visits 1x/week x 8-12 weeks pending insurance changes. Overall Progression Towards Functional goals/ Treatment Progress Update:  [] Patient is progressing as expected towards functional goals listed. [x] Progression is slowed due to complexities/Impairments listed. [] Progression has been slowed due to co-morbidities. [] Plan just implemented, too soon to assess goals progression <30days   [] Goals require adjustment due to lack of progress  [] Patient is not progressing as expected and requires additional follow up with physician  [] Other    Prognosis for POC: [x] Good [] Fair  [] Poor    Patient requires continued skilled intervention: [x] Yes  [] No    Treatment/Activity Tolerance:  [x] Patient able to complete treatment  [] Patient limited by fatigue  [] Patient limited by pain    [] Patient limited by other medical complications  [] Other:     PLAN: Wean to 1x/week. Request 12 more PT visits, new insurance provider will factor into this. [x] Continue per plan of care [x] Alter current plan (see comments above)  [] Plan of care initiated [] Hold pending MD visit [] Discharge    Electronically signed by:  Marlon Malik PT, DPT    Note: If patient does not return for scheduled/ recommended follow up visits, this note will serve as a discharge from care along with most recent update on progress.

## 2022-06-02 ENCOUNTER — HOSPITAL ENCOUNTER (OUTPATIENT)
Dept: PHYSICAL THERAPY | Age: 67
Setting detail: THERAPIES SERIES
Discharge: HOME | End: 2022-06-02
Payer: MEDICARE

## 2022-06-02 PROCEDURE — 97110 THERAPEUTIC EXERCISES: CPT

## 2022-06-02 PROCEDURE — 97140 MANUAL THERAPY 1/> REGIONS: CPT

## 2022-06-02 NOTE — FLOWSHEET NOTE
[facility], [address] [place]  Phone: [phone]  Fax [phone]    Date:  2022    Patient Name:  Miguel Mcdaniel    :  1955  MRN: [id number]  Restrictions/Precautions:   Right TTL SHLD Post-op Protocol (See MEDIA TAB),  [id number] 2021 w/ hospitalization and f/u PT 21 to 21,  OA, HTN, Osteoporosis , Back SX for Cason Rods @ age 8, Inner Ear [address] for Replacement of Ear Drum, Right Ulnar Nerve Decompression @ Elbow Aug. 12,2020, Right Carpal Tunnel Release SX Aug 25, 2020, PT Right Shoulder 2020    Medical/Treatment Diagnosis Information:  Diagnosis: M75.101 Right Shld RTC Tear & OA s/p Total SHLD SX (DOS: 2021)  Treatment Diagnosis: M25.511 Right SHLD Pain, M25.521 Right Elbow Pain,M25.611 Rigth SHLD Stiffness, M25.621 Rigth Elbow Stiffness, M62.81 Right UE Weakness , G25.89 Scapular Dyskinesis  Insurance/Certification information:  PT Insurance Information: [employer] (Changed 22)  Physician Information:  Referring Practitioner: Dr. Indra Holliday. Bro Alert  Has the plan of care been signed (Y/N):        [x]  Yes  []  No     Date of Patient follow up with Physician: May 13, 2020    MEDICARE CAP EXCEPTION DOCUMENTATION  I certify that this patient meets one of the below criteria necessary for becoming an exception to the Medicare cap on therapy services:    [x]  The patient has a condition identified by an ICD-10 code that has a direct and significant impact on the need for therapy. (Significantly impacts the rate of recovery.)                   []  The patient has a complexity identified by an ICD-10 code that has a direct and significant impact on the need for therapy.   (Significantly impacts the rate of recovery and is associated with a primary condition.)         []  The patient has associated variables that influence the amount of treatment to include:  Social support, self-efficacy/motivation, prognosis, time since onset/acuity. [x]  The patient has generalized musculoskeletal conditions or a condition affecting multiple sites that will have a direct impact on the rate of recovery. []  The patient had a prior episode of outpatient therapy during this calendar year for a different condition. []  The patient has a mental or cognitive disorder in addition to the condition being treated that will have a direct and significant impact on the rate of recovery. Is this a Progress Report:     []  Yes  [x]  No     If Yes:  Date Range for reporting period:  Beginnin-3-2022  Endin22    Progress report will be due (10 Rx or 30 days whichever is less):           29  Recertification will be due (POC Duration  / 90 days whichever is less):   2022     Visit # Insurance Allowable Auth Required   In-person -PN and visit request written  until 22 [x]  Yes []  No    Telehealth   []  Yes []  No    Total      11%   Date assessed:            22    Therapy Diagnosis/Practice Pattern: 4H     Number of Comorbidities:  []0     [x]1-2    []3+    Latex Allergy:  [x]NO      []YES  Preferred Language for Healthcare:   [x]English       []other:    Pain level:   0 /10    SUBJECTIVE: Pt had f/u with MD, wants pt to continue with building strength in PT and f/u again in . She switched insurance coverage 22. OBJECTIVE   Observation:Test measurements:   RESTRICTIONS/PRECAUTIONS: Right TTL SHLD Post-op Protocol (See MEDIA TAB),     Exercises/Interventions:  Access Code: [email or web address]  URL: [email or web address]  Date: 2022    Access Code: [email or web address]  URL: [email or web address]/  Date: 2021    Access Code: [email or web address]  URL: [email or web address]  Date: 2021  Therapeutic Ex (65652) Sets/sec Reps Notes/CUES   Finisher: flex, scap, cw, ccw x10 ea  5#    hep          Standing Shld Flex wall slides, scaption 2x5 MC @ promoting relaxation,  increasing ROM, reducing/eliminating soft tissue swelling/inflammation/restriction, improving soft tissue extensibility and allowing for proper ROM for normal function with self care, reaching, carrying, lifting, house/yardwork, driving/computer work    Modalities:    Declined   Charges  Timed Code Treatment Minutes: 38   Total Treatment Minutes: 45       [] EVAL (LOW) 46359   [] EVAL (MOD) 74065   [] EVAL (HIGH) 25324   [] RE-EVAL      [x] SL(61492) x 2  [] IONTO  [] NMR (24356) x    [] VASO   [x] Manual (57866) x 1   [] Other:CP  [] TA x      [] Mech Traction (78376)  [] ES(attended) (04731)      [] ES (un) (18435):     GOALS  Short Term Goals: To be achieved in: 2 weeks  1. Independent in HEP and progression per patient tolerance, in order to prevent re-injury. []? Progressing: [x]? Met: []? Not Met: []? Adjusted  2. Patient will have a decrease in pain to facilitate improvement in movement, function, and ADLs as indicated by Functional Deficits. []? Progressing: [x]? Met: []? Not Met: []? Adjusted     Long Term Goals: To be achieved in: 12 weeks  1. Disability index score of 42% or less for the [facility] to assist with reaching prior level of function. []? Progressing:  [x]? Met: to 11% 5-26-22 []? Not Met: []? Adjusted  2. Patient will demonstrate increased AROM to 140 deg flexion to allow for proper joint functioning as indicated by patients Functional Deficits. [x]? Progressing:AAROM to ~120, but AROM limited by strength still , []? Met: []? Not Met: []? Adjusted  3. Patient will demonstrate an increase in Strength to 4/5 to allow for proper functional mobility as indicated by patients Functional Deficits. [x]? Progressing: []? Met: []? Not Met: []? Adjusted  4. Patient will retrieve plate from overhead shelf with her right hand without increased symptoms or restriction. []? Progressing: [x]? Met: []? Not Met: []? Adjusted  5. Wash and fix hair with right hand.    []? Progressing: [x]? Met: []? Not Met: []? Adjusted         Progression Towards Functional goals:  [] Patient is progressing as expected towards functional goals listed. [x] Progression is slowed due to complexities listed. [] Progression has been slowed due to co-morbidities. [] Plan just implemented, too soon to assess goals progression  [] Other:      ASSESSMENT: Pt remains fatigued with strength exc's, but pain remains much better since injection. She is most limited by strength still, and differences of PROM vs AROM demonstrate lack of strength/endurance for overhead ADLs/lifting. She will benefit from continued PT to maintain ROM, progress strength exc's. Decrease frequency to only f/u 1x/week now to focus on strength building and progression of HEP. Overall Progression Towards Functional goals/ Treatment Progress Update:  [] Patient is progressing as expected towards functional goals listed. [x] Progression is slowed due to complexities/Impairments listed. [] Progression has been slowed due to co-morbidities. [] Plan just implemented, too soon to assess goals progression <30days   [] Goals require adjustment due to lack of progress  [] Patient is not progressing as expected and requires additional follow up with physician  [] Other    Prognosis for POC: [x] Good [] Fair  [] Poor    Patient requires continued skilled intervention: [x] Yes  [] No    Treatment/Activity Tolerance:  [x] Patient able to complete treatment  [] Patient limited by fatigue  [] Patient limited by pain    [] Patient limited by other medical complications  [] Other:     PLAN: Wean to 1x/week. Request 12 more PT visits, new insurance provider will factor into this.   [x] Continue per plan of care [x] Alter current plan (see comments above)  [] Plan of care initiated [] Hold pending MD visit [] Discharge    Electronically signed by:  Bertrand Guy, PT, DPT    Note: If patient does not return for scheduled/ recommended follow up visits, this note will serve as a discharge from care along with most recent update on progress.

## 2022-06-09 ENCOUNTER — APPOINTMENT (OUTPATIENT)
Dept: PHYSICAL THERAPY | Age: 67
End: 2022-06-09
Payer: MEDICARE

## 2022-06-10 ENCOUNTER — HOSPITAL ENCOUNTER (OUTPATIENT)
Dept: PHYSICAL THERAPY | Age: 67
Setting detail: THERAPIES SERIES
Discharge: HOME | End: 2022-06-10
Payer: MEDICARE

## 2022-06-10 PROCEDURE — 97110 THERAPEUTIC EXERCISES: CPT

## 2022-06-10 PROCEDURE — 97140 MANUAL THERAPY 1/> REGIONS: CPT

## 2022-06-10 NOTE — FLOWSHEET NOTE
[facility], [address] [place]  Phone: [phone]  Fax [phone]    Date:  6/10/2022    Patient Name:  Kathlean Brunner    :  1955  MRN: [id number]  Restrictions/Precautions:   Right TTL SHLD Post-op Protocol (See MEDIA TAB),  [id number] 2021 w/ hospitalization and f/u PT 21 to 21,  OA, HTN, Osteoporosis , Back SX for Cason Rods @ age 8, Inner Ear [address] for Replacement of Ear Drum, Right Ulnar Nerve Decompression @ Elbow Aug. 12,2020, Right Carpal Tunnel Release SX Aug 25, 2020, PT Right Shoulder 2020    Medical/Treatment Diagnosis Information:  Diagnosis: M75.101 Right Shld RTC Tear & OA s/p Total SHLD SX (DOS: 2021)  Treatment Diagnosis: M25.511 Right SHLD Pain, M25.521 Right Elbow Pain,M25.611 Rigth SHLD Stiffness, M25.621 Rigth Elbow Stiffness, M62.81 Right UE Weakness , G25.89 Scapular Dyskinesis  Insurance/Certification information:  PT Insurance Information: Aetna Medicare (Changed 22)  Physician Information:  Referring Practitioner: Dr. Eber Baker. Stanislav Bui  Has the plan of care been signed (Y/N):        [x]  Yes  []  No     Date of Patient follow up with Physician: May 13, 2020    MEDICARE CAP EXCEPTION DOCUMENTATION  I certify that this patient meets one of the below criteria necessary for becoming an exception to the Medicare cap on therapy services:    [x]  The patient has a condition identified by an ICD-10 code that has a direct and significant impact on the need for therapy. (Significantly impacts the rate of recovery.)                   []  The patient has a complexity identified by an ICD-10 code that has a direct and significant impact on the need for therapy.   (Significantly impacts the rate of recovery and is associated with a primary condition.)         []  The patient has associated variables that influence the amount of treatment to include:  Social support, self-efficacy/motivation, prognosis, time since onset/acuity. [x]  The patient has generalized musculoskeletal conditions or a condition affecting multiple sites that will have a direct impact on the rate of recovery. []  The patient had a prior episode of outpatient therapy during this calendar year for a different condition. []  The patient has a mental or cognitive disorder in addition to the condition being treated that will have a direct and significant impact on the rate of recovery. Is this a Progress Report:     []  Yes  [x]  No     If Yes:  Date Range for reporting period:  Beginnin-3-2022  Endin22    Progress report will be due (10 Rx or 30 days whichever is less):             Recertification will be due (POC Duration  / 90 days whichever is less):   2022     Visit # Insurance Allowable Auth Required   In-person 40 30 + 8 until 22 [x]  Yes []  No    Telehealth   []  Yes []  No    Total      11%   Date assessed:            22    Therapy Diagnosis/Practice Pattern: 4H     Number of Comorbidities:  []0     [x]1-2    []3+    Latex Allergy:  [x]NO      []YES  Preferred Language for Healthcare:   [x]English       []other:    Pain level:   0 /10    SUBJECTIVE: Pt is slightly more sore in ant and lat shoulder today, feels she's sore from more typing and writing. OBJECTIVE   Observation:TTP prox biceps and pec (mild), less at deltoidTest measurements:   RESTRICTIONS/PRECAUTIONS: Right TTL SHLD Post-op Protocol (See MEDIA TAB),     Exercises/Interventions:  Access Code: [email or web address]  URL: [employer]. com/  Date: 2022    Access Code: [email or web address]  URL: [employer]. com/  Date: 2021    Access Code: [email or web address]  URL: [email or web address]. com/  Date: 2021  Therapeutic Ex (98337) Sets/sec Reps Notes/CUES   Finisher: flex, scap, cw, ccw x10 ea  3#    hep          Standing Shld Flex wall slides, scaption 2x5 MC @ scap and elbow Slide \"up\" step [id number]    MC @ scap    Trustetch SHLD Flex Stretch w/ Trunk Flexion            Wall Wipes Thread The Needle Posterior Capsule Stretch H5 1x10     Wall Circles @ 90 degCW, CCW 2x5 ea    Equip Lat Pulldown to chest  Row  Single arm row 30# 2x10  20# 2x10  5# x10     Trustretch Pec minor Rocks  MC @ scap and trunk             Standing Shld Ext w/ Elbow Ext GTB H2  2x10    Supine Ext Rot/ bilat RTB H2 2x10     SL Ext Rot 2x10 SBA for technique 1#   SL abd  SL flex reach  x10  x10 SBA for technique 1#  -   SL Horz Abd to 90  x10 1# NV? Supine Flex w/ elbow flex / bilat  Bench press  Single arm press 2# 2x5  5# 2x5  2# 2x5     Supine cane flex :05 X 10     Supine post cap s    Table slides flex / ER :05 X 10 each    Pulleys       Biceps curls  Triceps ext standing bend over  x10 ea 3#  3# progress NV   Standing horiz abd to ~45 (bent over hi-low table)  Standing horiz abd band flex to ~30 deg  2x5    2x5    Manual Intervention ([id number])      Re-assess time        PROM right shld /  Grade 2-3 mobs. 8 min     STM  prox biceps, pec, deltoid  Teres/ lat/ subscap/SL scap mobs 10 min     2nd rib mob R supine gr III  + L SB            NMR re-education (61730)   CUES NEEDED   theraflex bar hold at ~30 deg flex with wrist flex/ext  Hold at ~40 deg flex with rhythmic stab  \" \" 40 deg scap with \" \"                        Therapeutic Activity (14417)                        Access Code: [email or web address]  URL: [email or web address]. com/  Date: 23/29/8342  Prepared by: Al Lawson    Exercises  Supine Shoulder Flexion Extension AAROM with Dowel - 3 x daily - 7 x weekly - 1 sets - 10 reps - 5 hold  Supine Cross Body Shoulder Stretch - 3 x daily - 7 x weekly - 1 sets - 3 reps - 30 hold  Shoulder External Rotation and Scapular Retraction - 3 x daily - 7 x weekly - 2 sets - 10 reps - 2 hold  Seated Shoulder Flexion Towel Slide at Table Top - 3 x daily - 7 x weekly - 1 sets - 10 reps - 5 hold  Seated Shoulder External cervical/CT, scapular GHJ and UE for the purpose of modulating pain, promoting relaxation,  increasing ROM, reducing/eliminating soft tissue swelling/inflammation/restriction, improving soft tissue extensibility and allowing for proper ROM for normal function with self care, reaching, carrying, lifting, house/yardwork, driving/computer work    Modalities: HMP to neck and CP to right shoulder x10 min    Charges  Timed Code Treatment Minutes: 45   Total Treatment Minutes: 55       [] EVAL (LOW) 92262   [] EVAL (MOD) 68721   [] EVAL (HIGH) 66391   [] RE-EVAL      [x] BB(58900) x 2  [] IONTO  [] NMR (85966) x    [] VASO   [x] Manual (53829) x 1   [] Other:CP  [] TA x      [] Mech Traction (73009)  [] ES(attended) (15982)      [] ES (un) (15384):     GOALS  Short Term Goals: To be achieved in: 2 weeks  1. Independent in HEP and progression per patient tolerance, in order to prevent re-injury. []? Progressing: [x]? Met: []? Not Met: []? Adjusted  2. Patient will have a decrease in pain to facilitate improvement in movement, function, and ADLs as indicated by Functional Deficits. []? Progressing: [x]? Met: []? Not Met: []? Adjusted     Long Term Goals: To be achieved in: 12 weeks  1. Disability index score of 42% or less for the [facility] to assist with reaching prior level of function. []? Progressing:  [x]? Met: to 11% 5-26-22 []? Not Met: []? Adjusted  2. Patient will demonstrate increased AROM to 140 deg flexion to allow for proper joint functioning as indicated by patients Functional Deficits. [x]? Progressing:AAROM to ~120, but AROM limited by strength still , []? Met: []? Not Met: []? Adjusted  3. Patient will demonstrate an increase in Strength to 4/5 to allow for proper functional mobility as indicated by patients Functional Deficits. [x]? Progressing: []? Met: []? Not Met: []? Adjusted  4. Patient will retrieve plate from overhead shelf with her right hand without increased symptoms or restriction.    []? Progressing: [x]? Met: []? Not Met: []? Adjusted  5. Wash and fix hair with right hand. []? Progressing: [x]? Met: []? Not Met: []? Adjusted         Progression Towards Functional goals:  [] Patient is progressing as expected towards functional goals listed. [x] Progression is slowed due to complexities listed. [] Progression has been slowed due to co-morbidities. [] Plan just implemented, too soon to assess goals progression  [] Other:      ASSESSMENT: Pt slightly more sore this date, had to modify reps/sets of exc and held off as many >90 reach exc's. but did continue to work on scap and pec/deltoid strenth on table. She is most limited by strength still, and differences of PROM vs AROM demonstrate lack of strength/endurance for overhead ADLs/lifting. She will benefit from continued PT to maintain ROM, progress strength exc's. Decrease frequency to only f/u 1x/week now to focus on strength building and progression of HEP. Overall Progression Towards Functional goals/ Treatment Progress Update:  [] Patient is progressing as expected towards functional goals listed. [x] Progression is slowed due to complexities/Impairments listed. [] Progression has been slowed due to co-morbidities. [] Plan just implemented, too soon to assess goals progression <30days   [] Goals require adjustment due to lack of progress  [] Patient is not progressing as expected and requires additional follow up with physician  [] Other    Prognosis for POC: [x] Good [] Fair  [] Poor    Patient requires continued skilled intervention: [x] Yes  [] No    Treatment/Activity Tolerance:  [x] Patient able to complete treatment  [] Patient limited by fatigue  [] Patient limited by pain    [] Patient limited by other medical complications  [] Other:     PLAN: Wean to 1x/week. Request 12 more PT visits, new insurance provider will factor into this.   [x] Continue per plan of care [x] Alter current plan (see comments above)  [] Plan of care initiated [] Hold pending MD visit [] Discharge    Electronically signed by:  Eufemia De Souza, PT, DPT    Note: If patient does not return for scheduled/ recommended follow up visits, this note will serve as a discharge from care along with most recent update on progress.

## 2022-06-17 ENCOUNTER — HOSPITAL ENCOUNTER (OUTPATIENT)
Dept: PHYSICAL THERAPY | Age: 67
Setting detail: THERAPIES SERIES
Discharge: HOME | End: 2022-06-17
Payer: MEDICARE

## 2022-06-17 PROCEDURE — 97140 MANUAL THERAPY 1/> REGIONS: CPT

## 2022-06-17 PROCEDURE — 97110 THERAPEUTIC EXERCISES: CPT

## 2022-06-17 NOTE — FLOWSHEET NOTE
[facility], [address], [address] [place] [address] [facility] [place]  Phone: [phone]  Fax [phone]    Date:  2022    Patient Name:  Reji Caceres    :  1955  MRN: [id number]  Restrictions/Precautions:   Right TTL SHLD Post-op Protocol (See MEDIA TAB),  HGQNL-14 2021 w/ hospitalization and f/u PT 21 to 21,  OA, HTN, Osteoporosis , Back SX for Cason Rods @ age 8, Inner Ear [address] for Replacement of Ear Drum, Right Ulnar Nerve Decompression @ Elbow Aug. 12,2020, Right Carpal Tunnel Release SX Aug 25, 2020, PT Right Shoulder 2020    Medical/Treatment Diagnosis Information:  Diagnosis: M75.101 Right Shld RTC Tear & OA s/p Total SHLD SX (DOS: 2021)  Treatment Diagnosis: M25.511 Right SHLD Pain, M25.521 Right Elbow Pain,M25.611 Rigth SHLD Stiffness, M25.621 Rigth Elbow Stiffness, M62.81 Right UE Weakness , G25.89 Scapular Dyskinesis  Insurance/Certification information:  PT Insurance Information: Aetna Medicare (Changed 22)  Physician Information:  Referring Practitioner: Dr. Benjamin Reaves. Ena Anders  Has the plan of care been signed (Y/N):        [x]  Yes  []  No     Date of Patient follow up with Physician: May 13, 2020    MEDICARE CAP EXCEPTION DOCUMENTATION  I certify that this patient meets one of the below criteria necessary for becoming an exception to the Medicare cap on therapy services:    [x]  The patient has a condition identified by an ICD-10 code that has a direct and significant impact on the need for therapy. (Significantly impacts the rate of recovery.)                   []  The patient has a complexity identified by an ICD-10 code that has a direct and significant impact on the need for therapy.   (Significantly impacts the rate of recovery and is associated with a primary condition.)         []  The patient has associated variables that influence the amount of treatment to include:  Social support, self-efficacy/motivation, prognosis, time since onset/acuity. [x]  The patient has generalized musculoskeletal conditions or a condition affecting multiple sites that will have a direct impact on the rate of recovery. []  The patient had a prior episode of outpatient therapy during this calendar year for a different condition. []  The patient has a mental or cognitive disorder in addition to the condition being treated that will have a direct and significant impact on the rate of recovery. Is this a Progress Report:     []  Yes  [x]  No     If Yes:  Date Range for reporting period:  Beginnin-3-2022  Endin22    Progress report will be due (10 Rx or 30 days whichever is less):             Recertification will be due (POC Duration  / 90 days whichever is less):   2022     Visit # Insurance Allowable Auth Required   In-person 41 30 + 8 until 22 [x]  Yes []  No    Telehealth   []  Yes []  No    Total      11%   Date assessed:            22    Therapy Diagnosis/Practice Pattern: 4H     Number of Comorbidities:  []0     [x]1-2    []3+    Latex Allergy:  [x]NO      []YES  Preferred Language for Healthcare:   [x]English       []other:    Pain level:   3-4 /10    SUBJECTIVE: Pt is more sore in her shoulder, mostly lateral area at deltoid this past week. OBJECTIVE   Observation:less TTP prox biceps and more at pec (mild), deltoid , UT and subscapTest measurements:   RESTRICTIONS/PRECAUTIONS: Right TTL SHLD Post-op Protocol (See MEDIA TAB),     Exercises/Interventions:  Access Code: [email or web address]  URL: [employer]. com/  Date: 2022    Access Code: [email or web address]  URL: [employer]/  Date: 2021    Access Code: [email or web address]  URL: [email or web address]. com/  Date: 2021  Therapeutic Ex (63486) Sets/sec Reps Notes/CUES   Pt ed: hold off as much strength exc 2-3 days, mostly just pulleys/finisher motions for AAROM.  Inc'd ice to reduce pain/inflammation. Ergonomics for computer work: keep mouse and keyboard closer to body to reduce reaching/abd motions rep  5'     Finisher: flex, scap, cw, ccw x10 ea  no weight today    hep          Standing Shld Flex wall slides, scaption  MC @ scap and elbow    Slide \"up\" step railing    MC @ scap    Trustetch SHLD Flex Stretch w/ Trunk Flexion            Wall Wipes Thread The Needle Posterior Capsule Stretch H5 1x10     Wall Circles @ 90 deg    Equip Lat Pulldown to chest  Row  Single arm row     Trustretch Pec minor Rocks  MC @ scap and trunk             Standing Shld Ext w/ Elbow Ext GTB H2  2x10    Supine Ext Rot/ bilat RTB H2 2x10     SL Ext Rot 2x10 SBA for technique 1#   SL abd  SL flex reach  x10  x10 SBA for technique 1#  -   SL Horz Abd to 90  x10 1# NV? Supine Flex w/ elbow flex / bilat  Bench press  Single arm press 2# 2x5  5# 2x5  2# 2x5     Supine cane flex :05 X 10     Supine post cap s    Table slides flex / ER :05 X 10 each    Pulleys       Biceps curls  Triceps ext standing bend over  x10 ea 3#  3# progress NV   Standing horiz abd to ~45 (bent over hi-low table)  Standing horiz abd band flex to ~30 deg  2x5    2x5    Manual Intervention ([id number])      Re-assess time        PROM right shld /  Grade 2-3 mobs. 8 min     STM  prox biceps, pec, deltoid, UT  Teres/ lat/ subscap/SL scap mobs 30 min     2nd rib mob R supine gr III  + L SB            NMR re-education (83944)   CUES NEEDED   theraflex bar hold at ~30 deg flex with wrist flex/ext  Hold at ~40 deg flex with rhythmic stab  \" \" 40 deg scap with \" \"                        Therapeutic Activity (38348)                        Access Code: [email or web address]  URL: [email or web address] [facility]/  Date: 82/90/4208  Prepared by: Kriss Ricardo    Exercises  Supine Shoulder Flexion Extension AAROM with Dowel - 3 x daily - 7 x weekly - 1 sets - 10 reps - 5 hold  Supine Cross Body Shoulder Stretch - 3 x daily - 7 x weekly - 1 sets - 3 reps - 30 hold  Shoulder External Rotation and Scapular Retraction - 3 x daily - 7 x weekly - 2 sets - 10 reps - 2 hold  Seated Shoulder Flexion Towel Slide at Table Top - 3 x daily - 7 x weekly - 1 sets - 10 reps - 5 hold  Seated Shoulder External Rotation PROM on Table - 3 x daily - 7 x weekly - 1 sets - 10 reps - 5 hold  Standing Shoulder Internal Rotation Stretch with Hands Behind Back - 3 x daily - 7 x weekly - 1 sets - 5 reps - 10 hold  Seated Shoulder Flexion AAROM with Pulley Behind - 3 x daily - 7 x weekly - 3 sets - 10 reps - 2 hold    Therapeutic Exercise and NMR EXR  [x] (36827) Provided verbal/tactile cueing for activities related to strengthening, flexibility, endurance, ROM  for improvements in scapular, scapulothoracic and UE control with self care, reaching, carrying, lifting, house/yardwork, driving/computer work. [x] (26362) Provided verbal/tactile cueing for activities related to improving balance, coordination, kinesthetic sense, posture, motor skill, proprioception  to assist with  scapular, scapulothoracic and UE control with self care, reaching, carrying, lifting, house/yardwork, driving/computer work. Therapeutic Activities:    [] (94464 or 36914) Provided verbal/tactile cueing for activities related to improving balance, coordination, kinesthetic sense, posture, motor skill, proprioception and motor activation to allow for proper function of scapular, scapulothoracic and UE control with self care, carrying, lifting, driving/computer work.      Home Exercise Program:    [x] (22122) Reviewed/Progressed HEP activities related to strengthening, flexibility, endurance, ROM of scapular, scapulothoracic and UE control with self care, reaching, carrying, lifting, house/yardwork, driving/computer work  [] (63258) Reviewed/Progressed HEP activities related to improving balance, coordination, kinesthetic sense, posture, motor skill, proprioception of scapular, scapulothoracic and UE control with self care, reaching, carrying, lifting, house/yardwork, driving/computer work      Manual Treatments:  PROM / STM / Oscillations-Mobs:  G-I, II, III, IV (PA's, Inf., Post.)  [x] (23173) Provided manual therapy to mobilize soft tissue/joints of cervical/CT, scapular GHJ and UE for the purpose of modulating pain, promoting relaxation,  increasing ROM, reducing/eliminating soft tissue swelling/inflammation/restriction, improving soft tissue extensibility and allowing for proper ROM for normal function with self care, reaching, carrying, lifting, house/yardwork, driving/computer work    Modalities: HMP to neck and CP to right shoulder x10 min    Charges  Timed Code Treatment Minutes: 40   Total Treatment Minutes: 50       [] EVAL (LOW) 07120   [] EVAL (MOD) 33595   [] EVAL (HIGH) 20177   [] RE-EVAL      [x] JS(40564) x 1  [] IONTO  [] NMR (87284) x    [] VASO   [x] Manual (85015) x 2   [] Other:CP  [] TA x      [] Mech Traction (71696)  [] ES(attended) (73693)      [] ES (un) (56990):     GOALS  Short Term Goals: To be achieved in: 2 weeks  1. Independent in HEP and progression per patient tolerance, in order to prevent re-injury. []? Progressing: [x]? Met: []? Not Met: []? Adjusted  2. Patient will have a decrease in pain to facilitate improvement in movement, function, and ADLs as indicated by Functional Deficits. []? Progressing: [x]? Met: []? Not Met: []? Adjusted     Long Term Goals: To be achieved in: 12 weeks  1. Disability index score of 42% or less for the [facility] to assist with reaching prior level of function. []? Progressing:  [x]? Met: to 11% 5-26-22 []? Not Met: []? Adjusted  2. Patient will demonstrate increased AROM to 140 deg flexion to allow for proper joint functioning as indicated by patients Functional Deficits. [x]? Progressing:AAROM to ~120, but AROM limited by strength still , []? Met: []? Not Met: []? Adjusted  3.  Patient will demonstrate an increase in Strength to 4/5 to allow for proper functional mobility as indicated by patients Functional Deficits. [x]? Progressing: []? Met: []? Not Met: []? Adjusted  4. Patient will retrieve plate from overhead shelf with her right hand without increased symptoms or restriction. []? Progressing: [x]? Met: []? Not Met: []? Adjusted  5. Wash and fix hair with right hand. []? Progressing: [x]? Met: []? Not Met: []? Adjusted         Progression Towards Functional goals:  [] Patient is progressing as expected towards functional goals listed. [x] Progression is slowed due to complexities listed. [] Progression has been slowed due to co-morbidities. [] Plan just implemented, too soon to assess goals progression  [] Other:      ASSESSMENT: Pt is more sore the past week, partly d/t rep lifting arm for computer work, writing for work duties. Focused more on manual tx and AAROM, PROM to alleviate soreness as well as discussed work station set-up to reduce rep reaching. Asked pt to hold strength exc's through the weekend, inc'd icing. If pain levels better at NV, can resume AROM and scap stab NV. She will benefit from continued PT to maintain ROM, progress strength exc's. Decrease frequency to only f/u 1x/week now to focus on strength building and progression of HEP. Overall Progression Towards Functional goals/ Treatment Progress Update:  [] Patient is progressing as expected towards functional goals listed. [x] Progression is slowed due to complexities/Impairments listed. [] Progression has been slowed due to co-morbidities.   [] Plan just implemented, too soon to assess goals progression <30days   [] Goals require adjustment due to lack of progress  [] Patient is not progressing as expected and requires additional follow up with physician  [] Other    Prognosis for POC: [x] Good [] Fair  [] Poor    Patient requires continued skilled intervention: [x] Yes  [] No    Treatment/Activity Tolerance:  [x] Patient able to complete treatment  [] Patient limited by fatigue  [] Patient limited by pain    [] Patient limited by other medical complications  [] Other:     PLAN: Wean to 1x/week. Request 12 more PT visits, new insurance provider will factor into this. [x] Continue per plan of care [x] Alter current plan (see comments above)  [] Plan of care initiated [] Hold pending MD visit [] Discharge    Electronically signed by:  Nicola Ferreira, PT, DPT    Note: If patient does not return for scheduled/ recommended follow up visits, this note will serve as a discharge from care along with most recent update on progress.

## 2022-06-24 ENCOUNTER — HOSPITAL ENCOUNTER (OUTPATIENT)
Dept: PHYSICAL THERAPY | Age: 67
Setting detail: THERAPIES SERIES
Discharge: HOME | End: 2022-06-24
Payer: MEDICARE

## 2022-06-24 PROCEDURE — 97110 THERAPEUTIC EXERCISES: CPT

## 2022-06-24 PROCEDURE — 97140 MANUAL THERAPY 1/> REGIONS: CPT

## 2022-06-24 NOTE — FLOWSHEET NOTE
[facility], [address] [place]  Phone: [phone]  Fax [phone]    Date:  2022    Patient Name:  Reza Toney    :  1955  MRN: [id number]  Restrictions/Precautions:   Right TTL SHLD Post-op Protocol (See MEDIA TAB),  ICPJI-[id number] 2021 w/ hospitalization and f/u PT 21 to 21,  OA, HTN, Osteoporosis , Back SX for Cason Rods @ age 8, Inner Ear [address] for Replacement of Ear Drum, Right Ulnar Nerve Decompression @ Elbow Aug. 12,2020, Right Carpal Tunnel Release SX Aug 25, 2020, PT Right Shoulder 2020    Medical/Treatment Diagnosis Information:  Diagnosis: M75.101 Right Shld RTC Tear & OA s/p Total SHLD SX (DOS: 2021)  Treatment Diagnosis: M25.511 Right SHLD Pain, M25.521 Right Elbow Pain,M25.611 Rigth SHLD Stiffness, M25.621 Rigth Elbow Stiffness, M62.81 Right UE Weakness , G25.89 Scapular Dyskinesis  Insurance/Certification information:  PT Insurance Information: A[employer]na Medicare (Changed 22)  Physician Information:  Referring Practitioner: Dr. Lillian Simms. Perez Krishna  Has the plan of care been signed (Y/N):        [x]  Yes  []  No     Date of Patient follow up with Physician: May 13, 2020    MEDICARE CAP EXCEPTION DOCUMENTATION  I certify that this patient meets one of the below criteria necessary for becoming an exception to the Medicare cap on therapy services:    [x]  The patient has a condition identified by an ICD-10 code that has a direct and significant impact on the need for therapy. (Significantly impacts the rate of recovery.)                   []  The patient has a complexity identified by an ICD-10 code that has a direct and significant impact on the need for therapy.   (Significantly impacts the rate of recovery and is associated with a primary condition.)         []  The patient has associated variables that influence the amount of treatment to include:  Social support, self-efficacy/motivation, prognosis, time since onset/acuity. [x]  The patient has generalized musculoskeletal conditions or a condition affecting multiple sites that will have a direct impact on the rate of recovery. []  The patient had a prior episode of outpatient therapy during this calendar year for a different condition. []  The patient has a mental or cognitive disorder in addition to the condition being treated that will have a direct and significant impact on the rate of recovery. Is this a Progress Report:     []  Yes  [x]  No     If Yes:  Date Range for reporting period:  Beginnin-3-2022  Endin22    Progress report will be due (10 Rx or 30 days whichever is less):             Recertification will be due (POC Duration  / 90 days whichever is less):   2022     Visit # Insurance Allowable Auth Required   In-person 42 30 + 8 until 22 [x]  Yes []  No    Telehealth   []  Yes []  No    Total      11%   Date assessed:            22    Therapy Diagnosis/Practice Pattern: 4H     Number of Comorbidities:  []0     [x]1-2    []3+    Latex Allergy:  [x]NO      []YES  Preferred Language for Healthcare:   [x]English       []other:    Pain level:   2-3 10    SUBJECTIVE: Pt feels better this date, rested a bit more and worked on not overdoing work with mouse/keyboard. Her shoulder is still sore in deltoid, pec and biceps. OBJECTIVE   Observation:less TTP prox biceps and more at pec (mild), deltoid , UT and subscap; elevated 2nd ribTest measurements:   RESTRICTIONS/PRECAUTIONS: Right TTL SHLD Post-op Protocol (See MEDIA TAB),     Exercises/Interventions:  Access Code: [email or web address]  URL: [email or web address]  Date: 2022    Access Code: [email or web address]  URL: [email or web address]  Date: 2021    Access Code: [email or web address]  URL: [email or web address]  Date: 2021  Therapeutic Ex (89453) Sets/sec Reps Notes/CUES   Pt ed: hold off as much strength exc 2-3 days, mostly just pulleys/finisher motions for AAROM. Inc'd ice to reduce pain/inflammation. Ergonomics for computer work: keep mouse and keyboard closer to body to reduce reaching/abd motions rep       Finisher: flex, scap, cw, ccw x10 ea  no weight today    hep          Standing Shld Flex wall slides, scaption x5 MC @ scap and elbow    Slide \"up\" step railing    MC @ scap    Trustetch SHLD Flex Stretch w/ Trunk Flexion            Wall Wipes Thread The Needle Posterior Capsule Stretch H5 1x10     Wall Circles @ 90 degCW, CCW 2x5 ea     Equip Lat Pulldown to chest  Row  Single arm row 20# 2x10  10# 2x10  5# x10     Trustretch Pec minor [place] H10x4 MC @ scap and trunk             Standing Shld Ext w/ Elbow Ext GTB H2  2x10    Supine Ext Rot/ bilat RTB H2 2x10     SL Ext Rot 2x10 SBA for technique 1#   SL abd  SL flex reach  x10  x10 SBA for technique 1#  -   SL Horz Abd to 90  x10 1# NV? Supine Flex w/ elbow flex / bilat  Bench press  Single arm press      Supine cane flex :05 X 10     Supine post cap s    Table slides flex / ER :05 X 10 each    Pulleys       Biceps curls  Triceps ext standing bend over  x10 ea 3#  3#    Standing horiz abd to ~45 (bent over hi-low table)  Standing horiz abd band flex to ~30 deg  2x5         Resume NV   Self R rib mob with strap L SB  5\"x5    Manual Intervention (66044)      Re-assess time        PROM right shld /  Grade 2-3 mobs. 6 min     STM  prox biceps, pec, deltoid, UT  Teres/ lat/ subscap/SL scap mobs 15 min     2nd rib mob R supine gr III  + L SB 15\"x4  x5           NMR re-education (79475)   CUES NEEDED   theraflex bar hold at ~30 deg flex with wrist flex/ext  Hold at ~40 deg flex with rhythmic stab  \" \" 40 deg scap with \" \"                        Therapeutic Activity (62834)                        Access Code: [email or web address]  URL: [email or web address]  Date: 77/41/8605  Prepared by: Anahy Gum    Exercises  Supine Shoulder Flexion Extension Rhea Angles with Dowel - 3 x daily - 7 x weekly - 1 sets - 10 reps - 5 hold  Supine Cross Body Shoulder Stretch - 3 x daily - 7 x weekly - 1 sets - 3 reps - 30 hold  Shoulder External Rotation and Scapular Retraction - 3 x daily - 7 x weekly - 2 sets - 10 reps - 2 hold  Seated Shoulder Flexion Towel Slide at Table Top - 3 x daily - 7 x weekly - 1 sets - 10 reps - 5 hold  Seated Shoulder External Rotation PROM on Table - 3 x daily - 7 x weekly - 1 sets - 10 reps - 5 hold  Standing Shoulder Internal Rotation Stretch with Hands Behind Back - 3 x daily - 7 x weekly - 1 sets - 5 reps - 10 hold  Seated Shoulder Flexion AAROM with Pulley Behind - 3 x daily - 7 x weekly - 3 sets - 10 reps - 2 hold    Therapeutic Exercise and NMR EXR  [x] (40567) Provided verbal/tactile cueing for activities related to strengthening, flexibility, endurance, ROM  for improvements in scapular, scapulothoracic and UE control with self care, reaching, carrying, lifting, house/yardwork, driving/computer work. [x] (75773) Provided verbal/tactile cueing for activities related to improving balance, coordination, kinesthetic sense, posture, motor skill, proprioception  to assist with  scapular, scapulothoracic and UE control with self care, reaching, carrying, lifting, house/yardwork, driving/computer work. Therapeutic Activities:    [] (14613 or 39547) Provided verbal/tactile cueing for activities related to improving balance, coordination, kinesthetic sense, posture, motor skill, proprioception and motor activation to allow for proper function of scapular, scapulothoracic and UE control with self care, carrying, lifting, driving/computer work.      Home Exercise Program:    [x] (18761) Reviewed/Progressed HEP activities related to strengthening, flexibility, endurance, ROM of scapular, scapulothoracic and UE control with self care, reaching, carrying, lifting, house/yardwork, driving/computer work  [] (57195) Reviewed/Progressed HEP activities related to improving balance, coordination, kinesthetic sense, posture, motor skill, proprioception of scapular, scapulothoracic and UE control with self care, reaching, carrying, lifting, house/yardwork, driving/computer work      Manual Treatments:  PROM / STM / Oscillations-Mobs:  G-I, II, III, IV (PA's, Inf., Post.)  [x] (21643) Provided manual therapy to mobilize soft tissue/joints of cervical/CT, scapular GHJ and UE for the purpose of modulating pain, promoting relaxation,  increasing ROM, reducing/eliminating soft tissue swelling/inflammation/restriction, improving soft tissue extensibility and allowing for proper ROM for normal function with self care, reaching, carrying, lifting, house/yardwork, driving/computer work    Modalities: HMP to neck and CP to right shoulder x10 min    Charges  Timed Code Treatment Minutes: 45   Total Treatment Minutes: 55 (ice)       [] EVAL (LOW) 24173   [] EVAL (MOD) 96548   [] EVAL (HIGH) 14002   [] RE-EVAL      [x] LV(56347) x 2  [] IONTO  [] NMR (38815) x    [] VASO   [x] Manual (51589) x 1   [] Other:CP  [] TA x      [] Mech Traction (68058)  [] ES(attended) (93844)      [] ES (un) (19359):     GOALS  Short Term Goals: To be achieved in: 2 weeks  1. Independent in HEP and progression per patient tolerance, in order to prevent re-injury. []? Progressing: [x]? Met: []? Not Met: []? Adjusted  2. Patient will have a decrease in pain to facilitate improvement in movement, function, and ADLs as indicated by Functional Deficits. []? Progressing: [x]? Met: []? Not Met: []? Adjusted     Long Term Goals: To be achieved in: 12 weeks  1. Disability index score of 42% or less for the [facility] to assist with reaching prior level of function. []? Progressing:  [x]? Met: to 11% 5-26-22 []? Not Met: []? Adjusted  2. Patient will demonstrate increased AROM to 140 deg flexion to allow for proper joint functioning as indicated by patients Functional Deficits. [x]?  Progressing:AAROM to ~120, but AROM limited by strength still , []? Met: []? Not Met: []? Adjusted  3. Patient will demonstrate an increase in Strength to 4/5 to allow for proper functional mobility as indicated by patients Functional Deficits. [x]? Progressing: []? Met: []? Not Met: []? Adjusted  4. Patient will retrieve plate from overhead shelf with her right hand without increased symptoms or restriction. []? Progressing: [x]? Met: []? Not Met: []? Adjusted  5. Wash and fix hair with right hand. []? Progressing: [x]? Met: []? Not Met: []? Adjusted         Progression Towards Functional goals:  [] Patient is progressing as expected towards functional goals listed. [x] Progression is slowed due to complexities listed. [] Progression has been slowed due to co-morbidities. [] Plan just implemented, too soon to assess goals progression  [] Other:      ASSESSMENT: Pt's had improved pain levels this date, able to resume strength exc's. She still has strength>joint motion deficit lending to dec'd AROM for ADLs. She is, however, able to I'ly dress, wash/do hair, and perform light dishes/chore work now. UT elevation still noted with flex/scap, and showed pt self rib mob to alleviate soreness in scalene and UT area. Overall Progression Towards Functional goals/ Treatment Progress Update:  [] Patient is progressing as expected towards functional goals listed. [x] Progression is slowed due to complexities/Impairments listed. [] Progression has been slowed due to co-morbidities.   [] Plan just implemented, too soon to assess goals progression <30days   [] Goals require adjustment due to lack of progress  [] Patient is not progressing as expected and requires additional follow up with physician  [] Other    Prognosis for POC: [x] Good [] Fair  [] Poor    Patient requires continued skilled intervention: [x] Yes  [] No    Treatment/Activity Tolerance:  [x] Patient able to complete treatment  [] Patient limited by fatigue  [] Patient limited by pain    [] Patient limited by other medical complications  [] Other:     PLAN: Wean to 1x/week. Request 12 more PT visits, new insurance provider will factor into this. [x] Continue per plan of care [x] Alter current plan (see comments above)  [] Plan of care initiated [] Hold pending MD visit [] Discharge    Electronically signed by:  Jose Elias Bell, PT, DPT    Note: If patient does not return for scheduled/ recommended follow up visits, this note will serve as a discharge from care along with most recent update on progress.

## 2022-07-01 ENCOUNTER — HOSPITAL ENCOUNTER (OUTPATIENT)
Dept: PHYSICAL THERAPY | Age: 67
Setting detail: THERAPIES SERIES
Discharge: HOME | End: 2022-07-01
Payer: MEDICARE

## 2022-07-01 PROCEDURE — 97140 MANUAL THERAPY 1/> REGIONS: CPT

## 2022-07-01 PROCEDURE — 97110 THERAPEUTIC EXERCISES: CPT

## 2022-07-01 NOTE — PROGRESS NOTES
[facility], [address] [facility]  [address] [place] [address], [address] [place] [address] [facility] [place]  Phone: [phone]  Fax [phone]    Date:  2022    Patient Name:  Tiki Huerta    :  1955  MRN: [id number]  Restrictions/Precautions:   Right TTL SHLD Post-op Protocol (See MEDIA TAB),  QLLOK-19 2021 w/ hospitalization and f/u PT 21 to 21,  OA, HTN, Osteoporosis , Back SX for Cason Rods @ age 8, Inner Ear [address] for Replacement of Ear Drum, Right Ulnar Nerve Decompression @ Elbow Aug. 12,2020, Right Carpal Tunnel Release SX Aug 25, 2020, PT Right Shoulder 2020    Medical/Treatment Diagnosis Information:  Diagnosis: M75.101 Right Shld RTC Tear & OA s/p Total SHLD SX (DOS: 2021)  Treatment Diagnosis: M25.511 Right SHLD Pain, M25.521 Right Elbow Pain,M25.611 Rigth SHLD Stiffness, M25.621 Rigth Elbow Stiffness, M62.81 Right UE Weakness , G25.89 Scapular Dyskinesis  Insurance/Certification information:  PT Insurance Information: Aetna Medicare (Changed 22)  Physician Information:  Referring Practitioner: Dr. Doc Boyce. Homero Hogan  Has the plan of care been signed (Y/N):        [x]  Yes  []  No     Date of Patient follow up with Physician: May 13, 2020    MEDICARE CAP EXCEPTION DOCUMENTATION  I certify that this patient meets one of the below criteria necessary for becoming an exception to the Medicare cap on therapy services:    [x]  The patient has a condition identified by an ICD-10 code that has a direct and significant impact on the need for therapy. (Significantly impacts the rate of recovery.)                   []  The patient has a complexity identified by an ICD-10 code that has a direct and significant impact on the need for therapy.   (Significantly impacts the rate of recovery and is associated with a primary condition.)         []  The patient has associated variables that influence the amount of treatment to include:  Social support, self-efficacy/motivation, prognosis, time since onset/acuity. [x]  The patient has generalized musculoskeletal conditions or a condition affecting multiple sites that will have a direct impact on the rate of recovery. []  The patient had a prior episode of outpatient therapy during this calendar year for a different condition. []  The patient has a mental or cognitive disorder in addition to the condition being treated that will have a direct and significant impact on the rate of recovery. Is this a Progress Report:     []  Yes  [x]  No     If Yes:  Date Range for reporting period:  Beginnin-3-2022  Endin22    Progress report will be due (10 Rx or 30 days whichever is less):           52    Recertification will be due (POC Duration  / 90 days whichever is less):   2022     Visit # Insurance Allowable Auth Required   In-person 43-PN written  + 8 until 22 [x]  Yes []  No    Telehealth   []  Yes []  No    Total       16%  Date assessed:             22  Therapy Diagnosis/Practice Pattern: 4H     Number of Comorbidities:  []0     [x]1-2    []3+    Latex Allergy:  [x]NO      []YES  Preferred Language for Healthcare:   [x]English       []other:    Pain level:   2-3 /10    SUBJECTIVE: Pt feels her pain is doing better; she can do a bit more around her house and sitting for computer work and edda is less uncomfortable in anterior shoulder and pec.      OBJECTIVE   Observation:less TTP prox biceps and more at pec (mild), deltoid , UT and subscap; int elevated 2nd rib but improving with mobilizationTest measurements: AROM flex 94 with UT sub,  scap 68 with UT sub  Functional ER thumb to R ear lobe Functional IR thumb to R lat IC    PROM flex 135   scap 143    ER 70 at deg 70 abd 70 IR at deg 65 abd  MMT flex 3-/5  abd 3-/5  3+ ER in neutral  4+ IR in neutral   Biceps  4+/5  Triceps  4+/5   Rhomboids 4/5 (seated)    RESTRICTIONS/PRECAUTIONS: Right TTL SHLD Post-op Protocol (See MEDIA TAB),     Exercises/Interventions:  Access Code: [email or web address]  URL: [email or web address]  Date: 01/04/2022    Access Code: [email or web address]  URL: [email or web address] [email or web address]  Date: 12/23/2021    Access Code: [email or web address]  URL: [email or web address]  Date: 12/09/2021  Therapeutic Ex (19854) Sets/sec Reps Notes/CUES   Pt ed: hold off as much strength exc 2-3 days, mostly just pulleys/finisher motions for AAROM. Inc'd ice to reduce pain/inflammation. Ergonomics for computer work: keep mouse and keyboard closer to body to reduce reaching/abd motions rep       Finisher: flex, scap, cw, ccw x10 ea  no weight today    hep          Standing Shld Flex wall slides, scaption x5 MC @ scap and elbow    Slide \"up\" step railing    MC @ scap    Trustetch SHLD Flex Stretch w/ Trunk Flexion            Wall Wipes Thread The Needle Posterior Capsule Stretch H5 1x10     Wall Circles @ 90 degCW, CCW 2x5 ea     Equip Lat Pulldown to chest  Row  Single arm row 20# 2x10  10# 2x10  5# x10     Trustretch Pec minor [place]ocks H10x4 MC @ scap and trunk             Standing Shld Ext w/ Elbow Ext GTB H2  2x10    Supine Ext Rot/ bilat RTB H2 2x10     SL Ext Rot 2x10 SBA for technique 1#   SL abd  SL flex reach  x15  x15 SBA for technique 1#  -   SL Horz Abd to 90  x15 1# NV? Supine Flex w/ elbow flex / bilat  Bench press  Single arm press      Supine cane flex :05 X 10     Supine post cap s    Table slides flex / ER :05 X 10 each    Pulleys       Biceps curls  Triceps ext standing bend over  x10 ea 3#  3#    Standing horiz abd to ~45 (bent over hi-low table)  Standing horiz abd band flex to ~30 deg  3x5         Resume NV   Self R rib mob with strap L SB      Manual Intervention (07437)      Re-assess time5'        PROM right shld /  Grade 2-3 mobs.   6 min     STM  prox biceps, pec, deltoid, UT  Teres/ lat/ subscap/SL scap mobs 15 min     2nd rib mob R supine gr III  + L SB 15\"x4  x5           NMR re-education (45728)   CUES NEEDED carrying, lifting, driving/computer work. Home Exercise Program:    [x] (38766) Reviewed/Progressed HEP activities related to strengthening, flexibility, endurance, ROM of scapular, scapulothoracic and UE control with self care, reaching, carrying, lifting, house/yardwork, driving/computer work  [] (13273) Reviewed/Progressed HEP activities related to improving balance, coordination, kinesthetic sense, posture, motor skill, proprioception of scapular, scapulothoracic and UE control with self care, reaching, carrying, lifting, house/yardwork, driving/computer work      Manual Treatments:  PROM / STM / Oscillations-Mobs:  G-I, II, III, IV (PA's, Inf., Post.)  [x] (51290) Provided manual therapy to mobilize soft tissue/joints of cervical/CT, scapular GHJ and UE for the purpose of modulating pain, promoting relaxation,  increasing ROM, reducing/eliminating soft tissue swelling/inflammation/restriction, improving soft tissue extensibility and allowing for proper ROM for normal function with self care, reaching, carrying, lifting, house/yardwork, driving/computer work    Modalities: HMP to neck and CP to right shoulder x10 min    Charges  Timed Code Treatment Minutes: 45   Total Treatment Minutes: 55 (ice)       [] EVAL (LOW) 49481   [] EVAL (MOD) 77205   [] EVAL (HIGH) 99862   [] RE-EVAL      [x] ZA(09659) x 2  [] IONTO  [] NMR (82510) x    [] VASO   [x] Manual (20950) x 1   [x] Other:CP  [] TA x      [] Mech Traction (09889)  [] ES(attended) (23460)      [] ES (un) (65771):     GOALS  Short Term Goals: To be achieved in: 2 weeks  1. Independent in HEP and progression per patient tolerance, in order to prevent re-injury. []? Progressing: [x]? Met: []? Not Met: []? Adjusted  2. Patient will have a decrease in pain to facilitate improvement in movement, function, and ADLs as indicated by Functional Deficits. []? Progressing: [x]? Met: []? Not Met: []? Adjusted     Long Term Goals: To be achieved in: 12 weeks  1. Disability index score of 42% or less for the [facility] to assist with reaching prior level of function. []? Progressing:  [x]? Met: to 11% 5-26-22 []? Not Met: []? Adjusted  2. Patient will demonstrate increased AROM to 140 deg flexion to allow for proper joint functioning as indicated by patients Functional Deficits. [x]? Progressing:AAROM to ~120, but AROM limited by strength still , []? Met: []? Not Met: []? Adjusted  3. Patient will demonstrate an increase in Strength to 4/5 to allow for proper functional mobility as indicated by patients Functional Deficits. [x]? Progressing: []? Met: []? Not Met: []? Adjusted  4. Patient will retrieve plate from overhead shelf with her right hand without increased symptoms or restriction. []? Progressing: [x]? Met: []? Not Met: []? Adjusted  5. Wash and fix hair with right hand. []? Progressing: [x]? Met: []? Not Met: []? Adjusted         Progression Towards Functional goals:  [] Patient is progressing as expected towards functional goals listed. [x] Progression is slowed due to complexities listed. [] Progression has been slowed due to co-morbidities. [] Plan just implemented, too soon to assess goals progression  [] Other:      ASSESSMENT: Pt is making slow progress towards PT goals, mostly inc'd strength for improved AROM. ,She will benefit from continued PT every 1-1/5 weeks to maintain joint PROM, progress strength for inc'd AROM for ADLs. Overall Progression Towards Functional goals/ Treatment Progress Update:  [] Patient is progressing as expected towards functional goals listed. [x] Progression is slowed due to complexities/Impairments listed. [] Progression has been slowed due to co-morbidities.   [] Plan just implemented, too soon to assess goals progression <30days   [] Goals require adjustment due to lack of progress  [] Patient is not progressing as expected and requires additional follow up with physician  [] Other    Prognosis for POC: [x] Good [] Fair  [] Poor    Patient requires continued skilled intervention: [x] Yes  [] No    Treatment/Activity Tolerance:  [x] Patient able to complete treatment  [] Patient limited by fatigue  [] Patient limited by pain    [] Patient limited by other medical complications  [] Other:     PLAN: Wean to 1x every 1-1.5/wks. [x] Continue per plan of care [] Alter current plan (see comments above)  [] Plan of care initiated [] Hold pending MD visit [] Discharge    Electronically signed by:  Betina Almazan, PT, DPT    Note: If patient does not return for scheduled/ recommended follow up visits, this note will serve as a discharge from care along with most recent update on progress.

## 2022-07-07 ENCOUNTER — OFFICE VISIT (OUTPATIENT)
Dept: ENT CLINIC | Age: 67
End: 2022-07-07
Payer: MEDICARE

## 2022-07-07 VITALS
WEIGHT: 130 LBS | HEIGHT: 59 IN | TEMPERATURE: 97.2 F | DIASTOLIC BLOOD PRESSURE: 68 MMHG | BODY MASS INDEX: 26.21 KG/M2 | SYSTOLIC BLOOD PRESSURE: 115 MMHG

## 2022-07-07 DIAGNOSIS — H90.6 MIXED CONDUCTIVE AND SENSORINEURAL HEARING LOSS OF BOTH EARS: Primary | ICD-10-CM

## 2022-07-07 DIAGNOSIS — Z90.89 HISTORY OF RIGHT MASTOIDECTOMY: ICD-10-CM

## 2022-07-07 DIAGNOSIS — H72.92 TYMPANIC MEMBRANE PERFORATION, LEFT: ICD-10-CM

## 2022-07-07 PROCEDURE — 99214 OFFICE O/P EST MOD 30 MIN: CPT | Performed by: OTOLARYNGOLOGY

## 2022-07-07 PROCEDURE — 1123F ACP DISCUSS/DSCN MKR DOCD: CPT | Performed by: OTOLARYNGOLOGY

## 2022-07-07 PROCEDURE — 1090F PRES/ABSN URINE INCON ASSESS: CPT | Performed by: OTOLARYNGOLOGY

## 2022-07-07 PROCEDURE — G8417 CALC BMI ABV UP PARAM F/U: HCPCS | Performed by: OTOLARYNGOLOGY

## 2022-07-07 PROCEDURE — G8427 DOCREV CUR MEDS BY ELIG CLIN: HCPCS | Performed by: OTOLARYNGOLOGY

## 2022-07-07 PROCEDURE — 3017F COLORECTAL CA SCREEN DOC REV: CPT | Performed by: OTOLARYNGOLOGY

## 2022-07-07 PROCEDURE — 1036F TOBACCO NON-USER: CPT | Performed by: OTOLARYNGOLOGY

## 2022-07-07 PROCEDURE — 69220 CLEAN OUT MASTOID CAVITY: CPT | Performed by: OTOLARYNGOLOGY

## 2022-07-07 PROCEDURE — G8400 PT W/DXA NO RESULTS DOC: HCPCS | Performed by: OTOLARYNGOLOGY

## 2022-07-07 RX ORDER — LEVOTHYROXINE SODIUM 0.03 MG/1
25 TABLET ORAL DAILY
COMMUNITY
Start: 2022-03-28

## 2022-07-07 ASSESSMENT — ENCOUNTER SYMPTOMS
ABDOMINAL PAIN: 0
WHEEZING: 0
SHORTNESS OF BREATH: 0

## 2022-07-07 NOTE — PROGRESS NOTES
RIGHT CARPAL TUNNEL RELEASE performed by Kentrell Kaur MD at [address] [facility] Bilateral     cataracts    INNER EAR SURGERY Right     replacement of eardrum    TONSILLECTOMY         Family History     Family History   Problem Relation Age of Onset    Diabetes Mother     Cancer Father         pancreas       Social History     Social History     Tobacco Use    Smoking status: Never Smoker    Smokeless tobacco: Never Used   Vaping Use    Vaping Use: Never used   Substance Use Topics    Alcohol use: Yes     Comment: 0-1 drinks per month    Drug use: No        Allergies     Allergies   Allergen Reactions    Penicillins Rash       Medications     Current Outpatient Medications   Medication Sig Dispense Refill    levothyroxine (SYNTHROID) 25 MCG tablet Take 25 mcg by mouth daily      albuterol sulfate HFA (PROAIR HFA) 108 (90 Base) MCG/ACT inhaler Inhale 2 puffs into the lungs every 6 hours as needed for Wheezing 1 Inhaler 0    metoprolol tartrate (LOPRESSOR) 50 MG tablet Take 1 tablet by mouth 2 times daily 60 tablet 0    pantoprazole (PROTONIX) 40 MG tablet Take 1 tablet by mouth every morning (before breakfast) 30 tablet 0    albuterol-ipratropium (COMBIVENT RESPIMAT)  MCG/ACT AERS inhaler Inhale 1 puff into the lungs every 4 hours as needed for Wheezing 1 Inhaler 0    clobetasol (TEMOVATE) 0.05 % cream Apply twice daily as needed to flared areas.  vitamin D (ERGOCALCIFEROL) 1.25 MG (08469 UT) CAPS capsule       hydroxychloroquine (PLAQUENIL) 200 MG tablet Take 200 mg by mouth daily        No current facility-administered medications for this visit. Review of Systems     Review of Systems   Constitutional: Negative for activity change, chills, diaphoresis, fatigue and fever. HENT: Positive for hearing loss. Negative for congestion. Eyes: Negative for visual disturbance. Respiratory: Negative for shortness of breath and wheezing.     Cardiovascular: Negative for chest pain. Gastrointestinal: Negative for abdominal pain. Musculoskeletal: Negative for neck pain and neck stiffness. Skin: Negative for rash. Neurological: Negative for dizziness, light-headedness and headaches. Hematological: Negative for adenopathy. PhysicalExam     Vitals:    07/07/22 1135   BP: 115/68   Site: Left Upper Arm   Position: Sitting   Temp: 97.2 °F (36.2 °C)   Weight: 130 lb (59 kg)   Height: 4' 11\" (1.499 m)       Physical Exam  Vitals reviewed. Constitutional:       Appearance: Normal appearance. HENT:      Head: Normocephalic and atraumatic. Right Ear: External ear normal. There is impacted cerumen. Left Ear: Ear canal and external ear normal. There is no impacted cerumen. Tympanic membrane is perforated. Ears:      Rodrigez exam findings: lateralizes right. Right Rinne: BC > AC. Left Rinne: AC > BC. Comments: Significant cerumen and squamous debris in the right      Nose: No congestion or rhinorrhea. Mouth/Throat:      Lips: Pink. No lesions. Mouth: Mucous membranes are moist. No oral lesions. Tongue: No lesions. Tongue does not deviate from midline. Palate: No mass and lesions. Pharynx: Oropharynx is clear. Uvula midline. No oropharyngeal exudate or posterior oropharyngeal erythema. Eyes:      Extraocular Movements: Extraocular movements intact. Pupils: Pupils are equal, round, and reactive to light. Musculoskeletal:      Cervical back: Normal range of motion and neck supple. Lymphadenopathy:      Cervical: No cervical adenopathy. Neurological:      Mental Status: She is alert. Cranial Nerves: No cranial nerve deficit. Data Review      Audiogram dated 9/27/19:                Procedure     Mastoid Debridement Right Ear    Pre op: Cerumen impaction of the Right ears.    Post op: Canal wall down mastoidectomy of the right ear, left posterosuperior perforation of the left ear (15%, central)  Procedure : Binocular otomicroscopy with debridement of cerumen  Surgeon: ROSETTE Chen  Estimated Blood Loss: None    Procedure:   Binocular otomicroscopy with debridement of cerumen impaction    After obtaining consent, the patient was placed in the examination chair in the reclined position.      -Right ear: External auditory canal intact, canal wall down mastoidectomy noted, completely occluded with skin/cerumen removed with suction, angled pick and alligator forceps  -Left ear: External auditory canal with no stenosis Left tympanic membrane visible without with global retraction over the pars tensa - 10-15% posterosuperior perforation noted through which the stapes tendon and facial nerve are appreciated     * Patient tolerated the procedure well with no complications    Mireya Sellers MD  7/7/22    Portions of this note were dictated using Dragon.  There may be linguistic errors secondary to the use of this program.

## 2022-07-08 ENCOUNTER — HOSPITAL ENCOUNTER (OUTPATIENT)
Dept: PHYSICAL THERAPY | Age: 67
Setting detail: THERAPIES SERIES
Discharge: HOME | End: 2022-07-08
Payer: MEDICARE

## 2022-07-08 PROCEDURE — 97140 MANUAL THERAPY 1/> REGIONS: CPT

## 2022-07-08 PROCEDURE — 97110 THERAPEUTIC EXERCISES: CPT

## 2022-07-08 PROCEDURE — 97016 VASOPNEUMATIC DEVICE THERAPY: CPT

## 2022-07-08 NOTE — FLOWSHEET NOTE
[facility], [address] [place]  Phone: [phone]  Fax [phone]    Date:  2022    Patient Name:  Emily Nicole    :  1955  MRN: [id number]  Restrictions/Precautions:   Right TTL SHLD Post-op Protocol (See MEDIA TAB),  [id number] 2021 w/ hospitalization and f/u PT 21 to 21,  OA, HTN, Osteoporosis , Back SX for Cason Rods @ age 8, Inner Ear [address] for Replacement of Ear Drum, Right Ulnar Nerve Decompression @ Elbow Aug. 12,2020, Right Carpal Tunnel Release SX Aug 25, 2020, PT Right Shoulder 2020    Medical/Treatment Diagnosis Information:  Diagnosis: M75.101 Right Shld RTC Tear & OA s/p Total SHLD SX (DOS: 2021)  Treatment Diagnosis: M25.511 Right SHLD Pain, M25.521 Right Elbow Pain,M25.611 Rigth SHLD Stiffness, M25.621 Rigth Elbow Stiffness, M62.81 Right UE Weakness , G25.89 Scapular Dyskinesis  Insurance/Certification information:  PT Insurance Information: [employer] Medicare (Changed 22)  Physician Information:  Referring Practitioner: Dr. Christina Leslie. [facility]  Has the plan of care been signed (Y/N):        [x]  Yes  []  No     Date of Patient follow up with Physician: May 13, 2020    MEDICARE CAP EXCEPTION DOCUMENTATION  I certify that this patient meets one of the below criteria necessary for becoming an exception to the Medicare cap on therapy services:    [x]  The patient has a condition identified by an ICD-10 code that has a direct and significant impact on the need for therapy. (Significantly impacts the rate of recovery.)                   []  The patient has a complexity identified by an ICD-10 code that has a direct and significant impact on the need for therapy.   (Significantly impacts the rate of recovery and is associated with a primary condition.)         []  The patient has associated variables that influence the amount of treatment to include:  Social support, self-efficacy/motivation, prognosis, time since onset/acuity. [x]  The patient has generalized musculoskeletal conditions or a condition affecting multiple sites that will have a direct impact on the rate of recovery. []  The patient had a prior episode of outpatient therapy during this calendar year for a different condition. []  The patient has a mental or cognitive disorder in addition to the condition being treated that will have a direct and significant impact on the rate of recovery. Is this a Progress Report:     []  Yes  [x]  No     If Yes:  Date Range for reporting period:  Beginnin-3-2022  Endin22    Progress report will be due (10 Rx or 30 days whichever is less):           72    Recertification will be due (POC Duration  / 90 days whichever is less):   2022     Visit # Insurance Allowable Auth Required   In-person 44 30 + 8 until 22 [x]  Yes []  No    Telehealth   []  Yes []  No    Total       16%  Date assessed:             22  Therapy Diagnosis/Practice Pattern: 4H     Number of Comorbidities:  []0     [x]1-2    []3+    Latex Allergy:  [x]NO      []YES  Preferred Language for Healthcare:   [x]English       []other:    Pain level:   2-3 /10    SUBJECTIVE: Pt feels persistent tightness and discomfort in anterior shoulder and pec. OBJECTIVE   Observation:less TTP prox biceps and more at pec (mild), deltoid , UT and subscap; Test measurements: PROM flex 135   scap 143    ER 70 at deg 70 abd 70 IR at deg 65 abd  MMT flex 3-/5  abd 3-/5  3+ ER in neutral  4+ IR in neutral   Biceps  4+/5  Triceps  4+/5   Rhomboids 4/5 (seated)      RESTRICTIONS/PRECAUTIONS: Right TTL SHLD Post-op Protocol (See MEDIA TAB),     Exercises/Interventions:  Access Code: [email or web address]  URL: [email or web address]  Date: 2022    Access Code: [email or web address]  URL: [email or web address]/  Date: 2021    Access Code: [email or web address]  URL: [email or web address]/  Date: 12/09/2021  Therapeutic Ex (77313) Sets/sec Reps Notes/CUES   Pt ed: hold off as much strength exc 2-3 days, mostly just pulleys/finisher motions for AAROM. Inc'd ice to reduce pain/inflammation. Ergonomics for computer work: keep mouse and keyboard closer to body to reduce reaching/abd motions rep       Finisher: flex, scap, cw, ccw x10 ea  no weight today    hep          Standing Shld Flex wall slides, scaption  MC @ scap and elbow    Slide \"up\" step railing    MC @ scap    Trustetch SHLD Flex Stretch w/ Trunk Flexion            Wall Wipes Thread The Needle Posterior Capsule Stretch H5 1x10     Wall Circles @ 90 deg     Equip Lat Pulldown to chest  Row  Single arm row 20# 2x10  10# 2x10  5# x10     Trustretch [place] H10x4 MC @ scap and trunk             Standing Shld Ext w/ Elbow Ext GTB H2  2x10    Supine Ext Rot/ bilat RTB H2 2x10     SL Ext Rot 2x10 SBA for technique 1#   SL abd  SL flex reach  x15  x15 SBA for technique 1#  -   SL Horz Abd to 90  x15 1# NV? Supine Flex w/ elbow flex / bilat  Bench press  Single arm press      Supine cane flex :05 X 10     Supine post cap s    Table slides flex / ER :05 X 10 each    Pulleys       Biceps curls  Triceps ext standing bend over  x10 ea 3#  3#    Standing horiz abd to ~45 (bent over hi-low table)  Standing horiz abd band flex to ~30 deg  3x5         Resume NV   Self R rib mob with strap L SB      Manual Intervention (25887)      Re-assess time        PROM right shld /  Grade 2-3 mobs. 6 min     STM  prox biceps, pec, deltoid, UT  Teres/ lat/ subscap/SL scap mobs 15 min     2nd rib mob R supine gr III  + L SB 15\"x4  x5           NMR re-education (25811)   CUES NEEDED   theraflex bar hold at ~30 deg flex with wrist flex/ext  Hold at ~40 deg flex with rhythmic stab  \" \" 40 deg scap with \" \"                        Therapeutic Activity (75883)                        Access Code: [email or web address]  URL: [email or web address]  Date: 04/19/2022  Prepared by: Danny Michael    Exercises  Supine Shoulder Flexion Extension AAROM with Dowel - 3 x daily - 7 x weekly - 1 sets - 10 reps - 5 hold  Supine Cross Body Shoulder Stretch - 3 x daily - 7 x weekly - 1 sets - 3 reps - 30 hold  Shoulder External Rotation and Scapular Retraction - 3 x daily - 7 x weekly - 2 sets - 10 reps - 2 hold  Seated Shoulder Flexion Towel Slide at Table Top - 3 x daily - 7 x weekly - 1 sets - 10 reps - 5 hold  Seated Shoulder External Rotation PROM on Table - 3 x daily - 7 x weekly - 1 sets - 10 reps - 5 hold  Standing Shoulder Internal Rotation Stretch with Hands Behind Back - 3 x daily - 7 x weekly - 1 sets - 5 reps - 10 hold  Seated Shoulder Flexion AAROM with Pulley Behind - 3 x daily - 7 x weekly - 3 sets - 10 reps - 2 hold    Therapeutic Exercise and NMR EXR  [x] (17987) Provided verbal/tactile cueing for activities related to strengthening, flexibility, endurance, ROM  for improvements in scapular, scapulothoracic and UE control with self care, reaching, carrying, lifting, house/yardwork, driving/computer work. [x] (43372) Provided verbal/tactile cueing for activities related to improving balance, coordination, kinesthetic sense, posture, motor skill, proprioception  to assist with  scapular, scapulothoracic and UE control with self care, reaching, carrying, lifting, house/yardwork, driving/computer work. Therapeutic Activities:    [] (17812 or 06047) Provided verbal/tactile cueing for activities related to improving balance, coordination, kinesthetic sense, posture, motor skill, proprioception and motor activation to allow for proper function of scapular, scapulothoracic and UE control with self care, carrying, lifting, driving/computer work.      Home Exercise Program:    [x] (61423) Reviewed/Progressed HEP activities related to strengthening, flexibility, endurance, ROM of scapular, scapulothoracic and UE control with self care, reaching, carrying, lifting, house/yardwork, driving/computer work  [] (60220) Reviewed/Progressed HEP activities related to improving balance, coordination, kinesthetic sense, posture, motor skill, proprioception of scapular, scapulothoracic and UE control with self care, reaching, carrying, lifting, house/yardwork, driving/computer work      Manual Treatments:  PROM / STM / Oscillations-Mobs:  G-I, II, III, IV (PA's, Inf., Post.)  [x] (67824) Provided manual therapy to mobilize soft tissue/joints of cervical/CT, scapular GHJ and UE for the purpose of modulating pain, promoting relaxation,  increasing ROM, reducing/eliminating soft tissue swelling/inflammation/restriction, improving soft tissue extensibility and allowing for proper ROM for normal function with self care, reaching, carrying, lifting, house/yardwork, driving/computer work    Modalities: 5' heat to pec/lat shoudler pre manual tx and shoulder PRO, vaso to R right shoulder no comp x15 min    Charges  Timed Code Treatment Minutes: 40   Total Treatment Minutes: 60 (heat, vaso)       [] EVAL (LOW) 83306   [] EVAL (MOD) 37303   [] EVAL (HIGH) 92986   [] RE-EVAL      [x] YZ(21460) x 2  [] IONTO  [] NMR (26157) x    [x] VASO   [x] Manual (45179) x 1   [] Other:CP  [] TA x      [] Mech Traction (20194)  [] ES(attended) (23946)      [] ES (un) (11389):     GOALS  Short Term Goals: To be achieved in: 2 weeks  1. Independent in HEP and progression per patient tolerance, in order to prevent re-injury. []? Progressing: [x]? Met: []? Not Met: []? Adjusted  2. Patient will have a decrease in pain to facilitate improvement in movement, function, and ADLs as indicated by Functional Deficits. []? Progressing: [x]? Met: []? Not Met: []? Adjusted     Long Term Goals: To be achieved in: 12 weeks  1. Disability index score of 42% or less for the [facility] to assist with reaching prior level of function. []? Progressing:  [x]? Met: to 11% 5-26-22 []? Not Met: []? Adjusted  2.  Patient will demonstrate increased AROM to 140 deg flexion to allow for proper joint functioning as indicated by patients Functional Deficits. [x]? Progressing:AAROM to ~120, but AROM limited by strength still , []? Met: []? Not Met: []? Adjusted  3. Patient will demonstrate an increase in Strength to 4/5 to allow for proper functional mobility as indicated by patients Functional Deficits. [x]? Progressing: []? Met: []? Not Met: []? Adjusted  4. Patient will retrieve plate from overhead shelf with her right hand without increased symptoms or restriction. []? Progressing: [x]? Met: []? Not Met: []? Adjusted  5. Wash and fix hair with right hand. []? Progressing: [x]? Met: []? Not Met: []? Adjusted         Progression Towards Functional goals:  [] Patient is progressing as expected towards functional goals listed. [x] Progression is slowed due to complexities listed. [] Progression has been slowed due to co-morbidities. [] Plan just implemented, too soon to assess goals progression  [] Other:      ASSESSMENT: Pt continues to have some soreness in ant/lat shoulder, mostly at pec and deltoid so did inc'd STM/stretching and held of as much ant shoulder strength this date. Also used vaso post tx. She remains very limited in deltoid strength, question if strength is progressing as expected of if she needs to see surgeon again in next 3-4 weeks if plateau in strength. ADL function continues to make gains despite objective measurements not making notable progress. Overall Progression Towards Functional goals/ Treatment Progress Update:  [] Patient is progressing as expected towards functional goals listed. [x] Progression is slowed due to complexities/Impairments listed. [] Progression has been slowed due to co-morbidities.   [] Plan just implemented, too soon to assess goals progression <30days   [] Goals require adjustment due to lack of progress  [] Patient is not progressing as expected and requires additional follow up with physician  [] Other    Prognosis for POC: [x] Good [] Fair  [] Poor    Patient requires continued skilled intervention: [x] Yes  [] No    Treatment/Activity Tolerance:  [x] Patient able to complete treatment  [] Patient limited by fatigue  [] Patient limited by pain    [] Patient limited by other medical complications  [] Other:     PLAN: Wean to 1x every 1-1.5/wks, consider seeing surgeon sooner if reaches plateau/worse pain. [x] Continue per plan of care [] Alter current plan (see comments above)  [] Plan of care initiated [] Hold pending MD visit [] Discharge    Electronically signed by:  Bryan Gill, PT, DPT    Note: If patient does not return for scheduled/ recommended follow up visits, this note will serve as a discharge from care along with most recent update on progress.

## 2022-07-15 ENCOUNTER — HOSPITAL ENCOUNTER (OUTPATIENT)
Dept: PHYSICAL THERAPY | Age: 67
Setting detail: THERAPIES SERIES
Discharge: HOME | End: 2022-07-15
Payer: MEDICARE

## 2022-07-15 PROCEDURE — 97140 MANUAL THERAPY 1/> REGIONS: CPT

## 2022-07-15 PROCEDURE — 97110 THERAPEUTIC EXERCISES: CPT

## 2022-07-15 NOTE — FLOWSHEET NOTE
[facility], [address] [place]  Phone: [phone]  Fax [phone]    Date:  7/15/2022    Patient Name:  Luke Carolina    :  1955  MRN: [id number]  Restrictions/Precautions:   Right TTL SHLD Post-op Protocol (See MEDIA TAB),  [id number] 2021 w/ hospitalization and f/u PT 21 to 21,  OA, HTN, Osteoporosis , Back SX for Cason Rods @ age 8, Inner Ear [address] for Replacement of Ear Drum, Right Ulnar Nerve Decompression @ Elbow Aug. 12,2020, Right Carpal Tunnel Release SX Aug 25, 2020, PT Right Shoulder 2020    Medical/Treatment Diagnosis Information:  Diagnosis: M75.101 Right Shld RTC Tear & OA s/p Total SHLD SX (DOS: 2021)  Treatment Diagnosis: M25.511 Right SHLD Pain, M25.521 Right Elbow Pain,M25.611 Rigth SHLD Stiffness, M25.621 Rigth Elbow Stiffness, M62.81 Right UE Weakness , G25.89 Scapular Dyskinesis  Insurance/Certification information:  PT Insurance Information: Aetna Medicare (Changed 22)  Physician Information:  Referring Practitioner: Dr. Carrie Cervantes. Gaston Perea  Has the plan of care been signed (Y/N):        [x]  Yes  []  No     Date of Patient follow up with Physician: May 13, 2020    MEDICARE CAP EXCEPTION DOCUMENTATION  I certify that this patient meets one of the below criteria necessary for becoming an exception to the Medicare cap on therapy services:    [x]  The patient has a condition identified by an ICD-10 code that has a direct and significant impact on the need for therapy. (Significantly impacts the rate of recovery.)                   []  The patient has a complexity identified by an ICD-10 code that has a direct and significant impact on the need for therapy.   (Significantly impacts the rate of recovery and is associated with a primary condition.)         []  The patient has associated variables that influence the amount of treatment to include:  Social support, self-efficacy/motivation, prognosis, time since onset/acuity. [x]  The patient has generalized musculoskeletal conditions or a condition affecting multiple sites that will have a direct impact on the rate of recovery. []  The patient had a prior episode of outpatient therapy during this calendar year for a different condition. []  The patient has a mental or cognitive disorder in addition to the condition being treated that will have a direct and significant impact on the rate of recovery. Is this a Progress Report:     []  Yes  [x]  No     If Yes:  Date Range for reporting period:  Beginnin-3-2022  Endin22    Progress report will be due (10 Rx or 30 days whichever is less):           70    Recertification will be due (POC Duration  / 90 days whichever is less):   2022     Visit # Insurance Allowable Auth Required   In-person 45 30 + 8 until 22 [x]  Yes []  No    Telehealth   []  Yes []  No    Total       16%  Date assessed:             22  Therapy Diagnosis/Practice Pattern: 4H     Number of Comorbidities:  []0     [x]1-2    []3+    Latex Allergy:  [x]NO      []YES  Preferred Language for Healthcare:   [x]English       []other:    Pain level:   2-3 /10    SUBJECTIVE: Pt feels that the persistent tightness and discomfort in anterior shoulder and pec is slightly better after LV and with more rest breaks between HEP days. OBJECTIVE   Observation:less TTP prox biceps and more at pec (mild), deltoid , UT and subscap; Test measurements:   RESTRICTIONS/PRECAUTIONS: Right TTL SHLD Post-op Protocol (See MEDIA TAB),     Exercises/Interventions:  Access Code: [email or web address]  URL: [email or web address]  Date: 2022    Access Code: [email or web address]  URL: [email or web address]/  Date: 2021    Access Code: [email or web address]  URL: [email or web address]  Date: 2021  Therapeutic Ex (36906) Sets/sec Reps Notes/CUES   Pt ed: hold off as much strength exc 2-3 days, mostly just pulleys/finisher motions for AAROM. Inc'd ice to reduce pain/inflammation. Ergonomics for computer work: keep mouse and keyboard closer to body to reduce reaching/abd motions rep      Finisher: flex, scap, cw, ccw x10 ea  no weight today    hep          Standing Shld Flex wall slides, scaption MC @ scap and elbow    Slide \"up\" step railing  Lift 1 rung to next rung on Ernesto Stretch x10     MC @ scap    Trustetch SHLD Flex Stretch w/ Trunk Flexion     FR slide up wedge on hi-low table x10     Wall Wipes Thread The Needle Posterior Capsule Stretch H5 1x10     Wall Circles @ 90 deg     Equip Lat Pulldown to chest  Row  Single arm row 20# 2x10  10# 2x10  5# x10     Trustretch Pec minor [place] H10x4 MC @ scap and trunk             Standing Shld Ext w/ Elbow Ext GTB H2  2x10    Supine Ext Rot/ bilat RTB H2 2x10     SL Ext Rot 2x10 SBA for technique 1#   SL abd  SL flex reach  x15  x15 SBA for technique -   SL Horz Abd to 90  x15     Supine Flex w/ elbow flex / bilat  Bench press  Single arm press     Supine cane flex :05 X 10     Supine post cap s    Table slides flex / ER :05 X 10 each    Pulleys       Biceps curls  Triceps ext standing bend over  x10 ea 3#  3#    Standing horiz abd to ~45 (bent over hi-low table)  Standing horiz abd band flex to ~30 deg  3x5        Resume NV   Self R rib mob with strap L SB     Manual Intervention ([phone])      Re-assess time         PROM right shld /  Grade 2-3 mobs. 6 min     STM  prox biceps, pec, deltoid, UT  Teres/ lat/ subscap/SL scap mobs 15 min     2nd rib mob R supine gr III  + L SB           NMR re-education (37474)   CUES NEEDED   theraflex bar hold at ~30 deg flex with wrist flex/ext  Hold at ~40 deg flex with rhythmic stab  \" \" 40 deg scap with \" \"                       Therapeutic Activity (71830)                        Access Code: [email or web address]  URL: [email or web address]  Date: 38/70/0857  Prepared by: David Lopes    Exercises  Supine Shoulder Flexion Extension AAROM with Dowel - 3 x daily - 7 x weekly - 1 sets - 10 reps - 5 hold  Supine Cross Body Shoulder Stretch - 3 x daily - 7 x weekly - 1 sets - 3 reps - 30 hold  Shoulder External Rotation and Scapular Retraction - 3 x daily - 7 x weekly - 2 sets - 10 reps - 2 hold  Seated Shoulder Flexion Towel Slide at Table Top - 3 x daily - 7 x weekly - 1 sets - 10 reps - 5 hold  Seated Shoulder External Rotation PROM on Table - 3 x daily - 7 x weekly - 1 sets - 10 reps - 5 hold  Standing Shoulder Internal Rotation Stretch with Hands Behind Back - 3 x daily - 7 x weekly - 1 sets - 5 reps - 10 hold  Seated Shoulder Flexion AAROM with Pulley Behind - 3 x daily - 7 x weekly - 3 sets - 10 reps - 2 hold    Therapeutic Exercise and NMR EXR  [x] (41814) Provided verbal/tactile cueing for activities related to strengthening, flexibility, endurance, ROM  for improvements in scapular, scapulothoracic and UE control with self care, reaching, carrying, lifting, house/yardwork, driving/computer work. [x] (95541) Provided verbal/tactile cueing for activities related to improving balance, coordination, kinesthetic sense, posture, motor skill, proprioception  to assist with  scapular, scapulothoracic and UE control with self care, reaching, carrying, lifting, house/yardwork, driving/computer work. Therapeutic Activities:    [] (83487 or 44028) Provided verbal/tactile cueing for activities related to improving balance, coordination, kinesthetic sense, posture, motor skill, proprioception and motor activation to allow for proper function of scapular, scapulothoracic and UE control with self care, carrying, lifting, driving/computer work.      Home Exercise Program:    [x] (52812) Reviewed/Progressed HEP activities related to strengthening, flexibility, endurance, ROM of scapular, scapulothoracic and UE control with self care, reaching, carrying, lifting, house/yardwork, driving/computer work  [] (20732) Reviewed/Progressed HEP activities related to improving balance, coordination, kinesthetic sense, posture, motor skill, proprioception of scapular, scapulothoracic and UE control with self care, reaching, carrying, lifting, house/yardwork, driving/computer work      Manual Treatments:  PROM / STM / Oscillations-Mobs:  G-I, II, III, IV (PA's, Inf., Post.)  [x] (08371) Provided manual therapy to mobilize soft tissue/joints of cervical/CT, scapular GHJ and UE for the purpose of modulating pain, promoting relaxation,  increasing ROM, reducing/eliminating soft tissue swelling/inflammation/restriction, improving soft tissue extensibility and allowing for proper ROM for normal function with self care, reaching, carrying, lifting, house/yardwork, driving/computer work    Modalities: 510' ice R shoudler     Charges  Timed Code Treatment Minutes: 45   Total Treatment Minutes: 55 (ice)       [] EVAL (LOW) 53002   [] EVAL (MOD) 26122   [] EVAL (HIGH) 38109   [] RE-EVAL      [x] KG(36745) x 2  [] IONTO  [] NMR (80849) x    [] VASO   [x] Manual (90165) x 1   [] Other:CP  [] TA x      [] Mech Traction (63129)  [] ES(attended) (81321)      [] ES (un) (68239):     GOALS  Short Term Goals: To be achieved in: 2 weeks  1. Independent in HEP and progression per patient tolerance, in order to prevent re-injury. [] Progressing: [x] Met: [] Not Met: [] Adjusted  2. Patient will have a decrease in pain to facilitate improvement in movement, function, and ADLs as indicated by Functional Deficits. [] Progressing: [x] Met: [] Not Met: [] Adjusted     Long Term Goals: To be achieved in: 12 weeks  1. Disability index score of 42% or less for the [facility] to assist with reaching prior level of function. [] Progressing:  [x] Met: to 11% 5-26-22 [] Not Met: [] Adjusted  2. Patient will demonstrate increased AROM to 140 deg flexion to allow for proper joint functioning as indicated by patients Functional Deficits.    [x] Progressing:AAROM to ~120, but AROM limited by strength still , [] Met: [] Not Met: [] Adjusted  3. Patient will demonstrate an increase in Strength to 4/5 to allow for proper functional mobility as indicated by patients Functional Deficits. [x] Progressing: [] Met: [] Not Met: [] Adjusted  4. Patient will retrieve plate from overhead shelf with her right hand without increased symptoms or restriction. [] Progressing: [x] Met: [] Not Met: [] Adjusted  5. Wash and fix hair with right hand. [] Progressing: [x] Met: [] Not Met: [] Adjusted         Progression Towards Functional goals:  [] Patient is progressing as expected towards functional goals listed. [x] Progression is slowed due to complexities listed. [] Progression has been slowed due to co-morbidities. [] Plan just implemented, too soon to assess goals progression  [] Other:      ASSESSMENT: Pt's soreness at deltoid and pec improved this date with inc'd STM and dec'd reps/HEP frequency past week. Worked on continued scap stab and small range functional reaching on danae-stretch ladder rungs to help with pt's goals of reaching for shelf at home. She remains very limited in deltoid strength, question if strength is progressing as expected of if she needs to see surgeon again in next 3-4 weeks if plateau in strength. ADL function continues to make gains despite objective measurements not making notable progress. Overall Progression Towards Functional goals/ Treatment Progress Update:  [] Patient is progressing as expected towards functional goals listed. [x] Progression is slowed due to complexities/Impairments listed. [] Progression has been slowed due to co-morbidities.   [] Plan just implemented, too soon to assess goals progression <30days   [] Goals require adjustment due to lack of progress  [] Patient is not progressing as expected and requires additional follow up with physician  [] Other    Prognosis for POC: [x] Good [] Fair  [] Poor    Patient requires continued skilled intervention: [x] Yes  [] No    Treatment/Activity Tolerance:  [x] Patient able to complete treatment  [] Patient limited by fatigue  [] Patient limited by pain    [] Patient limited by other medical complications  [] Other:     PLAN: Wean to 1x every 1-1.5/wks, consider seeing surgeon sooner if reaches plateau/worse pain. [x] Continue per plan of care [] Alter current plan (see comments above)  [] Plan of care initiated [] Hold pending MD visit [] Discharge    Electronically signed by:  Byron Trinh PT, DPT    Note: If patient does not return for scheduled/ recommended follow up visits, this note will serve as a discharge from care along with most recent update on progress.

## 2022-07-22 ENCOUNTER — HOSPITAL ENCOUNTER (OUTPATIENT)
Dept: PHYSICAL THERAPY | Age: 67
Setting detail: THERAPIES SERIES
Discharge: HOME | End: 2022-07-22
Payer: MEDICARE

## 2022-07-22 PROCEDURE — 97140 MANUAL THERAPY 1/> REGIONS: CPT | Performed by: PHYSICAL THERAPIST

## 2022-07-22 PROCEDURE — 97110 THERAPEUTIC EXERCISES: CPT | Performed by: PHYSICAL THERAPIST

## 2022-07-22 NOTE — FLOWSHEET NOTE
[facility], [address]  Phone: [phone]  Fax [phone]    Date:  2022    Patient Name:  Luke Carolina    :  1955  MRN: [id number]  Restrictions/Precautions:   Right TTL SHLD Post-op Protocol (See MEDIA TAB),  PRUSP-56 2021 w/ hospitalization and f/u PT 21 to 21,  OA, HTN, Osteoporosis , Back SX for Cason Rods @ age 8, Inner Ear [address] for Replacement of Ear Drum, Right Ulnar Nerve Decompression @ Elbow Aug. 12,2020, Right Carpal Tunnel Release SX Aug 25, 2020, PT Right Shoulder 2020    Medical/Treatment Diagnosis Information:  Diagnosis: M75.101 Right Shld RTC Tear & OA s/p Total SHLD SX (DOS: 2021)  Treatment Diagnosis: M25.511 Right SHLD Pain, M25.521 Right Elbow Pain,M25.611 Rigth SHLD Stiffness, M25.621 Rigth Elbow Stiffness, M62.81 Right UE Weakness , G25.89 Scapular Dyskinesis  Insurance/Certification information:  PT Insurance Information: Aetna Medicare (Changed 22)  Physician Information:  Referring Practitioner: Dr. Carrie Cervantes. Gaston Perea  Has the plan of care been signed (Y/N):        [x]  Yes  []  No     Date of Patient follow up with Physician: May 13, 2020    MEDICARE CAP EXCEPTION DOCUMENTATION  I certify that this patient meets one of the below criteria necessary for becoming an exception to the Medicare cap on therapy services:    [x]  The patient has a condition identified by an ICD-10 code that has a direct and significant impact on the need for therapy. (Significantly impacts the rate of recovery.)                   []  The patient has a complexity identified by an ICD-10 code that has a direct and significant impact on the need for therapy.   (Significantly impacts the rate of recovery and is associated with a primary condition.)         []  The patient has associated variables that influence the amount of treatment to include:  Social support, self-efficacy/motivation, prognosis, time since onset/acuity. [x]  The patient has generalized musculoskeletal conditions or a condition affecting multiple sites that will have a direct impact on the rate of recovery. []  The patient had a prior episode of outpatient therapy during this calendar year for a different condition. []  The patient has a mental or cognitive disorder in addition to the condition being treated that will have a direct and significant impact on the rate of recovery. Is this a Progress Report:     []  Yes  [x]  No     If Yes:  Date Range for reporting period:  Beginnin-3-2022  Endin22    Progress report will be due (10 Rx or 30 days whichever is less):           25    Recertification will be due (POC Duration  / 90 days whichever is less):   2022     Visit # Insurance Allowable Auth Required   In-person 46 30 + 8 until 22 [x]  Yes []  No    Telehealth  New insurance 22 BMN []  Yes [x]  No    Total       16%  Date assessed:             22  Therapy Diagnosis/Practice Pattern: 4H     Number of Comorbidities:  []0     [x]1-2    []3+    Latex Allergy:  [x]NO      []YES  Preferred Language for Healthcare:   [x]English       []other:    Pain level:   2-3 /10    SUBJECTIVE:  Pt. Reports that she is having a good day today, she is going to be busy this week end. OBJECTIVE   Observation:less TTP prox biceps and more at pec (mild), deltoid , UT and subscap; Test measurements:   RESTRICTIONS/PRECAUTIONS: Right TTL SHLD Post-op Protocol (See MEDIA TAB),     Exercises/Interventions:  Access Code: [email or web address]  URL: [email or web address] com/  Date: 2022    Access Code: [email or web address]  URL: [email or web address]. com/  Date: 2021    Access Code: [email or web address]  URL: [email or web address] com/  Date: 2021  Therapeutic Ex (23723) Sets/sec Reps Notes/CUES   Pt ed: hold off as much strength exc 2-3 days, mostly just pulleys/finisher motions for VICTOR HUGO. Inc'd ice to reduce pain/inflammation. Ergonomics for computer work: keep mouse and keyboard closer to body to reduce reaching/abd motions rep      Finisher: flex, scap, cw, ccw x10 ea  no weight today    hep          Standing Shld Flex wall slides, scaption MC @ scap and elbow    Slide \"up\" step railing  Lift 1 rung to next rung on Ernesto Stretch x10     MC @ scap    Trustetch SHLD Flex Stretch w/ Trunk Flexion     FR slide up wedge on hi-low table x10     Wall Wipes Thread The Needle Posterior Capsule Stretch H5 1x10     Wall Circles @ 90 deg     Equip Lat Pulldown to chest  Row  Single arm row 20# 2x10  20# 2x10  5# x15     Trustretch Pec minor [place] H10x4 MC @ scap and trunk             Standing Shld Ext w/ Elbow Ext GTB H2  2x10    Supine Ext Rot/ bilat RTB H2 2x10     SL Ext Rot 2x10 SBA for technique 1#   SL abd  SL flex reach  x15  x15 SBA for technique -   SL Horz Abd to 90  x15     Supine Flex w/ elbow flex / bilat  Bench press  Single arm press     Supine cane flex    Supine post cap s    Table slides flex / ER    Pulleys       Biceps curls  Triceps ext standing bend over  x10 ea 3#  3#    Standing horiz abd to ~45 (bent over hi-low table)  Standing horiz abd band flex to ~30 deg  3x5        Resume NV   Self R rib mob with strap L SB     Manual Intervention ([id number])      Re-assess time         PROM right shld /  Grade 2-3 mobs. 6 min     STM  prox biceps, pec, deltoid, UT  Teres/ lat/ subscap/SL scap mobs 15 min     2nd rib mob R supine gr III  + L SB           NMR re-education (05646)   CUES NEEDED   theraflex bar hold at ~30 deg flex with wrist flex/ext  Hold at ~40 deg flex with rhythmic stab  \" \" 40 deg scap with \" \"                       Therapeutic Activity (70578)                        Access Code: [email or web address]  URL: [email or web address]  Date: 57/99/5177  Prepared by: Vibha Hopper    Exercises  Supine Shoulder Flexion Extension AAROM with Dowel - 3 x daily - 7 x weekly - 1 sets - 10 reps - 5 hold  Supine Cross Body Shoulder Stretch - 3 x daily - 7 x weekly - 1 sets - 3 reps - 30 hold  Shoulder External Rotation and Scapular Retraction - 3 x daily - 7 x weekly - 2 sets - 10 reps - 2 hold  Seated Shoulder Flexion Towel Slide at Table Top - 3 x daily - 7 x weekly - 1 sets - 10 reps - 5 hold  Seated Shoulder External Rotation PROM on Table - 3 x daily - 7 x weekly - 1 sets - 10 reps - 5 hold  Standing Shoulder Internal Rotation Stretch with Hands Behind Back - 3 x daily - 7 x weekly - 1 sets - 5 reps - 10 hold  Seated Shoulder Flexion AAROM with Pulley Behind - 3 x daily - 7 x weekly - 3 sets - 10 reps - 2 hold    Therapeutic Exercise and NMR EXR  [x] (86888) Provided verbal/tactile cueing for activities related to strengthening, flexibility, endurance, ROM  for improvements in scapular, scapulothoracic and UE control with self care, reaching, carrying, lifting, house/yardwork, driving/computer work. [x] (42154) Provided verbal/tactile cueing for activities related to improving balance, coordination, kinesthetic sense, posture, motor skill, proprioception  to assist with  scapular, scapulothoracic and UE control with self care, reaching, carrying, lifting, house/yardwork, driving/computer work. Therapeutic Activities:    [] (07717 or 96191) Provided verbal/tactile cueing for activities related to improving balance, coordination, kinesthetic sense, posture, motor skill, proprioception and motor activation to allow for proper function of scapular, scapulothoracic and UE control with self care, carrying, lifting, driving/computer work.      Home Exercise Program:    [x] (19787) Reviewed/Progressed HEP activities related to strengthening, flexibility, endurance, ROM of scapular, scapulothoracic and UE control with self care, reaching, carrying, lifting, house/yardwork, driving/computer work  [] (13475) Reviewed/Progressed HEP activities related to improving balance, coordination, kinesthetic sense, posture, motor skill, proprioception of scapular, scapulothoracic and UE control with self care, reaching, carrying, lifting, house/yardwork, driving/computer work      Manual Treatments:  PROM / STM / Oscillations-Mobs:  G-I, II, III, IV (PA's, Inf., Post.)  [x] (08999) Provided manual therapy to mobilize soft tissue/joints of cervical/CT, scapular GHJ and UE for the purpose of modulating pain, promoting relaxation,  increasing ROM, reducing/eliminating soft tissue swelling/inflammation/restriction, improving soft tissue extensibility and allowing for proper ROM for normal function with self care, reaching, carrying, lifting, house/yardwork, driving/computer work    Modalities: 510' ice R shoudler     Charges  Timed Code Treatment Minutes: 45   Total Treatment Minutes: 45        [] EVAL (LOW) 82231   [] EVAL (MOD) 06808   [] EVAL (HIGH) 10120   [] RE-EVAL      [x] NL(45408) x 2  [] IONTO  [] NMR (15106) x    [] VASO   [x] Manual (10493) x 1   [] Other:CP  [] TA x      [] Mech Traction (76770)  [] ES(attended) (70355)      [] ES (un) (00655):     GOALS  Short Term Goals: To be achieved in: 2 weeks  1. Independent in HEP and progression per patient tolerance, in order to prevent re-injury. [] Progressing: [x] Met: [] Not Met: [] Adjusted  2. Patient will have a decrease in pain to facilitate improvement in movement, function, and ADLs as indicated by Functional Deficits. [] Progressing: [x] Met: [] Not Met: [] Adjusted     Long Term Goals: To be achieved in: 12 weeks  1. Disability index score of 42% or less for the [facility] to assist with reaching prior level of function. [] Progressing:  [x] Met: to 11% 5-26-22 [] Not Met: [] Adjusted  2. Patient will demonstrate increased AROM to 140 deg flexion to allow for proper joint functioning as indicated by patients Functional Deficits. [x] Progressing:AAROM to ~120, but AROM limited by strength still , [] Met: [] Not Met: [] Adjusted  3.  Patient will demonstrate an increase in Strength to 4/5 to allow for proper functional mobility as indicated by patients Functional Deficits. [x] Progressing: [] Met: [] Not Met: [] Adjusted  4. Patient will retrieve plate from overhead shelf with her right hand without increased symptoms or restriction. [] Progressing: [x] Met: [] Not Met: [] Adjusted  5. Wash and fix hair with right hand. [] Progressing: [x] Met: [] Not Met: [] Adjusted         Progression Towards Functional goals:  [] Patient is progressing as expected towards functional goals listed. [x] Progression is slowed due to complexities listed. [] Progression has been slowed due to co-morbidities. [] Plan just implemented, too soon to assess goals progression  [] Other:      ASSESSMENT: pt. With (+)TrP R bicep, deltoid and post cuff, TTP at distat clavicle and palpable grind with AROM. Pt. With less end range pain following STW and manuals, PROM. Able to lift arm to 3rd rung on True stretch with less effort. Pt. Still requires cues and ed. Re:strengthening and performance of HEP for strength progression. Cont. To work toward AROM shoulder F above shoulder height. Overall Progression Towards Functional goals/ Treatment Progress Update:  [] Patient is progressing as expected towards functional goals listed. [x] Progression is slowed due to complexities/Impairments listed. [] Progression has been slowed due to co-morbidities.   [] Plan just implemented, too soon to assess goals progression <30days   [] Goals require adjustment due to lack of progress  [] Patient is not progressing as expected and requires additional follow up with physician  [] Other    Prognosis for POC: [x] Good [] Fair  [] Poor    Patient requires continued skilled intervention: [x] Yes  [] No    Treatment/Activity Tolerance:  [x] Patient able to complete treatment  [] Patient limited by fatigue  [] Patient limited by pain    [] Patient limited by other medical

## 2022-07-29 ENCOUNTER — HOSPITAL ENCOUNTER (OUTPATIENT)
Dept: PHYSICAL THERAPY | Age: 67
Setting detail: THERAPIES SERIES
Discharge: HOME | End: 2022-07-29
Payer: MEDICARE

## 2022-07-29 PROCEDURE — 97140 MANUAL THERAPY 1/> REGIONS: CPT

## 2022-07-29 PROCEDURE — 97110 THERAPEUTIC EXERCISES: CPT

## 2022-07-29 NOTE — FLOWSHEET NOTE
[facility], [address], [address] [place] [address] Ivan  Phone: [phone]  Fax [phone]    Date:  2022    Patient Name:  Rox Toscano    :  1955  MRN: [id number]  Restrictions/Precautions:   Right TTL SHLD Post-op Protocol (See MEDIA TAB),  [id number] 2021 w/ hospitalization and f/u PT 21 to 21,  OA, HTN, Osteoporosis , Back SX for Cason Rods @ age 8, Inner Ear [address] for Replacement of Ear Drum, Right Ulnar Nerve Decompression @ Elbow Aug. 12,2020, Right Carpal Tunnel Release SX Aug 25, 2020, PT Right Shoulder 2020    Medical/Treatment Diagnosis Information:  Diagnosis: M75.101 Right Shld RTC Tear & OA s/p Total SHLD SX (DOS: 2021)  Treatment Diagnosis: M25.511 Right SHLD Pain, M25.521 Right Elbow Pain,M25.611 Rigth SHLD Stiffness, M25.621 Rigth Elbow Stiffness, M62.81 Right UE Weakness , G25.89 Scapular Dyskinesis  Insurance/Certification information:  PT Insurance Information: [employer] (Changed 22)  Physician Information:  Referring Practitioner: Dr. Lito Sandoval. Florencia Pacheco  Has the plan of care been signed (Y/N):        [x]  Yes  []  No     Date of Patient follow up with Physician: May 13, 2020    MEDICARE CAP EXCEPTION DOCUMENTATION  I certify that this patient meets one of the below criteria necessary for becoming an exception to the Medicare cap on therapy services:    [x]  The patient has a condition identified by an ICD-10 code that has a direct and significant impact on the need for therapy. (Significantly impacts the rate of recovery.)                   []  The patient has a complexity identified by an ICD-10 code that has a direct and significant impact on the need for therapy.   (Significantly impacts the rate of recovery and is associated with a primary condition.)         []  The patient has associated variables that influence the amount of treatment to include:  Social support, self-efficacy/motivation, prognosis, time since onset/acuity. [x]  The patient has generalized musculoskeletal conditions or a condition affecting multiple sites that will have a direct impact on the rate of recovery. []  The patient had a prior episode of outpatient therapy during this calendar year for a different condition. []  The patient has a mental or cognitive disorder in addition to the condition being treated that will have a direct and significant impact on the rate of recovery. Is this a Progress Report:     []  Yes  [x]  No     If Yes:  Date Range for reporting period:  Beginnin-3-2022  Endin22    Progress report will be due (10 Rx or 30 days whichever is less):           5/3/50    Recertification will be due (POC Duration  / 90 days whichever is less):   2022     Visit # Insurance Allowable Auth Required   In-person 47 30 + 8 until 22 [x]  Yes []  No    Telehealth  New insurance 22 BMN []  Yes [x]  No    Total       16%  Date assessed:             22  Therapy Diagnosis/Practice Pattern: 4H     Number of Comorbidities:  []0     [x]1-2    []3+    Latex Allergy:  [x]NO      []YES  Preferred Language for Healthcare:   [x]English       []other:    Pain level:   2-3 /10    SUBJECTIVE:  Pt feels that she still sees some progress. She's overall less sore, has improved her ability to use arm for lifting to her shelf in the pantry. OBJECTIVE   Observation:less TTP prox biceps and more at pec (mild), deltoid , UT and subscap; Test measurements:   RESTRICTIONS/PRECAUTIONS: Right TTL SHLD Post-op Protocol (See MEDIA TAB),     Exercises/Interventions:  Access Code: [email or web address]  URL: [email or web address]  Date: 2022    Access Code: [email or web address]  URL: [email or web address]  Date: 2021    Access Code: [email or web address]  URL: [email or web address]  Date: 2021  Therapeutic Ex (21244) Sets/sec Reps Notes/CUES   Pt ed: hold off as much strength exc 2-3 days, mostly just pulleys/finisher motions for AAROM. Inc'd ice to reduce pain/inflammation. Ergonomics for computer work: keep mouse and keyboard closer to body to reduce reaching/abd motions rep      Finisher: flex, scap, cw, ccw x10 ea  no weight today    hep          Standing Shld Flex wall slides, scaption MC @ scap and elbow    Slide \"up\" step railing  Lift 1 rung to next rung on Ernesto Stretch x10     MC @ scap    Trustetch SHLD Flex Stretch w/ Trunk Flexion     FR slide up wedge on hi-low table x10     Wall Wipes Thread The Needle Posterior Capsule Stretch H5 1x10     Wall Circles @ 90 deg     Equip Lat Pulldown to chest  Row  Single arm row 20# 2x10  20# 2x10  5# x15     Trustretch [place] H10x4 MC @ scap and trunk             Standing Shld Ext w/ Elbow Ext GTB H2  2x10    Supine Ext Rot/ bilat RTB H2 2x10     SL Ext Rot 2x10 SBA for technique 1#   SL abd  SL flex reach  x15  x15 SBA for technique -   SL Horz Abd to 90  x15     Supine Flex w/ elbow flex / bilat  Bench press  Single arm press  Serratus reach  With horiz abd/add (~30 deg)   With f/e arc ( ~) 1# 2x5  1# 2x5  1# x10  X10   x10     Supine cane flex    Supine post cap s    Table slides flex / ER    Pulleys       Biceps curls  Triceps ext standing bend over  x10 ea 3#  3#    Standing horiz abd to ~45 (bent over hi-low table)  Standing horiz abd band flex to ~30 deg  3x5        Resume NV   Self R rib mob with strap L SB  5\"x5 Review again for HEP   Manual Intervention (52816)      Re-assess time         PROM right shld /  Grade 2-3 mobs.   6 min     STM  prox biceps, pec, deltoid, UT  Teres/ lat/ subscap/SL scap mobs 15 min     2nd rib mob R supine gr III  + L SB           NMR re-education (30087)   CUES NEEDED   theraflex bar hold at ~30 deg flex with wrist flex/ext  Hold at ~40 deg flex with rhythmic stab  \" \" 40 deg scap with \" \"                       Therapeutic Activity (08554)                        Access Code: [email or web address]  URL: [email or web address]  Date: 25/66/4662  Prepared by: Willian Berger    Exercises  Supine Shoulder Flexion Extension AAROM with Dowel - 3 x daily - 7 x weekly - 1 sets - 10 reps - 5 hold  Supine Cross Body Shoulder Stretch - 3 x daily - 7 x weekly - 1 sets - 3 reps - 30 hold  Shoulder External Rotation and Scapular Retraction - 3 x daily - 7 x weekly - 2 sets - 10 reps - 2 hold  Seated Shoulder Flexion Towel Slide at Table Top - 3 x daily - 7 x weekly - 1 sets - 10 reps - 5 hold  Seated Shoulder External Rotation PROM on Table - 3 x daily - 7 x weekly - 1 sets - 10 reps - 5 hold  Standing Shoulder Internal Rotation Stretch with Hands Behind Back - 3 x daily - 7 x weekly - 1 sets - 5 reps - 10 hold  Seated Shoulder Flexion AAROM with Pulley Behind - 3 x daily - 7 x weekly - 3 sets - 10 reps - 2 hold    Therapeutic Exercise and NMR EXR  [x] (75175) Provided verbal/tactile cueing for activities related to strengthening, flexibility, endurance, ROM  for improvements in scapular, scapulothoracic and UE control with self care, reaching, carrying, lifting, house/yardwork, driving/computer work. [x] (20029) Provided verbal/tactile cueing for activities related to improving balance, coordination, kinesthetic sense, posture, motor skill, proprioception  to assist with  scapular, scapulothoracic and UE control with self care, reaching, carrying, lifting, house/yardwork, driving/computer work. Therapeutic Activities:    [] (14080 or 27256) Provided verbal/tactile cueing for activities related to improving balance, coordination, kinesthetic sense, posture, motor skill, proprioception and motor activation to allow for proper function of scapular, scapulothoracic and UE control with self care, carrying, lifting, driving/computer work.      Home Exercise Program:    [x] (64092) Reviewed/Progressed HEP activities related to strengthening, flexibility, endurance, ROM of scapular, scapulothoracic and UE control with self care, reaching, carrying, lifting, house/yardwork, driving/computer work  [] (06977) Reviewed/Progressed HEP activities related to improving balance, coordination, kinesthetic sense, posture, motor skill, proprioception of scapular, scapulothoracic and UE control with self care, reaching, carrying, lifting, house/yardwork, driving/computer work      Manual Treatments:  PROM / STM / Oscillations-Mobs:  G-I, II, III, IV (PA's, Inf., Post.)  [x] (93975) Provided manual therapy to mobilize soft tissue/joints of cervical/CT, scapular GHJ and UE for the purpose of modulating pain, promoting relaxation,  increasing ROM, reducing/eliminating soft tissue swelling/inflammation/restriction, improving soft tissue extensibility and allowing for proper ROM for normal function with self care, reaching, carrying, lifting, house/yardwork, driving/computer work    Modalities: 512' ice R shoudler     Charges  Timed Code Treatment Minutes: 45   Total Treatment Minutes: 57 (ice)       [] EVAL (LOW) 33284   [] EVAL (MOD) 68371   [] EVAL (HIGH) 91846   [] RE-EVAL      [x] JI(58900) x 2  [] IONTO  [] NMR (16111) x    [] VASO   [x] Manual (38425) x 1   [] Other:CP  [] TA x      [] Mech Traction (49635)  [] ES(attended) (71800)      [] ES (un) (69561):     GOALS  Short Term Goals: To be achieved in: 2 weeks  1. Independent in HEP and progression per patient tolerance, in order to prevent re-injury. [] Progressing: [x] Met: [] Not Met: [] Adjusted  2. Patient will have a decrease in pain to facilitate improvement in movement, function, and ADLs as indicated by Functional Deficits. [] Progressing: [x] Met: [] Not Met: [] Adjusted     Long Term Goals: To be achieved in: 12 weeks  1. Disability index score of 42% or less for the [facility] to assist with reaching prior level of function. [] Progressing:  [x] Met: to 11% 5-26-22 [] Not Met: [] Adjusted  2.  Patient will demonstrate increased AROM to 140 deg flexion to allow for proper joint functioning as indicated by patients Functional Deficits. [x] Progressing:AAROM to ~120, but AROM limited by strength still , [] Met: [] Not Met: [] Adjusted  3. Patient will demonstrate an increase in Strength to 4/5 to allow for proper functional mobility as indicated by patients Functional Deficits. [x] Progressing: [] Met: [] Not Met: [] Adjusted  4. Patient will retrieve plate from overhead shelf with her right hand without increased symptoms or restriction. [] Progressing: [x] Met: [] Not Met: [] Adjusted  5. Wash and fix hair with right hand. [] Progressing: [x] Met: [] Not Met: [] Adjusted         Progression Towards Functional goals:  [] Patient is progressing as expected towards functional goals listed. [x] Progression is slowed due to complexities listed. [] Progression has been slowed due to co-morbidities. [] Plan just implemented, too soon to assess goals progression  [] Other:      ASSESSMENT: Pt less sore this date, still requires STM to manage deltoid/pec tightness. Still requires cues and ed. Re:strengthening and performance of HEP for strength progression. Cont. To work toward AROM shoulder F above shoulder height. Overall Progression Towards Functional goals/ Treatment Progress Update:  [] Patient is progressing as expected towards functional goals listed. [x] Progression is slowed due to complexities/Impairments listed. [] Progression has been slowed due to co-morbidities.   [] Plan just implemented, too soon to assess goals progression <30days   [] Goals require adjustment due to lack of progress  [] Patient is not progressing as expected and requires additional follow up with physician  [] Other    Prognosis for POC: [x] Good [] Fair  [] Poor    Patient requires continued skilled intervention: [x] Yes  [] No    Treatment/Activity Tolerance:  [x] Patient able to complete treatment  [] Patient limited by fatigue  [] Patient limited by pain    [] Patient limited by other medical complications  [] Other:     PLAN: Wean to 1x every 1-1.5/wks, consider seeing surgeon sooner if reaches plateau/worse pain. [x] Continue per plan of care [] Alter current plan (see comments above)  [] Plan of care initiated [] Hold pending MD visit [] Discharge    Electronically signed by:  Ivette Parker, PT, DPT      Note: If patient does not return for scheduled/ recommended follow up visits, this note will serve as a discharge from care along with most recent update on progress.

## 2022-08-01 RX ORDER — METOPROLOL TARTRATE 50 MG/1
TABLET, FILM COATED ORAL
Qty: 60 TABLET | Refills: 0 | OUTPATIENT
Start: 2022-08-01

## 2022-08-04 RX ORDER — METOPROLOL TARTRATE 50 MG/1
50 TABLET, FILM COATED ORAL 2 TIMES DAILY
Qty: 60 TABLET | Refills: 0 | Status: SHIPPED | OUTPATIENT
Start: 2022-08-04 | End: 2022-08-30

## 2022-08-05 ENCOUNTER — HOSPITAL ENCOUNTER (OUTPATIENT)
Dept: PHYSICAL THERAPY | Age: 67
Setting detail: THERAPIES SERIES
Discharge: HOME | End: 2022-08-05
Payer: MEDICARE

## 2022-08-05 PROCEDURE — 97110 THERAPEUTIC EXERCISES: CPT

## 2022-08-05 PROCEDURE — 97140 MANUAL THERAPY 1/> REGIONS: CPT

## 2022-08-05 NOTE — PROGRESS NOTES
[facility], [address] [facility]  Phone: [phone]  Fax [phone]    Date:  2022    Patient Name:  Lisbet Garcia    :  1955  MRN: [id number]  Restrictions/Precautions:   Right TTL SHLD Post-op Protocol (See MEDIA TAB),  [id number] 2021 w/ hospitalization and f/u PT 21 to 21,  OA, HTN, Osteoporosis , Back SX for Cason Rods @ age 8, Inner Ear [address] for Replacement of Ear Drum, Right Ulnar Nerve Decompression @ Elbow Aug. 12,2020, Right Carpal Tunnel Release SX Aug 25, 2020, PT Right Shoulder 2020    Medical/Treatment Diagnosis Information:  Diagnosis: M75.101 Right Shld RTC Tear & OA s/p Total SHLD SX (DOS: 2021)  Treatment Diagnosis: M25.511 Right SHLD Pain, M25.521 Right Elbow Pain,M25.611 Rigth SHLD Stiffness, M25.621 Rigth Elbow Stiffness, M62.81 Right UE Weakness , G25.89 Scapular Dyskinesis  Insurance/Certification information:  PT Insurance Information: [employer] Medicare (Changed 22)  Physician Information:  Referring Practitioner: Dr. Karina Soto. Adelia Borjas  Has the plan of care been signed (Y/N):        [x]  Yes  []  No     Date of Patient follow up with Physician: May 13, 2020    MEDICARE CAP EXCEPTION DOCUMENTATION  I certify that this patient meets one of the below criteria necessary for becoming an exception to the Medicare cap on therapy services:    [x]  The patient has a condition identified by an ICD-10 code that has a direct and significant impact on the need for therapy. (Significantly impacts the rate of recovery.)                   []  The patient has a complexity identified by an ICD-10 code that has a direct and significant impact on the need for therapy.   (Significantly impacts the rate of recovery and is associated with a primary condition.)         []  The patient has associated variables that influence the amount of treatment to include:  Social support, self-efficacy/motivation, prognosis, time since onset/acuity. [x]  The patient has generalized musculoskeletal conditions or a condition affecting multiple sites that will have a direct impact on the rate of recovery. []  The patient had a prior episode of outpatient therapy during this calendar year for a different condition. []  The patient has a mental or cognitive disorder in addition to the condition being treated that will have a direct and significant impact on the rate of recovery. Is this a Progress Report:     []  Yes  [x]  No     If Yes:  Date Range for reporting period:  Beginnin-3-2022  Endin22    Progress report will be due (10 Rx or 30 days whichever is less):           73    Recertification will be due (POC Duration  / 90 days whichever is less):   2022     Visit # Insurance Allowable Auth Required   In-person 48-PN written 30 + 8 until 22 [x]  Yes []  No    Telehealth  New insurance 22 BMN []  Yes [x]  No    Total        16%  Date assessed:              22  Therapy Diagnosis/Practice Pattern: 4H     Number of Comorbidities:  []0     [x]1-2    []3+    Latex Allergy:  [x]NO      []YES  Preferred Language for Healthcare:   [x]English       []other:    Pain level:   2-3 /10    SUBJECTIVE:  Pt feels that she continues to see progress with ADLs, and less soreness at rest. She still has trouble sleeping on her back and L side d/t shoulder discomfort, and has limited strength for reaching/lifting. She agrees that there may be concern for her R shoulder strength and relation to neck pain/hx Cason rods, but she does not want to seek consultation with her spine surgeon or ever have EMG testing. OBJECTIVE   Observation:less TTP prox biceps and more at pec (mild), deltoid , UT and subscap; Test measurements: AROM flex 92 with less UT sub,  scap 70 with UT sub  Functional ER thumb to R ear lobe Functional IR thumb to lateral L2  PROM flex 128 scap 138   ER 65 at deg 70 abd 65 IR at deg 65 abd  MMT flex 3-/5  abd 3-/5  3+ ER in neutral  4+ IR in neutral   Biceps  5-/5  Triceps  4+/5   Rhomboids 5/5 (seated)      RESTRICTIONS/PRECAUTIONS: Right TTL SHLD Post-op Protocol (See MEDIA TAB),     Exercises/Interventions:  Access Code: [email or web address]  URL: [email or web address]  Date: 01/04/2022    Access Code: [email or web address]  URL: [email or web address]/  Date: 12/23/2021    Access Code: [email or web address]  URL: [email or web address]  Date: 12/09/2021  Therapeutic Ex (19059) Sets/sec Reps Notes/CUES   Pt ed: Continue AAROM, PROM self stretching so she doesn't get stiff d/t limited strength/AROM. Consider f/u in PT after August every 30 days to check progress or modify HEP. She will stop d/t high co-pay when [facility] is dropped.   5'     Finisher: flex, scap, cw, ccw x10 ea  no weight today    hep          Standing Shld Flex wall slides, scaption MC @ scap and elbow    Slide \"up\" step railing flex and scap  Lift 1 rung to next rung on Ernesto Stretch X1x10     MC @ scap    Trustetch SHLD Flex Stretch w/ Trunk Flexion         Wall Wipes Thread The Needle Posterior Capsule Stretch H5 1x10     Wall Circles @ 90 deg     Equip Lat Pulldown to chest  Row  Single arm row 25# 2x10  20# 2x10  10# x15     Trustretch [place] H10x4 MC @ scap and trunk             Standing Shld Ext w/ Elbow Ext GTB H2  2x10    Supine Ext Rot/ bilat RTB H2 2x10     SL Ext Rot 2x10 SBA for technique 1#   SL abd  SL flex reach  x15  x15 SBA for technique -   SL Horz Abd to 90  x15     Supine Flex w/ elbow flex / bilat  Bench press  Single arm press  Serratus reach  With horiz abd/add (~30 deg)   With f/e arc ( ~) 1# 2x5  1# 2x5  1# x10  X10   x10     Supine cane flex    Supine post cap s    Table slides flex / ER    Pulleys       Biceps curls  Triceps ext standing bend over  X10+ x5 ea 4#  4#    Standing horiz abd to ~45 (bent over hi-low table)  Standing horiz abd band flex to ~30 deg  X10, x5        Resume NV   Self R rib mob with strap L SB  Review again for HEP   Manual Intervention (99330)      Re-assess time 5'         PROM right shld /  Grade 2-3 mobs. 6 min     STM  prox biceps, pec, deltoid, UT  Teres/ lat/ subscap/SL scap mobs     2nd rib mob R supine gr III  + L SB           NMR re-education (27736)   CUES NEEDED   theraflex bar hold at ~30 deg flex with wrist flex/ext  Hold at ~40 deg flex with rhythmic stab  \" \" 40 deg scap with \" \"                       Therapeutic Activity (48941)                        Access Code: [email or web address]  URL: [employer]  Date: 29/23/1629  Prepared by: Luiz Sepulveda    Exercises  Supine Shoulder Flexion Extension AAROM with Dowel - 3 x daily - 7 x weekly - 1 sets - 10 reps - 5 hold  Supine Cross Body Shoulder Stretch - 3 x daily - 7 x weekly - 1 sets - 3 reps - 30 hold  Shoulder External Rotation and Scapular Retraction - 3 x daily - 7 x weekly - 2 sets - 10 reps - 2 hold  Seated Shoulder Flexion Towel Slide at Table Top - 3 x daily - 7 x weekly - 1 sets - 10 reps - 5 hold  Seated Shoulder External Rotation PROM on Table - 3 x daily - 7 x weekly - 1 sets - 10 reps - 5 hold  Standing Shoulder Internal Rotation Stretch with Hands Behind Back - 3 x daily - 7 x weekly - 1 sets - 5 reps - 10 hold  Seated Shoulder Flexion AAROM with Pulley Behind - 3 x daily - 7 x weekly - 3 sets - 10 reps - 2 hold    Therapeutic Exercise and NMR EXR  [x] (32268) Provided verbal/tactile cueing for activities related to strengthening, flexibility, endurance, ROM  for improvements in scapular, scapulothoracic and UE control with self care, reaching, carrying, lifting, house/yardwork, driving/computer work.     [x] (26728) Provided verbal/tactile cueing for activities related to improving balance, coordination, kinesthetic sense, posture, motor skill, proprioception  to assist with  scapular, scapulothoracic and UE control with self care, reaching, carrying, lifting, house/yardwork, driving/computer work. Therapeutic Activities:    [] (66680 or 00276) Provided verbal/tactile cueing for activities related to improving balance, coordination, kinesthetic sense, posture, motor skill, proprioception and motor activation to allow for proper function of scapular, scapulothoracic and UE control with self care, carrying, lifting, driving/computer work. Home Exercise Program:    [x] (59840) Reviewed/Progressed HEP activities related to strengthening, flexibility, endurance, ROM of scapular, scapulothoracic and UE control with self care, reaching, carrying, lifting, house/yardwork, driving/computer work  [] (56912) Reviewed/Progressed HEP activities related to improving balance, coordination, kinesthetic sense, posture, motor skill, proprioception of scapular, scapulothoracic and UE control with self care, reaching, carrying, lifting, house/yardwork, driving/computer work      Manual Treatments:  PROM / STM / Oscillations-Mobs:  G-I, II, III, IV (PA's, Inf., Post.)  [x] (66743) Provided manual therapy to mobilize soft tissue/joints of cervical/CT, scapular GHJ and UE for the purpose of modulating pain, promoting relaxation,  increasing ROM, reducing/eliminating soft tissue swelling/inflammation/restriction, improving soft tissue extensibility and allowing for proper ROM for normal function with self care, reaching, carrying, lifting, house/yardwork, driving/computer work    Modalities: 512' ice R shoudler     Charges  Timed Code Treatment Minutes: 40   Total Treatment Minutes: 50 (ice)       [] EVAL (LOW) 47358   [] EVAL (MOD) 90353   [] EVAL (HIGH) 31310   [] RE-EVAL      [x] LC(67754) x 2  [] IONTO  [] NMR (22426) x    [] VASO   [x] Manual (86527) x 1   [] Other:CP  [] TA x      [] Mech Traction (29474)  [] ES(attended) (41577)      [] ES (un) (61444):     GOALS  Short Term Goals: To be achieved in: 2 weeks  1.  Independent in HEP and progression per patient tolerance, in order to prevent re-injury. [] Progressing: [x] Met: [] Not Met: [] Adjusted  2. Patient will have a decrease in pain to facilitate improvement in movement, function, and ADLs as indicated by Functional Deficits. [] Progressing: [x] Met: [] Not Met: [] Adjusted     Long Term Goals: To be achieved in: 12 weeks  1. Disability index score of 42% or less for the [facility] to assist with reaching prior level of function. [] Progressing:  [x] Met: to 11%  [] Not Met: [] Adjusted  2. Patient will demonstrate increased AROM to 100 deg flexion to allow for proper joint functioning as indicated by patients Functional Deficits. [x] Progressing:AAROM to ~120, but AROM limited by strength still to ~90 deg, [] Met: [] Not Met: [x] Adjusted Pt goal adjusted to 100  3. Patient will demonstrate an increase in Strength to 4/5  (within available range 0-100 AROM goal) to allow for proper functional mobility as indicated by patients Functional Deficits. [x] Progressing: [] Met: [] Not Met: [x] Adjusted adjusted to AROM 0-100 goal as pt not likely to truly get beyond 3-/5 d/t limited AROM/strength. Have discussed referral back to surgeon d/t concern for axillary N issue. 4. Patient will retrieve plate from overhead shelf with her right hand without increased symptoms or restriction. [] Progressing: [x] Met: [] Not Met: [] Adjusted  5. Wash and fix hair with right hand. [] Progressing: [x] Met: [] Not Met: [] Adjusted         Progression Towards Functional goals:  [] Patient is progressing as expected towards functional goals listed. [x] Progression is slowed due to complexities listed. [] Progression has been slowed due to co-morbidities. [] Plan just implemented, too soon to assess goals progression  [] Other:      ASSESSMENT: Pt 's goals adjusted to set goal of 0-100 AROM and strength to 4/5 within this ROM.  She continues to make functional progress with her ADLs, comfort levels with reaching and endurance for UE use for work/home. She still lacks strength to show >3/5 strength in flexion/abduction, consistent with deltoid weakness and question axillary N issue still, however she does not want to see surgeon earlier, talk to her spine surgeon or get neck/EMG testing at this time. Overall Progression Towards Functional goals/ Treatment Progress Update:  [] Patient is progressing as expected towards functional goals listed. [x] Progression is slowed due to complexities/Impairments listed. [] Progression has been slowed due to co-morbidities. [] Plan just implemented, too soon to assess goals progression <30days   [] Goals require adjustment due to lack of progress  [] Patient is not progressing as expected and requires additional follow up with physician  [] Other    Prognosis for POC: [x] Good [] Fair  [] Poor    Patient requires continued skilled intervention: [x] Yes  [] No    Treatment/Activity Tolerance:  [x] Patient able to complete treatment  [] Patient limited by fatigue  [] Patient limited by pain    [] Patient limited by other medical complications  [] Other:     PLAN: 1x week through end of August 2022, then D/C with HEP. Re-assess every 30 days as needed to demo progress vs decline in function. [x] Continue per plan of care [x] Alter current plan (see comments above)  [] Plan of care initiated [] Hold pending MD visit [] Discharge    Electronically signed by:  Eulice Kussmaul, PT, DPT      Note: If patient does not return for scheduled/ recommended follow up visits, this note will serve as a discharge from care along with most recent update on progress.

## 2022-08-12 ENCOUNTER — HOSPITAL ENCOUNTER (OUTPATIENT)
Dept: PHYSICAL THERAPY | Age: 67
Setting detail: THERAPIES SERIES
Discharge: HOME | End: 2022-08-12
Payer: MEDICARE

## 2022-08-12 ENCOUNTER — APPOINTMENT (OUTPATIENT)
Dept: PHYSICAL THERAPY | Age: 67
End: 2022-08-12
Payer: MEDICARE

## 2022-08-12 PROCEDURE — 97140 MANUAL THERAPY 1/> REGIONS: CPT

## 2022-08-12 PROCEDURE — 97110 THERAPEUTIC EXERCISES: CPT

## 2022-08-12 NOTE — FLOWSHEET NOTE
self-efficacy/motivation, prognosis, time since onset/acuity. [x]  The patient has generalized musculoskeletal conditions or a condition affecting multiple sites that will have a direct impact on the rate of recovery. []  The patient had a prior episode of outpatient therapy during this calendar year for a different condition. []  The patient has a mental or cognitive disorder in addition to the condition being treated that will have a direct and significant impact on the rate of recovery. Is this a Progress Report:     []  Yes  [x]  No     If Yes:  Date Range for reporting period:  Beginnin-3-2022  Endin22    Progress report will be due (10 Rx or 30 days whichever is less):           88    Recertification will be due (POC Duration  / 90 days whichever is less):   2022     Visit # Insurance Allowable Auth Required   In-person 49 30 + 8 until 22 [x]  Yes []  No    Telehealth  New insurance 22 BMN []  Yes [x]  No    Total        16%  Date assessed:              22  Therapy Diagnosis/Practice Pattern: 4H     Number of Comorbidities:  []0     [x]1-2    []3+    Latex Allergy:  [x]NO      []YES  Preferred Language for Healthcare:   [x]English       []other:    Pain level:   0-5 with some days reaching/lifting sometimes on days it rains /10    SUBJECTIVE:  Pt feels sore in anterior shoulder today, also feels that she got a lot of cramping in (distal) elbow from writing a lot at work. OBJECTIVE   Observation:less TTP prox biceps and more at pec (mild), deltoid , UT and subscap; Test measurements: AROM flex 92 with less UT sub,  scap 70 with UT sub  Functional ER thumb to R ear lobe Functional IR thumb to lateral L2  PROM flex 128   scap 138   ER 65 at deg 70 abd 65 IR at deg 65 abd  MMT flex 3-/5  abd 3-/5  3+ ER in neutral  4+ IR in neutral   Biceps  5-/5  Triceps  4+/5   Rhomboids 5/5 (seated)      RESTRICTIONS/PRECAUTIONS: Right TTL SHLD Post-op Protocol (See MEDIA TAB),     Exercises/Interventions:  Access Code: [email or web address]  URL: [email or web address]  Date: 01/04/2022    Access Code: [email or web address]  URL: [email or web address]  Date: 12/23/2021    Access Code: [email or web address]  URL: [email or web address]  Date: 12/09/2021  Therapeutic Ex (11575) Sets/sec Reps Notes/CUES   Pt ed: Continue AAROM, PROM self stretching so she doesn't get stiff d/t limited strength/AROM. Consider f/u in PT after August every 30 days to check progress or modify HEP. She will stop d/t high co-pay when Heather Chavez is dropped. 5'     Finisher: flex, scap, cw, ccw x10 ea  no weight today    hep          Standing Shld Flex wall slides, scaption MC @ scap and elbow    Slide \"up\" step railing flex and scap  Lift 1 rung to next rung on Ernesto Stretch X1x10     MC @ scap    Trustetch SHLD Flex Stretch w/ Trunk Flexion         Wall Wipes Thread The Needle Posterior Capsule Stretch H5 1x10     Wall Circles @ 90 deg     Equip Lat Pulldown to chest  Row  Single arm row 25# 2x10  20# 2x10  10# x15     Trustretch [place] H10x4 MC @ scap and trunk             Standing Shld Ext w/ Elbow Ext GTB H2  2x10    Supine Ext Rot/ bilat RTB H2 2x10     SL Ext Rot 2x10 SBA for technique 1#   SL abd  SL flex reach  x15  x15 SBA for technique -   SL Horz Abd to 90  x15     Supine Flex w/ elbow flex / bilat  Bench press  Single arm press  Serratus reach  With horiz abd/add (~30 deg)   With f/e arc ( ~) 1# 2x5  1# 2x5  1# x10  X10   x10     Supine cane flex    Supine post cap s    Table slides flex / ER    Pulleys       Biceps curls  Triceps ext standing bend over  X10+ x5 ea 4#  4#    Standing horiz abd to ~45 (bent over hi-low table)  Standing horiz abd band flex to ~30 deg  X10, x5        Resume NV   Self R rib mob with strap L SB  Review again for HEP   Manual Intervention (89823)      Re-assess time 5'         PROM right shld /  Grade 2-3 mobs.   6 min     STM  prox biceps, pec, deltoid, UT  Teres/ lat/ subscap/SL scap mobs 15 min     2nd rib mob R supine gr III  + L SB           NMR re-education (76744)   CUES NEEDED   theraflex bar hold at ~30 deg flex with wrist flex/ext  Hold at ~40 deg flex with rhythmic stab  \" \" 40 deg scap with \" \"                       Therapeutic Activity (39416)                        Access Code: [email or web address]  URL: [email or web address]  Date: 10/50/7771  Prepared by: Gal Leong    Exercises  Supine Shoulder Flexion Extension AAROM with Dowel - 3 x daily - 7 x weekly - 1 sets - 10 reps - 5 hold  Supine Cross Body Shoulder Stretch - 3 x daily - 7 x weekly - 1 sets - 3 reps - 30 hold  Shoulder External Rotation and Scapular Retraction - 3 x daily - 7 x weekly - 2 sets - 10 reps - 2 hold  Seated Shoulder Flexion Towel Slide at Table Top - 3 x daily - 7 x weekly - 1 sets - 10 reps - 5 hold  Seated Shoulder External Rotation PROM on Table - 3 x daily - 7 x weekly - 1 sets - 10 reps - 5 hold  Standing Shoulder Internal Rotation Stretch with Hands Behind Back - 3 x daily - 7 x weekly - 1 sets - 5 reps - 10 hold  Seated Shoulder Flexion AAROM with Pulley Behind - 3 x daily - 7 x weekly - 3 sets - 10 reps - 2 hold    Therapeutic Exercise and NMR EXR  [x] (76582) Provided verbal/tactile cueing for activities related to strengthening, flexibility, endurance, ROM  for improvements in scapular, scapulothoracic and UE control with self care, reaching, carrying, lifting, house/yardwork, driving/computer work. [x] (17701) Provided verbal/tactile cueing for activities related to improving balance, coordination, kinesthetic sense, posture, motor skill, proprioception  to assist with  scapular, scapulothoracic and UE control with self care, reaching, carrying, lifting, house/yardwork, driving/computer work.     Therapeutic Activities:    [] (16978 or 09193) Provided verbal/tactile cueing for activities related to improving balance, coordination, kinesthetic sense, posture, motor skill, proprioception and motor activation to allow for proper function of scapular, scapulothoracic and UE control with self care, carrying, lifting, driving/computer work. Home Exercise Program:    [x] (15455) Reviewed/Progressed HEP activities related to strengthening, flexibility, endurance, ROM of scapular, scapulothoracic and UE control with self care, reaching, carrying, lifting, house/yardwork, driving/computer work  [] (54174) Reviewed/Progressed HEP activities related to improving balance, coordination, kinesthetic sense, posture, motor skill, proprioception of scapular, scapulothoracic and UE control with self care, reaching, carrying, lifting, house/yardwork, driving/computer work      Manual Treatments:  PROM / STM / Oscillations-Mobs:  G-I, II, III, IV (PA's, Inf., Post.)  [x] (19807) Provided manual therapy to mobilize soft tissue/joints of cervical/CT, scapular GHJ and UE for the purpose of modulating pain, promoting relaxation,  increasing ROM, reducing/eliminating soft tissue swelling/inflammation/restriction, improving soft tissue extensibility and allowing for proper ROM for normal function with self care, reaching, carrying, lifting, house/yardwork, driving/computer work    Modalities: 512' ice R shoudler     Charges  Timed Code Treatment Minutes: 45   Total Treatment Minutes: 55 (ice)       [] EVAL (LOW) 06526   [] EVAL (MOD) 40100   [] EVAL (HIGH) 72797   [] RE-EVAL      [x] HJ(24137) x 2  [] IONTO  [] NMR (64919) x    [] VASO   [x] Manual (21253) x 1   [] Other:CP  [] TA x      [] Mech Traction (86285)  [] ES(attended) (23418)      [] ES (un) (46526):     GOALS  Short Term Goals: To be achieved in: 2 weeks  1. Independent in HEP and progression per patient tolerance, in order to prevent re-injury. [] Progressing: [x] Met: [] Not Met: [] Adjusted  2. Patient will have a decrease in pain to facilitate improvement in movement, function, and ADLs as indicated by Functional Deficits.   [] Progressing: [x] Met: [] Not Met: [] Adjusted     Long Term Goals: To be achieved in: 12 weeks  1. Disability index score of 42% or less for the [facility] to assist with reaching prior level of function. [] Progressing:  [x] Met: to 11%  [] Not Met: [] Adjusted  2. Patient will demonstrate increased AROM to 100 deg flexion to allow for proper joint functioning as indicated by patients Functional Deficits. [x] Progressing:AAROM to ~120, but AROM limited by strength still to ~90 deg, [] Met: [] Not Met: [x] Adjusted Pt goal adjusted to 100  3. Patient will demonstrate an increase in Strength to 4/5  (within available range 0-100 AROM goal) to allow for proper functional mobility as indicated by patients Functional Deficits. [x] Progressing: [] Met: [] Not Met: [x] Adjusted adjusted to AROM 0-100 goal as pt not likely to truly get beyond 3-/5 d/t limited AROM/strength. Have discussed referral back to surgeon d/t concern for axillary N issue. 4. Patient will retrieve plate from overhead shelf with her right hand without increased symptoms or restriction. [] Progressing: [x] Met: [] Not Met: [] Adjusted  5. Wash and fix hair with right hand. [] Progressing: [x] Met: [] Not Met: [] Adjusted         Progression Towards Functional goals:  [] Patient is progressing as expected towards functional goals listed. [x] Progression is slowed due to complexities listed. [] Progression has been slowed due to co-morbidities. [] Plan just implemented, too soon to assess goals progression  [] Other:      ASSESSMENT: Pt  was more sore in her biceps proximally this date, had some catching felt at anterior shoulder with changing position from SL'ing<>supine and with sidelying horiz abd. She's tolerating inc'd reps of overhead climbing AAROM flex on ladder rungs, fatigues quickly still with AROM flex and this inc's her UT hiking/soreness in anterior shoulder.      Overall Progression Towards Functional goals/ Treatment Progress Update:  [] Patient is progressing as expected towards functional goals listed. [x] Progression is slowed due to complexities/Impairments listed. [] Progression has been slowed due to co-morbidities. [] Plan just implemented, too soon to assess goals progression <30days   [] Goals require adjustment due to lack of progress  [] Patient is not progressing as expected and requires additional follow up with physician  [] Other    Prognosis for POC: [x] Good [] Fair  [] Poor    Patient requires continued skilled intervention: [x] Yes  [] No    Treatment/Activity Tolerance:  [x] Patient able to complete treatment  [] Patient limited by fatigue  [] Patient limited by pain    [] Patient limited by other medical complications  [] Other:     PLAN: 1x week through end of August 2022, then D/C with HEP. Re-assess every 30 days as needed to demo progress vs decline in function. [x] Continue per plan of care [x] Alter current plan (see comments above)  [] Plan of care initiated [] Hold pending MD visit [] Discharge    Electronically signed by:  Obinna Felton, PT, DPT      Note: If patient does not return for scheduled/ recommended follow up visits, this note will serve as a discharge from care along with most recent update on progress.

## 2022-08-19 ENCOUNTER — HOSPITAL ENCOUNTER (OUTPATIENT)
Dept: PHYSICAL THERAPY | Age: 67
Setting detail: THERAPIES SERIES
Discharge: HOME | End: 2022-08-19
Payer: MEDICARE

## 2022-08-19 PROCEDURE — 97110 THERAPEUTIC EXERCISES: CPT | Performed by: PHYSICAL THERAPIST

## 2022-08-19 PROCEDURE — 97140 MANUAL THERAPY 1/> REGIONS: CPT | Performed by: PHYSICAL THERAPIST

## 2022-08-19 NOTE — FLOWSHEET NOTE
[facility] [address] [place] [address] [place]  Phone: [phone]  Fax [phone]    Date:  2022    Patient Name:  Jaqueline Mancera    :  1955  MRN: [id number]  Restrictions/Precautions:   Right TTL SHLD Post-op Protocol (See MEDIA TAB),  [id number] 2021 w/ hospitalization and f/u PT 21 to 21,  OA, HTN, Osteoporosis , Back SX for Cason Rods @ age 8, Inner Ear [address] for Replacement of Ear Drum, Right Ulnar Nerve Decompression @ Elbow Aug. 12,2020, Right Carpal Tunnel Release SX Aug 25, 2020, PT Right Shoulder 2020    Medical/Treatment Diagnosis Information:  Diagnosis: M75.101 Right Shld RTC Tear & OA s/p Total SHLD SX (DOS: 2021)  Treatment Diagnosis: M25.511 Right SHLD Pain, M25.521 Right Elbow Pain,M25.611 Rigth SHLD Stiffness, M25.621 Rigth Elbow Stiffness, M62.81 Right UE Weakness , G25.89 Scapular Dyskinesis  Insurance/Certification information:  PT Insurance Information: [employer] (Changed 22)  Physician Information:  Referring Practitioner: Dr. Chase Caruso. Elina Florentino  Has the plan of care been signed (Y/N):        [x]  Yes  []  No     Date of Patient follow up with Physician: May 13, 2020    MEDICARE CAP EXCEPTION DOCUMENTATION  I certify that this patient meets one of the below criteria necessary for becoming an exception to the Medicare cap on therapy services:    [x]  The patient has a condition identified by an ICD-10 code that has a direct and significant impact on the need for therapy. (Significantly impacts the rate of recovery.)                   []  The patient has a complexity identified by an ICD-10 code that has a direct and significant impact on the need for therapy.   (Significantly impacts the rate of recovery and is associated with a primary condition.)         []  The patient has associated variables that influence the amount of treatment to include:  Social support, self-efficacy/motivation, prognosis, time since onset/acuity. [x]  The patient has generalized musculoskeletal conditions or a condition affecting multiple sites that will have a direct impact on the rate of recovery. []  The patient had a prior episode of outpatient therapy during this calendar year for a different condition. []  The patient has a mental or cognitive disorder in addition to the condition being treated that will have a direct and significant impact on the rate of recovery. Is this a Progress Report:     []  Yes  [x]  No     If Yes:  Date Range for reporting period:  Beginnin-3-2022  Endin22    Progress report will be due (10 Rx or 30 days whichever is less):               Recertification will be due (POC Duration  / 90 days whichever is less):   2022     Visit # Insurance Allowable Auth Required   In-person 50 30 + 8 until 22 [x]  Yes []  No    Telehealth  New insurance 22 BMN []  Yes [x]  No    Total        16%  Date assessed:              22  Therapy Diagnosis/Practice Pattern: 4H     Number of Comorbidities:  []0     [x]1-2    []3+    Latex Allergy:  [x]NO      []YES  Preferred Language for Healthcare:   [x]English       []other:    Pain level:   0-5 with some days reaching/lifting sometimes on days it rains /10    SUBJECTIVE:  Pt feels sore in anterior shoulder today, also feels that she got a lot of cramping in (distal) elbow from writing a lot at work. OBJECTIVE   Observation:R subscap restriction; Test measurements: PROM flex 130          RESTRICTIONS/PRECAUTIONS: Right TTL SHLD Post-op Protocol (See MEDIA TAB),     Exercises/Interventions:  Access Code: [email or web address]  URL: [email or web address]/  Date: 2022    Access Code: [email or web address]  URL: [email or web address]  Date: 2021    Access Code: [email or web address]  URL: [email or web address]  Date: 2021  Therapeutic Ex (36959) Sets/sec Reps Notes/CUES   Pt ed: Retraction - 3 x daily - 7 x weekly - 2 sets - 10 reps - 2 hold  Seated Shoulder Flexion Towel Slide at Table Top - 3 x daily - 7 x weekly - 1 sets - 10 reps - 5 hold  Seated Shoulder External Rotation PROM on Table - 3 x daily - 7 x weekly - 1 sets - 10 reps - 5 hold  Standing Shoulder Internal Rotation Stretch with Hands Behind Back - 3 x daily - 7 x weekly - 1 sets - 5 reps - 10 hold  Seated Shoulder Flexion AAROM with Pulley Behind - 3 x daily - 7 x weekly - 3 sets - 10 reps - 2 hold    Therapeutic Exercise and NMR EXR  [x] (12898) Provided verbal/tactile cueing for activities related to strengthening, flexibility, endurance, ROM  for improvements in scapular, scapulothoracic and UE control with self care, reaching, carrying, lifting, house/yardwork, driving/computer work. [x] (70009) Provided verbal/tactile cueing for activities related to improving balance, coordination, kinesthetic sense, posture, motor skill, proprioception  to assist with  scapular, scapulothoracic and UE control with self care, reaching, carrying, lifting, house/yardwork, driving/computer work. Therapeutic Activities:    [] (89434 or 81745) Provided verbal/tactile cueing for activities related to improving balance, coordination, kinesthetic sense, posture, motor skill, proprioception and motor activation to allow for proper function of scapular, scapulothoracic and UE control with self care, carrying, lifting, driving/computer work.      Home Exercise Program:    [x] (78971) Reviewed/Progressed HEP activities related to strengthening, flexibility, endurance, ROM of scapular, scapulothoracic and UE control with self care, reaching, carrying, lifting, house/yardwork, driving/computer work  [] (72080) Reviewed/Progressed HEP activities related to improving balance, coordination, kinesthetic sense, posture, motor skill, proprioception of scapular, scapulothoracic and UE control with self care, reaching, carrying, lifting, house/yardwork, driving/computer work      Manual Treatments:  PROM / STM / Oscillations-Mobs:  G-I, II, III, IV (PA's, Inf., Post.)  [x] (13920) Provided manual therapy to mobilize soft tissue/joints of cervical/CT, scapular GHJ and UE for the purpose of modulating pain, promoting relaxation,  increasing ROM, reducing/eliminating soft tissue swelling/inflammation/restriction, improving soft tissue extensibility and allowing for proper ROM for normal function with self care, reaching, carrying, lifting, house/yardwork, driving/computer work    Modalities: [address] [place] [address] R shoudler     Charges  Timed Code Treatment Minutes: 45   Total Treatment Minutes: 55 (ice)       [] EVAL (LOW) 62403   [] EVAL (MOD) 81327   [] EVAL (HIGH) 48053   [] RE-EVAL      [x] RZ(18592) x 2  [] IONTO  [] NMR (02766) x    [] VASO   [x] Manual (50239) x 1   [] Other:CP  [] TA x      [] Mech Traction (54091)  [] ES(attended) (08886)      [] ES (un) (55492):     GOALS  Short Term Goals: To be achieved in: 2 weeks  1. Independent in HEP and progression per patient tolerance, in order to prevent re-injury. [] Progressing: [x] Met: [] Not Met: [] Adjusted  2. Patient will have a decrease in pain to facilitate improvement in movement, function, and ADLs as indicated by Functional Deficits. [] Progressing: [x] Met: [] Not Met: [] Adjusted     Long Term Goals: To be achieved in: 12 weeks  1. Disability index score of 42% or less for the [facility] to assist with reaching prior level of function. [] Progressing:  [x] Met: to 11%  [] Not Met: [] Adjusted  2. Patient will demonstrate increased AROM to 100 deg flexion to allow for proper joint functioning as indicated by patients Functional Deficits. [x] Progressing:AAROM to ~120, but AROM limited by strength still to ~90 deg, [] Met: [] Not Met: [x] Adjusted Pt goal adjusted to 100  3.  Patient will demonstrate an increase in Strength to 4/5  (within available range 0-100 AROM goal) to allow for proper functional mobility as indicated by patients Functional Deficits. [x] Progressing: [] Met: [] Not Met: [x] Adjusted adjusted to AROM 0-100 goal as pt not likely to truly get beyond 3-/5 d/t limited AROM/strength. Have discussed referral back to surgeon d/t concern for axillary N issue. 4. Patient will retrieve plate from overhead shelf with her right hand without increased symptoms or restriction. [] Progressing: [x] Met: [] Not Met: [] Adjusted  5. Wash and fix hair with right hand. [] Progressing: [x] Met: [] Not Met: [] Adjusted         Progression Towards Functional goals:  [] Patient is progressing as expected towards functional goals listed. [x] Progression is slowed due to complexities listed. [] Progression has been slowed due to co-morbidities. [] Plan just implemented, too soon to assess goals progression  [] Other:      ASSESSMENT: pt. Having less bicep pain, still limited with above shoulder reach. PROM flexion 130. Overall Progression Towards Functional goals/ Treatment Progress Update:  [] Patient is progressing as expected towards functional goals listed. [x] Progression is slowed due to complexities/Impairments listed. [] Progression has been slowed due to co-morbidities. [] Plan just implemented, too soon to assess goals progression <30days   [] Goals require adjustment due to lack of progress  [] Patient is not progressing as expected and requires additional follow up with physician  [] Other    Prognosis for POC: [x] Good [] Fair  [] Poor    Patient requires continued skilled intervention: [x] Yes  [] No    Treatment/Activity Tolerance:  [x] Patient able to complete treatment  [] Patient limited by fatigue  [] Patient limited by pain    [] Patient limited by other medical complications  [] Other:     PLAN: 1x week through end of August 2022, then D/C with HEP. Re-assess every 30 days as needed to demo progress vs decline in function.    [x] Continue per plan of care [x] Alter current plan (see comments above)  [] Plan of care initiated [] Hold pending MD visit [] Discharge    Electronically signed by:  Alcira Chand, PT, MSPT, OMT-C [id number]        Note: If patient does not return for scheduled/ recommended follow up visits, this note will serve as a discharge from care along with most recent update on progress.

## 2022-08-19 NOTE — PROGRESS NOTES
Luke Carolina   1955, 74 y.o. female   [id number]       Referring Provider: Abigail Sanders MD  Referral Type: In an order in [address] [place] [address]    Reason for Visit: Evaluation of suspected change in hearing, tinnitus, or balance. ADULT AUDIOLOGIC EVALUATION      Luke Carolina is a 74 y.o. female seen today, 8/22/2022 , for an initial audiologic evaluation. Patient was seen by Abigail Sanders MD following today's evaluation. previous evaluation from 09/27/2019 viewable in medical record. AUDIOLOGIC AND OTHER PERTINENT MEDICAL HISTORY:      Luke Carolina noted possible decline in hearing. Per previous evaluation, patient has history of right ear canal wall down mastoidectomy, pressure equalizing (PE) tubes, known history of hearing loss (RE> LE), family history of hearing loss, noise exposure from loud concerts and tinnitus in the left ear. Medical history is reportedly significant for shoulder surgery about a year ago. 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 Air-bone gaps > 10 dBHL noted from 250-4000Hz in the right ear. Follow medical recommendations of Abigail Sanders MD.     ASSESSMENT AND FINDINGS:     Otoscopy revealed: Clear ear canals bilaterally    RIGHT EAR:  Hearing Sensitivity: Moderate sloping to severe mixed conductive and sensorineural hearing loss. Speech Recognition Threshold: 65 dB HL  Word Recognition: Excellent (96%), based on NU-6 25-word list at 100 dBHL masked using recorded speech stimuli. Tympanometry: Flat, no peak pressure or compliance, Type B tympanogram, with large ear canal volume, consistent with patent PE tube/TM perforation.     LEFT EAR:  Hearing Sensitivity:Mild sensorineural hearing loss from 250-500Hz rising to normal limits from 1-4kHz sloping to a mild to moderate hearing loss through [facility]. Speech Recognition Threshold: 25 dB HL  Word Recognition: Excellent (92%), based on NU-6 25-word list at 65 dBHL using recorded speech stimuli. Tympanometry: Flat, no peak pressure or compliance, Type B tympanogram, with large ear canal volume, consistent with patent PE tube/TM perforation. Reliability: Good   Transducer: Inserts    **NOTE: Air-bone gaps > 10 dBHL noted from 250-4000Hz in the right ear. See scanned audiogram dated 8/22/2022  for results. PATIENT EDUCATION:       The following items were discussed with the patient:   - Good Communication Strategies  - Hearing Loss and Hearing Aids  - Tinnitus Management Strategies    - Noise-Induced Hearing Loss and use of Hearing Protection Devices (HPDs)     Educational information was shared in the After Visit Summary. RECOMMENDATIONS:                                                                                                                                                                                                                                                            The following items are recommended based on patient report and results from today's appointment:   - Continue medical follow-up with Isaias Shaikh MD.   - Retest hearing as medically indicated and/or sooner if a change in hearing is noted. - If desired, schedule a Hearing Aid Evaluation (HAE) appointment to discuss hearing aid options. - Utilize \"Good Communication Strategies\" as discussed to assist in speech understanding with communication partners. - Maintain a sound enriched environment to assist in the management of tinnitus symptoms.  - Use hearing protection devices (HPDs), such as protective ear muffs and ear plugs, when exposed to dangerous sound levels.        Maribel Finch, AuD  Audiologist    Chart CC'd to: Orin Sorenson MD      Degree of   Hearing Sensitivity dB Range   Within Normal Limits (WNL) 0 - 20   Mild 20 - 40   Moderate 40 - 55   Moderately-Severe 55 - 70   Severe 70 - 90   Profound 90 +

## 2022-08-22 ENCOUNTER — PROCEDURE VISIT (OUTPATIENT)
Dept: AUDIOLOGY | Age: 67
End: 2022-08-22
Payer: MEDICARE

## 2022-08-22 ENCOUNTER — PROCEDURE VISIT (OUTPATIENT)
Dept: ENT CLINIC | Age: 67
End: 2022-08-22
Payer: MEDICARE

## 2022-08-22 VITALS — RESPIRATION RATE: 16 BRPM | WEIGHT: 130 LBS | HEIGHT: 59 IN | BODY MASS INDEX: 26.21 KG/M2 | TEMPERATURE: 97.1 F

## 2022-08-22 DIAGNOSIS — H72.02 TYMPANIC MEMBRANE CENTRAL PERFORATION, LEFT: Primary | ICD-10-CM

## 2022-08-22 DIAGNOSIS — H93.11 TINNITUS, RIGHT EAR: ICD-10-CM

## 2022-08-22 DIAGNOSIS — H90.A22 SENSORINEURAL HEARING LOSS (SNHL) OF LEFT EAR WITH RESTRICTED HEARING OF RIGHT EAR: Primary | ICD-10-CM

## 2022-08-22 DIAGNOSIS — H90.A31 MIXED CONDUCTIVE AND SENSORINEURAL HEARING LOSS OF RIGHT EAR WITH RESTRICTED HEARING OF LEFT EAR: ICD-10-CM

## 2022-08-22 PROCEDURE — 69610 TYMPANIC MEMBRANE REPAIR: CPT | Performed by: OTOLARYNGOLOGY

## 2022-08-22 PROCEDURE — 92567 TYMPANOMETRY: CPT | Performed by: AUDIOLOGIST

## 2022-08-22 PROCEDURE — 92557 COMPREHENSIVE HEARING TEST: CPT | Performed by: AUDIOLOGIST

## 2022-08-22 NOTE — Clinical Note
Dr. Cierra Alamo,  Thank you for your referral for audiometric testing on this patient. Please see the scanned audiogram (under \"Media\" tab) and encounter note for details. If you have any questions, or if there is anything else you need, please let me know.    Brianna Luz Audiologist --- [address] [facility] - Audiology

## 2022-08-22 NOTE — PATIENT INSTRUCTIONS
Good Communication Strategies    Communication can be challenging for anyone, but can be especially difficult for those with some degree of hearing loss. While we may not be able to control every factor that may lead to difficulty with communication, there are Good Communication Strategies that we can all use in our day-to-day lives. Communication takes both parties working together for it to be successful. Tips as a Listener:   Control your environment. It is important to limit the amount of background noise in the room when possible. You should also consider having a good light source in the room to best see the other person. Ask for clarification. Instead of saying \"What?\", you can use parts of what you heard to make a new question. For example, if you heard the word \"Thursday\" but not the rest of the week, you may ask \"What was that about Thursday? \" or \"What did you want to do Thursday? \". This shows the person talking that you are listening and will help them better explain what they are saying. Be an advocate for yourself. If you are hearing but not understanding, tell the other person \"I can hear you, but I need you to slow down when you speak. \"  Or if someone is facing the other direction, say \"I cannot hear you when you are not looking at me when we talk. \"       Tips as a Talker:   - Sit or stand 3 to 6 feet away to maximize audibility         -- It is unrealistic to believe someone else will fully hear your message if you are speaking from across the room or in a different room in the house   - Stay at eye level to help with visual cues   - Make sure you have the persons attention before speaking   - Use facial expressions and gestures to accentuate your message   - Raise your voice slightly (do not scream)   - Speak slowly and distinctly   - Use short, simple sentences   - Rephrase your words if the person is having a hard time understanding you    - To avoid distortion, dont speak directly into a persons ear      Some additional items that may be helpful:   - Remain patient - this is important for both parties   - Write down items that still cannot be heard/understood. You may write with pen/paper or consider typing/texting on a cell phone or smart device. - If background noise is unavoidable, try to keep yourself in a good position in the room. By sitting at a bonner on the side of the restaurant (preferably a corner), it will be easier to communicate than if you were sitting at a table in the middle with background noise surrounding you. Keep yourself positioned away from music speakers or heavy foot traffic. Tinnitus: Overview and Management Strategies          Many people have some ringing sounds in their ears once in a while. You may hear a roar, a hiss, a tinkle, or a buzz. The sound usually lasts only a few minutes. If it goes on all the time, you may have tinnitus. Tinnitus is usually caused by long-term exposure to loud noise. This damages the nerves in the inner ear. It can occur with all types of hearing loss. It may be a symptom of almost any ear problem. Tinnitus may be caused by a buildup of earwax. Or, it may be caused by ear infections or certain medicines (especially antibiotics or large amounts of aspirin). You can also hear noises in your ears because of an injury to the ears, drinking too much alcohol or caffeine, or a medical condition. Other conditions may also contribute to tinnitus, including: head and neck trauma, temporomandibular joint disorder (TMJ), sinus pressure and barometric trauma, traumatic brain injury, metabolic disorders, autoimmune disorders, stress, and high blood pressure. You may need tests to evaluate your hearing and to find causes of long-lasting tinnitus. Your doctor may suggest one or more treatments to help you cope with the tinnitus. You can also do things at home to help reduce symptoms.     Follow-up care is a key part of better access to the sounds it is missing. There are some hearing aids with built-in noise generator programs, which may help when amplification alone is not enough. Additional resources may be found through the American Tinnitus Association at www.rodrick.org    When should you call for help? Call 911 anytime you think you may need emergency care. For example, call if:    You have symptoms of a stroke. These may include:  Sudden numbness, tingling, weakness, or loss of movement in your face, arm, or leg, especially on only one side of your body. Sudden vision changes. Sudden trouble speaking. Sudden confusion or trouble understanding simple statements. Sudden problems with walking or balance. A sudden, severe headache that is different from past headaches. Call your doctor now or seek immediate medical care if:    You develop other symptoms. These may include hearing loss (or worse hearing loss), balance problems, dizziness, nausea, or vomiting. Watch closely for changes in your health, and be sure to contact your doctor if:    Your tinnitus moves from both ears to one ear. Your hearing loss gets worse within 1 day after an ear injury. Your tinnitus or hearing loss does not get better within 1 week after an ear injury. Your tinnitus bothers you enough that you want to take medicines to help you cope with it. If you notice changes in your tinnitus and/or your hearing, it is recommended that you have your hearing tested by your audiologist and to follow-up with your physician that manages your hearing loss (such as your ENT or Primary Care doctor). Hearing Loss: Care Instructions  Your Care Instructions      Hearing loss is a sudden or slow decrease in how well you hear. It can range from mild to profound. Permanent hearing loss can occur with aging, and it can happen when you are exposed long-term to loud noise.  Examples include listening to loud music, riding motorcycles, or being around other loud machines. Hearing loss can affect your work and home life. It can make you feel lonely or depressed. You may feel that you have lost your independence. But hearing aids and other devices can help you hear better and feel connected to others. Follow-up care is a key part of your treatment and safety. Be sure to make and go to all appointments, and call your doctor if you are having problems. It's also a good idea to know your test results and keep a list of the medicines you take. How can you care for yourself at home? Avoid loud noises whenever possible. This helps keep your hearing from getting worse. Always wear hearing protection around loud noises. If appropriate, wear hearing aid(s) as directed. It is recommended that hearing aids are worn during all waking hours to keep your brain active and give it access to the sounds it is missing. If you are beginning your process with hearing aid(s), schedule a \"Hearing Aid Evaluation\" with an audiologist to discuss your lifestyle, features of hearing aid technology, and styles of hearing aids available. It is recommended that you contact your insurance company to determine if you have a hearing aid benefit, as this may dictate who you can see for these services. Have hearing tests as your doctor suggests. They can show whether your hearing has changed. Your hearing aid may need to be adjusted. Use other assistive devices as needed. These may include:  Telephone amplifiers and hearing aids that can connect to a television, stereo, radio, or microphone. Devices that use lights or vibrations. These alert you to the doorbell, a ringing telephone, or a baby monitor. Television closed-captioning. This shows the words at the bottom of the screen. Most new TVs can do this. TTY (text telephone). This lets you type messages back and forth on the telephone instead of talking or listening. These devices are also called TDD.  When messages are typed on the keyboard, they are sent over the phone line to a receiving TTY. The message is shown on a monitor. Use pagers, fax machines, text, and email if it is hard for you to communicate by telephone. Try to learn a listening technique called speech-reading. It is not lip-reading. You pay attention to people's gestures, expressions, posture, and tone of voice. These clues can help you understand what a person is saying. Face the person you are talking to, and have him or her face you. Make sure the lighting is good. You need to see the other person's face clearly. Think about counseling if you need help to adjust to your hearing loss. When should you call for help? Watch closely for changes in your health, and be sure to contact your doctor if:    You think your hearing is getting worse. You have new symptoms, such as dizziness or nausea. Noise-Induced Hearing Loss  What it is, and what you can do to prevent it      Exposure to loud sounds, in an occupational setting or recreational, can cause permanent hearing loss. Sound is measured in decibels (dB). Noise-induced hearing loss is the ONLY type of preventable hearing loss. Hearing loss related to noise exposure can occur at any age. There are small sensory cells, called inner and outer hair cells, within the inner ear (cochlea). These cells process the loudness (intensity) and pitch (frequency) of sound and send the signal to the brain via our auditory nerve (vestibulocochlear nerve, cranial nerve VIII). When these cells are damaged, they can result in permanent hearing loss and/or tinnitus. The hair cells responsible for high frequency sounds, like birds chirping, are most likely to be damaged due to loud sounds. The high frequency sounds are also very important for our clarity and understanding of speech. OCCUPATIONAL NOISE EXPOSURE RECREATIONAL NOISE EXPOSURE   Some jobs may have exposure to loud sounds in the workplace. These jobs may include but are not limited to:  [address] settings  Manufacturing  Construction  [place]  Landscaping  Hairdressing/hairstyling  [address] [facility]   . .. And more! Many activities outside of work may cause permanent hearing loss. These activities may include but are not limited to:  Lawnmowers, leaf blowers  Farming equipment and animals (such as pigs squealing)  Chainsaws and other power tools  Playing musical instruments and/or singing  Listening to music too loudly - at concerts, through stereo, through ear buds or headphones  Attending sporting events  Attending fireworks shows or using fireworks at home  Use of firearms  . .. And more! REDUCE OR PROTECT YOUR EARS FROM NOISE EXPOSURE    To do your best to avoid noise-induced hearing loss, here are some tips:  Limit exposure to loud sounds. 85 dB (decibels) is safe for 8 hours. As sounds are louder, the length of time the sound is safe lessens. These numbers are cumulative across a 24-hour period. (NIOSH and CDC, 2002)  85 dB is safe for 8 hours  88 dB is safe for 4 hours  91 dB is safe for 2 hours  94 dB is safe for 1 hour  97 dB is safe for 30 minutes  100 dB is safe for 15 minutes  103 dB is safe for 7.5 minutes  106 dB is safe for 3.75 minutes  109 dB is safe for LESS THAN 2 minutes  112 dB is safe for LESS THAN 1 minute  115 dB is safe for ~ 30 seconds  130 dB can cause IMMEDIATE hearing loss  If you are unsure if a sound is too loud, consider checking the sound level with a \"sound level meter\". There are apps on smart devices that can measure the loudness of the sound. They are not as accurate as expensive equipment used by scientists, but it will give you a guesstimate of how loud the sound is, and if it may be damaging to your hearing. If you cannot avoid loud sounds, here are ways to reduce your exposure:  1.  Wear hearing protection  Ear plugs and protective ear muffs can be used to reduce the intensity of the sound.  The higher the NRR (noise reduction rating), the better reduction of the intensity of the sound   2. Turn the volume down  When listening to music, turn the volume down, especially when wearing ear buds or headphones. A good rule of thumb is to not go beyond the middle setting on your device. If you can't hear someone talking to you from arm's length away, your music may be at a level that it can cause damage. If someone else can hear your music from 3 feet away, it may also be at a level that it can cause damage. 3. Walk away from the sound  If you do not have the ability to wear hearing protection or turn down the volume of the sound, you should do your best to move away from the source of the sound. Sound decreases in intensity as we move further from the source. The sound will decrease by 6 dB for every doubling of distance from the sound source. Exposure to these sounds may cause permanent damage to your hearing.   If you suspect your hearing has changed, it is recommended that you have your hearing tested by your audiologist.

## 2022-08-22 NOTE — PROGRESS NOTES
Patch Myringoplasty  Procedure    Pre op: Tympanic membrane perforation of left ear  Post op: Successful patch placement   Procedure : left patch myringoplasty under binocular otomicroscopy   Surgeon: ROSETTE Chen  Estimated Blood Loss: None    Procedure:   left patch myringoplasty    Risks and benefits of patch myringoplasty including pain, inflammation, infection, otorrhea, unsuccessful repair of eardrum perforation - patient agreed to proceed. After obtaining consent, the patient was placed in the examination chair in the reclined position. left ear: External auditory canal with occluding cerumen that was removed, tympanic membrane with 10% perforation localized in the posterior-superior quadrant - perforation painted with trichloroacetic acid with good blanching noted. Cigarette paper patch trimmed to size and placed lateral to the perforation with alligator forceps, then manipulated into final position with Cardoso needle.     Patient tolerated the procedure well with no complications

## 2022-08-22 NOTE — PATIENT INSTRUCTIONS
-Dry ear precautions recommended as follows:   Patient to place either:  A silicone ear plug  A cotton ball coated in Vaseline   Into the left ears during showering, instructed to remove afterwards to aerate ears     Patient instructed to avoid digital trauma to the ears   Instructed to notify the clinic immediately with any acute otalgia, hearing loss, purulent otorrhea

## 2022-08-25 ENCOUNTER — HOSPITAL ENCOUNTER (OUTPATIENT)
Dept: PHYSICAL THERAPY | Age: 67
Setting detail: THERAPIES SERIES
Discharge: HOME | End: 2022-08-25
Payer: MEDICARE

## 2022-08-25 ENCOUNTER — APPOINTMENT (OUTPATIENT)
Dept: PHYSICAL THERAPY | Age: 67
End: 2022-08-25
Payer: MEDICARE

## 2022-08-25 PROCEDURE — 97140 MANUAL THERAPY 1/> REGIONS: CPT

## 2022-08-25 PROCEDURE — 97110 THERAPEUTIC EXERCISES: CPT

## 2022-08-25 NOTE — DISCHARGE SUMMARY
[place] [facility] [facility], [address]  Phone: [phone]  Fax [phone]    Date:  2022    Patient Name:  Gunnar Moscoso    :  1955  MRN: [id number]  Restrictions/Precautions:   Right TTL SHLD Post-op Protocol (See MEDIA TAB),  RYOUN-91 2021 w/ hospitalization and f/u PT 21 to 21,  OA, HTN, Osteoporosis , Back SX for Cason Rods @ age 8, Inner Ear [address] for Replacement of Ear Drum, Right Ulnar Nerve Decompression @ Elbow Aug. 12,2020, Right Carpal Tunnel Release SX Aug 25, 2020, PT Right Shoulder 2020    Medical/Treatment Diagnosis Information:  Diagnosis: M75.101 Right Shld RTC Tear & OA s/p Total SHLD SX (DOS: 2021)  Treatment Diagnosis: M25.511 Right SHLD Pain, M25.521 Right Elbow Pain,M25.611 Rigth SHLD Stiffness, M25.621 Rigth Elbow Stiffness, M62.81 Right UE Weakness , G25.89 Scapular Dyskinesis  Insurance/Certification information:  PT Insurance Information: [employer] Medicare (Changed 22)  Physician Information:  Referring Practitioner: Dr. Taty Vasquez. [facility]  Has the plan of care been signed (Y/N):        [x]  Yes  []  No     Date of Patient follow up with Physician: May 13, 2020    MEDICARE CAP EXCEPTION DOCUMENTATION  I certify that this patient meets one of the below criteria necessary for becoming an exception to the Medicare cap on therapy services:    [x]  The patient has a condition identified by an ICD-10 code that has a direct and significant impact on the need for therapy. (Significantly impacts the rate of recovery.)                   []  The patient has a complexity identified by an ICD-10 code that has a direct and significant impact on the need for therapy.   (Significantly impacts the rate of recovery and is associated with a primary condition.)         []  The patient has associated variables that influence the amount of treatment to include:  Social support, self-efficacy/motivation, prognosis, time since onset/acuity. [x]  The patient has generalized musculoskeletal conditions or a condition affecting multiple sites that will have a direct impact on the rate of recovery. []  The patient had a prior episode of outpatient therapy during this calendar year for a different condition. []  The patient has a mental or cognitive disorder in addition to the condition being treated that will have a direct and significant impact on the rate of recovery. Is this a Progress Report:     []  Yes  [x]  No     If Yes:  Date Range for reporting period:  Beginnin-3-2022  Endin22    Progress report will be due (10 Rx or 30 days whichever is less):           06    Recertification will be due (POC Duration  / 90 days whichever is less):   2022     Visit # Insurance Allowable Auth Required   In-person 51 30 + 8 until 22 [x]  Yes []  No    Telehealth  New insurance 22 BMN []  Yes [x]  No    Total        16%  Date assessed:              22  Therapy Diagnosis/Practice Pattern: 4H     Number of Comorbidities:  []0     [x]1-2    []3+    Latex Allergy:  [x]NO      []YES  Preferred Language for Healthcare:   [x]English       []other:    Pain level:   0-5 with some days reaching/lifting sometimes on days it rains /10    SUBJECTIVE:  Pt feels soreness still in lat shoulder, less at ant. Today will be her last visit or she will take a break from PT d/t insurance coverage changing. She may consider coming back if she has worsening function or needs manual tx. Does not have plan to see surgeon until . She is discouraged that she still does not have good strength to lift arm to the front and side.      OBJECTIVE weakness in C5 dermatome, also C6-7 but not as significant as deltoid weakness (concern for axillary N vs cervical component d/t her spine hx)  Test measurements: AROM flex 89 with UT sub and trunk ext, poor upward rot of scapula,  scap 83 with UT sub and lat trunk lean to R   Functional ER thumb to R lat C5 Functional IR thumb to lateral L4PROM flex 132   scap 140   ER 58 at deg 70 abd  78 IR at deg 70 abd  MMT flex 3-/5  abd 3-/5  3+ ER in neutral  4+ IR in neutral   Biceps  5-/5  Triceps  4+/5   Rhomboids 5/5 (seated) , serratus 3+/5    RESTRICTIONS/PRECAUTIONS: Right TTL SHLD Post-op Protocol (See MEDIA TAB),     Exercises/Interventions:  Access Code: [email or web address]  URL: [email or web address]  Date: 01/04/2022    Access Code: [email or web address]  URL: [email or web address]  Date: 12/23/2021    Access Code: [email or web address]  URL: [email or web address]  Date: 12/09/2021  Therapeutic Ex (40623) Sets/sec Reps Notes/CUES   Pt ed:   Continue AAROM, PROM self stretching so she doesn't get stiff d/t limited strength/AROM. Consider f/u in PT after August every 30 days to check progress or modify HEP. She will stop d/t high co-pay when [place] is dropped. Review HEP, updated pictures, asked pt to see surgeon sooner than Dec and she will call. Can resume PT if needed, would need re-eval and UPOC.  10'     Finisher: flex, scap, cw, ccw x10 ea  5# no weight today    hep               Slide \"up\" step railing flex and scap  Lift 1 rung to next rung on Ernesto Stretch              Playground ball reach to top of wall B R only  eccentric 10  10  5    Wall Wipes Thread The Needle Posterior Capsule Stretch          Equip Lat Pulldown to chest  Row  Single arm row     [facility] H10x4 MC @ scap and trunk             Standing Shld Ext w/ Elbow Ext    Supine Ext Rot/ bilat     SL Ext Rot SBA for technique 1#   SL abd  SL flex reach SBA for technique -   SL Horz Abd to 90      Supine Flex w/ elbow flex / bilat  Bench press  Single arm press  Serratus reach  With horiz abd/add (~30 deg)   With f/e arc ( ~)                      Biceps curls  Triceps ext standing bend over  X10+ x5 ea 4#  4#    Standing horiz abd to ~45 (bent over hi-low table)  Standing horiz abd band flex to ~30 deg  X10, x5        Resume NV   Self R rib mob with strap L SB  Review again for HEP   Manual Intervention (76274)      Re-assess time          PROM right shld /  Grade 2-3 mobs, subscap release. 15 min     STM  prox biceps, pec, deltoid, UT  Teres/ lat/ subscap/SL scap mobs 15 min                NMR re-education (32597)   CUES NEEDED              Access Code: [email or web address]  URL: [employer]/  Date: 93/93/3298  Prepared by: Katherine Byers    Exercises  Supine Shoulder Flexion Extension AAROM with Dowel - 3 x daily - 7 x weekly - 1 sets - 10 reps - 5 hold  Supine Cross Body Shoulder Stretch - 3 x daily - 7 x weekly - 1 sets - 3 reps - 30 hold  Shoulder External Rotation and Scapular Retraction - 3 x daily - 7 x weekly - 2 sets - 10 reps - 2 hold  Seated Shoulder Flexion Towel Slide at Table Top - 3 x daily - 7 x weekly - 1 sets - 10 reps - 5 hold  Seated Shoulder External Rotation PROM on Table - 3 x daily - 7 x weekly - 1 sets - 10 reps - 5 hold  Standing Shoulder Internal Rotation Stretch with Hands Behind Back - 3 x daily - 7 x weekly - 1 sets - 5 reps - 10 hold  Seated Shoulder Flexion AAROM with Pulley Behind - 3 x daily - 7 x weekly - 3 sets - 10 reps - 2 hold    Therapeutic Exercise and NMR EXR  [x] (38044) Provided verbal/tactile cueing for activities related to strengthening, flexibility, endurance, ROM  for improvements in scapular, scapulothoracic and UE control with self care, reaching, carrying, lifting, house/yardwork, driving/computer work. [x] (16013) Provided verbal/tactile cueing for activities related to improving balance, coordination, kinesthetic sense, posture, motor skill, proprioception  to assist with  scapular, scapulothoracic and UE control with self care, reaching, carrying, lifting, house/yardwork, driving/computer work.     Therapeutic Activities:    [] (81221 or 05702) Provided verbal/tactile cueing for activities related to improving balance, coordination, kinesthetic sense, posture, motor skill, proprioception and motor activation to allow for proper function of scapular, scapulothoracic and UE control with self care, carrying, lifting, driving/computer work. Home Exercise Program:    [x] (18828) Reviewed/Progressed HEP activities related to strengthening, flexibility, endurance, ROM of scapular, scapulothoracic and UE control with self care, reaching, carrying, lifting, house/yardwork, driving/computer work  [] (89022) Reviewed/Progressed HEP activities related to improving balance, coordination, kinesthetic sense, posture, motor skill, proprioception of scapular, scapulothoracic and UE control with self care, reaching, carrying, lifting, house/yardwork, driving/computer work      Manual Treatments:  PROM / STM / Oscillations-Mobs:  G-I, II, III, IV (PA's, Inf., Post.)  [x] (42156) Provided manual therapy to mobilize soft tissue/joints of cervical/CT, scapular GHJ and UE for the purpose of modulating pain, promoting relaxation,  increasing ROM, reducing/eliminating soft tissue swelling/inflammation/restriction, improving soft tissue extensibility and allowing for proper ROM for normal function with self care, reaching, carrying, lifting, house/yardwork, driving/computer work    Modalities: 512' ice R shoudler     Charges  Timed Code Treatment Minutes: 45   Total Treatment Minutes: 45       [] EVAL (LOW) 29936   [] EVAL (MOD) 69727   [] EVAL (HIGH) 27359   [] RE-EVAL      [x] VR(75206) x 1  [] IONTO  [] NMR (41567) x    [] VASO   [x] Manual (42909) x 2   [] Other:CP  [] TA x      [] Mech Traction (05484)  [] ES(attended) (61779)      [] ES (un) (99416):     GOALS  Short Term Goals: To be achieved in: 2 weeks  1. Independent in HEP and progression per patient tolerance, in order to prevent re-injury. [] Progressing: [x] Met: [] Not Met: [] Adjusted  2.  Patient will have a decrease in pain to facilitate improvement in movement, function, and ADLs as indicated by Functional Deficits. [] Progressing: [x] Met: [] Not Met: [] Adjusted     Long Term Goals: To be achieved in: 12 weeks  1. Disability index score of 42% or less for the [facility] to assist with reaching prior level of function. [] Progressing:  [x] Met: to 11%  [] Not Met: [] Adjusted  2. Patient will demonstrate increased AROM to 100 deg flexion to allow for proper joint functioning as indicated by patients Functional Deficits. [x] Progressing:AAROM to ~120, but AROM limited by strength still to ~90 deg, [] Met: [] Not Met: [x] Adjusted Pt goal adjusted to 100  3. Patient will demonstrate an increase in Strength to 4/5  (within available range 0-100 AROM goal) to allow for proper functional mobility as indicated by patients Functional Deficits. [x] Progressing: [] Met: [] Not Met: [x] Adjusted adjusted to AROM 0-100 goal as pt not likely to truly get beyond 3-/5 d/t limited AROM/strength. Have discussed referral back to surgeon d/t concern for axillary N issue. 4. Patient will retrieve plate from overhead shelf with her right hand without increased symptoms or restriction. [] Progressing: [x] Met: [] Not Met: [] Adjusted  5. Wash and fix hair with right hand. [] Progressing: [x] Met: [] Not Met: [] Adjusted         Progression Towards Functional goals:  [] Patient is progressing as expected towards functional goals listed. [x] Progression is slowed due to complexities listed. [] Progression has been slowed due to co-morbidities. [] Plan just implemented, too soon to assess goals progression  [] Other:      ASSESSMENT: pVerb review ext this date d/t inc'd sorness and LV, focused on manual tx and re-assess for D/C note to MD> Asked pt to f/u with MD sooner d/t concerns of weakness still in deltoid and plateau with PT. She may resume PT if needed for UPOC/change in function or regression.     Overall Progression Towards Functional goals/ Treatment Progress Update:  [] Patient is progressing as expected towards functional goals listed. [x] Progression is slowed due to complexities/Impairments listed. [] Progression has been slowed due to co-morbidities. [] Plan just implemented, too soon to assess goals progression <30days   [] Goals require adjustment due to lack of progress  [] Patient is not progressing as expected and requires additional follow up with physician  [] Other    Prognosis for POC: [x] Good [] Fair  [] Poor    Patient requires continued skilled intervention: [x] Yes  [] No    Treatment/Activity Tolerance:  [x] Patient able to complete treatment  [] Patient limited by fatigue  [] Patient limited by pain    [] Patient limited by other medical complications  [] Other:     PLAN: D/C with HEP. Re-assess every 30 days as needed to demo progress vs decline in function. Asked pt to see surgeon sooner. [] Continue per plan of care [x] Alter current plan (see comments above)  [] Plan of care initiated [x] Hold pending MD visit [] Discharge    Electronically signed by:  Darshana Peñaloza, PT, DPT        Note: If patient does not return for scheduled/ recommended follow up visits, this note will serve as a discharge from care along with most recent update on progress.

## 2022-08-26 ENCOUNTER — APPOINTMENT (OUTPATIENT)
Dept: PHYSICAL THERAPY | Age: 67
End: 2022-08-26
Payer: MEDICARE

## 2022-08-30 RX ORDER — METOPROLOL TARTRATE 50 MG/1
TABLET, FILM COATED ORAL
Qty: 60 TABLET | Refills: 0 | Status: SHIPPED | OUTPATIENT
Start: 2022-08-30 | End: 2022-10-04 | Stop reason: SDUPTHER

## 2022-09-22 ENCOUNTER — TELEPHONE (OUTPATIENT)
Dept: CARDIOLOGY CLINIC | Age: 67
End: 2022-09-22

## 2022-09-22 ENCOUNTER — OFFICE VISIT (OUTPATIENT)
Dept: CARDIOLOGY CLINIC | Age: 67
End: 2022-09-22
Payer: MEDICARE

## 2022-09-22 VITALS
HEART RATE: 116 BPM | SYSTOLIC BLOOD PRESSURE: 137 MMHG | WEIGHT: 131.5 LBS | OXYGEN SATURATION: 97 % | HEIGHT: 59 IN | DIASTOLIC BLOOD PRESSURE: 80 MMHG | BODY MASS INDEX: 26.51 KG/M2

## 2022-09-22 DIAGNOSIS — R00.0 TACHYCARDIA: Primary | ICD-10-CM

## 2022-09-22 DIAGNOSIS — R06.02 SOB (SHORTNESS OF BREATH) ON EXERTION: ICD-10-CM

## 2022-09-22 PROCEDURE — 93000 ELECTROCARDIOGRAM COMPLETE: CPT | Performed by: INTERNAL MEDICINE

## 2022-09-22 PROCEDURE — 1123F ACP DISCUSS/DSCN MKR DOCD: CPT | Performed by: INTERNAL MEDICINE

## 2022-09-22 PROCEDURE — 99214 OFFICE O/P EST MOD 30 MIN: CPT | Performed by: INTERNAL MEDICINE

## 2022-09-22 NOTE — PROGRESS NOTES
[address]   Cardiac Consultation    Referring Provider:  Severa Abbot CNP     No chief complaint on file. History of Present Illness: Anuel Garcia, 72yo female who I saw originally 1/8/19. She has PMH HTN, thyroid disease (Dr. Claudetta Shilling), sinus tachycardia, and atrial tachycardia. She previously saw Dr. Marie Fisher at [facility]. Most recent ECHO ([facility]) 11/22/17 EF=55-60%, Regional wall motion abnormalities cannot be excluded. Normal diastolic function. Most recent holter monitor 9/19/17 ([facility]) NSR with sinus tachycardia frequently present. No ventricular ectopy. Moderately frequent PAC's. No symptoms noted. Since last OV 11/20 she was hospitalized at [facility] for 16 days 4/25/21-5/11/21 with Covid Pneumonia. EKG 4/25/2021 showed Sinus tachycardia cannot rule out atrial tachycardia. ST & T wave abnormality, consider inferior/anterior ischemia. EKG today 9/22/22 showed Sinus Tachycardia 116 bpm with Left atrial enlargement. Today patient states she is here for check up as it has been nearly 2 years. She states she manages well with ADLs but does c/o SOMMER. She is vague historian. Patient is taking all cardiac medications as prescribed and tolerates them well. Reports missing her metoprolol dose this morning. sPatient denies current edema, chest pain, palpitations, dizziness or syncope. Past Medical History:   has a past medical history of Polymyalgia Rheumaica and Hypertension, osteoarthritis. Surgical History:    has a past surgical history that includes Inner ear surgery (Right). Social History: , lives in [facility]. Reports that she has never smoked. She has never used smokeless tobacco. She reports that she does not use drugs She drinks socially, only occassionally. Family History:  family history includes Cancer in her father; Diabetes in her mother. Home Medications:  Prior to Admission medications    Medication Sig Start Date End Date Taking?  Authorizing Provider   metoprolol tartrate (LOPRESSOR) 50 MG tablet TAKE ONE TABLET BY MOUTH EVERY MORNING AND 1 TABLET BEFORE BEDTIME 8/30/22   Harry Parson MD   levothyroxine (SYNTHROID) 25 MCG tablet Take 25 mcg by mouth daily 3/28/22   Historical Provider, MD   albuterol sulfate HFA (PROAIR HFA) 108 (90 Base) MCG/ACT inhaler Inhale 2 puffs into the lungs every 6 hours as needed for Wheezing 5/11/21   Abigail Loyd MD   pantoprazole (PROTONIX) 40 MG tablet Take 1 tablet by mouth every morning (before breakfast) 5/12/21   Abigail oLyd MD   albuterol-ipratropium (COMBIVENT RESPIMAT)  MCG/ACT AERS inhaler Inhale 1 puff into the lungs every 4 hours as needed for Wheezing 5/11/21   Abigail Loyd MD   clobetasol (TEMOVATE) 0.05 % cream Apply twice daily as needed to flared areas. 10/15/20   Historical Provider, MD   vitamin D (ERGOCALCIFEROL) 1.25 MG (17693 UT) CAPS capsule  8/28/20   Historical Provider, MD   hydroxychloroquine (PLAQUENIL) 200 MG tablet Take 200 mg by mouth daily  7/4/20   Historical Provider, MD        Allergies:  Penicillins     Review of Systems:   Constitutional: there has been no unanticipated weight loss. There's been no change in energy level, sleep pattern, or activity level. Eyes: No visual changes or diplopia. No scleral icterus. ENT: No Headaches, hearing loss or vertigo. No mouth sores or sore throat. Cardiovascular: Reviewed in HPI  Respiratory: No cough or wheezing, no sputum production. No hematemesis. Gastrointestinal: No abdominal pain, appetite loss, blood in stools. No change in bowel or bladder habits. Genitourinary: No dysuria, trouble voiding, or hematuria. Musculoskeletal:  No gait disturbance, weakness or joint complaints. Integumentary: No rash or pruritis. Neurological: No headache, diplopia, change in muscle strength, numbness or tingling.  No change in gait, balance, coordination, mood, affect, memory, mentation, tachycardia. 3.    SOB: Vague symptoms and difficult historian. Given hx sinus tachycardia and atrial tachycardia I need concern for development of tachycardia-induced cardiomyopathy. She has thyroid disease and under care of endocrinologist at [facility] with recent TSH=6.69. I want to reassess heart rhythm and avg HR along with ECHO to assess LVEF. Plan:  1. Cardiac Monitor for 14 days for Tachycardia (fast Heart rate)   2. Echocardiogram to access heart size and strength due to SOB/ Tachycardia  - This test is an ultrasound of your heart just like when you see an ultrasound that a woman has when she is pregnant.  -The test records the movement of your heart valves and chambers.  -It evaluates heart valves, chamber enlargement, abnormal openings, or any fluid in the sac surrounding the heart. 3. Refer to EP (Electrophysiologist Cardiologist) depending on results of Heart monitor if concerning findings  4. Follow up with me in 6 months    This note has been scribed in the presence of Sue Mg MD, by Berenice Banks RN.     I, Dr. Sue Mg, personally performed the services described in this documentation, as scribed by the above signed scribe in my presence. It is both accurate and complete to my knowledge. I agree with the details independently gathered by the clinical support staff, while the remaining scribed note accurately describes my personal service to the patient. Cost of prescription medications and patient compliance have been reviewed with patient. All questions answered. Thank you for allowing me to participate in the care of this individual.    Iwona Cowan.  Terell Chakraborty M.D., [address] [facility]

## 2022-09-22 NOTE — LETTER
[address] Cardiology - [address]  Phone: [phone]  Fax: [phone]    Monie Ng MD    September 22, 2022     Armen Teran, [address] [place] [address] [address]    Patient: Luke Carolina   MR Number: [id number]   YOB: 1955   Date of Visit: 9/22/2022       Dear Armen Teran:    Thank you for referring Roland Martinez to me for evaluation/treatment. Below are the relevant portions of my assessment and plan of care. If you have questions, please do not hesitate to call me. I look forward to following Gilberto Solomon along with you.     Sincerely,      Monie Ng MD

## 2022-09-22 NOTE — PATIENT INSTRUCTIONS
Plan:  1. Cardiac Monitor for 14 days for Tachycardia (fast Heart rate)   2. Echocardiogram to access heart size and strength due to SOB/ Tachycardia  - This test is an ultrasound of your heart just like when you see an ultrasound that a woman has when she is pregnant.  -The test records the movement of your heart valves and chambers.  -It evaluates heart valves, chamber enlargement, abnormal openings, or any fluid in the sac surrounding the heart. 3. Refer to EP (Electrophysiologist Cardiologist) depending on results of Heart monitor  4. Follow up with me in 6 months    Your provider has ordered testing for further evaluation. An order/prescription has been included in your paper work. To schedule outpatient testing, contact Central Scheduling by calling [employer] ([phone]).

## 2022-09-30 ENCOUNTER — HOSPITAL ENCOUNTER (OUTPATIENT)
Dept: CARDIOLOGY | Age: 67
Discharge: HOME | End: 2022-09-30
Payer: MEDICARE

## 2022-09-30 DIAGNOSIS — R00.0 TACHYCARDIA: ICD-10-CM

## 2022-09-30 DIAGNOSIS — R06.02 SOB (SHORTNESS OF BREATH) ON EXERTION: ICD-10-CM

## 2022-09-30 LAB
LV EF: 55 %
LVEF MODALITY: NORMAL

## 2022-09-30 PROCEDURE — C8929 TTE W OR WO FOL WCON,DOPPLER: HCPCS

## 2022-10-03 ENCOUNTER — OFFICE VISIT (OUTPATIENT)
Dept: ENT CLINIC | Age: 67
End: 2022-10-03
Payer: MEDICARE

## 2022-10-03 VITALS — WEIGHT: 130 LBS | TEMPERATURE: 97.2 F | HEIGHT: 59 IN | BODY MASS INDEX: 26.21 KG/M2

## 2022-10-03 DIAGNOSIS — H90.A22 SENSORINEURAL HEARING LOSS (SNHL) OF LEFT EAR WITH RESTRICTED HEARING OF RIGHT EAR: Primary | ICD-10-CM

## 2022-10-03 DIAGNOSIS — H72.02 CENTRAL PERFORATION OF TYMPANIC MEMBRANE OF LEFT EAR: ICD-10-CM

## 2022-10-03 DIAGNOSIS — H93.11 TINNITUS, RIGHT EAR: ICD-10-CM

## 2022-10-03 PROBLEM — H61.21 IMPACTED CERUMEN, RIGHT EAR: Status: RESOLVED | Noted: 2022-01-06 | Resolved: 2022-10-03

## 2022-10-03 PROCEDURE — 1123F ACP DISCUSS/DSCN MKR DOCD: CPT | Performed by: OTOLARYNGOLOGY

## 2022-10-03 PROCEDURE — 99213 OFFICE O/P EST LOW 20 MIN: CPT | Performed by: OTOLARYNGOLOGY

## 2022-10-03 ASSESSMENT — ENCOUNTER SYMPTOMS
SHORTNESS OF BREATH: 0
WHEEZING: 0
ABDOMINAL PAIN: 0

## 2022-10-03 NOTE — PROGRESS NOTES
[facility], [address], [address], [address] [place] [address]  P: [phone]       Patient     Madisyn Queen  1955    Chief Concern     Chief Complaint   Patient presents with    Follow-up     Since last visit she has been able to hear better. Assessment and Plan      Diagnosis Orders   1. Sensorineural hearing loss (SNHL) of left ear with restricted hearing of right ear        2. Tinnitus, right ear        3. Central perforation of tympanic membrane of left ear            74 y.o. female s/p right canal wall down mastoidectomy - no cholesteatoma noted, mastoid bowl debrided. Left tympanic membrane perforation (posterosuperior) with global pars tensa retraction. Patch myringoplasty performed at previous visit, no epithelial ingrowth noted, patch in place-we will keep in place for now given improved hearing in the left ear. She had worsening conductive gap in the right ear, may benefit from hearing aid evaluation, she will follow-up for this with outside hearing aid provider that is covered by her insurance. We will see her back in 3 months for mastoid debridement and patch evaluation. Return in about 3 months (around 1/3/2023). History of Present Illness     Madisyn Queen is a 74 y.o. female has a history of a right canal wall down mastoidectomy for cholesteatoma per Dr. Jocelynn Alvarez at [facility] completed 2015 with revision tympanoplasty and TORP reconstruction 2016. Interval History 7/7/2022: No otorrhea, no otalgia, no aural fullness, decreased hearing in the right ear (states 90% loss). Was unable to complete audiometric testing due to shoulder surgery.     Interval history 10/3/2022: States significant improvement in hearing in the left ear following patch placement, no otorrhea, continuing dry ear precautions    Past Medical History     Past Medical History:   Diagnosis Date    Arthritis     COVID-19 04/25/2021    Hypertension     Osteoporosis     Pneumonia 2017 PONV (postoperative nausea and vomiting)        Past Surgical History     Past Surgical History:   Procedure Laterality Date    ARM SURGERY Right 8/12/2020    RIGHT ULNAR NERVE DECOMPRESSION AT THE ELBOW performed by Yoshi Brandt MD at [address] rods for scoliosis    CARPAL TUNNEL RELEASE Right 8/12/2020    RIGHT CARPAL TUNNEL RELEASE performed by Yoshi Brandt MD at [address] Bilateral     cataracts    INNER EAR SURGERY Right     replacement of eardrum    TONSILLECTOMY         Family History     Family History   Problem Relation Age of Onset    Diabetes Mother     Cancer Father         pancreas       Social History     Social History     Tobacco Use    Smoking status: Never    Smokeless tobacco: Never   Vaping Use    Vaping Use: Never used   Substance Use Topics    Alcohol use: Yes     Comment: 0-1 drinks per month    Drug use: No        Allergies     Allergies   Allergen Reactions    Penicillins Rash       Medications     Current Outpatient Medications   Medication Sig Dispense Refill    metoprolol tartrate (LOPRESSOR) 50 MG tablet TAKE ONE TABLET BY MOUTH EVERY MORNING AND 1 TABLET BEFORE BEDTIME 60 tablet 0    levothyroxine (SYNTHROID) 25 MCG tablet Take 25 mcg by mouth daily      clobetasol (TEMOVATE) 0.05 % cream Apply twice daily as needed to flared areas.       vitamin D (ERGOCALCIFEROL) 1.25 MG (07630 UT) CAPS capsule       hydroxychloroquine (PLAQUENIL) 200 MG tablet Take 200 mg by mouth daily       albuterol sulfate HFA (PROAIR HFA) 108 (90 Base) MCG/ACT inhaler Inhale 2 puffs into the lungs every 6 hours as needed for Wheezing (Patient not taking: No sig reported) 1 Inhaler 0    pantoprazole (PROTONIX) 40 MG tablet Take 1 tablet by mouth every morning (before breakfast) (Patient not taking: No sig reported) 30 tablet 0    albuterol-ipratropium (COMBIVENT RESPIMAT)  MCG/ACT AERS inhaler Inhale 1 puff into the lungs every 4 hours as needed for Wheezing (Patient not taking: No sig reported) 1 Inhaler 0     No current facility-administered medications for this visit. Review of Systems     Review of Systems   Constitutional:  Negative for activity change, chills, diaphoresis, fatigue and fever. HENT:  Positive for hearing loss. Negative for congestion. Eyes:  Negative for visual disturbance. Respiratory:  Negative for shortness of breath and wheezing. Cardiovascular:  Negative for chest pain. Gastrointestinal:  Negative for abdominal pain. Musculoskeletal:  Negative for neck pain and neck stiffness. Skin:  Negative for rash. Neurological:  Negative for dizziness, light-headedness and headaches. Hematological:  Negative for adenopathy. PhysicalExam     Vitals:    10/03/22 1132   Temp: 97.2 °F (36.2 °C)   TempSrc: Infrared   Weight: 130 lb (59 kg)   Height: 4' 11\" (1.499 m)       Physical Exam  Vitals reviewed. Constitutional:       Appearance: Normal appearance. HENT:      Head: Normocephalic and atraumatic. Right Ear: External ear normal. There is impacted cerumen. Left Ear: Ear canal and external ear normal. There is no impacted cerumen. Tympanic membrane is perforated. Ears:      Rodrigez exam findings: Lateralizes right. Right Rinne: BC > AC. Left Rinne: AC > BC. Comments: Left ear patch in place - no epithelial ingrowth, no otorrhea     Nose: No congestion or rhinorrhea. Mouth/Throat:      Lips: Pink. No lesions. Mouth: Mucous membranes are moist. No oral lesions. Tongue: No lesions. Tongue does not deviate from midline. Palate: No mass and lesions. Pharynx: Oropharynx is clear. Uvula midline. No oropharyngeal exudate or posterior oropharyngeal erythema. Eyes:      Extraocular Movements: Extraocular movements intact. Pupils: Pupils are equal, round, and reactive to light. Musculoskeletal:      Cervical back: Normal range of motion and neck supple. Lymphadenopathy:      Cervical: No cervical adenopathy. Neurological:      Mental Status: She is alert. Cranial Nerves: No cranial nerve deficit. Data Review           Audiogram dated 9/27/19:                Procedure       Beau Quigley MD  10/3/22    Portions of this note were dictated using Dragon.  There may be linguistic errors secondary to the use of this program.

## 2022-10-04 ENCOUNTER — TELEPHONE (OUTPATIENT)
Dept: CARDIOLOGY CLINIC | Age: 67
End: 2022-10-04

## 2022-10-04 DIAGNOSIS — R93.1 ABNORMAL ECHOCARDIOGRAM: Primary | ICD-10-CM

## 2022-10-04 RX ORDER — METOPROLOL TARTRATE 50 MG/1
TABLET, FILM COATED ORAL
Qty: 60 TABLET | Refills: 11 | Status: SHIPPED | OUTPATIENT
Start: 2022-10-04

## 2022-10-04 NOTE — TELEPHONE ENCOUNTER
----- Message from Rex Villeda MD sent at 10/4/2022  7:54 AM EDT -----  I called and went over ECHO results. She had technically difficult study. Heart strength and size are normal but here is suggestion of \"tissue\" around heart which may be artifact or pericardial fat. Need CT imaging to be definitive. Please order CT of chest without contrast to evaluate for abnormal ECHO finding with possible mass around heart seen. Please let me know when it's scheduled and get results to review. She will be expecting call from office and knows about CT. Thanks.

## 2022-10-04 NOTE — TELEPHONE ENCOUNTER
Pt is requesting refill of Metoprolol 50mg. Preferred pharmacy is [facility] in [address] [place] [address]. Last ov 09/22/2022 smm. Upcoming ov 03/16/2023 smm.

## 2022-10-04 NOTE — TELEPHONE ENCOUNTER
Spoke with pt. CT chest w/o contrast ordered.   Pt will call and schedule test.  Med refill for metoprolol tartrate sent per pt request

## 2022-10-06 ENCOUNTER — TELEPHONE (OUTPATIENT)
Dept: CARDIOLOGY CLINIC | Age: 67
End: 2022-10-06

## 2022-10-06 NOTE — TELEPHONE ENCOUNTER
10/6 pt's  presented himself in office today to drop off pt monitor. Monitor was placed in [facility] office.

## 2022-10-13 ENCOUNTER — HOSPITAL ENCOUNTER (OUTPATIENT)
Dept: CT IMAGING | Age: 67
Discharge: HOME | End: 2022-10-13
Payer: MEDICARE

## 2022-10-13 DIAGNOSIS — R93.1 ABNORMAL ECHOCARDIOGRAM: ICD-10-CM

## 2022-10-13 PROCEDURE — 71250 CT THORAX DX C-: CPT

## 2022-10-17 ENCOUNTER — TELEPHONE (OUTPATIENT)
Dept: PULMONOLOGY | Age: 67
End: 2022-10-17

## 2022-10-17 ENCOUNTER — TELEPHONE (OUTPATIENT)
Dept: CARDIOLOGY CLINIC | Age: 67
End: 2022-10-17

## 2022-10-17 DIAGNOSIS — R91.1 NODULE OF LEFT LUNG: Primary | ICD-10-CM

## 2022-10-17 NOTE — TELEPHONE ENCOUNTER
----- Message from Guru Mahmood MD sent at 10/17/2022  1:02 PM EDT -----  I called and went over CT results. Good news given no mass in lung or mediastinum. She does have new left lung nodules. Please refer to [facility] for evaluation. She is aware. CPM otherwise. Thanks.

## 2022-10-17 NOTE — TELEPHONE ENCOUNTER
from the   prior studies, the largest measuring approximately 5 mm within the left upper   lobe, image 32 of series 2. No dominant pulmonary mass is identified. Upper Abdomen: The adrenal glands are unremarkable bilaterally. There is a   stable calcified gallstone within the lower gallbladder near the cystic duct,   similar to 11/7/2018. There is a stable 8 mm calculus within the left kidney   lower pole. There is fatty atrophy of the pancreas. There are nonspecific   thick-walled and moderately dilated loops of small bowel within the left   upper quadrant. Soft Tissues/Bones: There is a chronic severe scoliosis of the thoracolumbar   spine with stabilization rods in place. There is diffuse osteopenia. A   right shoulder arthroplasty is noted. No osteolytic or osteoblastic lesion   is seen. Impression   1. No CT evidence of a pulmonary or mediastinal mass. 2. Stable chronic elevation of the left hemidiaphragm, with chronic   compressive atelectasis within the left upper and left lower lobes, may   account for the previously described echocardiogram finding. 3. Multifocal atelectasis and parenchymal scar, without acute airspace   consolidation. 4. Multiple new scattered nodules throughout the left lung, the largest   measuring 5 mm within the left upper lobe. Potential further follow-up of   these nodules is as advised below. 5. Chronic cholelithiasis. 6. Nonspecific thick-walled and dilated loops of small bowel within the left   upper quadrant, possibly representing either an enteritis or a nonvisualized   bowel obstruction. Suggest clinical correlation, and if concerning,   recommend further characterization with a CT abdomen/pelvis with IV and oral   contrast.       RECOMMENDATIONS:   Multiple pulmonary nodules. Most severe: 5 mm left solid pulmonary nodule   within the upper lobe.  If patient is low risk for malignancy, no routine   follow-up imaging is recommended; if patient is high risk for malignancy, a   non-contrast Chest CT at 12 months is optional. If performed and the nodule   is stable at 12 months, no further follow-up is recommended. These guidelines do not apply to immunocompromised patients and patients with   cancer. Follow up in patients with significant comorbidities as clinically   warranted. For lung cancer screening, adhere to Lung-RADS guidelines. Reference: Radiology. 2017; 284(1):228-43.

## 2022-10-18 ENCOUNTER — OFFICE VISIT (OUTPATIENT)
Dept: PULMONOLOGY | Age: 67
End: 2022-10-18
Payer: MEDICARE

## 2022-10-18 VITALS
DIASTOLIC BLOOD PRESSURE: 84 MMHG | WEIGHT: 130 LBS | HEIGHT: 59 IN | HEART RATE: 84 BPM | OXYGEN SATURATION: 96 % | SYSTOLIC BLOOD PRESSURE: 118 MMHG | BODY MASS INDEX: 26.21 KG/M2

## 2022-10-18 DIAGNOSIS — R91.1 PULMONARY NODULE: Primary | ICD-10-CM

## 2022-10-18 PROCEDURE — 99215 OFFICE O/P EST HI 40 MIN: CPT | Performed by: INTERNAL MEDICINE

## 2022-10-18 PROCEDURE — 1123F ACP DISCUSS/DSCN MKR DOCD: CPT | Performed by: INTERNAL MEDICINE

## 2022-10-18 RX ORDER — ACETAMINOPHEN 160 MG
TABLET,DISINTEGRATING ORAL DAILY
COMMUNITY

## 2022-10-18 NOTE — PROGRESS NOTES
PULMONARY, CRITICAL CARE AND SLEEP MEDICINE     CC/REFERRING PROVIDER:  Patient is being seen at the request of Dr. Cj Grayson for a consultation for Pulmonary Nodules      PRESENTING HPI: 75 yo WF with h/o scoliosis and severe COVID-19 PNA requiring mechanical ventilation, had ECHO that raised concern for lung mass and resulted in CT CHEST @ [address] on 10/13/22 that showed multiple small Pulmonary Nodules. Here for evaluation of Pulmonary Nodules. Same day as CT had several day GI illness with N/V/D, all of which has resolved     Past Medical History:   Diagnosis Date    Arthritis     COVID-19 04/25/2021    Hypertension     Osteoporosis     Pneumonia 2017    PONV (postoperative nausea and vomiting)        Past Surgical History:   Procedure Laterality Date    ARM SURGERY Right 8/12/2020    RIGHT ULNAR NERVE DECOMPRESSION AT THE ELBOW performed by Luis Jeffery MD at [address] rods for scoliosis    CARPAL TUNNEL RELEASE Right 8/12/2020    RIGHT CARPAL TUNNEL RELEASE performed by Luis Jeffery MD at [address] Bilateral     cataracts    INNER EAR SURGERY Right     replacement of eardrum    TONSILLECTOMY         Allergies   Allergen Reactions    Penicillins Rash       Medication list was reviewed and updated as needed in Epic     reports that she has never smoked. She has never used smokeless tobacco.    family history includes Cancer in her father; Diabetes in her mother. REVIEW OF SYSTEMS: Complete Review of system reviewed with patient and noted on attached review of system sheet. PHYSICAL EXAM:  Blood pressure 118/84, pulse 84, height 4' 11\" (1.499 m), weight 130 lb (59 kg), SpO2 96 %.'  CONSTITUTIONAL:  No acute distress. HENT:  Oropharynx is clear and moist. No thyromegaly. EYES:  Conjunctivae are normal. Pupils equal, round, and reactive to light. No scleral icterus. NECK:  No tracheal deviation present. No obvious thyroid mass.    CV: Normal rate, regular rhythm, normal heart sounds. No right ventricular heave. No lower extremity edema. PULM/CHEST: No wheezes. No rales. Chest wall is not dull to percussion. No accessory muscle usage or stridor. Diminished BS   ABDOMINAL: Soft. Bowel sounds present. No distension or hernia. No tenderness. MUSCULOSKELETAL: No cyanosis. No clubbing. No obvious joint deformity. LYMPHATIC: No cervical or supraclavicular adenopathy. SKIN: Skin is warm and dry. No rash or nodules on the exposed extremities. PSYCHIATRIC: Normal mood and affect. Behavior is normal.  No anxiety. NEUROLOGIC: Alert, awake and oriented. PERRL. Speech fluent    DATA:  CT CHEST 10/13/22  Impression   1. No CT evidence of a pulmonary or mediastinal mass. 2. Stable chronic elevation of the left hemidiaphragm, with chronic   compressive atelectasis within the left upper and left lower lobes, may   account for the previously described echocardiogram finding. 3. Multifocal atelectasis and parenchymal scar, without acute airspace   consolidation. 4. Multiple new scattered nodules throughout the left lung, the largest   measuring 5 mm within the left upper lobe. Potential further follow-up of   these nodules is as advised below. 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 [employer] society recommendations were discussed.

## 2022-10-18 NOTE — Clinical Note
Ney Johnson,  We will f/u with CT Chest in 12 months for the small Pulmonary Nodules Thanks, Tabby Stevenson

## 2022-10-19 PROCEDURE — 93228 REMOTE 30 DAY ECG REV/REPORT: CPT | Performed by: INTERNAL MEDICINE

## 2022-10-21 DIAGNOSIS — R00.0 TACHYCARDIA: ICD-10-CM

## 2023-01-09 ENCOUNTER — OFFICE VISIT (OUTPATIENT)
Dept: ENT CLINIC | Age: 68
End: 2023-01-09
Payer: MEDICARE

## 2023-01-09 VITALS — RESPIRATION RATE: 16 BRPM | HEIGHT: 59 IN | WEIGHT: 130 LBS | TEMPERATURE: 97.2 F | BODY MASS INDEX: 26.21 KG/M2

## 2023-01-09 DIAGNOSIS — H72.02 CENTRAL PERFORATION OF TYMPANIC MEMBRANE OF LEFT EAR: Primary | ICD-10-CM

## 2023-01-09 DIAGNOSIS — H90.A31 MIXED CONDUCTIVE AND SENSORINEURAL HEARING LOSS OF RIGHT EAR WITH RESTRICTED HEARING OF LEFT EAR: ICD-10-CM

## 2023-01-09 PROCEDURE — 99213 OFFICE O/P EST LOW 20 MIN: CPT | Performed by: OTOLARYNGOLOGY

## 2023-01-09 PROCEDURE — 1123F ACP DISCUSS/DSCN MKR DOCD: CPT | Performed by: OTOLARYNGOLOGY

## 2023-01-09 ASSESSMENT — ENCOUNTER SYMPTOMS
WHEEZING: 0
ABDOMINAL PAIN: 0
SHORTNESS OF BREATH: 0

## 2023-01-09 NOTE — PATIENT INSTRUCTIONS
-Call with any drainage from the ears, ear pain or room spinning  -Return to ENT clinic in 4 months for repeat ear evaluation

## 2023-01-09 NOTE — PROGRESS NOTES
[facility], [address], [address], [facility] Sreekanth Mohan  P: [phone]       Patient     Amisha Verma  1955    Chief Concern     Chief Complaint   Patient presents with    Follow-up     States that she is here for a 3 month follow up and to have ears cleaned. Assessment and Plan      Diagnosis Orders   1. Central perforation of tympanic membrane of left ear        2. 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 female s/p right canal wall down mastoidectomy - no cholesteatoma noted, mastoid bowl debrided. Left tympanic membrane perforation (posterosuperior) with global pars tensa retraction. Patch myringoplasty performed at previous visit, however patch has extruded into the lateral canal, with 10% central perforation of the left tympanic membrane. We discussed tympanoplasty however the patient would like to proceed with observation - will see her back in 4 months. No follow-ups on file. History of Present Illness     Amisha Verma is a 74 y.o. female has a history of a right canal wall down mastoidectomy for cholesteatoma per Dr. Cherylene Mane at [facility] completed 2015 with revision tympanoplasty and TORP reconstruction 2016. Interval History 7/7/2022: No otorrhea, no otalgia, no aural fullness, decreased hearing in the right ear (states 90% loss). Was unable to complete audiometric testing due to shoulder surgery.     Interval history 10/3/2022: States significant improvement in hearing in the left ear following patch placement, no otorrhea, continuing dry ear precautions    Past Medical History     Past Medical History:   Diagnosis Date    Arthritis     COVID-19 04/25/2021    Hypertension     Osteoporosis     Pneumonia 2017    PONV (postoperative nausea and vomiting)     Tinnitus        Past Surgical History     Past Surgical History:   Procedure Laterality Date    ARM SURGERY Right 8/12/2020    RIGHT ULNAR NERVE DECOMPRESSION AT THE ELBOW performed by Eber Francis MD at [address] rods for scoliosis    CARPAL TUNNEL RELEASE Right 8/12/2020    RIGHT CARPAL TUNNEL RELEASE performed by Eber Francis MD at [address] Bilateral     cataracts    INNER EAR SURGERY Right     replacement of eardrum    TONSILLECTOMY         Family History     Family History   Problem Relation Age of Onset    Diabetes Mother     Cancer Father         pancreas       Social History     Social History     Tobacco Use    Smoking status: Never    Smokeless tobacco: Never   Vaping Use    Vaping Use: Never used   Substance Use Topics    Alcohol use: Yes     Comment: 0-1 drinks per month    Drug use: No        Allergies     Allergies   Allergen Reactions    Penicillins Rash       Medications     Current Outpatient Medications   Medication Sig Dispense Refill    Cholecalciferol (VITAMIN D3) 50 MCG (2000 UT) CAPS Take by mouth daily      metoprolol tartrate (LOPRESSOR) 50 MG tablet TAKE ONE TABLET BY MOUTH EVERY MORNING AND 1 TABLET BEFORE BEDTIME 60 tablet 11    levothyroxine (SYNTHROID) 25 MCG tablet Take 25 mcg by mouth daily      hydroxychloroquine (PLAQUENIL) 200 MG tablet Take 200 mg by mouth daily       albuterol sulfate HFA (PROAIR HFA) 108 (90 Base) MCG/ACT inhaler Inhale 2 puffs into the lungs every 6 hours as needed for Wheezing (Patient not taking: No sig reported) 1 Inhaler 0    pantoprazole (PROTONIX) 40 MG tablet Take 1 tablet by mouth every morning (before breakfast) (Patient not taking: No sig reported) 30 tablet 0    albuterol-ipratropium (COMBIVENT RESPIMAT)  MCG/ACT AERS inhaler Inhale 1 puff into the lungs every 4 hours as needed for Wheezing (Patient not taking: No sig reported) 1 Inhaler 0    clobetasol (TEMOVATE) 0.05 % cream Apply twice daily as needed to flared areas.  (Patient not taking: No sig reported)      vitamin D (ERGOCALCIFEROL) 1.25 MG (22664 UT) CAPS capsule  (Patient not taking: No sig reported)       No current facility-administered medications for this visit. Review of Systems     Review of Systems   Constitutional:  Negative for activity change, chills, diaphoresis, fatigue and fever. HENT:  Positive for hearing loss. Negative for congestion. Eyes:  Negative for visual disturbance. Respiratory:  Negative for shortness of breath and wheezing. Cardiovascular:  Negative for chest pain. Gastrointestinal:  Negative for abdominal pain. Musculoskeletal:  Negative for neck pain and neck stiffness. Skin:  Negative for rash. Neurological:  Negative for dizziness, light-headedness and headaches. Hematological:  Negative for adenopathy. PhysicalExam     Vitals:    01/09/23 1122   Resp: 16   Temp: 97.2 °F (36.2 °C)   TempSrc: Infrared   Weight: 130 lb (59 kg)   Height: 4' 11\" (1.499 m)       Physical Exam  Vitals reviewed. Constitutional:       Appearance: Normal appearance. HENT:      Head: Normocephalic and atraumatic. Right Ear: External ear normal. There is impacted cerumen. Left Ear: Ear canal and external ear normal. There is no impacted cerumen. Tympanic membrane is perforated. Ears:      Rodrigez exam findings: Lateralizes right. Right Rinne: BC > AC. Left Rinne: AC > BC. Comments: Left ear patch lateralized - 10% perforation posterior to the incudostapedial joint, central     Right mastoid bowl debrided, no bony exposure however inflammation of the lateral EAC. Nose: No congestion or rhinorrhea. Mouth/Throat:      Lips: Pink. No lesions. Mouth: Mucous membranes are moist. No oral lesions. Tongue: No lesions. Tongue does not deviate from midline. Palate: No mass and lesions. Pharynx: Oropharynx is clear. Uvula midline. No oropharyngeal exudate or posterior oropharyngeal erythema. Eyes:      Extraocular Movements: Extraocular movements intact. Pupils: Pupils are equal, round, and reactive to light. Musculoskeletal:      Cervical back: Normal range of motion and neck supple. Lymphadenopathy:      Cervical: No cervical adenopathy. Neurological:      Mental Status: She is alert. Cranial Nerves: No cranial nerve deficit. Data Review           Audiogram dated 9/27/19:                Procedure       Daysi Salcido MD  1/9/23    Portions of this note were dictated using Dragon.  There may be linguistic errors secondary to the use of this program.

## 2023-03-28 ENCOUNTER — OFFICE VISIT (OUTPATIENT)
Dept: CARDIOLOGY CLINIC | Age: 68
End: 2023-03-28
Payer: MEDICARE

## 2023-03-28 VITALS
BODY MASS INDEX: 26.21 KG/M2 | DIASTOLIC BLOOD PRESSURE: 74 MMHG | HEART RATE: 86 BPM | SYSTOLIC BLOOD PRESSURE: 112 MMHG | OXYGEN SATURATION: 97 % | WEIGHT: 130 LBS | HEIGHT: 59 IN

## 2023-03-28 DIAGNOSIS — Z86.79 HX OF ATRIAL TACHYCARDIA: Primary | ICD-10-CM

## 2023-03-28 PROCEDURE — 3074F SYST BP LT 130 MM HG: CPT | Performed by: INTERNAL MEDICINE

## 2023-03-28 PROCEDURE — 1123F ACP DISCUSS/DSCN MKR DOCD: CPT | Performed by: INTERNAL MEDICINE

## 2023-03-28 PROCEDURE — 99214 OFFICE O/P EST MOD 30 MIN: CPT | Performed by: INTERNAL MEDICINE

## 2023-03-28 PROCEDURE — 3078F DIAST BP <80 MM HG: CPT | Performed by: INTERNAL MEDICINE

## 2023-03-28 NOTE — PROGRESS NOTES
[facility]   Cardiac Follow-up    Referring Provider:  Kaye Virgen CNP     Chief Complaint   Patient presents with    6 Month Follow-Up    Hypertension    Shortness of Breath     On exertion         I saw originally 1/8/19. History of Present Illness: Reece Kaye, 89LF female who has PMH HTN, thyroid disease (Dr. 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 Prior saw Dr. Deborah G[facility]mez at [facility]. ECHO ([facility]) 11/22/17 OL=58-64%,  Normal diastolic function. Holter monitor 9/19/17 ([facility]) NSR with sinus tachycardia frequently present. No ventricular ectopy. Moderately frequent PAC's. No symptoms. .   Since last OV 11/20 she was  MHC for 16 days 4/25/21-5/11/21 with Covid Pneumonia requiring mechanical ventilation. REGGIE 9/22-10/6/22 showed predom SR with low PAC and PSVT burden. Most recent Echo 9/30/22 LVEF=55% with mild MR. Note ECHO showed excess soft tissue c/w possible pericardial fat pad. Follow up CT imaging did not demonstrate any mass in lung or mediastinum. She has pulmonary nodules and I referred her to Dr. Han Fuentes who will follow with serial CT scans. Today 3/28/23, patient has no complaints from a cardiovascular standpoint. Patient states she had episode on Saturday night were she felt SOB on exertion. She feels like this was related to her lungs due to paralyzed hemidiaphragm. She has had no further episodes. She states she can complete all her ADL'S with no issues. Patient denies current edema, chest pain, palpitations, dizziness or syncope. Patient is taking all cardiac medications as prescribed and tolerates them well. Past Medical History:   has a past medical history of Polymyalgia Rheumaica and Hypertension, osteoarthritis. Surgical History:    has a past surgical history that includes Inner ear surgery (Right). Social History: , lives in [facility]. Reports that she has never smoked.  She has never used smokeless

## 2023-03-28 NOTE — PATIENT INSTRUCTIONS
Plan:  Lab work reviewed in care everywhere  Current medications reviewed. No changes at this time. Refills given as warranted. Follow up with me as needed.

## 2023-04-24 NOTE — PROGRESS NOTES
sodium chloride flush, sodium chloride, promethazine **OR** ondansetron, polyethylene glycol, acetaminophen **OR** acetaminophen, guaiFENesin-dextromethorphan, glucose, dextrose, glucagon (rDNA), dextrose    Results:  CBC:   Recent Labs     04/25/21 2113 04/27/21  0445 04/28/21  0641   WBC 6.1 7.4 5.8   HGB 14.8 12.4 10.5*   HCT 44.2 37.7 31.6*   MCV 91.0 93.7 90.9    261 321     BMP:   Recent Labs     04/26/21 0548 04/27/21  0445 04/28/21  0641   * 140 140   K 3.5 3.6 3.3*    106 106   CO2 17* 21 24   BUN 10 15 21*   CREATININE <0.5* <0.5* <0.5*     LIVER PROFILE:   Recent Labs     04/25/21 2113 04/26/21 0548 04/27/21 0445 04/28/21  0641   * 112* 77* 45*   ALT 51* 55* 38 25   LIPASE 88.0*  --   --   --    BILIDIR  --   --  <0.2 <0.2   BILITOT 0.5 0.4 0.3 <0.2   ALKPHOS 119 102 97 79     Microbiology  4/25/2021 SARS-CoV-2 positive  4/25/2021 C. difficile negative  4/25/2021 blood NGTD  4/26/2021 urine mixed coral  4/27/2021 tracheal aspirate sent    Imaging:  Chest imaging was reviewed by me and showed CTPA 4/25/2021  Pulmonary Arteries: Evaluation is compromised by motion artifact.  No   definite evidence of intraluminal filling defect to suggest pulmonary   embolism.  Main pulmonary artery is normal in caliber. Mediastinum: Mediastinal and bilateral hilar lymph nodes are probably   reactive.  The heart and pericardium demonstrate no acute abnormality.  There   is no acute abnormality of the thoracic aorta. Lungs/pleura: There is no pneumothorax or pleural effusion.  There are patchy   airspace opacities throughout both lungs.  Elevation of the left   hemidiaphragm is again noted. Upper Abdomen: There is fatty infiltration of the liver.  The visualized   upper abdomen is otherwise unremarkable. Soft Tissues/Bones: No acute bone or soft tissue abnormality.  Spinal   fixation rods are again seen.       Impression:       1.  Motion limited study with no definite scan evidence for pulmonary embolus. 2. Bilateral airspace opacities probably represent a combination of   atelectasis and pneumonia. CT abdomen 4/25/2021  Impression:        1. Motion limited study. 2. Cholelithiasis and probable choledocholithiasis without evidence for acute   cholecystitis. 3. Nonobstructing left renal calculus. CXR 4/27/2021 ET tube okay, bilateral infiltrates, rods in back    ASSESSMENT:  · Acute hypoxemic respiratory failure -progressive  · COVID-19 pneumonia  · Abnormal CT: Mediastinal lymphadenopathy and parenchymal changes consistent with COVID-19 pneumonia  · Diarrhea  · Transaminitis - improved  · Hyperglycemia   · Tachyarrhythmia    PLAN:  COVID-19 isolation, droplet plus  Mechanical ventilation as per my orders. The ventilator was adjusted by me at the bedside for unstable, life threatening respiratory failure. IV Propofol for sedation, target RASS -2, with daily spontaneous awakening trial.  Fentanyl gtt as needed. Remdesevir D#4, LFT monitoring for high risk medication  CCP given 4/26/21, Tocilizumab given 4/27/2021  Decadron D#4, 6 mg daily   Ceftriaxone and azithromycin D#3/5, low clinical suspicion for superimposed bacterial pneumonia  Inhaled bronchodilators only as needed, MDI preferred  Replace electrolytes    Fluids today, concern for mild hypovolemia  Cardiology for tachycardia, I am concerned that this is not all sinus tach  Eventually will need f/u CT imaging of chest  · Prophylaxis: Lovenox 30 twice daily    Total critical care time caring for this patient with life threatening, unstable organ failure, including direct patient contact, management of life support systems, review of data including imaging and labs, discussions with other team members and physicians is 33 minutes so far today, excluding procedures. Cyclophosphamide Pregnancy And Lactation Text: This medication is Pregnancy Category D and it isn't considered safe during pregnancy. This medication is excreted in breast milk.

## 2023-05-11 ENCOUNTER — OFFICE VISIT (OUTPATIENT)
Dept: ENT CLINIC | Age: 68
End: 2023-05-11
Payer: MEDICARE

## 2023-05-11 VITALS — TEMPERATURE: 97.2 F | BODY MASS INDEX: 26.21 KG/M2 | RESPIRATION RATE: 16 BRPM | HEIGHT: 59 IN | WEIGHT: 130 LBS

## 2023-05-11 DIAGNOSIS — Z90.89 HISTORY OF RIGHT MASTOIDECTOMY: ICD-10-CM

## 2023-05-11 DIAGNOSIS — H72.02 CENTRAL PERFORATION OF TYMPANIC MEMBRANE OF LEFT EAR: Primary | ICD-10-CM

## 2023-05-11 PROBLEM — H95.191: Status: RESOLVED | Noted: 2022-01-06 | Resolved: 2023-05-11

## 2023-05-11 PROCEDURE — 1123F ACP DISCUSS/DSCN MKR DOCD: CPT | Performed by: OTOLARYNGOLOGY

## 2023-05-11 PROCEDURE — 99213 OFFICE O/P EST LOW 20 MIN: CPT | Performed by: OTOLARYNGOLOGY

## 2023-05-11 ASSESSMENT — ENCOUNTER SYMPTOMS
WHEEZING: 0
ABDOMINAL PAIN: 0
SHORTNESS OF BREATH: 0

## 2023-05-11 NOTE — PROGRESS NOTES
[facility], [address], [address], [address] [place] [address]  P: [phone]       Patient     Isidoro Reyes  1955    Chief Concern     Chief Complaint   Patient presents with    Follow-up     Patient states that she is here for a 4 month follow up on her ears. She states that she has been doing good. Assessment and Plan      Diagnosis Orders   1. Central perforation of tympanic membrane of left ear        2. History of right mastoidectomy          79 y.o. female s/p right canal wall down mastoidectomy - no cholesteatoma noted, mastoid bowl debrided. Left tympanic membrane perforation (posterosuperior) with global pars tensa retraction. Patch myringoplasty performed at previous visit, however patch has extruded  with 10% central perforation of the left tympanic membrane is healing over with dimeric area. She would like to continue with observation at this time, we will have her be seen back in 6 months by one of my colleagues for mastoid bowl debridement. Return in about 6 months (around 11/11/2023). History of Present Illness     Isidoro Reyes is a 79 y.o. female has a history of a right canal wall down mastoidectomy for cholesteatoma per Dr. Mike Tan at [facility] completed 2015 with revision tympanoplasty and TORP reconstruction 2016. Interval History 7/7/2022: No otorrhea, no otalgia, no aural fullness, decreased hearing in the right ear (states 90% loss). Was unable to complete audiometric testing due to shoulder surgery.     Interval history 10/3/2022: States significant improvement in hearing in the left ear following patch placement, no otorrhea, continuing dry ear precautions    Interval history 5/11/2023: No changes in hearing     Past Medical History     Past Medical History:   Diagnosis Date    Arthritis     COVID-19 04/25/2021    Hypertension     Osteoporosis     Pneumonia 2017    PONV (postoperative nausea and vomiting)     Tinnitus        Past

## 2023-06-21 ENCOUNTER — HOSPITAL ENCOUNTER (OUTPATIENT)
Dept: WOMENS IMAGING | Age: 68
Discharge: HOME | End: 2023-06-21
Payer: MEDICARE

## 2023-06-21 DIAGNOSIS — Z12.31 ENCOUNTER FOR SCREENING MAMMOGRAM FOR BREAST CANCER: ICD-10-CM

## 2023-06-21 PROCEDURE — 77063 BREAST TOMOSYNTHESIS BI: CPT

## 2023-10-16 ENCOUNTER — HOSPITAL ENCOUNTER (OUTPATIENT)
Dept: CT IMAGING | Age: 68
Discharge: HOME | End: 2023-10-16
Payer: MEDICARE

## 2023-10-16 DIAGNOSIS — R91.1 PULMONARY NODULE: ICD-10-CM

## 2023-10-16 PROCEDURE — 71250 CT THORAX DX C-: CPT

## 2023-10-18 ENCOUNTER — TELEPHONE (OUTPATIENT)
Dept: PULMONOLOGY | Age: 68
End: 2023-10-18

## 2023-10-18 ENCOUNTER — OFFICE VISIT (OUTPATIENT)
Dept: PULMONOLOGY | Age: 68
End: 2023-10-18
Payer: MEDICARE

## 2023-10-18 VITALS
DIASTOLIC BLOOD PRESSURE: 80 MMHG | BODY MASS INDEX: 26.41 KG/M2 | HEART RATE: 81 BPM | OXYGEN SATURATION: 96 % | SYSTOLIC BLOOD PRESSURE: 110 MMHG | WEIGHT: 131 LBS | HEIGHT: 59 IN

## 2023-10-18 DIAGNOSIS — R91.1 PULMONARY NODULE: Primary | ICD-10-CM

## 2023-10-18 PROCEDURE — 1123F ACP DISCUSS/DSCN MKR DOCD: CPT | Performed by: INTERNAL MEDICINE

## 2023-10-18 PROCEDURE — 3078F DIAST BP <80 MM HG: CPT | Performed by: INTERNAL MEDICINE

## 2023-10-18 PROCEDURE — 99213 OFFICE O/P EST LOW 20 MIN: CPT | Performed by: INTERNAL MEDICINE

## 2023-10-18 PROCEDURE — 3074F SYST BP LT 130 MM HG: CPT | Performed by: INTERNAL MEDICINE

## 2023-10-18 RX ORDER — ALBUTEROL SULFATE 90 UG/1
2 AEROSOL, METERED RESPIRATORY (INHALATION) 4 TIMES DAILY PRN
Qty: 1 EACH | Refills: 0 | Status: SHIPPED | OUTPATIENT
Start: 2023-10-18

## 2023-10-18 NOTE — PROGRESS NOTES
PULMONARY, CRITICAL CARE AND SLEEP MEDICINE     CC/REFERRING PROVIDER:  Patient is being seen at the request of Dr. Jean Claude Trammell for a consultation for Pulmonary Nodules      Interval History 10/19/23  - Patient is here for follow up of pulmonary nodule and has obtained follow up [facility] scan at [facility]; since last clinic visit reporting no hemoptysis, unexplained weight loss, new nodules, masses or lumps, no lymphadenopathy, no unexplained fevers or nightsweats. PRESENTING HPI: 73 yo WF with h/o scoliosis and severe COVID-19 PNA requiring mechanical ventilation, had ECHO that raised concern for lung mass and resulted in CT CHEST @ [facility] on 10/13/22 that showed multiple small Pulmonary Nodules. Here for evaluation of Pulmonary Nodules. Same day as CT had several day GI illness with N/V/D, all of which has resolved      reports that she has never smoked. She has never used smokeless tobacco.      PHYSICAL EXAM:  Blood pressure 110/80, pulse 81, height 1.499 m (4' 11\"), weight 59.4 kg (131 lb), SpO2 96 %.'  Constitutional:  No acute distress   HEENT:  No scleral icterus  Neck:  No tracheal deviation present. Cardiovascular:  Normal heart sounds. Pulmonary/Chest:  No accessory muscle usage. No decreased breath sounds. No wheezes. No rhonchi. No rales. Abdominal:  Soft. Musculoskeletal:  No cyanosis. No clubbing. Skin:  Skin is warm and dry. Neuro: awake and alert      DATA:  CT CHEST 10/13/22  Impression   1. No CT evidence of a pulmonary or mediastinal mass. 2. Stable chronic elevation of the left hemidiaphragm, with chronic   compressive atelectasis within the left upper and left lower lobes, may   account for the previously described echocardiogram finding. 3. Multifocal atelectasis and parenchymal scar, without acute airspace   consolidation. 4. Multiple new scattered nodules throughout the left lung, the largest   measuring 5 mm within the left upper lobe.   Potential further

## 2023-10-19 NOTE — TELEPHONE ENCOUNTER
Pt was informed with verbal understanding. The [employer] is no longer covered with pt's insurance. So per TE from 12/28/18 pt is changed to [address] [place] [address]. Pt never picked up the 14 day samples of the 10mg dose so placed 2 separate scripts for the Jardiance per instruction. all other ROS negative except as per HPI

## 2023-10-19 NOTE — TELEPHONE ENCOUNTER
Message  Received: Today  MD Ty Garcia MA; Dinesh Blount [place]; Jez Tellez  Tell pt radiology has now reviewed CT and they agree with my assessment. No additional f/u is required.

## 2023-11-14 ENCOUNTER — OFFICE VISIT (OUTPATIENT)
Dept: ENT CLINIC | Age: 68
End: 2023-11-14
Payer: MEDICARE

## 2023-11-14 VITALS — HEIGHT: 59 IN | BODY MASS INDEX: 26.21 KG/M2 | RESPIRATION RATE: 16 BRPM | WEIGHT: 130 LBS

## 2023-11-14 DIAGNOSIS — H72.02 CENTRAL PERFORATION OF TYMPANIC MEMBRANE OF LEFT EAR: Primary | ICD-10-CM

## 2023-11-14 DIAGNOSIS — Z90.89 HISTORY OF RIGHT MASTOIDECTOMY: ICD-10-CM

## 2023-11-14 DIAGNOSIS — H90.A31 MIXED CONDUCTIVE AND SENSORINEURAL HEARING LOSS OF RIGHT EAR WITH RESTRICTED HEARING OF LEFT EAR: ICD-10-CM

## 2023-11-14 PROCEDURE — 69220 CLEAN OUT MASTOID CAVITY: CPT | Performed by: OTOLARYNGOLOGY

## 2023-11-14 PROCEDURE — 99213 OFFICE O/P EST LOW 20 MIN: CPT | Performed by: OTOLARYNGOLOGY

## 2023-11-14 PROCEDURE — 1123F ACP DISCUSS/DSCN MKR DOCD: CPT | Performed by: OTOLARYNGOLOGY

## 2023-11-14 ASSESSMENT — ENCOUNTER SYMPTOMS
VOICE CHANGE: 0
TROUBLE SWALLOWING: 0
SINUS PRESSURE: 0
FACIAL SWELLING: 0
COUGH: 0
SORE THROAT: 0
APNEA: 0
SHORTNESS OF BREATH: 0
EYE ITCHING: 0

## 2023-11-14 NOTE — PROGRESS NOTES
Psychiatric/Behavioral:  Negative for confusion, decreased concentration and sleep disturbance. PhysicalExam     Vitals:    11/14/23 1103   Resp: 16   Weight: 59 kg (130 lb)   Height: 1.499 m (4' 11\")       Physical Exam  Constitutional:       General: She is not in acute distress. Appearance: She is well-developed. HENT:      Head: Normocephalic and atraumatic. Right Ear: Ear canal and external ear normal. No drainage. There is impacted cerumen (Mastoid bowl with debris). Tympanic membrane is scarred (Postsurgical changes) and retracted. Left Ear: Ear canal and external ear normal. No drainage. No middle ear effusion. Tympanic membrane is perforated (Central tympanic membrane perforation over I-S joint, dry). Tympanic membrane is not bulging. Tympanic membrane has normal mobility. Nose: No mucosal edema or rhinorrhea. Mouth/Throat:      Lips: Pink. Mouth: Mucous membranes are moist.      Tongue: No lesions. Palate: No mass. Pharynx: Uvula midline. Eyes:      Pupils: Pupils are equal, round, and reactive to light. Neck:      Thyroid: No thyroid mass or thyromegaly. Trachea: Trachea and phonation normal.   Cardiovascular:      Pulses: Normal pulses. Pulmonary:      Effort: Pulmonary effort is normal. No accessory muscle usage or respiratory distress. Breath sounds: No stridor. Musculoskeletal:      Cervical back: Full passive range of motion without pain. Lymphadenopathy:      Head:      Right side of head: No submental or submandibular adenopathy. Left side of head: No submental or submandibular adenopathy. Cervical: No cervical adenopathy. Right cervical: No superficial, deep or posterior cervical adenopathy. Left cervical: No superficial, deep or posterior cervical adenopathy. Skin:     General: Skin is warm and dry. Neurological:      Mental Status: She is alert and oriented to person, place, and time.       Cranial

## 2024-05-14 ENCOUNTER — OFFICE VISIT (OUTPATIENT)
Dept: ENT CLINIC | Age: 69
End: 2024-05-14
Payer: MEDICARE

## 2024-05-14 VITALS
BODY MASS INDEX: 26.21 KG/M2 | HEIGHT: 59 IN | SYSTOLIC BLOOD PRESSURE: 124 MMHG | HEART RATE: 73 BPM | WEIGHT: 130 LBS | DIASTOLIC BLOOD PRESSURE: 64 MMHG

## 2024-05-14 DIAGNOSIS — Z90.89 HISTORY OF RIGHT MASTOIDECTOMY: Primary | ICD-10-CM

## 2024-05-14 DIAGNOSIS — H72.92 TYMPANIC MEMBRANE PERFORATION, LEFT: ICD-10-CM

## 2024-05-14 DIAGNOSIS — H90.A31 MIXED CONDUCTIVE AND SENSORINEURAL HEARING LOSS OF RIGHT EAR WITH RESTRICTED HEARING OF LEFT EAR: ICD-10-CM

## 2024-05-14 PROCEDURE — 3074F SYST BP LT 130 MM HG: CPT | Performed by: OTOLARYNGOLOGY

## 2024-05-14 PROCEDURE — 69220 CLEAN OUT MASTOID CAVITY: CPT | Performed by: OTOLARYNGOLOGY

## 2024-05-14 PROCEDURE — 3078F DIAST BP <80 MM HG: CPT | Performed by: OTOLARYNGOLOGY

## 2024-05-14 PROCEDURE — 99213 OFFICE O/P EST LOW 20 MIN: CPT | Performed by: OTOLARYNGOLOGY

## 2024-05-14 PROCEDURE — 1123F ACP DISCUSS/DSCN MKR DOCD: CPT | Performed by: OTOLARYNGOLOGY

## 2024-05-14 ASSESSMENT — ENCOUNTER SYMPTOMS
COUGH: 0
APNEA: 0
SHORTNESS OF BREATH: 0
SORE THROAT: 0
EYE ITCHING: 0
TROUBLE SWALLOWING: 0
FACIAL SWELLING: 0
SINUS PRESSURE: 0
VOICE CHANGE: 0

## 2024-05-14 NOTE — PROGRESS NOTES
[facility]  [address], [address]  [place] [address]  P: [phone]       Patient     Kim E Blocher  1955    ChiefComplaint     Chief Complaint   Patient presents with    Follow-up     6 month follow up for right ear, no pain or symtoms.       History of Present Illness     69-year-old female with history of right canal wall down mastoidectomy for cholesteatoma by Dr. Verduzco in 2015 with revision tympanoplasty and TORP in 2016.  Previously a patient of my partner Dr. Lewis last seen 5/11/2023.  Did have paper patch myringoplasty with partial take July 2022 for left central perforation.     Interval History 5/14/2024: Patient reports overall she is doing well no concerns with the ears.  Using hearing aids with good benefit.      Past Medical History     Past Medical History:   Diagnosis Date    Arthritis     COVID-19 04/25/2021    Hypertension     Osteoporosis     Pneumonia 2017    PONV (postoperative nausea and vomiting)     Tinnitus        Past Surgical History     Past Surgical History:   Procedure Laterality Date    ARM SURGERY Right 8/12/2020    RIGHT ULNAR NERVE DECOMPRESSION AT THE ELBOW performed by Wayne Jessica MD at [facility] OR    BACK SURGERY      Cason rods for scoliosis    CARPAL TUNNEL RELEASE Right 8/12/2020    RIGHT CARPAL TUNNEL RELEASE performed by Wayne Jessica MD at [facility] OR    EYE SURGERY Bilateral     cataracts    INNER EAR SURGERY Right     replacement of eardrum    TONSILLECTOMY         Family History     Family History   Problem Relation Age of Onset    Diabetes Mother     Cancer Father         pancreas       Social History     Social History     Tobacco Use    Smoking status: Never    Smokeless tobacco: Never   Vaping Use    Vaping Use: Never used   Substance Use Topics    Alcohol use: Yes     Comment: 0-1 drinks per month    Drug use: No        Allergies     Allergies   Allergen Reactions    Penicillins Rash       Medications

## 2024-11-12 ENCOUNTER — OFFICE VISIT (OUTPATIENT)
Dept: ENT CLINIC | Age: 69
End: 2024-11-12

## 2024-11-12 VITALS
DIASTOLIC BLOOD PRESSURE: 74 MMHG | WEIGHT: 132 LBS | BODY MASS INDEX: 26.61 KG/M2 | HEART RATE: 71 BPM | SYSTOLIC BLOOD PRESSURE: 134 MMHG | HEIGHT: 59 IN

## 2024-11-12 DIAGNOSIS — H90.A31 MIXED CONDUCTIVE AND SENSORINEURAL HEARING LOSS OF RIGHT EAR WITH RESTRICTED HEARING OF LEFT EAR: ICD-10-CM

## 2024-11-12 DIAGNOSIS — Z90.89 HISTORY OF RIGHT MASTOIDECTOMY: Primary | ICD-10-CM

## 2024-11-12 DIAGNOSIS — H72.92 TYMPANIC MEMBRANE PERFORATION, LEFT: ICD-10-CM

## 2024-11-12 RX ORDER — CIPROFLOXACIN AND DEXAMETHASONE 3; 1 MG/ML; MG/ML
4 SUSPENSION/ DROPS AURICULAR (OTIC) 2 TIMES DAILY
Qty: 1 EACH | Refills: 0 | Status: SHIPPED | OUTPATIENT
Start: 2024-11-12 | End: 2024-11-26

## 2024-11-12 ASSESSMENT — ENCOUNTER SYMPTOMS
EYE ITCHING: 0
VOICE CHANGE: 0
SORE THROAT: 0
SINUS PRESSURE: 0
SHORTNESS OF BREATH: 0
COUGH: 0
TROUBLE SWALLOWING: 0
FACIAL SWELLING: 0
APNEA: 0

## 2024-11-12 NOTE — PROGRESS NOTES
[facility]  [address], [address]  [place] [address]  P: [phone]       Patient     Kim E Blocher  1955    ChiefComplaint     Chief Complaint   Patient presents with    Follow-up     Left ear 6 month follow up.       History of Present Illness     69-year-old female with history of right canal wall down mastoidectomy for cholesteatoma by Dr. Verduzco in 2015 with revision tympanoplasty and TORP in 2016.  Previously a patient of my partner Dr. Lewis last seen 5/11/2023.  Did have paper patch myringoplasty with partial take July 2022 for left central perforation.     Interval History 5/14/2024: Patient reports overall she is doing well no concerns with the ears.  Using hearing aids with good benefit.    Interval history 11/12/2024: Patient states she is doing well using hearing aids.  Occasionally has drainage from the left ear.    Past Medical History:   Diagnosis Date    Arthritis     COVID-19 04/25/2021    Hypertension     Osteoporosis     Pneumonia 2017    PONV (postoperative nausea and vomiting)     Tinnitus        Past Surgical History     Past Surgical History:   Procedure Laterality Date    ARM SURGERY Right 8/12/2020    RIGHT ULNAR NERVE DECOMPRESSION AT THE ELBOW performed by Wayne Jessica MD at [facility]    BACK SURGERY      Cason rods for scoliosis    CARPAL TUNNEL RELEASE Right 8/12/2020    RIGHT CARPAL TUNNEL RELEASE performed by Wayne Jessica MD at [facility] OR    EYE SURGERY Bilateral     cataracts    INNER EAR SURGERY Right     replacement of eardrum    TONSILLECTOMY         Family History     Family History   Problem Relation Age of Onset    Diabetes Mother     Cancer Father         pancreas       Social History     Social History     Tobacco Use    Smoking status: Never    Smokeless tobacco: Never   Vaping Use    Vaping status: Never Used   Substance Use Topics    Alcohol use: Yes     Comment: 0-1 drinks per month    Drug use: No        Allergies 
No

## 2024-12-11 ENCOUNTER — OFFICE VISIT (OUTPATIENT)
Dept: ENT CLINIC | Age: 69
End: 2024-12-11
Payer: MEDICARE

## 2024-12-11 VITALS
HEIGHT: 59 IN | SYSTOLIC BLOOD PRESSURE: 129 MMHG | BODY MASS INDEX: 26.61 KG/M2 | WEIGHT: 132 LBS | DIASTOLIC BLOOD PRESSURE: 82 MMHG | HEART RATE: 92 BPM

## 2024-12-11 DIAGNOSIS — H71.91 CHOLESTEATOMA OF RIGHT EAR: ICD-10-CM

## 2024-12-11 DIAGNOSIS — H90.6 MIXED CONDUCTIVE AND SENSORINEURAL HEARING LOSS OF BOTH EARS: Primary | ICD-10-CM

## 2024-12-11 PROCEDURE — 3079F DIAST BP 80-89 MM HG: CPT | Performed by: OTOLARYNGOLOGY

## 2024-12-11 PROCEDURE — 99213 OFFICE O/P EST LOW 20 MIN: CPT | Performed by: OTOLARYNGOLOGY

## 2024-12-11 PROCEDURE — 3074F SYST BP LT 130 MM HG: CPT | Performed by: OTOLARYNGOLOGY

## 2024-12-11 PROCEDURE — 1123F ACP DISCUSS/DSCN MKR DOCD: CPT | Performed by: OTOLARYNGOLOGY

## 2024-12-11 PROCEDURE — 1159F MED LIST DOCD IN RCRD: CPT | Performed by: OTOLARYNGOLOGY

## 2024-12-11 NOTE — PROGRESS NOTES
[facility]  DIVISION OF OTOLARYNGOLOGY- HEAD & NECK SURGERY  Follow up      Patient Name: Kim E Blocher  Medical Record Number:  [id number]  Primary Care Physician:  Vanna Denise APRN - CNP  Date of Consultation: 12/11/2024    Chief Complaint: Ear issues        Interval History    Patient is following up for her ears.  She has a complicated past ear history.  She had a right canal wall down tympanomastoidectomy years ago.  She has been seen by multiple ear nose and throat doctors here at [facility] since then.  She had some concern for a recurrent cholesteatoma in the middle ear space when she was seen by my partner Dr. Cain in November.  Subjectively the patient says she feels like it is her normal issues she has had with her ear.  She said that she has hearing loss on the left side, has never had surgery.  She has bilateral hearing aids.          REVIEW OF SYSTEMS  As above     PHYSICAL EXAM  GENERAL: No Acute Distress, Alert and Oriented, no Hoarseness, strong voice  EYES: EOMI, Anti-icteric  HENT:   Head: Normocephalic and atraumatic.   Face:  Symmetric, facial nerve intact, no sinus tenderness  Ears: See below          REVIEW OF MEDICAL RECORDS  I reviewed her medical records from previous ear nose and throat doctors including surgical history        PROCEDURE  Bilateral ear exam  Right ear was visualized with the binoculars scope.  Some crusting was removed from the mastoid cavity.  She had some adherent crusting on the tympanic membrane itself that is able to remove with a Cardoso needle and alligator forceps.  Tympanic membrane is very scarred and part of his retracted onto what appears to be the incus.  There is not an obvious cholesteatoma.  On the left side she has a posterior perforation.  Through the perforation you can see the stapedial tendon.  There is no evidence of cholesteatoma        ASSESSMENT/PLAN  1. Mixed conductive and sensorineural hearing loss of both ears  I was able to remove a lot of

## 2025-02-13 ENCOUNTER — TELEPHONE (OUTPATIENT)
Dept: ENDOCRINOLOGY | Age: 70
End: 2025-02-13

## 2025-02-13 NOTE — TELEPHONE ENCOUNTER
Fax from [facility] sending referral to see Dr. Peres     Dx- Hypothyroidism    Referring Vanna Lr CNP

## 2025-03-15 ENCOUNTER — OFFICE VISIT (OUTPATIENT)
Age: 70
End: 2025-03-15

## 2025-03-15 VITALS
WEIGHT: 132 LBS | OXYGEN SATURATION: 96 % | HEART RATE: 110 BPM | TEMPERATURE: 97.4 F | DIASTOLIC BLOOD PRESSURE: 62 MMHG | HEIGHT: 59 IN | SYSTOLIC BLOOD PRESSURE: 110 MMHG | BODY MASS INDEX: 26.61 KG/M2
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
## 2025-03-15 DIAGNOSIS — R52 PAIN: ICD-10-CM

## 2025-03-15 DIAGNOSIS — M25.522 LEFT ELBOW PAIN: ICD-10-CM

## 2025-03-15 DIAGNOSIS — M25.422 EFFUSION OF BURSA OF LEFT ELBOW: Primary | ICD-10-CM

## 2025-03-15 NOTE — PROGRESS NOTES
Kim E Blocher (: 1955) is a 69 y.o. female, Established patient, here for evaluation of the following chief complaint(s):  Fall (Fell Thursday, Left elbow pain. Fell on concrete block)        ASSESSMENT/PLAN:    ICD-10-CM    1. Effusion of bursa of left elbow  M25.422       2. Left elbow pain  M25.522 [facility]      3. Fall, initial encounter  W19.XXXA [facility]      4. Pain  R52 XR ELBOW LEFT (2 VIEWS)     XR SHOULDER LEFT (MIN 2 VIEWS)            Discussed PCP follow up for persisting or worsening symptoms, or to return to the clinic if unable to obtain PCP follow up for worsening symptoms.    The patient tolerated their visit well. The patient and  were informed of the results of any tests. A time was given to answer questions and a plan was established, proposed, and was agreed upon. Reviewed AVS with treatment instructions and answered questions - patient and  acknowledges understanding and agreement with the discussed treatment plan and AVS instructions.      SUBJECTIVE/OBJECTIVE:  HPI:   69 y.o. female presents for complaint of left elbow pain x 2 days.    Admits fell 2 days ago on left side. Hit left elbow on concrete curb while going down. Has had pain there ever since. Pain is there constantly, hurts with ROM and cannot hold things. Did have a small abrasion on left elbow following fall.   Denies fever, previous injury in left shoulder/elbow, radiating pain,     Tramadol makes symptoms better.  Movement, ROM makes symptoms worse.    Has attempted Tramadol, Tylenol for symptoms with moderate improvement    Pt provided HPI by themself - pt considered to be a reliable historian.        Fall        VITAL SIGNS  Vitals:    03/15/25 1112   BP: 110/62   Pulse: (!) 110   Temp: 97.4 °F (36.3 °C)   TempSrc: Temporal   SpO2: 96%   Weight: 59.9 kg (132 lb)   Height: 1.499 m (4' 11\")       Review of Systems

## 2025-03-17 SDOH — HEALTH STABILITY: PHYSICAL HEALTH: ON AVERAGE, HOW MANY DAYS PER WEEK DO YOU ENGAGE IN MODERATE TO STRENUOUS EXERCISE (LIKE A BRISK WALK)?: 0 DAYS

## 2025-03-17 SDOH — HEALTH STABILITY: PHYSICAL HEALTH: ON AVERAGE, HOW MANY MINUTES DO YOU ENGAGE IN EXERCISE AT THIS LEVEL?: 0 MIN

## 2025-03-19 ENCOUNTER — OFFICE VISIT (OUTPATIENT)
Dept: ORTHOPEDIC SURGERY | Age: 70
End: 2025-03-19
Payer: MEDICARE

## 2025-03-19 DIAGNOSIS — M25.422 SWELLING OF LEFT ELBOW: Primary | ICD-10-CM

## 2025-03-19 PROCEDURE — 99204 OFFICE O/P NEW MOD 45 MIN: CPT | Performed by: PHYSICIAN ASSISTANT

## 2025-03-19 PROCEDURE — 1159F MED LIST DOCD IN RCRD: CPT | Performed by: PHYSICIAN ASSISTANT

## 2025-03-19 PROCEDURE — 1123F ACP DISCUSS/DSCN MKR DOCD: CPT | Performed by: PHYSICIAN ASSISTANT

## 2025-03-19 NOTE — PROGRESS NOTES
Ms. Kim E Blocher is a 69 y.o. right handed woman  who is seen today in Hand Surgical Consultation at the request of Vanna Denise APRN - CNP.    She is seen today regarding an injury occurring on March 15th, 2025.  She reports injuring her left Elbow, having  fallen.   She did not note any neurologic symptoms at that time.  She was seen for Emergency evaluation elsewhere where radiographs were obtained & she was immobilized.  She reports mild pain located in the  diffuse elbow , there is not tenderness at the wrist, no tenderness elsewhere in the injured extremity.  She notes today, no neurologic symptoms in the Whole Hand. Symptoms are improving over time.  She has had an ace bandage wrapped around her elbow since being seen at an urgent care.     I have today reviewed with Kim E Blocher the clinically relevant, past medical history, medications, allergies,  family history, social history, and Review Of Systems & I have documented any details relevant to today's presenting complaints in my history above.  Ms. Kim E Blocher's self-reported past medical history, medications, allergies,  family history, social history, and Review Of Systems have been scanned into the chart under the \"Media\" tab.    Physical Exam:  Ms. Kim E Blocher's most recent vitals:       She is well nourished, oriented to person, place & time.  She demonstrates appropriate mood and affect as well as normal gait and station.    Skin: Normal in appearance, Normal Color, and Normal Texture in the injured limb, normal on the contralateral side  Digital range of motion is Full bilaterally  Wrist range of motion is full bilaterally  Elbow range of motion is stiff from immobilization on the left. Limitation in flexion and extension. No limitation or pain with supination or pronation. Full ROM on the right.    Sensation is subjectively normal in the Whole Hand bilaterally  Vascular examination reveals normal, good capillary refill, and good color

## 2025-06-11 ENCOUNTER — OFFICE VISIT (OUTPATIENT)
Dept: ENT CLINIC | Age: 70
End: 2025-06-11

## 2025-06-11 VITALS
BODY MASS INDEX: 24.8 KG/M2 | DIASTOLIC BLOOD PRESSURE: 71 MMHG | SYSTOLIC BLOOD PRESSURE: 131 MMHG | WEIGHT: 123 LBS | HEIGHT: 59 IN

## 2025-06-11 DIAGNOSIS — H90.6 MIXED CONDUCTIVE AND SENSORINEURAL HEARING LOSS OF BOTH EARS: Primary | ICD-10-CM

## 2025-06-11 DIAGNOSIS — H61.21 IMPACTED CERUMEN OF RIGHT EAR: ICD-10-CM

## 2025-06-11 NOTE — PROGRESS NOTES
[facility]  DIVISION OF OTOLARYNGOLOGY- HEAD & NECK SURGERY  Follow up      Patient Name: Kim E Blocher  Medical Record Number:  [id number]  Primary Care Physician:  Vanna Denise APRN - CNP  Date of Consultation: 6/11/2025    Chief Complaint: Ear issues        Interval History  She is following up for her ears.  She has had a canal wall down surgery on the right side and has a perforation on the left.  She says she has been doing well.  No drainage or pain.            REVIEW OF SYSTEMS  As above    PHYSICAL EXAM  GENERAL: No Acute Distress, Alert and Oriented, no Hoarseness, strong voice  EYES: EOMI, Anti-icteric  HENT:   Head: Normocephalic and atraumatic.   Face:  Symmetric, facial nerve intact, no sinus tenderness  Ears: See below        PROCEDURE  Bilateral ear exam and cerumen removal  Right ear was visualized binoculars ago.  Some cerumen was removed in the mastoid cavity with a cerumen loop.  Retracted the residual tympanic membrane without evidence of cholesteatoma.  On the left side she has a stable posterior perforation.  You can see the stapedial tendon.  No drainage        ASSESSMENT/PLAN  1. Mixed conductive and sensorineural hearing loss of both ears  Her ear is actually really good today.  I will plan on seeing her again in 6 months with an audiogram.    2. Impacted cerumen of right ear  Removed today in clinic             I have performed a head and neck physical exam personally or was physically present during the key or critical portions of the service.    This note was generated completely or in part utilizing Dragon dictation speech recognition software.  Occasionally, words are mistranscribed and despite editing, the text may contain inaccuracies due to incorrect word recognition.  If further clarification is needed please contact the office at [phone].

## 2025-06-18 ENCOUNTER — OFFICE VISIT (OUTPATIENT)
Dept: ENDOCRINOLOGY | Age: 70
End: 2025-06-18
Payer: MEDICARE

## 2025-06-18 VITALS
HEART RATE: 82 BPM | SYSTOLIC BLOOD PRESSURE: 148 MMHG | BODY MASS INDEX: 24.8 KG/M2 | WEIGHT: 123 LBS | DIASTOLIC BLOOD PRESSURE: 80 MMHG | HEIGHT: 59 IN

## 2025-06-18 DIAGNOSIS — M81.0 AGE-RELATED OSTEOPOROSIS WITHOUT CURRENT PATHOLOGICAL FRACTURE: Primary | ICD-10-CM

## 2025-06-18 DIAGNOSIS — E55.9 HYPOVITAMINOSIS D: ICD-10-CM

## 2025-06-18 DIAGNOSIS — E03.9 HYPOTHYROIDISM (ACQUIRED): ICD-10-CM

## 2025-06-18 PROCEDURE — 99204 OFFICE O/P NEW MOD 45 MIN: CPT | Performed by: INTERNAL MEDICINE

## 2025-06-18 PROCEDURE — G2211 COMPLEX E/M VISIT ADD ON: HCPCS | Performed by: INTERNAL MEDICINE

## 2025-06-18 PROCEDURE — 1159F MED LIST DOCD IN RCRD: CPT | Performed by: INTERNAL MEDICINE

## 2025-06-18 PROCEDURE — 3077F SYST BP >= 140 MM HG: CPT | Performed by: INTERNAL MEDICINE

## 2025-06-18 PROCEDURE — 3079F DIAST BP 80-89 MM HG: CPT | Performed by: INTERNAL MEDICINE

## 2025-06-18 PROCEDURE — 1123F ACP DISCUSS/DSCN MKR DOCD: CPT | Performed by: INTERNAL MEDICINE

## 2025-06-18 RX ORDER — LEVOTHYROXINE SODIUM 25 UG/1
25 TABLET ORAL DAILY
Qty: 90 TABLET | Refills: 3 | Status: SHIPPED | OUTPATIENT
Start: 2025-06-18

## 2025-06-18 NOTE — PROGRESS NOTES
in attendance with the patient were advised that Artificial Intelligence will be utilized during this visit to record, process the conversation to generate a clinical note, and support improvement of the AI technology. The patient (or guardian, if applicable) and other individuals in attendance at the appointment consented to the use of AI, including the recording.      Comment: This documentation utilized a software-based speech recognition technology (voice-to-text process), where occasional errors in transcription can occur.

## 2025-06-18 NOTE — PATIENT INSTRUCTIONS
Physical activity: please continue weight-bearing, strength training, balance training exercises (at least 3 times per week).     Take a look at [facility] videos on exercise and safe movement (at the bottom there is a link to videos).  [email or web address]    Calcium: continue food sources of calcium and calcium supplement to reach a goal calcium intake of 1200 mg daily.    Calcium Sources in Food  Here is a good on-line calcium calculator:   [email or web address].ca/calcium-[email or web address]  Calcium Calculator  Osteoporosis Yas   You can also search calcium calculators online or download to your smart device.    A short list of foods that are good sources of calcium. Please check the food label.  Food Amount Mg Calcium   Cow's Milk (skim, 1%, 2%, whole) 1 cup (8oz) 300  Paola, soy, rice, oat, etc. milk 1 cup 300-600   Yogurt, plain 3/4 cup(6oz) 300  Processed cheese 1 slice(thin) 115  Cheese (cheddar, gouda, swiss) 1.5 oz 245  Parmesan cheese, grated 1 tablespoon 70  Cottage cheese 1 cup 150  Ginny greens, cooked 1/2 cup 133  Kale, cooked 1/2 cup 49  Black-eyed peas, boiled 1 cup 211  Baked beans, plain canned 1 cup 86  Green peas, boiled 1 cup 94  Orange juice, calcium-fortified 1 cup (8oz) 300  Oranges 1 medium 50  Almonds 1/4 cup 93  Bread, white 2 slices 106  Oatmeal, instant (calcium-added) 1 pouch 150  Canned salmon with bones 3 oz 181  Calcium-set tofu 3 oz (¼ block) 130    For more general information about osteoporosis and bone health, please take a look at [facility] ([facility]). Here is the web site address for [facility]: [email or web address] and their patient education pamphlet, \"Boning Up on Osteoporosis\".   [email or web address]

## (undated) DEVICE — GLOVE SURG SZ 65 THK91MIL LTX FREE SYN POLYISOPRENE

## (undated) DEVICE — ZIMMER® STERILE DISPOSABLE TOURNIQUET CUFF WITH PLC, DUAL PORT, SINGLE BLADDER, 18 IN. (46 CM)

## (undated) DEVICE — PADDING CAST W4INXL4YD ST COT RAYON MICROPLEATED HIGHLY

## (undated) DEVICE — SET GRAV VENT NVENT CK VLV 3 NDL FREE PRT 10 GTT

## (undated) DEVICE — SUTURE MCRYL SZ 3-0 L27IN ABSRB UD L26MM SH 1/2 CIR Y416H

## (undated) DEVICE — 10FR FRAZIER SUCTION HANDLE: Brand: CARDINAL HEALTH

## (undated) DEVICE — 3M™ COBAN™ NL STERILE NON-LATEX SELF-ADHERENT WRAP, 2084S, 4 IN X 5 YD (10 CM X 4,5 M), 18 ROLLS/CASE: Brand: 3M™ COBAN™

## (undated) DEVICE — PADDING CAST W4INXL4YD NONSTERILE COT RAYON MICROPLEATED

## (undated) DEVICE — CORD ES L12FT BPLR FRCP

## (undated) DEVICE — 1200CC HIFLOW SUCTION CANISTER WITH AEROSTAT FILTER, FLOAT VALVE SHUTOFF WITH GREEN LID: Brand: BEMIS

## (undated) DEVICE — BANDAGE COMPR W3INXL5YD HI E BGE W/ CLP SURE-WRAP

## (undated) DEVICE — CATHETER IV 20GA L1.25IN PNK FEP SFTY STR HUB RADPQ DISP

## (undated) DEVICE — SET ADMIN PRIMING 7ML L30IN 7.35LB 20 GTT 2ND RLER CLMP

## (undated) DEVICE — GOWN,SIRUS,NON REINFRCD,LARGE,SET IN SL: Brand: MEDLINE

## (undated) DEVICE — SOLUTION IV 1000ML LAC RINGERS PH 6.5 INJ USP VIAFLX PLAS

## (undated) DEVICE — MEDI-VAC NON-CONDUCTIVE SUCTION TUBING: Brand: CARDINAL HEALTH

## (undated) DEVICE — GLOVE SURG SZ 75 L12IN FNGR THK94MIL STD WHT LTX FREE

## (undated) DEVICE — ABDOMINAL PAD: Brand: DERMACEA

## (undated) DEVICE — 3M™ TEGADERM™ TRANSPARENT FILM DRESSING FRAME STYLE, 1624W, 2-3/8 IN X 2-3/4 IN (6 CM X 7 CM), 100/CT 4CT/CASE: Brand: 3M™ TEGADERM™

## (undated) DEVICE — NEEDLE HYPO 25GA L1.5IN BLU POLYPR HUB S STL REG BVL STR

## (undated) DEVICE — BANDAGE COMPR W4INXL5YD BGE HI E W/ REM CLP SURE-WRAP

## (undated) DEVICE — GLOVE,SURG,SENSICARE,ALOE,LF,PF,7: Brand: MEDLINE

## (undated) DEVICE — Device

## (undated) DEVICE — SPLINT ORTH W4XL30IN LAYERED FBRGLS FOAM PD BRTH BK MOLD

## (undated) DEVICE — TOWEL,OR,DSP,ST,BLUE,STD,8/PK,10PK/CS: Brand: MEDLINE

## (undated) DEVICE — SYRINGE, LUER LOCK, 10ML: Brand: MEDLINE

## (undated) DEVICE — MATERIAL PD W2XL4YD ST COT CAST SPLNT NONADHESIVE SPEC

## (undated) DEVICE — PAD CAST COTTON BLEND ST PKG 4INX4YD

## (undated) DEVICE — SOLUTION IV IRRIG 500ML 0.9% SODIUM CHL 2F7123

## (undated) DEVICE — SUTURE ETHLN SZ 4-0 L18IN NONABSORBABLE BLK L19MM PS-2 3/8 1667H

## (undated) DEVICE — GLOVE SURG SZ 65 L12IN FNGR THK79MIL GRN LTX FREE

## (undated) DEVICE — STANDARD HYPODERMIC NEEDLE,POLYPROPYLENE HUB: Brand: MONOJECT